# Patient Record
Sex: MALE | Race: BLACK OR AFRICAN AMERICAN | NOT HISPANIC OR LATINO | URBAN - METROPOLITAN AREA
[De-identification: names, ages, dates, MRNs, and addresses within clinical notes are randomized per-mention and may not be internally consistent; named-entity substitution may affect disease eponyms.]

---

## 2017-01-20 ENCOUNTER — EMERGENCY (EMERGENCY)
Facility: HOSPITAL | Age: 47
LOS: 0 days | Discharge: HOME | End: 2017-01-21
Attending: EMERGENCY MEDICINE
Payer: COMMERCIAL

## 2017-01-20 VITALS
SYSTOLIC BLOOD PRESSURE: 138 MMHG | RESPIRATION RATE: 19 BRPM | OXYGEN SATURATION: 99 % | DIASTOLIC BLOOD PRESSURE: 90 MMHG | TEMPERATURE: 98 F | HEIGHT: 73 IN | HEART RATE: 86 BPM | WEIGHT: 216.93 LBS

## 2017-01-20 DIAGNOSIS — E11.9 TYPE 2 DIABETES MELLITUS WITHOUT COMPLICATIONS: ICD-10-CM

## 2017-01-20 DIAGNOSIS — E78.00 PURE HYPERCHOLESTEROLEMIA, UNSPECIFIED: ICD-10-CM

## 2017-01-20 DIAGNOSIS — I10 ESSENTIAL (PRIMARY) HYPERTENSION: ICD-10-CM

## 2017-01-20 DIAGNOSIS — R31.9 HEMATURIA, UNSPECIFIED: ICD-10-CM

## 2017-01-20 DIAGNOSIS — Z79.4 LONG TERM (CURRENT) USE OF INSULIN: ICD-10-CM

## 2017-01-20 LAB
ALBUMIN SERPL ELPH-MCNC: 2.9 G/DL — LOW (ref 3.3–5)
ALP SERPL-CCNC: 539 U/L — HIGH (ref 40–120)
ALT FLD-CCNC: 199 U/L — HIGH (ref 12–78)
ANION GAP SERPL CALC-SCNC: 10 MMOL/L — SIGNIFICANT CHANGE UP (ref 5–17)
APPEARANCE UR: CLEAR — SIGNIFICANT CHANGE UP
AST SERPL-CCNC: 76 U/L — HIGH (ref 15–37)
BACTERIA # UR AUTO: ABNORMAL
BASOPHILS # BLD AUTO: 0.1 K/UL — SIGNIFICANT CHANGE UP (ref 0–0.2)
BASOPHILS NFR BLD AUTO: 0.8 % — SIGNIFICANT CHANGE UP (ref 0–2)
BILIRUB SERPL-MCNC: 0.3 MG/DL — SIGNIFICANT CHANGE UP (ref 0.2–1.2)
BILIRUB UR-MCNC: NEGATIVE — SIGNIFICANT CHANGE UP
BUN SERPL-MCNC: 16 MG/DL — SIGNIFICANT CHANGE UP (ref 7–23)
CALCIUM SERPL-MCNC: 9.2 MG/DL — SIGNIFICANT CHANGE UP (ref 8.5–10.1)
CHLORIDE SERPL-SCNC: 104 MMOL/L — SIGNIFICANT CHANGE UP (ref 96–108)
CO2 SERPL-SCNC: 26 MMOL/L — SIGNIFICANT CHANGE UP (ref 22–31)
COLOR SPEC: YELLOW — SIGNIFICANT CHANGE UP
CREAT SERPL-MCNC: 0.98 MG/DL — SIGNIFICANT CHANGE UP (ref 0.5–1.3)
DIFF PNL FLD: ABNORMAL
EOSINOPHIL # BLD AUTO: 0.3 K/UL — SIGNIFICANT CHANGE UP (ref 0–0.5)
EOSINOPHIL NFR BLD AUTO: 4.2 % — SIGNIFICANT CHANGE UP (ref 0–6)
EPI CELLS # UR: SIGNIFICANT CHANGE UP
GLUCOSE SERPL-MCNC: 215 MG/DL — HIGH (ref 70–99)
GLUCOSE UR QL: 1000 MG/DL
HCT VFR BLD CALC: 32.7 % — LOW (ref 39–50)
HGB BLD-MCNC: 11.5 G/DL — LOW (ref 13–17)
KETONES UR-MCNC: NEGATIVE — SIGNIFICANT CHANGE UP
LEUKOCYTE ESTERASE UR-ACNC: ABNORMAL
LYMPHOCYTES # BLD AUTO: 1.7 K/UL — SIGNIFICANT CHANGE UP (ref 1–3.3)
LYMPHOCYTES # BLD AUTO: 21.2 % — SIGNIFICANT CHANGE UP (ref 13–44)
MAGNESIUM SERPL-MCNC: 2.2 MG/DL — SIGNIFICANT CHANGE UP (ref 1.8–2.4)
MCHC RBC-ENTMCNC: 30.4 PG — SIGNIFICANT CHANGE UP (ref 27–34)
MCHC RBC-ENTMCNC: 35.2 GM/DL — SIGNIFICANT CHANGE UP (ref 32–36)
MCV RBC AUTO: 86.5 FL — SIGNIFICANT CHANGE UP (ref 80–100)
MONOCYTES # BLD AUTO: 0.9 K/UL — SIGNIFICANT CHANGE UP (ref 0–0.9)
MONOCYTES NFR BLD AUTO: 10.8 % — SIGNIFICANT CHANGE UP (ref 2–14)
NEUTROPHILS # BLD AUTO: 5.1 K/UL — SIGNIFICANT CHANGE UP (ref 1.8–7.4)
NEUTROPHILS NFR BLD AUTO: 62.9 % — SIGNIFICANT CHANGE UP (ref 43–77)
NITRITE UR-MCNC: NEGATIVE — SIGNIFICANT CHANGE UP
PH UR: 6 — SIGNIFICANT CHANGE UP (ref 4.8–8)
PLATELET # BLD AUTO: 324 K/UL — SIGNIFICANT CHANGE UP (ref 150–400)
POTASSIUM SERPL-MCNC: 4.3 MMOL/L — SIGNIFICANT CHANGE UP (ref 3.5–5.3)
POTASSIUM SERPL-SCNC: 4.3 MMOL/L — SIGNIFICANT CHANGE UP (ref 3.5–5.3)
PROT SERPL-MCNC: 7.7 GM/DL — SIGNIFICANT CHANGE UP (ref 6–8.3)
PROT UR-MCNC: 30 MG/DL
RBC # BLD: 3.78 M/UL — LOW (ref 4.2–5.8)
RBC # FLD: 10.7 % — LOW (ref 11–15)
RBC CASTS # UR COMP ASSIST: ABNORMAL /HPF (ref 0–4)
SODIUM SERPL-SCNC: 140 MMOL/L — SIGNIFICANT CHANGE UP (ref 135–145)
SP GR SPEC: 1.01 — SIGNIFICANT CHANGE UP (ref 1.01–1.02)
UROBILINOGEN FLD QL: 4 MG/DL
WBC # BLD: 8.1 K/UL — SIGNIFICANT CHANGE UP (ref 3.8–10.5)
WBC # FLD AUTO: 8.1 K/UL — SIGNIFICANT CHANGE UP (ref 3.8–10.5)
WBC UR QL: ABNORMAL

## 2017-01-20 PROCEDURE — 74177 CT ABD & PELVIS W/CONTRAST: CPT | Mod: 26

## 2017-01-20 PROCEDURE — 99285 EMERGENCY DEPT VISIT HI MDM: CPT

## 2017-01-20 NOTE — ED PROVIDER NOTE - PHYSICAL EXAMINATION
Gen: Alert, NAD  Head: NC, AT   Eyes: PERRL, EOMI, normal lids/conjunctiva  ENT: normal hearing, patent oropharynx without erythema/exudate, uvula midline  Neck: supple, no tenderness, Trachea midline  Pulm: Bilateral BS, normal resp effort, no wheeze/stridor/retractions  CV: RRR, no M/R/G, 2+ radial and dp pulses bl, no edema  Abd: soft, NT/ND, +BS, no hepatosplenomegaly  Mskel: extremities x4 with normal ROM and no joint effusions. no ctl spine ttp.   Skin: violaceous and erythematous papules on soles of feet, legs, and arms. none on palms. no lesions in the mucous membranes. none on torso.   Neuro: AAOx3, no sensory/motor deficits, CN 2-12 intact

## 2017-01-20 NOTE — ED PROVIDER NOTE - OBJECTIVE STATEMENT
Pertinent PMH/PSH/FHx/SHx and Review of Systems contained within:  46m hx htn, iddm, hld pw 1. hematuria - microscopy found by pmd today. no dysuria, penile trauma, discharge, perineal pain. 2. rash on bl feet, lets and arms - non itchy, non pruritic. no fever, chills, nausea, vomiting, abd pain. no new exposure to chemicals   Fh and Sh not otherwise contributory  ROS otherwise negative

## 2017-01-20 NOTE — ED PROVIDER NOTE - PROGRESS NOTE DETAILS
+RPR, was not treated, unable to reach patient, left a message to callback and mailgram sent today, will need treatment and further STI (HIV, etc.) testing unable to reach again, mailgram sent, reported to ADRIANO

## 2017-01-20 NOTE — ED ADULT TRIAGE NOTE - CHIEF COMPLAINT QUOTE
pt states he was seen by pmd and told that he has blood in his urine. pt also wants to be evaluated for generalized rash since Monday. pt has history of diabetes.

## 2017-01-20 NOTE — ED PROVIDER NOTE - MEDICAL DECISION MAKING DETAILS
hematuria may represent infection but unlikely given absence of symptoms. may represent bladder ca, also unlikely given age and patient is non smoker. perhaps nephritis? will check chem. rash disseminated gonorrhea? syphilis? will test for both. hematuria may represent infection but unlikely given absence of symptoms. may represent bladder ca, also unlikely given age and patient is non smoker. perhaps nephritis? will check chem. rash disseminated gonorrhea? syphilis? will test for both. patient has no pulmonary symptoms despite ct read. will have him follow with pmd for dedicated ct chest. will need cystoscopy for hematuria. will need follow up on lfts. would be worried about ca.

## 2017-01-20 NOTE — ED ADULT NURSE NOTE - OBJECTIVE STATEMENT
presented to ed from pmd to evaluate for blood in urine and upper abdominal pains with constipation. Red non itchy blotches on extreamities.

## 2017-01-21 VITALS
HEART RATE: 83 BPM | DIASTOLIC BLOOD PRESSURE: 94 MMHG | TEMPERATURE: 99 F | OXYGEN SATURATION: 95 % | SYSTOLIC BLOOD PRESSURE: 138 MMHG | RESPIRATION RATE: 18 BRPM

## 2017-01-22 LAB
CULTURE RESULTS: SIGNIFICANT CHANGE UP
RPR SER-TITR: (no result)
RPR SERPL-ACNC: REACTIVE
SPECIMEN SOURCE: SIGNIFICANT CHANGE UP
T PALLIDUM AB TITR SER: POSITIVE

## 2017-01-25 ENCOUNTER — EMERGENCY (EMERGENCY)
Facility: HOSPITAL | Age: 47
LOS: 0 days | Discharge: HOME | End: 2017-01-25
Attending: EMERGENCY MEDICINE
Payer: COMMERCIAL

## 2017-01-25 VITALS
HEIGHT: 73 IN | RESPIRATION RATE: 17 BRPM | DIASTOLIC BLOOD PRESSURE: 96 MMHG | HEART RATE: 100 BPM | OXYGEN SATURATION: 96 % | WEIGHT: 216.93 LBS | SYSTOLIC BLOOD PRESSURE: 153 MMHG | TEMPERATURE: 98 F

## 2017-01-25 VITALS
TEMPERATURE: 99 F | SYSTOLIC BLOOD PRESSURE: 142 MMHG | HEART RATE: 91 BPM | OXYGEN SATURATION: 97 % | RESPIRATION RATE: 17 BRPM | DIASTOLIC BLOOD PRESSURE: 85 MMHG

## 2017-01-25 DIAGNOSIS — Z79.4 LONG TERM (CURRENT) USE OF INSULIN: ICD-10-CM

## 2017-01-25 DIAGNOSIS — R31.9 HEMATURIA, UNSPECIFIED: ICD-10-CM

## 2017-01-25 DIAGNOSIS — I10 ESSENTIAL (PRIMARY) HYPERTENSION: ICD-10-CM

## 2017-01-25 DIAGNOSIS — E78.00 PURE HYPERCHOLESTEROLEMIA, UNSPECIFIED: ICD-10-CM

## 2017-01-25 DIAGNOSIS — A53.9 SYPHILIS, UNSPECIFIED: ICD-10-CM

## 2017-01-25 DIAGNOSIS — E11.9 TYPE 2 DIABETES MELLITUS WITHOUT COMPLICATIONS: ICD-10-CM

## 2017-01-25 LAB
C TRACH RRNA SPEC QL NAA+PROBE: SIGNIFICANT CHANGE UP
C TRACH RRNA SPEC QL NAA+PROBE: SIGNIFICANT CHANGE UP
GC AMPLIFICATION INTERPRETATION: SIGNIFICANT CHANGE UP
HIV 1 & 2 AB SERPL IA.RAPID: SIGNIFICANT CHANGE UP
N GONORRHOEA RRNA SPEC QL NAA+PROBE: SIGNIFICANT CHANGE UP
SPECIMEN SOURCE: SIGNIFICANT CHANGE UP

## 2017-01-25 PROCEDURE — 99284 EMERGENCY DEPT VISIT MOD MDM: CPT

## 2017-01-25 RX ORDER — PENICILLIN G BENZATHINE 1200000 [IU]/2ML
1.2 INJECTION, SUSPENSION INTRAMUSCULAR ONCE
Qty: 0 | Refills: 0 | Status: DISCONTINUED | OUTPATIENT
Start: 2017-01-25 | End: 2017-01-25

## 2017-01-25 RX ORDER — PENICILLIN G BENZATHINE 1200000 [IU]/2ML
2.4 INJECTION, SUSPENSION INTRAMUSCULAR ONCE
Qty: 0 | Refills: 0 | Status: COMPLETED | OUTPATIENT
Start: 2017-01-25 | End: 2017-01-25

## 2017-01-25 RX ADMIN — PENICILLIN G BENZATHINE 2.4 MILLION UNIT(S): 1200000 INJECTION, SUSPENSION INTRAMUSCULAR at 10:12

## 2017-01-25 RX ADMIN — Medication 100 MILLIGRAM(S): at 11:29

## 2017-01-25 NOTE — ED PROVIDER NOTE - MEDICAL DECISION MAKING DETAILS
Ddx: Confirmed syphillis, r/o HIV, already tested for other STI  Plan: Penicillin, HIV test, reiterated to pt need to have partner treated.

## 2017-08-02 ENCOUNTER — EMERGENCY (EMERGENCY)
Facility: HOSPITAL | Age: 47
LOS: 1 days | Discharge: HOME | End: 2017-08-02
Attending: EMERGENCY MEDICINE | Admitting: EMERGENCY MEDICINE
Payer: COMMERCIAL

## 2017-08-02 VITALS
DIASTOLIC BLOOD PRESSURE: 88 MMHG | OXYGEN SATURATION: 98 % | RESPIRATION RATE: 20 BRPM | SYSTOLIC BLOOD PRESSURE: 156 MMHG | HEART RATE: 96 BPM | TEMPERATURE: 99 F

## 2017-08-02 VITALS
SYSTOLIC BLOOD PRESSURE: 160 MMHG | TEMPERATURE: 98 F | DIASTOLIC BLOOD PRESSURE: 94 MMHG | HEART RATE: 103 BPM | OXYGEN SATURATION: 96 % | RESPIRATION RATE: 20 BRPM

## 2017-08-02 PROCEDURE — 99282 EMERGENCY DEPT VISIT SF MDM: CPT

## 2017-08-02 RX ORDER — CEPHALEXIN 500 MG
1 CAPSULE ORAL
Qty: 28 | Refills: 0
Start: 2017-08-02 | End: 2017-08-09

## 2017-08-02 NOTE — ED ADULT NURSE REASSESSMENT NOTE - NS ED NURSE REASSESS COMMENT FT1
Applied bacitracin under MD order to patients toe. Wrapped toe with gauze and cling. Patient states wrap feels comfortable- no redness to toes/decreased circulation/pain after wrap.

## 2017-08-02 NOTE — ED ADULT NURSE NOTE - CHPI ED SYMPTOMS NEG
no bleeding at site/no chills/no bleeding/no red streaks/no vomiting/no purulent drainage/no fever/no redness

## 2017-08-02 NOTE — ED PROVIDER NOTE - MEDICAL DECISION MAKING DETAILS
OPAL Izaguirre MD: Pt with PMH DM2 with blister between 2nd and 3rd L toes. Pt is concerned for infection. Plan is to give abx ppx, apply bacitracin and dressings and and to give wound care instructions. f/u with podiatrist and PMD recommended. Return precautions given to pt. Knows to f/u with PMD in 1-2 days and to return to the ED sooner for any worsening/concerning sx.

## 2017-08-02 NOTE — ED ADULT NURSE NOTE - OBJECTIVE STATEMENT
46M comes to ED c/o 46M comes to ED c/o left toe blister between 2nd and 3rd toes. He states yesterday he went to work and had no blisters, but when he got home from work, he found himself to have a bursted blister. He states he has slight pain to the toe. He cleaned the toe with betadine and wrapped it. He denies SOB/chest pain/N/V/D/Dizziness/abdominal pain/fever/chills/urinary symptoms. He has chronic numbness to toes. PMH hyperlipidemia, HTN and DM. He is a&ox4. On exam, patient has nontender soft abdomen, +pulses/motor/sensory all 4 extremities, open blister to left foot between second and third toe, neurologically intact. Will continue to monitor,

## 2017-08-02 NOTE — ED PROVIDER NOTE - PHYSICAL EXAMINATION
1x2cm open blister in web space between L 2nd and 3rd toes 1x2cm open blister in web space between L 2nd and 3rd toes. No surrounding erythema. No discharge. No closed blisters, bullae, petechiae, purpura.

## 2017-08-02 NOTE — ED PROVIDER NOTE - OBJECTIVE STATEMENT
Pt with PMH DM2 with blister between 2nd and 3rd L toes. Concerned for infection. Plan is to give ppx and give wound care instructions. f/y with podiatrist and PMD recommended.

## 2017-08-28 ENCOUNTER — EMERGENCY (EMERGENCY)
Facility: HOSPITAL | Age: 47
LOS: 1 days | End: 2017-08-28
Attending: EMERGENCY MEDICINE | Admitting: EMERGENCY MEDICINE
Payer: COMMERCIAL

## 2017-08-28 VITALS
OXYGEN SATURATION: 96 % | SYSTOLIC BLOOD PRESSURE: 151 MMHG | RESPIRATION RATE: 18 BRPM | DIASTOLIC BLOOD PRESSURE: 103 MMHG | TEMPERATURE: 98 F | HEART RATE: 83 BPM

## 2017-08-28 VITALS
HEART RATE: 92 BPM | TEMPERATURE: 99 F | DIASTOLIC BLOOD PRESSURE: 97 MMHG | SYSTOLIC BLOOD PRESSURE: 150 MMHG | RESPIRATION RATE: 20 BRPM | OXYGEN SATURATION: 97 %

## 2017-08-28 PROCEDURE — 99282 EMERGENCY DEPT VISIT SF MDM: CPT

## 2017-08-28 NOTE — ED PROVIDER NOTE - OBJECTIVE STATEMENT
46 M h/o DM, htn, p/w r great toe infection. Symptoms started 4 days ago with itching, applying otc anti-fungals with no relief. No also with toe nail dislocation and elevation. No fever/chills. no surrounding erythema. No drainage. Was unable to see his podiatrist so came to ED. denies pain, mild itching currently.

## 2017-08-28 NOTE — ED PROVIDER NOTE - SKIN COLOR
R toe with small crack at base of toe, no drainage, no surrounging erythema, also with superficial cracks in skin, toenail with disclocation and mild elevation, no streaking up leg or swelling noted, normal pulses

## 2017-08-28 NOTE — ED PROVIDER NOTE - MEDICAL DECISION MAKING DETAILS
46 M with fungal infection of toe with no evidence of cellulitis. Will tx for onychomycosis and podiatry follow up.

## 2017-08-28 NOTE — ED ADULT NURSE NOTE - PMH
Diabetes    DM (diabetes mellitus)    High cholesterol    HTN (hypertension)    HTN (hypertension)    Hyperlipidemia

## 2017-08-28 NOTE — ED PROVIDER NOTE - ATTENDING CONTRIBUTION TO CARE
emily quinones 46 yo c hx of dm, htn, rt great toe swelling, no redness, no spreading/streaking, 4 days. Pt unable to establish appt c pods until this week. No fever. No chills. Patient with onchymosis to toes. Requesting cream for nails.  mild fluctuance underneath left 1st nail with serous fluid expressed in medial cuticle, no purulence, non tender, no deformity, No pe findings of warmth/ redness, ncat, trachea midline, ctab, cooperative, mild  will get xray, will encourage  follow up with podiatry and dermatology, no acute life/limb-threatening infection, no gangrene   Will follow up on xray  reassess and disposition home as clinically indicated. emily quinones 48 yo c hx of dm, htn, rt great toe swelling, no redness, no spreading/streaking, 4 days. Pt unable to establish appt c pods until this week. No fever. No chills. Patient with oncomychosis to toes. Requesting cream for nails.  mild fluctuance underneath right 1st nail with serous fluid expressed in medial cuticle, intact pulses to dp and PT region, no purulence, non tender, no deformity, No pe findings of increased warmth/ redness, ncat, trachea midline, ctab, cooperative, mild  will get xray, will encourage  follow up with podiatry and dermatology, no acute life/limb-threatening infection, no gangrene   Will follow up on xray  reassess and disposition home as clinically indicated.    Patient eloped called for patient and no response

## 2017-08-28 NOTE — ED PROVIDER NOTE - PROGRESS NOTE DETAILS
Morad: XR had been ordered to r/o fx, patient called multiple times, patient no longer in ED, eloped prior to imaging

## 2017-08-28 NOTE — ED ADULT NURSE NOTE - OBJECTIVE STATEMENT
47 y/o male hx of DM, HTN HLD came in c/o pain to right big toe. As per it is swelling with discomfort, been using anti fungal med. Pt denies any fever/chill, no cp or SOB. Pulse present,

## 2018-03-31 ENCOUNTER — EMERGENCY (EMERGENCY)
Facility: HOSPITAL | Age: 48
LOS: 1 days | Discharge: HOME | End: 2018-03-31
Attending: EMERGENCY MEDICINE | Admitting: EMERGENCY MEDICINE
Payer: COMMERCIAL

## 2018-03-31 VITALS
RESPIRATION RATE: 16 BRPM | OXYGEN SATURATION: 100 % | TEMPERATURE: 98 F | SYSTOLIC BLOOD PRESSURE: 152 MMHG | DIASTOLIC BLOOD PRESSURE: 86 MMHG | HEART RATE: 95 BPM

## 2018-03-31 PROCEDURE — 73610 X-RAY EXAM OF ANKLE: CPT | Mod: 26,RT

## 2018-03-31 PROCEDURE — 99283 EMERGENCY DEPT VISIT LOW MDM: CPT

## 2018-03-31 PROCEDURE — 73630 X-RAY EXAM OF FOOT: CPT | Mod: 26,RT

## 2018-03-31 NOTE — ED PROVIDER NOTE - MEDICAL DECISION MAKING DETAILS
46 y/o male, s/p ankle injury 6 days prior. XR at that time demonstrated 2nd toe Fx with progressive pain and swelling. Evaluate for occult Fx vs sprain. Obtain XR ankle and foot r/o Fx. Anticipate D/C with crutches, Aircast, and scheduled podiatry f/u.

## 2018-03-31 NOTE — ED PROVIDER NOTE - PROGRESS NOTE DETAILS
MARIBEL Cheek: Discussed pt care with attending : 47yM w/ pmhx HTN, HLD, DM p/w right foot and ankle pain after twisting foot while walking on sand in [place] one week ago, was told he had fracture 2nd toe. Plan is to follow up xray, consult podiatry if needed. MARIBEL Cheek: Discussed with pt xray showing no acute fracture, placed pt in right ankle sprain, pt refused crutches. He has an appointment scheduled with his podiatrist for early next week. Agrees to d/c home with podiatry follow up

## 2018-03-31 NOTE — ED PROVIDER NOTE - PLAN OF CARE
Follow up with your primary care provider within 48-72 hours.    Recommend ortho follow up within the week. Referral list provided.   Rest, ice and elevate.  Take Motrin 600mg every 8hrs for pain with food.    Ambulate as tolerated using crutch assistance.   Worsening pain, swelling, numbness, weakness return to ER

## 2018-03-31 NOTE — ED ADULT TRIAGE NOTE - CHIEF COMPLAINT QUOTE
Pt injured his right ankle six days ago states he was told he has a fx in the toe pt c/o of worsening  pain. Pt inured his ankle on vacation.

## 2018-03-31 NOTE — ED PROVIDER NOTE - OBJECTIVE STATEMENT
46 y/o male, with PMHx of DM, HTN, and HLD, presents to the ED for right ankle pain x 6 days ago. Pt reports being, on vacation in [place], at the beach and twisting his ankle as he stepped in the sand. Visited nearby clinic and was Dx with Fx of 2nd toe. Has f/u with podiatry. Denies numbness, tingling, weakness, and any other complaints. No alcohol. No illicit drugs. Social alcohol use.

## 2018-03-31 NOTE — ED PROVIDER NOTE - LOWER EXTREMITY EXAM, RIGHT
Right ankle swelling with TTP over the medial malleolus at the posterior aspect at the distal 3 cm. Mild TTP at the 2nd toe. Antalgic gait but able to weight bear.

## 2018-03-31 NOTE — ED PROVIDER NOTE - CARE PLAN
Principal Discharge DX:	Ankle pain, right Principal Discharge DX:	Ankle pain, right  Assessment and plan of treatment:	Follow up with your primary care provider within 48-72 hours.    Recommend ortho follow up within the week. Referral list provided.   Rest, ice and elevate.  Take Motrin 600mg every 8hrs for pain with food.    Ambulate as tolerated using crutch assistance.   Worsening pain, swelling, numbness, weakness return to ER

## 2018-04-03 ENCOUNTER — APPOINTMENT (OUTPATIENT)
Dept: MRI IMAGING | Facility: CLINIC | Age: 48
End: 2018-04-03
Payer: COMMERCIAL

## 2018-04-03 ENCOUNTER — OUTPATIENT (OUTPATIENT)
Dept: OUTPATIENT SERVICES | Facility: HOSPITAL | Age: 48
LOS: 1 days | End: 2018-04-03
Payer: COMMERCIAL

## 2018-04-03 DIAGNOSIS — Z00.8 ENCOUNTER FOR OTHER GENERAL EXAMINATION: ICD-10-CM

## 2018-04-03 PROBLEM — Z00.00 ENCOUNTER FOR PREVENTIVE HEALTH EXAMINATION: Status: ACTIVE | Noted: 2018-04-03

## 2018-04-03 PROCEDURE — 73721 MRI JNT OF LWR EXTRE W/O DYE: CPT

## 2018-04-03 PROCEDURE — 73721 MRI JNT OF LWR EXTRE W/O DYE: CPT | Mod: 26,RT

## 2019-10-21 PROBLEM — E78.5 HYPERLIPIDEMIA, UNSPECIFIED: Chronic | Status: ACTIVE | Noted: 2017-08-02

## 2019-10-28 ENCOUNTER — APPOINTMENT (OUTPATIENT)
Dept: INTERNAL MEDICINE | Facility: CLINIC | Age: 49
End: 2019-10-28
Payer: COMMERCIAL

## 2019-10-28 VITALS
WEIGHT: 235 LBS | DIASTOLIC BLOOD PRESSURE: 86 MMHG | BODY MASS INDEX: 31.14 KG/M2 | HEIGHT: 73 IN | SYSTOLIC BLOOD PRESSURE: 122 MMHG | HEART RATE: 84 BPM | TEMPERATURE: 97.4 F

## 2019-10-28 DIAGNOSIS — Z78.9 OTHER SPECIFIED HEALTH STATUS: ICD-10-CM

## 2019-10-28 DIAGNOSIS — Z80.0 FAMILY HISTORY OF MALIGNANT NEOPLASM OF DIGESTIVE ORGANS: ICD-10-CM

## 2019-10-28 PROCEDURE — 99204 OFFICE O/P NEW MOD 45 MIN: CPT

## 2019-10-28 RX ORDER — ATORVASTATIN CALCIUM 10 MG/1
10 TABLET, FILM COATED ORAL
Qty: 90 | Refills: 0 | Status: ACTIVE | COMMUNITY
Start: 2019-10-28

## 2019-10-28 RX ORDER — ASPIRIN 81 MG/1
81 TABLET ORAL DAILY
Refills: 0 | Status: ACTIVE | COMMUNITY
Start: 2019-10-28

## 2019-10-28 RX ORDER — INSULIN ASPART 100 [IU]/ML
100 INJECTION, SOLUTION INTRAVENOUS; SUBCUTANEOUS
Refills: 0 | Status: ACTIVE | COMMUNITY
Start: 2019-10-28

## 2019-10-28 RX ORDER — NAPROXEN 500 MG/1
500 TABLET ORAL
Qty: 60 | Refills: 0 | Status: ACTIVE | COMMUNITY
Start: 2019-10-28

## 2019-10-28 RX ORDER — LOSARTAN POTASSIUM 100 MG/1
100 TABLET, FILM COATED ORAL
Qty: 90 | Refills: 0 | Status: ACTIVE | COMMUNITY
Start: 2019-10-28

## 2019-10-28 NOTE — REVIEW OF SYSTEMS
[Joint Pain] : joint pain [Joint Stiffness] : joint stiffness [Muscle Pain] : no muscle pain [Joint Swelling] : no joint swelling [Negative] : Heme/Lymph [FreeTextEntry9] : L knee pain see HPI

## 2019-10-28 NOTE — ASSESSMENT
[Patient NOT optimized for Surgery at this time] : Patient not optimized for surgery at this time [Other: _____] : [unfilled] [As per surgery] : as per surgery [FreeTextEntry4] : Mr. PRADO  is a 48 year-old male  with no significant history of coronary artery disease.  He  denies chest pain, palpitations or shortness of breath and  his EKG today is normal. His diabetes is uncontrolled and he will see the endocrinologist to lower the A1c prior to elective knee replacement.\par \par Mr. PRADO  is not medically optimized at this time.\par

## 2019-10-28 NOTE — PHYSICAL EXAM
[Normal] : normal gait, coordination grossly intact, no focal deficits [de-identified] : no rashes

## 2019-10-28 NOTE — HISTORY OF PRESENT ILLNESS
[No Pertinent Cardiac History] : no history of aortic stenosis, atrial fibrillation, coronary artery disease, recent myocardial infarction, or implantable device/pacemaker [No Pertinent Pulmonary History] : no history of asthma, COPD, sleep apnea, or smoking [No Adverse Anesthesia Reaction] : no adverse anesthesia reaction in self or family member [Chronic Anticoagulation] : no chronic anticoagulation [Chronic Kidney Disease] : no chronic kidney disease [Diabetes] : diabetes [(Patient denies any chest pain, claudication, dyspnea on exertion, orthopnea, palpitations or syncope)] : Patient denies any chest pain, claudication, dyspnea on exertion, orthopnea, palpitations or syncope [FreeTextEntry1] : L partial knee replacement [FreeTextEntry2] : 11/21 [FreeTextEntry3] : DR Samson [FreeTextEntry4] : Dear Dr. Samson : Thank you for referring Mr. PRADO for preoperative evaluation prior to L partial knee replacement on 11/21. He  is complaining of L knee  pain since August. Anti-inflammatories and physical therapy/injections are not helping any longer.\par PCP  dr Burk\par \par

## 2019-12-23 ENCOUNTER — TRANSCRIPTION ENCOUNTER (OUTPATIENT)
Age: 49
End: 2019-12-23

## 2019-12-24 ENCOUNTER — EMERGENCY (EMERGENCY)
Facility: HOSPITAL | Age: 49
LOS: 1 days | Discharge: HOME | End: 2019-12-24
Attending: EMERGENCY MEDICINE | Admitting: EMERGENCY MEDICINE
Payer: COMMERCIAL

## 2019-12-24 VITALS
SYSTOLIC BLOOD PRESSURE: 181 MMHG | RESPIRATION RATE: 16 BRPM | OXYGEN SATURATION: 98 % | HEART RATE: 85 BPM | DIASTOLIC BLOOD PRESSURE: 106 MMHG | TEMPERATURE: 98 F

## 2019-12-24 VITALS
OXYGEN SATURATION: 100 % | DIASTOLIC BLOOD PRESSURE: 92 MMHG | RESPIRATION RATE: 17 BRPM | HEART RATE: 81 BPM | SYSTOLIC BLOOD PRESSURE: 146 MMHG | TEMPERATURE: 99 F

## 2019-12-24 PROCEDURE — 99283 EMERGENCY DEPT VISIT LOW MDM: CPT

## 2019-12-24 NOTE — ED PROVIDER NOTE - OBJECTIVE STATEMENT
48 y/o male presents to ED w/ /90. Pt states his BP is usually 128/85. BP medications include Losartan (100mg) and Amlodipine. No changes to his BP medications recently. No CP or blurred vision but admits to palpitations, dull HA, and bloodshot eyes.

## 2019-12-24 NOTE — ED PROVIDER NOTE - CLINICAL SUMMARY MEDICAL DECISION MAKING FREE TEXT BOX
Pt w/ asymptomatic HTN. No red flags for HTN urgency or emergency. Extensive counseling about BP management and signs of severe BP. Will check BP and dc home. Pt w/ asymptomatic HTN. No red flags for HTN urgency or emergency. Extensive counseling about BP management and measuring as well as signs of severe BP. Will check BP and dc home.

## 2019-12-24 NOTE — ED ADULT TRIAGE NOTE - CHIEF COMPLAINT QUOTE
Pt presents with c/o hypertension. Pt also states that he has also had mild headaches but none at present. Pt states that he has been compliant with his rx'ed antihypertensive.

## 2019-12-24 NOTE — ED PROVIDER NOTE - PATIENT PORTAL LINK FT
You can access the FollowMyHealth Patient Portal offered by [facility] by registering at the following website: [email or web address]. By joining [facility]’s FollowMyHealth portal, you will also be able to view your health information using other applications (apps) compatible with our system.

## 2020-02-03 ENCOUNTER — APPOINTMENT (OUTPATIENT)
Dept: INTERNAL MEDICINE | Facility: CLINIC | Age: 50
End: 2020-02-03
Payer: COMMERCIAL

## 2020-02-03 VITALS
WEIGHT: 236 LBS | OXYGEN SATURATION: 98 % | TEMPERATURE: 97.7 F | BODY MASS INDEX: 31.28 KG/M2 | SYSTOLIC BLOOD PRESSURE: 120 MMHG | RESPIRATION RATE: 16 BRPM | HEIGHT: 73 IN | DIASTOLIC BLOOD PRESSURE: 70 MMHG | HEART RATE: 82 BPM

## 2020-02-03 DIAGNOSIS — Z01.818 ENCOUNTER FOR OTHER PREPROCEDURAL EXAMINATION: ICD-10-CM

## 2020-02-03 DIAGNOSIS — Z79.4 TYPE 2 DIABETES MELLITUS WITH OTHER DIABETIC NEUROLOGICAL COMPLICATION: ICD-10-CM

## 2020-02-03 DIAGNOSIS — I10 ESSENTIAL (PRIMARY) HYPERTENSION: ICD-10-CM

## 2020-02-03 DIAGNOSIS — M25.562 PAIN IN LEFT KNEE: ICD-10-CM

## 2020-02-03 DIAGNOSIS — E11.49 TYPE 2 DIABETES MELLITUS WITH OTHER DIABETIC NEUROLOGICAL COMPLICATION: ICD-10-CM

## 2020-02-03 DIAGNOSIS — R31.29 OTHER MICROSCOPIC HEMATURIA: ICD-10-CM

## 2020-02-03 DIAGNOSIS — E78.2 MIXED HYPERLIPIDEMIA: ICD-10-CM

## 2020-02-03 PROCEDURE — 99214 OFFICE O/P EST MOD 30 MIN: CPT

## 2020-02-03 RX ORDER — EMPAGLIFLOZIN 25 MG/1
25 TABLET, FILM COATED ORAL DAILY
Refills: 0 | Status: ACTIVE | COMMUNITY
Start: 2020-02-03

## 2020-02-03 RX ORDER — INSULIN GLARGINE 100 [IU]/ML
100 INJECTION, SOLUTION SUBCUTANEOUS AT BEDTIME
Refills: 0 | Status: DISCONTINUED | COMMUNITY
Start: 2019-10-28 | End: 2020-02-03

## 2020-02-03 RX ORDER — INSULIN DEGLUDEC INJECTION 100 U/ML
100 INJECTION, SOLUTION SUBCUTANEOUS AT BEDTIME
Refills: 0 | Status: ACTIVE | COMMUNITY
Start: 2020-02-03

## 2020-02-03 NOTE — ASSESSMENT
[Patient Optimized for Surgery] : Patient optimized for surgery [No Further Testing Recommended] : no further testing recommended [Other: _____] : [unfilled] [As per surgery] : as per surgery [FreeTextEntry4] : Mr. PRADO is a 49 year yo male with no h/o CAD and good exercise tolerance. He denies CP, palpitation or SOB and his EKG 11/2019 was  normal. He does not take blood thinners and denies sleep apnea or urinary obstruction symptoms. He does not have a h/o DVT. He will continue the prescription medications perioperatively as instructed. The morning of the procedure he will only take the BP   pill. He will take only 15 U Tresiba the night before and no DM rx the morning of procedure.\par \par Mr. PRADO has a moderate risk for perioperative cardiovascular complications and will undergo the procedure as planned. I will follow him postop.\par \par  ***More than 50% of the face to face time was devoted to counseling and/or coordination of care. The discussion and/or coordination of care included: perioperative medication management.\par \par

## 2020-02-03 NOTE — REVIEW OF SYSTEMS
[Joint Pain] : joint pain [Joint Stiffness] : joint stiffness [Negative] : Heme/Lymph [Muscle Pain] : no muscle pain [Joint Swelling] : no joint swelling [FreeTextEntry9] : L knee pain see HPI

## 2020-02-03 NOTE — PHYSICAL EXAM
[Normal] : normal gait, coordination grossly intact, no focal deficits [de-identified] : L knee c crepitations and tender ROM [de-identified] : no rashes

## 2020-02-03 NOTE — HISTORY OF PRESENT ILLNESS
[No Pertinent Cardiac History] : no history of aortic stenosis, atrial fibrillation, coronary artery disease, recent myocardial infarction, or implantable device/pacemaker [No Pertinent Pulmonary History] : no history of asthma, COPD, sleep apnea, or smoking [No Adverse Anesthesia Reaction] : no adverse anesthesia reaction in self or family member [Diabetes] : diabetes [(Patient denies any chest pain, claudication, dyspnea on exertion, orthopnea, palpitations or syncope)] : Patient denies any chest pain, claudication, dyspnea on exertion, orthopnea, palpitations or syncope [Chronic Anticoagulation] : no chronic anticoagulation [Chronic Kidney Disease] : no chronic kidney disease [FreeTextEntry1] : L partial knee replacement [FreeTextEntry2] : 2/11 [FreeTextEntry3] : DR Samson [FreeTextEntry4] : Dear Dr. Samson : Thank you for referring Mr. PRADO for preoperative evaluation prior to L partial knee replacement on 2/11. He  is complaining of L knee  pain since August. Anti-inflammatories and physical therapy/injections are not helping any longer. His initial surgey date was postponed for uncontrolled DM.\par PCP  dr Burk\par \par

## 2020-08-06 NOTE — ED ADULT NURSE NOTE - THOUGHTS OF HOMICIDE/VIOLENCE TOWARDS OTHERS YN, MLM
[email or web address] No

## 2021-04-02 ENCOUNTER — EMERGENCY (EMERGENCY)
Facility: HOSPITAL | Age: 51
LOS: 1 days | Discharge: HOME | End: 2021-04-02
Attending: STUDENT IN AN ORGANIZED HEALTH CARE EDUCATION/TRAINING PROGRAM | Admitting: STUDENT IN AN ORGANIZED HEALTH CARE EDUCATION/TRAINING PROGRAM
Payer: COMMERCIAL

## 2021-04-02 VITALS
TEMPERATURE: 97 F | RESPIRATION RATE: 16 BRPM | SYSTOLIC BLOOD PRESSURE: 174 MMHG | OXYGEN SATURATION: 100 % | HEIGHT: 73 IN | DIASTOLIC BLOOD PRESSURE: 95 MMHG | HEART RATE: 72 BPM

## 2021-04-02 DIAGNOSIS — S92.909A UNSPECIFIED FRACTURE OF UNSPECIFIED FOOT, INITIAL ENCOUNTER FOR CLOSED FRACTURE: Chronic | ICD-10-CM

## 2021-04-02 DIAGNOSIS — Z96.659 PRESENCE OF UNSPECIFIED ARTIFICIAL KNEE JOINT: Chronic | ICD-10-CM

## 2021-04-02 LAB
ALBUMIN SERPL ELPH-MCNC: 3.8 G/DL — SIGNIFICANT CHANGE UP (ref 3.3–5)
ALP SERPL-CCNC: 109 U/L — SIGNIFICANT CHANGE UP (ref 40–120)
ALT FLD-CCNC: 28 U/L — SIGNIFICANT CHANGE UP (ref 4–41)
ANION GAP SERPL CALC-SCNC: 13 MMOL/L — SIGNIFICANT CHANGE UP (ref 7–14)
AST SERPL-CCNC: 27 U/L — SIGNIFICANT CHANGE UP (ref 4–40)
BASOPHILS # BLD AUTO: 0.04 K/UL — SIGNIFICANT CHANGE UP (ref 0–0.2)
BASOPHILS NFR BLD AUTO: 0.5 % — SIGNIFICANT CHANGE UP (ref 0–2)
BILIRUB SERPL-MCNC: 0.3 MG/DL — SIGNIFICANT CHANGE UP (ref 0.2–1.2)
BUN SERPL-MCNC: 13 MG/DL — SIGNIFICANT CHANGE UP (ref 7–23)
CALCIUM SERPL-MCNC: 10.3 MG/DL — SIGNIFICANT CHANGE UP (ref 8.4–10.5)
CHLORIDE SERPL-SCNC: 102 MMOL/L — SIGNIFICANT CHANGE UP (ref 98–107)
CO2 SERPL-SCNC: 26 MMOL/L — SIGNIFICANT CHANGE UP (ref 22–31)
CREAT SERPL-MCNC: 1.06 MG/DL — SIGNIFICANT CHANGE UP (ref 0.5–1.3)
EOSINOPHIL # BLD AUTO: 0.26 K/UL — SIGNIFICANT CHANGE UP (ref 0–0.5)
EOSINOPHIL NFR BLD AUTO: 3.4 % — SIGNIFICANT CHANGE UP (ref 0–6)
GLUCOSE SERPL-MCNC: 103 MG/DL — HIGH (ref 70–99)
HCT VFR BLD CALC: 35.5 % — LOW (ref 39–50)
HGB BLD-MCNC: 11.8 G/DL — LOW (ref 13–17)
IANC: 4.47 K/UL — SIGNIFICANT CHANGE UP (ref 1.5–8.5)
IMM GRANULOCYTES NFR BLD AUTO: 0.4 % — SIGNIFICANT CHANGE UP (ref 0–1.5)
LYMPHOCYTES # BLD AUTO: 1.97 K/UL — SIGNIFICANT CHANGE UP (ref 1–3.3)
LYMPHOCYTES # BLD AUTO: 26 % — SIGNIFICANT CHANGE UP (ref 13–44)
MCHC RBC-ENTMCNC: 29.4 PG — SIGNIFICANT CHANGE UP (ref 27–34)
MCHC RBC-ENTMCNC: 33.2 GM/DL — SIGNIFICANT CHANGE UP (ref 32–36)
MCV RBC AUTO: 88.5 FL — SIGNIFICANT CHANGE UP (ref 80–100)
MONOCYTES # BLD AUTO: 0.81 K/UL — SIGNIFICANT CHANGE UP (ref 0–0.9)
MONOCYTES NFR BLD AUTO: 10.7 % — SIGNIFICANT CHANGE UP (ref 2–14)
NEUTROPHILS # BLD AUTO: 4.47 K/UL — SIGNIFICANT CHANGE UP (ref 1.8–7.4)
NEUTROPHILS NFR BLD AUTO: 59 % — SIGNIFICANT CHANGE UP (ref 43–77)
NRBC # BLD: 0 /100 WBCS — SIGNIFICANT CHANGE UP
NRBC # FLD: 0 K/UL — SIGNIFICANT CHANGE UP
PLATELET # BLD AUTO: 371 K/UL — SIGNIFICANT CHANGE UP (ref 150–400)
POTASSIUM SERPL-MCNC: 4.5 MMOL/L — SIGNIFICANT CHANGE UP (ref 3.5–5.3)
POTASSIUM SERPL-SCNC: 4.5 MMOL/L — SIGNIFICANT CHANGE UP (ref 3.5–5.3)
PROT SERPL-MCNC: 7.8 G/DL — SIGNIFICANT CHANGE UP (ref 6–8.3)
RBC # BLD: 4.01 M/UL — LOW (ref 4.2–5.8)
RBC # FLD: 11.3 % — SIGNIFICANT CHANGE UP (ref 10.3–14.5)
SODIUM SERPL-SCNC: 141 MMOL/L — SIGNIFICANT CHANGE UP (ref 135–145)
WBC # BLD: 7.58 K/UL — SIGNIFICANT CHANGE UP (ref 3.8–10.5)
WBC # FLD AUTO: 7.58 K/UL — SIGNIFICANT CHANGE UP (ref 3.8–10.5)

## 2021-04-02 PROCEDURE — 99285 EMERGENCY DEPT VISIT HI MDM: CPT

## 2021-04-02 PROCEDURE — 70450 CT HEAD/BRAIN W/O DYE: CPT | Mod: 26

## 2021-04-02 NOTE — ED PROVIDER NOTE - PSH
No significant past surgical history    No significant past surgical history     Foot fracture    H/O total knee replacement
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
## 2021-04-02 NOTE — ED PROVIDER NOTE - NSFOLLOWUPINSTRUCTIONS_ED_ALL_ED_FT
Rest, drink plenty of fluids.  Advance activity as tolerated.  Continue all previously prescribed medications as directed.  Follow up with your primary care physician in 48-72 hours- bring copies of your results.  Return to the ER for worsening or persistent symptoms, and/or ANY NEW OR CONCERNING SYMPTOMS. If you have issues obtaining follow up, please call: [phone] to obtain a doctor or specialist who takes your insurance in your area.

## 2021-04-02 NOTE — ED ADULT NURSE NOTE - OBJECTIVE STATEMENT
pt received to room intake #10a with c/o HTN. pt states he took his BP at home on a wrist meter and got 145/113, states he was worried so came to the ed. denies cp, sob, n/v/d, h/a and visual changes. IV placed, labs drawn and sent, pt to go to [facility]W.
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
## 2021-04-02 NOTE — ED PROVIDER NOTE - PROGRESS NOTE DETAILS
MARIBEL Napoles: pt signed out to PA Kendra follow up CT head and reassess. MARIBEL PATTERSON: Patient signed out to me by MARIBEL Napoles to f/u CT head. Patient reassessed, sitting comfortably in chair in NAD, denies any complaints. States feeling better, symptoms improved. CT head neg acute finding. Discussed with attending, Dr. Ron, patient can be discharged home to f/u with PCP. The patient was given verbal and written discharge instructions. Specifically, instructions when to return to the ED and when to seek follow-up from their pcp was discussed. Any specialty follow-up was discussed, including how to make an appointment.  Instructions were discussed in simple, plain language and was understood by the patient. The patient understands that should their symptoms worsen or any new symptoms arise, they should return to the ED immediately for further evaluation. All pt's questions were answered. Patient verbalizes understanding.

## 2021-04-02 NOTE — ED PROVIDER NOTE - CARE PLAN
Principal Discharge DX:	Elevated blood pressure reading  Secondary Diagnosis:	Peripheral neuropathy

## 2021-04-02 NOTE — ED ADULT TRIAGE NOTE - CHIEF COMPLAINT QUOTE
Pt presents to ED for high blood pressure of 178/114 at home. Denies chest pain, dizziness, palpitations, headaches. pmhx HTN, DM, HLD. Pt has no symptoms at this time.
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
## 2021-04-02 NOTE — ED PROVIDER NOTE - NS ED ROS FT
denies fever, chills, chest pain, SOB, abdominal pain, diarrhea, dysuria, syncope, bleeding, new rash, weakness, + chronic LE numbness, blurred vision    ROS  otherwise negative as per HPI

## 2021-04-02 NOTE — ED PROVIDER NOTE - PATIENT PORTAL LINK FT
You can access the FollowMyHealth Patient Portal offered by [facility] by registering at the following website: [email or web address]. By joining [facility]’s FollowMyHealth portal, you will also be able to view your health information using other applications (apps) compatible with our system.

## 2021-04-02 NOTE — ED ADULT NURSE NOTE - NSIMPLEMENTINTERV_GEN_ALL_ED
Implemented All Universal Safety Interventions:  [place] to call system. Call bell, personal items and telephone within reach. Instruct patient to call for assistance. Room bathroom lighting operational. Non-slip footwear when patient is off stretcher. Physically safe environment: no spills, clutter or unnecessary equipment. Stretcher in lowest position, wheels locked, appropriate side rails in place.

## 2021-04-03 VITALS — HEART RATE: 71 BPM | DIASTOLIC BLOOD PRESSURE: 74 MMHG | SYSTOLIC BLOOD PRESSURE: 158 MMHG

## 2021-04-10 ENCOUNTER — INPATIENT (INPATIENT)
Facility: HOSPITAL | Age: 51
LOS: 24 days | Discharge: NURSING, REHAB OR LONG-TERM CARE | End: 2021-05-05
Attending: GENERAL ACUTE CARE HOSPITAL | Admitting: GENERAL ACUTE CARE HOSPITAL
Payer: COMMERCIAL

## 2021-04-10 VITALS
RESPIRATION RATE: 16 BRPM | DIASTOLIC BLOOD PRESSURE: 97 MMHG | HEART RATE: 97 BPM | HEIGHT: 73 IN | TEMPERATURE: 98 F | OXYGEN SATURATION: 97 % | SYSTOLIC BLOOD PRESSURE: 155 MMHG

## 2021-04-10 DIAGNOSIS — Z96.659 PRESENCE OF UNSPECIFIED ARTIFICIAL KNEE JOINT: Chronic | ICD-10-CM

## 2021-04-10 DIAGNOSIS — S92.909A UNSPECIFIED FRACTURE OF UNSPECIFIED FOOT, INITIAL ENCOUNTER FOR CLOSED FRACTURE: Chronic | ICD-10-CM

## 2021-04-10 LAB
ALBUMIN SERPL ELPH-MCNC: 4.2 G/DL — SIGNIFICANT CHANGE UP (ref 3.3–5)
ALP SERPL-CCNC: 82 U/L — SIGNIFICANT CHANGE UP (ref 40–120)
ALT FLD-CCNC: 27 U/L — SIGNIFICANT CHANGE UP (ref 4–41)
ANION GAP SERPL CALC-SCNC: 10 MMOL/L — SIGNIFICANT CHANGE UP (ref 7–14)
ANION GAP SERPL CALC-SCNC: 13 MMOL/L — SIGNIFICANT CHANGE UP (ref 7–14)
AST SERPL-CCNC: 18 U/L — SIGNIFICANT CHANGE UP (ref 4–40)
BASE EXCESS BLDV CALC-SCNC: 2.7 MMOL/L — HIGH (ref -3–2)
BASOPHILS # BLD AUTO: 0.02 K/UL — SIGNIFICANT CHANGE UP (ref 0–0.2)
BASOPHILS NFR BLD AUTO: 0.2 % — SIGNIFICANT CHANGE UP (ref 0–2)
BILIRUB SERPL-MCNC: 0.5 MG/DL — SIGNIFICANT CHANGE UP (ref 0.2–1.2)
BLOOD GAS VENOUS - CREATININE: 1.2 MG/DL — SIGNIFICANT CHANGE UP (ref 0.5–1.3)
BLOOD GAS VENOUS COMPREHENSIVE RESULT: SIGNIFICANT CHANGE UP
BLOOD GAS VENOUS COMPREHENSIVE RESULT: SIGNIFICANT CHANGE UP
BUN SERPL-MCNC: 20 MG/DL — SIGNIFICANT CHANGE UP (ref 7–23)
BUN SERPL-MCNC: 22 MG/DL — SIGNIFICANT CHANGE UP (ref 7–23)
CALCIUM SERPL-MCNC: 8.6 MG/DL — SIGNIFICANT CHANGE UP (ref 8.4–10.5)
CALCIUM SERPL-MCNC: 9.4 MG/DL — SIGNIFICANT CHANGE UP (ref 8.4–10.5)
CHLORIDE BLDV-SCNC: 98 MMOL/L — SIGNIFICANT CHANGE UP (ref 96–108)
CHLORIDE SERPL-SCNC: 89 MMOL/L — LOW (ref 98–107)
CHLORIDE SERPL-SCNC: 92 MMOL/L — LOW (ref 98–107)
CO2 SERPL-SCNC: 24 MMOL/L — SIGNIFICANT CHANGE UP (ref 22–31)
CO2 SERPL-SCNC: 25 MMOL/L — SIGNIFICANT CHANGE UP (ref 22–31)
CREAT SERPL-MCNC: 1.07 MG/DL — SIGNIFICANT CHANGE UP (ref 0.5–1.3)
CREAT SERPL-MCNC: 1.13 MG/DL — SIGNIFICANT CHANGE UP (ref 0.5–1.3)
EOSINOPHIL # BLD AUTO: 0.06 K/UL — SIGNIFICANT CHANGE UP (ref 0–0.5)
EOSINOPHIL NFR BLD AUTO: 0.6 % — SIGNIFICANT CHANGE UP (ref 0–6)
GAS PNL BLDV: 127 MMOL/L — LOW (ref 136–146)
GLUCOSE BLDV-MCNC: 224 MG/DL — HIGH (ref 70–99)
GLUCOSE SERPL-MCNC: 216 MG/DL — HIGH (ref 70–99)
GLUCOSE SERPL-MCNC: 281 MG/DL — HIGH (ref 70–99)
HCO3 BLDV-SCNC: 26 MMOL/L — SIGNIFICANT CHANGE UP (ref 20–27)
HCT VFR BLD CALC: 39.6 % — SIGNIFICANT CHANGE UP (ref 39–50)
HCT VFR BLDA CALC: 40.6 % — SIGNIFICANT CHANGE UP (ref 39–51)
HGB BLD CALC-MCNC: 13.2 G/DL — SIGNIFICANT CHANGE UP (ref 13–17)
HGB BLD-MCNC: 13.6 G/DL — SIGNIFICANT CHANGE UP (ref 13–17)
IANC: 8.12 K/UL — SIGNIFICANT CHANGE UP (ref 1.5–8.5)
IMM GRANULOCYTES NFR BLD AUTO: 0.3 % — SIGNIFICANT CHANGE UP (ref 0–1.5)
LACTATE BLDV-MCNC: 1.6 MMOL/L — SIGNIFICANT CHANGE UP (ref 0.5–2)
LYMPHOCYTES # BLD AUTO: 1.59 K/UL — SIGNIFICANT CHANGE UP (ref 1–3.3)
LYMPHOCYTES # BLD AUTO: 14.7 % — SIGNIFICANT CHANGE UP (ref 13–44)
MAGNESIUM SERPL-MCNC: 2.5 MG/DL — SIGNIFICANT CHANGE UP (ref 1.6–2.6)
MCHC RBC-ENTMCNC: 28.9 PG — SIGNIFICANT CHANGE UP (ref 27–34)
MCHC RBC-ENTMCNC: 34.3 GM/DL — SIGNIFICANT CHANGE UP (ref 32–36)
MCV RBC AUTO: 84.3 FL — SIGNIFICANT CHANGE UP (ref 80–100)
MONOCYTES # BLD AUTO: 1 K/UL — HIGH (ref 0–0.9)
MONOCYTES NFR BLD AUTO: 9.2 % — SIGNIFICANT CHANGE UP (ref 2–14)
NEUTROPHILS # BLD AUTO: 8.12 K/UL — HIGH (ref 1.8–7.4)
NEUTROPHILS NFR BLD AUTO: 75 % — SIGNIFICANT CHANGE UP (ref 43–77)
NRBC # BLD: 0 /100 WBCS — SIGNIFICANT CHANGE UP
NRBC # FLD: 0 K/UL — SIGNIFICANT CHANGE UP
PCO2 BLDV: 43 MMHG — SIGNIFICANT CHANGE UP (ref 42–55)
PH BLDV: 7.41 — SIGNIFICANT CHANGE UP (ref 7.32–7.43)
PHOSPHATE SERPL-MCNC: 2.8 MG/DL — SIGNIFICANT CHANGE UP (ref 2.5–4.5)
PLATELET # BLD AUTO: 388 K/UL — SIGNIFICANT CHANGE UP (ref 150–400)
PO2 BLDV: 35 MMHG — SIGNIFICANT CHANGE UP (ref 35–40)
POTASSIUM BLDV-SCNC: 4.2 MMOL/L — SIGNIFICANT CHANGE UP (ref 3.4–4.5)
POTASSIUM SERPL-MCNC: 4.2 MMOL/L — SIGNIFICANT CHANGE UP (ref 3.5–5.3)
POTASSIUM SERPL-MCNC: 4.7 MMOL/L — SIGNIFICANT CHANGE UP (ref 3.5–5.3)
POTASSIUM SERPL-SCNC: 4.2 MMOL/L — SIGNIFICANT CHANGE UP (ref 3.5–5.3)
POTASSIUM SERPL-SCNC: 4.7 MMOL/L — SIGNIFICANT CHANGE UP (ref 3.5–5.3)
PROT SERPL-MCNC: 7.8 G/DL — SIGNIFICANT CHANGE UP (ref 6–8.3)
RBC # BLD: 4.7 M/UL — SIGNIFICANT CHANGE UP (ref 4.2–5.8)
RBC # FLD: 11.3 % — SIGNIFICANT CHANGE UP (ref 10.3–14.5)
SAO2 % BLDV: 61.4 % — SIGNIFICANT CHANGE UP (ref 60–85)
SODIUM SERPL-SCNC: 126 MMOL/L — LOW (ref 135–145)
SODIUM SERPL-SCNC: 127 MMOL/L — LOW (ref 135–145)
WBC # BLD: 10.82 K/UL — HIGH (ref 3.8–10.5)
WBC # FLD AUTO: 10.82 K/UL — HIGH (ref 3.8–10.5)

## 2021-04-10 RX ORDER — LOSARTAN POTASSIUM 100 MG/1
1 TABLET, FILM COATED ORAL
Qty: 0 | Refills: 0 | DISCHARGE

## 2021-04-10 RX ORDER — INSULIN LISPRO 100/ML
VIAL (ML) SUBCUTANEOUS AT BEDTIME
Refills: 0 | Status: DISCONTINUED | OUTPATIENT
Start: 2021-04-10 | End: 2021-04-11

## 2021-04-10 RX ORDER — IBUPROFEN 200 MG
600 TABLET ORAL ONCE
Refills: 0 | Status: COMPLETED | OUTPATIENT
Start: 2021-04-10 | End: 2021-04-10

## 2021-04-10 RX ORDER — DEXTROSE 50 % IN WATER 50 %
15 SYRINGE (ML) INTRAVENOUS ONCE
Refills: 0 | Status: DISCONTINUED | OUTPATIENT
Start: 2021-04-10 | End: 2021-05-05

## 2021-04-10 RX ORDER — SODIUM CHLORIDE 9 MG/ML
2000 INJECTION INTRAMUSCULAR; INTRAVENOUS; SUBCUTANEOUS ONCE
Refills: 0 | Status: COMPLETED | OUTPATIENT
Start: 2021-04-10 | End: 2021-04-10

## 2021-04-10 RX ORDER — GLUCAGON INJECTION, SOLUTION 0.5 MG/.1ML
1 INJECTION, SOLUTION SUBCUTANEOUS ONCE
Refills: 0 | Status: DISCONTINUED | OUTPATIENT
Start: 2021-04-10 | End: 2021-05-05

## 2021-04-10 RX ORDER — DEXTROSE 50 % IN WATER 50 %
12.5 SYRINGE (ML) INTRAVENOUS ONCE
Refills: 0 | Status: DISCONTINUED | OUTPATIENT
Start: 2021-04-10 | End: 2021-05-05

## 2021-04-10 RX ORDER — DEXTROSE 50 % IN WATER 50 %
25 SYRINGE (ML) INTRAVENOUS ONCE
Refills: 0 | Status: DISCONTINUED | OUTPATIENT
Start: 2021-04-10 | End: 2021-05-05

## 2021-04-10 RX ORDER — SIMVASTATIN 20 MG/1
1 TABLET, FILM COATED ORAL
Qty: 0 | Refills: 0 | DISCHARGE

## 2021-04-10 RX ORDER — ACETAMINOPHEN 500 MG
650 TABLET ORAL ONCE
Refills: 0 | Status: COMPLETED | OUTPATIENT
Start: 2021-04-10 | End: 2021-04-10

## 2021-04-10 RX ORDER — INSULIN LISPRO 100/ML
VIAL (ML) SUBCUTANEOUS
Refills: 0 | Status: DISCONTINUED | OUTPATIENT
Start: 2021-04-10 | End: 2021-04-11

## 2021-04-10 RX ORDER — SODIUM CHLORIDE 9 MG/ML
1000 INJECTION, SOLUTION INTRAVENOUS
Refills: 0 | Status: DISCONTINUED | OUTPATIENT
Start: 2021-04-10 | End: 2021-05-05

## 2021-04-10 RX ORDER — SODIUM CHLORIDE 9 MG/ML
1000 INJECTION INTRAMUSCULAR; INTRAVENOUS; SUBCUTANEOUS
Refills: 0 | Status: DISCONTINUED | OUTPATIENT
Start: 2021-04-10 | End: 2021-04-12

## 2021-04-10 RX ORDER — SODIUM CHLORIDE 9 MG/ML
1000 INJECTION INTRAMUSCULAR; INTRAVENOUS; SUBCUTANEOUS ONCE
Refills: 0 | Status: COMPLETED | OUTPATIENT
Start: 2021-04-10 | End: 2021-04-10

## 2021-04-10 RX ORDER — METFORMIN HYDROCHLORIDE 850 MG/1
1 TABLET ORAL
Qty: 0 | Refills: 0 | DISCHARGE

## 2021-04-10 RX ORDER — KETOROLAC TROMETHAMINE 30 MG/ML
30 SYRINGE (ML) INJECTION ONCE
Refills: 0 | Status: DISCONTINUED | OUTPATIENT
Start: 2021-04-10 | End: 2021-04-10

## 2021-04-10 RX ADMIN — Medication 650 MILLIGRAM(S): at 22:09

## 2021-04-10 RX ADMIN — Medication 30 MILLIGRAM(S): at 22:20

## 2021-04-10 RX ADMIN — SODIUM CHLORIDE 2000 MILLILITER(S): 9 INJECTION INTRAMUSCULAR; INTRAVENOUS; SUBCUTANEOUS at 14:01

## 2021-04-10 RX ADMIN — Medication 650 MILLIGRAM(S): at 14:12

## 2021-04-10 RX ADMIN — Medication 600 MILLIGRAM(S): at 14:12

## 2021-04-10 RX ADMIN — Medication 600 MILLIGRAM(S): at 22:09

## 2021-04-10 RX ADMIN — SODIUM CHLORIDE 1000 MILLILITER(S): 9 INJECTION INTRAMUSCULAR; INTRAVENOUS; SUBCUTANEOUS at 18:59

## 2021-04-10 NOTE — ED PROVIDER NOTE - NS ED ROS FT
GENERAL: No fever, chills  EYES: no vision changes, no discharge.   ENT: no difficulty swallowing or speaking   CARDIAC: no chest pain/pressure, SOB, lower extremity swelling  PULMONARY: no cough, SOB  GI: +abdominal pain, +diarrhea  : no dysuria  SKIN: no rashes  NEURO: no headache, lightheadedness, paresthesia  MSK: No joint pain, myalgia, weakness.

## 2021-04-10 NOTE — ED CDU PROVIDER INITIAL DAY NOTE - DETAILS
Hyponatremia in setting of fluid losses due to diarrhea secondary to laxative use; plan:  IV hydration, supportive care, AM labs, general observation care / monitoring.

## 2021-04-10 NOTE — ED ADULT TRIAGE NOTE - CHIEF COMPLAINT QUOTE
Pt presents to ED from home with c/o abdominal pain and dehydration. Pt had been feeling constipated x 1 week and took magnesium citrate last night which made him go but this AM pt has been feeling weak with muscle cramping. Pt was seen by MD who advised pt to come to ED for further eval. Elda Mcpherson (wife) [phone]

## 2021-04-10 NOTE — ED CDU PROVIDER INITIAL DAY NOTE - PMH
Diabetes    DM (diabetes mellitus)    High cholesterol    HTN (hypertension)    HTN (hypertension)    Hyperlipidemia     DM (diabetes mellitus)    HTN (hypertension)    Hyperlipidemia

## 2021-04-10 NOTE — ED PROVIDER NOTE - NSFOLLOWUPINSTRUCTIONS_ED_ALL_ED_FT
You were seen in the ED for diarrhea. This was likely caused by taking too many laxatives. The diarrhea caused dehydration and hyponatremia - low sodium. We recommended admission to replete your sodium but you opted to leave against medical advice.    Avoid drinking too much free fluid -- try salty fluids like soup to replete your sodium.    Your blood pressure was high. Make sure you take your BP medication and follow up with your primary doctor.    Please follow up with your PCP in 2-3 days. Return to the ED if you experience any worsening or new symptoms or any symptoms that concern you, including fevers, chills, shortness of breath, chest pain, seizures, numbness, weakness, syncope. You were seen in the ED for diarrhea. This was likely caused by taking too many laxatives. The diarrhea caused dehydration and hyponatremia - low sodium.    Your blood pressure was high. Make sure you take your BP medication and follow up with your primary doctor.    Please follow up with your PCP in 2-3 days. Return to the ED if you experience any worsening or new symptoms or any symptoms that concern you, including fevers, chills, shortness of breath, chest pain, seizures, numbness, weakness, syncope.

## 2021-04-10 NOTE — ED PROVIDER NOTE - PHYSICAL EXAMINATION
Omaira Cuba MD  GENERAL: Patient awake alert NAD.  HEENT: NC/AT, Moist mucous membranes, PERRL, EOMI.  LUNGS: CTAB, no wheezes or crackles.   CARDIAC: RRR, no m/r/g.    ABDOMEN: Soft, NT, ND, No rebound, guarding. No CVA tenderness.   EXT: No edema. No calf tenderness. CV 2+DP/PT bilaterally.   MSK: no pain with movement, no deformities.  NEURO: A&Ox3. Moving all extremities.  SKIN: Warm and dry. No rash.  PSYCH: Normal affect.

## 2021-04-10 NOTE — ED CDU PROVIDER INITIAL DAY NOTE - OBJECTIVE STATEMENT
Pt is a 51 yo M with HTN and DM who presents with abd cramping and diarrhea. Pt has had constipation for 1 week. Yesterday he took 3 miralax packets, 2 fleet enemas, and 1 mag citrate, resulting in perfuse (about 10 episodes) diarrhea. He endorses mild to moderate lower abd cramping and continuing lighter episodes of diarrhea. His muscles feel weak and he feels dehydrated. He denies abd surgery, blood in stool, N/V, fevers. Pt had normal colonoscopy 1 yr ago and has GI follow up in 1 week. His pcp sent him in to r/o acute abd pathology    CDU MARIBEL Parisi Note------  ED Provider HPI as above, reviewed.  Pt. is a 51 yo male, PMH DM, HTN, who presented to the ED c/o abdominal cramping, generalized, and diarrhea following use of laxatives as above for constipation.  In the ED, WBC 10.82, Hb 13.6, CMP: sodium 126, chloride 89, glucose 281, CMP otherwise unremarkable.  VBG: Lactate 2.6 (later downtrended to 1.6).  Pt. was treated with IV hydration and dispo'd to CDU for continued care plan:  IV hydration, supportive care, AM labs, general observation care / monitoring.  On CDU evaluation, pt denies any abdominal pain, discomfort, or nausea.  CDU care plan discussed with pt who verbalizes agreement with plan. Pt is a 49 yo M with HTN and DM who presents with abd cramping and diarrhea. Pt has had constipation for 1 week. Yesterday he took 3 miralax packets, 2 fleet enemas, and 1 mag citrate, resulting in perfuse (about 10 episodes) diarrhea. He endorses mild to moderate lower abd cramping and continuing lighter episodes of diarrhea. His muscles feel weak and he feels dehydrated. He denies abd surgery, blood in stool, N/V, fevers. Pt had normal colonoscopy 1 yr ago and has GI follow up in 1 week. His pcp sent him in to r/o acute abd pathology    CDU MARIBEL Parisi Note------  ED Provider HPI as above, reviewed.  Pt. is a 49 yo male, PMH DM, HTN, hyperlipidemia; pt presented to the ED c/o abdominal cramping, generalized, and diarrhea following use of laxatives as above for constipation.  In the ED, WBC 10.82, Hb 13.6, CMP: sodium 126, chloride 89, glucose 281, CMP otherwise unremarkable.  VBG: Lactate 2.6 (later downtrended to 1.6).  Pt. was treated with IV hydration and dispo'd to CDU for continued care plan:  IV hydration, supportive care, AM labs, general observation care / monitoring.  On CDU evaluation, pt denies any abdominal pain, discomfort, or nausea.  CDU care plan discussed with pt who verbalizes agreement with plan.  Of note, pt states he took his home dose of Tresiba 20 units prior to ED arrival.  No dizziness, lightheadedness reported.

## 2021-04-10 NOTE — ED PROVIDER NOTE - ATTENDING CONTRIBUTION TO CARE
Dr. Gaitan:  I have personally performed a face to face bedside history and physical examination of this patient. I have discussed the history, examination, review of systems, assessment and plan of management with the resident. I have reviewed the electronic medical record and amended it to reflect my history, review of systems, physical exam, assessment and plan.    50M h/o HTN, DM, presents with abdominal cramping and generalized weakness.  Had constipation x 1 week, took miralax x 3, fleet enemas x 2, and magnesium citrate x 1, and subsequently had diarrhea.  Reportedly had normal colonoscopy 1 year ago, has GI follow up scheduled next week.      Exam:  - nad  - rrr  - ctab   -abd soft ntnd    A/P  - abd pain and diarrhea, likely secondary to laxatives; r/o electrolyte abnl, rehydrate  - cbc, cmp, mg, phos, IVF

## 2021-04-10 NOTE — ED PROVIDER NOTE - PATIENT PORTAL LINK FT
You can access the FollowMyHealth Patient Portal offered by [facility] by registering at the following website: [email or web address]. By joining [employer]’s FollowMyHealth portal, you will also be able to view your health information using other applications (apps) compatible with our system.

## 2021-04-10 NOTE — ED PROVIDER NOTE - PROGRESS NOTE DETAILS
spoke with pt and his wife about risks and benefits of admission vs leaving and they insist they do not want to stay bc they are afraid of covid. they understand the return precautions had a lengthy discussion with pt and his wife about CDU vs AMA and pt decided to stay to correct his hyponatremia.

## 2021-04-10 NOTE — ED PROVIDER NOTE - OBJECTIVE STATEMENT
Pt is a 51 yo M with HTN and DM who presents with abd cramping and diarrhea. Pt has had constipation for 1 week. Yesterday he took 3 miralax packets, 2 fleet enemas, and 1 mag citrate, resulting in perfuse (about 10 episodes) diarrhea. He endorses mild to moderate lower abd cramping and continuing lighter episodes of diarrhea. His muscles feel weak and he feels dehydrated. He denies abd surgery, blood in stool, N/V, fevers. Pt had normal colonoscopy 1 yr ago and has GI follow up in 1 week. His pcp sent him in to r/o acute abd pathology

## 2021-04-10 NOTE — ED PROVIDER NOTE - CLINICAL SUMMARY MEDICAL DECISION MAKING FREE TEXT BOX
Rosa Elena: Pt is a 49 yo M with HTN and DM who presents with abd cramping and diarrhea. Most likely diarrhea 2/2 laxative use. gallbladder pathology, appendicitis, and SBO unlikely. will give fluids and check cbc, cmp for electrolyte imbalance

## 2021-04-10 NOTE — ED ADULT NURSE REASSESSMENT NOTE - NS ED NURSE REASSESS COMMENT FT1
Pt /106, Dr. Bloch aware and at bedside.
Pt aa&ox4 presenting to ED for abdominal cramping and loose stools x 3 days. Pt states prior to current symptoms pt was constipated. Pt was advised by PCP to take a few different medications to help with constipation. Pt denies black or bloody stools, ua symptoms, fevers. 20 IV placed in RT AC, labs sent. Will monitor.
Pt c/o back pain, requesting pain medication. Attending Jimena aware, awaiting orders.
Pt hypertensive. Dr. Bloch aware. Awaiting orders.
Patient complaining of back pain from sitting in the recliner. Patient breathing even and nonlabored. Patient medicated as ordered. Safety maintained. Patient stable upon exiting the room.

## 2021-04-11 DIAGNOSIS — E11.65 TYPE 2 DIABETES MELLITUS WITH HYPERGLYCEMIA: ICD-10-CM

## 2021-04-11 DIAGNOSIS — E87.1 HYPO-OSMOLALITY AND HYPONATREMIA: ICD-10-CM

## 2021-04-11 DIAGNOSIS — I10 ESSENTIAL (PRIMARY) HYPERTENSION: ICD-10-CM

## 2021-04-11 LAB
A1C WITH ESTIMATED AVERAGE GLUCOSE RESULT: 11.5 % — HIGH (ref 4–5.6)
ALBUMIN SERPL ELPH-MCNC: 3.8 G/DL — SIGNIFICANT CHANGE UP (ref 3.3–5)
ALP SERPL-CCNC: 83 U/L — SIGNIFICANT CHANGE UP (ref 40–120)
ALT FLD-CCNC: 24 U/L — SIGNIFICANT CHANGE UP (ref 4–41)
ANION GAP SERPL CALC-SCNC: 10 MMOL/L — SIGNIFICANT CHANGE UP (ref 7–14)
ANION GAP SERPL CALC-SCNC: 12 MMOL/L — SIGNIFICANT CHANGE UP (ref 7–14)
APPEARANCE UR: CLEAR — SIGNIFICANT CHANGE UP
AST SERPL-CCNC: 20 U/L — SIGNIFICANT CHANGE UP (ref 4–40)
BASOPHILS # BLD AUTO: 0.02 K/UL — SIGNIFICANT CHANGE UP (ref 0–0.2)
BASOPHILS NFR BLD AUTO: 0.2 % — SIGNIFICANT CHANGE UP (ref 0–2)
BILIRUB SERPL-MCNC: 0.5 MG/DL — SIGNIFICANT CHANGE UP (ref 0.2–1.2)
BILIRUB UR-MCNC: NEGATIVE — SIGNIFICANT CHANGE UP
BLOOD GAS VENOUS COMPREHENSIVE RESULT: SIGNIFICANT CHANGE UP
BUN SERPL-MCNC: 16 MG/DL — SIGNIFICANT CHANGE UP (ref 7–23)
BUN SERPL-MCNC: 17 MG/DL — SIGNIFICANT CHANGE UP (ref 7–23)
CALCIUM SERPL-MCNC: 8.4 MG/DL — SIGNIFICANT CHANGE UP (ref 8.4–10.5)
CALCIUM SERPL-MCNC: 8.6 MG/DL — SIGNIFICANT CHANGE UP (ref 8.4–10.5)
CHLORIDE SERPL-SCNC: 92 MMOL/L — LOW (ref 98–107)
CHLORIDE SERPL-SCNC: 93 MMOL/L — LOW (ref 98–107)
CHLORIDE UR-SCNC: 90 MMOL/L — SIGNIFICANT CHANGE UP
CO2 SERPL-SCNC: 20 MMOL/L — LOW (ref 22–31)
CO2 SERPL-SCNC: 24 MMOL/L — SIGNIFICANT CHANGE UP (ref 22–31)
COLOR SPEC: SIGNIFICANT CHANGE UP
CREAT SERPL-MCNC: 0.86 MG/DL — SIGNIFICANT CHANGE UP (ref 0.5–1.3)
CREAT SERPL-MCNC: 0.94 MG/DL — SIGNIFICANT CHANGE UP (ref 0.5–1.3)
DIFF PNL FLD: ABNORMAL
EOSINOPHIL # BLD AUTO: 0.08 K/UL — SIGNIFICANT CHANGE UP (ref 0–0.5)
EOSINOPHIL NFR BLD AUTO: 0.8 % — SIGNIFICANT CHANGE UP (ref 0–6)
ESTIMATED AVERAGE GLUCOSE: 283 MG/DL — HIGH (ref 68–114)
GLUCOSE SERPL-MCNC: 216 MG/DL — HIGH (ref 70–99)
GLUCOSE SERPL-MCNC: 244 MG/DL — HIGH (ref 70–99)
GLUCOSE UR QL: ABNORMAL
HCT VFR BLD CALC: 43.5 % — SIGNIFICANT CHANGE UP (ref 39–50)
HGB BLD-MCNC: 15 G/DL — SIGNIFICANT CHANGE UP (ref 13–17)
IANC: 7.43 K/UL — SIGNIFICANT CHANGE UP (ref 1.5–8.5)
IMM GRANULOCYTES NFR BLD AUTO: 0.4 % — SIGNIFICANT CHANGE UP (ref 0–1.5)
KETONES UR-MCNC: ABNORMAL
LEUKOCYTE ESTERASE UR-ACNC: NEGATIVE — SIGNIFICANT CHANGE UP
LYMPHOCYTES # BLD AUTO: 1.73 K/UL — SIGNIFICANT CHANGE UP (ref 1–3.3)
LYMPHOCYTES # BLD AUTO: 17.2 % — SIGNIFICANT CHANGE UP (ref 13–44)
MAGNESIUM SERPL-MCNC: 2.3 MG/DL — SIGNIFICANT CHANGE UP (ref 1.6–2.6)
MAGNESIUM SERPL-MCNC: 2.4 MG/DL — SIGNIFICANT CHANGE UP (ref 1.6–2.6)
MCHC RBC-ENTMCNC: 29.1 PG — SIGNIFICANT CHANGE UP (ref 27–34)
MCHC RBC-ENTMCNC: 34.5 GM/DL — SIGNIFICANT CHANGE UP (ref 32–36)
MCV RBC AUTO: 84.3 FL — SIGNIFICANT CHANGE UP (ref 80–100)
MONOCYTES # BLD AUTO: 0.78 K/UL — SIGNIFICANT CHANGE UP (ref 0–0.9)
MONOCYTES NFR BLD AUTO: 7.7 % — SIGNIFICANT CHANGE UP (ref 2–14)
NEUTROPHILS # BLD AUTO: 7.43 K/UL — HIGH (ref 1.8–7.4)
NEUTROPHILS NFR BLD AUTO: 73.7 % — SIGNIFICANT CHANGE UP (ref 43–77)
NITRITE UR-MCNC: NEGATIVE — SIGNIFICANT CHANGE UP
NRBC # BLD: 0 /100 WBCS — SIGNIFICANT CHANGE UP
NRBC # FLD: 0 K/UL — SIGNIFICANT CHANGE UP
OSMOLALITY SERPL: 281 MOSM/KG — SIGNIFICANT CHANGE UP (ref 275–295)
OSMOLALITY UR: 465 MOSM/KG — SIGNIFICANT CHANGE UP (ref 50–1200)
PH UR: 6.5 — SIGNIFICANT CHANGE UP (ref 5–8)
PHOSPHATE SERPL-MCNC: 2.2 MG/DL — LOW (ref 2.5–4.5)
PHOSPHATE SERPL-MCNC: 2.4 MG/DL — LOW (ref 2.5–4.5)
PLATELET # BLD AUTO: 338 K/UL — SIGNIFICANT CHANGE UP (ref 150–400)
POTASSIUM SERPL-MCNC: 4.5 MMOL/L — SIGNIFICANT CHANGE UP (ref 3.5–5.3)
POTASSIUM SERPL-MCNC: 4.5 MMOL/L — SIGNIFICANT CHANGE UP (ref 3.5–5.3)
POTASSIUM SERPL-SCNC: 4.5 MMOL/L — SIGNIFICANT CHANGE UP (ref 3.5–5.3)
POTASSIUM SERPL-SCNC: 4.5 MMOL/L — SIGNIFICANT CHANGE UP (ref 3.5–5.3)
POTASSIUM UR-SCNC: 25.1 MMOL/L — SIGNIFICANT CHANGE UP
PROT SERPL-MCNC: 7.9 G/DL — SIGNIFICANT CHANGE UP (ref 6–8.3)
PROT UR-MCNC: ABNORMAL
RBC # BLD: 5.16 M/UL — SIGNIFICANT CHANGE UP (ref 4.2–5.8)
RBC # FLD: 11.5 % — SIGNIFICANT CHANGE UP (ref 10.3–14.5)
SARS-COV-2 RNA SPEC QL NAA+PROBE: SIGNIFICANT CHANGE UP
SODIUM SERPL-SCNC: 125 MMOL/L — LOW (ref 135–145)
SODIUM SERPL-SCNC: 126 MMOL/L — LOW (ref 135–145)
SODIUM UR-SCNC: 104 MMOL/L — SIGNIFICANT CHANGE UP
SP GR SPEC: 1.01 — SIGNIFICANT CHANGE UP (ref 1.01–1.02)
TSH SERPL-MCNC: 2.29 UIU/ML — SIGNIFICANT CHANGE UP (ref 0.27–4.2)
UROBILINOGEN FLD QL: SIGNIFICANT CHANGE UP
WBC # BLD: 10.08 K/UL — SIGNIFICANT CHANGE UP (ref 3.8–10.5)
WBC # FLD AUTO: 10.08 K/UL — SIGNIFICANT CHANGE UP (ref 3.8–10.5)

## 2021-04-11 PROCEDURE — 99255 IP/OBS CONSLTJ NEW/EST HI 80: CPT

## 2021-04-11 RX ORDER — AMLODIPINE BESYLATE 2.5 MG/1
5 TABLET ORAL ONCE
Refills: 0 | Status: COMPLETED | OUTPATIENT
Start: 2021-04-11 | End: 2021-04-11

## 2021-04-11 RX ORDER — INSULIN LISPRO 100/ML
VIAL (ML) SUBCUTANEOUS
Refills: 0 | Status: DISCONTINUED | OUTPATIENT
Start: 2021-04-11 | End: 2021-05-05

## 2021-04-11 RX ORDER — POTASSIUM PHOSPHATE, MONOBASIC POTASSIUM PHOSPHATE, DIBASIC 236; 224 MG/ML; MG/ML
30 INJECTION, SOLUTION INTRAVENOUS ONCE
Refills: 0 | Status: COMPLETED | OUTPATIENT
Start: 2021-04-11 | End: 2021-04-11

## 2021-04-11 RX ORDER — LIDOCAINE 4 G/100G
1 CREAM TOPICAL ONCE
Refills: 0 | Status: COMPLETED | OUTPATIENT
Start: 2021-04-11 | End: 2021-04-11

## 2021-04-11 RX ORDER — INSULIN LISPRO 100/ML
VIAL (ML) SUBCUTANEOUS AT BEDTIME
Refills: 0 | Status: DISCONTINUED | OUTPATIENT
Start: 2021-04-11 | End: 2021-05-05

## 2021-04-11 RX ORDER — ACETAMINOPHEN 500 MG
650 TABLET ORAL ONCE
Refills: 0 | Status: COMPLETED | OUTPATIENT
Start: 2021-04-11 | End: 2021-04-11

## 2021-04-11 RX ORDER — LOSARTAN POTASSIUM 100 MG/1
100 TABLET, FILM COATED ORAL DAILY
Refills: 0 | Status: DISCONTINUED | OUTPATIENT
Start: 2021-04-11 | End: 2021-04-19

## 2021-04-11 RX ORDER — KETOROLAC TROMETHAMINE 30 MG/ML
30 SYRINGE (ML) INJECTION ONCE
Refills: 0 | Status: DISCONTINUED | OUTPATIENT
Start: 2021-04-11 | End: 2021-04-11

## 2021-04-11 RX ORDER — ACETAMINOPHEN 500 MG
650 TABLET ORAL EVERY 6 HOURS
Refills: 0 | Status: DISCONTINUED | OUTPATIENT
Start: 2021-04-11 | End: 2021-05-05

## 2021-04-11 RX ORDER — DIAZEPAM 5 MG
5 TABLET ORAL ONCE
Refills: 0 | Status: DISCONTINUED | OUTPATIENT
Start: 2021-04-11 | End: 2021-04-11

## 2021-04-11 RX ORDER — INSULIN LISPRO 100/ML
10 VIAL (ML) SUBCUTANEOUS
Refills: 0 | Status: DISCONTINUED | OUTPATIENT
Start: 2021-04-11 | End: 2021-04-16

## 2021-04-11 RX ORDER — INSULIN GLARGINE 100 [IU]/ML
20 INJECTION, SOLUTION SUBCUTANEOUS AT BEDTIME
Refills: 0 | Status: DISCONTINUED | OUTPATIENT
Start: 2021-04-11 | End: 2021-04-14

## 2021-04-11 RX ADMIN — Medication 650 MILLIGRAM(S): at 10:05

## 2021-04-11 RX ADMIN — Medication 650 MILLIGRAM(S): at 21:23

## 2021-04-11 RX ADMIN — Medication 10 UNIT(S): at 13:32

## 2021-04-11 RX ADMIN — SODIUM CHLORIDE 150 MILLILITER(S): 9 INJECTION INTRAMUSCULAR; INTRAVENOUS; SUBCUTANEOUS at 09:15

## 2021-04-11 RX ADMIN — LIDOCAINE 1 PATCH: 4 CREAM TOPICAL at 21:38

## 2021-04-11 RX ADMIN — Medication 1: at 09:08

## 2021-04-11 RX ADMIN — Medication 5 MILLIGRAM(S): at 09:35

## 2021-04-11 RX ADMIN — LOSARTAN POTASSIUM 100 MILLIGRAM(S): 100 TABLET, FILM COATED ORAL at 09:35

## 2021-04-11 RX ADMIN — Medication 10 UNIT(S): at 17:45

## 2021-04-11 RX ADMIN — INSULIN GLARGINE 20 UNIT(S): 100 INJECTION, SOLUTION SUBCUTANEOUS at 22:37

## 2021-04-11 RX ADMIN — Medication: at 13:32

## 2021-04-11 RX ADMIN — Medication 30 MILLIGRAM(S): at 07:02

## 2021-04-11 RX ADMIN — SODIUM CHLORIDE 150 MILLILITER(S): 9 INJECTION INTRAMUSCULAR; INTRAVENOUS; SUBCUTANEOUS at 01:45

## 2021-04-11 RX ADMIN — POTASSIUM PHOSPHATE, MONOBASIC POTASSIUM PHOSPHATE, DIBASIC 83.33 MILLIMOLE(S): 236; 224 INJECTION, SOLUTION INTRAVENOUS at 17:45

## 2021-04-11 RX ADMIN — Medication 650 MILLIGRAM(S): at 09:35

## 2021-04-11 RX ADMIN — AMLODIPINE BESYLATE 5 MILLIGRAM(S): 2.5 TABLET ORAL at 01:54

## 2021-04-11 NOTE — ED CDU PROVIDER DISPOSITION NOTE - CLINICAL COURSE
50 y/ M with a PMHx DM, HTN, hyperlipidemia; pt presented to the ED c/o abdominal cramping, generalized, and diarrhea following use of laxatives as above for constipation.  In the ED, WBC 10.82, Hb 13.6, CMP: sodium 126, chloride 89, glucose 281, CMP otherwise unremarkable.  VBG: Lactate 2.6 (later downtrended to 1.6).  Pt. was treated with IV hydration and dispo'd to CDU for continued care plan:  IV hydration, supportive care, AM labs, general observation care / monitoring.  This morning patient reporting worsening weakness and tingling to b/l upper arms to fingers, fatigue, and weakness/ tingling to left thigh. No further abdominal pain or diarrhea.   this am sodium trending down 125, despite hydration.   A1C, endo evaluated recs were given Lantus.. and Admelog...  labs and urine studies added on will admit for further management hydration. 50 y/ M with a PMHx DM, HTN, hyperlipidemia; pt presented to the ED c/o abdominal cramping, generalized, and diarrhea following use of laxatives as above for constipation.  In the ED, WBC 10.82, Hb 13.6, CMP: sodium 126, chloride 89, glucose 281, CMP otherwise unremarkable.  VBG: Lactate 2.6 (later downtrended to 1.6).  Pt. was treated with IV hydration and dispo'd to CDU for continued care plan:  IV hydration, supportive care, AM labs, general observation care / monitoring.  This morning patient reporting worsening weakness and tingling to b/l upper arms to fingers, fatigue, and weakness/ tingling to left thigh. No further abdominal pain or diarrhea.   this am sodium trending down 125, despite hydration.   A1C, endo evaluated recs were given [id number]. and [id number].  labs and urine studies added on will admit for further management hydration. Dr. Burk request Dr Garza admission. Can Admit to Dr. Garza

## 2021-04-11 NOTE — ED CDU PROVIDER SUBSEQUENT DAY NOTE - MEDICAL DECISION MAKING DETAILS
50 y/ M with a PMHx DM, HTN, hyperlipidemia; pt presented to the ED c/o abdominal cramping, generalized, and diarrhea following use of laxatives as above for constipation.  In the ED, WBC 10.82, Hb 13.6, CMP: sodium 126, chloride 89, glucose 281, CMP otherwise unremarkable.  VBG: Lactate 2.6 (later downtrended to 1.6).  Pt. was treated with IV hydration and dispo'd to CDU for continued care plan:  IV hydration, supportive care, AM labs, general observation care / monitoring.  This morning patient reporting worsening weakness and tingling to b/l upper arms to fingers, fatigue, and weakness/ tingling to left thigh. No further abdominal pain or diarrhea.   this am sodium trending down 125, despite hydration.   A1C, endo evaluated recs were given Lantus.. and Admelog...  labs and urine studies added on will admit for further management hydration.

## 2021-04-11 NOTE — CONSULT NOTE ADULT - PROBLEM SELECTOR PROBLEM 3
PT AOX3, admitted for left side cramps. Pt is on dialysis, Lt upper arm AV Fistula strong bruit and thrill. Pt was given multiple medication for spasms, heat pack applied. Pt had no relief from either interventions. Rt FA 20" saline lock intact with no sign of infiltration. Will continue to monitor. Hyponatremia

## 2021-04-11 NOTE — ED ADULT NURSE NOTE - CHIEF COMPLAINT QUOTE
Pt presents to ED from home with c/o abdominal pain and dehydration. Pt had been feeling constipated x 1 week and took magnesium citrate last night which made him go but this AM pt has been feeling weak with muscle cramping. Pt was seen by MD who advised pt to come to ED for further eval. Elda Mcpherson (wife) [phone]

## 2021-04-11 NOTE — CONSULT NOTE ADULT - ASSESSMENT
51 yo male, PMH DM, HTN, hyperlipidemia; pt presented to the ED c/o abdominal cramping, generalized, and diarrhea following use of laxatives as above for constipation.  In the ED, WBC 10.82, Hb 13.6, CMP: sodium 126, chloride 89, glucose 281, CMP otherwise unremarkable.  VBG: Lactate 2.6 (later downtrended to 1.6).  Pt. was treated with IV hydration and dispo'd to CDU for continued care plan:  IV hydration, supportive care, AM labs, general observation care / monitoring. pt continued to have generalized weakness , numbness and on labs persistent hyponatermia      A/P:  Hyponatremia:  Etiology?  Likely sec to hypovolemia+hyperglycemia, but work up suggest SIADH  Na has not improved significantly with IVF but didn't decrease. Has giovanny getting NS since last night  Continue NS at present  Repeat Na level-stat  Call me with result so I can adjust the fluid at [phone]  Repeat Urine Na, osmolality  Get TSH, serum cortisol in AM  Monitor Na level Q12  Na level should not increase more than 6meq in 24 hrs    Proteinuia/Hematuria:  Etiology?  Had Cystoscopy last year and was normal per patient  Possible sec to Diabetic Nephropathy but will need full vasculitis work up  Renal US, PIERO, C3, C4, Hep B, C, ANCA, HIV, RPR, Serum immunofixation, SPEP, serum free light chain  Based on work up result he might need kidney biopsy, which can be planned as outpatient as his renal function is stable  D/W patient in detail above plan    Hypophosphatemia:  Supplement K-phos 15mmol one dose

## 2021-04-11 NOTE — CONSULT NOTE ADULT - ASSESSMENT
50M uncontrolled DM2 HbA1c 11.5%, hyperglycemia related to difficulties obtaining adequate insulin during pandemic due to insurance/cost.  Endocrine team consulted for uncontrolled diabetes. Patient is high risk with high level decision making due to uncontrolled diabetes which places patient at high risk for cardiovascular and cerebrovascular events. Patient with lability of glucose requiring close monitoring and insulin adjustments.    1) DM2  Will be admitted to hospital for hyponatremia eval  Inpatient plan:  BG target 100-180 mg/dl  carb consistent diet  FS premeal and bedtime  Recommend Lantus 20 units qhs  Admelog 10/10/10  Admelog moderate scale premeal and moderate bedtime scale  RD consult  Discussed with patient regarding discharge planning, he now will have enought Tresiba and Novolog at home to proceed with these insulins.  DC on Tresiba and Novolog pens doses TBD. Discussed option of mixed insulin in case of future issues obtaining insulin.  Outpatient endocrine follow up Dr. Ban Velez    2) Hyponatremia  TSH normal  for completeness check Free T4 and 8AM cortisol tomorrow    3) Hypertension  currently uncontrolled  on amlodipine and losartan as outpatient  management per primary team    David Rojas MD  Division of Endocrinology  Pager: [phone]    If after 6PM or before 9AM, or on weekends/holidays, please call endocrine answering service for assistance ([phone]).  For nonurgent matters email [email or web address] for assistance.

## 2021-04-11 NOTE — H&P ADULT - ASSESSMENT
49 yo male, PMH DM, HTN, hyperlipidemia; pt presented to the ED c/o abdominal cramping, generalized, and diarrhea following use of laxatives as above for constipation.  In the ED, WBC 10.82, Hb 13.6, CMP: sodium 126, chloride 89, glucose 281, CMP otherwise unremarkable.  VBG: Lactate 2.6 (later downtrended to 1.6).  Pt. was treated with IV hydration and dispo'd to CDU for continued care plan:  IV hydration, supportive care, AM labs, general observation care / monitoring. pt continued to have generalized weakness , numbness and on labs persistent hyponatermia and now being admitted for furhter evlauation .  dnenies N/V/Abd pain   had bm   no urinary sx   no fever or chills   no cough   no cp /sob     - hyponatremia : check osmolalities   likely multifactorial including hyperglycemia ( pseudohyponatremia ) and pre renal sec to diarreah   ? element of ADH induced by pain   cont ivf   consult Renal   monitor     - DM: uncontrolled   appreciate endo input    -HTN : monitor     - numbness : likely nueropathic   consider neuro however : LLE proximal weakness   otherwise no concerns for spinal involvement     - hypophsphatemia : replete and monitor     - constipation : had had colonoscopy 2 yrs ago with Dr. Rae   will consult him  hold further bowel regimen     dvt proph   d/w pt at length   then with wife on phone   with CDU PA

## 2021-04-11 NOTE — ED CDU PROVIDER SUBSEQUENT DAY NOTE - HISTORY
50 y/ M with a PMHx DM, HTN, hyperlipidemia; pt presented to the ED c/o abdominal cramping, generalized, and diarrhea following use of laxatives as above for constipation.  In the ED, WBC 10.82, Hb 13.6, CMP: sodium 126, chloride 89, glucose 281, CMP otherwise unremarkable.  VBG: Lactate 2.6 (later downtrended to 1.6).  Pt. was treated with IV hydration and dispo'd to CDU for continued care plan:  IV hydration, supportive care, AM labs, general observation care / monitoring.  This morning patient reporting worsening weakness and tingling to b/l upper arms to fingers, fatigue, and weakness/ tingling to left thigh. No further abdominal pain or diarrhea.   this am sodium trending down 125, despite hydration.   A1C, endo evaluated recs were given Lantus.. and Admelog...

## 2021-04-11 NOTE — PROGRESS NOTE ADULT - SUBJECTIVE AND OBJECTIVE BOX
Patient is a 50y Male     Patient is a 50y old  Male who presents with a chief complaint of     HPI:  51 yo male, PMH DM, HTN, hyperlipidemia; pt presented to the ED c/o abdominal cramping, generalized, and diarrhea following use of laxatives as above for constipation.  In the ED, WBC 10.82, Hb 13.6, CMP: sodium 126, chloride 89, glucose 281, CMP otherwise unremarkable.  VBG: Lactate 2.6 (later downtrended to 1.6).  Pt. was treated with IV hydration and dispo'd to CDU for continued care plan:  IV hydration, supportive care, AM labs, general observation care / monitoring. pt continued to have generalized weakness , numbness and on labs persistent hyponatermia and now being admitted for furhter evlauation .  dnenies N/V/Abd pain   had bm   no urinary sx   no fever or chills   no cough   no cp /sob    (11 Apr 2021 15:50)      PAST MEDICAL & SURGICAL HISTORY:  DM (diabetes mellitus)    HTN (hypertension)    Hyperlipidemia    H/O total knee replacement    Foot fracture        MEDICATIONS  (STANDING):  dextrose 40% Gel 15 Gram(s) Oral once  dextrose 5%. 1000 milliLiter(s) (50 mL/Hr) IV Continuous <Continuous>  dextrose 5%. 1000 milliLiter(s) (100 mL/Hr) IV Continuous <Continuous>  dextrose 50% Injectable 25 Gram(s) IV Push once  dextrose 50% Injectable 12.5 Gram(s) IV Push once  dextrose 50% Injectable 25 Gram(s) IV Push once  glucagon  Injectable 1 milliGRAM(s) IntraMuscular once  insulin glargine Injectable (LANTUS) 20 Unit(s) SubCutaneous at bedtime  insulin lispro (ADMELOG) corrective regimen sliding scale   SubCutaneous three times a day before meals  insulin lispro (ADMELOG) corrective regimen sliding scale   SubCutaneous at bedtime  insulin lispro Injectable (ADMELOG) 10 Unit(s) SubCutaneous three times a day before meals  losartan 100 milliGRAM(s) Oral daily  sodium chloride 0.9%. 1000 milliLiter(s) (150 mL/Hr) IV Continuous <Continuous>      Allergies    No Known Drug Allergies  shellfish (Urticaria)    Intolerances        SOCIAL HISTORY:  Denies ETOh,Smoking,     FAMILY HISTORY:  No pertinent family history in first degree relatives        REVIEW OF SYSTEMS:    CONSTITUTIONAL: No weakness, fevers or chills  EYES/ENT: No visual changes;  No vertigo or throat pain   NECK: No pain or stiffness  RESPIRATORY: No cough, wheezing, hemoptysis; No shortness of breath  CARDIOVASCULAR: No chest pain or palpitations  GASTROINTESTINAL: No abdominal or epigastric pain. No nausea, vomiting, or hematemesis; No diarrhea or constipation. No melena or hematochezia.  GENITOURINARY: No dysuria, frequency or hematuria  NEUROLOGICAL: No numbness or weakness  SKIN: No itching, burning, rashes, or lesions   All other review of systems is negative unless indicated above.    VITAL:  T(C): , Max: 37.1 (04-11-21 @ 16:31)  T(F): , Max: 98.7 (04-11-21 @ 16:31)  HR: 80 (04-11-21 @ 16:31)  BP: 150/101 (04-11-21 @ 16:31)  BP(mean): --  RR: 17 (04-11-21 @ 16:31)  SpO2: 100% (04-11-21 @ 16:31)  Wt(kg): --    I and O's:        PHYSICAL EXAM:    Constitutional: NAD  HEENT: PERRLA,   Neck: No JVD  Respiratory: CTA B/L  Cardiovascular: S1 and S2  Gastrointestinal: BS+, soft, NT/ND  Extremities: No peripheral edema  Neurological: A/O x 3, no focal deficits  Psychiatric: Normal mood, normal affect  : No Jose  Skin: No rashes  Access: Not applicable  Back: No CVA tenderness    LABS:                        15.0   10.08 )-----------( 338      ( 11 Apr 2021 07:40 )             43.5     04-11    126<L>  |  92<L>  |  16  ----------------------------<  244<H>  4.5   |  24  |  0.94    Ca    8.4      11 Apr 2021 11:43  Phos  2.4     04-11  Mg     2.3     04-11    TPro  7.9  /  Alb  3.8  /  TBili  0.5  /  DBili  x   /  AST  20  /  ALT  24  /  AlkPhos  83  04-11          RADIOLOGY & ADDITIONAL STUDIES:

## 2021-04-11 NOTE — CONSULT NOTE ADULT - SUBJECTIVE AND OBJECTIVE BOX
HPI: Pt. is a 51 yo male, PMH DM, HTN, hyperlipidemia; pt presented to the ED c/o abdominal cramping, generalized, and diarrhea following use of laxatives as above for constipation.  In the ED, WBC 10.82, Hb 13.6, CMP: sodium 126, chloride 89, glucose 281, CMP otherwise unremarkable.  VBG: Lactate 2.6 (later downtrended to 1.6).  Pt. was treated with IV hydration and dispo'd to CDU for continued care plan:  IV hydration, supportive care, AM labs, general observation care / monitoring.  On CDU evaluation, pt denies any abdominal pain, discomfort, or nausea.  CDU care plan discussed with pt who verbalizes agreement with plan.  Of note, pt states he took his home dose of Tresiba 20 units prior to ED arrival.  No dizziness, lightheadedness reported.    Endocrine history:  Long standing DM2, follows with endocrinologist Dr. Ban Velez as well as PCP. Due to pandemic was furloughed and still has medical insurance but lost prescription coverage. Has had issues obtaining insulin and was rationing insulin to make it last.  More recently found a program by [email or web address] to obtain insulin and now should have access to Tresiba and Novolog.  Usual insulin regimen is Tresiba 20 units qhs (took last dose before coming to hospital around 2 PM yesterday) and Novolog 15 units TID (he was using this 1-2 x per day instead of 3 x per day due to not having enough insulin).  Denies retinopathy. + Numbness in hands and feet. No known nephropathy.      PAST MEDICAL & SURGICAL HISTORY:  DM (diabetes mellitus)    HTN (hypertension)    Hyperlipidemia    H/O total knee replacement    Foot fracture        FAMILY HISTORY:  No pertinent family history in first degree relatives        Social History:   · Tobacco Usage	Never smoker    Outpatient Medications:   	amLODIPine: Last Dose Taken:  , orally once a day  · 	Tresiba 100 units/mL subcutaneous solution: Last Dose Taken:  , 20 units in the evening  · 	NovoLOG: Last Dose Taken:  ,  15 units subcutaneous injection TID with meals  · 	losartan 100 mg oral tablet: Last Dose Taken:  , 1 tab(s) orally once a day    MEDICATIONS  (STANDING):  dextrose 40% Gel 15 Gram(s) Oral once  dextrose 5%. 1000 milliLiter(s) (50 mL/Hr) IV Continuous <Continuous>  dextrose 5%. 1000 milliLiter(s) (100 mL/Hr) IV Continuous <Continuous>  dextrose 50% Injectable 25 Gram(s) IV Push once  dextrose 50% Injectable 12.5 Gram(s) IV Push once  dextrose 50% Injectable 25 Gram(s) IV Push once  glucagon  Injectable 1 milliGRAM(s) IntraMuscular once  insulin lispro (ADMELOG) corrective regimen sliding scale   SubCutaneous three times a day before meals  insulin lispro (ADMELOG) corrective regimen sliding scale   SubCutaneous at bedtime  losartan 100 milliGRAM(s) Oral daily  sodium chloride 0.9%. 1000 milliLiter(s) (150 mL/Hr) IV Continuous <Continuous>    MEDICATIONS  (PRN):      Allergies    No Known Drug Allergies  shellfish (Urticaria)    Intolerances      Review of Systems:  Constitutional: No fever  Eyes: No blurry vision  Neuro: No tremors  HEENT: No pain  Cardiovascular: No chest pain, palpitations  Respiratory: No SOB, no cough  GI: abdominal discomfort, constipation  : No dysuria  Skin: no rash  Psych: no depression  Endocrine: no polyuria, polydipsia  Hem/lymph: no swelling  Osteoporosis: no fractures    ALL OTHER SYSTEMS REVIEWED AND NEGATIVE      PHYSICAL EXAM:  VITALS: T(C): 36.9 (04-11-21 @ 12:05)  T(F): 98.5 (04-11-21 @ 12:05), Max: 98.6 (04-10-21 @ 17:47)  HR: 89 (04-11-21 @ 12:05) (82 - 97)  BP: 170/97 (04-11-21 @ 12:05) (155/97 - 198/98)  RR:  (16 - 18)  SpO2:  (97% - 100%)  Wt(kg): --  GENERAL: NAD, well-groomed, well-developed  EYES: No proptosis, no lid lag, anicteric  HEENT:  Atraumatic, Normocephalic, moist mucous membranes  THYROID: Normal size, no palpable nodules  RESPIRATORY: Clear to auscultation bilaterally; No rales, rhonchi, wheezing  CARDIOVASCULAR: Regular rate and rhythm; No murmurs; no peripheral edema  GI: Soft, mild distention, mildly tender lower abdomen  SKIN: Dry, intact, No rashes or lesions  MUSCULOSKELETAL: difficulty lifting legs fully  NEURO: sensation intact, extraocular movements intact, no tremor  PSYCH: Alert and oriented x 3, normal affect, normal mood  CUSHING'S SIGNS: no striae      CAPILLARY BLOOD GLUCOSE      POCT Blood Glucose.: 195 mg/dL (11 Apr 2021 08:57)  POCT Blood Glucose.: 214 mg/dL (10 Apr 2021 22:25)  POCT Blood Glucose.: 224 mg/dL (10 Apr 2021 19:14)                            15.0   10.08 )-----------( 338      ( 11 Apr 2021 07:40 )             43.5       04-11    126<L>  |  92<L>  |  16  ----------------------------<  244<H>  4.5   |  24  |  0.94    EGFR if : 108  EGFR if non : 93    Ca    8.4      04-11  Mg     2.3     04-11  Phos  2.4     04-11    TPro  7.9  /  Alb  3.8  /  TBili  0.5  /  DBili  x   /  AST  20  /  ALT  24  /  AlkPhos  83  04-11      Thyroid Function Tests:  04-11 @ 11:43 TSH 2.29 FreeT4 -- T3 -- Anti TPO -- Anti Thyroglobulin Ab -- TSI --      A1C with Estimated Average Glucose Result: 11.5 % (04-11-21 @ 07:40)          Radiology:

## 2021-04-12 LAB
ANION GAP SERPL CALC-SCNC: 10 MMOL/L — SIGNIFICANT CHANGE UP (ref 7–14)
ANION GAP SERPL CALC-SCNC: 11 MMOL/L — SIGNIFICANT CHANGE UP (ref 7–14)
BUN SERPL-MCNC: 15 MG/DL — SIGNIFICANT CHANGE UP (ref 7–23)
BUN SERPL-MCNC: 15 MG/DL — SIGNIFICANT CHANGE UP (ref 7–23)
CALCIUM SERPL-MCNC: 8.8 MG/DL — SIGNIFICANT CHANGE UP (ref 8.4–10.5)
CALCIUM SERPL-MCNC: 8.9 MG/DL — SIGNIFICANT CHANGE UP (ref 8.4–10.5)
CHLORIDE SERPL-SCNC: 101 MMOL/L — SIGNIFICANT CHANGE UP (ref 98–107)
CHLORIDE SERPL-SCNC: 101 MMOL/L — SIGNIFICANT CHANGE UP (ref 98–107)
CO2 SERPL-SCNC: 21 MMOL/L — LOW (ref 22–31)
CO2 SERPL-SCNC: 22 MMOL/L — SIGNIFICANT CHANGE UP (ref 22–31)
CORTIS AM PEAK SERPL-MCNC: 9 UG/DL — SIGNIFICANT CHANGE UP (ref 6–18.4)
COVID-19 SPIKE DOMAIN AB INTERP: POSITIVE
COVID-19 SPIKE DOMAIN ANTIBODY RESULT: 238 U/ML — HIGH
CREAT SERPL-MCNC: 0.97 MG/DL — SIGNIFICANT CHANGE UP (ref 0.5–1.3)
CREAT SERPL-MCNC: 0.99 MG/DL — SIGNIFICANT CHANGE UP (ref 0.5–1.3)
GLUCOSE SERPL-MCNC: 130 MG/DL — HIGH (ref 70–99)
GLUCOSE SERPL-MCNC: 154 MG/DL — HIGH (ref 70–99)
HCT VFR BLD CALC: 39.3 % — SIGNIFICANT CHANGE UP (ref 39–50)
HGB BLD-MCNC: 13.4 G/DL — SIGNIFICANT CHANGE UP (ref 13–17)
MAGNESIUM SERPL-MCNC: 2.5 MG/DL — SIGNIFICANT CHANGE UP (ref 1.6–2.6)
MAGNESIUM SERPL-MCNC: 2.5 MG/DL — SIGNIFICANT CHANGE UP (ref 1.6–2.6)
MCHC RBC-ENTMCNC: 29.6 PG — SIGNIFICANT CHANGE UP (ref 27–34)
MCHC RBC-ENTMCNC: 34.1 GM/DL — SIGNIFICANT CHANGE UP (ref 32–36)
MCV RBC AUTO: 86.8 FL — SIGNIFICANT CHANGE UP (ref 80–100)
NRBC # BLD: 0 /100 WBCS — SIGNIFICANT CHANGE UP
NRBC # FLD: 0 K/UL — SIGNIFICANT CHANGE UP
PHOSPHATE SERPL-MCNC: 2.3 MG/DL — LOW (ref 2.5–4.5)
PHOSPHATE SERPL-MCNC: 3.8 MG/DL — SIGNIFICANT CHANGE UP (ref 2.5–4.5)
PLATELET # BLD AUTO: 358 K/UL — SIGNIFICANT CHANGE UP (ref 150–400)
POTASSIUM SERPL-MCNC: 4.6 MMOL/L — SIGNIFICANT CHANGE UP (ref 3.5–5.3)
POTASSIUM SERPL-MCNC: 4.6 MMOL/L — SIGNIFICANT CHANGE UP (ref 3.5–5.3)
POTASSIUM SERPL-SCNC: 4.6 MMOL/L — SIGNIFICANT CHANGE UP (ref 3.5–5.3)
POTASSIUM SERPL-SCNC: 4.6 MMOL/L — SIGNIFICANT CHANGE UP (ref 3.5–5.3)
RBC # BLD: 4.53 M/UL — SIGNIFICANT CHANGE UP (ref 4.2–5.8)
RBC # FLD: 11.7 % — SIGNIFICANT CHANGE UP (ref 10.3–14.5)
SARS-COV-2 IGG+IGM SERPL QL IA: 238 U/ML — HIGH
SARS-COV-2 IGG+IGM SERPL QL IA: POSITIVE
SODIUM SERPL-SCNC: 132 MMOL/L — LOW (ref 135–145)
SODIUM SERPL-SCNC: 134 MMOL/L — LOW (ref 135–145)
T4 FREE SERPL-MCNC: 1.7 NG/DL — SIGNIFICANT CHANGE UP (ref 0.9–1.8)
TSH SERPL-MCNC: 3 UIU/ML — SIGNIFICANT CHANGE UP (ref 0.27–4.2)
WBC # BLD: 9.65 K/UL — SIGNIFICANT CHANGE UP (ref 3.8–10.5)
WBC # FLD AUTO: 9.65 K/UL — SIGNIFICANT CHANGE UP (ref 3.8–10.5)

## 2021-04-12 PROCEDURE — 74176 CT ABD & PELVIS W/O CONTRAST: CPT | Mod: 26

## 2021-04-12 RX ORDER — AMLODIPINE BESYLATE 2.5 MG/1
5 TABLET ORAL DAILY
Refills: 0 | Status: DISCONTINUED | OUTPATIENT
Start: 2021-04-12 | End: 2021-04-13

## 2021-04-12 RX ORDER — OXYCODONE HYDROCHLORIDE 5 MG/1
5 TABLET ORAL ONCE
Refills: 0 | Status: DISCONTINUED | OUTPATIENT
Start: 2021-04-12 | End: 2021-04-13

## 2021-04-12 RX ORDER — HYDRALAZINE HCL 50 MG
5 TABLET ORAL ONCE
Refills: 0 | Status: COMPLETED | OUTPATIENT
Start: 2021-04-12 | End: 2021-04-12

## 2021-04-12 RX ORDER — ACETAMINOPHEN 500 MG
1000 TABLET ORAL ONCE
Refills: 0 | Status: COMPLETED | OUTPATIENT
Start: 2021-04-12 | End: 2021-04-12

## 2021-04-12 RX ORDER — GABAPENTIN 400 MG/1
100 CAPSULE ORAL THREE TIMES A DAY
Refills: 0 | Status: DISCONTINUED | OUTPATIENT
Start: 2021-04-12 | End: 2021-04-21

## 2021-04-12 RX ADMIN — Medication 650 MILLIGRAM(S): at 20:38

## 2021-04-12 RX ADMIN — Medication 10 UNIT(S): at 12:26

## 2021-04-12 RX ADMIN — INSULIN GLARGINE 20 UNIT(S): 100 INJECTION, SOLUTION SUBCUTANEOUS at 21:35

## 2021-04-12 RX ADMIN — Medication 2: at 09:34

## 2021-04-12 RX ADMIN — GABAPENTIN 100 MILLIGRAM(S): 400 CAPSULE ORAL at 13:55

## 2021-04-12 RX ADMIN — Medication 650 MILLIGRAM(S): at 05:06

## 2021-04-12 RX ADMIN — LOSARTAN POTASSIUM 100 MILLIGRAM(S): 100 TABLET, FILM COATED ORAL at 05:06

## 2021-04-12 RX ADMIN — GABAPENTIN 100 MILLIGRAM(S): 400 CAPSULE ORAL at 20:37

## 2021-04-12 RX ADMIN — Medication 650 MILLIGRAM(S): at 06:06

## 2021-04-12 RX ADMIN — Medication 400 MILLIGRAM(S): at 10:44

## 2021-04-12 RX ADMIN — Medication 2: at 12:26

## 2021-04-12 RX ADMIN — Medication 10 UNIT(S): at 09:34

## 2021-04-12 RX ADMIN — Medication 650 MILLIGRAM(S): at 21:08

## 2021-04-12 RX ADMIN — LIDOCAINE 1 PATCH: 4 CREAM TOPICAL at 10:00

## 2021-04-12 RX ADMIN — AMLODIPINE BESYLATE 5 MILLIGRAM(S): 2.5 TABLET ORAL at 13:56

## 2021-04-12 RX ADMIN — Medication 1000 MILLIGRAM(S): at 11:14

## 2021-04-12 RX ADMIN — Medication 5 MILLIGRAM(S): at 21:17

## 2021-04-12 RX ADMIN — LIDOCAINE 1 PATCH: 4 CREAM TOPICAL at 06:46

## 2021-04-12 NOTE — PROVIDER CONTACT NOTE (OTHER) - ASSESSMENT
Pt is alert and oriented. Other vital signs stable. Pt denies headache, dizziness, chest pain and shortness of breath. Pt denies any other s/s. Pt appears to be resting comfortably in bed.

## 2021-04-12 NOTE — CONSULT NOTE ADULT - SUBJECTIVE AND OBJECTIVE BOX
Neurology Consult    Reason for Consult: Patient is a 50y old  Male who presents with a chief complaint of abdominal pain (2021 09:47)      HPI:  51 yo RH AA male with h/o DM, HTN, hyperlipidemia; pt presented to the ED c/o abdominal cramping, generalized, and diarrhea following use of laxatives as above for constipation. Labs notable for Na 126. Pt. was treated with IV hydration and dispo'd to CDU for continued care plan:  IV hydration, supportive care, AM labs, general observation care / monitoring. pt continued to have generalized weakness , numbness and on labs persistent hyponatermia and now being admitted for further evaluation .  dnenies N/V/Abd pain   had bm   no urinary sx   no fever or chills   no cough   no cp /sob   Neurology called for evaluation of LE weakness, unsteady gait. patient states he is unstady ambulating. strength intact. labs notable for A1x 11.5, , Na was down to 126 improving to 134.       PAST MEDICAL & SURGICAL HISTORY:  DM (diabetes mellitus)    HTN (hypertension)    Hyperlipidemia    H/O total knee replacement    Foot fracture        Allergies: Allergies    No Known Drug Allergies  shellfish (Urticaria)    Intolerances        Social History: Denies toxic habits including tobacco, ETOH or illicit drugs.    Family History: FAMILY HISTORY:  No pertinent family history in first degree relatives    . No family history of strokes    Medications: MEDICATIONS  (STANDING):  amLODIPine   Tablet 5 milliGRAM(s) Oral daily  dextrose 40% Gel 15 Gram(s) Oral once  dextrose 5%. 1000 milliLiter(s) (50 mL/Hr) IV Continuous <Continuous>  dextrose 5%. 1000 milliLiter(s) (100 mL/Hr) IV Continuous <Continuous>  dextrose 50% Injectable 25 Gram(s) IV Push once  dextrose 50% Injectable 12.5 Gram(s) IV Push once  dextrose 50% Injectable 25 Gram(s) IV Push once  gabapentin   Solution 100 milliGRAM(s) Oral three times a day  glucagon  Injectable 1 milliGRAM(s) IntraMuscular once  insulin glargine Injectable (LANTUS) 20 Unit(s) SubCutaneous at bedtime  insulin lispro (ADMELOG) corrective regimen sliding scale   SubCutaneous three times a day before meals  insulin lispro (ADMELOG) corrective regimen sliding scale   SubCutaneous at bedtime  insulin lispro Injectable (ADMELOG) 10 Unit(s) SubCutaneous three times a day before meals  losartan 100 milliGRAM(s) Oral daily    MEDICATIONS  (PRN):  acetaminophen   Tablet .. 650 milliGRAM(s) Oral every 6 hours PRN Temp greater or equal to 38C (100.4F), Mild Pain (1 - 3), Moderate Pain (4 - 6)      Review of Systems:  CONSTITUTIONAL:  No weight loss, fever, chills, weakness or fatigue.  HEENT:  Eyes:  No visual loss, blurred vision, double vision or yellow sclera. Ears, Nose, Throat:  No hearing loss, sneezing, congestion, runny nose or sore throat.  SKIN:  No rash or itching.  CARDIOVASCULAR:  No chest pain, chest pressure or chest discomfort. No palpitations or edema.  RESPIRATORY:  No shortness of breath, cough or sputum.  GASTROINTESTINAL:  abdominal pain bloating constipation   GENITOURINARY:  No burning on urination or incontinence   NEUROLOGICAL:  No headache, dizziness, syncope, paralysis, ataxia, numbness or tingling in the extremities. No change in bowel or bladder control. no limb weakness. no vision changes.   MUSCULOSKELETAL:  No muscle, back pain, joint pain or stiffness.  HEMATOLOGIC:  No anemia, bleeding or bruising.  LYMPHATICS:  No enlarged nodes. No history of splenectomy.  PSYCHIATRIC:  No history of depression or anxiety.  ENDOCRINOLOGIC:  No reports of sweating, cold or heat intolerance. No polyuria or polydipsia.      Vitals:  Vital Signs Last 24 Hrs  T(C): 36.9 (2021 13:50), Max: 36.9 (2021 13:50)  T(F): 98.5 (2021 13:50), Max: 98.5 (2021 13:50)  HR: 88 (2021 13:50) (67 - 93)  BP: 157/97 (2021 13:50) (140/87 - 171/95)  BP(mean): --  RR: 18 (2021 13:50) (15 - 18)  SpO2: 98% (2021 13:50) (98% - 100%)    General Exam:   General Appearance: Appropriately dressed and in no acute distress       Head: Normocephalic, atraumatic and no dysmorphic features  Ear, Nose, and Throat: Moist mucous membranes  CVS: S1S2+  Resp: No SOB, no wheeze or rhonchi  GI: soft +_ distended   Extremities: No edema or cyanosis  Skin: No bruises or rashes     Neurological Exam:  Mental Status: Awake, alert and oriented x 3.  Able to follow simple and complex verbal commands. Able to name and repeat. fluent speech. No obvious aphasia or dysarthria noted.   Cranial Nerves: PERRL, EOMI, VFFC, sensation V1-V3 intact,  no obvious facial asymmetry, equal elevation of palate, scm/trap 5/5, tongue is midline on protrusion. no obvious papilledema on fundoscopic exam. hearing is grossly intact.   Motor: Normal bulk, tone and strength throughout. B/L Uppers 5/5 LE's distally 5/5 at DF, PF eversion and inversion.  Prox at hip also 5/5.    Sensation: Intact to light touch and pinprick throughout. no right/left confusion. no extinction to tactile on DSS. decrease prop and vibration   Reflexes: 1+ throughout at biceps, brachioradialis, triceps, patellars and ankles bilaterally and equal. No clonus. R toe and L toe were both downgoing.  Coordination: No dysmetria on FNF trouble with HKS   Gait: able to stand but unstady. unable to hold for long time     Data/Labs/Imaging which I personally reviewed.     Labs:     CBC Full  -  ( 2021 06:36 )  WBC Count : 9.65 K/uL  RBC Count : 4.53 M/uL  Hemoglobin : 13.4 g/dL  Hematocrit : 39.3 %  Platelet Count - Automated : 358 K/uL  Mean Cell Volume : 86.8 fL  Mean Cell Hemoglobin : 29.6 pg  Mean Cell Hemoglobin Concentration : 34.1 gm/dL  Auto Neutrophil # : x  Auto Lymphocyte # : x  Auto Monocyte # : x  Auto Eosinophil # : x  Auto Basophil # : x  Auto Neutrophil % : x  Auto Lymphocyte % : x  Auto Monocyte % : x  Auto Eosinophil % : x  Auto Basophil % : x    04-12    134<L>  |  101  |  15  ----------------------------<  154<H>  4.6   |  22  |  0.99    Ca    8.9      2021 06:36  Phos  2.3     04-12  Mg     2.5     04-12    TPro  7.9  /  Alb  3.8  /  TBili  0.5  /  DBili  x   /  AST  20  /  ALT  24  /  AlkPhos  83  04-11    LIVER FUNCTIONS - ( 2021 07:40 )  Alb: 3.8 g/dL / Pro: 7.9 g/dL / ALK PHOS: 83 U/L / ALT: 24 U/L / AST: 20 U/L / GGT: x             Urinalysis Basic - ( 2021 12:32 )    Color: Light Yellow / Appearance: Clear / S.013 / pH: x  Gluc: x / Ketone: Small  / Bili: Negative / Urobili: <2 mg/dL   Blood: x / Protein: 100 mg/dL / Nitrite: Negative   Leuk Esterase: Negative / RBC: 10 /HPF / WBC 1 /HPF   Sq Epi: x / Non Sq Epi: 1 /HPF / Bacteria: Negative

## 2021-04-12 NOTE — CHART NOTE - NSCHARTNOTEFT_GEN_A_CORE
Patient seen and examined this am . Complains of generalized weakness following diarrhea prior to admission . Patient states constipation x 1 week , s/p laxative use had multiple episodes of diarrhea and generalized weakness . + flatus . Last BM 2 days ago . Denies nausea and vomiting . No dizziness or lightheadedness.    currently able to move all extremities but has been in bed for a few days . Has not attempted to sit in chair      Lungs : CTA b/l   CVS : S1, S2   Abdomen : Soft + BS all 4 quadrants, no rebound or guarding , Generalized pain on deep palpation   EXT: No calf pain , Strength equal and intact b/l Upper and lower extremities, no weakness noted< + Active and passive ROM all extremities , no back pain on palpation   Complete Blood Count (04.12.21 @ 06:36)    Nucleated RBC: 0 /100 WBCs    WBC Count: 9.65 K/uL    RBC Count: 4.53 M/uL    Hemoglobin: 13.4 g/dL    Hematocrit: 39.3 %    Mean Cell Volume: 86.8 fL    Mean Cell Hemoglobin: 29.6 pg    Mean Cell Hemoglobin Conc: 34.1 gm/dL    Red Cell Distrib Width: 11.7 %    Platelet Count - Automated: 358 K/uL    Nucleated RBC #: 0.00 K/uL    Basic Metabolic Panel w/Mg &amp; Inorg Phos (04.12.21 @ 06:36)    Sodium, Serum: 134 mmol/L    Potassium, Serum: 4.6 mmol/L    Chloride, Serum: 101 mmol/L    Carbon Dioxide, Serum: 22 mmol/L    Anion Gap, Serum: 11 mmol/L    Blood Urea Nitrogen, Serum: 15 mg/dL    Creatinine, Serum: 0.99 mg/dL    Glucose, Serum: 154 mg/dL    Calcium, Total Serum: 8.9 mg/dL     Magnesium, Serum: 2.5 mg/dL    Phosphorus Level, Serum: 2.3 mg/dL      49 yo M with hx of uncontrolled DM , HTN, hypercholesterolemia , a/w diarrhea and weakness following laxative use   Abdominal Pain -   GI consult appreciated . CT abdomen ordered earlier . diyclomine can be used for pain     generalized weakness  - PT consult ordered   - orthostatics pending   encourage PO fluid intake , renal following   - possible neuropathy - will start neurontin 100 mg TID     HTN   On losartan 100 , will restart home amlodipine . Unable to reach home pharmacy to confirm dosage. will start 5 mg now and titrate as needed   - Monitor BP Q shift     Hyponatremia   - renal following . NA improving labs ordered , considering us.

## 2021-04-12 NOTE — PROGRESS NOTE ADULT - SUBJECTIVE AND OBJECTIVE BOX
Date of service: 04-12-21 @ 17:01      Patient is a 50y old  Male who presents with a chief complaint of abdominal pain (12 Apr 2021 09:47)                                                               INTERVAL HPI/OVERNIGHT EVENTS:    REVIEW OF SYSTEMS:     CONSTITUTIONAL: No weakness, fevers or chills  RESPIRATORY: No cough, wheezing,  No shortness of breath  CARDIOVASCULAR: No chest pain or palpitations  GASTROINTESTINAL: No abdominal pain  . No nausea, vomiting, or hematemesis; No diarrhea or constipation. No melena or hematochezia.  GENITOURINARY: No dysuria, frequency or hematuria  NEUROLOGICAL: numbness  in extermities   pain mid back                                                                                                                                                                                                                                                                                Medications:  MEDICATIONS  (STANDING):  amLODIPine   Tablet 5 milliGRAM(s) Oral daily  dextrose 40% Gel 15 Gram(s) Oral once  dextrose 5%. 1000 milliLiter(s) (50 mL/Hr) IV Continuous <Continuous>  dextrose 5%. 1000 milliLiter(s) (100 mL/Hr) IV Continuous <Continuous>  dextrose 50% Injectable 25 Gram(s) IV Push once  dextrose 50% Injectable 12.5 Gram(s) IV Push once  dextrose 50% Injectable 25 Gram(s) IV Push once  gabapentin   Solution 100 milliGRAM(s) Oral three times a day  glucagon  Injectable 1 milliGRAM(s) IntraMuscular once  insulin glargine Injectable (LANTUS) 20 Unit(s) SubCutaneous at bedtime  insulin lispro (ADMELOG) corrective regimen sliding scale   SubCutaneous three times a day before meals  insulin lispro (ADMELOG) corrective regimen sliding scale   SubCutaneous at bedtime  insulin lispro Injectable (ADMELOG) 10 Unit(s) SubCutaneous three times a day before meals  losartan 100 milliGRAM(s) Oral daily    MEDICATIONS  (PRN):  acetaminophen   Tablet .. 650 milliGRAM(s) Oral every 6 hours PRN Temp greater or equal to 38C (100.4F), Mild Pain (1 - 3), Moderate Pain (4 - 6)       Allergies    No Known Drug Allergies  shellfish (Urticaria)    Intolerances      Vital Signs Last 24 Hrs  T(C): 36.9 (12 Apr 2021 13:50), Max: 36.9 (12 Apr 2021 13:50)  T(F): 98.5 (12 Apr 2021 13:50), Max: 98.5 (12 Apr 2021 13:50)  HR: 88 (12 Apr 2021 13:50) (67 - 93)  BP: 157/97 (12 Apr 2021 13:50) (140/87 - 171/95)  BP(mean): --  RR: 18 (12 Apr 2021 13:50) (15 - 18)  SpO2: 98% (12 Apr 2021 13:50) (98% - 100%)  CAPILLARY BLOOD GLUCOSE      POCT Blood Glucose.: 74 mg/dL (12 Apr 2021 16:27)  POCT Blood Glucose.: 153 mg/dL (12 Apr 2021 12:04)  POCT Blood Glucose.: 165 mg/dL (12 Apr 2021 09:13)  POCT Blood Glucose.: 110 mg/dL (11 Apr 2021 22:10)  POCT Blood Glucose.: 127 mg/dL (11 Apr 2021 17:30)      04-12 @ 07:01  -  04-12 @ 17:01  --------------------------------------------------------  IN: 0 mL / OUT: 400 mL / NET: -400 mL      Physical Exam:    Daily Height in cm: 185.42 (12 Apr 2021 02:30)    Daily   General:  NAD   HEENT:  Nonicteric, PERRLA  CV:  RRR, S1S2   Lungs:  CTA B/L, no wheezes, rales, rhonchi  Abdomen:  Soft, non-tender, no distended, positive BS  Extremities:  no edema   Neuro:  AAOx3, LLE proximal weakness sec to pain                                                                                                                                                                                                                                                                         LABS:                               13.4   9.65  )-----------( 358      ( 12 Apr 2021 06:36 )             39.3                      04-12    134<L>  |  101  |  15  ----------------------------<  154<H>  4.6   |  22  |  0.99    Ca    8.9      12 Apr 2021 06:36  Phos  2.3     04-12  Mg     2.5     04-12    TPro  7.9  /  Alb  3.8  /  TBili  0.5  /  DBili  x   /  AST  20  /  ALT  24  /  AlkPhos  83  04-11                       RADIOLOGY & ADDITIONAL TESTS         I personally reviewed: [  ]EKG   [  ]CXR    [  ] CT      A/P:         Discussed with :     Francisco consultants' Notes   Time spent :

## 2021-04-12 NOTE — PROGRESS NOTE ADULT - SUBJECTIVE AND OBJECTIVE BOX
[facility]   MD WYATT CHRIS DO ANAM SIDDIQUI ANGELA WONG, PA    TEL:  OFFICE: [phone]  DR DAWSON CELL: [phone]  DR. TIRADO CELL: [phone]  DR. MORAN CELL: [phone]  CATINA PATEL CELL: [phone]    From 5pm-7am Answering Service [phone]    -- RENAL FOLLOW UP NOTE ---Date of Service 04-12-21 @ 14:47    Patient is a 50y old  Male who presents with a chief complaint of abdominal pain (12 Apr 2021 09:47)      Patient seen and examined at bedside. No chest pain/sob    VITALS:  T(F): 98.5 (04-12-21 @ 13:50), Max: 98.7 (04-11-21 @ 16:31)  HR: 88 (04-12-21 @ 13:50)  BP: 157/97 (04-12-21 @ 13:50)  RR: 18 (04-12-21 @ 13:50)  SpO2: 98% (04-12-21 @ 13:50)  Wt(kg): --    04-12 @ 07:01  -  04-12 @ 14:47  --------------------------------------------------------  IN: 0 mL / OUT: 400 mL / NET: -400 mL      Height (cm): 185.4 (04-12 @ 02:30)  Weight (kg): 91.9 (04-12 @ 02:30)  BMI (kg/m2): 26.7 (04-12 @ 02:30)  BSA (m2): 2.16 (04-12 @ 02:30)    PHYSICAL EXAM:  Constitutional: NAD  Neck: No JVD  Respiratory: CTAB, no wheezes, rales or rhonchi  Cardiovascular: S1, S2, RRR  Gastrointestinal: BS+, soft, NT/ND  Extremities: No peripheral edema    Hospital Medications:   MEDICATIONS  (STANDING):  amLODIPine   Tablet 5 milliGRAM(s) Oral daily  dextrose 40% Gel 15 Gram(s) Oral once  dextrose 5%. 1000 milliLiter(s) (50 mL/Hr) IV Continuous <Continuous>  dextrose 5%. 1000 milliLiter(s) (100 mL/Hr) IV Continuous <Continuous>  dextrose 50% Injectable 25 Gram(s) IV Push once  dextrose 50% Injectable 12.5 Gram(s) IV Push once  dextrose 50% Injectable 25 Gram(s) IV Push once  gabapentin   Solution 100 milliGRAM(s) Oral three times a day  glucagon  Injectable 1 milliGRAM(s) IntraMuscular once  insulin glargine Injectable (LANTUS) 20 Unit(s) SubCutaneous at bedtime  insulin lispro (ADMELOG) corrective regimen sliding scale   SubCutaneous three times a day before meals  insulin lispro (ADMELOG) corrective regimen sliding scale   SubCutaneous at bedtime  insulin lispro Injectable (ADMELOG) 10 Unit(s) SubCutaneous three times a day before meals  losartan 100 milliGRAM(s) Oral daily      LABS:  04-12    134<L>  |  101  |  15  ----------------------------<  154<H>  4.6   |  22  |  0.99    Ca    8.9      12 Apr 2021 06:36  Phos  2.3     04-12  Mg     2.5     04-12    TPro  7.9  /  Alb  3.8  /  TBili  0.5  /  DBili      /  AST  20  /  ALT  24  /  AlkPhos  83  04-11    Creatinine Trend: 0.99 <--, 0.97 <--, 0.94 <--, 0.86 <--, 1.07 <--, 1.13 <--    Phosphorus Level, Serum: 2.3 mg/dL (04-12 @ 06:36)  Phosphorus Level, Serum: 3.8 mg/dL (04-12 @ 00:07)                              13.4   9.65  )-----------( 358      ( 12 Apr 2021 06:36 )             39.3     Urine Studies:  Urinalysis - [04-11-21 @ 12:32]      Color Light Yellow / Appearance Clear / SG 1.013 / pH 6.5      Gluc >= 1000 mg/dL / Ketone Small  / Bili Negative / Urobili <2 mg/dL       Blood Small / Protein 100 mg/dL / Leuk Est Negative / Nitrite Negative      RBC 10 / WBC 1 / Hyaline 1 / Gran  / Sq Epi  / Non Sq Epi 1 / Bacteria Negative    Urine Creatinine 51      [04-11-21 @ 16:02]  Urine Sodium 104      [04-11-21 @ 12:32]  Urine Potassium 25.1      [04-11-21 @ 12:32]  Urine Chloride 90      [04-11-21 @ 12:32]  Urine Osmolality 465      [04-11-21 @ 16:00]    TSH 3.00      [04-12-21 @ 06:36]        RADIOLOGY & ADDITIONAL STUDIES:

## 2021-04-12 NOTE — CONSULT NOTE ADULT - ASSESSMENT
51 yo RH AA male with h/o DM, HTN, hyperlipidemia; pt presented to the ED c/o abdominal cramping, generalized, and diarrhea following use of laxatives as above for constipation. Neurology called for evaluation of LE weakness, unsteady gait. A1c 11.5, , Na was down to 126 improving to 134.    Etiology of unsteady gait unclear at this time, mild ataxia lowers but not uppers.  possible deconditioning. possible diabetic neuropathy given A1c 11.5.  hyponatremia--> psuedohyponatremia  - check b12, copper, magnesium heavy metals for dorsal column dysfunction.   - will need outpt EMG/NCS  - PT therapy, if no improvement MRI L spine  - slow correction of Na   - needs better DM control  - cw neurontin 100mg TID for now   - check FS, glucose control <180  - GI/DVT ppx  - Counseling on diet, exercise, and medication adherence was done  - Counseling on smoking cessation and alcohol consumption offered when appropriate.  - Pain assessed and judicious use of narcotics when appropriate was discussed.    - Stroke education given when appropriate.  - Importance of fall prevention discussed.   - Differential diagnosis and plan of care discussed with patient and/or family and primary team  - Thank you for allowing me to participate in the care of this patient. Call with questions.   Geoffrey Gardner MD  Vascular Neurology  Office: [phone]  51 yo RH AA male with h/o DM, HTN, hyperlipidemia; pt presented to the ED c/o abdominal cramping, generalized, and diarrhea following use of laxatives as above for constipation. Neurology called for evaluation of LE weakness, unsteady gait. A1c 11.5, , Na was down to 126 improving to 134.    Etiology of unsteady gait unclear at this time, mild ataxia lowers but not uppers.  possible deconditioning. possible diabetic neuropathy given A1c 11.5.  hyponatremia--> psuedohyponatremia  - check b12, copper, magnesium heavy metals for dorsal column dysfunction.   - will need outpt EMG/NCS  - PT therapy, if no improvement MRI L spine. f/u xrays for now pending   - slow correction of Na   - needs better DM control  - cw neurontin 100mg TID for now   - check FS, glucose control <180  - GI/DVT ppx  - Counseling on diet, exercise, and medication adherence was done  - Counseling on smoking cessation and alcohol consumption offered when appropriate.  - Pain assessed and judicious use of narcotics when appropriate was discussed.    - Stroke education given when appropriate.  - Importance of fall prevention discussed.   - Differential diagnosis and plan of care discussed with patient and/or family and primary team  - Thank you for allowing me to participate in the care of this patient. Call with questions.   Geoffrey Gardner MD  Vascular Neurology  Office: [phone]  51 yo RH AA male with h/o DM, HTN, hyperlipidemia; pt presented to the ED c/o abdominal cramping, generalized, and diarrhea following use of laxatives as above for constipation. Neurology called for evaluation of LE weakness, unsteady gait. A1c 11.5, , Na was down to 126 improving to 134.    Etiology of unsteady gait unclear at this time, mild ataxia lowers but not uppers.  possible deconditioning. possible diabetic neuropathy given A1c 11.5.  hyponatremia--> psuedohyponatremia  - check b12, copper, Vit E, thiamine, magnesium heavy metals for dorsal column dysfunction.   - will need outpt EMG/NCS  - PT therapy, if no improvement MRI L spine. f/u xrays for now pending   - slow correction of Na   - needs better DM control  - cw neurontin 100mg TID for now   - check FS, glucose control <180  - GI/DVT ppx  - Counseling on diet, exercise, and medication adherence was done  - Counseling on smoking cessation and alcohol consumption offered when appropriate.  - Pain assessed and judicious use of narcotics when appropriate was discussed.    - Stroke education given when appropriate.  - Importance of fall prevention discussed.   - Differential diagnosis and plan of care discussed with patient and/or family and primary team  - Thank you for allowing me to participate in the care of this patient. Call with questions.   Geoffrey Gardner MD  Vascular Neurology  Office: [phone]

## 2021-04-12 NOTE — PROGRESS NOTE ADULT - ASSESSMENT
49 yo male, PMH DM, HTN, hyperlipidemia; pt presented to the ED c/o abdominal cramping, generalized, and diarrhea following use of laxatives as above for constipation.  In the ED, WBC 10.82, Hb 13.6, CMP: sodium 126, chloride 89, glucose 281, CMP otherwise unremarkable.  VBG: Lactate 2.6 (later downtrended to 1.6).  Pt. was treated with IV hydration and dispo'd to CDU for continued care plan:  IV hydration, supportive care, AM labs, general observation care / monitoring. pt continued to have generalized weakness , numbness and on labs persistent hyponatermia and now being admitted for furhter evlauation .  dnenies N/V/Abd pain   had bm   no urinary sx   no fever or chills   no cough   no cp /sob     - hyponatremia : check osmolalities   likely multifactorial including hyperglycemia ( pseudohyponatremia ) and pre renal sec to diarreah   ? ADH induced by pain   consulted Renal and discussed with    improving   monitor     - DM: uncontrolled   appreciate endo input    -HTN : not well constrolled   norvasc restarted     - numbness : likely nueropathic   LLE proximal weakness ??  limited exam sec to pain   otherwise no concerns for spinal involvement   consutl neuro   check Xray     - hypophsphatemia : replete and monitor     - constipation : had had colonoscopy 2 yrs ago with Dr. Rae   hold further bowel regimen   cu ct a/p         dvt proph   d/w pt at length   with PA

## 2021-04-12 NOTE — CHART NOTE - NSCHARTNOTEFT_GEN_A_CORE
Notified by RN that patient with continued abdominal pain s/p Tylenol. Patient seen and examined awaiting CT scan. Patient states that his abdomen still is painful and the Tylenol helps a little but pain is still there. Patient denies n/v, CP, SOB. Patient endorses passing flatus. On exam, positive bowel sounds noted, nontender to palpation, soft and nondistended. Will order dicyclomine 10mg per GI recommendation. Prelim CT results no emergent findings. Will f/u final read. Will continue to monitor patient closely.

## 2021-04-12 NOTE — CHART NOTE - NSCHARTNOTEFT_GEN_A_CORE
Notified by RN that patient /108, patient due to go for CT scan now. Patient without headache or dizziness. Patient with back pain. Told RN to cancel transport for CT now and will treat BP. Hydralazine 5mg IVP ordered. Called by RN about repeat BP post hydralazine 166/74. Patient within 1 hour window after drinking contrast. Patient asymptomatic. Will send down for CT scan and continue to monitor patient.

## 2021-04-12 NOTE — PROGRESS NOTE ADULT - ASSESSMENT
49 yo male, PMH DM, HTN, hyperlipidemia; pt presented to the ED c/o abdominal cramping, generalized, and diarrhea following use of laxatives as above for constipation.  In the ED, WBC 10.82, Hb 13.6, CMP: sodium 126, chloride 89, glucose 281, CMP otherwise unremarkable.  VBG: Lactate 2.6 (later downtrended to 1.6).  Pt. was treated with IV hydration and dispo'd to CDU for continued care plan:  IV hydration, supportive care, AM labs, general observation care / monitoring. pt continued to have generalized weakness , numbness and on labs persistent hyponatermia      A/P:  Hyponatremia:  Etiology?  Likely sec to hypovolemia+hyperglycemia, but work up suggest SIADH  Na better  Repeat Urine Na, osmolality  ok TSH, serum cortisol level  Monitor Na level Q12  Na level should not increase more than 6meq in 24 hrs    Proteinuia/Hematuria:  Etiology?  Had Cystoscopy last year and was normal per patient  Possible sec to Diabetic Nephropathy but will need full vasculitis work up  check urine p/c ratio  Renal US, PIERO, C3, C4, Hep B, C, ANCA, HIV, RPR, Serum immunofixation, SPEP, serum free light chain  Based on work up result he might need kidney biopsy, which can be planned as outpatient as his renal function is stable  D/W patient in detail above plan    Hypophosphatemia:  Supplement K-phos 15mmol one dose  montior

## 2021-04-12 NOTE — PROGRESS NOTE ADULT - SUBJECTIVE AND OBJECTIVE BOX
Patient is a 50y Male     Patient is a 50y old  Male who presents with a chief complaint of dyspepsia (11 Apr 2021 20:29)      HPI:  51 yo male, PMH DM, HTN, hyperlipidemia; pt presented to the ED c/o abdominal cramping, generalized, and diarrhea following use of laxatives as above for constipation.  In the ED, WBC 10.82, Hb 13.6, CMP: sodium 126, chloride 89, glucose 281, CMP otherwise unremarkable.  VBG: Lactate 2.6 (later downtrended to 1.6).  Pt. was treated with IV hydration and dispo'd to CDU for continued care plan:  IV hydration, supportive care, AM labs, general observation care / monitoring. pt continued to have generalized weakness , numbness and on labs persistent hyponatermia and now being admitted for furhter evlauation .  dnenies N/V/Abd pain   had bm   no urinary sx   no fever or chills   no cough   no cp /sob    (11 Apr 2021 15:50)      PAST MEDICAL & SURGICAL HISTORY:  DM (diabetes mellitus)    HTN (hypertension)    Hyperlipidemia    H/O total knee replacement    Foot fracture        MEDICATIONS  (STANDING):  acetaminophen  IVPB .. 1000 milliGRAM(s) IV Intermittent once  amLODIPine   Tablet 5 milliGRAM(s) Oral daily  dextrose 40% Gel 15 Gram(s) Oral once  dextrose 5%. 1000 milliLiter(s) (50 mL/Hr) IV Continuous <Continuous>  dextrose 5%. 1000 milliLiter(s) (100 mL/Hr) IV Continuous <Continuous>  dextrose 50% Injectable 25 Gram(s) IV Push once  dextrose 50% Injectable 12.5 Gram(s) IV Push once  dextrose 50% Injectable 25 Gram(s) IV Push once  gabapentin   Solution 100 milliGRAM(s) Oral three times a day  glucagon  Injectable 1 milliGRAM(s) IntraMuscular once  insulin glargine Injectable (LANTUS) 20 Unit(s) SubCutaneous at bedtime  insulin lispro (ADMELOG) corrective regimen sliding scale   SubCutaneous three times a day before meals  insulin lispro (ADMELOG) corrective regimen sliding scale   SubCutaneous at bedtime  insulin lispro Injectable (ADMELOG) 10 Unit(s) SubCutaneous three times a day before meals  losartan 100 milliGRAM(s) Oral daily      Allergies    No Known Drug Allergies  shellfish (Urticaria)    Intolerances        SOCIAL HISTORY:  Denies ETOh,Smoking,     FAMILY HISTORY:  No pertinent family history in first degree relatives        REVIEW OF SYSTEMS:    CONSTITUTIONAL: No weakness, fevers or chills  EYES/ENT: No visual changes;  No vertigo or throat pain   NECK: No pain or stiffness  RESPIRATORY: No cough, wheezing, hemoptysis; No shortness of breath  CARDIOVASCULAR: No chest pain or palpitations  GASTROINTESTINAL: No abdominal or epigastric pain. No nausea, vomiting, or hematemesis; No diarrhea or constipation. No melena or hematochezia.  GENITOURINARY: No dysuria, frequency or hematuria  NEUROLOGICAL: No numbness or weakness  SKIN: No itching, burning, rashes, or lesions   All other review of systems is negative unless indicated above.    VITAL:  T(C): , Max: 37.1 (04-11-21 @ 16:31)  T(F): , Max: 98.7 (04-11-21 @ 16:31)  HR: 90 (04-12-21 @ 05:04)  BP: 170/90 (04-12-21 @ 05:04)  BP(mean): --  RR: 18 (04-12-21 @ 05:04)  SpO2: 99% (04-12-21 @ 05:04)  Wt(kg): --    I and O's:    Height (cm): 185.4 (04-12 @ 02:30)  Weight (kg): 91.9 (04-12 @ 02:30)  BMI (kg/m2): 26.7 (04-12 @ 02:30)  BSA (m2): 2.16 (04-12 @ 02:30)    PHYSICAL EXAM:    Constitutional: NAD  HEENT: PERRLA,   Neck: No JVD  Respiratory: CTA B/L  Cardiovascular: S1 and S2  Gastrointestinal: BS+, soft, NT/ND  Extremities: No peripheral edema  Neurological: A/O x 3, no focal deficits  Psychiatric: Normal mood, normal affect  : No Jose  Skin: No rashes  Access: Not applicable  Back: No CVA tenderness    LABS:                        13.4   9.65  )-----------( 358      ( 12 Apr 2021 06:36 )             39.3     04-12    134<L>  |  101  |  15  ----------------------------<  154<H>  4.6   |  22  |  0.99    Ca    8.9      12 Apr 2021 06:36  Phos  2.3     04-12  Mg     2.5     04-12    TPro  7.9  /  Alb  3.8  /  TBili  0.5  /  DBili  x   /  AST  20  /  ALT  24  /  AlkPhos  83  04-11          RADIOLOGY & ADDITIONAL STUDIES:

## 2021-04-12 NOTE — PROGRESS NOTE ADULT - ASSESSMENT
pt reports consipation and pain afetre bm.  abdomen is benign. will order ct abdomen  further recommenations after exam.  diyclomine can be used for pain

## 2021-04-13 LAB
ANION GAP SERPL CALC-SCNC: 11 MMOL/L — SIGNIFICANT CHANGE UP (ref 7–14)
BUN SERPL-MCNC: 16 MG/DL — SIGNIFICANT CHANGE UP (ref 7–23)
C3 SERPL-MCNC: 152 MG/DL — SIGNIFICANT CHANGE UP (ref 90–180)
C4 SERPL-MCNC: 33 MG/DL — SIGNIFICANT CHANGE UP (ref 10–40)
CALCIUM SERPL-MCNC: 8.9 MG/DL — SIGNIFICANT CHANGE UP (ref 8.4–10.5)
CHLORIDE SERPL-SCNC: 96 MMOL/L — LOW (ref 98–107)
CO2 SERPL-SCNC: 24 MMOL/L — SIGNIFICANT CHANGE UP (ref 22–31)
CREAT SERPL-MCNC: 1.26 MG/DL — SIGNIFICANT CHANGE UP (ref 0.5–1.3)
GLUCOSE SERPL-MCNC: 175 MG/DL — HIGH (ref 70–99)
HBV SURFACE AG SER-ACNC: SIGNIFICANT CHANGE UP
HCT VFR BLD CALC: 39.2 % — SIGNIFICANT CHANGE UP (ref 39–50)
HCV AB S/CO SERPL IA: 0.13 S/CO — SIGNIFICANT CHANGE UP (ref 0–0.99)
HCV AB SERPL-IMP: SIGNIFICANT CHANGE UP
HGB BLD-MCNC: 13.4 G/DL — SIGNIFICANT CHANGE UP (ref 13–17)
HIV 1+2 AB+HIV1 P24 AG SERPL QL IA: SIGNIFICANT CHANGE UP
KAPPA LC SER QL IFE: 1.51 MG/DL — SIGNIFICANT CHANGE UP (ref 0.33–1.94)
KAPPA/LAMBDA FREE LIGHT CHAIN RATIO, SERUM: 0.8 RATIO — SIGNIFICANT CHANGE UP (ref 0.26–1.65)
LAMBDA LC SER QL IFE: 1.89 MG/DL — SIGNIFICANT CHANGE UP (ref 0.57–2.63)
MAGNESIUM SERPL-MCNC: 2.3 MG/DL — SIGNIFICANT CHANGE UP (ref 1.6–2.6)
MAGNESIUM SERPL-MCNC: 2.3 MG/DL — SIGNIFICANT CHANGE UP (ref 1.6–2.6)
MCHC RBC-ENTMCNC: 30 PG — SIGNIFICANT CHANGE UP (ref 27–34)
MCHC RBC-ENTMCNC: 34.2 GM/DL — SIGNIFICANT CHANGE UP (ref 32–36)
MCV RBC AUTO: 87.7 FL — SIGNIFICANT CHANGE UP (ref 80–100)
NRBC # BLD: 0 /100 WBCS — SIGNIFICANT CHANGE UP
NRBC # FLD: 0 K/UL — SIGNIFICANT CHANGE UP
PLATELET # BLD AUTO: 351 K/UL — SIGNIFICANT CHANGE UP (ref 150–400)
POTASSIUM SERPL-MCNC: 4.4 MMOL/L — SIGNIFICANT CHANGE UP (ref 3.5–5.3)
POTASSIUM SERPL-SCNC: 4.4 MMOL/L — SIGNIFICANT CHANGE UP (ref 3.5–5.3)
PROT SERPL-MCNC: 6.9 G/DL — SIGNIFICANT CHANGE UP (ref 6–8.3)
RBC # BLD: 4.47 M/UL — SIGNIFICANT CHANGE UP (ref 4.2–5.8)
RBC # FLD: 11.7 % — SIGNIFICANT CHANGE UP (ref 10.3–14.5)
SODIUM SERPL-SCNC: 131 MMOL/L — LOW (ref 135–145)
VIT B12 SERPL-MCNC: 2000 PG/ML — HIGH (ref 200–900)
WBC # BLD: 8.41 K/UL — SIGNIFICANT CHANGE UP (ref 3.8–10.5)
WBC # FLD AUTO: 8.41 K/UL — SIGNIFICANT CHANGE UP (ref 3.8–10.5)

## 2021-04-13 PROCEDURE — 99232 SBSQ HOSP IP/OBS MODERATE 35: CPT

## 2021-04-13 PROCEDURE — 84165 PROTEIN E-PHORESIS SERUM: CPT | Mod: 26

## 2021-04-13 PROCEDURE — 71250 CT THORAX DX C-: CPT | Mod: 26

## 2021-04-13 PROCEDURE — 86334 IMMUNOFIX E-PHORESIS SERUM: CPT | Mod: 26

## 2021-04-13 RX ORDER — AMLODIPINE BESYLATE 2.5 MG/1
5 TABLET ORAL ONCE
Refills: 0 | Status: COMPLETED | OUTPATIENT
Start: 2021-04-13 | End: 2021-04-13

## 2021-04-13 RX ORDER — AMLODIPINE BESYLATE 2.5 MG/1
10 TABLET ORAL DAILY
Refills: 0 | Status: DISCONTINUED | OUTPATIENT
Start: 2021-04-13 | End: 2021-04-13

## 2021-04-13 RX ORDER — HEPARIN SODIUM 5000 [USP'U]/ML
5000 INJECTION INTRAVENOUS; SUBCUTANEOUS EVERY 8 HOURS
Refills: 0 | Status: DISCONTINUED | OUTPATIENT
Start: 2021-04-13 | End: 2021-04-19

## 2021-04-13 RX ORDER — AMLODIPINE BESYLATE 2.5 MG/1
10 TABLET ORAL DAILY
Refills: 0 | Status: DISCONTINUED | OUTPATIENT
Start: 2021-04-14 | End: 2021-05-05

## 2021-04-13 RX ADMIN — LOSARTAN POTASSIUM 100 MILLIGRAM(S): 100 TABLET, FILM COATED ORAL at 04:54

## 2021-04-13 RX ADMIN — Medication 10 MILLIGRAM(S): at 04:48

## 2021-04-13 RX ADMIN — Medication 10 UNIT(S): at 08:30

## 2021-04-13 RX ADMIN — Medication 10 UNIT(S): at 12:11

## 2021-04-13 RX ADMIN — Medication 650 MILLIGRAM(S): at 13:00

## 2021-04-13 RX ADMIN — Medication 650 MILLIGRAM(S): at 13:55

## 2021-04-13 RX ADMIN — AMLODIPINE BESYLATE 5 MILLIGRAM(S): 2.5 TABLET ORAL at 07:29

## 2021-04-13 RX ADMIN — AMLODIPINE BESYLATE 5 MILLIGRAM(S): 2.5 TABLET ORAL at 04:54

## 2021-04-13 RX ADMIN — GABAPENTIN 100 MILLIGRAM(S): 400 CAPSULE ORAL at 04:54

## 2021-04-13 RX ADMIN — INSULIN GLARGINE 20 UNIT(S): 100 INJECTION, SOLUTION SUBCUTANEOUS at 22:24

## 2021-04-13 RX ADMIN — HEPARIN SODIUM 5000 UNIT(S): 5000 INJECTION INTRAVENOUS; SUBCUTANEOUS at 22:24

## 2021-04-13 RX ADMIN — GABAPENTIN 100 MILLIGRAM(S): 400 CAPSULE ORAL at 13:00

## 2021-04-13 RX ADMIN — GABAPENTIN 100 MILLIGRAM(S): 400 CAPSULE ORAL at 22:24

## 2021-04-13 RX ADMIN — Medication 10 UNIT(S): at 17:40

## 2021-04-13 RX ADMIN — Medication 4: at 08:30

## 2021-04-13 NOTE — PROGRESS NOTE ADULT - SUBJECTIVE AND OBJECTIVE BOX
Chief Complaint: DM2    History:  tolerating carb consistent clear liquids, asking to advance die to solids  no hypoglycemia symptoms    MEDICATIONS  (STANDING):  dextrose 40% Gel 15 Gram(s) Oral once  dextrose 5%. 1000 milliLiter(s) (50 mL/Hr) IV Continuous <Continuous>  dextrose 5%. 1000 milliLiter(s) (100 mL/Hr) IV Continuous <Continuous>  dextrose 50% Injectable 25 Gram(s) IV Push once  dextrose 50% Injectable 12.5 Gram(s) IV Push once  dextrose 50% Injectable 25 Gram(s) IV Push once  gabapentin   Solution 100 milliGRAM(s) Oral three times a day  glucagon  Injectable 1 milliGRAM(s) IntraMuscular once  insulin glargine Injectable (LANTUS) 20 Unit(s) SubCutaneous at bedtime  insulin lispro (ADMELOG) corrective regimen sliding scale   SubCutaneous three times a day before meals  insulin lispro (ADMELOG) corrective regimen sliding scale   SubCutaneous at bedtime  insulin lispro Injectable (ADMELOG) 10 Unit(s) SubCutaneous three times a day before meals  losartan 100 milliGRAM(s) Oral daily    MEDICATIONS  (PRN):  acetaminophen   Tablet .. 650 milliGRAM(s) Oral every 6 hours PRN Temp greater or equal to 38C (100.4F), Mild Pain (1 - 3), Moderate Pain (4 - 6)      Allergies    No Known Drug Allergies  shellfish (Urticaria)    Intolerances      Review of Systems:  ALL OTHER SYSTEMS REVIEWED AND NEGATIVE      PHYSICAL EXAM:  VITALS: T(C): 36.7 (04-13-21 @ 13:00)  T(F): 98 (04-13-21 @ 13:00), Max: 98.9 (04-12-21 @ 21:33)  HR: 90 (04-13-21 @ 13:00) (88 - 94)  BP: 124/73 (04-13-21 @ 13:00) (124/73 - 199/118)  RR:  (17 - 18)  SpO2:  (98% - 99%)  Wt(kg): --  GENERAL: NAD, well-groomed, well-developed  EYES: No proptosis, no lid lag, anicteric  HEENT:  Atraumatic, Normocephalic, moist mucous membranes  RESPIRATORY: nonlabored respirations  PSYCH: Alert and oriented x 3, normal affect, normal mood    CAPILLARY BLOOD GLUCOSE      POCT Blood Glucose.: 116 mg/dL (13 Apr 2021 11:51)  POCT Blood Glucose.: 225 mg/dL (13 Apr 2021 08:02)  POCT Blood Glucose.: 167 mg/dL (12 Apr 2021 21:19)  POCT Blood Glucose.: 74 mg/dL (12 Apr 2021 16:27)      04-12    134<L>  |  101  |  15  ----------------------------<  154<H>  4.6   |  22  |  0.99    EGFR if : 101  EGFR if non : 87    Ca    8.9      04-12  Mg     2.3     04-13  Phos  2.3     04-12    TPro  6.9  /  Alb  x   /  TBili  x   /  DBili  x   /  AST  x   /  ALT  x   /  AlkPhos  x   04-13      A1C with Estimated Average Glucose Result: 11.5 % (04-11-21 @ 07:40)      Thyroid Function Tests:  04-12 @ 06:36 TSH 3.00 FreeT4 1.7 T3 -- Anti TPO -- Anti Thyroglobulin Ab -- TSI --  04-11 @ 11:43 TSH 2.29 FreeT4 -- T3 -- Anti TPO -- Anti Thyroglobulin Ab -- TSI --

## 2021-04-13 NOTE — PROGRESS NOTE ADULT - ASSESSMENT
51 yo male, PMH DM, HTN, hyperlipidemia; pt presented to the ED c/o abdominal cramping, generalized, and diarrhea following use of laxatives as above for constipation.  In the ED, WBC 10.82, Hb 13.6, CMP: sodium 126, chloride 89, glucose 281, CMP otherwise unremarkable.  VBG: Lactate 2.6 (later downtrended to 1.6).  Pt. was treated with IV hydration and dispo'd to CDU for continued care plan:  IV hydration, supportive care, AM labs, general observation care / monitoring. pt continued to have generalized weakness , numbness and on labs persistent hyponatermia and now being admitted for furhter evlauation .  dnenies N/V/Abd pain   had bm   no urinary sx   no fever or chills   no cough   no cp /sob     - hyponatremia :   likely multifactorial including hyperglycemia ( pseudohyponatremia ) and pre renal sec to diarreah   ? ADH induced by pain   consulted Renal and discussed with    improving   monitor     - DM: uncontrolled   appreciate endo input    -HTN : not well constrolled   norvasc restarted     - numbness/weakness  : likely nueropathic   LLE proximal weakness ??  limited exam sec to pain   otherwise no concerns for spinal involvement   consult apprecaited   also possibly sec to recent COVID infection .. will check CT chest      - hypophsphatemia : replete and monitor     - constipation : had had colonoscopy 2 yrs ago with Dr. Rae   hold further bowel regimen   CT abd no acute path       dvt proph   d/w pt at length   with PA

## 2021-04-13 NOTE — PROGRESS NOTE ADULT - SUBJECTIVE AND OBJECTIVE BOX
[facility]   MD SEVEN CHRIS MD RUORU WONG, PA    TEL:  OFFICE: [phone]  DR DAWSON CELL: [phone]  SIMI PATEL CELL: [phone]  DR. MORAN CELL: [phone]  DR. TIRADO CELL: [phone]    FROM 5 PM - 7 AM PLEASE CALL ANSWERING SERVICE: [phone]    RENAL FOLLOW UP NOTE--Date of Service 04-13-21 @ 08:01  --------------------------------------------------------------------------------  HPI:      Pt seen and examined at bedside.       PAST HISTORY  --------------------------------------------------------------------------------  No significant changes to PMH, PSH, FHx, SHx, unless otherwise noted    ALLERGIES & MEDICATIONS  --------------------------------------------------------------------------------  Allergies    No Known Drug Allergies  shellfish (Urticaria)    Intolerances      Standing Inpatient Medications  dextrose 40% Gel 15 Gram(s) Oral once  dextrose 5%. 1000 milliLiter(s) IV Continuous <Continuous>  dextrose 5%. 1000 milliLiter(s) IV Continuous <Continuous>  dextrose 50% Injectable 25 Gram(s) IV Push once  dextrose 50% Injectable 12.5 Gram(s) IV Push once  dextrose 50% Injectable 25 Gram(s) IV Push once  gabapentin   Solution 100 milliGRAM(s) Oral three times a day  glucagon  Injectable 1 milliGRAM(s) IntraMuscular once  insulin glargine Injectable (LANTUS) 20 Unit(s) SubCutaneous at bedtime  insulin lispro (ADMELOG) corrective regimen sliding scale   SubCutaneous three times a day before meals  insulin lispro (ADMELOG) corrective regimen sliding scale   SubCutaneous at bedtime  insulin lispro Injectable (ADMELOG) 10 Unit(s) SubCutaneous three times a day before meals  losartan 100 milliGRAM(s) Oral daily    PRN Inpatient Medications  acetaminophen   Tablet .. 650 milliGRAM(s) Oral every 6 hours PRN      REVIEW OF SYSTEMS  --------------------------------------------------------------------------------  General: no fever    MSK: no edema     VITALS/PHYSICAL EXAM  --------------------------------------------------------------------------------  T(C): 37.2 (04-13-21 @ 04:46), Max: 37.2 (04-12-21 @ 21:33)  HR: 89 (04-13-21 @ 04:46) (88 - 94)  BP: 176/100 (04-13-21 @ 04:46) (157/97 - 199/118)  RR: 18 (04-13-21 @ 04:46) (18 - 18)  SpO2: 98% (04-13-21 @ 04:46) (98% - 98%)  Wt(kg): --  Height (cm): 185.4 (04-12-21 @ 02:30)  Weight (kg): 91.9 (04-12-21 @ 02:30)  BMI (kg/m2): 26.7 (04-12-21 @ 02:30)  BSA (m2): 2.16 (04-12-21 @ 02:30)      04-12-21 @ 07:01  -  04-13-21 @ 07:00  --------------------------------------------------------  IN: 0 mL / OUT: 400 mL / NET: -400 mL      Physical Exam:  	Gen: NAD  	HEENT: MMM  	Pulm: CTA B/L  	CV: S1S2  	Abd: Soft, +BS  	Ext: No LE edema B/L                      Neuro: Awake   	Skin: Warm and Dry   	Vascular access: no HD catheter           no  lance  LABS/STUDIES  --------------------------------------------------------------------------------              13.4   9.65  >-----------<  358      [04-12-21 @ 06:36]              39.3     134  |  101  |  15  ----------------------------<  154      [04-12-21 @ 06:36]  4.6   |  22  |  0.99        Ca     8.9     [04-12-21 @ 06:36]      Mg     2.5     [04-12-21 @ 06:36]      Phos  2.3     [04-12-21 @ 06:36]          Serum Osmolality 281      [04-11-21 @ 11:43]    Creatinine Trend:  SCr 0.99 [04-12 @ 06:36]  SCr 0.97 [04-12 @ 00:07]  SCr 0.94 [04-11 @ 11:43]  SCr 0.86 [04-11 @ 07:40]  SCr 1.07 [04-10 @ 16:32]    Urinalysis - [04-11-21 @ 12:32]      Color Light Yellow / Appearance Clear / SG 1.013 / pH 6.5      Gluc >= 1000 mg/dL / Ketone Small  / Bili Negative / Urobili <2 mg/dL       Blood Small / Protein 100 mg/dL / Leuk Est Negative / Nitrite Negative      RBC 10 / WBC 1 / Hyaline 1 / Gran  / Sq Epi  / Non Sq Epi 1 / Bacteria Negative    Urine Creatinine 51      [04-11-21 @ 16:02]  Urine Sodium 104      [04-11-21 @ 12:32]  Urine Potassium 25.1      [04-11-21 @ 12:32]  Urine Chloride 90      [04-11-21 @ 12:32]  Urine Osmolality 465      [04-11-21 @ 16:00]    TSH 3.00      [04-12-21 @ 06:36]

## 2021-04-13 NOTE — PROGRESS NOTE ADULT - ASSESSMENT
50M uncontrolled DM2 HbA1c 11.5%, hyperglycemia related to difficulties obtaining adequate insulin during pandemic due to insurance/cost.    1) DM2 with hyperglycemia  Inpatient plan:  BG target 100-180 mg/dl, BG overall improving, one borderline low value yesterday likely related to being on clear liquid diet, diet advanced for today.  carb consistent diet  FS premeal and bedtime  Continue Lantus 20 units qhs  Continue Admelog 10/10/10  Continue Admelog moderate scale premeal and moderate bedtime scale  RD consult  Discussed with patient regarding discharge planning, he now will have enough Tresiba and Novolog at home to proceed with these insulins due to [facility] program he is now a part of.   DC on Tresiba and Novolog pens doses TBD. Discussed option of mixed insulin in case of future issues obtaining insulin.  Outpatient endocrine follow up Dr. Ban Velez, although he will consider changing to [facility] endocrinology if he prefers [phone].    2) Hyponatremia  TSH, Free T4 and 8AM cortisol in acceptable range not indicative of endocrine cause of hyponatremia. Na improved to 134 yesterday.    3) Hypertension  currently uncontrolled  on amlodipine and losartan as outpatient  management per primary team    David Rojas MD  Division of Endocrinology  Pager: [phone]    If after 6PM or before 9AM, or on weekends/holidays, please call endocrine answering service for assistance ([phone]).  For nonurgent matters email [email or web address] for assistance.

## 2021-04-13 NOTE — PROGRESS NOTE ADULT - SUBJECTIVE AND OBJECTIVE BOX
Date of service: 04-13-21 @ 22:25      Patient is a 50y old  Male who presents with a chief complaint of abdominal pain (12 Apr 2021 09:47)                                                               INTERVAL HPI/OVERNIGHT EVENTS:    REVIEW OF SYSTEMS:     CONSTITUTIONAL: generalied weakness   RESPIRATORY: No cough, wheezing,  No shortness of breath  CARDIOVASCULAR: No chest pain or palpitations  GASTROINTESTINAL: No abdominal pain  . No nausea, vomiting, or hematemesis; No diarrhea or constipation. No melena or hematochezia.  GENITOURINARY: No dysuria, frequency or hematuria  NEUROLOGICAL: No numbness or weakness                                                                                                                                                                                                                                                                           Medications:  MEDICATIONS  (STANDING):  dextrose 40% Gel 15 Gram(s) Oral once  dextrose 5%. 1000 milliLiter(s) (50 mL/Hr) IV Continuous <Continuous>  dextrose 5%. 1000 milliLiter(s) (100 mL/Hr) IV Continuous <Continuous>  dextrose 50% Injectable 25 Gram(s) IV Push once  dextrose 50% Injectable 12.5 Gram(s) IV Push once  dextrose 50% Injectable 25 Gram(s) IV Push once  gabapentin   Solution 100 milliGRAM(s) Oral three times a day  glucagon  Injectable 1 milliGRAM(s) IntraMuscular once  heparin   Injectable 5000 Unit(s) SubCutaneous every 8 hours  insulin glargine Injectable (LANTUS) 20 Unit(s) SubCutaneous at bedtime  insulin lispro (ADMELOG) corrective regimen sliding scale   SubCutaneous three times a day before meals  insulin lispro (ADMELOG) corrective regimen sliding scale   SubCutaneous at bedtime  insulin lispro Injectable (ADMELOG) 10 Unit(s) SubCutaneous three times a day before meals  losartan 100 milliGRAM(s) Oral daily    MEDICATIONS  (PRN):  acetaminophen   Tablet .. 650 milliGRAM(s) Oral every 6 hours PRN Temp greater or equal to 38C (100.4F), Mild Pain (1 - 3), Moderate Pain (4 - 6)       Allergies    No Known Drug Allergies  shellfish (Urticaria)    Intolerances      Vital Signs Last 24 Hrs  T(C): 36.7 (13 Apr 2021 13:00), Max: 37.2 (13 Apr 2021 04:46)  T(F): 98 (13 Apr 2021 13:00), Max: 98.9 (13 Apr 2021 04:46)  HR: 90 (13 Apr 2021 13:00) (89 - 90)  BP: 124/73 (13 Apr 2021 13:00) (124/73 - 176/100)  BP(mean): --  RR: 17 (13 Apr 2021 13:00) (17 - 18)  SpO2: 99% (13 Apr 2021 13:00) (98% - 99%)  CAPILLARY BLOOD GLUCOSE      POCT Blood Glucose.: 162 mg/dL (13 Apr 2021 21:57)  POCT Blood Glucose.: 131 mg/dL (13 Apr 2021 16:49)  POCT Blood Glucose.: 116 mg/dL (13 Apr 2021 11:51)  POCT Blood Glucose.: 225 mg/dL (13 Apr 2021 08:02)      04-12 @ 07:01  -  04-13 @ 07:00  --------------------------------------------------------  IN: 0 mL / OUT: 400 mL / NET: -400 mL    04-13 @ 07:01 - 04-13 @ 22:25  --------------------------------------------------------  IN: 0 mL / OUT: 475 mL / NET: -475 mL      Physical Exam:    Daily     Daily   General:  NAD  HEENT:  Nonicteric, PERRLA  CV:  RRR, S1S2   Lungs:  CTA B/L, no wheezes, rales, rhonchi  Abdomen:  Soft, non-tender, no distended, positive BS  Extremities:   edema   2+ pulses, no c/c, no edema  Skin:  Warm and dry, no rashes  :  No lucas  Neuro:  AAOx3, non-focal, grossly intact                                                                                                                                                                                                                                                                                                LABS:                               13.4   8.41  )-----------( 351      ( 13 Apr 2021 18:47 )             39.2                      04-13    131<L>  |  96<L>  |  16  ----------------------------<  175<H>  4.4   |  24  |  1.26    Ca    8.9      13 Apr 2021 18:47  Phos  2.3     04-12  Mg     2.3     04-13    TPro  6.9  /  Alb  x   /  TBili  x   /  DBili  x   /  AST  x   /  ALT  x   /  AlkPhos  x   04-13                       RADIOLOGY & ADDITIONAL TESTS         I personally reviewed: [  ]EKG   [  ]CXR    [  ] CT      A/P:         Discussed with :     Francisco consultants' Notes   Time spent :

## 2021-04-13 NOTE — PROGRESS NOTE ADULT - ASSESSMENT
49 yo male, PMH DM, HTN, hyperlipidemia; pt presented to the ED c/o abdominal cramping, generalized, and diarrhea following use of laxatives as above for constipation.  In the ED, WBC 10.82, Hb 13.6, CMP: sodium 126, chloride 89, glucose 281, CMP otherwise unremarkable.  VBG: Lactate 2.6 (later downtrended to 1.6).  Pt. was treated with IV hydration and dispo'd to CDU for continued care plan:  IV hydration, supportive care, AM labs, general observation care / monitoring. pt continued to have generalized weakness , numbness and on labs persistent hyponatermia      A/P:  Hyponatremia:  Etiology?  Likely sec to hypovolemia+hyperglycemia, but work up suggest SIADH  Na improving on 4/12 --pending BMP today   Repeat Urine Na, osmolality  ok TSH, serum cortisol level  Monitor Na level Q12  Na level should not increase more than 6meq in 24 hrs    Proteinuia/Hematuria:  Etiology?  Had Cystoscopy last year and was normal per patient  Possible sec to Diabetic Nephropathy but will need full vasculitis work up  check urine p/c ratio  Renal US, PIERO, C3, C4, Hep B, C, ANCA, HIV, RPR, Serum immunofixation, SPEP, serum free light chain  Based on work up result he might need kidney biopsy, which can be planned as outpatient as his renal function is stable  D/W patient in detail above plan    Hypophosphatemia:  SUpplement as needed   ivon

## 2021-04-14 LAB
ANION GAP SERPL CALC-SCNC: 10 MMOL/L — SIGNIFICANT CHANGE UP (ref 7–14)
BUN SERPL-MCNC: 23 MG/DL — SIGNIFICANT CHANGE UP (ref 7–23)
CALCIUM SERPL-MCNC: 8.9 MG/DL — SIGNIFICANT CHANGE UP (ref 8.4–10.5)
CHLORIDE SERPL-SCNC: 97 MMOL/L — LOW (ref 98–107)
CO2 SERPL-SCNC: 23 MMOL/L — SIGNIFICANT CHANGE UP (ref 22–31)
CREAT SERPL-MCNC: 1.21 MG/DL — SIGNIFICANT CHANGE UP (ref 0.5–1.3)
CRP SERPL-MCNC: <4 MG/L — SIGNIFICANT CHANGE UP
ERYTHROCYTE [SEDIMENTATION RATE] IN BLOOD: 60 MM/HR — HIGH (ref 1–15)
GLUCOSE SERPL-MCNC: 187 MG/DL — HIGH (ref 70–99)
HCT VFR BLD CALC: 39 % — SIGNIFICANT CHANGE UP (ref 39–50)
HGB BLD-MCNC: 13.1 G/DL — SIGNIFICANT CHANGE UP (ref 13–17)
MAGNESIUM SERPL-MCNC: 2.3 MG/DL — SIGNIFICANT CHANGE UP (ref 1.6–2.6)
MCHC RBC-ENTMCNC: 29.2 PG — SIGNIFICANT CHANGE UP (ref 27–34)
MCHC RBC-ENTMCNC: 33.6 GM/DL — SIGNIFICANT CHANGE UP (ref 32–36)
MCV RBC AUTO: 86.9 FL — SIGNIFICANT CHANGE UP (ref 80–100)
NRBC # BLD: 0 /100 WBCS — SIGNIFICANT CHANGE UP
NRBC # FLD: 0 K/UL — SIGNIFICANT CHANGE UP
OSMOLALITY UR: 242 MOSM/KG — SIGNIFICANT CHANGE UP (ref 50–1200)
PLATELET # BLD AUTO: 339 K/UL — SIGNIFICANT CHANGE UP (ref 150–400)
POTASSIUM SERPL-MCNC: 4.6 MMOL/L — SIGNIFICANT CHANGE UP (ref 3.5–5.3)
POTASSIUM SERPL-SCNC: 4.6 MMOL/L — SIGNIFICANT CHANGE UP (ref 3.5–5.3)
RBC # BLD: 4.49 M/UL — SIGNIFICANT CHANGE UP (ref 4.2–5.8)
RBC # FLD: 11.5 % — SIGNIFICANT CHANGE UP (ref 10.3–14.5)
SODIUM SERPL-SCNC: 130 MMOL/L — LOW (ref 135–145)
SODIUM UR-SCNC: 24 MMOL/L — SIGNIFICANT CHANGE UP
T PALLIDUM AB TITR SER: POSITIVE
WBC # BLD: 8.52 K/UL — SIGNIFICANT CHANGE UP (ref 3.8–10.5)
WBC # FLD AUTO: 8.52 K/UL — SIGNIFICANT CHANGE UP (ref 3.8–10.5)

## 2021-04-14 PROCEDURE — 71046 X-RAY EXAM CHEST 2 VIEWS: CPT | Mod: 26

## 2021-04-14 PROCEDURE — 72082 X-RAY EXAM ENTIRE SPI 2/3 VW: CPT | Mod: 26

## 2021-04-14 RX ORDER — LIDOCAINE 4 G/100G
1 CREAM TOPICAL ONCE
Refills: 0 | Status: COMPLETED | OUTPATIENT
Start: 2021-04-14 | End: 2021-04-14

## 2021-04-14 RX ORDER — INSULIN GLARGINE 100 [IU]/ML
24 INJECTION, SOLUTION SUBCUTANEOUS AT BEDTIME
Refills: 0 | Status: DISCONTINUED | OUTPATIENT
Start: 2021-04-14 | End: 2021-04-16

## 2021-04-14 RX ORDER — KETOROLAC TROMETHAMINE 30 MG/ML
15 SYRINGE (ML) INJECTION EVERY 12 HOURS
Refills: 0 | Status: DISCONTINUED | OUTPATIENT
Start: 2021-04-14 | End: 2021-04-16

## 2021-04-14 RX ADMIN — Medication 15 MILLIGRAM(S): at 17:25

## 2021-04-14 RX ADMIN — Medication 10 MILLIGRAM(S): at 04:07

## 2021-04-14 RX ADMIN — Medication 5 MILLIGRAM(S): at 21:29

## 2021-04-14 RX ADMIN — LIDOCAINE 1 PATCH: 4 CREAM TOPICAL at 16:36

## 2021-04-14 RX ADMIN — INSULIN GLARGINE 24 UNIT(S): 100 INJECTION, SOLUTION SUBCUTANEOUS at 21:27

## 2021-04-14 RX ADMIN — HEPARIN SODIUM 5000 UNIT(S): 5000 INJECTION INTRAVENOUS; SUBCUTANEOUS at 05:25

## 2021-04-14 RX ADMIN — Medication 10 UNIT(S): at 09:00

## 2021-04-14 RX ADMIN — HEPARIN SODIUM 5000 UNIT(S): 5000 INJECTION INTRAVENOUS; SUBCUTANEOUS at 13:08

## 2021-04-14 RX ADMIN — GABAPENTIN 100 MILLIGRAM(S): 400 CAPSULE ORAL at 13:08

## 2021-04-14 RX ADMIN — LOSARTAN POTASSIUM 100 MILLIGRAM(S): 100 TABLET, FILM COATED ORAL at 05:25

## 2021-04-14 RX ADMIN — Medication 10 UNIT(S): at 17:25

## 2021-04-14 RX ADMIN — Medication 4: at 09:00

## 2021-04-14 RX ADMIN — GABAPENTIN 100 MILLIGRAM(S): 400 CAPSULE ORAL at 05:25

## 2021-04-14 RX ADMIN — LIDOCAINE 1 PATCH: 4 CREAM TOPICAL at 04:07

## 2021-04-14 RX ADMIN — HEPARIN SODIUM 5000 UNIT(S): 5000 INJECTION INTRAVENOUS; SUBCUTANEOUS at 21:27

## 2021-04-14 RX ADMIN — GABAPENTIN 100 MILLIGRAM(S): 400 CAPSULE ORAL at 21:28

## 2021-04-14 RX ADMIN — Medication 10 UNIT(S): at 12:42

## 2021-04-14 RX ADMIN — AMLODIPINE BESYLATE 10 MILLIGRAM(S): 2.5 TABLET ORAL at 05:25

## 2021-04-14 RX ADMIN — LIDOCAINE 1 PATCH: 4 CREAM TOPICAL at 09:02

## 2021-04-14 NOTE — PROGRESS NOTE ADULT - SUBJECTIVE AND OBJECTIVE BOX
Patient is a 50y Male     Patient is a 50y old  Male who presents with a chief complaint of abdominal pain (12 Apr 2021 09:47)      HPI:  49 yo male, PMH DM, HTN, hyperlipidemia; pt presented to the ED c/o abdominal cramping, generalized, and diarrhea following use of laxatives as above for constipation.  In the ED, WBC 10.82, Hb 13.6, CMP: sodium 126, chloride 89, glucose 281, CMP otherwise unremarkable.  VBG: Lactate 2.6 (later downtrended to 1.6).  Pt. was treated with IV hydration and dispo'd to CDU for continued care plan:  IV hydration, supportive care, AM labs, general observation care / monitoring. pt continued to have generalized weakness , numbness and on labs persistent hyponatermia and now being admitted for furhter evlauation .  dnenies N/V/Abd pain   had bm   no urinary sx   no fever or chills   no cough   no cp /sob    (11 Apr 2021 15:50)      PAST MEDICAL & SURGICAL HISTORY:  DM (diabetes mellitus)    HTN (hypertension)    Hyperlipidemia    H/O total knee replacement    Foot fracture        MEDICATIONS  (STANDING):  amLODIPine   Tablet 10 milliGRAM(s) Oral daily  dextrose 40% Gel 15 Gram(s) Oral once  dextrose 5%. 1000 milliLiter(s) (50 mL/Hr) IV Continuous <Continuous>  dextrose 5%. 1000 milliLiter(s) (100 mL/Hr) IV Continuous <Continuous>  dextrose 50% Injectable 25 Gram(s) IV Push once  dextrose 50% Injectable 12.5 Gram(s) IV Push once  dextrose 50% Injectable 25 Gram(s) IV Push once  gabapentin   Solution 100 milliGRAM(s) Oral three times a day  glucagon  Injectable 1 milliGRAM(s) IntraMuscular once  heparin   Injectable 5000 Unit(s) SubCutaneous every 8 hours  insulin glargine Injectable (LANTUS) 20 Unit(s) SubCutaneous at bedtime  insulin lispro (ADMELOG) corrective regimen sliding scale   SubCutaneous three times a day before meals  insulin lispro (ADMELOG) corrective regimen sliding scale   SubCutaneous at bedtime  insulin lispro Injectable (ADMELOG) 10 Unit(s) SubCutaneous three times a day before meals  losartan 100 milliGRAM(s) Oral daily      Allergies    No Known Drug Allergies  shellfish (Urticaria)    Intolerances        SOCIAL HISTORY:  Denies ETOh,Smoking,     FAMILY HISTORY:  No pertinent family history in first degree relatives        REVIEW OF SYSTEMS:    CONSTITUTIONAL: No weakness, fevers or chills  EYES/ENT: No visual changes;  No vertigo or throat pain   NECK: No pain or stiffness  RESPIRATORY: No cough, wheezing, hemoptysis; No shortness of breath  CARDIOVASCULAR: No chest pain or palpitations  GASTROINTESTINAL: No abdominal or epigastric pain. No nausea, vomiting, or hematemesis; No diarrhea or constipation. No melena or hematochezia.  GENITOURINARY: No dysuria, frequency or hematuria  NEUROLOGICAL: No numbness or weakness  SKIN: No itching, burning, rashes, or lesions   All other review of systems is negative unless indicated above.    VITAL:  T(C): , Max: 37.1 (04-14-21 @ 05:20)  T(F): , Max: 98.7 (04-14-21 @ 05:20)  HR: 90 (04-14-21 @ 05:20)  BP: 158/80 (04-14-21 @ 05:20)  BP(mean): --  RR: 18 (04-14-21 @ 05:20)  SpO2: 97% (04-14-21 @ 05:20)  Wt(kg): --    I and O's:    04-12 @ 07:01  -  04-13 @ 07:00  --------------------------------------------------------  IN: 0 mL / OUT: 400 mL / NET: -400 mL    04-13 @ 07:01  -  04-14 @ 07:00  --------------------------------------------------------  IN: 0 mL / OUT: 475 mL / NET: -475 mL          PHYSICAL EXAM:    Constitutional: NAD  HEENT: PERRLA,   Neck: No JVD  Respiratory: CTA B/L  Cardiovascular: S1 and S2  Gastrointestinal: BS+, soft, NT/ND  Extremities: No peripheral edema  Neurological: A/O x 3, no focal deficits  Psychiatric: Normal mood, normal affect  : No Jose  Skin: No rashes  Access: Not applicable  Back: No CVA tenderness    LABS:                        13.1   8.52  )-----------( 339      ( 14 Apr 2021 05:24 )             39.0     04-14    130<L>  |  97<L>  |  23  ----------------------------<  187<H>  4.6   |  23  |  1.21    Ca    8.9      14 Apr 2021 05:24  Mg     2.3     04-14    TPro  6.9  /  Alb  x   /  TBili  x   /  DBili  x   /  AST  x   /  ALT  x   /  AlkPhos  x   04-13          RADIOLOGY & ADDITIONAL STUDIES:

## 2021-04-14 NOTE — PROGRESS NOTE ADULT - ASSESSMENT
51 yo male, PMH DM, HTN, hyperlipidemia; pt presented to the ED c/o abdominal cramping, generalized, and diarrhea following use of laxatives as above for constipation.  In the ED, WBC 10.82, Hb 13.6, CMP: sodium 126, chloride 89, glucose 281, CMP otherwise unremarkable.  VBG: Lactate 2.6 (later downtrended to 1.6).  Pt. was treated with IV hydration and dispo'd to CDU for continued care plan:  IV hydration, supportive care, AM labs, general observation care / monitoring. pt continued to have generalized weakness , numbness and on labs persistent hyponatermia      A/P:  Hyponatremia:  Etiology?  Likely sec to hypovolemia+hyperglycemia. work up suggested SIADH  Na slightly worsen today  Repeat Urine Na, osmolality  free water restriction <1.2L/day. if Na worsen would likely to start na tab 1g tid x 2days  ok TSH, serum cortisol level  Monitor Na level Q12  Na level should not increase more than 6meq in 24 hrs    Proteinuia/Hematuria:  Etiology?  Had Cystoscopy last year and was normal per patient  Possible sec to Diabetic Nephropathy but will need full vasculitis work up  check urine p/c ratio  c3 c4 hep b hep c hiv, k/l wnl  pending PIERO, ANCA, RPR, Serum immunofixation, SPEP    Based on work up result he might need kidney biopsy, which can be planned as outpatient as his renal function is stable  D/W patient in detail above plan    Hypophosphatemia:  SUpplement as needed   montior    HTN  suboptimal   on norvasc and losartan  start metoprolol if bp persistently >150

## 2021-04-14 NOTE — PROGRESS NOTE ADULT - SUBJECTIVE AND OBJECTIVE BOX
[facility]   MD WYATT CHRIS DO ANAM SIDDIQUI ANGELA WONG, PA    TEL:  OFFICE: [phone]  DR DAWSON CELL: [phone]  DR. TIRADO CELL: [phone]  DR. MORAN CELL: [phone]  CATINA PATEL CELL: [phone]    From 5pm-7am Answering Service [phone]    -- RENAL FOLLOW UP NOTE ---Date of Service 04-14-21 @ 13:13    Patient is a 50y old  Male who presents with a chief complaint of constipaiton, abdominal pain (14 Apr 2021 08:46)      Patient seen and examined at bedside. No chest pain/sob    VITALS:  T(F): 98.7 (04-14-21 @ 05:20), Max: 98.7 (04-14-21 @ 05:20)  HR: 90 (04-14-21 @ 05:20)  BP: 158/80 (04-14-21 @ 05:20)  RR: 18 (04-14-21 @ 05:20)  SpO2: 97% (04-14-21 @ 05:20)  Wt(kg): --    04-13 @ 07:01  -  04-14 @ 07:00  --------------------------------------------------------  IN: 0 mL / OUT: 475 mL / NET: -475 mL          PHYSICAL EXAM:  Constitutional: NAD  Neck: No JVD  Respiratory: CTAB, no wheezes, rales or rhonchi  Cardiovascular: S1, S2, RRR  Gastrointestinal: BS+, soft, NT/ND  Extremities: No peripheral edema    Hospital Medications:   MEDICATIONS  (STANDING):  amLODIPine   Tablet 10 milliGRAM(s) Oral daily  dextrose 40% Gel 15 Gram(s) Oral once  dextrose 5%. 1000 milliLiter(s) (50 mL/Hr) IV Continuous <Continuous>  dextrose 5%. 1000 milliLiter(s) (100 mL/Hr) IV Continuous <Continuous>  dextrose 50% Injectable 25 Gram(s) IV Push once  dextrose 50% Injectable 12.5 Gram(s) IV Push once  dextrose 50% Injectable 25 Gram(s) IV Push once  gabapentin   Solution 100 milliGRAM(s) Oral three times a day  glucagon  Injectable 1 milliGRAM(s) IntraMuscular once  heparin   Injectable 5000 Unit(s) SubCutaneous every 8 hours  insulin glargine Injectable (LANTUS) 24 Unit(s) SubCutaneous at bedtime  insulin lispro (ADMELOG) corrective regimen sliding scale   SubCutaneous three times a day before meals  insulin lispro (ADMELOG) corrective regimen sliding scale   SubCutaneous at bedtime  insulin lispro Injectable (ADMELOG) 10 Unit(s) SubCutaneous three times a day before meals  ketorolac   Injectable 15 milliGRAM(s) IV Push every 12 hours  losartan 100 milliGRAM(s) Oral daily      LABS:  04-14    130<L>  |  97<L>  |  23  ----------------------------<  187<H>  4.6   |  23  |  1.21    Ca    8.9      14 Apr 2021 05:24  Mg     2.3     04-14    TPro  6.9  /  Alb      /  TBili      /  DBili      /  AST      /  ALT      /  AlkPhos      04-13    Creatinine Trend: 1.21 <--, 1.26 <--, 0.99 <--, 0.97 <--, 0.94 <--, 0.86 <--, 1.07 <--, 1.13 <--                                13.1   8.52  )-----------( 339      ( 14 Apr 2021 05:24 )             39.0     Urine Studies:  Urinalysis - [04-11-21 @ 12:32]      Color Light Yellow / Appearance Clear / SG 1.013 / pH 6.5      Gluc >= 1000 mg/dL / Ketone Small  / Bili Negative / Urobili <2 mg/dL       Blood Small / Protein 100 mg/dL / Leuk Est Negative / Nitrite Negative      RBC 10 / WBC 1 / Hyaline 1 / Gran  / Sq Epi  / Non Sq Epi 1 / Bacteria Negative    Urine Creatinine 51      [04-11-21 @ 16:02]  Urine Sodium 104      [04-11-21 @ 12:32]  Urine Potassium 25.1      [04-11-21 @ 12:32]  Urine Chloride 90      [04-11-21 @ 12:32]  Urine Osmolality 465      [04-11-21 @ 16:00]    TSH 3.00      [04-12-21 @ 06:36]    HBsAg Nonreact      [04-13-21 @ 10:09]  HCV 0.13, Nonreact      [04-13-21 @ 10:09]  HIV Nonreact      [04-13-21 @ 07:48]    C3 Complement 152      [04-13-21 @ 07:48]  C4 Complement 33      [04-13-21 @ 07:48]  Free Light Chains: kappa 1.51, lambda 1.89, ratio = 0.80      [04-13 @ 10:08]    RADIOLOGY & ADDITIONAL STUDIES:

## 2021-04-14 NOTE — PROGRESS NOTE ADULT - SUBJECTIVE AND OBJECTIVE BOX
Date of service: 04-14-21 @ 17:36      Patient is a 50y old  Male who presents with a chief complaint of constipation, abdominal pain (14 Apr 2021 13:25)                                                               INTERVAL HPI/OVERNIGHT EVENTS:    REVIEW OF SYSTEMS:     CONSTITUTIONAL: No weakness, fevers or chills  EYES/ENT: No visual changes , no ear ache   NECK: No pain or stiffness  RESPIRATORY: No cough, wheezing,  No shortness of breath  CARDIOVASCULAR: No chest pain or palpitations  GASTROINTESTINAL: No abdominal pain  . No nausea, vomiting, or hematemesis; No diarrhea or constipation. No melena or hematochezia.  GENITOURINARY: No dysuria, frequency or hematuria  NEUROLOGICAL: still c/o numbenss and weakness   no urinary or stool incontiencne                                                                                                                                                                                                                                                                             Medications:  MEDICATIONS  (STANDING):  amLODIPine   Tablet 10 milliGRAM(s) Oral daily  dextrose 40% Gel 15 Gram(s) Oral once  dextrose 5%. 1000 milliLiter(s) (50 mL/Hr) IV Continuous <Continuous>  dextrose 5%. 1000 milliLiter(s) (100 mL/Hr) IV Continuous <Continuous>  dextrose 50% Injectable 25 Gram(s) IV Push once  dextrose 50% Injectable 12.5 Gram(s) IV Push once  dextrose 50% Injectable 25 Gram(s) IV Push once  gabapentin   Solution 100 milliGRAM(s) Oral three times a day  glucagon  Injectable 1 milliGRAM(s) IntraMuscular once  heparin   Injectable 5000 Unit(s) SubCutaneous every 8 hours  insulin glargine Injectable (LANTUS) 24 Unit(s) SubCutaneous at bedtime  insulin lispro (ADMELOG) corrective regimen sliding scale   SubCutaneous three times a day before meals  insulin lispro (ADMELOG) corrective regimen sliding scale   SubCutaneous at bedtime  insulin lispro Injectable (ADMELOG) 10 Unit(s) SubCutaneous three times a day before meals  ketorolac   Injectable 15 milliGRAM(s) IV Push every 12 hours  losartan 100 milliGRAM(s) Oral daily    MEDICATIONS  (PRN):  acetaminophen   Tablet .. 650 milliGRAM(s) Oral every 6 hours PRN Temp greater or equal to 38C (100.4F), Mild Pain (1 - 3), Moderate Pain (4 - 6)       Allergies    No Known Drug Allergies  shellfish (Urticaria)    Intolerances      Vital Signs Last 24 Hrs  T(C): 36.8 (14 Apr 2021 15:00), Max: 37.1 (14 Apr 2021 05:20)  T(F): 98.3 (14 Apr 2021 15:00), Max: 98.7 (14 Apr 2021 05:20)  HR: 88 (14 Apr 2021 15:00) (88 - 90)  BP: 122/77 (14 Apr 2021 15:00) (122/77 - 158/80)  BP(mean): --  RR: 16 (14 Apr 2021 15:00) (16 - 18)  SpO2: 100% (14 Apr 2021 15:00) (97% - 100%)  CAPILLARY BLOOD GLUCOSE      POCT Blood Glucose.: 147 mg/dL (14 Apr 2021 17:05)  POCT Blood Glucose.: 133 mg/dL (14 Apr 2021 12:36)  POCT Blood Glucose.: 209 mg/dL (14 Apr 2021 08:38)  POCT Blood Glucose.: 162 mg/dL (13 Apr 2021 21:57)      04-13 @ 07:01  -  04-14 @ 07:00  --------------------------------------------------------  IN: 0 mL / OUT: 475 mL / NET: -475 mL      Physical Exam:    Daily     Daily   General:  NAD   HEENT:  Nonicteric, PERRLA  CV:  RRR, S1S2   Lungs:  CTA B/L, no wheezes, rales, rhonchi  Abdomen:  Soft, non-tender, no distended, positive BS  Extremities:  no edema   Neuro:  AAOx3, LLE weakness sec to pain ?        LABS:                               13.1   8.52  )-----------( 339      ( 14 Apr 2021 05:24 )             39.0                      04-14    130<L>  |  97<L>  |  23  ----------------------------<  187<H>  4.6   |  23  |  1.21    Ca    8.9      14 Apr 2021 05:24  Mg     2.3     04-14    TPro  6.9  /  Alb  x   /  TBili  x   /  DBili  x   /  AST  x   /  ALT  x   /  AlkPhos  x   04-13                       RADIOLOGY & ADDITIONAL TESTS         I personally reviewed: [  ]EKG   [  ]CXR    [  ] CT      A/P:         Discussed with :     Francisco consultants' Notes   Time spent :

## 2021-04-14 NOTE — DIETITIAN INITIAL EVALUATION ADULT. - OTHER INFO
Pt has a history of DM2, HTN and HLD.  Pt presented with abdominal cramping  and diarrhea after use of laxatives.   Pt states his appetite has been good and he is eating well. He has an allergy to shellfish. Pt states he has had diabetes for many years and is usually well controlled. Pt was taking less insulin at home due to insurance and cost of insulin. Reviewed diet with Pt who showed a good understanding of the diet with teach back.  Pt states that he often gets hungry during the day and requested Glucerna Shakes for snack.

## 2021-04-14 NOTE — PROGRESS NOTE ADULT - ASSESSMENT
49 yo male, PMH DM, HTN, hyperlipidemia; pt presented to the ED c/o abdominal cramping, generalized, and diarrhea following use of laxatives as above for constipation.  In the ED, WBC 10.82, Hb 13.6, CMP: sodium 126, chloride 89, glucose 281, CMP otherwise unremarkable.  VBG: Lactate 2.6 (later downtrended to 1.6).  Pt. was treated with IV hydration and dispo'd to CDU for continued care plan:  IV hydration, supportive care, AM labs, general observation care / monitoring. pt continued to have generalized weakness , numbness and on labs persistent hyponatermia and now being admitted for furhter evlauation .  dnenies N/V/Abd pain   had bm   no urinary sx   no fever or chills   no cough   no cp /sob     - hyponatremia :   likely multifactorial including hyperglycemia ( pseudohyponatremia ) and pre renal sec to diarreah   ? ADH induced by pain   apprecaite renal input   improving   monitor     - DM: uncontrolled   appreciate endo input    -HTN : imrpoved     - numbness/weakness  : likely nueropathic   LLE proximal weakness ??  limited exam sec to pain   otherwise no concerns for spinal involvement   neuro consult apprecaited   will check MR C/T/L given persistance numbeness and weakness   also possibly sec to recent COVID infection .. CT chest  : non specific GGO     - hypophosphatemia : repleted and monitor     - constipation : had had colonoscopy 2 yrs ago with Dr. Rae   hold further bowel regimen   CT abd no acute path   no furtehr inpt red : discussed with Dr Rae       dvt proph   d/w pt at length   with PA

## 2021-04-14 NOTE — PROGRESS NOTE ADULT - SUBJECTIVE AND OBJECTIVE BOX
Chief Complaint: DM2    History:  patient reports fair appetite, eating about 50% of meals  No n/v  no hypoglycemia symptoms    MEDICATIONS  (STANDING):  dextrose 40% Gel 15 Gram(s) Oral once  dextrose 5%. 1000 milliLiter(s) (50 mL/Hr) IV Continuous <Continuous>  dextrose 5%. 1000 milliLiter(s) (100 mL/Hr) IV Continuous <Continuous>  dextrose 50% Injectable 25 Gram(s) IV Push once  dextrose 50% Injectable 12.5 Gram(s) IV Push once  dextrose 50% Injectable 25 Gram(s) IV Push once  gabapentin   Solution 100 milliGRAM(s) Oral three times a day  glucagon  Injectable 1 milliGRAM(s) IntraMuscular once  insulin glargine Injectable (LANTUS) 20 Unit(s) SubCutaneous at bedtime  insulin lispro (ADMELOG) corrective regimen sliding scale   SubCutaneous three times a day before meals  insulin lispro (ADMELOG) corrective regimen sliding scale   SubCutaneous at bedtime  insulin lispro Injectable (ADMELOG) 10 Unit(s) SubCutaneous three times a day before meals  losartan 100 milliGRAM(s) Oral daily    MEDICATIONS  (PRN):  acetaminophen   Tablet .. 650 milliGRAM(s) Oral every 6 hours PRN Temp greater or equal to 38C (100.4F), Mild Pain (1 - 3), Moderate Pain (4 - 6)      Allergies    No Known Drug Allergies  shellfish (Urticaria)    Intolerances      Review of Systems:  ALL OTHER SYSTEMS REVIEWED AND NEGATIVE      Vital Signs Last 24 Hrs  T(C): 36.8 (14 Apr 2021 15:00), Max: 37.1 (14 Apr 2021 05:20)  T(F): 98.3 (14 Apr 2021 15:00), Max: 98.7 (14 Apr 2021 05:20)  HR: 88 (14 Apr 2021 15:00) (88 - 90)  BP: 122/77 (14 Apr 2021 15:00) (122/77 - 158/80)  BP(mean): --  RR: 16 (14 Apr 2021 15:00) (16 - 18)  SpO2: 100% (14 Apr 2021 15:00) (97% - 100%)  GENERAL: NAD, well-groomed, well-developed  EYES: No proptosis, no lid lag, anicteric  HEENT:  Atraumatic, Normocephalic, moist mucous membranes  RESPIRATORY: nonlabored respirations  PSYCH: Alert and oriented x 3, normal affect, normal mood      CAPILLARY BLOOD GLUCOSE      POCT Blood Glucose.: 147 mg/dL (14 Apr 2021 17:05)  POCT Blood Glucose.: 133 mg/dL (14 Apr 2021 12:36)  POCT Blood Glucose.: 209 mg/dL (14 Apr 2021 08:38)  POCT Blood Glucose.: 162 mg/dL (13 Apr 2021 21:57)    POCT Blood Glucose.: 116 mg/dL (13 Apr 2021 11:51)  POCT Blood Glucose.: 225 mg/dL (13 Apr 2021 08:02)  POCT Blood Glucose.: 167 mg/dL (12 Apr 2021 21:19)  POCT Blood Glucose.: 74 mg/dL (12 Apr 2021 16:27)      04-14    130<L>  |  97<L>  |  23  ----------------------------<  187<H>  4.6   |  23  |  1.21    Ca    8.9      14 Apr 2021 05:24  Mg     2.3     04-14    TPro  6.9  /  Alb  x   /  TBili  x   /  DBili  x   /  AST  x   /  ALT  x   /  AlkPhos  x   04-13        A1C with Estimated Average Glucose Result: 11.5 % (04-11-21 @ 07:40)      Thyroid Function Tests:  04-12 @ 06:36 TSH 3.00 FreeT4 1.7 T3 -- Anti TPO -- Anti Thyroglobulin Ab -- TSI --  04-11 @ 11:43 TSH 2.29 FreeT4 -- T3 -- Anti TPO -- Anti Thyroglobulin Ab -- TSI --

## 2021-04-14 NOTE — PROGRESS NOTE ADULT - ASSESSMENT
50M uncontrolled DM2 HbA1c 11.5%, hyperglycemia related to difficulties obtaining adequate insulin during pandemic due to insurance/cost.    1) DM2 with hyperglycemia  Inpatient plan:  BG target 100-180 mg/dl, BG overall improving, one borderline low value yesterday likely related to being on clear liquid diet, diet advanced for today.  carb consistent diet  FS premeal and bedtime  increase Lantus to 24 units qhs  Continue Admelog 10/10/10  Continue Admelog moderate scale premeal and moderate bedtime scale  RD consult- appreciated  Discussed with patient regarding discharge planning, he now will have enough Tresiba and Novolog at home to proceed with these insulins due to [facility] program he is now a part of.   DC on Tresiba and Novolog pens doses TBD. Discussed option of mixed insulin in case of future issues obtaining insulin.  Outpatient endocrine follow up Dr. Ban Velez, although he will consider changing to [facility] endocrinology if he prefers [phone].    2) Hyponatremia  TSH, Free T4 and 8AM cortisol in acceptable range not indicative of endocrine cause of hyponatremia. Na improved to 134 yesterday.    3) Hypertension  currently uncontrolled  on amlodipine and losartan as outpatient  management per primary team    Frazaneh Bennett  Nurse Practitioner  Division of Endocrinology & Diabetes  Pager # [phone]      If after 6PM or before 9AM, or on weekends/holidays, please call endocrine answering service for assistance ([phone]).  For nonurgent matters email [email or web address] for assistance.

## 2021-04-15 LAB
ALBUMIN SERPL ELPH-MCNC: 3.5 G/DL — SIGNIFICANT CHANGE UP (ref 3.3–5)
ALP SERPL-CCNC: 106 U/L — SIGNIFICANT CHANGE UP (ref 40–120)
ALT FLD-CCNC: 33 U/L — SIGNIFICANT CHANGE UP (ref 4–41)
ANA TITR SER: NEGATIVE — SIGNIFICANT CHANGE UP
ANION GAP SERPL CALC-SCNC: 12 MMOL/L — SIGNIFICANT CHANGE UP (ref 7–14)
AST SERPL-CCNC: 24 U/L — SIGNIFICANT CHANGE UP (ref 4–40)
AUTO DIFF PNL BLD: NEGATIVE — SIGNIFICANT CHANGE UP
BILIRUB DIRECT SERPL-MCNC: <0.2 MG/DL — SIGNIFICANT CHANGE UP (ref 0–0.2)
BILIRUB INDIRECT FLD-MCNC: >0.1 MG/DL — SIGNIFICANT CHANGE UP (ref 0–1)
BILIRUB SERPL-MCNC: 0.3 MG/DL — SIGNIFICANT CHANGE UP (ref 0.2–1.2)
BUN SERPL-MCNC: 25 MG/DL — HIGH (ref 7–23)
C-ANCA SER-ACNC: NEGATIVE — SIGNIFICANT CHANGE UP
CALCIUM SERPL-MCNC: 9 MG/DL — SIGNIFICANT CHANGE UP (ref 8.4–10.5)
CHLORIDE SERPL-SCNC: 98 MMOL/L — SIGNIFICANT CHANGE UP (ref 98–107)
CO2 SERPL-SCNC: 22 MMOL/L — SIGNIFICANT CHANGE UP (ref 22–31)
CREAT SERPL-MCNC: 1.29 MG/DL — SIGNIFICANT CHANGE UP (ref 0.5–1.3)
GLUCOSE SERPL-MCNC: 257 MG/DL — HIGH (ref 70–99)
HCT VFR BLD CALC: 39.4 % — SIGNIFICANT CHANGE UP (ref 39–50)
HGB BLD-MCNC: 13.2 G/DL — SIGNIFICANT CHANGE UP (ref 13–17)
MAGNESIUM SERPL-MCNC: 2.4 MG/DL — SIGNIFICANT CHANGE UP (ref 1.6–2.6)
MCHC RBC-ENTMCNC: 29.5 PG — SIGNIFICANT CHANGE UP (ref 27–34)
MCHC RBC-ENTMCNC: 33.5 GM/DL — SIGNIFICANT CHANGE UP (ref 32–36)
MCV RBC AUTO: 87.9 FL — SIGNIFICANT CHANGE UP (ref 80–100)
NRBC # BLD: 0 /100 WBCS — SIGNIFICANT CHANGE UP
NRBC # FLD: 0 K/UL — SIGNIFICANT CHANGE UP
OSMOLALITY UR: 360 MOSM/KG — SIGNIFICANT CHANGE UP (ref 50–1200)
P-ANCA SER-ACNC: NEGATIVE — SIGNIFICANT CHANGE UP
PHOSPHATE SERPL-MCNC: 4.1 MG/DL — SIGNIFICANT CHANGE UP (ref 2.5–4.5)
PLATELET # BLD AUTO: 328 K/UL — SIGNIFICANT CHANGE UP (ref 150–400)
POTASSIUM SERPL-MCNC: 4.8 MMOL/L — SIGNIFICANT CHANGE UP (ref 3.5–5.3)
POTASSIUM SERPL-SCNC: 4.8 MMOL/L — SIGNIFICANT CHANGE UP (ref 3.5–5.3)
PROT ?TM UR-MCNC: 66 MG/DL — SIGNIFICANT CHANGE UP
PROT SERPL-MCNC: 7.1 G/DL — SIGNIFICANT CHANGE UP (ref 6–8.3)
RBC # BLD: 4.48 M/UL — SIGNIFICANT CHANGE UP (ref 4.2–5.8)
RBC # FLD: 11.5 % — SIGNIFICANT CHANGE UP (ref 10.3–14.5)
SODIUM SERPL-SCNC: 132 MMOL/L — LOW (ref 135–145)
SODIUM UR-SCNC: 21 MMOL/L — SIGNIFICANT CHANGE UP
WBC # BLD: 7.3 K/UL — SIGNIFICANT CHANGE UP (ref 3.8–10.5)
WBC # FLD AUTO: 7.3 K/UL — SIGNIFICANT CHANGE UP (ref 3.8–10.5)

## 2021-04-15 PROCEDURE — 99231 SBSQ HOSP IP/OBS SF/LOW 25: CPT

## 2021-04-15 PROCEDURE — 99253 IP/OBS CNSLTJ NEW/EST LOW 45: CPT | Mod: GC

## 2021-04-15 RX ORDER — SENNA PLUS 8.6 MG/1
1 TABLET ORAL
Refills: 0 | Status: DISCONTINUED | OUTPATIENT
Start: 2021-04-15 | End: 2021-04-15

## 2021-04-15 RX ORDER — KETOROLAC TROMETHAMINE 30 MG/ML
15 SYRINGE (ML) INJECTION ONCE
Refills: 0 | Status: DISCONTINUED | OUTPATIENT
Start: 2021-04-15 | End: 2021-04-15

## 2021-04-15 RX ORDER — KETOROLAC TROMETHAMINE 30 MG/ML
15 SYRINGE (ML) INJECTION ONCE
Refills: 0 | Status: DISCONTINUED | OUTPATIENT
Start: 2021-04-15 | End: 2021-04-16

## 2021-04-15 RX ORDER — LANOLIN ALCOHOL/MO/W.PET/CERES
6 CREAM (GRAM) TOPICAL ONCE
Refills: 0 | Status: COMPLETED | OUTPATIENT
Start: 2021-04-15 | End: 2021-04-15

## 2021-04-15 RX ORDER — SENNA PLUS 8.6 MG/1
2 TABLET ORAL
Refills: 0 | Status: DISCONTINUED | OUTPATIENT
Start: 2021-04-15 | End: 2021-05-05

## 2021-04-15 RX ADMIN — Medication 5 MILLIGRAM(S): at 23:06

## 2021-04-15 RX ADMIN — Medication 650 MILLIGRAM(S): at 02:26

## 2021-04-15 RX ADMIN — Medication 10 UNIT(S): at 12:27

## 2021-04-15 RX ADMIN — Medication 15 MILLIGRAM(S): at 06:11

## 2021-04-15 RX ADMIN — Medication 30 MILLILITER(S): at 13:12

## 2021-04-15 RX ADMIN — LOSARTAN POTASSIUM 100 MILLIGRAM(S): 100 TABLET, FILM COATED ORAL at 06:11

## 2021-04-15 RX ADMIN — GABAPENTIN 100 MILLIGRAM(S): 400 CAPSULE ORAL at 06:11

## 2021-04-15 RX ADMIN — HEPARIN SODIUM 5000 UNIT(S): 5000 INJECTION INTRAVENOUS; SUBCUTANEOUS at 13:13

## 2021-04-15 RX ADMIN — INSULIN GLARGINE 24 UNIT(S): 100 INJECTION, SOLUTION SUBCUTANEOUS at 23:05

## 2021-04-15 RX ADMIN — Medication 650 MILLIGRAM(S): at 00:31

## 2021-04-15 RX ADMIN — Medication 10 UNIT(S): at 17:37

## 2021-04-15 RX ADMIN — Medication 15 MILLIGRAM(S): at 17:37

## 2021-04-15 RX ADMIN — Medication 4: at 08:47

## 2021-04-15 RX ADMIN — GABAPENTIN 100 MILLIGRAM(S): 400 CAPSULE ORAL at 13:13

## 2021-04-15 RX ADMIN — Medication 10 UNIT(S): at 08:48

## 2021-04-15 RX ADMIN — Medication 6 MILLIGRAM(S): at 00:43

## 2021-04-15 RX ADMIN — SENNA PLUS 2 TABLET(S): 8.6 TABLET ORAL at 17:37

## 2021-04-15 RX ADMIN — HEPARIN SODIUM 5000 UNIT(S): 5000 INJECTION INTRAVENOUS; SUBCUTANEOUS at 06:11

## 2021-04-15 RX ADMIN — Medication 15 MILLIGRAM(S): at 04:14

## 2021-04-15 RX ADMIN — Medication 15 MILLIGRAM(S): at 02:07

## 2021-04-15 RX ADMIN — HEPARIN SODIUM 5000 UNIT(S): 5000 INJECTION INTRAVENOUS; SUBCUTANEOUS at 23:06

## 2021-04-15 RX ADMIN — AMLODIPINE BESYLATE 10 MILLIGRAM(S): 2.5 TABLET ORAL at 06:11

## 2021-04-15 NOTE — CONSULT NOTE ADULT - SUBJECTIVE AND OBJECTIVE BOX
Patient is a 50y old  Male who presents with a chief complaint of constipation (15 Apr 2021 07:00)    HPI:  51 yo M with HTN and DM who presents with abd cramping and diarrhea on 4/10/2021. Pt has had constipation for 1 week. He took 3 miralax packets, 2 fleet enemas, and 1 mag citrate on 4/9/2021, resulting in perfuse (about 10 episodes) diarrhea. He endorses mild to moderate lower abd cramping and continuing lighter episodes of diarrhea. His muscles feel weak and he feels dehydrated. He denies abd surgery, blood in stool, N/V, fevers. Pt had normal colonoscopy 1 yr ago and has GI follow up in 1 week. His pcp sent him in to r/o acute abd pathology.    Per chart review, pt went to ED on 1/20/2017 for hematuria and found to have rash on palms. Called back on 1/27/2017 about syphilis positive result, RPR=1:128, pt received PCN G benazathine 2.4M IM shot*1.    Now pt with RPR 1:1, ID consulted for positive test result.     REVIEW OF SYSTEMS  [  ] ROS unobtainable because:    [  ] All other systems negative except as noted below    Constitutional:  [ ] fever [ ] chills  [ ] weight loss  [ ]night sweat  [ ]poor appetite/PO intake [ ]fatigue   Skin:  [ ] rash [ ] phlebitis	  Eyes: [ ] icterus [ ] pain  [ ] discharge	  ENMT: [ ] sore throat  [ ] thrush [ ] ulcers [ ] exudates [ ]anosmia  Respiratory: [ ] dyspnea [ ] hemoptysis [ ] cough [ ] sputum	  Cardiovascular:  [ ] chest pain [ ] palpitations [ ] edema	  Gastrointestinal:  [ ] nausea [ ] vomiting [ ] diarrhea [ ] constipation [ ] pain	  Genitourinary:  [ ] dysuria [ ] frequency [ ] hematuria [ ] discharge [ ] flank pain  [ ] incontinence  Musculoskeletal:  [ ] myalgias [ ] arthralgias [ ] arthritis  [ ] back pain  Neurological:  [ ] headache [ ] weakness [ ] seizures  [ ] confusion/altered mental status    prior hospital charts reviewed [V]  primary team notes reviewed [V]  other consultant notes reviewed [V]    PAST MEDICAL & SURGICAL HISTORY:  DM (diabetes mellitus)    HTN (hypertension)    Hyperlipidemia    H/O total knee replacement    Foot fracture      SOCIAL HISTORY:  - Denied smoking/vaping/alcohol/recreational drug use    FAMILY HISTORY:  No pertinent family history in first degree relatives    Allergies  No Known Drug Allergies  shellfish (Urticaria)    ANTIMICROBIALS: Off    ANTIMICROBIALS (past 90 days):  MEDICATIONS  (STANDING):      OTHER MEDS:   MEDICATIONS  (STANDING):  acetaminophen   Tablet .. 650 every 6 hours PRN  aluminum hydroxide/magnesium hydroxide/simethicone Suspension 30 every 6 hours PRN  amLODIPine   Tablet 10 daily  bisacodyl 5 at bedtime  dextrose 40% Gel 15 once  dextrose 50% Injectable 25 once  dextrose 50% Injectable 12.5 once  dextrose 50% Injectable 25 once  gabapentin   Solution 100 three times a day  glucagon  Injectable 1 once  heparin   Injectable 5000 every 8 hours  insulin glargine Injectable (LANTUS) 24 at bedtime  insulin lispro (ADMELOG) corrective regimen sliding scale  three times a day before meals  insulin lispro (ADMELOG) corrective regimen sliding scale  at bedtime  insulin lispro Injectable (ADMELOG) 10 three times a day before meals  ketorolac   Injectable 15 every 12 hours  losartan 100 daily  senna 2 two times a day      VITALS:  Vital Signs Last 24 Hrs  T(F): 97 (04-15-21 @ 13:23), Max: 98.9 (04-12-21 @ 21:33)    Vital Signs Last 24 Hrs  HR: 90 (04-15-21 @ 13:23) (76 - 90)  BP: 149/90 (04-15-21 @ 13:23) (121/80 - 149/90)  RR: 18 (04-15-21 @ 13:23)  SpO2: 98% (04-15-21 @ 13:23) (98% - 100%)  Wt(kg): --    EXAM:  GA: NAD  HEENT: oral cavity no lesion  CV: nl S1/S2, no RMG  Lungs: CTAB  Abd: BS+, soft, nontender, no rebounding pain  Ext: no edema  Skin:  IV: no phlebitis    Labs:                        13.2   7.30  )-----------( 328      ( 15 Apr 2021 07:36 )             39.4     04-15    132<L>  |  98  |  25<H>  ----------------------------<  257<H>  4.8   |  22  |  1.29    Ca    9.0      15 Apr 2021 07:36  Phos  4.1     04-15  Mg     2.4     04-15    TPro  7.1  /  Alb  3.5  /  TBili  0.3  /  DBili  <0.2  /  AST  24  /  ALT  33  /  AlkPhos  106  04-15      WBC Trend:  WBC Count: 7.30 (04-15-21 @ 07:36)  WBC Count: 8.52 (04-14-21 @ 05:24)  WBC Count: 8.41 (04-13-21 @ 18:47)  WBC Count: 9.65 (04-12-21 @ 06:36)      Auto Neutrophil #: 7.43 K/uL (04-11-21 @ 07:40)  Auto Neutrophil #: 8.12 K/uL (04-10-21 @ 14:21)  Auto Neutrophil #: 4.47 K/uL (04-02-21 @ 18:49)      Creatine Trend:  Creatinine, Serum: 1.29 (04-15)  Creatinine, Serum: 1.21 (04-14)  Creatinine, Serum: 1.26 (04-13)  Creatinine, Serum: 0.99 (04-12)      Liver Biochemical Testing Trend:  Alanine Aminotransferase (ALT/SGPT): 33 (04-15)  Alanine Aminotransferase (ALT/SGPT): 24 (04-11)  Alanine Aminotransferase (ALT/SGPT): 27 (04-10)  Alanine Aminotransferase (ALT/SGPT): 28 (04-02)  Aspartate Aminotransferase (AST/SGOT): 24 (04-15-21 @ 07:36)  Aspartate Aminotransferase (AST/SGOT): 20 (04-11-21 @ 07:40)  Aspartate Aminotransferase (AST/SGOT): 18 (04-10-21 @ 14:21)  Aspartate Aminotransferase (AST/SGOT): 27 (04-02-21 @ 18:52)  Bilirubin Total, Serum: 0.3 (04-15)  Bilirubin Direct, Serum: <0.2 (04-15)  Bilirubin Total, Serum: 0.5 (04-11)  Bilirubin Total, Serum: 0.5 (04-10)  Bilirubin Total, Serum: 0.3 (04-02)      MICROBIOLOGY:    HIV-1/2 Combo Result: Nonreact (04-13-21 @ 07:48)    Treponema Pallidum Antibody Interpretation: Positive (04-13-21 @ 10:10)  RPR Titer: 1:1 (04.13.21 @ 10:10)  RPR Titer: 1:128 (01.21.17 @ 10:34)  Treponema Pallidum Antibody Interpretation: Positive (01.21.17 @ 10:34)    COVID-19 PCR: NotDetec (04-10-21 @ 18:57)    COVID-19 Padilla Domain AB Interp: Positive (04-12-21 @ 09:33)    C-Reactive Protein, Serum: <4.0 (04-14)    A1C with Estimated Average Glucose Result: 11.5 % (04-11-21 @ 07:40)      RADIOLOGY:  imaging below personally reviewed    < from: CT Chest No Cont (04.13.21 @ 21:42) >  IMPRESSION:  1.  There are faint bilateral groundglass and reticular opacities with a slight peripheral predominance are of uncertain etiology.  2.  Atherosclerotic calcifications of the left anterior descending coronary artery.    < end of copied text >       Patient is a 50y old  Male who presents with a chief complaint of constipation (15 Apr 2021 07:00)    HPI:  49 yo M with HTN and DM who presents with abd cramping and diarrhea on 4/10/2021. Pt has had constipation for 1 week. He took 3 miralax packets, 2 fleet enemas, and 1 mag citrate on 4/9/2021, resulting in perfuse (about 10 episodes) diarrhea. He endorses mild to moderate lower abd cramping and continuing lighter episodes of diarrhea. His muscles feel weak and he feels dehydrated. He denies abd surgery, blood in stool, N/V, fevers. Pt had normal colonoscopy 1 yr ago and has GI follow up in 1 week. His pcp sent him in to r/o acute abd pathology.    Per chart review, pt went to ED on 1/20/2017 for hematuria and found to have rash on palms. Called back on 1/27/2017 about syphilis positive result, RPR=1:128, pt received PCN G benazathine 2.4M IM shot*1.    Now pt with RPR 1:1, ID consulted for positive test result.    He denies hx of multiple sexual partners. Only sex with women. He  his wife since 2007, monogomous with her. He does not know how he got syphilis in the past. Stated that he has ED so he is not sexually active either.     REVIEW OF SYSTEMS  [  ] ROS unobtainable because:    [ V ] All other systems negative except as noted below    Constitutional:  [ ] fever [ ] chills  [ ] weight loss  [ ]night sweat  [ ]poor appetite/PO intake [ ]fatigue   Skin:  [ ] rash [ ] phlebitis	  Eyes: [ ] icterus [ ] pain  [ ] discharge	  ENMT: [ ] sore throat  [ ] thrush [ ] ulcers [ ] exudates [ ]anosmia  Respiratory: [ ] dyspnea [ ] hemoptysis [ ] cough [ ] sputum	  Cardiovascular:  [ ] chest pain [ ] palpitations [ ] edema	  Gastrointestinal:  [ ] nausea [ ] vomiting [ ] diarrhea [ ] constipation [V ] pain	  Genitourinary:  [ ] dysuria [ ] frequency [ ] hematuria [ ] discharge [ ] flank pain  [ ] incontinence  Musculoskeletal:  [ ] myalgias [ ] arthralgias [ ] arthritis  [ ] back pain  Neurological:  [ ] headache [V ] weakness [ ] seizures  [ ] confusion/altered mental status    prior hospital charts reviewed [V]  primary team notes reviewed [V]  other consultant notes reviewed [V]    PAST MEDICAL & SURGICAL HISTORY:  DM (diabetes mellitus)    HTN (hypertension)    Hyperlipidemia    H/O total knee replacement    Foot fracture      SOCIAL HISTORY:  - Denied smoking/vaping/alcohol/recreational drug use  - Working as [employer]    FAMILY HISTORY:  Mother- colon cancer    Allergies  No Known Drug Allergies  shellfish (Urticaria)    ANTIMICROBIALS: Off    ANTIMICROBIALS (past 90 days):  MEDICATIONS  (STANDING):      OTHER MEDS:   MEDICATIONS  (STANDING):  acetaminophen   Tablet .. 650 every 6 hours PRN  aluminum hydroxide/magnesium hydroxide/simethicone Suspension 30 every 6 hours PRN  amLODIPine   Tablet 10 daily  bisacodyl 5 at bedtime  dextrose 40% Gel 15 once  dextrose 50% Injectable 25 once  dextrose 50% Injectable 12.5 once  dextrose 50% Injectable 25 once  gabapentin   Solution 100 three times a day  glucagon  Injectable 1 once  heparin   Injectable 5000 every 8 hours  insulin glargine Injectable (LANTUS) 24 at bedtime  insulin lispro (ADMELOG) corrective regimen sliding scale  three times a day before meals  insulin lispro (ADMELOG) corrective regimen sliding scale  at bedtime  insulin lispro Injectable (ADMELOG) 10 three times a day before meals  ketorolac   Injectable 15 every 12 hours  losartan 100 daily  senna 2 two times a day      VITALS:  Vital Signs Last 24 Hrs  T(F): 97 (04-15-21 @ 13:23), Max: 98.9 (04-12-21 @ 21:33)    Vital Signs Last 24 Hrs  HR: 90 (04-15-21 @ 13:23) (76 - 90)  BP: 149/90 (04-15-21 @ 13:23) (121/80 - 149/90)  RR: 18 (04-15-21 @ 13:23)  SpO2: 98% (04-15-21 @ 13:23) (98% - 100%)  Wt(kg): --    EXAM:  GA: NAD  HEENT: oral cavity no lesion  CV: nl S1/S2, no RMG  Lungs: CTAB  Abd: BS+, soft, mild tender, no rebounding pain  Ext: no edema  Skin: no palmar lesion  IV: no phlebitis  More weakness on the legs, can move around, upper muscle power 4.5/5    Labs:                        13.2   7.30  )-----------( 328      ( 15 Apr 2021 07:36 )             39.4     04-15    132<L>  |  98  |  25<H>  ----------------------------<  257<H>  4.8   |  22  |  1.29    Ca    9.0      15 Apr 2021 07:36  Phos  4.1     04-15  Mg     2.4     04-15    TPro  7.1  /  Alb  3.5  /  TBili  0.3  /  DBili  <0.2  /  AST  24  /  ALT  33  /  AlkPhos  106  04-15      WBC Trend:  WBC Count: 7.30 (04-15-21 @ 07:36)  WBC Count: 8.52 (04-14-21 @ 05:24)  WBC Count: 8.41 (04-13-21 @ 18:47)  WBC Count: 9.65 (04-12-21 @ 06:36)      Auto Neutrophil #: 7.43 K/uL (04-11-21 @ 07:40)  Auto Neutrophil #: 8.12 K/uL (04-10-21 @ 14:21)  Auto Neutrophil #: 4.47 K/uL (04-02-21 @ 18:49)      Creatine Trend:  Creatinine, Serum: 1.29 (04-15)  Creatinine, Serum: 1.21 (04-14)  Creatinine, Serum: 1.26 (04-13)  Creatinine, Serum: 0.99 (04-12)      Liver Biochemical Testing Trend:  Alanine Aminotransferase (ALT/SGPT): 33 (04-15)  Alanine Aminotransferase (ALT/SGPT): 24 (04-11)  Alanine Aminotransferase (ALT/SGPT): 27 (04-10)  Alanine Aminotransferase (ALT/SGPT): 28 (04-02)  Aspartate Aminotransferase (AST/SGOT): 24 (04-15-21 @ 07:36)  Aspartate Aminotransferase (AST/SGOT): 20 (04-11-21 @ 07:40)  Aspartate Aminotransferase (AST/SGOT): 18 (04-10-21 @ 14:21)  Aspartate Aminotransferase (AST/SGOT): 27 (04-02-21 @ 18:52)  Bilirubin Total, Serum: 0.3 (04-15)  Bilirubin Direct, Serum: <0.2 (04-15)  Bilirubin Total, Serum: 0.5 (04-11)  Bilirubin Total, Serum: 0.5 (04-10)  Bilirubin Total, Serum: 0.3 (04-02)      MICROBIOLOGY:    HIV-1/2 Combo Result: Nonreact (04-13-21 @ 07:48)    Treponema Pallidum Antibody Interpretation: Positive (04-13-21 @ 10:10)  RPR Titer: 1:1 (04.13.21 @ 10:10)  RPR Titer: 1:128 (01.21.17 @ 10:34)  Treponema Pallidum Antibody Interpretation: Positive (01.21.17 @ 10:34)    COVID-19 PCR: NotDetec (04-10-21 @ 18:57)    COVID-19 Padilla Domain AB Interp: Positive (04-12-21 @ 09:33)    C-Reactive Protein, Serum: <4.0 (04-14)    A1C with Estimated Average Glucose Result: 11.5 % (04-11-21 @ 07:40)      RADIOLOGY:  imaging below personally reviewed    < from: CT Chest No Cont (04.13.21 @ 21:42) >  IMPRESSION:  1.  There are faint bilateral groundglass and reticular opacities with a slight peripheral predominance are of uncertain etiology.  2.  Atherosclerotic calcifications of the left anterior descending coronary artery.    < end of copied text >

## 2021-04-15 NOTE — PROGRESS NOTE ADULT - ASSESSMENT
abdomen is benign. recommend mirlax and or senna for bm.   no contraidicaiton to d/c form gi percspecitve

## 2021-04-15 NOTE — PROGRESS NOTE ADULT - ASSESSMENT
50M uncontrolled DM2 HbA1c 11.5%, hyperglycemia related to difficulties obtaining adequate insulin during pandemic due to insurance/cost.    1) DM2 with hyperglycemia  Inpatient plan:  BG target 100-180 mg/dl  carb consistent diet  FS premeal and bedtime  continue Lantus 24 units qhs  Continue Admelog 10/10/10  Continue Admelog moderate scale premeal and moderate bedtime scale  RD consult- appreciated    Discussed with patient regarding discharge planning, he now will have enough Tresiba and Novolog at home to proceed with these insulins due to [facility] program he is now a part of.   DC on Tresiba and Novolog pens doses TBD. Discussed option of mixed insulin in case of future issues obtaining insulin.  Outpatient endocrine follow up Dr. Ban Velez, although he will consider changing to [facility] endocrinology if he prefers [phone].    2) Hyponatremia  TSH, Free T4 and 8AM cortisol in acceptable range not indicative of endocrine cause of hyponatremia. Na- 132 today    3) Hypertension  currently uncontrolled  on amlodipine and losartan as outpatient  management per primary team    Farzaneh Bennett  Nurse Practitioner  Division of Endocrinology & Diabetes  Pager # [phone]      If after 6PM or before 9AM, or on weekends/holidays, please call endocrine answering service for assistance ([phone]).  For nonurgent matters email [email or web address] for assistance.

## 2021-04-15 NOTE — PROGRESS NOTE ADULT - SUBJECTIVE AND OBJECTIVE BOX
Chief Complaint: DM2    History:  patient reports fair appetite, eating about 40-50% of meals  No n/v  no hypoglycemia symptoms  has tresiba and novolog at home    MEDICATIONS  (STANDING):  dextrose 40% Gel 15 Gram(s) Oral once  dextrose 5%. 1000 milliLiter(s) (50 mL/Hr) IV Continuous <Continuous>  dextrose 5%. 1000 milliLiter(s) (100 mL/Hr) IV Continuous <Continuous>  dextrose 50% Injectable 25 Gram(s) IV Push once  dextrose 50% Injectable 12.5 Gram(s) IV Push once  dextrose 50% Injectable 25 Gram(s) IV Push once  gabapentin   Solution 100 milliGRAM(s) Oral three times a day  glucagon  Injectable 1 milliGRAM(s) IntraMuscular once  insulin glargine Injectable (LANTUS) 20 Unit(s) SubCutaneous at bedtime  insulin lispro (ADMELOG) corrective regimen sliding scale   SubCutaneous three times a day before meals  insulin lispro (ADMELOG) corrective regimen sliding scale   SubCutaneous at bedtime  insulin lispro Injectable (ADMELOG) 10 Unit(s) SubCutaneous three times a day before meals  losartan 100 milliGRAM(s) Oral daily    MEDICATIONS  (PRN):  acetaminophen   Tablet .. 650 milliGRAM(s) Oral every 6 hours PRN Temp greater or equal to 38C (100.4F), Mild Pain (1 - 3), Moderate Pain (4 - 6)      Allergies    No Known Drug Allergies  shellfish (Urticaria)    Intolerances      Review of Systems:  ALL OTHER SYSTEMS REVIEWED AND NEGATIVE      Vital Signs Last 24 Hrs  T(C): 36.1 (15 Apr 2021 13:23), Max: 36.8 (15 Apr 2021 02:05)  T(F): 97 (15 Apr 2021 13:23), Max: 98.3 (15 Apr 2021 06:07)  HR: 90 (15 Apr 2021 13:23) (76 - 90)  BP: 149/90 (15 Apr 2021 13:23) (121/80 - 149/90)  BP(mean): --  RR: 18 (15 Apr 2021 13:23) (18 - 18)  SpO2: 98% (15 Apr 2021 13:23) (98% - 100%)  GENERAL: NAD, well-groomed, well-developed  EYES: No proptosis, no lid lag, anicteric  HEENT:  Atraumatic, Normocephalic, moist mucous membranes  RESPIRATORY: nonlabored respirations  PSYCH: Alert and oriented x 3, normal affect, normal mood      CAPILLARY BLOOD GLUCOSE      POCT Blood Glucose.: 149 mg/dL (15 Apr 2021 17:30)  POCT Blood Glucose.: 147 mg/dL (15 Apr 2021 12:17)  POCT Blood Glucose.: 227 mg/dL (15 Apr 2021 08:38)  POCT Blood Glucose.: 173 mg/dL (14 Apr 2021 21:17)      POCT Blood Glucose.: 147 mg/dL (14 Apr 2021 17:05)  POCT Blood Glucose.: 133 mg/dL (14 Apr 2021 12:36)  POCT Blood Glucose.: 209 mg/dL (14 Apr 2021 08:38)  POCT Blood Glucose.: 162 mg/dL (13 Apr 2021 21:57)    POCT Blood Glucose.: 116 mg/dL (13 Apr 2021 11:51)  POCT Blood Glucose.: 225 mg/dL (13 Apr 2021 08:02)  POCT Blood Glucose.: 167 mg/dL (12 Apr 2021 21:19)  POCT Blood Glucose.: 74 mg/dL (12 Apr 2021 16:27)    04-15    132<L>  |  98  |  25<H>  ----------------------------<  257<H>  4.8   |  22  |  1.29    Ca    9.0      15 Apr 2021 07:36  Phos  4.1     04-15  Mg     2.4     04-15    TPro  7.1  /  Alb  3.5  /  TBili  0.3  /  DBili  <0.2  /  AST  24  /  ALT  33  /  AlkPhos  106  04-15          A1C with Estimated Average Glucose Result: 11.5 % (04-11-21 @ 07:40)      Thyroid Function Tests:  04-12 @ 06:36 TSH 3.00 FreeT4 1.7 T3 -- Anti TPO -- Anti Thyroglobulin Ab -- TSI --  04-11 @ 11:43 TSH 2.29 FreeT4 -- T3 -- Anti TPO -- Anti Thyroglobulin Ab -- TSI --

## 2021-04-15 NOTE — PROGRESS NOTE ADULT - ASSESSMENT
51 yo male, PMH DM, HTN, hyperlipidemia; pt presented to the ED c/o abdominal cramping, generalized, and diarrhea following use of laxatives as above for constipation.  In the ED, WBC 10.82, Hb 13.6, CMP: sodium 126, chloride 89, glucose 281, CMP otherwise unremarkable.  VBG: Lactate 2.6 (later downtrended to 1.6).  Pt. was treated with IV hydration and dispo'd to CDU for continued care plan:  IV hydration, supportive care, AM labs, general observation care / monitoring. pt continued to have generalized weakness , numbness and on labs persistent hyponatermia      A/P:  Hyponatremia:  Etiology?  Likely sec to hypovolemia+hyperglycemia. work up suggested SIADH  Na better today  Repeat Urine Na, osmolality showed polydipsia.   optimize dm control  free water restriction <1.2L/day.  ok TSH, serum cortisol level  Monitor Na level Q12  Na level should not increase more than 6meq in 24 hrs    Proteinuia/Hematuria:  Etiology?  Had Cystoscopy last year and was normal per patient  Possible sec to Diabetic Nephropathy but will need full vasculitis work up  check urine p/c ratio  c3 c4 hep b hep c hiv, k/l wnl, lexii neg  pendingANCA, Serum immunofixation, SPEP    RPR +, pt denied known hx of syphilis, ID eval. d/w team    Based on work up result he might need kidney biopsy, which can be planned as outpatient as his renal function is stable  D/W patient in detail above plan    Hypophosphatemia:  SUpplement as needed   montior    HTN  suboptimal   on norvasc and losartan  start metoprolol if bp persistently >150

## 2021-04-15 NOTE — PROGRESS NOTE ADULT - SUBJECTIVE AND OBJECTIVE BOX
[facility] [place] [facility]   MD WYATT CHRIS DO ANAM SIDDIQUI ANGELA WONG, PA    TEL:  OFFICE: [phone]  DR DAWSON CELL: [phone]  DR. TIRADO CELL: [phone]  DR. MORAN CELL: [phone]  CATINA PATEL CELL: [phone]    From 5pm-7am Answering Service [phone]    -- RENAL FOLLOW UP NOTE ---Date of Service 04-15-21 @ 14:24    Patient is a 50y old  Male who presents with a chief complaint of constipation (15 Apr 2021 07:00)      Patient seen and examined at bedside. No chest pain/sob. c/o abd pain    VITALS:  T(F): 97 (04-15-21 @ 13:23), Max: 98.3 (04-14-21 @ 15:00)  HR: 90 (04-15-21 @ 13:23)  BP: 149/90 (04-15-21 @ 13:23)  RR: 18 (04-15-21 @ 13:23)  SpO2: 98% (04-15-21 @ 13:23)  Wt(kg): --        PHYSICAL EXAM:  Constitutional: NAD  Neck: No JVD  Respiratory: CTAB, no wheezes, rales or rhonchi  Cardiovascular: S1, S2, RRR  Gastrointestinal: BS+, soft, tender  Extremities: No peripheral edema    Hospital Medications:   MEDICATIONS  (STANDING):  amLODIPine   Tablet 10 milliGRAM(s) Oral daily  bisacodyl 5 milliGRAM(s) Oral at bedtime  dextrose 40% Gel 15 Gram(s) Oral once  dextrose 5%. 1000 milliLiter(s) (50 mL/Hr) IV Continuous <Continuous>  dextrose 5%. 1000 milliLiter(s) (100 mL/Hr) IV Continuous <Continuous>  dextrose 50% Injectable 25 Gram(s) IV Push once  dextrose 50% Injectable 12.5 Gram(s) IV Push once  dextrose 50% Injectable 25 Gram(s) IV Push once  gabapentin   Solution 100 milliGRAM(s) Oral three times a day  glucagon  Injectable 1 milliGRAM(s) IntraMuscular once  heparin   Injectable 5000 Unit(s) SubCutaneous every 8 hours  insulin glargine Injectable (LANTUS) 24 Unit(s) SubCutaneous at bedtime  insulin lispro (ADMELOG) corrective regimen sliding scale   SubCutaneous three times a day before meals  insulin lispro (ADMELOG) corrective regimen sliding scale   SubCutaneous at bedtime  insulin lispro Injectable (ADMELOG) 10 Unit(s) SubCutaneous three times a day before meals  ketorolac   Injectable 15 milliGRAM(s) IV Push every 12 hours  losartan 100 milliGRAM(s) Oral daily  senna 2 Tablet(s) Oral two times a day      LABS:  04-15    132<L>  |  98  |  25<H>  ----------------------------<  257<H>  4.8   |  22  |  1.29    Ca    9.0      15 Apr 2021 07:36  Phos  4.1     04-15  Mg     2.4     04-15    TPro  7.1  /  Alb  3.5  /  TBili  0.3  /  DBili  <0.2  /  AST  24  /  ALT  33  /  AlkPhos  106  04-15    Creatinine Trend: 1.29 <--, 1.21 <--, 1.26 <--, 0.99 <--, 0.97 <--, 0.94 <--, 0.86 <--, 1.07 <--, 1.13 <--    Phosphorus Level, Serum: 4.1 mg/dL (04-15 @ 07:36)  Albumin, Serum: 3.5 g/dL (04-15 @ 07:36)                              13.2   7.30  )-----------( 328      ( 15 Apr 2021 07:36 )             39.4     Urine Studies:  Urinalysis - [04-11-21 @ 12:32]      Color Light Yellow / Appearance Clear / SG 1.013 / pH 6.5      Gluc >= 1000 mg/dL / Ketone Small  / Bili Negative / Urobili <2 mg/dL       Blood Small / Protein 100 mg/dL / Leuk Est Negative / Nitrite Negative      RBC 10 / WBC 1 / Hyaline 1 / Gran  / Sq Epi  / Non Sq Epi 1 / Bacteria Negative    Urine Creatinine 51      [04-11-21 @ 16:02]  Urine Protein 66      [04-15-21 @ 09:10]  Urine Sodium 21      [04-15-21 @ 09:10]  Urine Potassium 25.1      [04-11-21 @ 12:32]  Urine Chloride 90      [04-11-21 @ 12:32]  Urine Osmolality 360      [04-15-21 @ 09:10]    TSH 3.00      [04-12-21 @ 06:36]    HBsAg Nonreact      [04-13-21 @ 10:09]  HCV 0.13, Nonreact      [04-13-21 @ 10:09]  HIV Nonreact      [04-13-21 @ 07:48]    PIERO: titer Negative, pattern --      [04-13-21 @ 10:10]  C3 Complement 152      [04-13-21 @ 07:48]  C4 Complement 33      [04-13-21 @ 07:48]  Syphilis Screen (Treponema Pallidum Ab) Positive      [04-13-21 @ 10:10]  Syphilis Screen (RPR Titer) 1:1      [04-13-21 @ 10:10]  Free Light Chains: kappa 1.51, lambda 1.89, ratio = 0.80      [04-13 @ 10:08]    RADIOLOGY & ADDITIONAL STUDIES:

## 2021-04-15 NOTE — PROGRESS NOTE ADULT - ASSESSMENT
49 yo male, PMH DM, HTN, hyperlipidemia; pt presented to the ED c/o abdominal cramping, generalized, and diarrhea following use of laxatives as above for constipation.  In the ED, WBC 10.82, Hb 13.6, CMP: sodium 126, chloride 89, glucose 281, CMP otherwise unremarkable.  VBG: Lactate 2.6 (later downtrended to 1.6).  Pt. was treated with IV hydration and dispo'd to CDU for continued care plan:  IV hydration, supportive care, AM labs, general observation care / monitoring. pt continued to have generalized weakness , numbness and on labs persistent hyponatermia and now being admitted for furhter evlauation .  dnenies N/V/Abd pain   had bm   no urinary sx   no fever or chills   no cough   no cp /sob     - hyponatremia :   likely multifactorial including hyperglycemia ( pseudohyponatremia ) and pre renal sec to diarreah   ? ADH induced by pain   apprecaite renal input   improving   monitor     - DM: uncontrolled   appreciate endo input    -HTN : imrpoved     - numbness/weakness  : likely nueropathic   LLE proximal weakness ??  limited exam sec to pain   otherwise no concerns for spinal involvement   neuro consult apprecaited   fu MR C/T/L given persistance numbeness and weakness   also possibly sec to recent COVID infection .. CT chest  : non specific GGO     - hypophosphatemia : repleted and monitor     - constipation : had had colonoscopy 2 yrs ago with Dr. Rae   hold further bowel regimen   CT abd no acute path   no furtehr inpt red : discussed with Dr Rae       dvt proph   d/w pt at length   with PA

## 2021-04-15 NOTE — PROGRESS NOTE ADULT - SUBJECTIVE AND OBJECTIVE BOX
Patient is a 50y Male     Patient is a 50y old  Male who presents with a chief complaint of constipation, abdominal pain (14 Apr 2021 13:25)      HPI:  49 yo male, PMH DM, HTN, hyperlipidemia; pt presented to the ED c/o abdominal cramping, generalized, and diarrhea following use of laxatives as above for constipation.  In the ED, WBC 10.82, Hb 13.6, CMP: sodium 126, chloride 89, glucose 281, CMP otherwise unremarkable.  VBG: Lactate 2.6 (later downtrended to 1.6).  Pt. was treated with IV hydration and dispo'd to CDU for continued care plan:  IV hydration, supportive care, AM labs, general observation care / monitoring. pt continued to have generalized weakness , numbness and on labs persistent hyponatermia and now being admitted for furhter evlauation .  dnenies N/V/Abd pain   had bm   no urinary sx   no fever or chills   no cough   no cp /sob    (11 Apr 2021 15:50)      PAST MEDICAL & SURGICAL HISTORY:  DM (diabetes mellitus)    HTN (hypertension)    Hyperlipidemia    H/O total knee replacement    Foot fracture        MEDICATIONS  (STANDING):  amLODIPine   Tablet 10 milliGRAM(s) Oral daily  bisacodyl 5 milliGRAM(s) Oral at bedtime  dextrose 40% Gel 15 Gram(s) Oral once  dextrose 5%. 1000 milliLiter(s) (50 mL/Hr) IV Continuous <Continuous>  dextrose 5%. 1000 milliLiter(s) (100 mL/Hr) IV Continuous <Continuous>  dextrose 50% Injectable 25 Gram(s) IV Push once  dextrose 50% Injectable 12.5 Gram(s) IV Push once  dextrose 50% Injectable 25 Gram(s) IV Push once  gabapentin   Solution 100 milliGRAM(s) Oral three times a day  glucagon  Injectable 1 milliGRAM(s) IntraMuscular once  heparin   Injectable 5000 Unit(s) SubCutaneous every 8 hours  insulin glargine Injectable (LANTUS) 24 Unit(s) SubCutaneous at bedtime  insulin lispro (ADMELOG) corrective regimen sliding scale   SubCutaneous three times a day before meals  insulin lispro (ADMELOG) corrective regimen sliding scale   SubCutaneous at bedtime  insulin lispro Injectable (ADMELOG) 10 Unit(s) SubCutaneous three times a day before meals  ketorolac   Injectable 15 milliGRAM(s) IV Push every 12 hours  losartan 100 milliGRAM(s) Oral daily      Allergies    No Known Drug Allergies  shellfish (Urticaria)    Intolerances        SOCIAL HISTORY:  Denies ETOh,Smoking,     FAMILY HISTORY:  No pertinent family history in first degree relatives        REVIEW OF SYSTEMS:    CONSTITUTIONAL: No weakness, fevers or chills  EYES/ENT: No visual changes;  No vertigo or throat pain   NECK: No pain or stiffness  RESPIRATORY: No cough, wheezing, hemoptysis; No shortness of breath  CARDIOVASCULAR: No chest pain or palpitations  GASTROINTESTINAL: No abdominal or epigastric pain. No nausea, vomiting, or hematemesis; No diarrhea or constipation. No melena or hematochezia.  GENITOURINARY: No dysuria, frequency or hematuria  NEUROLOGICAL: No numbness or weakness  SKIN: No itching, burning, rashes, or lesions   All other review of systems is negative unless indicated above.    VITAL:  T(C): , Max: 36.8 (04-14-21 @ 15:00)  T(F): , Max: 98.3 (04-14-21 @ 15:00)  HR: 76 (04-15-21 @ 06:07)  BP: 128/82 (04-15-21 @ 06:07)  BP(mean): --  RR: 18 (04-15-21 @ 06:07)  SpO2: 100% (04-15-21 @ 06:07)  Wt(kg): --    I and O's:    04-13 @ 07:01  -  04-14 @ 07:00  --------------------------------------------------------  IN: 0 mL / OUT: 475 mL / NET: -475 mL          PHYSICAL EXAM:    Constitutional: NAD  HEENT: PERRLA,   Neck: No JVD  Respiratory: CTA B/L  Cardiovascular: S1 and S2  Gastrointestinal: BS+, soft, NT/ND  Extremities: No peripheral edema  Neurological: A/O x 3, no focal deficits  Psychiatric: Normal mood, normal affect  : No Jose  Skin: No rashes  Access: Not applicable  Back: No CVA tenderness    LABS:                        13.1   8.52  )-----------( 339      ( 14 Apr 2021 05:24 )             39.0     04-14    130<L>  |  97<L>  |  23  ----------------------------<  187<H>  4.6   |  23  |  1.21    Ca    8.9      14 Apr 2021 05:24  Mg     2.3     04-14    TPro  6.9  /  Alb  x   /  TBili  x   /  DBili  x   /  AST  x   /  ALT  x   /  AlkPhos  x   04-13          RADIOLOGY & ADDITIONAL STUDIES:

## 2021-04-15 NOTE — CONSULT NOTE ADULT - ATTENDING COMMENTS
50m with h/o syphilis - treated  -his RPR is 1:1 - no s/s of active syphilis  -treated with PCN in past  -no need for abx currently    John Tolbert  Attending Physician   Division of Infectious Disease  Pager #[phone]  Available on Microsoft Teams also  After 5pm/weekend or no response, call #[phone]    Will sign off, recall ID if needed #[phone].    D/w pt

## 2021-04-15 NOTE — CONSULT NOTE ADULT - ASSESSMENT
Pt with hx of syphilis, treated with 1 IM PCN shot in 2017, had responded well improving from RPR 1:128 to RPR 1:1.    #Treated syphilis:  - Does not require more intervention  - Routine STD screening as per primary team    Jie Gama MD, PGY4   ID fellow  Pager: [phone]  After 5pm/weekends call [phone]   Pt with hx of syphilis, treated with 1 IM PCN shot in 2017, had responded well improving from RPR 1:128 to RPR 1:1.    #Treated syphilis:  - Does not require more intervention  - Routine STD screening as per primary team  - Rest of care per primary team    Jie Gama MD, PGY4   ID fellow  Pager: [phone]  After 5pm/weekends call [phone]    d/w Dr. Tolbert  Recs conveyed to primary team

## 2021-04-15 NOTE — PROGRESS NOTE ADULT - SUBJECTIVE AND OBJECTIVE BOX
Date of service: 04-15-21 @ 23:00      Patient is a 50y old  Male who presents with a chief complaint of abdominal pain (15 Apr 2021 14:58)                                                               INTERVAL HPI/OVERNIGHT EVENTS:    REVIEW OF SYSTEMS:     CONSTITUTIONAL: No weakness, fevers or chills  EYES/ENT: No visual changes , no ear ache   NECK: No pain or stiffness  RESPIRATORY: No cough, wheezing,  No shortness of breath  CARDIOVASCULAR: No chest pain or palpitations  GASTROINTESTINAL: abdominal pain  . No nausea, vomiting, or hematemesis; No diarrhea or constipation. No melena or hematochezia.  GENITOURINARY: No dysuria, frequency or hematuria  NEUROLOGICAL: back pain                                                                                                                                                                                                                                                                           Medications:  MEDICATIONS  (STANDING):  amLODIPine   Tablet 10 milliGRAM(s) Oral daily  bisacodyl 5 milliGRAM(s) Oral at bedtime  dextrose 40% Gel 15 Gram(s) Oral once  dextrose 5%. 1000 milliLiter(s) (50 mL/Hr) IV Continuous <Continuous>  dextrose 5%. 1000 milliLiter(s) (100 mL/Hr) IV Continuous <Continuous>  dextrose 50% Injectable 25 Gram(s) IV Push once  dextrose 50% Injectable 12.5 Gram(s) IV Push once  dextrose 50% Injectable 25 Gram(s) IV Push once  gabapentin   Solution 100 milliGRAM(s) Oral three times a day  glucagon  Injectable 1 milliGRAM(s) IntraMuscular once  heparin   Injectable 5000 Unit(s) SubCutaneous every 8 hours  insulin glargine Injectable (LANTUS) 24 Unit(s) SubCutaneous at bedtime  insulin lispro (ADMELOG) corrective regimen sliding scale   SubCutaneous three times a day before meals  insulin lispro (ADMELOG) corrective regimen sliding scale   SubCutaneous at bedtime  insulin lispro Injectable (ADMELOG) 10 Unit(s) SubCutaneous three times a day before meals  ketorolac   Injectable 15 milliGRAM(s) IV Push every 12 hours  losartan 100 milliGRAM(s) Oral daily  senna 2 Tablet(s) Oral two times a day    MEDICATIONS  (PRN):  acetaminophen   Tablet .. 650 milliGRAM(s) Oral every 6 hours PRN Temp greater or equal to 38C (100.4F), Mild Pain (1 - 3), Moderate Pain (4 - 6)  aluminum hydroxide/magnesium hydroxide/simethicone Suspension 30 milliLiter(s) Oral every 6 hours PRN Dyspepsia       Allergies    No Known Drug Allergies  shellfish (Urticaria)    Intolerances      Vital Signs Last 24 Hrs  T(C): 36.9 (15 Apr 2021 21:17), Max: 36.9 (15 Apr 2021 21:17)  T(F): 98.5 (15 Apr 2021 21:17), Max: 98.5 (15 Apr 2021 21:17)  HR: 95 (15 Apr 2021 21:17) (76 - 95)  BP: 168/99 (15 Apr 2021 22:55) (122/74 - 168/99)  BP(mean): --  RR: 17 (15 Apr 2021 21:17) (17 - 18)  SpO2: 98% (15 Apr 2021 21:17) (98% - 100%)  CAPILLARY BLOOD GLUCOSE      POCT Blood Glucose.: 162 mg/dL (15 Apr 2021 21:07)  POCT Blood Glucose.: 149 mg/dL (15 Apr 2021 17:30)  POCT Blood Glucose.: 147 mg/dL (15 Apr 2021 12:17)  POCT Blood Glucose.: 227 mg/dL (15 Apr 2021 08:38)      Physical Exam:    Daily     Daily   General:  Well appearing, NAD, not cachetic  HEENT:  Nonicteric, PERRLA  CV:  RRR, S1S2   Lungs:  CTA B/L, no wheezes, rales, rhonchi  Abdomen:  Soft, non-tender, no distended, positive BS  Extremities:  2+ pulses, no c/c, no edema  Skin:  Warm and dry, no rashes  :  No lucas  Neuro:  AAOx3, non-focal, grossly intact                                                                                                                                                                                                                                                                                                LABS:                               13.2   7.30  )-----------( 328      ( 15 Apr 2021 07:36 )             39.4                      04-15    132<L>  |  98  |  25<H>  ----------------------------<  257<H>  4.8   |  22  |  1.29    Ca    9.0      15 Apr 2021 07:36  Phos  4.1     04-15  Mg     2.4     04-15    TPro  7.1  /  Alb  3.5  /  TBili  0.3  /  DBili  <0.2  /  AST  24  /  ALT  33  /  AlkPhos  106  04-15                       RADIOLOGY & ADDITIONAL TESTS         I personally reviewed: [  ]EKG   [  ]CXR    [  ] CT      A/P:         Discussed with :     Francisco consultants' Notes   Time spent :

## 2021-04-16 LAB
% ALBUMIN: 42.9 % — SIGNIFICANT CHANGE UP
% ALPHA 1: 2.9 % — SIGNIFICANT CHANGE UP
% ALPHA 2: 19.6 % — SIGNIFICANT CHANGE UP
% BETA: 17.2 % — SIGNIFICANT CHANGE UP
% GAMMA: 17.4 % — SIGNIFICANT CHANGE UP
ALBUMIN SERPL ELPH-MCNC: 2.96 G/DL — LOW (ref 3.3–4.4)
ALBUMIN SERPL ELPH-MCNC: 3.5 G/DL — SIGNIFICANT CHANGE UP (ref 3.3–5)
ALBUMIN/GLOB SERPL ELPH: 0.8 RATIO — SIGNIFICANT CHANGE UP
ALP SERPL-CCNC: 98 U/L — SIGNIFICANT CHANGE UP (ref 40–120)
ALPHA1 GLOB SERPL ELPH-MCNC: 0.2 G/DL — SIGNIFICANT CHANGE UP (ref 0.1–0.3)
ALPHA2 GLOB SERPL ELPH-MCNC: 1.4 G/DL — HIGH (ref 0.6–1)
ALT FLD-CCNC: 38 U/L — SIGNIFICANT CHANGE UP (ref 4–41)
ANION GAP SERPL CALC-SCNC: 11 MMOL/L — SIGNIFICANT CHANGE UP (ref 7–14)
ANION GAP SERPL CALC-SCNC: 11 MMOL/L — SIGNIFICANT CHANGE UP (ref 7–14)
AST SERPL-CCNC: 44 U/L — HIGH (ref 4–40)
B-GLOBULIN SERPL ELPH-MCNC: 1.19 G/DL — HIGH (ref 0.6–1.1)
BILIRUB DIRECT SERPL-MCNC: <0.2 MG/DL — SIGNIFICANT CHANGE UP (ref 0–0.2)
BILIRUB INDIRECT FLD-MCNC: >0.3 MG/DL — SIGNIFICANT CHANGE UP (ref 0–1)
BILIRUB SERPL-MCNC: 0.5 MG/DL — SIGNIFICANT CHANGE UP (ref 0.2–1.2)
BUN SERPL-MCNC: 24 MG/DL — HIGH (ref 7–23)
BUN SERPL-MCNC: 25 MG/DL — HIGH (ref 7–23)
CALCIUM SERPL-MCNC: 9.3 MG/DL — SIGNIFICANT CHANGE UP (ref 8.4–10.5)
CALCIUM SERPL-MCNC: 9.5 MG/DL — SIGNIFICANT CHANGE UP (ref 8.4–10.5)
CHLORIDE SERPL-SCNC: 95 MMOL/L — LOW (ref 98–107)
CHLORIDE SERPL-SCNC: 97 MMOL/L — LOW (ref 98–107)
CO2 SERPL-SCNC: 21 MMOL/L — LOW (ref 22–31)
CO2 SERPL-SCNC: 24 MMOL/L — SIGNIFICANT CHANGE UP (ref 22–31)
CREAT SERPL-MCNC: 1.05 MG/DL — SIGNIFICANT CHANGE UP (ref 0.5–1.3)
CREAT SERPL-MCNC: 1.13 MG/DL — SIGNIFICANT CHANGE UP (ref 0.5–1.3)
GAMMA GLOBULIN: 1.2 G/DL — SIGNIFICANT CHANGE UP (ref 0.7–1.7)
GLUCOSE SERPL-MCNC: 227 MG/DL — HIGH (ref 70–99)
GLUCOSE SERPL-MCNC: 235 MG/DL — HIGH (ref 70–99)
HCT VFR BLD CALC: 40.4 % — SIGNIFICANT CHANGE UP (ref 39–50)
HGB BLD-MCNC: 13.8 G/DL — SIGNIFICANT CHANGE UP (ref 13–17)
INTERPRETATION SERPL IFE-IMP: SIGNIFICANT CHANGE UP
MAGNESIUM SERPL-MCNC: 2.3 MG/DL — SIGNIFICANT CHANGE UP (ref 1.6–2.6)
MAGNESIUM SERPL-MCNC: 2.3 MG/DL — SIGNIFICANT CHANGE UP (ref 1.6–2.6)
MCHC RBC-ENTMCNC: 29.9 PG — SIGNIFICANT CHANGE UP (ref 27–34)
MCHC RBC-ENTMCNC: 34.2 GM/DL — SIGNIFICANT CHANGE UP (ref 32–36)
MCV RBC AUTO: 87.4 FL — SIGNIFICANT CHANGE UP (ref 80–100)
NRBC # BLD: 0 /100 WBCS — SIGNIFICANT CHANGE UP
NRBC # FLD: 0 K/UL — SIGNIFICANT CHANGE UP
PHOSPHATE SERPL-MCNC: 3.7 MG/DL — SIGNIFICANT CHANGE UP (ref 2.5–4.5)
PHOSPHATE SERPL-MCNC: 3.8 MG/DL — SIGNIFICANT CHANGE UP (ref 2.5–4.5)
PLATELET # BLD AUTO: 331 K/UL — SIGNIFICANT CHANGE UP (ref 150–400)
POTASSIUM SERPL-MCNC: 4.9 MMOL/L — SIGNIFICANT CHANGE UP (ref 3.5–5.3)
POTASSIUM SERPL-MCNC: SIGNIFICANT CHANGE UP MMOL/L (ref 3.5–5.3)
POTASSIUM SERPL-SCNC: 4.9 MMOL/L — SIGNIFICANT CHANGE UP (ref 3.5–5.3)
POTASSIUM SERPL-SCNC: SIGNIFICANT CHANGE UP MMOL/L (ref 3.5–5.3)
PROT PATTERN SERPL ELPH-IMP: SIGNIFICANT CHANGE UP
PROT PATTERN SERPL ELPH-IMP: SIGNIFICANT CHANGE UP
PROT SERPL-MCNC: 6.9 G/DL — SIGNIFICANT CHANGE UP
PROT SERPL-MCNC: 7.6 G/DL — SIGNIFICANT CHANGE UP (ref 6–8.3)
PROT SERPL-MCNC: 7.6 G/DL — SIGNIFICANT CHANGE UP (ref 6–8.3)
RBC # BLD: 4.62 M/UL — SIGNIFICANT CHANGE UP (ref 4.2–5.8)
RBC # FLD: 11.7 % — SIGNIFICANT CHANGE UP (ref 10.3–14.5)
SODIUM SERPL-SCNC: 127 MMOL/L — LOW (ref 135–145)
SODIUM SERPL-SCNC: 132 MMOL/L — LOW (ref 135–145)
WBC # BLD: 7.72 K/UL — SIGNIFICANT CHANGE UP (ref 3.8–10.5)
WBC # FLD AUTO: 7.72 K/UL — SIGNIFICANT CHANGE UP (ref 3.8–10.5)

## 2021-04-16 PROCEDURE — 84165 PROTEIN E-PHORESIS SERUM: CPT | Mod: 26

## 2021-04-16 PROCEDURE — 99232 SBSQ HOSP IP/OBS MODERATE 35: CPT

## 2021-04-16 PROCEDURE — 72157 MRI CHEST SPINE W/O & W/DYE: CPT | Mod: 26

## 2021-04-16 PROCEDURE — 86334 IMMUNOFIX E-PHORESIS SERUM: CPT | Mod: 26

## 2021-04-16 PROCEDURE — 72156 MRI NECK SPINE W/O & W/DYE: CPT | Mod: 26

## 2021-04-16 PROCEDURE — 72158 MRI LUMBAR SPINE W/O & W/DYE: CPT | Mod: 26

## 2021-04-16 RX ORDER — TRAMADOL HYDROCHLORIDE 50 MG/1
25 TABLET ORAL ONCE
Refills: 0 | Status: DISCONTINUED | OUTPATIENT
Start: 2021-04-16 | End: 2021-04-16

## 2021-04-16 RX ORDER — METOPROLOL TARTRATE 50 MG
12.5 TABLET ORAL
Refills: 0 | Status: DISCONTINUED | OUTPATIENT
Start: 2021-04-16 | End: 2021-05-05

## 2021-04-16 RX ORDER — INSULIN GLARGINE 100 [IU]/ML
26 INJECTION, SOLUTION SUBCUTANEOUS AT BEDTIME
Refills: 0 | Status: DISCONTINUED | OUTPATIENT
Start: 2021-04-16 | End: 2021-04-19

## 2021-04-16 RX ORDER — INSULIN LISPRO 100/ML
12 VIAL (ML) SUBCUTANEOUS
Refills: 0 | Status: DISCONTINUED | OUTPATIENT
Start: 2021-04-16 | End: 2021-04-19

## 2021-04-16 RX ADMIN — Medication 500 MILLIGRAM(S): at 18:00

## 2021-04-16 RX ADMIN — Medication 650 MILLIGRAM(S): at 16:59

## 2021-04-16 RX ADMIN — Medication 4: at 13:13

## 2021-04-16 RX ADMIN — AMLODIPINE BESYLATE 10 MILLIGRAM(S): 2.5 TABLET ORAL at 06:30

## 2021-04-16 RX ADMIN — Medication 15 MILLIGRAM(S): at 06:31

## 2021-04-16 RX ADMIN — Medication 650 MILLIGRAM(S): at 17:24

## 2021-04-16 RX ADMIN — HEPARIN SODIUM 5000 UNIT(S): 5000 INJECTION INTRAVENOUS; SUBCUTANEOUS at 22:45

## 2021-04-16 RX ADMIN — Medication 15 MILLIGRAM(S): at 00:27

## 2021-04-16 RX ADMIN — Medication 650 MILLIGRAM(S): at 23:27

## 2021-04-16 RX ADMIN — GABAPENTIN 100 MILLIGRAM(S): 400 CAPSULE ORAL at 00:28

## 2021-04-16 RX ADMIN — Medication 10 UNIT(S): at 08:11

## 2021-04-16 RX ADMIN — Medication 2: at 16:58

## 2021-04-16 RX ADMIN — SENNA PLUS 2 TABLET(S): 8.6 TABLET ORAL at 16:59

## 2021-04-16 RX ADMIN — GABAPENTIN 100 MILLIGRAM(S): 400 CAPSULE ORAL at 13:30

## 2021-04-16 RX ADMIN — LOSARTAN POTASSIUM 100 MILLIGRAM(S): 100 TABLET, FILM COATED ORAL at 06:31

## 2021-04-16 RX ADMIN — Medication 10 UNIT(S): at 13:13

## 2021-04-16 RX ADMIN — Medication 5 MILLIGRAM(S): at 22:44

## 2021-04-16 RX ADMIN — Medication 500 MILLIGRAM(S): at 18:30

## 2021-04-16 RX ADMIN — Medication 15 MILLIGRAM(S): at 00:57

## 2021-04-16 RX ADMIN — GABAPENTIN 100 MILLIGRAM(S): 400 CAPSULE ORAL at 06:31

## 2021-04-16 RX ADMIN — Medication 12 UNIT(S): at 16:58

## 2021-04-16 RX ADMIN — SENNA PLUS 2 TABLET(S): 8.6 TABLET ORAL at 06:31

## 2021-04-16 RX ADMIN — HEPARIN SODIUM 5000 UNIT(S): 5000 INJECTION INTRAVENOUS; SUBCUTANEOUS at 13:31

## 2021-04-16 RX ADMIN — Medication 6: at 08:10

## 2021-04-16 RX ADMIN — HEPARIN SODIUM 5000 UNIT(S): 5000 INJECTION INTRAVENOUS; SUBCUTANEOUS at 06:30

## 2021-04-16 RX ADMIN — GABAPENTIN 100 MILLIGRAM(S): 400 CAPSULE ORAL at 22:44

## 2021-04-16 RX ADMIN — Medication 12.5 MILLIGRAM(S): at 22:45

## 2021-04-16 RX ADMIN — INSULIN GLARGINE 26 UNIT(S): 100 INJECTION, SOLUTION SUBCUTANEOUS at 22:53

## 2021-04-16 NOTE — PROGRESS NOTE ADULT - SUBJECTIVE AND OBJECTIVE BOX
Patient is a 50y Male     Patient is a 50y old  Male who presents with a chief complaint of abdominal pain (15 Apr 2021 14:58)      HPI:  49 yo male, PMH DM, HTN, hyperlipidemia; pt presented to the ED c/o abdominal cramping, generalized, and diarrhea following use of laxatives as above for constipation.  In the ED, WBC 10.82, Hb 13.6, CMP: sodium 126, chloride 89, glucose 281, CMP otherwise unremarkable.  VBG: Lactate 2.6 (later downtrended to 1.6).  Pt. was treated with IV hydration and dispo'd to CDU for continued care plan:  IV hydration, supportive care, AM labs, general observation care / monitoring. pt continued to have generalized weakness , numbness and on labs persistent hyponatermia and now being admitted for furhter evlauation .  dnenies N/V/Abd pain   had bm   no urinary sx   no fever or chills   no cough   no cp /sob    (11 Apr 2021 15:50)      PAST MEDICAL & SURGICAL HISTORY:  DM (diabetes mellitus)    HTN (hypertension)    Hyperlipidemia    H/O total knee replacement    Foot fracture        MEDICATIONS  (STANDING):  amLODIPine   Tablet 10 milliGRAM(s) Oral daily  bisacodyl 5 milliGRAM(s) Oral at bedtime  dextrose 40% Gel 15 Gram(s) Oral once  dextrose 5%. 1000 milliLiter(s) (50 mL/Hr) IV Continuous <Continuous>  dextrose 5%. 1000 milliLiter(s) (100 mL/Hr) IV Continuous <Continuous>  dextrose 50% Injectable 25 Gram(s) IV Push once  dextrose 50% Injectable 12.5 Gram(s) IV Push once  dextrose 50% Injectable 25 Gram(s) IV Push once  gabapentin   Solution 100 milliGRAM(s) Oral three times a day  glucagon  Injectable 1 milliGRAM(s) IntraMuscular once  heparin   Injectable 5000 Unit(s) SubCutaneous every 8 hours  insulin glargine Injectable (LANTUS) 24 Unit(s) SubCutaneous at bedtime  insulin lispro (ADMELOG) corrective regimen sliding scale   SubCutaneous three times a day before meals  insulin lispro (ADMELOG) corrective regimen sliding scale   SubCutaneous at bedtime  insulin lispro Injectable (ADMELOG) 10 Unit(s) SubCutaneous three times a day before meals  losartan 100 milliGRAM(s) Oral daily  senna 2 Tablet(s) Oral two times a day      Allergies    No Known Drug Allergies  shellfish (Urticaria)    Intolerances        SOCIAL HISTORY:  Denies ETOh,Smoking,     FAMILY HISTORY:  No pertinent family history in first degree relatives        REVIEW OF SYSTEMS:    CONSTITUTIONAL: No weakness, fevers or chills  EYES/ENT: No visual changes;  No vertigo or throat pain   NECK: No pain or stiffness  RESPIRATORY: No cough, wheezing, hemoptysis; No shortness of breath  CARDIOVASCULAR: No chest pain or palpitations  GASTROINTESTINAL: No abdominal or epigastric pain. No nausea, vomiting, or hematemesis; No diarrhea or constipation. No melena or hematochezia.  GENITOURINARY: No dysuria, frequency or hematuria  NEUROLOGICAL: No numbness or weakness  SKIN: No itching, burning, rashes, or lesions   All other review of systems is negative unless indicated above.    VITAL:  T(C): , Max: 37.1 (04-16-21 @ 06:25)  T(F): , Max: 98.7 (04-16-21 @ 06:25)  HR: 94 (04-16-21 @ 06:25)  BP: 157/85 (04-16-21 @ 06:25)  BP(mean): --  RR: 18 (04-16-21 @ 06:25)  SpO2: 100% (04-16-21 @ 06:25)  Wt(kg): --    I and O's:    04-15 @ 07:01  -  04-16 @ 07:00  --------------------------------------------------------  IN: 0 mL / OUT: 550 mL / NET: -550 mL          PHYSICAL EXAM:    Constitutional: NAD  HEENT: PERRLA,   Neck: No JVD  Respiratory: CTA B/L  Cardiovascular: S1 and S2  Gastrointestinal: BS+, soft, NT/ND  Extremities: No peripheral edema  Neurological: A/O x 3, no focal deficits  Psychiatric: Normal mood, normal affect  : No Jose  Skin: No rashes  Access: Not applicable  Back: No CVA tenderness    LABS:                        13.2   7.30  )-----------( 328      ( 15 Apr 2021 07:36 )             39.4     04-15    132<L>  |  98  |  25<H>  ----------------------------<  257<H>  4.8   |  22  |  1.29    Ca    9.0      15 Apr 2021 07:36  Phos  4.1     04-15  Mg     2.4     04-15    TPro  7.1  /  Alb  3.5  /  TBili  0.3  /  DBili  <0.2  /  AST  24  /  ALT  33  /  AlkPhos  106  04-15          RADIOLOGY & ADDITIONAL STUDIES:

## 2021-04-16 NOTE — PROGRESS NOTE ADULT - ASSESSMENT
50M uncontrolled DM2 HbA1c 11.5%, hyperglycemia related to difficulties obtaining adequate insulin during pandemic due to insurance/cost.    1) DM2 with hyperglycemia  Inpatient plan:  BG target 100-180 mg/dl  above goal   carb consistent diet  FS premeal and bedtime  increasing Lantus to 26 units qhs  increasing Admelog to 12 units TID before meals - HOLD if not eating   Continue Admelog moderate scale premeal and moderate bedtime scale  RD consult- appreciated    Discussed with patient regarding discharge planning, he now will have enough Tresiba and Novolog at home to proceed with these insulins due to [facility] program he is now a part of.   DC on Tresiba and Novolog pens doses TBD. Discussed option of mixed insulin in case of future issues obtaining insulin.  Outpatient endocrine follow up Dr. Ban Velez, although he will consider changing to [facility] endocrinology if he prefers [phone]. Emailed office to help facilitate follow up     2) Hyponatremia  TSH, Free T4 and 8AM cortisol in acceptable range not indicative of endocrine cause of hyponatremia. Na- 132 today    3) Hypertension  currently uncontrolled  on amlodipine and losartan as outpatient  management per primary team      RIKY Dash-BC  Nurse Practitioner   Division of Endocrinology  Pager: [facility] pager [phone]    If out of hospital/unavailable when paged, please note: patient will be cared for by another provider on the endocrine service.  Please call the endocrine answering service for assistance to reach covering provider ([phone]). For non-urgent matters, please email [email or web address] for assistance.

## 2021-04-16 NOTE — PROVIDER CONTACT NOTE (OTHER) - ASSESSMENT
Patient's BP is 168/99 and he complains of pain in his abdomen and back. Denies chest pain, headache or dizziness.

## 2021-04-16 NOTE — PROGRESS NOTE ADULT - SUBJECTIVE AND OBJECTIVE BOX
Chief Complaint: DM2    History:  patient reports fair appetite, eating about 40-50% of meals  No n/v  no hypoglycemia symptoms  Has tresiba and novolog at home    MEDICATIONS  (STANDING):  amLODIPine   Tablet 10 milliGRAM(s) Oral daily  bisacodyl 5 milliGRAM(s) Oral at bedtime  dextrose 40% Gel 15 Gram(s) Oral once  dextrose 5%. 1000 milliLiter(s) (50 mL/Hr) IV Continuous <Continuous>  dextrose 5%. 1000 milliLiter(s) (100 mL/Hr) IV Continuous <Continuous>  dextrose 50% Injectable 25 Gram(s) IV Push once  dextrose 50% Injectable 12.5 Gram(s) IV Push once  dextrose 50% Injectable 25 Gram(s) IV Push once  gabapentin   Solution 100 milliGRAM(s) Oral three times a day  glucagon  Injectable 1 milliGRAM(s) IntraMuscular once  heparin   Injectable 5000 Unit(s) SubCutaneous every 8 hours  insulin glargine Injectable (LANTUS) 24 Unit(s) SubCutaneous at bedtime  insulin lispro (ADMELOG) corrective regimen sliding scale   SubCutaneous three times a day before meals  insulin lispro (ADMELOG) corrective regimen sliding scale   SubCutaneous at bedtime  insulin lispro Injectable (ADMELOG) 10 Unit(s) SubCutaneous three times a day before meals  losartan 100 milliGRAM(s) Oral daily  senna 2 Tablet(s) Oral two times a day      Allergies    No Known Drug Allergies  shellfish (Urticaria)        Review of Systems:  Gen: denies fever   ALL OTHER SYSTEMS REVIEWED AND NEGATIVE      Vital Signs Last 24 Hrs  Vital Signs Last 24 Hrs  T(C): 36.8 (16 Apr 2021 13:46), Max: 37.1 (16 Apr 2021 06:25)  T(F): 98.3 (16 Apr 2021 13:46), Max: 98.7 (16 Apr 2021 06:25)  HR: 96 (16 Apr 2021 13:46) (94 - 96)  BP: 153/101 (16 Apr 2021 13:46) (153/101 - 168/99)  BP(mean): --  RR: 18 (16 Apr 2021 13:46) (17 - 18)  SpO2: 98% (16 Apr 2021 13:46) (98% - 100%)  GENERAL: NAD, laying in bed   EYES: No proptosis, no lid lag, anicteric  HEENT:  Atraumatic, Normocephalic, moist mucous membranes  RESPIRATORY: nonlabored respirations  PSYCH: Alert and oriented x 3, normal affect, normal mood      CAPILLARY BLOOD GLUCOSE      POCT Blood Glucose.: 202 mg/dL (16 Apr 2021 11:57)  POCT Blood Glucose.: 275 mg/dL (16 Apr 2021 07:21)  POCT Blood Glucose.: 277 mg/dL (15 Apr 2021 23:02)  POCT Blood Glucose.: 162 mg/dL (15 Apr 2021 21:07)  POCT Blood Glucose.: 149 mg/dL (15 Apr 2021 17:30)      04-16    132<L>  |  97<L>  |  24<H>  ----------------------------<  227<H>  4.9   |  24  |  1.13    Ca    9.5      16 Apr 2021 10:57  Phos  3.8     04-16  Mg     2.3     04-16    TPro  7.6  /  Alb  3.5  /  TBili  0.5  /  DBili  <0.2  /  AST  44<H>  /  ALT  38  /  AlkPhos  98  04-16      A1C with Estimated Average Glucose Result: 11.5 % (04-11-21 @ 07:40)      Thyroid Function Tests:  04-12 @ 06:36 TSH 3.00 FreeT4 1.7 T3 -- Anti TPO -- Anti Thyroglobulin Ab -- TSI --  04-11 @ 11:43 TSH 2.29 FreeT4 -- T3 -- Anti TPO -- Anti Thyroglobulin Ab -- TSI --      Diet, Consistent Carbohydrate Renal w/Evening Snack:   1200mL Fluid Restriction ([id number])  Supplement Feeding Modality:  Oral  Glucerna Shake Cans or Servings Per Day:  2       Frequency:  Daily (04-16-21 @ 12:01) [Active]

## 2021-04-16 NOTE — PROGRESS NOTE ADULT - ASSESSMENT
49 yo male, PMH DM, HTN, hyperlipidemia; pt presented to the ED c/o abdominal cramping, generalized, and diarrhea following use of laxatives as above for constipation.  In the ED, WBC 10.82, Hb 13.6, CMP: sodium 126, chloride 89, glucose 281, CMP otherwise unremarkable.  VBG: Lactate 2.6 (later downtrended to 1.6).  Pt. was treated with IV hydration and dispo'd to CDU for continued care plan:  IV hydration, supportive care, AM labs, general observation care / monitoring. pt continued to have generalized weakness , numbness and on labs persistent hyponatermia      A/P:  Hyponatremia:  Etiology?  Likely sec to hypovolemia+hyperglycemia. work up suggested SIADH  Na better today  Repeat Urine Na, osmolality showed polydipsia.   optimize dm control  free water restriction <1.2L/day.  ok TSH, serum cortisol level  Monitor Na level Q12  Na level should not increase more than 6meq in 24 hrs    Proteinuia/Hematuria:  Etiology?  Had Cystoscopy last year and was normal per patient  Possible sec to Diabetic Nephropathy but will need full vasculitis work up  check urine p/c ratio  c3 c4 hep b hep c hiv, k/l wnl, lexii neg  pendingANCA, Serum immunofixation, SPEP    RPR +, patient s/p treatment 2017 ID eval appreciated     Based on work up result he might need kidney biopsy, which can be planned as outpatient as his renal function is stable  D/W patient in detail above plan    Hypophosphatemia:  SUpplement as needed   montior    HTN  suboptimal   on norvasc and losartan  start metoprolol if bp persistently >150 51 yo male, PMH DM, HTN, hyperlipidemia; pt presented to the ED c/o abdominal cramping, generalized, and diarrhea following use of laxatives as above for constipation.  In the ED, WBC 10.82, Hb 13.6, CMP: sodium 126, chloride 89, glucose 281, CMP otherwise unremarkable.  VBG: Lactate 2.6 (later downtrended to 1.6).  Pt. was treated with IV hydration and dispo'd to CDU for continued care plan:  IV hydration, supportive care, AM labs, general observation care / monitoring. pt continued to have generalized weakness , numbness and on labs persistent hyponatermia      A/P:  Hyponatremia:  Etiology?  Likely sec to hypovolemia+hyperglycemia. work up suggested SIADH  Na improving  today  Repeat Urine Na, osmolality showed polydipsia.   optimize dm control  free water restriction <1.2L/day.  ok TSH, serum cortisol level  Monitor Na level Q12  Na level should not increase more than 6meq in 24 hrs    Proteinuia/Hematuria:  Etiology?  Had Cystoscopy last year and was normal per patient  Possible sec to Diabetic Nephropathy but will need full vasculitis work up  check urine p/c ratio  c3 c4 hep b hep c hiv, k/l wnl, lexii neg  pendingANCA, Serum immunofixation, SPEP    RPR +, patient s/p treatment 2017 ID eval appreciated     Based on work up result he might need kidney biopsy, which can be planned as outpatient as his renal function is stable  D/W patient in detail above plan    Hypophosphatemia:  SUpplement as needed   montior    HTN  suboptimal   on norvasc and losartan  start metoprolol if bp persistently >150

## 2021-04-16 NOTE — PROGRESS NOTE ADULT - ASSESSMENT
49 yo RH AA male with h/o DM, HTN, hyperlipidemia; pt presented to the ED c/o abdominal cramping, generalized, and diarrhea following use of laxatives as above for constipation. Neurology called for evaluation of LE weakness, unsteady gait. A1c 11.5, , Na was down to 126 improving to 134.  +RPR 1:1  Etiology of unsteady gait unclear at this time, mild ataxia lowers but not uppers.  possible deconditioning. possible diabetic neuropathy given A1c 11.5.  hyponatremia--> psuedohyponatremia  - ID for +RPR, not active?  - MRI C/T/L spine pending  - check b12 WNL, copper, Vit E, thiamine, magnesium heavy metals for dorsal column dysfunction.   - will need outpt EMG/NCS  - PT therapy,  - slow correction of Na   - needs better DM control  - cw neurontin 100mg TID for now   - will consider LP if no improvement ? neuro syphilis, ? AIDP but poor   - check FS, glucose control <180  - GI/DVT ppx  - Counseling on diet, exercise, and medication adherence was done  - Counseling on smoking cessation and alcohol consumption offered when appropriate.  - Pain assessed and judicious use of narcotics when appropriate was discussed.    - Stroke education given when appropriate.  - Importance of fall prevention discussed.   - Differential diagnosis and plan of care discussed with patient and/or family and primary team  - Thank you for allowing me to participate in the care of this patient. Call with questions.   Geoffrey Gardner MD  Vascular Neurology  Office: [phone]

## 2021-04-16 NOTE — PROVIDER CONTACT NOTE (OTHER) - ASSESSMENT
Patients BP is 166/97mmHg. Patient A&OX4. Denies headache. Complaining on back pain 7/10. States Tylenol doesn't work. Patients BP is 166/97mmHg. Patient A&OX4. Denies headache. Complaining on back pain 6/10. States Tylenol doesn't work.

## 2021-04-16 NOTE — CHART NOTE - NSCHARTNOTEFT_GEN_A_CORE
Pt. noted with SBP > 150. As per discussion with medical attending will start lopressor 12.5mg BID. Will continue to monitor VS closely

## 2021-04-16 NOTE — PROGRESS NOTE ADULT - SUBJECTIVE AND OBJECTIVE BOX
Neurology Progress Note    S: Patient seen and examined. No new events overnight. patient denied CP, SOB, HA or pain. still with LE weakness. turned away patient transport to MRI. wife at bedside.     Medication:  acetaminophen   Tablet .. 650 milliGRAM(s) Oral every 6 hours PRN  aluminum hydroxide/magnesium hydroxide/simethicone Suspension 30 milliLiter(s) Oral every 6 hours PRN  amLODIPine   Tablet 10 milliGRAM(s) Oral daily  bisacodyl 5 milliGRAM(s) Oral at bedtime  dextrose 40% Gel 15 Gram(s) Oral once  dextrose 5%. 1000 milliLiter(s) IV Continuous <Continuous>  dextrose 5%. 1000 milliLiter(s) IV Continuous <Continuous>  dextrose 50% Injectable 25 Gram(s) IV Push once  dextrose 50% Injectable 12.5 Gram(s) IV Push once  dextrose 50% Injectable 25 Gram(s) IV Push once  dicyclomine 10 milliGRAM(s) Oral once PRN  gabapentin   Solution 100 milliGRAM(s) Oral three times a day  glucagon  Injectable 1 milliGRAM(s) IntraMuscular once  heparin   Injectable 5000 Unit(s) SubCutaneous every 8 hours  insulin glargine Injectable (LANTUS) 26 Unit(s) SubCutaneous at bedtime  insulin lispro (ADMELOG) corrective regimen sliding scale   SubCutaneous three times a day before meals  insulin lispro (ADMELOG) corrective regimen sliding scale   SubCutaneous at bedtime  insulin lispro Injectable (ADMELOG) 12 Unit(s) SubCutaneous three times a day before meals  losartan 100 milliGRAM(s) Oral daily  naproxen 500 milliGRAM(s) Oral two times a day PRN  senna 2 Tablet(s) Oral two times a day      Vitals:  Vital Signs Last 24 Hrs  T(C): 36.6 (16 Apr 2021 17:11), Max: 37.1 (16 Apr 2021 06:25)  T(F): 97.8 (16 Apr 2021 17:11), Max: 98.7 (16 Apr 2021 06:25)  HR: 97 (16 Apr 2021 17:11) (94 - 97)  BP: 151/93 (16 Apr 2021 17:11) (151/93 - 168/99)  BP(mean): --  RR: 18 (16 Apr 2021 17:11) (17 - 18)  SpO2: 98% (16 Apr 2021 17:11) (98% - 100%)    General Exam:   General Appearance: Appropriately dressed and in no acute distress       Head: Normocephalic, atraumatic and no dysmorphic features  Ear, Nose, and Throat: Moist mucous membranes  CVS: S1S2+  Resp: No SOB, no wheeze or rhonchi  GI: soft +_ distended   Extremities: No edema or cyanosis  Skin: No bruises or rashes     Neurological Exam:  Mental Status: Awake, alert and oriented x 3.  Able to follow simple and complex verbal commands. Able to name and repeat. fluent speech. No obvious aphasia or dysarthria noted.   Cranial Nerves: PERRL, EOMI, VFFC, sensation V1-V3 intact,  no obvious facial asymmetry, equal elevation of palate, scm/trap 5/5, tongue is midline on protrusion. no obvious papilledema on fundoscopic exam. hearing is grossly intact.   Motor: Normal bulk, tone and strength throughout. B/L Uppers 5/5 LE's distally 5/5 at DF, PF eversion and inversion. but unwilling to lift legs B/L. + hoovers poor effort  Sensation: Intact to light touch and pinprick throughout. no right/left confusion. no extinction to tactile on DSS. decrease prop and vibration   Reflexes: 1+ throughout at biceps, brachioradialis, triceps, patellars and ankles bilaterally and equal. No clonus. R toe and L toe were both downgoing.  Coordination: No dysmetria on FNF   Gait: unable to stand    I personally reviewed the below data/images/labs:    CBC Full  -  ( 16 Apr 2021 08:31 )  WBC Count : 7.72 K/uL  RBC Count : 4.62 M/uL  Hemoglobin : 13.8 g/dL  Hematocrit : 40.4 %  Platelet Count - Automated : 331 K/uL  Mean Cell Volume : 87.4 fL  Mean Cell Hemoglobin : 29.9 pg  Mean Cell Hemoglobin Concentration : 34.2 gm/dL  Auto Neutrophil # : x  Auto Lymphocyte # : x  Auto Monocyte # : x  Auto Eosinophil # : x  Auto Basophil # : x  Auto Neutrophil % : x  Auto Lymphocyte % : x  Auto Monocyte % : x  Auto Eosinophil % : x  Auto Basophil % : x    04-16    132<L>  |  97<L>  |  24<H>  ----------------------------<  227<H>  4.9   |  24  |  1.13    Ca    9.5      16 Apr 2021 10:57  Phos  3.8     04-16  Mg     2.3     04-16    TPro  7.6  /  Alb  3.5  /  TBili  0.5  /  DBili  <0.2  /  AST  44<H>  /  ALT  38  /  AlkPhos  98  04-16      < from: CT Head No Cont (04.02.21 @ 21:48) >    EXAM:  CT BRAIN        PROCEDURE DATE:  Apr 2 2021         INTERPRETATION:  HISTORY: Hypertension    Technique: CT of the head was performed without contrast.    Multiple contiguous axial images were acquired from the skullbase to the vertex without the administration of intravenous contrast.  Coronal and sagittal reformations were made.    COMPARISON: None.    FINDINGS:  The ventricles and sulci are within normal limits given the patient's age. No acute hemorrhage, mass effect, midline shift,hydrocephalus, or extra-axial fluid collections. Mild patchy hypoattenuation within the white matter, likely secondary chronic microscopy changes.    The calvarium is intact.    Small cyst or polyp in the right maxillary sinus. There are opacification of a few ethmoid air cells bilaterally as well as mild mucosal thickening of the left frontal sinus. The mastoid air cells are clear.    IMPRESSION:  No acute hemorrhage, mass effect, or midline shift.      DARRELL MIX MD; Resident Radiology  This document has been electronically signed.  PORTILLO HARPER MD; Attending Radiologist  This document has been electronically signed. Apr 2 2021 11:34PM    < end of copied text >

## 2021-04-16 NOTE — PROGRESS NOTE ADULT - ASSESSMENT
49 yo male, PMH DM, HTN, hyperlipidemia; pt presented to the ED c/o abdominal cramping, generalized, and diarrhea following use of laxatives as above for constipation.  In the ED, WBC 10.82, Hb 13.6, CMP: sodium 126, chloride 89, glucose 281, CMP otherwise unremarkable.  VBG: Lactate 2.6 (later downtrended to 1.6).  Pt. was treated with IV hydration and dispo'd to CDU for continued care plan:  IV hydration, supportive care, AM labs, general observation care / monitoring. pt continued to have generalized weakness , numbness and on labs persistent hyponatermia and now being admitted for furhter evlauation .  dnenies N/V/Abd pain   had bm   no urinary sx   no fever or chills   no cough   no cp /sob     - hyponatremia :   likely multifactorial including hyperglycemia ( pseudohyponatremia ) and pre renal sec to diarreah   ? ADH induced by pain   apprecaite renal input   improving   monitor     - DM: uncontrolled   appreciate endo input    -HTN : imrpoved     - numbness/weakness  : likely nueropathic   now with BLE weakness : doubt cord compression however will expedite MR: discussed with radio : being expedited   fu MR C/T/L  also possibly sec to recent COVID infection .. CT chest  : non specific GGO     - hypophosphatemia : repleted and monitor     - constipation : had had colonoscopy 2 yrs ago with Dr. Rae   hold further bowel regimen   CT abd no acute path   no furtehr inpt red : discussed with Dr Rae         dvt proph   d/w pt at length   with PA

## 2021-04-16 NOTE — PROGRESS NOTE ADULT - SUBJECTIVE AND OBJECTIVE BOX
[facility] [place] [facility]   MD WYATT CHRIS DO ANAM SIDDIQUI ANGELA WONG, PA    TEL:  OFFICE: [phone]  DR DAWSON CELL: [phone]  DR. TIRADO CELL: [phone]  DR. MORAN CELL: [phone]  CATINA PATEL CELL: [phone]    From 5pm-7am Answering Service [phone]    -- RENAL FOLLOW UP NOTE ---Date of Service 04-16-21 @ 13:00    Patient is a 50y old  Male who presents with a chief complaint of abdominal pain (15 Apr 2021 14:58)      Patient seen and examined at bedside. No chest pain/sob    VITALS:  T(F): 98.7 (04-16-21 @ 06:25), Max: 98.7 (04-16-21 @ 06:25)  HR: 94 (04-16-21 @ 06:25)  BP: 157/85 (04-16-21 @ 06:25)  RR: 18 (04-16-21 @ 06:25)  SpO2: 100% (04-16-21 @ 06:25)  Wt(kg): --    04-15 @ 07:01  -  04-16 @ 07:00  --------------------------------------------------------  IN: 0 mL / OUT: 550 mL / NET: -550 mL          PHYSICAL EXAM:  Constitutional: NAD  Neck: No JVD  Respiratory: CTAB, no wheezes, rales or rhonchi  Cardiovascular: S1, S2, RRR  Gastrointestinal: BS+, soft, NT/ND  Extremities: No peripheral edema    Hospital Medications:   MEDICATIONS  (STANDING):  amLODIPine   Tablet 10 milliGRAM(s) Oral daily  bisacodyl 5 milliGRAM(s) Oral at bedtime  dextrose 40% Gel 15 Gram(s) Oral once  dextrose 5%. 1000 milliLiter(s) (50 mL/Hr) IV Continuous <Continuous>  dextrose 5%. 1000 milliLiter(s) (100 mL/Hr) IV Continuous <Continuous>  dextrose 50% Injectable 25 Gram(s) IV Push once  dextrose 50% Injectable 12.5 Gram(s) IV Push once  dextrose 50% Injectable 25 Gram(s) IV Push once  gabapentin   Solution 100 milliGRAM(s) Oral three times a day  glucagon  Injectable 1 milliGRAM(s) IntraMuscular once  heparin   Injectable 5000 Unit(s) SubCutaneous every 8 hours  insulin glargine Injectable (LANTUS) 24 Unit(s) SubCutaneous at bedtime  insulin lispro (ADMELOG) corrective regimen sliding scale   SubCutaneous three times a day before meals  insulin lispro (ADMELOG) corrective regimen sliding scale   SubCutaneous at bedtime  insulin lispro Injectable (ADMELOG) 10 Unit(s) SubCutaneous three times a day before meals  losartan 100 milliGRAM(s) Oral daily  senna 2 Tablet(s) Oral two times a day      LABS:  04-16    132<L>  |  97<L>  |  24<H>  ----------------------------<  227<H>  4.9   |  24  |  1.13    Ca    9.5      16 Apr 2021 10:57  Phos  3.8     04-16  Mg     2.3     04-16    TPro  7.6  /  Alb  3.5  /  TBili  0.5  /  DBili  <0.2  /  AST  44<H>  /  ALT  38  /  AlkPhos  98  04-16    Creatinine Trend: 1.13 <--, 1.05 <--, 1.29 <--, 1.21 <--, 1.26 <--, 0.99 <--, 0.97 <--, 0.94 <--, 0.86 <--, 1.07 <--, 1.13 <--    Phosphorus Level, Serum: 3.8 mg/dL (04-16 @ 10:57)  Phosphorus Level, Serum: 3.7 mg/dL (04-16 @ 08:31)  Albumin, Serum: 3.5 g/dL (04-16 @ 08:31)                              13.8   7.72  )-----------( 331      ( 16 Apr 2021 08:31 )             40.4     Urine Studies:  Urinalysis - [04-11-21 @ 12:32]      Color Light Yellow / Appearance Clear / SG 1.013 / pH 6.5      Gluc >= 1000 mg/dL / Ketone Small  / Bili Negative / Urobili <2 mg/dL       Blood Small / Protein 100 mg/dL / Leuk Est Negative / Nitrite Negative      RBC 10 / WBC 1 / Hyaline 1 / Gran  / Sq Epi  / Non Sq Epi 1 / Bacteria Negative    Urine Creatinine 51      [04-11-21 @ 16:02]  Urine Protein 66      [04-15-21 @ 09:10]  Urine Sodium 21      [04-15-21 @ 09:10]  Urine Potassium 25.1      [04-11-21 @ 12:32]  Urine Chloride 90      [04-11-21 @ 12:32]  Urine Osmolality 360      [04-15-21 @ 09:10]    TSH 3.00      [04-12-21 @ 06:36]    HBsAg Nonreact      [04-13-21 @ 10:09]  HCV 0.13, Nonreact      [04-13-21 @ 10:09]  HIV Nonreact      [04-13-21 @ 07:48]    PIERO: titer Negative, pattern --      [04-13-21 @ 10:10]  C3 Complement 152      [04-13-21 @ 07:48]  C4 Complement 33      [04-13-21 @ 07:48]  ANCA: cANCA Negative, pANCA Negative, atypical ANCA Negative      [04-13-21 @ 10:10]  Syphilis Screen (Treponema Pallidum Ab) Positive      [04-13-21 @ 10:10]  Syphilis Screen (RPR Titer) 1:1      [04-13-21 @ 10:10]  Free Light Chains: kappa 1.51, lambda 1.89, ratio = 0.80      [04-13 @ 10:08]    RADIOLOGY & ADDITIONAL STUDIES:

## 2021-04-16 NOTE — PROGRESS NOTE ADULT - SUBJECTIVE AND OBJECTIVE BOX
Date of service: 04-16-21 @ 15:42      Patient is a 50y old  Male who presents with a chief complaint of abdominal pain (15 Apr 2021 14:58)                                                               INTERVAL HPI/OVERNIGHT EVENTS:    REVIEW OF SYSTEMS:     CONSTITUTIONAL: No weakness, fevers or chills  RESPIRATORY: No cough, wheezing,  No shortness of breath  CARDIOVASCULAR: No chest pain or palpitations  GASTROINTESTINAL: No abdominal pain  . No nausea, vomiting, or hematemesis; No diarrhea or constipation. No melena or hematochezia.  GENITOURINARY: No dysuria, frequency or hematuria  NEUROLOGICAL: reporting numbenss and weakness   unable to get out of bed   no urinary or stool incontinence                                                                                                                                                                                                                                                                                    Medications:  MEDICATIONS  (STANDING):  amLODIPine   Tablet 10 milliGRAM(s) Oral daily  bisacodyl 5 milliGRAM(s) Oral at bedtime  dextrose 40% Gel 15 Gram(s) Oral once  dextrose 5%. 1000 milliLiter(s) (50 mL/Hr) IV Continuous <Continuous>  dextrose 5%. 1000 milliLiter(s) (100 mL/Hr) IV Continuous <Continuous>  dextrose 50% Injectable 25 Gram(s) IV Push once  dextrose 50% Injectable 12.5 Gram(s) IV Push once  dextrose 50% Injectable 25 Gram(s) IV Push once  gabapentin   Solution 100 milliGRAM(s) Oral three times a day  glucagon  Injectable 1 milliGRAM(s) IntraMuscular once  heparin   Injectable 5000 Unit(s) SubCutaneous every 8 hours  insulin glargine Injectable (LANTUS) 26 Unit(s) SubCutaneous at bedtime  insulin lispro (ADMELOG) corrective regimen sliding scale   SubCutaneous three times a day before meals  insulin lispro (ADMELOG) corrective regimen sliding scale   SubCutaneous at bedtime  insulin lispro Injectable (ADMELOG) 12 Unit(s) SubCutaneous three times a day before meals  losartan 100 milliGRAM(s) Oral daily  senna 2 Tablet(s) Oral two times a day    MEDICATIONS  (PRN):  acetaminophen   Tablet .. 650 milliGRAM(s) Oral every 6 hours PRN Temp greater or equal to 38C (100.4F), Mild Pain (1 - 3), Moderate Pain (4 - 6)  aluminum hydroxide/magnesium hydroxide/simethicone Suspension 30 milliLiter(s) Oral every 6 hours PRN Dyspepsia  dicyclomine 10 milliGRAM(s) Oral once PRN abdominal pain  naproxen 500 milliGRAM(s) Oral every 8 hours PRN Moderate Pain (4 - 6)       Allergies    No Known Drug Allergies  shellfish (Urticaria)    Intolerances      Vital Signs Last 24 Hrs  T(C): 36.8 (16 Apr 2021 13:46), Max: 37.1 (16 Apr 2021 06:25)  T(F): 98.3 (16 Apr 2021 13:46), Max: 98.7 (16 Apr 2021 06:25)  HR: 96 (16 Apr 2021 13:46) (94 - 96)  BP: 153/101 (16 Apr 2021 13:46) (153/101 - 168/99)  BP(mean): --  RR: 18 (16 Apr 2021 13:46) (17 - 18)  SpO2: 98% (16 Apr 2021 13:46) (98% - 100%)  CAPILLARY BLOOD GLUCOSE      POCT Blood Glucose.: 202 mg/dL (16 Apr 2021 11:57)  POCT Blood Glucose.: 275 mg/dL (16 Apr 2021 07:21)  POCT Blood Glucose.: 277 mg/dL (15 Apr 2021 23:02)  POCT Blood Glucose.: 162 mg/dL (15 Apr 2021 21:07)  POCT Blood Glucose.: 149 mg/dL (15 Apr 2021 17:30)      04-15 @ 07:01  -  04-16 @ 07:00  --------------------------------------------------------  IN: 0 mL / OUT: 550 mL / NET: -550 mL      Physical Exam:    Daily     Daily   General:  NAD   HEENT:  Nonicteric, PERRLA  CV:  RRR, S1S2   Lungs:  CTA B/L, no wheezes, rales, rhonchi  Abdomen:  Soft, non-tender, no distended, positive BS  Extremities: no edema   Neuro:  AAOx3  hyporefelxia   no sensory level   LE : says he is too weak to cooperate  2/5 at best   UE 4/5             LABS:                               13.8   7.72  )-----------( 331      ( 16 Apr 2021 08:31 )             40.4                      04-16    132<L>  |  97<L>  |  24<H>  ----------------------------<  227<H>  4.9   |  24  |  1.13    Ca    9.5      16 Apr 2021 10:57  Phos  3.8     04-16  Mg     2.3     04-16    TPro  7.6  /  Alb  3.5  /  TBili  0.5  /  DBili  <0.2  /  AST  44<H>  /  ALT  38  /  AlkPhos  98  04-16

## 2021-04-17 LAB
ALBUMIN SERPL ELPH-MCNC: 3.8 G/DL — SIGNIFICANT CHANGE UP (ref 3.3–5)
ALP SERPL-CCNC: 88 U/L — SIGNIFICANT CHANGE UP (ref 40–120)
ALT FLD-CCNC: 32 U/L — SIGNIFICANT CHANGE UP (ref 4–41)
ANION GAP SERPL CALC-SCNC: 13 MMOL/L — SIGNIFICANT CHANGE UP (ref 7–14)
AST SERPL-CCNC: 19 U/L — SIGNIFICANT CHANGE UP (ref 4–40)
BASOPHILS # BLD AUTO: 0.03 K/UL — SIGNIFICANT CHANGE UP (ref 0–0.2)
BASOPHILS NFR BLD AUTO: 0.3 % — SIGNIFICANT CHANGE UP (ref 0–2)
BILIRUB DIRECT SERPL-MCNC: <0.2 MG/DL — SIGNIFICANT CHANGE UP (ref 0–0.2)
BILIRUB INDIRECT FLD-MCNC: >0.4 MG/DL — SIGNIFICANT CHANGE UP (ref 0–1)
BILIRUB SERPL-MCNC: 0.6 MG/DL — SIGNIFICANT CHANGE UP (ref 0.2–1.2)
BUN SERPL-MCNC: 21 MG/DL — SIGNIFICANT CHANGE UP (ref 7–23)
CALCIUM SERPL-MCNC: 9.5 MG/DL — SIGNIFICANT CHANGE UP (ref 8.4–10.5)
CHLORIDE SERPL-SCNC: 93 MMOL/L — LOW (ref 98–107)
CO2 SERPL-SCNC: 22 MMOL/L — SIGNIFICANT CHANGE UP (ref 22–31)
CREAT SERPL-MCNC: 0.99 MG/DL — SIGNIFICANT CHANGE UP (ref 0.5–1.3)
EOSINOPHIL # BLD AUTO: 0.22 K/UL — SIGNIFICANT CHANGE UP (ref 0–0.5)
EOSINOPHIL NFR BLD AUTO: 2.2 % — SIGNIFICANT CHANGE UP (ref 0–6)
GLUCOSE SERPL-MCNC: 225 MG/DL — HIGH (ref 70–99)
HCT VFR BLD CALC: 40.2 % — SIGNIFICANT CHANGE UP (ref 39–50)
HGB BLD-MCNC: 13.7 G/DL — SIGNIFICANT CHANGE UP (ref 13–17)
HIV 1+2 AB+HIV1 P24 AG SERPL QL IA: SIGNIFICANT CHANGE UP
IANC: 6.89 K/UL — SIGNIFICANT CHANGE UP (ref 1.5–8.5)
IMM GRANULOCYTES NFR BLD AUTO: 0.2 % — SIGNIFICANT CHANGE UP (ref 0–1.5)
LYMPHOCYTES # BLD AUTO: 2.02 K/UL — SIGNIFICANT CHANGE UP (ref 1–3.3)
LYMPHOCYTES # BLD AUTO: 20.1 % — SIGNIFICANT CHANGE UP (ref 13–44)
MAGNESIUM SERPL-MCNC: 1.9 MG/DL — SIGNIFICANT CHANGE UP (ref 1.6–2.6)
MCHC RBC-ENTMCNC: 29.4 PG — SIGNIFICANT CHANGE UP (ref 27–34)
MCHC RBC-ENTMCNC: 34.1 GM/DL — SIGNIFICANT CHANGE UP (ref 32–36)
MCV RBC AUTO: 86.3 FL — SIGNIFICANT CHANGE UP (ref 80–100)
MONOCYTES # BLD AUTO: 0.89 K/UL — SIGNIFICANT CHANGE UP (ref 0–0.9)
MONOCYTES NFR BLD AUTO: 8.8 % — SIGNIFICANT CHANGE UP (ref 2–14)
NEUTROPHILS # BLD AUTO: 6.89 K/UL — SIGNIFICANT CHANGE UP (ref 1.8–7.4)
NEUTROPHILS NFR BLD AUTO: 68.4 % — SIGNIFICANT CHANGE UP (ref 43–77)
NRBC # BLD: 0 /100 WBCS — SIGNIFICANT CHANGE UP
NRBC # FLD: 0 K/UL — SIGNIFICANT CHANGE UP
PHOSPHATE SERPL-MCNC: 4.3 MG/DL — SIGNIFICANT CHANGE UP (ref 2.5–4.5)
PLATELET # BLD AUTO: 339 K/UL — SIGNIFICANT CHANGE UP (ref 150–400)
POTASSIUM SERPL-MCNC: 5.1 MMOL/L — SIGNIFICANT CHANGE UP (ref 3.5–5.3)
POTASSIUM SERPL-SCNC: 5.1 MMOL/L — SIGNIFICANT CHANGE UP (ref 3.5–5.3)
PROT SERPL-MCNC: 7.2 G/DL — SIGNIFICANT CHANGE UP (ref 6–8.3)
RBC # BLD: 4.66 M/UL — SIGNIFICANT CHANGE UP (ref 4.2–5.8)
RBC # FLD: 11.7 % — SIGNIFICANT CHANGE UP (ref 10.3–14.5)
SODIUM SERPL-SCNC: 128 MMOL/L — LOW (ref 135–145)
WBC # BLD: 10.07 K/UL — SIGNIFICANT CHANGE UP (ref 3.8–10.5)
WBC # FLD AUTO: 10.07 K/UL — SIGNIFICANT CHANGE UP (ref 3.8–10.5)

## 2021-04-17 RX ORDER — SODIUM CHLORIDE 9 MG/ML
1 INJECTION INTRAMUSCULAR; INTRAVENOUS; SUBCUTANEOUS THREE TIMES A DAY
Refills: 0 | Status: COMPLETED | OUTPATIENT
Start: 2021-04-17 | End: 2021-04-19

## 2021-04-17 RX ORDER — THIAMINE MONONITRATE (VIT B1) 100 MG
100 TABLET ORAL DAILY
Refills: 0 | Status: DISCONTINUED | OUTPATIENT
Start: 2021-04-17 | End: 2021-04-17

## 2021-04-17 RX ORDER — DIPHENHYDRAMINE HCL 50 MG
25 CAPSULE ORAL ONCE
Refills: 0 | Status: DISCONTINUED | OUTPATIENT
Start: 2021-04-17 | End: 2021-04-17

## 2021-04-17 RX ORDER — ACETAMINOPHEN 500 MG
325 TABLET ORAL ONCE
Refills: 0 | Status: DISCONTINUED | OUTPATIENT
Start: 2021-04-17 | End: 2021-04-17

## 2021-04-17 RX ORDER — IMMUNE GLOBULIN (HUMAN) 10 G/100ML
35 INJECTION INTRAVENOUS; SUBCUTANEOUS
Refills: 0 | Status: DISCONTINUED | OUTPATIENT
Start: 2021-04-17 | End: 2021-04-17

## 2021-04-17 RX ORDER — HYDRALAZINE HCL 50 MG
25 TABLET ORAL THREE TIMES A DAY
Refills: 0 | Status: DISCONTINUED | OUTPATIENT
Start: 2021-04-17 | End: 2021-05-05

## 2021-04-17 RX ORDER — DIPHENHYDRAMINE HCL 50 MG
25 CAPSULE ORAL DAILY
Refills: 0 | Status: COMPLETED | OUTPATIENT
Start: 2021-04-17 | End: 2021-04-22

## 2021-04-17 RX ORDER — ACETAMINOPHEN 500 MG
325 TABLET ORAL DAILY
Refills: 0 | Status: DISCONTINUED | OUTPATIENT
Start: 2021-04-17 | End: 2021-04-20

## 2021-04-17 RX ORDER — IMMUNE GLOBULIN (HUMAN) 10 G/100ML
160 INJECTION INTRAVENOUS; SUBCUTANEOUS ONCE
Refills: 0 | Status: DISCONTINUED | OUTPATIENT
Start: 2021-04-17 | End: 2021-04-17

## 2021-04-17 RX ADMIN — Medication 500 MILLIGRAM(S): at 22:19

## 2021-04-17 RX ADMIN — SODIUM CHLORIDE 1 GRAM(S): 9 INJECTION INTRAMUSCULAR; INTRAVENOUS; SUBCUTANEOUS at 15:07

## 2021-04-17 RX ADMIN — Medication 500 MILLIGRAM(S): at 23:15

## 2021-04-17 RX ADMIN — GABAPENTIN 100 MILLIGRAM(S): 400 CAPSULE ORAL at 05:27

## 2021-04-17 RX ADMIN — Medication 25 MILLIGRAM(S): at 15:10

## 2021-04-17 RX ADMIN — Medication 12 UNIT(S): at 12:28

## 2021-04-17 RX ADMIN — LOSARTAN POTASSIUM 100 MILLIGRAM(S): 100 TABLET, FILM COATED ORAL at 05:28

## 2021-04-17 RX ADMIN — Medication 25 MILLIGRAM(S): at 22:19

## 2021-04-17 RX ADMIN — Medication 500 MILLIGRAM(S): at 06:28

## 2021-04-17 RX ADMIN — SENNA PLUS 2 TABLET(S): 8.6 TABLET ORAL at 05:27

## 2021-04-17 RX ADMIN — Medication 500 MILLIGRAM(S): at 07:13

## 2021-04-17 RX ADMIN — GABAPENTIN 100 MILLIGRAM(S): 400 CAPSULE ORAL at 15:08

## 2021-04-17 RX ADMIN — HEPARIN SODIUM 5000 UNIT(S): 5000 INJECTION INTRAVENOUS; SUBCUTANEOUS at 15:08

## 2021-04-17 RX ADMIN — HEPARIN SODIUM 5000 UNIT(S): 5000 INJECTION INTRAVENOUS; SUBCUTANEOUS at 05:27

## 2021-04-17 RX ADMIN — Medication 2: at 12:28

## 2021-04-17 RX ADMIN — AMLODIPINE BESYLATE 10 MILLIGRAM(S): 2.5 TABLET ORAL at 05:27

## 2021-04-17 RX ADMIN — SODIUM CHLORIDE 1 GRAM(S): 9 INJECTION INTRAMUSCULAR; INTRAVENOUS; SUBCUTANEOUS at 22:19

## 2021-04-17 RX ADMIN — Medication 12 UNIT(S): at 18:26

## 2021-04-17 RX ADMIN — INSULIN GLARGINE 26 UNIT(S): 100 INJECTION, SOLUTION SUBCUTANEOUS at 22:19

## 2021-04-17 RX ADMIN — Medication 12 UNIT(S): at 08:00

## 2021-04-17 RX ADMIN — SENNA PLUS 2 TABLET(S): 8.6 TABLET ORAL at 18:33

## 2021-04-17 RX ADMIN — GABAPENTIN 100 MILLIGRAM(S): 400 CAPSULE ORAL at 22:18

## 2021-04-17 RX ADMIN — Medication 650 MILLIGRAM(S): at 00:27

## 2021-04-17 RX ADMIN — HEPARIN SODIUM 5000 UNIT(S): 5000 INJECTION INTRAVENOUS; SUBCUTANEOUS at 22:18

## 2021-04-17 RX ADMIN — Medication 4: at 08:00

## 2021-04-17 RX ADMIN — Medication 2: at 18:26

## 2021-04-17 NOTE — PROGRESS NOTE ADULT - ASSESSMENT
51 yo RH AA male with h/o DM, HTN, hyperlipidemia; pt presented to the ED c/o abdominal cramping, generalized, and diarrhea following use of laxatives as above for constipation. Neurology called for evaluation of LE weakness, unsteady gait.    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    Recs:  [] LP with opening pressure and send for glucose, protein, cell count, PCR, culture, gram stain, HSV, oligoclonal bands, NMO, MOG Ab, myelin basic protein, CSF syphilis screen  [] send for gandlioside antibodies, Gq1b  [] after LP obtain please start IVIG  [] slow correction of sodium  [] aggressive PT/OT  [] B12 >2000, copper, Vit E, thiamine, magnesium heavy metals, zinc    IVIG Recommendations:  [] please start IVIG 2g/kg divided into 5 days, patient weighs 91.9 kg, daily dose will be 36.76gm/day, can round to  35gm/day for 5 days  [] starting rate 30cc/hr and increase by 10cc/hr every 30 min to goal 70cc/hr. Infuse over 6-7 hours  [] premedicate with Tylenol 325mg PO 30 minutes prior to dose  [] premedicate with Benadryl 25mg PO 30 minutes prior to dose  [] obtain daily CBC, BMP while on IVIG  [] common adverse side effects include headache, nausea, fever, tremor, flushing, myalgias, high blood pressure, tachycardia  [] observe for symptoms of pulmonary edema, hemolysis, thrombosis, and anaphylaxis    Case discussed with neurology attending, Dr. Gardner.    49 yo RH AA male with h/o DM, HTN, hyperlipidemia; pt presented to the ED c/o abdominal cramping, generalized, and diarrhea following use of laxatives as above for constipation. Neurology called for evaluation of LE weakness, unsteady gait.    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    Recs:  [] LP with opening pressure and send for glucose, protein, cell count, PCR, culture, gram stain, HSV, oligoclonal bands, NMO, MOG Ab, myelin basic protein, CSF syphilis screen  [] send for ganglioside antibodies, Gq1b  [] after LP obtain please start IVIG  [] slow correction of sodium  [] aggressive PT/OT  [] B12 >2000, copper, Vit E, thiamine, magnesium heavy metals, zinc    IVIG Recommendations:  [] please start IVIG 2g/kg divided into 5 days, patient weighs 91.9 kg, daily dose will be 36.76gm/day, can round to  35gm/day for 5 days  [] starting rate 30cc/hr and increase by 10cc/hr every 30 min to goal 70cc/hr. Infuse over 6-7 hours  [] premedicate with Tylenol 325mg PO 30 minutes prior to dose  [] premedicate with Benadryl 25mg PO 30 minutes prior to dose  [] obtain daily CBC, BMP while on IVIG  [] common adverse side effects include headache, nausea, fever, tremor, flushing, myalgias, high blood pressure, tachycardia  [] observe for symptoms of pulmonary edema, hemolysis, thrombosis, and anaphylaxis    Case discussed with neurology attending, Dr. Gardner.    none

## 2021-04-17 NOTE — PROVIDER CONTACT NOTE (OTHER) - ASSESSMENT
Patients BP is 160/98mmHg. Patient A&OX4. Denies chest pain and headache. No signs of distress noted. Patient states "I'm not taking anymore BP meds. There has to be something wrong with the machine".

## 2021-04-17 NOTE — CHART NOTE - NSCHARTNOTEFT_GEN_A_CORE
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  Dr. Garza has made Dr. Gardner aware. Will f/u neuro recs.

## 2021-04-17 NOTE — CHART NOTE - NSCHARTNOTEFT_GEN_A_CORE
Notified by neuro resident that LP is recommended to be done for GBS work up. Neuro resident is unable to do LP procedure as he must respond to code webb at [facility] Called IR whom said Radiology resident ([id number]) takes care of this. Radiology resident stated on a weekend anesthesia would take care of LPs. Anesthesia pagers [phone] and [phone] paged. Awaiting call back. Neuro resident and Dr. Garza aware of the calls made to try to get LP done today. Neuro resident recommends anesthesia to do the LP but if unable to proceed with tx with IVIG as outlined in his note from today. Notified by neuro resident that LP is recommended to be done for GBS work up. Neuro resident is unable to do LP procedure as he must respond to code webb at [facility]. Called IR whom said Radiology resident ([id number]) takes care of this. Radiology resident stated on a weekend anesthesia would take care of LPs. Anesthesia pagers [phone] and [phone] paged. Awaiting call back. Neuro resident and Dr. Garza aware of the calls made to try to get LP done today. Neuro resident recommends anesthesia to do the LP but if unable to proceed with tx with IVIG as outlined in his note from today.    Spoke to Anesthesia MD Cazares at 15:15 on 4/17- Anesthesia is not responsible for LPs. Notified by neuro resident that LP is recommended to be done for GBS work up. Neuro resident is unable to do LP procedure as he must respond to code webb at [facility]. Called IR whom said Radiology resident ([id number]) takes care of this. Radiology resident Jeanna Castillo MD stated on a weekend anesthesia would take care of LPs. Anesthesia pagers [phone] and [phone] paged. Awaiting call back. Neuro resident and Dr. Garza aware of the calls made to try to get LP done today. Neuro resident recommends anesthesia to do the LP but if unable to proceed with tx with IVIG as outlined in his note from today.    Spoke to Anesthesia MD Cazares at 15:15 on 4/17- Anesthesia is not responsible for LPs. Notified by neuro resident that LP is recommended to be done for GBS work up. Neuro resident is unable to do LP procedure as he must respond to code jon at [facility]. Called IR whom said Radiology resident ([id number]) takes care of this. Radiology resident Jeanna Castillo MD stated on a weekend anesthesia would take care of LPs. Anesthesia pagers [phone] and [phone] paged. Awaiting call back. Neuro resident and Dr. Garza aware of the calls made to try to get LP done today. Neuro resident recommends anesthesia to do the LP but if unable to then go ahead and proceed with tx with IVIG as outlined in his note from today.    Spoke to Anesthesia MD Cazares at 15:15 on 4/17- Anesthesia is not responsible for LPs. Re-directed to neuroradiology. [place]ager listed is [phone]. She has made her attn MD aware of case and they will f/u w/ Dr. Garza.    As per Dr. Garza - order IVIG tx as he is unable to confirm LP getting done today. Spoke to MD Chava whom agreed to start IVIG.

## 2021-04-17 NOTE — PROGRESS NOTE ADULT - ASSESSMENT
51 yo male, PMH DM, HTN, hyperlipidemia; pt presented to the ED c/o abdominal cramping, generalized, and diarrhea following use of laxatives as above for constipation.  In the ED, WBC 10.82, Hb 13.6, CMP: sodium 126, chloride 89, glucose 281, CMP otherwise unremarkable.  VBG: Lactate 2.6 (later downtrended to 1.6).  Pt. was treated with IV hydration and dispo'd to CDU for continued care plan:  IV hydration, supportive care, AM labs, general observation care / monitoring. pt continued to have generalized weakness , numbness and on labs persistent hyponatermia and now being admitted for furhter evlauation .  dnenies N/V/Abd pain   had bm   no urinary sx   no fever or chills   no cough   no cp /sob     - hyponatremia :   likely multifactorial including hyperglycemia ( pseudohyponatremia ) and pre renal sec to diarreah   ? ADH induced by pain   apprecaite renal input   improving   monitor     - DM: uncontrolled   appreciate endo input    -HTN : imrpoved     - numbness/weakness  : likely nueropathic   now with BLE weakness : doubt cord compression however will expedite MR: discussed with radio : being expedited   fu MR C/T/L  also possibly sec to recent COVID infection .. CT chest  : non specific GGO     - hypophosphatemia : repleted and monitor     - constipation : had had colonoscopy 2 yrs ago with Dr. Rae   hold further bowel regimen   CT abd no acute path   no furtehr inpt red : discussed with Dr Rae         dvt proph   d/w pt at length   with PA  51 yo male, PMH DM, HTN, hyperlipidemia; pt presented to the ED c/o abdominal cramping, generalized, and diarrhea following use of laxatives as above for constipation.  In the ED, WBC 10.82, Hb 13.6, CMP: sodium 126, chloride 89, glucose 281, CMP otherwise unremarkable.  VBG: Lactate 2.6 (later downtrended to 1.6).  Pt. was treated with IV hydration and dispo'd to CDU for continued care plan:  IV hydration, supportive care, AM labs, general observation care / monitoring. pt continued to have generalized weakness , numbness and on labs persistent hyponatermia and now being admitted for furhter evlauation .  dnenies N/V/Abd pain   had bm   no urinary sx   no fever or chills   no cough   no cp /sob     - hyponatremia :   likely multifactorial including hyperglycemia ( pseudohyponatremia ) and pre renal sec to diarreah   ? ADH induced by pain   apprecaite renal input   improving   monitor     - DM: uncontrolled   appreciate endo input    -HTN : imrpoved     - numbness/weakness  : likely nueropathic   RPR positive : treated and no further need for any treatment per ID   now with BLE weakness : doubt cord compression however will expedite MR: discussed with radio : being expedited   fu MR C/T/L: noted   discussed w neuro , IR , neuroradioogy.. unable to arrange for LP over weekend.. discussed wit pt and wife at length : they refused IVIG for [employer] reasons .. will attempt plasmapaheresis : pt and wife agreeable   also possibly sec to recent COVID infection .. CT chest  : non specific GGO     - hypophosphatemia : repleted and monitor     - constipation : had had colonoscopy 2 yrs ago with Dr. Rae   hold further bowel regimen   CT abd no acute path   no furtehr inpt red : discussed with Dr Rae         dvt proph   d/w pt at length   with PA

## 2021-04-17 NOTE — CHART NOTE - NSCHARTNOTEFT_GEN_A_CORE
MRI Reviewed and results as below:    < from: MR Cervical Spine w/wo IV Cont (04.16.21 @ 22:16) >    IMPRESSION:  No evidence for spinal cord compression, signal abnormality, or abnormal enhancement.    Multilevel degenerative changes, as described above, most pronounced at C3-C4 where there is mild spinal canal stenosis and C5-C6 where there is severe right and moderate left neural foraminal narrowing.    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 Alternative etiologies include chronic inflammatory demyelinating polyneuropathy, Lyme disease, less likely arachnoiditis or carcinomatosis.    < end of copied text >      Please obtain LP with opening pressure with the following studies:  [] glucose, protein, cell count, PCR, culture, gram stain, HSV, oligocolonal bands, NMO, MOG Ab, myselin basic protein    Further recommendations to follow with likely IVIG treatment.

## 2021-04-17 NOTE — CHART NOTE - NSCHARTNOTEFT_GEN_A_CORE
Neurology unable to perform LP at this time as only resident is available and must cover code strokes.    Spoken with primary team and radiology resident.  Coordination for a lumbar puncture is ongoing for the patient to have the procedure performed today.    Ideally, studies as outlined in previous note should be drawn prior to initiation of therapy.  If LP cannot be performed, then would initiate treatement with IVIG as outlined in previous neurology progress note.

## 2021-04-17 NOTE — PROGRESS NOTE ADULT - SUBJECTIVE AND OBJECTIVE BOX
*************************************  NEUROLOGY PROGRESS NOTE  **************************************    JAGUAR PRADO  Male  MRN-[id number]    Subjective:  Patient was seen and examined at the bedside. Stated that he continues to be weak throughout. Unable to stand up. Does note that he has some pain in his lower extremities but is improved with naproxen.     ROS:  10 point review of systems reviewed and negative except for as in subjective.    MEDICATIONS  (STANDING):  amLODIPine   Tablet 10 milliGRAM(s) Oral daily  bisacodyl 5 milliGRAM(s) Oral at bedtime  dextrose 40% Gel 15 Gram(s) Oral once  dextrose 5%. 1000 milliLiter(s) (50 mL/Hr) IV Continuous <Continuous>  dextrose 5%. 1000 milliLiter(s) (100 mL/Hr) IV Continuous <Continuous>  dextrose 50% Injectable 25 Gram(s) IV Push once  dextrose 50% Injectable 12.5 Gram(s) IV Push once  dextrose 50% Injectable 25 Gram(s) IV Push once  gabapentin   Solution 100 milliGRAM(s) Oral three times a day  glucagon  Injectable 1 milliGRAM(s) IntraMuscular once  heparin   Injectable 5000 Unit(s) SubCutaneous every 8 hours  hydrALAZINE 25 milliGRAM(s) Oral three times a day  insulin glargine Injectable (LANTUS) 26 Unit(s) SubCutaneous at bedtime  insulin lispro (ADMELOG) corrective regimen sliding scale   SubCutaneous three times a day before meals  insulin lispro (ADMELOG) corrective regimen sliding scale   SubCutaneous at bedtime  insulin lispro Injectable (ADMELOG) 12 Unit(s) SubCutaneous three times a day before meals  losartan 100 milliGRAM(s) Oral daily  metoprolol tartrate 12.5 milliGRAM(s) Oral two times a day  senna 2 Tablet(s) Oral two times a day  sodium chloride 1 Gram(s) Oral three times a day    MEDICATIONS  (PRN):  acetaminophen   Tablet .. 650 milliGRAM(s) Oral every 6 hours PRN Temp greater or equal to 38C (100.4F), Mild Pain (1 - 3), Moderate Pain (4 - 6)  aluminum hydroxide/magnesium hydroxide/simethicone Suspension 30 milliLiter(s) Oral every 6 hours PRN Dyspepsia  dicyclomine 10 milliGRAM(s) Oral once PRN abdominal pain  naproxen 500 milliGRAM(s) Oral two times a day PRN Moderate Pain (4 - 6)    VITAL SIGNS:  Vital Signs Last 24 Hrs  T(C): 36.5 (17 Apr 2021 10:11), Max: 37 (17 Apr 2021 05:24)  T(F): 97.7 (17 Apr 2021 10:11), Max: 98.6 (17 Apr 2021 05:24)  HR: 80 (17 Apr 2021 10:11) (80 - 97)  BP: 120/86 (17 Apr 2021 10:11) (120/86 - 166/97)  BP(mean): --  RR: 17 (17 Apr 2021 10:11) (17 - 20)  SpO2: 100% (17 Apr 2021 10:11) (97% - 100%)    PHYSICAL EXAMINATION:  General: Well-developed, well nourished, in no acute distress.  Neurologic:  - Mental Status:    - Cranial Nerves II-XII:  VF intact, EOMI, PERRLA, V1-V3 intact, no facial asymmetry, t/p midline, SCM/trap intact.  - Motor:  RUE 4/5, LUE 4/5 throughout.   - Reflexes:  2+ and symmetric at the biceps, triceps, brachioradialis, knees, and ankles.  Plantar responses flexor.  - Sensory:  Intact to light touch, pin prick, vibration, and joint-position sense throughout.  - Coordination:  Finger-nose-finger and heel-knee-shin intact without dysmetria.  Rapid alternating hand movements intact.  - Gait:   Normal steps, base, arm swing, and turning.  Heel and toe walking are normal.  Tandem gait is normal.  Romberg testing is negative.    General Exam:   General appearance: No acute distress      Neurological Exam:  Mental Status: Alert, awake, oriented to person, place, and time; Speech is fluent with intact comprehension  Cranial Nerves:  VF intact, EOMI, PERRLA, V1-V3 intact, no facial asymmetry, t/p midline, SCM/trap intact.    Motor:   Tone: normal.                  Strength:     [] Upper extremity                      Delt       Bicep    Tricep                                                  R         4/5        4/5        4/5       4/5                                               L          4/5        4/5        4/5       4/5  [] Lower extremity                       HF          KE          KF        DF         PF                                               R        2/5        4-/5        4/5       4/5       4/5                                               L         2/5        4-/5       4/5       4/5        4/5               Dysmetria: None to finger-nose-finger.    Sensation: intact to light touch.    Deep Tendon Reflexes:     Biceps          Triceps      BR        Patellar        Ankle         Babinski                                         R       1                  1          1             0             0           downgoing                                         L        1                  1          1            0              0           downgoing    Gait: Deferred    LABS:                          13.7   10.07 )-----------( 339      ( 17 Apr 2021 06:22 )             40.2     04-17    128<L>  |  93<L>  |  21  ----------------------------<  225<H>  5.1   |  22  |  0.99    Ca    9.5      17 Apr 2021 06:22  Phos  4.3     04-17  Mg     1.9     04-17    TPro  7.2  /  Alb  3.8  /  TBili  0.6  /  DBili  <0.2  /  AST  19  /  ALT  32  /  AlkPhos  88  04-17    RADIOLOGY & ADDITIONAL STUDIES:      < from: MR Cervical Spine w/wo IV Cont (04.16.21 @ 22:16) >    IMPRESSION:  No evidence for spinal cord compression, signal abnormality, or abnormal enhancement.    Multilevel degenerative changes, as described above, most pronounced at C3-C4 where there is mild spinal canal stenosis and C5-C6 where there is severe right and moderate left neural foraminal narrowing.    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 Alternative etiologies include chronic inflammatory demyelinating polyneuropathy, Lyme disease, less likely arachnoiditis or carcinomatosis.    < end of copied text >

## 2021-04-17 NOTE — PROGRESS NOTE ADULT - SUBJECTIVE AND OBJECTIVE BOX
[facility] [place] [facility]   MD SEVEN CHRIS MD RUORU WONG, PA    TEL:  OFFICE: [phone]  DR DAWSON CELL: [phone]  SIMI PATEL CELL: [phone]  DR. MORAN CELL: [phone]  DR. TIRADO CELL: [phone]    FROM 5 PM - 7 AM PLEASE CALL ANSWERING SERVICE: [phone]    RENAL FOLLOW UP NOTE--Date of Service 04-17-21 @ 10:07  --------------------------------------------------------------------------------  HPI:      Pt seen and examined at bedside.   Denies SOB, chest pain     PAST HISTORY  --------------------------------------------------------------------------------  No significant changes to PMH, PSH, FHx, SHx, unless otherwise noted    ALLERGIES & MEDICATIONS  --------------------------------------------------------------------------------  Allergies    No Known Drug Allergies  shellfish (Urticaria)    Intolerances      Standing Inpatient Medications  amLODIPine   Tablet 10 milliGRAM(s) Oral daily  bisacodyl 5 milliGRAM(s) Oral at bedtime  dextrose 40% Gel 15 Gram(s) Oral once  dextrose 5%. 1000 milliLiter(s) IV Continuous <Continuous>  dextrose 5%. 1000 milliLiter(s) IV Continuous <Continuous>  dextrose 50% Injectable 25 Gram(s) IV Push once  dextrose 50% Injectable 12.5 Gram(s) IV Push once  dextrose 50% Injectable 25 Gram(s) IV Push once  gabapentin   Solution 100 milliGRAM(s) Oral three times a day  glucagon  Injectable 1 milliGRAM(s) IntraMuscular once  heparin   Injectable 5000 Unit(s) SubCutaneous every 8 hours  hydrALAZINE 25 milliGRAM(s) Oral three times a day  insulin glargine Injectable (LANTUS) 26 Unit(s) SubCutaneous at bedtime  insulin lispro (ADMELOG) corrective regimen sliding scale   SubCutaneous three times a day before meals  insulin lispro (ADMELOG) corrective regimen sliding scale   SubCutaneous at bedtime  insulin lispro Injectable (ADMELOG) 12 Unit(s) SubCutaneous three times a day before meals  losartan 100 milliGRAM(s) Oral daily  metoprolol tartrate 12.5 milliGRAM(s) Oral two times a day  senna 2 Tablet(s) Oral two times a day  sodium chloride 1 Gram(s) Oral three times a day    PRN Inpatient Medications  acetaminophen   Tablet .. 650 milliGRAM(s) Oral every 6 hours PRN  aluminum hydroxide/magnesium hydroxide/simethicone Suspension 30 milliLiter(s) Oral every 6 hours PRN  dicyclomine 10 milliGRAM(s) Oral once PRN  naproxen 500 milliGRAM(s) Oral two times a day PRN      REVIEW OF SYSTEMS  --------------------------------------------------------------------------------  General: no fever  CVS: no chest pain  RESP: no sob, no cough  ABD: no abdominal pain  : no dysuria,  MSK: no edema     VITALS/PHYSICAL EXAM  --------------------------------------------------------------------------------  T(C): 37 (04-17-21 @ 05:24), Max: 37 (04-17-21 @ 05:24)  HR: 86 (04-17-21 @ 05:24) (86 - 97)  BP: 161/97 (04-17-21 @ 05:24) (151/93 - 166/97)  RR: 18 (04-17-21 @ 05:24) (18 - 20)  SpO2: 99% (04-17-21 @ 05:24) (97% - 99%)  Wt(kg): --        Physical Exam:  	Gen: NAD  	HEENT: MMM  	Pulm: CTA B/L  	CV: S1S2  	Abd: Soft, +BS  	Ext: No LE edema B/L                      Neuro: Awake alert  	Skin: Warm and Dry   	Vascular access: no HD catheter            no lance  LABS/STUDIES  --------------------------------------------------------------------------------              13.7   10.07 >-----------<  339      [04-17-21 @ 06:22]              40.2     128  |  93  |  21  ----------------------------<  225      [04-17-21 @ 06:22]  5.1   |  22  |  0.99        Ca     9.5     [04-17-21 @ 06:22]      Mg     1.9     [04-17-21 @ 06:22]      Phos  4.3     [04-17-21 @ 06:22]    TPro  7.2  /  Alb  3.8  /  TBili  0.6  /  DBili  <0.2  /  AST  19  /  ALT  32  /  AlkPhos  88  [04-17-21 @ 06:22]          Creatinine Trend:  SCr 0.99 [04-17 @ 06:22]  SCr 1.13 [04-16 @ 10:57]  SCr 1.05 [04-16 @ 08:31]  SCr 1.29 [04-15 @ 07:36]  SCr 1.21 [04-14 @ 05:24]    Urinalysis - [04-11-21 @ 12:32]      Color Light Yellow / Appearance Clear / SG 1.013 / pH 6.5      Gluc >= 1000 mg/dL / Ketone Small  / Bili Negative / Urobili <2 mg/dL       Blood Small / Protein 100 mg/dL / Leuk Est Negative / Nitrite Negative      RBC 10 / WBC 1 / Hyaline 1 / Gran  / Sq Epi  / Non Sq Epi 1 / Bacteria Negative    Urine Creatinine 51      [04-11-21 @ 16:02]  Urine Protein 66      [04-15-21 @ 09:10]  Urine Sodium 21      [04-15-21 @ 09:10]  Urine Potassium 25.1      [04-11-21 @ 12:32]  Urine Chloride 90      [04-11-21 @ 12:32]  Urine Osmolality 360      [04-15-21 @ 09:10]    TSH 3.00      [04-12-21 @ 06:36]    HBsAg Nonreact      [04-13-21 @ 10:09]  HCV 0.13, Nonreact      [04-13-21 @ 10:09]  HIV Nonreact      [04-13-21 @ 07:48]    PIERO: titer Negative, pattern --      [04-13-21 @ 10:10]  C3 Complement 152      [04-13-21 @ 07:48]  C4 Complement 33      [04-13-21 @ 07:48]  ANCA: cANCA Negative, pANCA Negative, atypical ANCA Negative      [04-13-21 @ 10:10]  Syphilis Screen (Treponema Pallidum Ab) Positive      [04-13-21 @ 10:10]  Syphilis Screen (RPR Titer) 1:1      [04-13-21 @ 10:10]  Free Light Chains: kappa 1.51, lambda 1.89, ratio = 0.80      [04-13 @ 10:08]  Immunofixation Serum:   No Monoclonal Band Identified. KRIS Ferris M.D.    Reference Range: None Detected      [04-13-21 @ 07:48]  SPEP Interpretation: with acute phase reaction. KRIS Ferris M.D.      [04-13-21 @ 07:48]

## 2021-04-17 NOTE — PROGRESS NOTE ADULT - SUBJECTIVE AND OBJECTIVE BOX
Date of service: 04-17-21 @ 23:55      Patient is a 50y old  Male who presents with a chief complaint of abdominal pain (15 Apr 2021 14:58)                                                               INTERVAL HPI/OVERNIGHT EVENTS:    REVIEW OF SYSTEMS:     CONSTITUTIONAL: No weakness, fevers or chills  EYES/ENT: No visual changes , no ear ache   NECK: No pain or stiffness  RESPIRATORY: No cough, wheezing,  No shortness of breath  CARDIOVASCULAR: No chest pain or palpitations  GASTROINTESTINAL: No abdominal pain  . No nausea, vomiting, or hematemesis; No diarrhea or constipation. No melena or hematochezia.  GENITOURINARY: No dysuria, frequency or hematuria  NEUROLOGICAL: No numbness or weakness  SKIN: No itching, burning, rashes, or lesions                                                                                                                                                                                                                                                                                 Medications:  MEDICATIONS  (STANDING):  acetaminophen   Tablet .. 325 milliGRAM(s) Oral daily  amLODIPine   Tablet 10 milliGRAM(s) Oral daily  bisacodyl 5 milliGRAM(s) Oral at bedtime  bisacodyl Suppository 10 milliGRAM(s) Rectal once  dextrose 40% Gel 15 Gram(s) Oral once  dextrose 5%. 1000 milliLiter(s) (50 mL/Hr) IV Continuous <Continuous>  dextrose 5%. 1000 milliLiter(s) (100 mL/Hr) IV Continuous <Continuous>  dextrose 50% Injectable 25 Gram(s) IV Push once  dextrose 50% Injectable 12.5 Gram(s) IV Push once  dextrose 50% Injectable 25 Gram(s) IV Push once  diphenhydrAMINE 25 milliGRAM(s) Oral daily  gabapentin   Solution 100 milliGRAM(s) Oral three times a day  glucagon  Injectable 1 milliGRAM(s) IntraMuscular once  heparin   Injectable 5000 Unit(s) SubCutaneous every 8 hours  hydrALAZINE 25 milliGRAM(s) Oral three times a day  insulin glargine Injectable (LANTUS) 26 Unit(s) SubCutaneous at bedtime  insulin lispro (ADMELOG) corrective regimen sliding scale   SubCutaneous three times a day before meals  insulin lispro (ADMELOG) corrective regimen sliding scale   SubCutaneous at bedtime  insulin lispro Injectable (ADMELOG) 12 Unit(s) SubCutaneous three times a day before meals  losartan 100 milliGRAM(s) Oral daily  metoprolol tartrate 12.5 milliGRAM(s) Oral two times a day  senna 2 Tablet(s) Oral two times a day  sodium chloride 1 Gram(s) Oral three times a day    MEDICATIONS  (PRN):  acetaminophen   Tablet .. 650 milliGRAM(s) Oral every 6 hours PRN Temp greater or equal to 38C (100.4F), Mild Pain (1 - 3), Moderate Pain (4 - 6)  aluminum hydroxide/magnesium hydroxide/simethicone Suspension 30 milliLiter(s) Oral every 6 hours PRN Dyspepsia  dicyclomine 10 milliGRAM(s) Oral once PRN abdominal pain  naproxen 500 milliGRAM(s) Oral two times a day PRN Moderate Pain (4 - 6)       Allergies    No Known Drug Allergies  shellfish (Urticaria)    Intolerances      Vital Signs Last 24 Hrs  T(C): 36.6 (17 Apr 2021 21:23), Max: 37 (17 Apr 2021 05:24)  T(F): 97.8 (17 Apr 2021 21:23), Max: 98.6 (17 Apr 2021 05:24)  HR: 81 (17 Apr 2021 21:23) (69 - 92)  BP: 127/78 (17 Apr 2021 21:23) (105/68 - 161/97)  BP(mean): --  RR: 17 (17 Apr 2021 21:23) (17 - 18)  SpO2: 98% (17 Apr 2021 21:23) (98% - 100%)  CAPILLARY BLOOD GLUCOSE      POCT Blood Glucose.: 136 mg/dL (17 Apr 2021 21:16)  POCT Blood Glucose.: 178 mg/dL (17 Apr 2021 17:38)  POCT Blood Glucose.: 179 mg/dL (17 Apr 2021 12:14)  POCT Blood Glucose.: 205 mg/dL (17 Apr 2021 07:03)      04-17 @ 07:01  -  04-17 @ 23:55  --------------------------------------------------------  IN: 720 mL / OUT: 400 mL / NET: 320 mL      Physical Exam:    Daily     Daily   General:  Well appearing, NAD, not cachetic  HEENT:  Nonicteric, PERRLA  CV:  RRR, S1S2   Lungs:  CTA B/L, no wheezes, rales, rhonchi  Abdomen:  Soft, non-tender, no distended, positive BS  Extremities:  2+ pulses, no c/c, no edema  Skin:  Warm and dry, no rashes  :  No lucas  Neuro:  AAOx3, non-focal, grossly intact                                                                                                                                                                                                                                                                                                LABS:                               13.7   10.07 )-----------( 339      ( 17 Apr 2021 06:22 )             40.2                      04-17    128<L>  |  93<L>  |  21  ----------------------------<  225<H>  5.1   |  22  |  0.99    Ca    9.5      17 Apr 2021 06:22  Phos  4.3     04-17  Mg     1.9     04-17    TPro  7.2  /  Alb  3.8  /  TBili  0.6  /  DBili  <0.2  /  AST  19  /  ALT  32  /  AlkPhos  88  04-17                       RADIOLOGY & ADDITIONAL TESTS         I personally reviewed: [  ]EKG   [  ]CXR    [  ] CT      A/P:         Discussed with :     Francisco consultants' Notes   Time spent :   Date of service: 04-17-21 @ 23:55      Patient is a 50y old  Male who presents with a chief complaint of abdominal pain (15 Apr 2021 14:58)                                                               INTERVAL HPI/OVERNIGHT EVENTS:    REVIEW OF SYSTEMS:     CONSTITUTIONAL: No weakness, fevers or chills  EYES/ENT: No visual changes , no ear ache   NECK: No pain or stiffness  RESPIRATORY: No cough, wheezing,  No shortness of breath  CARDIOVASCULAR: No chest pain or palpitations  GASTROINTESTINAL: No abdominal pain  . No nausea, vomiting, or hematemesis; No diarrhea or constipation. No melena or hematochezia.  GENITOURINARY: No dysuria, frequency or hematuria  NEUROLOGICAL: Le weakness   no urinary or stool incontinence                                                                                                                                                                                                                                                                               Medications:  MEDICATIONS  (STANDING):  acetaminophen   Tablet .. 325 milliGRAM(s) Oral daily  amLODIPine   Tablet 10 milliGRAM(s) Oral daily  bisacodyl 5 milliGRAM(s) Oral at bedtime  bisacodyl Suppository 10 milliGRAM(s) Rectal once  dextrose 40% Gel 15 Gram(s) Oral once  dextrose 5%. 1000 milliLiter(s) (50 mL/Hr) IV Continuous <Continuous>  dextrose 5%. 1000 milliLiter(s) (100 mL/Hr) IV Continuous <Continuous>  dextrose 50% Injectable 25 Gram(s) IV Push once  dextrose 50% Injectable 12.5 Gram(s) IV Push once  dextrose 50% Injectable 25 Gram(s) IV Push once  diphenhydrAMINE 25 milliGRAM(s) Oral daily  gabapentin   Solution 100 milliGRAM(s) Oral three times a day  glucagon  Injectable 1 milliGRAM(s) IntraMuscular once  heparin   Injectable 5000 Unit(s) SubCutaneous every 8 hours  hydrALAZINE 25 milliGRAM(s) Oral three times a day  insulin glargine Injectable (LANTUS) 26 Unit(s) SubCutaneous at bedtime  insulin lispro (ADMELOG) corrective regimen sliding scale   SubCutaneous three times a day before meals  insulin lispro (ADMELOG) corrective regimen sliding scale   SubCutaneous at bedtime  insulin lispro Injectable (ADMELOG) 12 Unit(s) SubCutaneous three times a day before meals  losartan 100 milliGRAM(s) Oral daily  metoprolol tartrate 12.5 milliGRAM(s) Oral two times a day  senna 2 Tablet(s) Oral two times a day  sodium chloride 1 Gram(s) Oral three times a day    MEDICATIONS  (PRN):  acetaminophen   Tablet .. 650 milliGRAM(s) Oral every 6 hours PRN Temp greater or equal to 38C (100.4F), Mild Pain (1 - 3), Moderate Pain (4 - 6)  aluminum hydroxide/magnesium hydroxide/simethicone Suspension 30 milliLiter(s) Oral every 6 hours PRN Dyspepsia  dicyclomine 10 milliGRAM(s) Oral once PRN abdominal pain  naproxen 500 milliGRAM(s) Oral two times a day PRN Moderate Pain (4 - 6)       Allergies    No Known Drug Allergies  shellfish (Urticaria)    Intolerances      Vital Signs Last 24 Hrs  T(C): 36.6 (17 Apr 2021 21:23), Max: 37 (17 Apr 2021 05:24)  T(F): 97.8 (17 Apr 2021 21:23), Max: 98.6 (17 Apr 2021 05:24)  HR: 81 (17 Apr 2021 21:23) (69 - 92)  BP: 127/78 (17 Apr 2021 21:23) (105/68 - 161/97)  BP(mean): --  RR: 17 (17 Apr 2021 21:23) (17 - 18)  SpO2: 98% (17 Apr 2021 21:23) (98% - 100%)  CAPILLARY BLOOD GLUCOSE      POCT Blood Glucose.: 136 mg/dL (17 Apr 2021 21:16)  POCT Blood Glucose.: 178 mg/dL (17 Apr 2021 17:38)  POCT Blood Glucose.: 179 mg/dL (17 Apr 2021 12:14)  POCT Blood Glucose.: 205 mg/dL (17 Apr 2021 07:03)      04-17 @ 07:01  -  04-17 @ 23:55  --------------------------------------------------------  IN: 720 mL / OUT: 400 mL / NET: 320 mL      Physical Exam:    Daily     Daily   General:  Well appearing, NAD, not cachetic  HEENT:  Nonicteric, PERRLA  CV:  RRR, S1S2   Lungs:  CTA B/L, no wheezes, rales, rhonchi  Abdomen:  Soft, non-tender, no distended, positive BS  Extremities:  no edema   Neuro:  AAOx3,  LE 1/5 BLE   no sensory level  hyporeflexia all   othewrwise NF         LABS:                               13.7   10.07 )-----------( 339      ( 17 Apr 2021 06:22 )             40.2                      04-17    128<L>  |  93<L>  |  21  ----------------------------<  225<H>  5.1   |  22  |  0.99    Ca    9.5      17 Apr 2021 06:22  Phos  4.3     04-17  Mg     1.9     04-17    TPro  7.2  /  Alb  3.8  /  TBili  0.6  /  DBili  <0.2  /  AST  19  /  ALT  32  /  AlkPhos  88  04-17                       RADIOLOGY & ADDITIONAL TESTS         I personally reviewed: [  ]EKG   [  ]CXR    [  ] CT      A/P:         Discussed with :     Francisco consultants' Notes   Time spent :

## 2021-04-17 NOTE — PROGRESS NOTE ADULT - ASSESSMENT
49 yo male, PMH DM, HTN, hyperlipidemia; pt presented to the ED c/o abdominal cramping, generalized, and diarrhea following use of laxatives as above for constipation.  In the ED, WBC 10.82, Hb 13.6, CMP: sodium 126, chloride 89, glucose 281, CMP otherwise unremarkable.  VBG: Lactate 2.6 (later downtrended to 1.6).  Pt. was treated with IV hydration and dispo'd to CDU for continued care plan:  IV hydration, supportive care, AM labs, general observation care / monitoring. pt continued to have generalized weakness , numbness and on labs persistent hyponatermia      A/P:  Hyponatremia:  Etiology?  Likely sec to hypovolemia+hyperglycemia. work up suggested SIADH  Na worsening today ,   PT reports he is drinking a lot of water because he is constipated   Repeat Urine Na, osmolality showed polydipsia.   optimize dm control  free water restriction <1.2L/day.  start salt tab 1 gm TI Dx 2 days  ok TSH, serum cortisol level  Monitor Na level Q12  Na level should not increase more than 6meq in 24 hrs    Proteinuia/Hematuria:  Etiology?  Had Cystoscopy last year and was normal per patient  Possible sec to Diabetic Nephropathy but will need full vasculitis work up  check urine p/c ratio  c3 c4 hep b hep c hiv, k/l wnl, lexii neg  pendingANCA, Serum immunofixation, SPEP    RPR +, patient s/p treatment 2017 ID eval appreciated     Based on work up result he might need kidney biopsy, which can be planned as outpatient as his renal function is stable  D/W patient in detail above plan    Hypophosphatemia:  SUpplement as needed   montior    HTN  BP elevated   on norvasc and losartan  Start Hydralazine 25mg TID  Monitor closely

## 2021-04-17 NOTE — CHART NOTE - NSCHARTNOTEFT_GEN_A_CORE
Discussed with Neuro Resident-> ideally patient should have LP performed before starting IVIG. Due to patient's symptoms worsening it was recommended that patient should start IVIG and LP will be Discussed with Neuro Resident who explained that if LP cannot be performed, then would initiate treatement with IVIG due to worsening symptoms. Discussed with Neuro Resident who explained that if LP cannot be performed, then would initiate treatment with IVIG due to worsening symptoms. Discussed plan extensively with patient's HCP Elda Mcpherson (wife) [phone] who disagrees with treatment plan due to Rastafari reasons. Patient is Pre-[employer] and does not believe in accepting any products derived from humans. Risks vs Benefits of receiving IVIG were discussed. Patient will like to hold off on IVIG treatment for now.     Elissa Chambers PA-C  pager [phone] Discussed with Neuro Resident who explained that if LP cannot be performed, then would initiate treatment with IVIG due to worsening symptoms. Discussed plan extensively with patient's HCP Elda Mcpherson (wife) [phone] who disagrees with treatment plan due to Rastafarian reasons. Patient is Pre-[employer] and does not believe in accepting any products derived from humans. Risks vs Benefits of receiving IVIG were discussed. Patient will like to hold off on IVIG treatment for now. Discussed case with Dr. Garza.    Elissa Chambers PA-C  pager [phone]

## 2021-04-18 LAB
ALBUMIN SERPL ELPH-MCNC: 3.7 G/DL — SIGNIFICANT CHANGE UP (ref 3.3–5)
ALP SERPL-CCNC: 96 U/L — SIGNIFICANT CHANGE UP (ref 40–120)
ALT FLD-CCNC: 35 U/L — SIGNIFICANT CHANGE UP (ref 4–41)
ANION GAP SERPL CALC-SCNC: 12 MMOL/L — SIGNIFICANT CHANGE UP (ref 7–14)
AST SERPL-CCNC: 21 U/L — SIGNIFICANT CHANGE UP (ref 4–40)
BASOPHILS # BLD AUTO: 0.04 K/UL — SIGNIFICANT CHANGE UP (ref 0–0.2)
BASOPHILS NFR BLD AUTO: 0.4 % — SIGNIFICANT CHANGE UP (ref 0–2)
BILIRUB DIRECT SERPL-MCNC: <0.2 MG/DL — SIGNIFICANT CHANGE UP (ref 0–0.2)
BILIRUB INDIRECT FLD-MCNC: >0.2 MG/DL — SIGNIFICANT CHANGE UP (ref 0–1)
BILIRUB SERPL-MCNC: 0.4 MG/DL — SIGNIFICANT CHANGE UP (ref 0.2–1.2)
BUN SERPL-MCNC: 28 MG/DL — HIGH (ref 7–23)
CALCIUM SERPL-MCNC: 9.1 MG/DL — SIGNIFICANT CHANGE UP (ref 8.4–10.5)
CHLORIDE SERPL-SCNC: 97 MMOL/L — LOW (ref 98–107)
CO2 SERPL-SCNC: 22 MMOL/L — SIGNIFICANT CHANGE UP (ref 22–31)
CREAT SERPL-MCNC: 1.23 MG/DL — SIGNIFICANT CHANGE UP (ref 0.5–1.3)
EOSINOPHIL # BLD AUTO: 0.28 K/UL — SIGNIFICANT CHANGE UP (ref 0–0.5)
EOSINOPHIL NFR BLD AUTO: 3.1 % — SIGNIFICANT CHANGE UP (ref 0–6)
GLUCOSE SERPL-MCNC: 207 MG/DL — HIGH (ref 70–99)
HAV IGM SER-ACNC: SIGNIFICANT CHANGE UP
HBV CORE IGM SER-ACNC: SIGNIFICANT CHANGE UP
HBV SURFACE AG SER-ACNC: SIGNIFICANT CHANGE UP
HCT VFR BLD CALC: 38.6 % — LOW (ref 39–50)
HCV AB S/CO SERPL IA: 0.4 S/CO — SIGNIFICANT CHANGE UP (ref 0–0.99)
HCV AB SERPL-IMP: SIGNIFICANT CHANGE UP
HGB BLD-MCNC: 13.4 G/DL — SIGNIFICANT CHANGE UP (ref 13–17)
IANC: 4.93 K/UL — SIGNIFICANT CHANGE UP (ref 1.5–8.5)
IMM GRANULOCYTES NFR BLD AUTO: 0.3 % — SIGNIFICANT CHANGE UP (ref 0–1.5)
LYMPHOCYTES # BLD AUTO: 2.89 K/UL — SIGNIFICANT CHANGE UP (ref 1–3.3)
LYMPHOCYTES # BLD AUTO: 31.9 % — SIGNIFICANT CHANGE UP (ref 13–44)
MAGNESIUM SERPL-MCNC: 2.1 MG/DL — SIGNIFICANT CHANGE UP (ref 1.6–2.6)
MCHC RBC-ENTMCNC: 30.1 PG — SIGNIFICANT CHANGE UP (ref 27–34)
MCHC RBC-ENTMCNC: 34.7 GM/DL — SIGNIFICANT CHANGE UP (ref 32–36)
MCV RBC AUTO: 86.7 FL — SIGNIFICANT CHANGE UP (ref 80–100)
MONOCYTES # BLD AUTO: 0.88 K/UL — SIGNIFICANT CHANGE UP (ref 0–0.9)
MONOCYTES NFR BLD AUTO: 9.7 % — SIGNIFICANT CHANGE UP (ref 2–14)
NEUTROPHILS # BLD AUTO: 4.93 K/UL — SIGNIFICANT CHANGE UP (ref 1.8–7.4)
NEUTROPHILS NFR BLD AUTO: 54.6 % — SIGNIFICANT CHANGE UP (ref 43–77)
NRBC # BLD: 0 /100 WBCS — SIGNIFICANT CHANGE UP
NRBC # FLD: 0 K/UL — SIGNIFICANT CHANGE UP
PHOSPHATE SERPL-MCNC: 4.1 MG/DL — SIGNIFICANT CHANGE UP (ref 2.5–4.5)
PLATELET # BLD AUTO: 286 K/UL — SIGNIFICANT CHANGE UP (ref 150–400)
POTASSIUM SERPL-MCNC: 5 MMOL/L — SIGNIFICANT CHANGE UP (ref 3.5–5.3)
POTASSIUM SERPL-SCNC: 5 MMOL/L — SIGNIFICANT CHANGE UP (ref 3.5–5.3)
PROT SERPL-MCNC: 6.8 G/DL — SIGNIFICANT CHANGE UP (ref 6–8.3)
RBC # BLD: 4.45 M/UL — SIGNIFICANT CHANGE UP (ref 4.2–5.8)
RBC # FLD: 11.8 % — SIGNIFICANT CHANGE UP (ref 10.3–14.5)
SODIUM SERPL-SCNC: 131 MMOL/L — LOW (ref 135–145)
WBC # BLD: 9.05 K/UL — SIGNIFICANT CHANGE UP (ref 3.8–10.5)
WBC # FLD AUTO: 9.05 K/UL — SIGNIFICANT CHANGE UP (ref 3.8–10.5)

## 2021-04-18 RX ORDER — KETOROLAC TROMETHAMINE 30 MG/ML
15 SYRINGE (ML) INJECTION ONCE
Refills: 0 | Status: DISCONTINUED | OUTPATIENT
Start: 2021-04-18 | End: 2021-04-18

## 2021-04-18 RX ADMIN — Medication 12.5 MILLIGRAM(S): at 06:21

## 2021-04-18 RX ADMIN — Medication 325 MILLIGRAM(S): at 12:57

## 2021-04-18 RX ADMIN — LOSARTAN POTASSIUM 100 MILLIGRAM(S): 100 TABLET, FILM COATED ORAL at 06:21

## 2021-04-18 RX ADMIN — Medication 12 UNIT(S): at 12:59

## 2021-04-18 RX ADMIN — AMLODIPINE BESYLATE 10 MILLIGRAM(S): 2.5 TABLET ORAL at 06:20

## 2021-04-18 RX ADMIN — SENNA PLUS 2 TABLET(S): 8.6 TABLET ORAL at 17:40

## 2021-04-18 RX ADMIN — Medication 650 MILLIGRAM(S): at 23:30

## 2021-04-18 RX ADMIN — Medication 15 MILLIGRAM(S): at 04:12

## 2021-04-18 RX ADMIN — Medication 325 MILLIGRAM(S): at 13:38

## 2021-04-18 RX ADMIN — HEPARIN SODIUM 5000 UNIT(S): 5000 INJECTION INTRAVENOUS; SUBCUTANEOUS at 12:57

## 2021-04-18 RX ADMIN — Medication 12 UNIT(S): at 08:05

## 2021-04-18 RX ADMIN — Medication 12 UNIT(S): at 17:39

## 2021-04-18 RX ADMIN — Medication 15 MILLIGRAM(S): at 03:47

## 2021-04-18 RX ADMIN — SODIUM CHLORIDE 1 GRAM(S): 9 INJECTION INTRAMUSCULAR; INTRAVENOUS; SUBCUTANEOUS at 06:21

## 2021-04-18 RX ADMIN — HEPARIN SODIUM 5000 UNIT(S): 5000 INJECTION INTRAVENOUS; SUBCUTANEOUS at 06:20

## 2021-04-18 RX ADMIN — Medication 650 MILLIGRAM(S): at 22:30

## 2021-04-18 RX ADMIN — HEPARIN SODIUM 5000 UNIT(S): 5000 INJECTION INTRAVENOUS; SUBCUTANEOUS at 23:00

## 2021-04-18 RX ADMIN — Medication 25 MILLIGRAM(S): at 06:20

## 2021-04-18 RX ADMIN — SODIUM CHLORIDE 1 GRAM(S): 9 INJECTION INTRAMUSCULAR; INTRAVENOUS; SUBCUTANEOUS at 12:58

## 2021-04-18 RX ADMIN — Medication 500 MILLIGRAM(S): at 13:38

## 2021-04-18 RX ADMIN — GABAPENTIN 100 MILLIGRAM(S): 400 CAPSULE ORAL at 12:57

## 2021-04-18 RX ADMIN — Medication 500 MILLIGRAM(S): at 13:03

## 2021-04-18 RX ADMIN — Medication 2: at 17:39

## 2021-04-18 RX ADMIN — INSULIN GLARGINE 26 UNIT(S): 100 INJECTION, SOLUTION SUBCUTANEOUS at 22:00

## 2021-04-18 RX ADMIN — Medication 2: at 08:03

## 2021-04-18 RX ADMIN — Medication 2: at 12:58

## 2021-04-18 RX ADMIN — GABAPENTIN 100 MILLIGRAM(S): 400 CAPSULE ORAL at 06:20

## 2021-04-18 NOTE — PROGRESS NOTE ADULT - SUBJECTIVE AND OBJECTIVE BOX
Date of service: 04-18-21 @ 23:43      Patient is a 50y old  Male who presents with a chief complaint of constipation (18 Apr 2021 08:30)                                                               INTERVAL HPI/OVERNIGHT EVENTS:    REVIEW OF SYSTEMS:     CONSTITUTIONAL: No weakness, fevers or chills  EYES/ENT: No visual changes , no ear ache   NECK: No pain or stiffness  RESPIRATORY: No cough, wheezing,  No shortness of breath  CARDIOVASCULAR: No chest pain or palpitations  GASTROINTESTINAL: No abdominal pain  . No nausea, vomiting, or hematemesis; No diarrhea or constipation. No melena or hematochezia.  GENITOURINARY: No dysuria, frequency or hematuria  NEUROLOGICAL: No numbness or weakness  SKIN: No itching, burning, rashes, or lesions                                                                                                                                                                                                                                                                                 Medications:  MEDICATIONS  (STANDING):  acetaminophen   Tablet .. 325 milliGRAM(s) Oral daily  amLODIPine   Tablet 10 milliGRAM(s) Oral daily  bisacodyl 5 milliGRAM(s) Oral at bedtime  bisacodyl Suppository 10 milliGRAM(s) Rectal once  dextrose 40% Gel 15 Gram(s) Oral once  dextrose 5%. 1000 milliLiter(s) (50 mL/Hr) IV Continuous <Continuous>  dextrose 5%. 1000 milliLiter(s) (100 mL/Hr) IV Continuous <Continuous>  dextrose 50% Injectable 25 Gram(s) IV Push once  dextrose 50% Injectable 12.5 Gram(s) IV Push once  dextrose 50% Injectable 25 Gram(s) IV Push once  diphenhydrAMINE 25 milliGRAM(s) Oral daily  gabapentin   Solution 100 milliGRAM(s) Oral three times a day  glucagon  Injectable 1 milliGRAM(s) IntraMuscular once  heparin   Injectable 5000 Unit(s) SubCutaneous every 8 hours  hydrALAZINE 25 milliGRAM(s) Oral three times a day  insulin glargine Injectable (LANTUS) 26 Unit(s) SubCutaneous at bedtime  insulin lispro (ADMELOG) corrective regimen sliding scale   SubCutaneous three times a day before meals  insulin lispro (ADMELOG) corrective regimen sliding scale   SubCutaneous at bedtime  insulin lispro Injectable (ADMELOG) 12 Unit(s) SubCutaneous three times a day before meals  losartan 100 milliGRAM(s) Oral daily  metoprolol tartrate 12.5 milliGRAM(s) Oral two times a day  senna 2 Tablet(s) Oral two times a day  sodium chloride 1 Gram(s) Oral three times a day    MEDICATIONS  (PRN):  acetaminophen   Tablet .. 650 milliGRAM(s) Oral every 6 hours PRN Temp greater or equal to 38C (100.4F), Mild Pain (1 - 3), Moderate Pain (4 - 6)  aluminum hydroxide/magnesium hydroxide/simethicone Suspension 30 milliLiter(s) Oral every 6 hours PRN Dyspepsia  dicyclomine 10 milliGRAM(s) Oral once PRN abdominal pain  naproxen 500 milliGRAM(s) Oral two times a day PRN Moderate Pain (4 - 6)       Allergies    No Known Drug Allergies  shellfish (Urticaria)    Intolerances      Vital Signs Last 24 Hrs  T(C): 36.7 (18 Apr 2021 21:22), Max: 37 (18 Apr 2021 05:58)  T(F): 98.1 (18 Apr 2021 21:22), Max: 98.6 (18 Apr 2021 05:58)  HR: 93 (18 Apr 2021 21:22) (75 - 93)  BP: 107/68 (18 Apr 2021 21:22) (104/60 - 144/96)  BP(mean): --  RR: 16 (18 Apr 2021 21:22) (16 - 18)  SpO2: 98% (18 Apr 2021 21:22) (98% - 99%)  CAPILLARY BLOOD GLUCOSE      POCT Blood Glucose.: 174 mg/dL (18 Apr 2021 21:07)  POCT Blood Glucose.: 152 mg/dL (18 Apr 2021 17:35)  POCT Blood Glucose.: 154 mg/dL (18 Apr 2021 12:17)  POCT Blood Glucose.: 200 mg/dL (18 Apr 2021 07:15)      04-17 @ 07:01  -  04-18 @ 07:00  --------------------------------------------------------  IN: 720 mL / OUT: 400 mL / NET: 320 mL    04-18 @ 07:01 - 04-18 @ 23:43  --------------------------------------------------------  IN: 0 mL / OUT: 525 mL / NET: -525 mL      Physical Exam:    Daily     Daily   General:  Well appearing, NAD, not cachetic  HEENT:  Nonicteric, PERRLA  CV:  RRR, S1S2   Lungs:  CTA B/L, no wheezes, rales, rhonchi  Abdomen:  Soft, non-tender, no distended, positive BS  Extremities:  2+ pulses, no c/c, no edema  Skin:  Warm and dry, no rashes  :  No lucas  Neuro:  AAOx3, non-focal, grossly intact                                                                                                                                                                                                                                                                                                LABS:                               13.4   9.05  )-----------( 286      ( 18 Apr 2021 07:49 )             38.6                      04-18    131<L>  |  97<L>  |  28<H>  ----------------------------<  207<H>  5.0   |  22  |  1.23    Ca    9.1      18 Apr 2021 07:49  Phos  4.1     04-18  Mg     2.1     04-18    TPro  6.8  /  Alb  3.7  /  TBili  0.4  /  DBili  <0.2  /  AST  21  /  ALT  35  /  AlkPhos  96  04-18                       RADIOLOGY & ADDITIONAL TESTS         I personally reviewed: [  ]EKG   [  ]CXR    [  ] CT      A/P:         Discussed with :     Francisco consultants' Notes   Time spent :   Date of service: 04-18-21 @ 23:43      Patient is a 50y old  Male who presents with a chief complaint of constipation (18 Apr 2021 08:30)                                                               INTERVAL HPI/OVERNIGHT EVENTS:    REVIEW OF SYSTEMS:     CONSTITUTIONAL: No weakness, fevers or chills  RESPIRATORY: No cough, wheezing,  No shortness of breath  CARDIOVASCULAR: No chest pain or palpitations  GASTROINTESTINAL: No abdominal pain  . No nausea, vomiting, or hematemesis; No diarrhea or constipation. No melena or hematochezia.  GENITOURINARY: No dysuria, frequency or hematuria  NEUROLOGICAL: wekaness in LE                                                                                                                                                                                                                                                                      Medications:  MEDICATIONS  (STANDING):  acetaminophen   Tablet .. 325 milliGRAM(s) Oral daily  amLODIPine   Tablet 10 milliGRAM(s) Oral daily  bisacodyl 5 milliGRAM(s) Oral at bedtime  bisacodyl Suppository 10 milliGRAM(s) Rectal once  dextrose 40% Gel 15 Gram(s) Oral once  dextrose 5%. 1000 milliLiter(s) (50 mL/Hr) IV Continuous <Continuous>  dextrose 5%. 1000 milliLiter(s) (100 mL/Hr) IV Continuous <Continuous>  dextrose 50% Injectable 25 Gram(s) IV Push once  dextrose 50% Injectable 12.5 Gram(s) IV Push once  dextrose 50% Injectable 25 Gram(s) IV Push once  diphenhydrAMINE 25 milliGRAM(s) Oral daily  gabapentin   Solution 100 milliGRAM(s) Oral three times a day  glucagon  Injectable 1 milliGRAM(s) IntraMuscular once  heparin   Injectable 5000 Unit(s) SubCutaneous every 8 hours  hydrALAZINE 25 milliGRAM(s) Oral three times a day  insulin glargine Injectable (LANTUS) 26 Unit(s) SubCutaneous at bedtime  insulin lispro (ADMELOG) corrective regimen sliding scale   SubCutaneous three times a day before meals  insulin lispro (ADMELOG) corrective regimen sliding scale   SubCutaneous at bedtime  insulin lispro Injectable (ADMELOG) 12 Unit(s) SubCutaneous three times a day before meals  losartan 100 milliGRAM(s) Oral daily  metoprolol tartrate 12.5 milliGRAM(s) Oral two times a day  senna 2 Tablet(s) Oral two times a day  sodium chloride 1 Gram(s) Oral three times a day    MEDICATIONS  (PRN):  acetaminophen   Tablet .. 650 milliGRAM(s) Oral every 6 hours PRN Temp greater or equal to 38C (100.4F), Mild Pain (1 - 3), Moderate Pain (4 - 6)  aluminum hydroxide/magnesium hydroxide/simethicone Suspension 30 milliLiter(s) Oral every 6 hours PRN Dyspepsia  dicyclomine 10 milliGRAM(s) Oral once PRN abdominal pain  naproxen 500 milliGRAM(s) Oral two times a day PRN Moderate Pain (4 - 6)       Allergies    No Known Drug Allergies  shellfish (Urticaria)    Intolerances      Vital Signs Last 24 Hrs  T(C): 36.7 (18 Apr 2021 21:22), Max: 37 (18 Apr 2021 05:58)  T(F): 98.1 (18 Apr 2021 21:22), Max: 98.6 (18 Apr 2021 05:58)  HR: 93 (18 Apr 2021 21:22) (75 - 93)  BP: 107/68 (18 Apr 2021 21:22) (104/60 - 144/96)  BP(mean): --  RR: 16 (18 Apr 2021 21:22) (16 - 18)  SpO2: 98% (18 Apr 2021 21:22) (98% - 99%)  CAPILLARY BLOOD GLUCOSE      POCT Blood Glucose.: 174 mg/dL (18 Apr 2021 21:07)  POCT Blood Glucose.: 152 mg/dL (18 Apr 2021 17:35)  POCT Blood Glucose.: 154 mg/dL (18 Apr 2021 12:17)  POCT Blood Glucose.: 200 mg/dL (18 Apr 2021 07:15)      04-17 @ 07:01  -  04-18 @ 07:00  --------------------------------------------------------  IN: 720 mL / OUT: 400 mL / NET: 320 mL    04-18 @ 07:01  -  04-18 @ 23:43  --------------------------------------------------------  IN: 0 mL / OUT: 525 mL / NET: -525 mL      Physical Exam:    Daily     Daily   General:  Well appearing, NAD, not cachetic  HEENT:  Nonicteric, PERRLA  CV:  RRR, S1S2   Lungs:  CTA B/L, no wheezes, rales, rhonchi  Abdomen:  Soft, non-tender, no distended, positive BS  Extremities:  2+ pulses, no c/c, no edema  Skin:  Warm and dry, no rashes  :  No lucas  Neuro:  AAOx3, non-focal, grossly intact                                                                                                                                                                                                                                                                                                LABS:                               13.4   9.05  )-----------( 286      ( 18 Apr 2021 07:49 )             38.6                      04-18    131<L>  |  97<L>  |  28<H>  ----------------------------<  207<H>  5.0   |  22  |  1.23    Ca    9.1      18 Apr 2021 07:49  Phos  4.1     04-18  Mg     2.1     04-18    TPro  6.8  /  Alb  3.7  /  TBili  0.4  /  DBili  <0.2  /  AST  21  /  ALT  35  /  AlkPhos  96  04-18                       RADIOLOGY & ADDITIONAL TESTS         I personally reviewed: [  ]EKG   [  ]CXR    [  ] CT      A/P:         Discussed with :     Francisco consultants' Notes   Time spent :

## 2021-04-18 NOTE — PROGRESS NOTE ADULT - SUBJECTIVE AND OBJECTIVE BOX
Patient is a 50y Male     Patient is a 50y old  Male who presents with a chief complaint of dyspepsia (16 Apr 2021 07:22)      HPI:  51 yo male, PMH DM, HTN, hyperlipidemia; pt presented to the ED c/o abdominal cramping, generalized, and diarrhea following use of laxatives as above for constipation.  In the ED, WBC 10.82, Hb 13.6, CMP: sodium 126, chloride 89, glucose 281, CMP otherwise unremarkable.  VBG: Lactate 2.6 (later downtrended to 1.6).  Pt. was treated with IV hydration and dispo'd to CDU for continued care plan:  IV hydration, supportive care, AM labs, general observation care / monitoring. pt continued to have generalized weakness , numbness and on labs persistent hyponatermia and now being admitted for furhter evlauation .  dnenies N/V/Abd pain   had bm   no urinary sx   no fever or chills   no cough   no cp /sob    (11 Apr 2021 15:50)      PAST MEDICAL & SURGICAL HISTORY:  DM (diabetes mellitus)    HTN (hypertension)    Hyperlipidemia    H/O total knee replacement    Foot fracture        MEDICATIONS  (STANDING):  acetaminophen   Tablet .. 325 milliGRAM(s) Oral daily  amLODIPine   Tablet 10 milliGRAM(s) Oral daily  bisacodyl 5 milliGRAM(s) Oral at bedtime  bisacodyl Suppository 10 milliGRAM(s) Rectal once  dextrose 40% Gel 15 Gram(s) Oral once  dextrose 5%. 1000 milliLiter(s) (50 mL/Hr) IV Continuous <Continuous>  dextrose 5%. 1000 milliLiter(s) (100 mL/Hr) IV Continuous <Continuous>  dextrose 50% Injectable 25 Gram(s) IV Push once  dextrose 50% Injectable 12.5 Gram(s) IV Push once  dextrose 50% Injectable 25 Gram(s) IV Push once  diphenhydrAMINE 25 milliGRAM(s) Oral daily  gabapentin   Solution 100 milliGRAM(s) Oral three times a day  glucagon  Injectable 1 milliGRAM(s) IntraMuscular once  heparin   Injectable 5000 Unit(s) SubCutaneous every 8 hours  hydrALAZINE 25 milliGRAM(s) Oral three times a day  insulin glargine Injectable (LANTUS) 26 Unit(s) SubCutaneous at bedtime  insulin lispro (ADMELOG) corrective regimen sliding scale   SubCutaneous three times a day before meals  insulin lispro (ADMELOG) corrective regimen sliding scale   SubCutaneous at bedtime  insulin lispro Injectable (ADMELOG) 12 Unit(s) SubCutaneous three times a day before meals  losartan 100 milliGRAM(s) Oral daily  metoprolol tartrate 12.5 milliGRAM(s) Oral two times a day  senna 2 Tablet(s) Oral two times a day  sodium chloride 1 Gram(s) Oral three times a day      Allergies    No Known Drug Allergies  shellfish (Urticaria)    Intolerances        SOCIAL HISTORY:  Denies ETOh,Smoking,     FAMILY HISTORY:  No pertinent family history in first degree relatives        REVIEW OF SYSTEMS:    CONSTITUTIONAL: No weakness, fevers or chills  EYES/ENT: No visual changes;  No vertigo or throat pain   NECK: No pain or stiffness  RESPIRATORY: No cough, wheezing, hemoptysis; No shortness of breath  CARDIOVASCULAR: No chest pain or palpitations  GASTROINTESTINAL: No abdominal or epigastric pain. No nausea, vomiting, or hematemesis; No diarrhea or constipation. No melena or hematochezia.  GENITOURINARY: No dysuria, frequency or hematuria  NEUROLOGICAL: No numbness or weakness  SKIN: No itching, burning, rashes, or lesions   All other review of systems is negative unless indicated above.    VITAL:  T(C): , Max: 37 (04-18-21 @ 05:58)  T(F): , Max: 98.6 (04-18-21 @ 05:58)  HR: 90 (04-18-21 @ 05:58)  BP: 144/96 (04-18-21 @ 05:58)  BP(mean): --  RR: 17 (04-18-21 @ 05:58)  SpO2: 98% (04-18-21 @ 05:58)  Wt(kg): --    I and O's:    04-17 @ 07:01  -  04-18 @ 07:00  --------------------------------------------------------  IN: 720 mL / OUT: 400 mL / NET: 320 mL          PHYSICAL EXAM:    Constitutional: NAD  HEENT: PERRLA,   Neck: No JVD  Respiratory: CTA B/L  Cardiovascular: S1 and S2  Gastrointestinal: BS+, soft, NT/ND  Extremities: No peripheral edema  Neurological: A/O x 3, no focal deficits  Psychiatric: Normal mood, normal affect  : No Jose  Skin: No rashes  Access: Not applicable  Back: No CVA tenderness    LABS:                        13.4   9.05  )-----------( 286      ( 18 Apr 2021 07:49 )             38.6     04-17    128<L>  |  93<L>  |  21  ----------------------------<  225<H>  5.1   |  22  |  0.99    Ca    9.5      17 Apr 2021 06:22  Phos  4.3     04-17  Mg     1.9     04-17    TPro  7.2  /  Alb  3.8  /  TBili  0.6  /  DBili  <0.2  /  AST  19  /  ALT  32  /  AlkPhos  88  04-17          RADIOLOGY & ADDITIONAL STUDIES:

## 2021-04-18 NOTE — PROGRESS NOTE ADULT - ASSESSMENT
51 yo male, PMH DM, HTN, hyperlipidemia; pt presented to the ED c/o abdominal cramping, generalized, and diarrhea following use of laxatives as above for constipation.  In the ED, WBC 10.82, Hb 13.6, CMP: sodium 126, chloride 89, glucose 281, CMP otherwise unremarkable.  VBG: Lactate 2.6 (later downtrended to 1.6).  Pt. was treated with IV hydration and dispo'd to CDU for continued care plan:  IV hydration, supportive care, AM labs, general observation care / monitoring. pt continued to have generalized weakness , numbness and on labs persistent hyponatermia      A/P:  Hyponatremia:  Likely sec to hypovolemia+hyperglycemia. work up suggested SIADH  PT reports he is drinking a lot of water because he is constipated   Repeat Urine Na, osmolality showed polydipsia.   optimize dm control  free water restriction <1.2L/day.  started  salt tab 1 gm TI Dx 2 days  improving sodium today   ok TSH, serum cortisol level  Monitor Na level Q12  Na level should not increase more than 6meq in 24 hrs    Proteinuia/Hematuria:  Etiology?  Had Cystoscopy last year and was normal per patient  Possible sec to Diabetic Nephropathy but will need full vasculitis work up  check urine p/c ratio  c3 c4 hep b hep c hiv, k/l wnl, lexii neg  pendingANCA, Serum immunofixation, SPEP    RPR +, patient s/p treatment 2017 ID eval appreciated     Based on work up result he might need kidney biopsy, which can be planned as outpatient as his renal function is stable  D/W patient in detail above plan    Hypophosphatemia:  SUpplement as needed   montior    HTN  BP improving  on norvasc and losartan  Continue Hydralazine 25mg TID  Monitor closely

## 2021-04-18 NOTE — CHART NOTE - CONSULT REQUEST TIME
Chief Complaints and History of Present Illnesses   Patient presents with     Follow Up For     POAG     HPI    Affected eye(s):  Both   Symptoms:     No decreased vision   Difficulty with reading (Comment: uses magnifying glasses since glasses are broken)   Floaters (Comment: occasionally notes floaters)   No flashes      Duration:  3 weeks   Frequency:  Constant       Do you have eye pain now?:  No      Comments:  3 week f/u POAG, both eyes  Pt's glasses are broken but is wanting to wait on updated glasses until after surgery  Pt states she wasn't able to acquire a shield for her eye until last night    Ocular meds (per med sheets with pt from home):  Brimonidine BID, right eye  Cosopt BID, both eyes  FML QD, right eye  Lumigan bimatoprost QHS, right eye    Ana Mendoza COA 10:32 AM January 4, 2018               18-Apr-2021 09:00

## 2021-04-18 NOTE — PROGRESS NOTE ADULT - SUBJECTIVE AND OBJECTIVE BOX
[facility]   MD SEVEN CHRIS MD RUORU WONG, PA    TEL:  OFFICE: [phone]  DR DAWSON CELL: [phone]  SIMI PATEL CELL: [phone]  DR. MORAN CELL: [phone]  DR. TIRADO CELL: [phone]    FROM 5 PM - 7 AM PLEASE CALL ANSWERING SERVICE: [phone]    RENAL FOLLOW UP NOTE--Date of Service 04-18-21 @ 09:27  --------------------------------------------------------------------------------  HPI:      Pt seen and examined at bedside.   Denies SOB, chest pain     PAST HISTORY  --------------------------------------------------------------------------------  No significant changes to PMH, PSH, FHx, SHx, unless otherwise noted    ALLERGIES & MEDICATIONS  --------------------------------------------------------------------------------  Allergies    No Known Drug Allergies  shellfish (Urticaria)    Intolerances      Standing Inpatient Medications  acetaminophen   Tablet .. 325 milliGRAM(s) Oral daily  amLODIPine   Tablet 10 milliGRAM(s) Oral daily  bisacodyl 5 milliGRAM(s) Oral at bedtime  bisacodyl Suppository 10 milliGRAM(s) Rectal once  dextrose 40% Gel 15 Gram(s) Oral once  dextrose 5%. 1000 milliLiter(s) IV Continuous <Continuous>  dextrose 5%. 1000 milliLiter(s) IV Continuous <Continuous>  dextrose 50% Injectable 25 Gram(s) IV Push once  dextrose 50% Injectable 12.5 Gram(s) IV Push once  dextrose 50% Injectable 25 Gram(s) IV Push once  diphenhydrAMINE 25 milliGRAM(s) Oral daily  gabapentin   Solution 100 milliGRAM(s) Oral three times a day  glucagon  Injectable 1 milliGRAM(s) IntraMuscular once  heparin   Injectable 5000 Unit(s) SubCutaneous every 8 hours  hydrALAZINE 25 milliGRAM(s) Oral three times a day  insulin glargine Injectable (LANTUS) 26 Unit(s) SubCutaneous at bedtime  insulin lispro (ADMELOG) corrective regimen sliding scale   SubCutaneous three times a day before meals  insulin lispro (ADMELOG) corrective regimen sliding scale   SubCutaneous at bedtime  insulin lispro Injectable (ADMELOG) 12 Unit(s) SubCutaneous three times a day before meals  losartan 100 milliGRAM(s) Oral daily  metoprolol tartrate 12.5 milliGRAM(s) Oral two times a day  senna 2 Tablet(s) Oral two times a day  sodium chloride 1 Gram(s) Oral three times a day    PRN Inpatient Medications  acetaminophen   Tablet .. 650 milliGRAM(s) Oral every 6 hours PRN  aluminum hydroxide/magnesium hydroxide/simethicone Suspension 30 milliLiter(s) Oral every 6 hours PRN  dicyclomine 10 milliGRAM(s) Oral once PRN  naproxen 500 milliGRAM(s) Oral two times a day PRN      REVIEW OF SYSTEMS  --------------------------------------------------------------------------------  General: no fever  CVS: no chest pain  RESP: no sob, no cough  ABD: no abdominal pain  : no dysuria,  MSK: no edema     VITALS/PHYSICAL EXAM  --------------------------------------------------------------------------------  T(C): 37 (04-18-21 @ 05:58), Max: 37 (04-18-21 @ 05:58)  HR: 90 (04-18-21 @ 05:58) (69 - 92)  BP: 144/96 (04-18-21 @ 05:58) (105/68 - 144/96)  RR: 17 (04-18-21 @ 05:58) (17 - 18)  SpO2: 98% (04-18-21 @ 05:58) (98% - 100%)  Wt(kg): --        04-17-21 @ 07:01  -  04-18-21 @ 07:00  --------------------------------------------------------  IN: 720 mL / OUT: 400 mL / NET: 320 mL      Physical Exam:  	Gen: NAD  	HEENT: MMM  	Pulm: CTA B/L  	CV: S1S2  	Abd: Soft, +BS  	Ext: No LE edema B/L                      Neuro: Awake alert  	Skin: Warm and Dry   	Vascular access: no HD catheter          BAKARI no  lance  LABS/STUDIES  --------------------------------------------------------------------------------              13.4   9.05  >-----------<  286      [04-18-21 @ 07:49]              38.6     131  |  97  |  28  ----------------------------<  207      [04-18-21 @ 07:49]  5.0   |  22  |  1.23        Ca     9.1     [04-18-21 @ 07:49]      Mg     2.1     [04-18-21 @ 07:49]      Phos  4.1     [04-18-21 @ 07:49]    TPro  6.8  /  Alb  3.7  /  TBili  0.4  /  DBili  <0.2  /  AST  21  /  ALT  35  /  AlkPhos  96  [04-18-21 @ 07:49]          Creatinine Trend:  SCr 1.23 [04-18 @ 07:49]  SCr 0.99 [04-17 @ 06:22]  SCr 1.13 [04-16 @ 10:57]  SCr 1.05 [04-16 @ 08:31]  SCr 1.29 [04-15 @ 07:36]    Urinalysis - [04-11-21 @ 12:32]      Color Light Yellow / Appearance Clear / SG 1.013 / pH 6.5      Gluc >= 1000 mg/dL / Ketone Small  / Bili Negative / Urobili <2 mg/dL       Blood Small / Protein 100 mg/dL / Leuk Est Negative / Nitrite Negative      RBC 10 / WBC 1 / Hyaline 1 / Gran  / Sq Epi  / Non Sq Epi 1 / Bacteria Negative    Urine Creatinine 51      [04-11-21 @ 16:02]  Urine Protein 66      [04-15-21 @ 09:10]  Urine Sodium 21      [04-15-21 @ 09:10]  Urine Potassium 25.1      [04-11-21 @ 12:32]  Urine Chloride 90      [04-11-21 @ 12:32]  Urine Osmolality 360      [04-15-21 @ 09:10]    TSH 3.00      [04-12-21 @ 06:36]    HBsAg Nonreact      [04-18-21 @ 01:28]  HCV 0.40, Nonreact      [04-18-21 @ 01:28]  HIV Nonreact      [04-17-21 @ 16:16]    PIERO: titer Negative, pattern --      [04-13-21 @ 10:10]  C3 Complement 152      [04-13-21 @ 07:48]  C4 Complement 33      [04-13-21 @ 07:48]  ANCA: cANCA Negative, pANCA Negative, atypical ANCA Negative      [04-13-21 @ 10:10]  Syphilis Screen (Treponema Pallidum Ab) Positive      [04-13-21 @ 10:10]  Syphilis Screen (RPR Titer) 1:1      [04-13-21 @ 10:10]  Free Light Chains: kappa 1.51, lambda 1.89, ratio = 0.80      [04-13 @ 10:08]  Immunofixation Serum:   No Monoclonal Band Identified. KRIS Ferris M.D.    Reference Range: None Detected      [04-13-21 @ 07:48]  SPEP Interpretation: with acute phase reaction. KRIS Ferris M.D.      [04-13-21 @ 07:48]

## 2021-04-18 NOTE — PROGRESS NOTE ADULT - ASSESSMENT
51 yo male, PMH DM, HTN, hyperlipidemia; pt presented to the ED c/o abdominal cramping, generalized, and diarrhea following use of laxatives as above for constipation.  In the ED, WBC 10.82, Hb 13.6, CMP: sodium 126, chloride 89, glucose 281, CMP otherwise unremarkable.  VBG: Lactate 2.6 (later downtrended to 1.6).  Pt. was treated with IV hydration and dispo'd to CDU for continued care plan:  IV hydration, supportive care, AM labs, general observation care / monitoring. pt continued to have generalized weakness , numbness and on labs persistent hyponatermia and now being admitted for furhter evlauation .  dnenies N/V/Abd pain   had bm   no urinary sx   no fever or chills   no cough   no cp /sob     - hyponatremia :   likely multifactorial including hyperglycemia ( pseudohyponatremia ) and pre renal sec to diarreah   ? ADH induced by pain   apprecaite renal input   improving   monitor     - DM: uncontrolled   appreciate endo input    -HTN : imrpoved     - numbness/weakness  : likely nueropathic   now with BLE weakness : doubt cord compression however will expedite MR: discussed with radio : being expedited   fu MR C/T/L  also possibly sec to recent COVID infection .. CT chest  : non specific GGO     - hypophosphatemia : repleted and monitor     - constipation : had had colonoscopy 2 yrs ago with Dr. Rae   hold further bowel regimen   CT abd no acute path   no furtehr inpt red : discussed with Dr Rae         dvt proph   d/w pt at length   with PA  49 yo male, PMH DM, HTN, hyperlipidemia; pt presented to the ED c/o abdominal cramping, generalized, and diarrhea following use of laxatives as above for constipation.  In the ED, WBC 10.82, Hb 13.6, CMP: sodium 126, chloride 89, glucose 281, CMP otherwise unremarkable.  VBG: Lactate 2.6 (later downtrended to 1.6).  Pt. was treated with IV hydration and dispo'd to CDU for continued care plan:  IV hydration, supportive care, AM labs, general observation care / monitoring. pt continued to have generalized weakness , numbness and on labs persistent hyponatermia and now being admitted for furhter evlauation .  dnenies N/V/Abd pain   had bm   no urinary sx   no fever or chills   no cough   no cp /sob     - hyponatremia :   likely multifactorial including hyperglycemia ( pseudohyponatremia ) and pre renal sec to diarreah   ? 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 unable to arrange for LP over weekend.. discussed wit pt and wife at length : they refused IVIG for Hinduism reasons .. refused plasmapaheresis and asking to transfer to [facility] due to convenience of location however if this does not occur prior to [place] : they are willing to move forward with LP .. still unclear if they will accept palsmapheresis   also possibly sec to recent COVID infection .. CT chest  : non specific GGO     - hypophosphatemia : repleted and monitor     - constipation : had had colonoscopy 2 yrs ago with Dr. Rae   hold further bowel regimen   CT abd no acute path   no further inpt red : discussed with Dr Rae         dvt proph   d/w pt at length   with PA

## 2021-04-19 LAB
ALBUMIN SERPL ELPH-MCNC: 3.7 G/DL — SIGNIFICANT CHANGE UP (ref 3.3–5)
ALP SERPL-CCNC: 162 U/L — HIGH (ref 40–120)
ALT FLD-CCNC: 67 U/L — HIGH (ref 4–41)
ANION GAP SERPL CALC-SCNC: 15 MMOL/L — HIGH (ref 7–14)
APPEARANCE CSF: CLEAR — SIGNIFICANT CHANGE UP
APPEARANCE SPUN FLD: COLORLESS — SIGNIFICANT CHANGE UP
AST SERPL-CCNC: 33 U/L — SIGNIFICANT CHANGE UP (ref 4–40)
BASOPHILS # BLD AUTO: 0.03 K/UL — SIGNIFICANT CHANGE UP (ref 0–0.2)
BASOPHILS NFR BLD AUTO: 0.4 % — SIGNIFICANT CHANGE UP (ref 0–2)
BILIRUB DIRECT SERPL-MCNC: <0.2 MG/DL — SIGNIFICANT CHANGE UP (ref 0–0.2)
BILIRUB INDIRECT FLD-MCNC: SIGNIFICANT CHANGE UP MG/DL (ref 0–1)
BILIRUB SERPL-MCNC: <0.2 MG/DL — SIGNIFICANT CHANGE UP (ref 0.2–1.2)
BUN SERPL-MCNC: 38 MG/DL — HIGH (ref 7–23)
CALCIUM SERPL-MCNC: 8.9 MG/DL — SIGNIFICANT CHANGE UP (ref 8.4–10.5)
CHLORIDE SERPL-SCNC: 99 MMOL/L — SIGNIFICANT CHANGE UP (ref 98–107)
CO2 SERPL-SCNC: 20 MMOL/L — LOW (ref 22–31)
COLOR CSF: COLORLESS — SIGNIFICANT CHANGE UP
CREAT SERPL-MCNC: 1.41 MG/DL — HIGH (ref 0.5–1.3)
EOSINOPHIL # BLD AUTO: 0.31 K/UL — SIGNIFICANT CHANGE UP (ref 0–0.5)
EOSINOPHIL NFR BLD AUTO: 4 % — SIGNIFICANT CHANGE UP (ref 0–6)
GLUCOSE CSF-MCNC: 133 MG/DL — HIGH (ref 40–70)
GLUCOSE SERPL-MCNC: 272 MG/DL — HIGH (ref 70–99)
HCT VFR BLD CALC: 40.1 % — SIGNIFICANT CHANGE UP (ref 39–50)
HGB BLD-MCNC: 13.2 G/DL — SIGNIFICANT CHANGE UP (ref 13–17)
IANC: 3.99 K/UL — SIGNIFICANT CHANGE UP (ref 1.5–8.5)
IMM GRANULOCYTES NFR BLD AUTO: 0.3 % — SIGNIFICANT CHANGE UP (ref 0–1.5)
LYMPHOCYTES # BLD AUTO: 2.65 K/UL — SIGNIFICANT CHANGE UP (ref 1–3.3)
LYMPHOCYTES # BLD AUTO: 34.4 % — SIGNIFICANT CHANGE UP (ref 13–44)
LYMPHOCYTES # CSF: 16 % — SIGNIFICANT CHANGE UP
MAGNESIUM SERPL-MCNC: 2.3 MG/DL — SIGNIFICANT CHANGE UP (ref 1.6–2.6)
MCHC RBC-ENTMCNC: 29.1 PG — SIGNIFICANT CHANGE UP (ref 27–34)
MCHC RBC-ENTMCNC: 32.9 GM/DL — SIGNIFICANT CHANGE UP (ref 32–36)
MCV RBC AUTO: 88.5 FL — SIGNIFICANT CHANGE UP (ref 80–100)
MONOCYTES # BLD AUTO: 0.7 K/UL — SIGNIFICANT CHANGE UP (ref 0–0.9)
MONOCYTES NFR BLD AUTO: 9.1 % — SIGNIFICANT CHANGE UP (ref 2–14)
MONOS+MACROS NFR CSF: 4 % — SIGNIFICANT CHANGE UP
NEUTROPHILS # BLD AUTO: 3.99 K/UL — SIGNIFICANT CHANGE UP (ref 1.8–7.4)
NEUTROPHILS # CSF: 0 % — SIGNIFICANT CHANGE UP
NEUTROPHILS NFR BLD AUTO: 51.8 % — SIGNIFICANT CHANGE UP (ref 43–77)
NRBC # BLD: 0 /100 WBCS — SIGNIFICANT CHANGE UP
NRBC # FLD: 0 K/UL — SIGNIFICANT CHANGE UP
NRBC NFR CSF: 1 CELLS/UL — SIGNIFICANT CHANGE UP (ref 0–5)
PHOSPHATE SERPL-MCNC: 3.9 MG/DL — SIGNIFICANT CHANGE UP (ref 2.5–4.5)
PLATELET # BLD AUTO: 281 K/UL — SIGNIFICANT CHANGE UP (ref 150–400)
POTASSIUM SERPL-MCNC: 4.7 MMOL/L — SIGNIFICANT CHANGE UP (ref 3.5–5.3)
POTASSIUM SERPL-SCNC: 4.7 MMOL/L — SIGNIFICANT CHANGE UP (ref 3.5–5.3)
PROT CSF-MCNC: 207 MG/DL — HIGH (ref 15–45)
PROT SERPL-MCNC: 6.8 G/DL — SIGNIFICANT CHANGE UP (ref 6–8.3)
RBC # BLD: 4.53 M/UL — SIGNIFICANT CHANGE UP (ref 4.2–5.8)
RBC # CSF: 0 CELLS/UL — SIGNIFICANT CHANGE UP (ref 0–0)
RBC # FLD: 11.9 % — SIGNIFICANT CHANGE UP (ref 10.3–14.5)
SODIUM SERPL-SCNC: 134 MMOL/L — LOW (ref 135–145)
TOTAL CELLS COUNTED, SPINAL FLUID: 20 CELLS — SIGNIFICANT CHANGE UP
TUBE TYPE: SIGNIFICANT CHANGE UP
WBC # BLD: 7.7 K/UL — SIGNIFICANT CHANGE UP (ref 3.8–10.5)
WBC # FLD AUTO: 7.7 K/UL — SIGNIFICANT CHANGE UP (ref 3.8–10.5)

## 2021-04-19 PROCEDURE — 99232 SBSQ HOSP IP/OBS MODERATE 35: CPT

## 2021-04-19 RX ORDER — INSULIN GLARGINE 100 [IU]/ML
28 INJECTION, SOLUTION SUBCUTANEOUS AT BEDTIME
Refills: 0 | Status: DISCONTINUED | OUTPATIENT
Start: 2021-04-19 | End: 2021-04-20

## 2021-04-19 RX ORDER — ACETAMINOPHEN 500 MG
975 TABLET ORAL ONCE
Refills: 0 | Status: COMPLETED | OUTPATIENT
Start: 2021-04-19 | End: 2021-04-19

## 2021-04-19 RX ORDER — LIDOCAINE HCL 20 MG/ML
1 VIAL (ML) INJECTION ONCE
Refills: 0 | Status: DISCONTINUED | OUTPATIENT
Start: 2021-04-19 | End: 2021-04-19

## 2021-04-19 RX ORDER — KETOROLAC TROMETHAMINE 30 MG/ML
15 SYRINGE (ML) INJECTION ONCE
Refills: 0 | Status: DISCONTINUED | OUTPATIENT
Start: 2021-04-19 | End: 2021-04-19

## 2021-04-19 RX ORDER — SODIUM CHLORIDE 9 MG/ML
1000 INJECTION INTRAMUSCULAR; INTRAVENOUS; SUBCUTANEOUS
Refills: 0 | Status: DISCONTINUED | OUTPATIENT
Start: 2021-04-19 | End: 2021-05-05

## 2021-04-19 RX ORDER — INSULIN LISPRO 100/ML
14 VIAL (ML) SUBCUTANEOUS
Refills: 0 | Status: DISCONTINUED | OUTPATIENT
Start: 2021-04-19 | End: 2021-04-20

## 2021-04-19 RX ORDER — LIDOCAINE HCL 20 MG/ML
50 VIAL (ML) INJECTION ONCE
Refills: 0 | Status: COMPLETED | OUTPATIENT
Start: 2021-04-19 | End: 2021-04-19

## 2021-04-19 RX ADMIN — SENNA PLUS 2 TABLET(S): 8.6 TABLET ORAL at 18:06

## 2021-04-19 RX ADMIN — Medication 5 MILLIGRAM(S): at 00:50

## 2021-04-19 RX ADMIN — SENNA PLUS 2 TABLET(S): 8.6 TABLET ORAL at 05:21

## 2021-04-19 RX ADMIN — LOSARTAN POTASSIUM 100 MILLIGRAM(S): 100 TABLET, FILM COATED ORAL at 05:21

## 2021-04-19 RX ADMIN — GABAPENTIN 100 MILLIGRAM(S): 400 CAPSULE ORAL at 15:46

## 2021-04-19 RX ADMIN — INSULIN GLARGINE 28 UNIT(S): 100 INJECTION, SOLUTION SUBCUTANEOUS at 22:20

## 2021-04-19 RX ADMIN — AMLODIPINE BESYLATE 10 MILLIGRAM(S): 2.5 TABLET ORAL at 05:20

## 2021-04-19 RX ADMIN — SODIUM CHLORIDE 1 GRAM(S): 9 INJECTION INTRAMUSCULAR; INTRAVENOUS; SUBCUTANEOUS at 00:52

## 2021-04-19 RX ADMIN — SODIUM CHLORIDE 75 MILLILITER(S): 9 INJECTION INTRAMUSCULAR; INTRAVENOUS; SUBCUTANEOUS at 16:04

## 2021-04-19 RX ADMIN — Medication 8: at 08:43

## 2021-04-19 RX ADMIN — Medication 12.5 MILLIGRAM(S): at 05:21

## 2021-04-19 RX ADMIN — Medication 2: at 18:05

## 2021-04-19 RX ADMIN — Medication 14 UNIT(S): at 18:06

## 2021-04-19 RX ADMIN — Medication 25 MILLIGRAM(S): at 00:51

## 2021-04-19 RX ADMIN — GABAPENTIN 100 MILLIGRAM(S): 400 CAPSULE ORAL at 22:19

## 2021-04-19 RX ADMIN — GABAPENTIN 100 MILLIGRAM(S): 400 CAPSULE ORAL at 00:51

## 2021-04-19 RX ADMIN — Medication 500 MILLIGRAM(S): at 06:43

## 2021-04-19 RX ADMIN — HEPARIN SODIUM 5000 UNIT(S): 5000 INJECTION INTRAVENOUS; SUBCUTANEOUS at 05:20

## 2021-04-19 RX ADMIN — Medication 4: at 12:23

## 2021-04-19 RX ADMIN — Medication 5 MILLIGRAM(S): at 22:19

## 2021-04-19 RX ADMIN — Medication 12 UNIT(S): at 08:44

## 2021-04-19 RX ADMIN — Medication 15 MILLIGRAM(S): at 12:00

## 2021-04-19 RX ADMIN — Medication 50 MILLILITER(S): at 18:13

## 2021-04-19 RX ADMIN — Medication 650 MILLIGRAM(S): at 05:22

## 2021-04-19 RX ADMIN — Medication 12 UNIT(S): at 12:24

## 2021-04-19 RX ADMIN — Medication 15 MILLIGRAM(S): at 11:37

## 2021-04-19 RX ADMIN — GABAPENTIN 100 MILLIGRAM(S): 400 CAPSULE ORAL at 05:20

## 2021-04-19 RX ADMIN — Medication 25 MILLIGRAM(S): at 05:21

## 2021-04-19 RX ADMIN — Medication 650 MILLIGRAM(S): at 23:27

## 2021-04-19 RX ADMIN — Medication 12.5 MILLIGRAM(S): at 18:06

## 2021-04-19 RX ADMIN — Medication 25 MILLIGRAM(S): at 15:46

## 2021-04-19 RX ADMIN — Medication 325 MILLIGRAM(S): at 11:45

## 2021-04-19 RX ADMIN — Medication 25 MILLIGRAM(S): at 22:19

## 2021-04-19 RX ADMIN — Medication 650 MILLIGRAM(S): at 06:15

## 2021-04-19 RX ADMIN — SODIUM CHLORIDE 1 GRAM(S): 9 INJECTION INTRAMUSCULAR; INTRAVENOUS; SUBCUTANEOUS at 05:21

## 2021-04-19 RX ADMIN — Medication 500 MILLIGRAM(S): at 07:06

## 2021-04-19 RX ADMIN — Medication 325 MILLIGRAM(S): at 12:10

## 2021-04-19 NOTE — PROGRESS NOTE ADULT - ASSESSMENT
50M uncontrolled DM2 HbA1c 11.5%, hyperglycemia related to difficulties obtaining adequate insulin during pandemic due to insurance/cost.    1) DM2 with hyperglycemia  Inpatient plan:  BG target 100-180 mg/dl  above goal   carb consistent diet  FS premeal and bedtime  increasing Lantus to 28 units qhs  increasing Admelog to 14 units TID before meals - HOLD if not eating   Continue Admelog moderate scale premeal and moderate bedtime scale  RD consult- appreciated    Discussed with patient regarding discharge planning, he now will have enough Tresiba and Novolog at home to proceed with these insulins due to [employer] [employer] program he is now a part of.   DC on Tresiba and Novolog pens doses TBD. Discussed option of mixed insulin in case of future issues obtaining insulin.  Outpatient endocrine follow up Dr. Ban Velez, although he will consider changing to [facility] endocrinology if he prefers [phone]. Emailed office to help facilitate follow up     2) Hyponatremia  TSH, Free T4 and 8AM cortisol in acceptable range not indicative of endocrine cause of hyponatremia. Na- 134 today    3) Hypertension  controlled at this time   management per primary team      RIKY Dash-BC  Nurse Practitioner   Division of Endocrinology  Pager: [facility] pager [phone]    If out of hospital/unavailable when paged, please note: patient will be cared for by another provider on the endocrine service.  Please call the endocrine answering service for assistance to reach covering provider ([phone]). For non-urgent matters, please email [email or web address] for assistance.      50M uncontrolled DM2 HbA1c 11.5%, hyperglycemia related to difficulties obtaining adequate insulin during pandemic due to insurance/cost.    1) DM2 with hyperglycemia  Inpatient plan:  BG target 100-180 mg/dl  above goal   carb consistent diet  FS premeal and bedtime  increasing Lantus to 28 units qhs  increasing Admelog to 14 units TID before meals - HOLD if not eating   Continue Admelog moderate scale premeal and moderate bedtime scale  RD consult- appreciated    Discussed with patient regarding discharge planning, he now will have enough Tresiba and Novolog at home to proceed with these insulins due to [employer] program he is now a part of.   DC on Tresiba and Novolog pens doses TBD. Discussed option of mixed insulin in case of future issues obtaining insulin.  Can follow with outpatient endocrine Dr. Ban Velez, although he requested changing to [facility] endocrinology if he prefers [phone]. Appointments below:  [address] [place] [address], [address], [phone]  6/8/21 @ 11:00 AM with diabetes educator   7/12/21 @ 11:00 AM with Dr Montesinos     2) Hyponatremia  TSH, Free T4 and 8AM cortisol in acceptable range not indicative of endocrine cause of hyponatremia. Na- 134 today    3) Hypertension  controlled at this time   management per primary team      RIKY Dash-BC  Nurse Practitioner   Division of Endocrinology  Pager: [facility] pager [phone]    If out of hospital/unavailable when paged, please note: patient will be cared for by another provider on the endocrine service.  Please call the endocrine answering service for assistance to reach covering provider ([phone]). For non-urgent matters, please email [email or web address] for assistance.

## 2021-04-19 NOTE — PROGRESS NOTE ADULT - SUBJECTIVE AND OBJECTIVE BOX
[facility] [place] [facility]PHROLOGY PRACTICE   MD WYATT CHRIS DO ANAM SIDDIQUI ANGELA WONG, PA    TEL:  OFFICE: [phone]  DR DAWSON CELL: [phone]  DR. TIRADO CELL: [phone]  DR. MORAN CELL: [phone]  CATINA PATEL CELL: [phone]    From 5pm-7am Answering Service [phone]    -- RENAL FOLLOW UP NOTE ---Date of Service 04-19-21 @ 13:06    Patient is a 50y old  Male who presents with a chief complaint of abdominal pain (19 Apr 2021 08:34)      Patient seen and examined at bedside. No chest pain/sob    VITALS:  T(F): 98.7 (04-19-21 @ 10:08), Max: 98.7 (04-19-21 @ 10:08)  HR: 90 (04-19-21 @ 10:08)  BP: 100/65 (04-19-21 @ 10:08)  RR: 17 (04-19-21 @ 10:08)  SpO2: 100% (04-19-21 @ 10:08)  Wt(kg): --    04-18 @ 07:01  -  04-19 @ 07:00  --------------------------------------------------------  IN: 0 mL / OUT: 1025 mL / NET: -1025 mL          PHYSICAL EXAM:  Constitutional: NAD  Neck: No JVD  Respiratory: CTAB, no wheezes, rales or rhonchi  Cardiovascular: S1, S2, RRR  Gastrointestinal: BS+, soft, NT/ND  Extremities: No peripheral edema    Hospital Medications:   MEDICATIONS  (STANDING):  acetaminophen   Tablet .. 325 milliGRAM(s) Oral daily  amLODIPine   Tablet 10 milliGRAM(s) Oral daily  bisacodyl 5 milliGRAM(s) Oral at bedtime  bisacodyl Suppository 10 milliGRAM(s) Rectal once  dextrose 40% Gel 15 Gram(s) Oral once  dextrose 5%. 1000 milliLiter(s) (50 mL/Hr) IV Continuous <Continuous>  dextrose 5%. 1000 milliLiter(s) (100 mL/Hr) IV Continuous <Continuous>  dextrose 50% Injectable 25 Gram(s) IV Push once  dextrose 50% Injectable 12.5 Gram(s) IV Push once  dextrose 50% Injectable 25 Gram(s) IV Push once  diphenhydrAMINE 25 milliGRAM(s) Oral daily  gabapentin   Solution 100 milliGRAM(s) Oral three times a day  glucagon  Injectable 1 milliGRAM(s) IntraMuscular once  hydrALAZINE 25 milliGRAM(s) Oral three times a day  insulin glargine Injectable (LANTUS) 28 Unit(s) SubCutaneous at bedtime  insulin lispro (ADMELOG) corrective regimen sliding scale   SubCutaneous three times a day before meals  insulin lispro (ADMELOG) corrective regimen sliding scale   SubCutaneous at bedtime  insulin lispro Injectable (ADMELOG) 14 Unit(s) SubCutaneous three times a day before meals  losartan 100 milliGRAM(s) Oral daily  metoprolol tartrate 12.5 milliGRAM(s) Oral two times a day  senna 2 Tablet(s) Oral two times a day      LABS:  04-19    134<L>  |  99  |  38<H>  ----------------------------<  272<H>  4.7   |  20<L>  |  1.41<H>    Ca    8.9      19 Apr 2021 06:10  Phos  3.9     04-19  Mg     2.3     04-19    TPro  6.8  /  Alb  3.7  /  TBili  <0.2  /  DBili  <0.2  /  AST  33  /  ALT  67<H>  /  AlkPhos  162<H>  04-19    Creatinine Trend: 1.41 <--, 1.23 <--, 0.99 <--, 1.13 <--, 1.05 <--, 1.29 <--, 1.21 <--, 1.26 <--    Phosphorus Level, Serum: 3.9 mg/dL (04-19 @ 06:10)  Albumin, Serum: 3.7 g/dL (04-19 @ 06:10)                              13.2   7.70  )-----------( 281      ( 19 Apr 2021 06:10 )             40.1     Urine Studies:  Urinalysis - [04-11-21 @ 12:32]      Color Light Yellow / Appearance Clear / SG 1.013 / pH 6.5      Gluc >= 1000 mg/dL / Ketone Small  / Bili Negative / Urobili <2 mg/dL       Blood Small / Protein 100 mg/dL / Leuk Est Negative / Nitrite Negative      RBC 10 / WBC 1 / Hyaline 1 / Gran  / Sq Epi  / Non Sq Epi 1 / Bacteria Negative    Urine Protein 66      [04-15-21 @ 09:10]  Urine Sodium 21      [04-15-21 @ 09:10]  Urine Osmolality 360      [04-15-21 @ 09:10]    TSH 3.00      [04-12-21 @ 06:36]    HBsAg Nonreact      [04-18-21 @ 01:28]  HCV 0.40, Nonreact      [04-18-21 @ 01:28]  HIV Nonreact      [04-17-21 @ 16:16]    PIERO: titer Negative, pattern --      [04-13-21 @ 10:10]  C3 Complement 152      [04-13-21 @ 07:48]  C4 Complement 33      [04-13-21 @ 07:48]  ANCA: cANCA Negative, pANCA Negative, atypical ANCA Negative      [04-13-21 @ 10:10]  Syphilis Screen (Treponema Pallidum Ab) Positive      [04-13-21 @ 10:10]  Syphilis Screen (RPR Titer) 1:1      [04-13-21 @ 10:10]  Free Light Chains: kappa 1.51, lambda 1.89, ratio = 0.80      [04-13 @ 10:08]  Immunofixation Serum:   No Monoclonal Band Identified. KRIS Ferirs M.D.    Reference Range: None Detected      [04-13-21 @ 07:48]  SPEP Interpretation: with acute phase reaction. KRIS Ferris M.D.      [04-13-21 @ 07:48]    RADIOLOGY & ADDITIONAL STUDIES:

## 2021-04-19 NOTE — PROGRESS NOTE ADULT - ASSESSMENT
49 yo male, PMH DM, HTN, hyperlipidemia; pt presented to the ED c/o abdominal cramping, generalized, and diarrhea following use of laxatives as above for constipation.  In the ED, WBC 10.82, Hb 13.6, CMP: sodium 126, chloride 89, glucose 281, CMP otherwise unremarkable.  VBG: Lactate 2.6 (later downtrended to 1.6).  Pt. was treated with IV hydration and dispo'd to CDU for continued care plan:  IV hydration, supportive care, AM labs, general observation care / monitoring. pt continued to have generalized weakness , numbness and on labs persistent hyponatermia and now being admitted for furhter evlauation .  dnenies N/V/Abd pain   had bm   no urinary sx   no fever or chills   no cough   no cp /sob     - hyponatremia :   likely multifactorial including hyperglycemia ( pseudohyponatremia ) and pre renal sec to diarreah   ? ADH induced by pain   apprecaite renal input   improving   monitor     - DM: uncontrolled   appreciate endo input    -HTN : imrpoved     - numbness/weakness  : likely nueropathic   RPR positive : treated and no further need for any treatment per ID   now with BLE weakness : doubt cord compression however will expedite MR: discussed with radio : being expedited   fu MR C/T/L: noted : lkely GBS   discussed at length w pt : requesting transfer .. received a call from [facility] ..discussed case .. in the meantime will proceed with LP if transfer doesnot take place today .     - hypophosphatemia : repleted and monitor     - constipation : had had colonoscopy 2 yrs ago with Dr. Rae   hold further bowel regimen   CT abd no acute path   no further inpt red : discussed with Dr Rae     - ALLIE : fu with nephrology         dvt proph   d/w pt at length   with PA

## 2021-04-19 NOTE — PROGRESS NOTE ADULT - ASSESSMENT
continue conservative gi management. no acute gi complaints.    bowel ergimen. ct negative for gi pathology, would use mirlax bid for bm

## 2021-04-19 NOTE — PROGRESS NOTE ADULT - SUBJECTIVE AND OBJECTIVE BOX
Patient is a 50y old  Male who presents with a chief complaint of abdominal pain (19 Apr 2021 08:34)                                                               INTERVAL HPI/OVERNIGHT EVENTS:    REVIEW OF SYSTEMS:     CONSTITUTIONAL: No weakness, fevers or chills  EYES/ENT: No visual changes , no ear ache   NECK: No pain or stiffness  RESPIRATORY: No cough, wheezing,  No shortness of breath  CARDIOVASCULAR: No chest pain or palpitations  GASTROINTESTINAL: No abdominal pain  . No nausea, vomiting, or hematemesis; No diarrhea or constipation. No melena or hematochezia.  GENITOURINARY: No dysuria, frequency or hematuria  NEUROLOGICAL: LE weaknes s  no urinary or stool incontinence                                                                                                                                                                                                                                                                              Medications:  MEDICATIONS  (STANDING):  acetaminophen   Tablet .. 325 milliGRAM(s) Oral daily  amLODIPine   Tablet 10 milliGRAM(s) Oral daily  bisacodyl 5 milliGRAM(s) Oral at bedtime  bisacodyl Suppository 10 milliGRAM(s) Rectal once  dextrose 40% Gel 15 Gram(s) Oral once  dextrose 5%. 1000 milliLiter(s) (50 mL/Hr) IV Continuous <Continuous>  dextrose 5%. 1000 milliLiter(s) (100 mL/Hr) IV Continuous <Continuous>  dextrose 50% Injectable 25 Gram(s) IV Push once  dextrose 50% Injectable 12.5 Gram(s) IV Push once  dextrose 50% Injectable 25 Gram(s) IV Push once  diphenhydrAMINE 25 milliGRAM(s) Oral daily  gabapentin   Solution 100 milliGRAM(s) Oral three times a day  glucagon  Injectable 1 milliGRAM(s) IntraMuscular once  hydrALAZINE 25 milliGRAM(s) Oral three times a day  insulin glargine Injectable (LANTUS) 28 Unit(s) SubCutaneous at bedtime  insulin lispro (ADMELOG) corrective regimen sliding scale   SubCutaneous three times a day before meals  insulin lispro (ADMELOG) corrective regimen sliding scale   SubCutaneous at bedtime  insulin lispro Injectable (ADMELOG) 14 Unit(s) SubCutaneous three times a day before meals  metoprolol tartrate 12.5 milliGRAM(s) Oral two times a day  senna 2 Tablet(s) Oral two times a day  sodium chloride 0.9%. 1000 milliLiter(s) (75 mL/Hr) IV Continuous <Continuous>    MEDICATIONS  (PRN):  acetaminophen   Tablet .. 650 milliGRAM(s) Oral every 6 hours PRN Temp greater or equal to 38C (100.4F), Mild Pain (1 - 3), Moderate Pain (4 - 6)  aluminum hydroxide/magnesium hydroxide/simethicone Suspension 30 milliLiter(s) Oral every 6 hours PRN Dyspepsia  dicyclomine 10 milliGRAM(s) Oral once PRN abdominal pain  naproxen 500 milliGRAM(s) Oral two times a day PRN Moderate Pain (4 - 6)       Allergies    No Known Drug Allergies  shellfish (Urticaria)    Intolerances      Vital Signs Last 24 Hrs  T(C): 36.7 (20 Apr 2021 06:25), Max: 37.1 (19 Apr 2021 10:08)  T(F): 98.1 (20 Apr 2021 06:25), Max: 98.7 (19 Apr 2021 10:08)  HR: 84 (20 Apr 2021 06:25) (78 - 90)  BP: 156/80 (20 Apr 2021 06:25) (100/65 - 156/80)  BP(mean): --  RR: 18 (20 Apr 2021 06:25) (17 - 18)  SpO2: 99% (20 Apr 2021 06:25) (98% - 100%)  CAPILLARY BLOOD GLUCOSE      POCT Blood Glucose.: 157 mg/dL (19 Apr 2021 21:34)  POCT Blood Glucose.: 195 mg/dL (19 Apr 2021 18:01)  POCT Blood Glucose.: 245 mg/dL (19 Apr 2021 12:10)  POCT Blood Glucose.: 337 mg/dL (19 Apr 2021 08:40)      Physical Exam:    Daily     Daily   General: NAD   HEENT:  Nonicteric, PERRLA  CV:  RRR, S1S2   Lungs:  CTA B/L, no wheezes, rales, rhonchi  Abdomen:  Soft, non-tender, no distended, positive BS  Extremities:  no edema   Neuro: LE 1/5  BLE   otherwise no sensory level                                                                                                                                                                                                                                                                                   LABS:                               13.2   7.70  )-----------( 281      ( 19 Apr 2021 06:10 )             40.1                      04-19    134<L>  |  99  |  38<H>  ----------------------------<  272<H>  4.7   |  20<L>  |  1.41<H>    Ca    8.9      19 Apr 2021 06:10  Phos  3.9     04-19  Mg     2.3     04-19    TPro  6.8  /  Alb  3.7  /  TBili  <0.2  /  DBili  <0.2  /  AST  33  /  ALT  67<H>  /  AlkPhos  162<H>  04-19                       RADIOLOGY & ADDITIONAL TESTS         I personally reviewed: [  ]EKG   [  ]CXR    [  ] CT      A/P:         Discussed with :     Francisco consultants' Notes   Time spent :

## 2021-04-19 NOTE — PROGRESS NOTE ADULT - SUBJECTIVE AND OBJECTIVE BOX
Neurology Progress Note    S: Patient seen and examined. No new events overnight. patient denied CP, SOB, HA or pain. still LE weakness.  awaiting LP today    Medication:  acetaminophen   Tablet .. 325 milliGRAM(s) Oral daily  acetaminophen   Tablet .. 650 milliGRAM(s) Oral every 6 hours PRN  aluminum hydroxide/magnesium hydroxide/simethicone Suspension 30 milliLiter(s) Oral every 6 hours PRN  amLODIPine   Tablet 10 milliGRAM(s) Oral daily  bisacodyl 5 milliGRAM(s) Oral at bedtime  bisacodyl Suppository 10 milliGRAM(s) Rectal once  dextrose 40% Gel 15 Gram(s) Oral once  dextrose 5%. 1000 milliLiter(s) IV Continuous <Continuous>  dextrose 5%. 1000 milliLiter(s) IV Continuous <Continuous>  dextrose 50% Injectable 25 Gram(s) IV Push once  dextrose 50% Injectable 12.5 Gram(s) IV Push once  dextrose 50% Injectable 25 Gram(s) IV Push once  dicyclomine 10 milliGRAM(s) Oral once PRN  diphenhydrAMINE 25 milliGRAM(s) Oral daily  gabapentin   Solution 100 milliGRAM(s) Oral three times a day  glucagon  Injectable 1 milliGRAM(s) IntraMuscular once  hydrALAZINE 25 milliGRAM(s) Oral three times a day  insulin glargine Injectable (LANTUS) 26 Unit(s) SubCutaneous at bedtime  insulin lispro (ADMELOG) corrective regimen sliding scale   SubCutaneous three times a day before meals  insulin lispro (ADMELOG) corrective regimen sliding scale   SubCutaneous at bedtime  insulin lispro Injectable (ADMELOG) 12 Unit(s) SubCutaneous three times a day before meals  ketorolac   Injectable 15 milliGRAM(s) IV Push once  losartan 100 milliGRAM(s) Oral daily  metoprolol tartrate 12.5 milliGRAM(s) Oral two times a day  naproxen 500 milliGRAM(s) Oral two times a day PRN  senna 2 Tablet(s) Oral two times a day      Vitals:  Vital Signs Last 24 Hrs  T(C): 25.9 (19 Apr 2021 10:08), Max: 36.9 (19 Apr 2021 05:19)  T(F): 78.7 (19 Apr 2021 10:08), Max: 98.4 (19 Apr 2021 05:19)  HR: 90 (19 Apr 2021 10:08) (75 - 93)  BP: 100/65 (19 Apr 2021 10:08) (100/65 - 136/77)  BP(mean): --  RR: 17 (19 Apr 2021 10:08) (16 - 18)  SpO2: 100% (19 Apr 2021 10:08) (98% - 100%)    General Exam:   General Appearance: Appropriately dressed and in no acute distress       Head: Normocephalic, atraumatic and no dysmorphic features  Ear, Nose, and Throat: Moist mucous membranes  CVS: S1S2+  Resp: No SOB, no wheeze or rhonchi  Abd: soft NTND  Extremities: No edema, no cyanosis  Skin: No bruises, no rashes     Neurological Exam:  Mental Status: Awake, alert and oriented x 3.  Able to follow simple and complex verbal commands. Able to name and repeat. fluent speech. No obvious aphasia or dysarthria noted.   Cranial Nerves: PERRL, EOMI, VFFC, sensation V1-V3 intact,  no obvious facial asymmetry , equal elevation of palate, scm/trap 5/5, tongue is midline on protrusion. no obvious papilledema on fundoscopic exam. Hearing is grossly intact.   Motor: Normal bulk, tone and strength throughout B/L UE Fine finger movements were intact and symmetric. no tremors B/L LE 2/5  Sensation: Intact to light touch and pinprick throughout. no right/left confusion. no extinction to tactile on DSS. Romberg was negative.   Reflexes: 0 throughout at biceps, brachioradialis, triceps, patellars and ankles bilaterally and equal. No clonus. R toe and L toe were both downgoing.  Coordination: No dysmetria on FNF   Gait: unable    I personally reviewed the below data/images/labs:      CBC Full  -  ( 19 Apr 2021 06:10 )  WBC Count : 7.70 K/uL  RBC Count : 4.53 M/uL  Hemoglobin : 13.2 g/dL  Hematocrit : 40.1 %  Platelet Count - Automated : 281 K/uL  Mean Cell Volume : 88.5 fL  Mean Cell Hemoglobin : 29.1 pg  Mean Cell Hemoglobin Concentration : 32.9 gm/dL  Auto Neutrophil # : 3.99 K/uL  Auto Lymphocyte # : 2.65 K/uL  Auto Monocyte # : 0.70 K/uL  Auto Eosinophil # : 0.31 K/uL  Auto Basophil # : 0.03 K/uL  Auto Neutrophil % : 51.8 %  Auto Lymphocyte % : 34.4 %  Auto Monocyte % : 9.1 %  Auto Eosinophil % : 4.0 %  Auto Basophil % : 0.4 %    04-19    134<L>  |  99  |  38<H>  ----------------------------<  272<H>  4.7   |  20<L>  |  1.41<H>    Ca    8.9      19 Apr 2021 06:10  Phos  3.9     04-19  Mg     2.3     04-19    TPro  6.8  /  Alb  3.7  /  TBili  <0.2  /  DBili  <0.2  /  AST  33  /  ALT  67<H>  /  AlkPhos  162<H>  04-19    LIVER FUNCTIONS - ( 19 Apr 2021 06:10 )  Alb: 3.7 g/dL / Pro: 6.8 g/dL / ALK PHOS: 162 U/L / ALT: 67 U/L / AST: 33 U/L / GGT: x           < from: CT Head No Cont (04.02.21 @ 21:48) >    EXAM:  CT BRAIN        PROCEDURE DATE:  Apr 2 2021         INTERPRETATION:  HISTORY: Hypertension    Technique: CT of the head was performed without contrast.    Multiple contiguous axial images were acquired from the skullbase to the vertex without the administration of intravenous contrast.  Coronal and sagittal reformations were made.    COMPARISON: None.    FINDINGS:  The ventricles and sulci are within normal limits given the patient's age. No acute hemorrhage, mass effect, midline shift,hydrocephalus, or extra-axial fluid collections. Mild patchy hypoattenuation within the white matter, likely secondary chronic microscopy changes.    The calvarium is intact.    Small cyst or polyp in the right maxillary sinus. There are opacification of a few ethmoid air cells bilaterally as well as mild mucosal thickening of the left frontal sinus. The mastoid air cells are clear.    IMPRESSION:  No acute hemorrhage, mass effect, or midline shift.            DARRELL MIX MD; Resident Radiology  This document has been electronically signed.  PORTILLO HARPER MD; Attending Radiologist  This document has been electronically signed. Apr 2 2021 11:34PM    < end of copied text >  < from: MR Lumbar Spine w/wo IV Cont (04.16.21 @ 22:16) >    EXAM:  MR SPINE LUMBAR WAW IC      EXAM:  MR SPINE THORACIC WAW IC        PROCEDURE DATE:  Apr 16 2021         INTERPRETATION:  CLINICAL INDICATION: Bilateral leg weakness of unknown etiology.    Technique: MRI of the thoracic and lumbar spine was performed with and without contrast. A total 10 cc of Gadavist were administered intravenously.    COMPARISON: None.    FINDINGS:    THORACIC:    There is normal thoracic kyphosis. There are multiple small vertebral body endplate Schmorl's nodes. Otherwise, the vertebrae are normal in signal, height, and alignment. There is anterior endplate osteophytosis most pronounced from the most pronounced from T6-T10.    There are tiny disc bulges at T7-T8 and T8-T9, without significant central spinal canalstenosis or neuroforaminal narrowing.    The spinal cord is normal in course, caliber, and signal.    There is no abnormal enhancement within thoracic spine.    LUMBAR:    There is normal lumbar lordosis. There are mild-to-moderate type I degenerative endplate changes on the left at L4-L5. Otherwise, the vertebral bodies are normal in height and signal without fracture or dislocation.    There is disc space narrowing and desiccation at L4-L5 and L5-S1.    Evaluation of individual levels demonstrates:    L5-S1: Small posterior disc bulge, as well as mild right greater than left facet hypertrophy, resulting in moderate right and mild left neural foraminal stenosis. No significant spinal canal stenosis.    L4-L5: Small posterior disc bulge and superimposed small right central disc protrusion, including tiny annular fissure, resulting in mild spinal canal stenosis, as well as bilateral facet hypertrophy with moderate right and severe left neural foraminal stenosis.    L3-L4: Small posterior disc bulge, along with bilateral facet hypertrophy, resulting in mild spinal canal stenosis as well as mild bilateral neural foraminal stenosis    L2-L3: No significant disc herniation, central spinal canal stenosis, or neural foraminal narrowing. Mildbilateral facet hypertrophy.    L1-L2: Tiny posterior disc bulge without significant central spinal canal stenosis or neuroforaminal narrowing. Mild bilateral facet hypertrophy.    The conus is normal in position and morphology at the L1 level. Thereis smooth thickening and abnormal enhancement of the cauda equina and ventral nerve roots, a nonspecific finding.    There is no definite fluid collection or mass lesion within the visualized retroperitoneal or posterior paraspinal soft tissues.    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 Alternative etiologies include chronic inflammatory demyelinating polyneuropathy, Lyme disease, less likely arachnoiditis or carcinomatosis.    Results were discussed with ADELSO Elkins by Dr. Haji at 9:40 AM on 4/17/2021 with read back followed.              ROSIE HAJI MD; Attending Radiologist  This document has been electronically signed. Apr 17 2021  9:48AM    < end of copied text >  < from: MR Cervical Spine w/wo IV Cont (04.16.21 @ 22:16) >    EXAM:  MR SPINE CERVICAL WAW IC        PROCEDURE DATE:  Apr 16 2021         INTERPRETATION:  CLINICAL INDICATION: Patient with new weakness in lower extremities.    TECHNIQUE: MRI of the cervical spine was performed before and after intravenous contrast. A total 10 cc of Gadavist were administered.    COMPARISON: None.    FINDINGS:  Structures at the craniocervical and cervicomedullary junction are intact.    There is nonspecific straightening of the normal cervical lordosis. There is grade 1 degenerative retrolisthesis of C5 on C6. There is disc space narrowing at C3-C4 and C5-C6. The remaining intervertebral disc space heights are grossly preserved. There is anterior endplate osteophytosis from C3 to C6.    There are mild Modic type II degenerative endplate changes involving C3 CT 4 and C5-C6, along with mild endplate irregularity. Otherwise, the vertebrae demonstrate normal height and signal without fracture, dislocation or marrow edema.    The spinal cord demonstrates normal course and caliber without intrinsic cord signal abnormality.    The prevertebral and paravertebral soft tissues are within normal limits.    There is no abnormal enhancement.    There is diffuse disc desiccation. Evaluation of individual levels demonstrates:    C2-C3: No significant disc herniation, central spinal canal stenosis, although neural foraminal narrowing.    C3-C4: Small posterior disc osteophyte complexes, partially effacing the ventral thecal sac and resulting in mild spinal canal stenosis,as well as moderate right and mild left neural foraminal narrowing.    C4-C5: Tiny posterior disc osteophyte complex minimally effacing the ventral thecal sac, without significant spinal canal stenosis, as well as moderate left and mild right neural foraminal narrowing.    C5-C6: Tiny posterior disc osteophyte complex minimally effacing the ventral thecal sac, without significant spinal canal stenosis, as well as severe right and moderate left neural foraminal narrowing.    C6-C7: No significant disc herniation, central spinal canal stenosis, or neural foraminal narrowing.    C7-T1: No significant disc herniation, central spinal canal stenosis, or neural foraminal narrowing.    There is no soft tissue neck mass or fluid collection.    IMPRESSION:  No evidence for spinal cord compression, signal abnormality, or abnormal enhancement.    Multilevel degenerative changes, as described above, most pronounced at C3-C4 where there is mild spinal canal stenosis and C5-C6 where there is severe right and moderate left neural foraminal narrowing.              ROSIE HAJI MD; Attending Radiologist  This document has been electronically signed. Apr 17 2021  8:54AM    < end of copied text >

## 2021-04-19 NOTE — PROGRESS NOTE ADULT - SUBJECTIVE AND OBJECTIVE BOX
Patient is a 50y Male     Patient is a 50y old  Male who presents with a chief complaint of constipation (18 Apr 2021 08:30)      HPI:  49 yo male, PMH DM, HTN, hyperlipidemia; pt presented to the ED c/o abdominal cramping, generalized, and diarrhea following use of laxatives as above for constipation.  In the ED, WBC 10.82, Hb 13.6, CMP: sodium 126, chloride 89, glucose 281, CMP otherwise unremarkable.  VBG: Lactate 2.6 (later downtrended to 1.6).  Pt. was treated with IV hydration and dispo'd to CDU for continued care plan:  IV hydration, supportive care, AM labs, general observation care / monitoring. pt continued to have generalized weakness , numbness and on labs persistent hyponatermia and now being admitted for furhter evlauation .  dnenies N/V/Abd pain   had bm   no urinary sx   no fever or chills   no cough   no cp /sob    (11 Apr 2021 15:50)      PAST MEDICAL & SURGICAL HISTORY:  DM (diabetes mellitus)    HTN (hypertension)    Hyperlipidemia    H/O total knee replacement    Foot fracture        MEDICATIONS  (STANDING):  acetaminophen   Tablet .. 325 milliGRAM(s) Oral daily  amLODIPine   Tablet 10 milliGRAM(s) Oral daily  bisacodyl 5 milliGRAM(s) Oral at bedtime  bisacodyl Suppository 10 milliGRAM(s) Rectal once  dextrose 40% Gel 15 Gram(s) Oral once  dextrose 5%. 1000 milliLiter(s) (50 mL/Hr) IV Continuous <Continuous>  dextrose 5%. 1000 milliLiter(s) (100 mL/Hr) IV Continuous <Continuous>  dextrose 50% Injectable 25 Gram(s) IV Push once  dextrose 50% Injectable 12.5 Gram(s) IV Push once  dextrose 50% Injectable 25 Gram(s) IV Push once  diphenhydrAMINE 25 milliGRAM(s) Oral daily  gabapentin   Solution 100 milliGRAM(s) Oral three times a day  glucagon  Injectable 1 milliGRAM(s) IntraMuscular once  hydrALAZINE 25 milliGRAM(s) Oral three times a day  insulin glargine Injectable (LANTUS) 26 Unit(s) SubCutaneous at bedtime  insulin lispro (ADMELOG) corrective regimen sliding scale   SubCutaneous three times a day before meals  insulin lispro (ADMELOG) corrective regimen sliding scale   SubCutaneous at bedtime  insulin lispro Injectable (ADMELOG) 12 Unit(s) SubCutaneous three times a day before meals  losartan 100 milliGRAM(s) Oral daily  metoprolol tartrate 12.5 milliGRAM(s) Oral two times a day  senna 2 Tablet(s) Oral two times a day      Allergies    No Known Drug Allergies  shellfish (Urticaria)    Intolerances        SOCIAL HISTORY:  Denies ETOh,Smoking,     FAMILY HISTORY:  No pertinent family history in first degree relatives        REVIEW OF SYSTEMS:    CONSTITUTIONAL: No weakness, fevers or chills  EYES/ENT: No visual changes;  No vertigo or throat pain   NECK: No pain or stiffness  RESPIRATORY: No cough, wheezing, hemoptysis; No shortness of breath  CARDIOVASCULAR: No chest pain or palpitations  GASTROINTESTINAL: No abdominal or epigastric pain. No nausea, vomiting, or hematemesis; No diarrhea or constipation. No melena or hematochezia.  GENITOURINARY: No dysuria, frequency or hematuria  NEUROLOGICAL: No numbness or weakness  SKIN: No itching, burning, rashes, or lesions   All other review of systems is negative unless indicated above.    VITAL:  T(C): , Max: 36.9 (04-19-21 @ 05:19)  T(F): , Max: 98.4 (04-19-21 @ 05:19)  HR: 90 (04-19-21 @ 05:19)  BP: 136/77 (04-19-21 @ 05:19)  BP(mean): --  RR: 17 (04-19-21 @ 05:19)  SpO2: 100% (04-19-21 @ 05:19)  Wt(kg): --    I and O's:    04-17 @ 07:01  -  04-18 @ 07:00  --------------------------------------------------------  IN: 720 mL / OUT: 400 mL / NET: 320 mL    04-18 @ 07:01  -  04-19 @ 07:00  --------------------------------------------------------  IN: 0 mL / OUT: 1025 mL / NET: -1025 mL          PHYSICAL EXAM:    Constitutional: NAD  HEENT: PERRLA,   Neck: No JVD  Respiratory: CTA B/L  Cardiovascular: S1 and S2  Gastrointestinal: BS+, soft, NT/ND  Extremities: No peripheral edema  Neurological: A/O x 3, no focal deficits  Psychiatric: Normal mood, normal affect  : No Jose  Skin: No rashes  Access: Not applicable  Back: No CVA tenderness    LABS:                        13.2   7.70  )-----------( 281      ( 19 Apr 2021 06:10 )             40.1     04-19    134<L>  |  99  |  38<H>  ----------------------------<  272<H>  4.7   |  20<L>  |  1.41<H>    Ca    8.9      19 Apr 2021 06:10  Phos  3.9     04-19  Mg     2.3     04-19    TPro  6.8  /  Alb  3.7  /  TBili  <0.2  /  DBili  <0.2  /  AST  33  /  ALT  67<H>  /  AlkPhos  162<H>  04-19          RADIOLOGY & ADDITIONAL STUDIES:

## 2021-04-19 NOTE — PROGRESS NOTE ADULT - ASSESSMENT
51 yo male, PMH DM, HTN, hyperlipidemia; pt presented to the ED c/o abdominal cramping, generalized, and diarrhea following use of laxatives as above for constipation.  In the ED, WBC 10.82, Hb 13.6, CMP: sodium 126, chloride 89, glucose 281, CMP otherwise unremarkable.  VBG: Lactate 2.6 (later downtrended to 1.6).  Pt. was treated with IV hydration and dispo'd to CDU for continued care plan:  IV hydration, supportive care, AM labs, general observation care / monitoring. pt continued to have generalized weakness , numbness and on labs persistent hyponatermia      A/P:    ALLIE  scr worsen today. baseline likely ~ 1  ALLIE possible sec to uncontrolled hyperkalemia on losartan  will give NS @ 75cc/hr x 12 hrs  check urine cr, na, bladder scan to r/o other etiology  will hold losartan today. can resume tmr if scr improves  monitor bmp  avoid nephrotoxic agents    Hyponatremia:  Likely sec to hypovolemia+hyperglycemia. work up suggested SIADH  PT reports he is drinking a lot of water because he is constipated   Repeat Urine Na, osmolality showed polydipsia.   optimize dm control  free water restriction <1.2L/day.  started  salt tab 1 gm TI Dx 2 days  improving sodium today   ok TSH, serum cortisol level  Monitor Na level Q12  Na level should not increase more than 6meq in 24 hrs    Proteinuia/Hematuria:  Etiology?  Had Cystoscopy last year and was normal per patient  Possible sec to Diabetic Nephropathy but will need full vasculitis work up  check urine p/c ratio  c3 c4 hep b hep c hiv, k/l wnl, lexii neg  pendingANCA, Serum immunofixation, SPEP    RPR +, patient s/p treatment 2017 ID eval appreciated     Based on work up result he might need kidney biopsy, which can be planned as outpatient as his renal function is stable  D/W patient in detail above plan    Hypophosphatemia:  SUpplement as needed   montior    HTN  BP improving  on norvasc and losartan  Continue Hydralazine 25mg TID  Monitor closely

## 2021-04-19 NOTE — PROGRESS NOTE ADULT - ASSESSMENT
51 yo RH AA male with h/o DM, HTN, hyperlipidemia; pt presented to the ED c/o abdominal cramping, generalized, and diarrhea following use of laxatives as above for constipation. Neurology called for evaluation of LE weakness, unsteady gait.    Impression: LE>UE weakness with absent reflexes and noted cauda equina/nerve root enhancement concerning for [place] syndrome.  - LP with opening pressure and send for glucose, protein, cell count, PCR, culture, gram stain, HSV, oligoclonal bands, NMO, MOG Ab, myelin basic protein, CSF syphilis screen. per procedure team won't be done until tomorrow...trying to arrange with IR. if nobody can do LP will have neurology residents attempt.   - send for ganglioside antibodies, Gq1b  - after LP obtain please start IVIG  - slow correction of sodium  - aggressive PT/OT  - B12 >2000, copper, Vit E, thiamine, magnesium heavy metals, zinc  - patient refusing treatment until LP obtained.  refused IVIG but agreed for PLEX  - if LP c/w GBS will need 3-5 session of PLEX after central line placed  - still trying to transfer to marafia Gardner MD  Vascular Neurology  Office: [phone]

## 2021-04-19 NOTE — PROGRESS NOTE ADULT - SUBJECTIVE AND OBJECTIVE BOX
Chief Complaint: DM2 uncontrolled with hyperglycemia     History:  Hyperglycemia, no recent steroid administration of per EMAR review     MEDICATIONS  (STANDING):  acetaminophen   Tablet .. 325 milliGRAM(s) Oral daily  amLODIPine   Tablet 10 milliGRAM(s) Oral daily  bisacodyl 5 milliGRAM(s) Oral at bedtime  bisacodyl Suppository 10 milliGRAM(s) Rectal once  dextrose 40% Gel 15 Gram(s) Oral once  dextrose 5%. 1000 milliLiter(s) (50 mL/Hr) IV Continuous <Continuous>  dextrose 5%. 1000 milliLiter(s) (100 mL/Hr) IV Continuous <Continuous>  dextrose 50% Injectable 25 Gram(s) IV Push once  dextrose 50% Injectable 12.5 Gram(s) IV Push once  dextrose 50% Injectable 25 Gram(s) IV Push once  diphenhydrAMINE 25 milliGRAM(s) Oral daily  gabapentin   Solution 100 milliGRAM(s) Oral three times a day  glucagon  Injectable 1 milliGRAM(s) IntraMuscular once  hydrALAZINE 25 milliGRAM(s) Oral three times a day  insulin glargine Injectable (LANTUS) 26 Unit(s) SubCutaneous at bedtime  insulin lispro (ADMELOG) corrective regimen sliding scale   SubCutaneous three times a day before meals  insulin lispro (ADMELOG) corrective regimen sliding scale   SubCutaneous at bedtime  insulin lispro Injectable (ADMELOG) 12 Unit(s) SubCutaneous three times a day before meals  losartan 100 milliGRAM(s) Oral daily  metoprolol tartrate 12.5 milliGRAM(s) Oral two times a day  senna 2 Tablet(s) Oral two times a day        Allergies    No Known Drug Allergies  shellfish (Urticaria)        Review of Systems:  Gen: denies fever   ALL OTHER SYSTEMS REVIEWED AND NEGATIVE      Vital Signs Last 24 Hrs  T(C): 37.1 (19 Apr 2021 10:08), Max: 37.1 (19 Apr 2021 10:08)  T(F): 98.7 (19 Apr 2021 10:08), Max: 98.7 (19 Apr 2021 10:08)  HR: 90 (19 Apr 2021 10:08) (75 - 93)  BP: 100/65 (19 Apr 2021 10:08) (100/65 - 136/77)  BP(mean): --  RR: 17 (19 Apr 2021 10:08) (16 - 18)  SpO2: 100% (19 Apr 2021 10:08) (98% - 100%)  GENERAL: NAD  EYES: No proptosis, no lid lag, anicteric  HEENT:  Atraumatic, Normocephalic, moist mucous membranes  RESPIRATORY: nonlabored respirations  PSYCH: Alert and oriented x 3, normal affect, normal mood    CAPILLARY BLOOD GLUCOSE      POCT Blood Glucose.: 245 mg/dL (19 Apr 2021 12:10)  POCT Blood Glucose.: 337 mg/dL (19 Apr 2021 08:40)  POCT Blood Glucose.: 310 mg/dL (19 Apr 2021 07:07)  POCT Blood Glucose.: 174 mg/dL (18 Apr 2021 21:07)  POCT Blood Glucose.: 152 mg/dL (18 Apr 2021 17:35)    04-19    134<L>  |  99  |  38<H>  ----------------------------<  272<H>  4.7   |  20<L>  |  1.41<H>    Ca    8.9      19 Apr 2021 06:10  Phos  3.9     04-19  Mg     2.3     04-19    TPro  6.8  /  Alb  3.7  /  TBili  <0.2  /  DBili  <0.2  /  AST  33  /  ALT  67<H>  /  AlkPhos  162<H>  04-19        A1C with Estimated Average Glucose Result: 11.5 % (04-11-21 @ 07:40)      Thyroid Function Tests:  04-12 @ 06:36 TSH 3.00 FreeT4 1.7 T3 -- Anti TPO -- Anti Thyroglobulin Ab -- TSI --  04-11 @ 11:43 TSH 2.29 FreeT4 -- T3 -- Anti TPO -- Anti Thyroglobulin Ab -- TSI --      Diet, Consistent Carbohydrate Renal w/Evening Snack:   1200mL Fluid Restriction ([id number])  Supplement Feeding Modality:  Oral  Glucerna Shake Cans or Servings Per Day:  2       Frequency:  Daily (04-16-21 @ 12:01) [Active]

## 2021-04-19 NOTE — CHART NOTE - NSCHARTNOTEFT_GEN_A_CORE
Briefly, 50M w/ acute onset flaccid quadriparesis.     MR brain demonstrating cauda equina/nerve root enhancement.     CSF w/ evidence of cytoalbuminologic dissociation.     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     Plan:   [] Spoke to family earlier today; They are leaning towards plasmapharesis as opposed to IVIG due to Sikhism concerns.   [] Consult pharesis team for shiley placement and to begin PLEX for 5 sessions  [] Continue NIF/VC q6h

## 2021-04-20 LAB
ALBUMIN SERPL ELPH-MCNC: 3.7 G/DL — SIGNIFICANT CHANGE UP (ref 3.3–5)
ALP SERPL-CCNC: 135 U/L — HIGH (ref 40–120)
ALT FLD-CCNC: 63 U/L — HIGH (ref 4–41)
ANION GAP SERPL CALC-SCNC: 11 MMOL/L — SIGNIFICANT CHANGE UP (ref 7–14)
APTT BLD: 29.7 SEC — SIGNIFICANT CHANGE UP (ref 27–36.3)
AST SERPL-CCNC: 25 U/L — SIGNIFICANT CHANGE UP (ref 4–40)
AUTO DIFF PNL BLD: NEGATIVE — SIGNIFICANT CHANGE UP
BILIRUB DIRECT SERPL-MCNC: <0.2 MG/DL — SIGNIFICANT CHANGE UP (ref 0–0.2)
BILIRUB INDIRECT FLD-MCNC: >0 MG/DL — SIGNIFICANT CHANGE UP (ref 0–1)
BILIRUB SERPL-MCNC: 0.2 MG/DL — SIGNIFICANT CHANGE UP (ref 0.2–1.2)
BLD GP AB SCN SERPL QL: NEGATIVE — SIGNIFICANT CHANGE UP
BUN SERPL-MCNC: 32 MG/DL — HIGH (ref 7–23)
C-ANCA SER-ACNC: NEGATIVE — SIGNIFICANT CHANGE UP
CALCIUM SERPL-MCNC: 9.2 MG/DL — SIGNIFICANT CHANGE UP (ref 8.4–10.5)
CHLORIDE SERPL-SCNC: 101 MMOL/L — SIGNIFICANT CHANGE UP (ref 98–107)
CO2 SERPL-SCNC: 22 MMOL/L — SIGNIFICANT CHANGE UP (ref 22–31)
CREAT SERPL-MCNC: 1.08 MG/DL — SIGNIFICANT CHANGE UP (ref 0.5–1.3)
CSF PCR RESULT: SIGNIFICANT CHANGE UP
GLUCOSE SERPL-MCNC: 255 MG/DL — HIGH (ref 70–99)
GRAM STN FLD: SIGNIFICANT CHANGE UP
HCT VFR BLD CALC: 37.3 % — LOW (ref 39–50)
HGB BLD-MCNC: 12.5 G/DL — LOW (ref 13–17)
INR BLD: 0.94 RATIO — SIGNIFICANT CHANGE UP (ref 0.88–1.16)
INR BLD: 0.96 RATIO — SIGNIFICANT CHANGE UP (ref 0.88–1.16)
LABORATORY COMMENT REPORT: SIGNIFICANT CHANGE UP
MAGNESIUM SERPL-MCNC: 2.2 MG/DL — SIGNIFICANT CHANGE UP (ref 1.6–2.6)
MCHC RBC-ENTMCNC: 29.3 PG — SIGNIFICANT CHANGE UP (ref 27–34)
MCHC RBC-ENTMCNC: 33.5 GM/DL — SIGNIFICANT CHANGE UP (ref 32–36)
MCV RBC AUTO: 87.4 FL — SIGNIFICANT CHANGE UP (ref 80–100)
NRBC # BLD: 0 /100 WBCS — SIGNIFICANT CHANGE UP
NRBC # FLD: 0 K/UL — SIGNIFICANT CHANGE UP
P-ANCA SER-ACNC: NEGATIVE — SIGNIFICANT CHANGE UP
PHOSPHATE SERPL-MCNC: 2.6 MG/DL — SIGNIFICANT CHANGE UP (ref 2.5–4.5)
PLATELET # BLD AUTO: 278 K/UL — SIGNIFICANT CHANGE UP (ref 150–400)
POTASSIUM SERPL-MCNC: 4.8 MMOL/L — SIGNIFICANT CHANGE UP (ref 3.5–5.3)
POTASSIUM SERPL-SCNC: 4.8 MMOL/L — SIGNIFICANT CHANGE UP (ref 3.5–5.3)
PROT SERPL-MCNC: 7.2 G/DL — SIGNIFICANT CHANGE UP (ref 6–8.3)
PROTHROM AB SERPL-ACNC: 10.7 SEC — SIGNIFICANT CHANGE UP (ref 10.6–13.6)
PROTHROM AB SERPL-ACNC: 11.1 SEC — SIGNIFICANT CHANGE UP (ref 10.6–13.6)
RBC # BLD: 4.27 M/UL — SIGNIFICANT CHANGE UP (ref 4.2–5.8)
RBC # FLD: 11.8 % — SIGNIFICANT CHANGE UP (ref 10.3–14.5)
RH IG SCN BLD-IMP: POSITIVE — SIGNIFICANT CHANGE UP
SODIUM SERPL-SCNC: 134 MMOL/L — LOW (ref 135–145)
SOURCE HSV 1/2: SIGNIFICANT CHANGE UP
SPECIMEN SOURCE: SIGNIFICANT CHANGE UP
WBC # BLD: 8.79 K/UL — SIGNIFICANT CHANGE UP (ref 3.8–10.5)
WBC # FLD AUTO: 8.79 K/UL — SIGNIFICANT CHANGE UP (ref 3.8–10.5)

## 2021-04-20 PROCEDURE — 99232 SBSQ HOSP IP/OBS MODERATE 35: CPT

## 2021-04-20 PROCEDURE — 99446 NTRPROF PH1/NTRNET/EHR 5-10: CPT | Mod: GC

## 2021-04-20 PROCEDURE — 76937 US GUIDE VASCULAR ACCESS: CPT | Mod: 26

## 2021-04-20 PROCEDURE — 36556 INSERT NON-TUNNEL CV CATH: CPT

## 2021-04-20 PROCEDURE — 77001 FLUOROGUIDE FOR VEIN DEVICE: CPT | Mod: 26,GC

## 2021-04-20 RX ORDER — CALCIUM GLUCONATE 100 MG/ML
2 VIAL (ML) INTRAVENOUS
Refills: 0 | Status: DISCONTINUED | OUTPATIENT
Start: 2021-04-20 | End: 2021-05-05

## 2021-04-20 RX ORDER — SODIUM CHLORIDE 9 MG/ML
10 INJECTION INTRAMUSCULAR; INTRAVENOUS; SUBCUTANEOUS
Refills: 0 | Status: DISCONTINUED | OUTPATIENT
Start: 2021-04-20 | End: 2021-05-05

## 2021-04-20 RX ORDER — CHLORHEXIDINE GLUCONATE 213 G/1000ML
1 SOLUTION TOPICAL
Refills: 0 | Status: DISCONTINUED | OUTPATIENT
Start: 2021-04-20 | End: 2021-04-20

## 2021-04-20 RX ORDER — LOSARTAN POTASSIUM 100 MG/1
100 TABLET, FILM COATED ORAL DAILY
Refills: 0 | Status: DISCONTINUED | OUTPATIENT
Start: 2021-04-20 | End: 2021-04-21

## 2021-04-20 RX ORDER — OXYCODONE AND ACETAMINOPHEN 5; 325 MG/1; MG/1
1 TABLET ORAL EVERY 6 HOURS
Refills: 0 | Status: DISCONTINUED | OUTPATIENT
Start: 2021-04-20 | End: 2021-04-22

## 2021-04-20 RX ORDER — INSULIN LISPRO 100/ML
16 VIAL (ML) SUBCUTANEOUS
Refills: 0 | Status: DISCONTINUED | OUTPATIENT
Start: 2021-04-21 | End: 2021-05-05

## 2021-04-20 RX ORDER — ACETAMINOPHEN 500 MG
650 TABLET ORAL EVERY 6 HOURS
Refills: 0 | Status: DISCONTINUED | OUTPATIENT
Start: 2021-04-20 | End: 2021-05-05

## 2021-04-20 RX ORDER — INSULIN LISPRO 100/ML
14 VIAL (ML) SUBCUTANEOUS
Refills: 0 | Status: DISCONTINUED | OUTPATIENT
Start: 2021-04-21 | End: 2021-05-05

## 2021-04-20 RX ORDER — ALBUMIN HUMAN 25 %
3750 VIAL (ML) INTRAVENOUS
Refills: 0 | Status: DISCONTINUED | OUTPATIENT
Start: 2021-04-20 | End: 2021-05-05

## 2021-04-20 RX ORDER — ACETAMINOPHEN 500 MG
1000 TABLET ORAL ONCE
Refills: 0 | Status: COMPLETED | OUTPATIENT
Start: 2021-04-20 | End: 2021-04-20

## 2021-04-20 RX ORDER — INSULIN GLARGINE 100 [IU]/ML
32 INJECTION, SOLUTION SUBCUTANEOUS AT BEDTIME
Refills: 0 | Status: DISCONTINUED | OUTPATIENT
Start: 2021-04-20 | End: 2021-04-21

## 2021-04-20 RX ORDER — HYDROMORPHONE HYDROCHLORIDE 2 MG/ML
1 INJECTION INTRAMUSCULAR; INTRAVENOUS; SUBCUTANEOUS EVERY 8 HOURS
Refills: 0 | Status: DISCONTINUED | OUTPATIENT
Start: 2021-04-20 | End: 2021-04-22

## 2021-04-20 RX ORDER — TRAMADOL HYDROCHLORIDE 50 MG/1
25 TABLET ORAL ONCE
Refills: 0 | Status: DISCONTINUED | OUTPATIENT
Start: 2021-04-20 | End: 2021-04-20

## 2021-04-20 RX ORDER — INSULIN LISPRO 100/ML
14 VIAL (ML) SUBCUTANEOUS
Refills: 0 | Status: DISCONTINUED | OUTPATIENT
Start: 2021-04-20 | End: 2021-05-05

## 2021-04-20 RX ORDER — CHLORHEXIDINE GLUCONATE 213 G/1000ML
1 SOLUTION TOPICAL DAILY
Refills: 0 | Status: DISCONTINUED | OUTPATIENT
Start: 2021-04-20 | End: 2021-05-05

## 2021-04-20 RX ADMIN — Medication 14 UNIT(S): at 17:30

## 2021-04-20 RX ADMIN — Medication 2: at 17:29

## 2021-04-20 RX ADMIN — Medication 25 MILLIGRAM(S): at 06:52

## 2021-04-20 RX ADMIN — TRAMADOL HYDROCHLORIDE 25 MILLIGRAM(S): 50 TABLET ORAL at 03:50

## 2021-04-20 RX ADMIN — Medication 25 MILLIGRAM(S): at 14:11

## 2021-04-20 RX ADMIN — Medication 14 UNIT(S): at 12:06

## 2021-04-20 RX ADMIN — AMLODIPINE BESYLATE 10 MILLIGRAM(S): 2.5 TABLET ORAL at 06:52

## 2021-04-20 RX ADMIN — Medication 400 MILLIGRAM(S): at 06:51

## 2021-04-20 RX ADMIN — SENNA PLUS 2 TABLET(S): 8.6 TABLET ORAL at 17:29

## 2021-04-20 RX ADMIN — GABAPENTIN 100 MILLIGRAM(S): 400 CAPSULE ORAL at 14:11

## 2021-04-20 RX ADMIN — HYDROMORPHONE HYDROCHLORIDE 1 MILLIGRAM(S): 2 INJECTION INTRAMUSCULAR; INTRAVENOUS; SUBCUTANEOUS at 09:12

## 2021-04-20 RX ADMIN — TRAMADOL HYDROCHLORIDE 25 MILLIGRAM(S): 50 TABLET ORAL at 02:57

## 2021-04-20 RX ADMIN — HYDROMORPHONE HYDROCHLORIDE 1 MILLIGRAM(S): 2 INJECTION INTRAMUSCULAR; INTRAVENOUS; SUBCUTANEOUS at 19:20

## 2021-04-20 RX ADMIN — INSULIN GLARGINE 32 UNIT(S): 100 INJECTION, SOLUTION SUBCUTANEOUS at 23:03

## 2021-04-20 RX ADMIN — HYDROMORPHONE HYDROCHLORIDE 1 MILLIGRAM(S): 2 INJECTION INTRAMUSCULAR; INTRAVENOUS; SUBCUTANEOUS at 19:14

## 2021-04-20 RX ADMIN — OXYCODONE AND ACETAMINOPHEN 1 TABLET(S): 5; 325 TABLET ORAL at 15:19

## 2021-04-20 RX ADMIN — SENNA PLUS 2 TABLET(S): 8.6 TABLET ORAL at 06:51

## 2021-04-20 RX ADMIN — Medication 650 MILLIGRAM(S): at 00:20

## 2021-04-20 RX ADMIN — Medication 12.5 MILLIGRAM(S): at 17:29

## 2021-04-20 RX ADMIN — Medication 1000 MILLIGRAM(S): at 07:10

## 2021-04-20 RX ADMIN — LOSARTAN POTASSIUM 100 MILLIGRAM(S): 100 TABLET, FILM COATED ORAL at 14:11

## 2021-04-20 RX ADMIN — Medication 25 MILLIGRAM(S): at 21:13

## 2021-04-20 RX ADMIN — Medication 5 MILLIGRAM(S): at 21:13

## 2021-04-20 RX ADMIN — GABAPENTIN 100 MILLIGRAM(S): 400 CAPSULE ORAL at 21:14

## 2021-04-20 RX ADMIN — OXYCODONE AND ACETAMINOPHEN 1 TABLET(S): 5; 325 TABLET ORAL at 15:59

## 2021-04-20 RX ADMIN — Medication 4: at 12:06

## 2021-04-20 RX ADMIN — Medication 4: at 08:13

## 2021-04-20 RX ADMIN — GABAPENTIN 100 MILLIGRAM(S): 400 CAPSULE ORAL at 06:51

## 2021-04-20 RX ADMIN — Medication 14 UNIT(S): at 08:14

## 2021-04-20 RX ADMIN — Medication 12.5 MILLIGRAM(S): at 06:52

## 2021-04-20 RX ADMIN — HYDROMORPHONE HYDROCHLORIDE 1 MILLIGRAM(S): 2 INJECTION INTRAMUSCULAR; INTRAVENOUS; SUBCUTANEOUS at 09:20

## 2021-04-20 NOTE — PROGRESS NOTE ADULT - SUBJECTIVE AND OBJECTIVE BOX
[facility]   MD WYATT CHRIS DO ANAM SIDDIQUI ANGELA WONG, PA    TEL:  OFFICE: [phone]  DR DAWSON CELL: [phone]  DR. TIRADO CELL: [phone]  DR. MORAN CELL: [phone]  CATINA PATEL CELL: [phone]    From 5pm-7am Answering Service [phone]    -- RENAL FOLLOW UP NOTE ---Date of Service 04-20-21 @ 12:44    Patient is a 50y old  Male who presents with a chief complaint of abdominal pain (20 Apr 2021 10:04)      Patient seen and examined at bedside. No chest pain/sob    VITALS:  T(F): 98.1 (04-20-21 @ 10:32), Max: 98.3 (04-19-21 @ 22:14)  HR: 72 (04-20-21 @ 10:32)  BP: 128/69 (04-20-21 @ 10:32)  RR: 18 (04-20-21 @ 10:32)  SpO2: 100% (04-20-21 @ 10:32)  Wt(kg): --        PHYSICAL EXAM:  Constitutional: NAD  Neck: No JVD  Respiratory: CTAB, no wheezes, rales or rhonchi  Cardiovascular: S1, S2, RRR  Gastrointestinal: BS+, soft, NT/ND  Extremities: No peripheral edema    Hospital Medications:   MEDICATIONS  (STANDING):  albumin human  5% IVPB 3750 milliLiter(s) IV Intermittent every 48 hours  amLODIPine   Tablet 10 milliGRAM(s) Oral daily  bisacodyl 5 milliGRAM(s) Oral at bedtime  bisacodyl Suppository 10 milliGRAM(s) Rectal once  calcium gluconate IVPB 2 Gram(s) IV Intermittent <User Schedule>  dextrose 40% Gel 15 Gram(s) Oral once  dextrose 5%. 1000 milliLiter(s) (50 mL/Hr) IV Continuous <Continuous>  dextrose 5%. 1000 milliLiter(s) (100 mL/Hr) IV Continuous <Continuous>  dextrose 50% Injectable 25 Gram(s) IV Push once  dextrose 50% Injectable 12.5 Gram(s) IV Push once  dextrose 50% Injectable 25 Gram(s) IV Push once  diphenhydrAMINE 25 milliGRAM(s) Oral daily  gabapentin   Solution 100 milliGRAM(s) Oral three times a day  glucagon  Injectable 1 milliGRAM(s) IntraMuscular once  hydrALAZINE 25 milliGRAM(s) Oral three times a day  insulin glargine Injectable (LANTUS) 28 Unit(s) SubCutaneous at bedtime  insulin lispro (ADMELOG) corrective regimen sliding scale   SubCutaneous three times a day before meals  insulin lispro (ADMELOG) corrective regimen sliding scale   SubCutaneous at bedtime  insulin lispro Injectable (ADMELOG) 14 Unit(s) SubCutaneous three times a day before meals  metoprolol tartrate 12.5 milliGRAM(s) Oral two times a day  senna 2 Tablet(s) Oral two times a day  sodium chloride 0.9%. 1000 milliLiter(s) (75 mL/Hr) IV Continuous <Continuous>      LABS:  04-20    134<L>  |  101  |  32<H>  ----------------------------<  255<H>  4.8   |  22  |  1.08    Ca    9.2      20 Apr 2021 07:35  Phos  2.6     04-20  Mg     2.2     04-20    TPro  7.2  /  Alb  3.7  /  TBili  0.2  /  DBili  <0.2  /  AST  25  /  ALT  63<H>  /  AlkPhos  135<H>  04-20    Creatinine Trend: 1.08 <--, 1.41 <--, 1.23 <--, 0.99 <--, 1.13 <--, 1.05 <--, 1.29 <--, 1.21 <--, 1.26 <--    Phosphorus Level, Serum: 2.6 mg/dL (04-20 @ 07:35)  Albumin, Serum: 3.7 g/dL (04-20 @ 07:35)                              12.5   8.79  )-----------( 278      ( 20 Apr 2021 07:35 )             37.3     Urine Studies:  Urinalysis - [04-11-21 @ 12:32]      Color Light Yellow / Appearance Clear / SG 1.013 / pH 6.5      Gluc >= 1000 mg/dL / Ketone Small  / Bili Negative / Urobili <2 mg/dL       Blood Small / Protein 100 mg/dL / Leuk Est Negative / Nitrite Negative      RBC 10 / WBC 1 / Hyaline 1 / Gran  / Sq Epi  / Non Sq Epi 1 / Bacteria Negative    Urine Protein 66      [04-15-21 @ 09:10]  Urine Sodium 21      [04-15-21 @ 09:10]  Urine Osmolality 360      [04-15-21 @ 09:10]    TSH 3.00      [04-12-21 @ 06:36]    HBsAg Nonreact      [04-18-21 @ 01:28]  HCV 0.40, Nonreact      [04-18-21 @ 01:28]  HIV Nonreact      [04-17-21 @ 16:16]    PIERO: titer Negative, pattern --      [04-13-21 @ 10:10]  C3 Complement 152      [04-13-21 @ 07:48]  C4 Complement 33      [04-13-21 @ 07:48]  ANCA: cANCA Negative, pANCA Negative, atypical ANCA Negative      [04-13-21 @ 10:10]  Syphilis Screen (Treponema Pallidum Ab) Positive      [04-13-21 @ 10:10]  Syphilis Screen (RPR Titer) 1:1      [04-13-21 @ 10:10]  Free Light Chains: kappa 1.51, lambda 1.89, ratio = 0.80      [04-13 @ 10:08]  Immunofixation Serum:   No Monoclonal Band Identified. KRIS Ferris M.D.    Reference Range: None Detected      [04-13-21 @ 07:48]  SPEP Interpretation: with acute phase reaction. KRIS Ferris M.D.      [04-13-21 @ 07:48]    RADIOLOGY & ADDITIONAL STUDIES:

## 2021-04-20 NOTE — PROGRESS NOTE ADULT - ASSESSMENT
51 yo male, PMH DM, HTN, hyperlipidemia; pt presented to the ED c/o abdominal cramping, generalized, and diarrhea following use of laxatives as above for constipation.  In the ED, WBC 10.82, Hb 13.6, CMP: sodium 126, chloride 89, glucose 281, CMP otherwise unremarkable.  VBG: Lactate 2.6 (later downtrended to 1.6).  Pt. was treated with IV hydration and dispo'd to CDU for continued care plan:  IV hydration, supportive care, AM labs, general observation care / monitoring. pt continued to have generalized weakness , numbness and on labs persistent hyponatermia      A/P:    ALLIE  scr worsen today. baseline likely ~ 1  ALLIE possible sec to uncontrolled hyperkalemia on losartan  s/p NS @ 75cc/hr x 12 hrs  scr improved  resume losartan  monitor bmp  avoid nephrotoxic agents    Hyponatremia:  Likely sec to hypovolemia+hyperglycemia. work up suggested SIADH  PT reports he is drinking a lot of water because he is constipated   Repeat Urine Na, osmolality showed polydipsia.   optimize dm control  free water restriction <1.2L/day.  improving sodium today   ok TSH, serum cortisol level  Monitor Na level Q12  Na level should not increase more than 6meq in 24 hrs    Proteinuia/Hematuria:  Etiology?  Had Cystoscopy last year and was normal per patient  Possible sec to Diabetic Nephropathy but will need full vasculitis work up  check urine p/c ratio  c3 c4 hep b hep c hiv, k/l wnl, lexii neg  pendingANCA, Serum immunofixation, SPEP    RPR +, patient s/p treatment 2017 ID eval appreciated     Based on work up result he might need kidney biopsy, which can be planned as outpatient as his renal function is stable  D/W patient in detail above plan    Hypophosphatemia:  SUpplement as needed   montior    HTN  BP improving  on norvasc and losartan  Continue Hydralazine 25mg TID  Monitor closely

## 2021-04-20 NOTE — CONSULT NOTE ADULT - ASSESSMENT
50 year old male presented with diarrhea and abdominal cramping and eventually with progressive LE weakness and reduced reflexes. S/p LP concern for GBS. Primary team requested PLEX.     Risks and benefits explained in details. All questions answered. Consent obtained from wife and son for PLEX with ONLY albumin. [facility] notified.  Plan to do 5 procedures over next 10 days. Jazzy pending.    PLEX today with 3750ml 5% albumin.  50 year old male presented with diarrhea and abdominal cramping and eventually with progressive LE weakness and reduced reflexes. S/p LP concern for GBS. Neuro requested PLEX.     Risks and benefits explained in details. All questions answered. Consent obtained from wife and son for PLEX with ONLY albumin. [facility] notified.  Plan to do 5 procedures over next 10 days. Jazzy pending.    PLEX will be started on 4/21/21 with 3750ml of 5% albumin as replacement fluid.

## 2021-04-20 NOTE — CHART NOTE - NSCHARTNOTEFT_GEN_A_CORE
IR Pre-Procedure Note    Patient Age: 50y    Patient Gender: Male    Procedure (including site / side if known): Shiley Catheter    Diagnosis / Indication: Patient is a 50y old  Male who presents with a chief complaint of abdominal pain (20 Apr 2021 10:04)      Interventional Radiology Attending Physician: Dr. Hardwick    Ordering Attending Physician: Dr. Garza    PAST MEDICAL & SURGICAL HISTORY:  DM (diabetes mellitus)  HTN (hypertension)  Hyperlipidemia  H/O total knee replacement  Foot fracture    Pertinent Labs:   CBC Full  -  ( 20 Apr 2021 07:35 )  WBC Count : 8.79 K/uL  RBC Count : 4.27 M/uL  Hemoglobin : 12.5 g/dL  Hematocrit : 37.3 %  Platelet Count - Automated : 278 K/uL  Mean Cell Volume : 87.4 fL  Mean Cell Hemoglobin : 29.3 pg  Mean Cell Hemoglobin Concentration : 33.5 gm/dL  Auto Neutrophil # : x  Auto Lymphocyte # : x  Auto Monocyte # : x  Auto Eosinophil # : x  Auto Basophil # : x  Auto Neutrophil % : x  Auto Lymphocyte % : x  Auto Monocyte % : x  Auto Eosinophil % : x  Auto Basophil % : x    04-20    134<L>  |  101  |  32<H>  ----------------------------<  255<H>  4.8   |  22  |  1.08    Ca    9.2      20 Apr 2021 07:35  Phos  2.6     04-20  Mg     2.2     04-20    TPro  7.2  /  Alb  3.7  /  TBili  0.2  /  DBili  <0.2  /  AST  25  /  ALT  63<H>  /  AlkPhos  135<H>  04-20    PT/INR - ( 20 Apr 2021 11:52 )   PT: 11.1 sec;   INR: 0.96 ratio      PTT - ( 20 Apr 2021 07:35 )  PTT:29.7 sec    Patient / Family aware of procedure:   [ x ] Y   [  ] N        Tanisha Joe NP-BC  Department of Medicine  In House Pager #[phone]

## 2021-04-20 NOTE — PROCEDURE NOTE - PROCEDURE FINDINGS AND DETAILS
Patent right IJ vein. Successful insertion of a non-tunneled plasmapheresis catheter with its tip in the SVC.

## 2021-04-20 NOTE — PROGRESS NOTE ADULT - SUBJECTIVE AND OBJECTIVE BOX
Patient is a 50y Male     Patient is a 50y old  Male who presents with a chief complaint of abdominal pain (19 Apr 2021 08:34)      HPI:  51 yo male, PMH DM, HTN, hyperlipidemia; pt presented to the ED c/o abdominal cramping, generalized, and diarrhea following use of laxatives as above for constipation.  In the ED, WBC 10.82, Hb 13.6, CMP: sodium 126, chloride 89, glucose 281, CMP otherwise unremarkable.  VBG: Lactate 2.6 (later downtrended to 1.6).  Pt. was treated with IV hydration and dispo'd to CDU for continued care plan:  IV hydration, supportive care, AM labs, general observation care / monitoring. pt continued to have generalized weakness , numbness and on labs persistent hyponatermia and now being admitted for furhter evlauation .  dnenies N/V/Abd pain   had bm   no urinary sx   no fever or chills   no cough   no cp /sob    (11 Apr 2021 15:50)      PAST MEDICAL & SURGICAL HISTORY:  DM (diabetes mellitus)    HTN (hypertension)    Hyperlipidemia    H/O total knee replacement    Foot fracture        MEDICATIONS  (STANDING):  amLODIPine   Tablet 10 milliGRAM(s) Oral daily  bisacodyl 5 milliGRAM(s) Oral at bedtime  bisacodyl Suppository 10 milliGRAM(s) Rectal once  dextrose 40% Gel 15 Gram(s) Oral once  dextrose 5%. 1000 milliLiter(s) (50 mL/Hr) IV Continuous <Continuous>  dextrose 5%. 1000 milliLiter(s) (100 mL/Hr) IV Continuous <Continuous>  dextrose 50% Injectable 25 Gram(s) IV Push once  dextrose 50% Injectable 12.5 Gram(s) IV Push once  dextrose 50% Injectable 25 Gram(s) IV Push once  diphenhydrAMINE 25 milliGRAM(s) Oral daily  gabapentin   Solution 100 milliGRAM(s) Oral three times a day  glucagon  Injectable 1 milliGRAM(s) IntraMuscular once  hydrALAZINE 25 milliGRAM(s) Oral three times a day  insulin glargine Injectable (LANTUS) 28 Unit(s) SubCutaneous at bedtime  insulin lispro (ADMELOG) corrective regimen sliding scale   SubCutaneous three times a day before meals  insulin lispro (ADMELOG) corrective regimen sliding scale   SubCutaneous at bedtime  insulin lispro Injectable (ADMELOG) 14 Unit(s) SubCutaneous three times a day before meals  metoprolol tartrate 12.5 milliGRAM(s) Oral two times a day  senna 2 Tablet(s) Oral two times a day  sodium chloride 0.9%. 1000 milliLiter(s) (75 mL/Hr) IV Continuous <Continuous>      Allergies    No Known Drug Allergies  shellfish (Urticaria)    Intolerances        SOCIAL HISTORY:  Denies ETOh,Smoking,     FAMILY HISTORY:  No pertinent family history in first degree relatives        REVIEW OF SYSTEMS:    CONSTITUTIONAL: No weakness, fevers or chills  EYES/ENT: No visual changes;  No vertigo or throat pain   NECK: No pain or stiffness  RESPIRATORY: No cough, wheezing, hemoptysis; No shortness of breath  CARDIOVASCULAR: No chest pain or palpitations  GASTROINTESTINAL: No abdominal or epigastric pain. No nausea, vomiting, or hematemesis; No diarrhea or constipation. No melena or hematochezia.  GENITOURINARY: No dysuria, frequency or hematuria  NEUROLOGICAL: No numbness or weakness  SKIN: No itching, burning, rashes, or lesions   All other review of systems is negative unless indicated above.    VITAL:  T(C): , Max: 37.1 (04-19-21 @ 10:08)  T(F): , Max: 98.7 (04-19-21 @ 10:08)  HR: 84 (04-20-21 @ 06:25)  BP: 156/80 (04-20-21 @ 06:25)  BP(mean): --  RR: 18 (04-20-21 @ 06:25)  SpO2: 99% (04-20-21 @ 06:25)  Wt(kg): --    I and O's:    04-18 @ 07:01  -  04-19 @ 07:00  --------------------------------------------------------  IN: 0 mL / OUT: 1025 mL / NET: -1025 mL          PHYSICAL EXAM:    Constitutional: NAD  HEENT: PERRLA,   Neck: No JVD  Respiratory: CTA B/L  Cardiovascular: S1 and S2  Gastrointestinal: BS+, soft, NT/ND  Extremities: No peripheral edema  Neurological: A/O x 3, no focal deficits  Psychiatric: Normal mood, normal affect  : No Jose  Skin: No rashes  Access: Not applicable  Back: No CVA tenderness    LABS:                        12.5   8.79  )-----------( 278      ( 20 Apr 2021 07:35 )             37.3     04-20    134<L>  |  101  |  32<H>  ----------------------------<  255<H>  4.8   |  22  |  1.08    Ca    9.2      20 Apr 2021 07:35  Phos  2.6     04-20  Mg     2.2     04-20    TPro  7.2  /  Alb  3.7  /  TBili  0.2  /  DBili  <0.2  /  AST  25  /  ALT  63<H>  /  AlkPhos  135<H>  04-20          RADIOLOGY & ADDITIONAL STUDIES:

## 2021-04-20 NOTE — PROGRESS NOTE ADULT - SUBJECTIVE AND OBJECTIVE BOX
Neurology Progress Note    S: Patient seen and examined. No new events overnight. patient denied CP, SOB, HA or pain. still LE weakness.  LP with + albumino cytologic dissociation c/w GBS    Medication:  MEDICATIONS  (STANDING):  amLODIPine   Tablet 10 milliGRAM(s) Oral daily  bisacodyl 5 milliGRAM(s) Oral at bedtime  bisacodyl Suppository 10 milliGRAM(s) Rectal once  dextrose 40% Gel 15 Gram(s) Oral once  dextrose 5%. 1000 milliLiter(s) (50 mL/Hr) IV Continuous <Continuous>  dextrose 5%. 1000 milliLiter(s) (100 mL/Hr) IV Continuous <Continuous>  dextrose 50% Injectable 25 Gram(s) IV Push once  dextrose 50% Injectable 12.5 Gram(s) IV Push once  dextrose 50% Injectable 25 Gram(s) IV Push once  diphenhydrAMINE 25 milliGRAM(s) Oral daily  gabapentin   Solution 100 milliGRAM(s) Oral three times a day  glucagon  Injectable 1 milliGRAM(s) IntraMuscular once  hydrALAZINE 25 milliGRAM(s) Oral three times a day  insulin glargine Injectable (LANTUS) 28 Unit(s) SubCutaneous at bedtime  insulin lispro (ADMELOG) corrective regimen sliding scale   SubCutaneous three times a day before meals  insulin lispro (ADMELOG) corrective regimen sliding scale   SubCutaneous at bedtime  insulin lispro Injectable (ADMELOG) 14 Unit(s) SubCutaneous three times a day before meals  metoprolol tartrate 12.5 milliGRAM(s) Oral two times a day  senna 2 Tablet(s) Oral two times a day  sodium chloride 0.9%. 1000 milliLiter(s) (75 mL/Hr) IV Continuous <Continuous>    MEDICATIONS  (PRN):  acetaminophen   Tablet .. 650 milliGRAM(s) Oral every 6 hours PRN Temp greater or equal to 38C (100.4F), Mild Pain (1 - 3), Moderate Pain (4 - 6)  acetaminophen   Tablet .. 650 milliGRAM(s) Oral every 6 hours PRN Mild Pain (1 - 3)  aluminum hydroxide/magnesium hydroxide/simethicone Suspension 30 milliLiter(s) Oral every 6 hours PRN Dyspepsia  dicyclomine 10 milliGRAM(s) Oral once PRN abdominal pain  HYDROmorphone  Injectable 1 milliGRAM(s) IV Push every 8 hours PRN Severe Pain (7 - 10)  oxycodone    5 mG/acetaminophen 325 mG 1 Tablet(s) Oral every 6 hours PRN Moderate Pain (4 - 6)      Vitals:  Vital Signs Last 24 Hrs  T(C): 25.9 (19 Apr 2021 10:08), Max: 36.9 (19 Apr 2021 05:19)  T(F): 78.7 (19 Apr 2021 10:08), Max: 98.4 (19 Apr 2021 05:19)  HR: 90 (19 Apr 2021 10:08) (75 - 93)  BP: 100/65 (19 Apr 2021 10:08) (100/65 - 136/77)  BP(mean): --  RR: 17 (19 Apr 2021 10:08) (16 - 18)  SpO2: 100% (19 Apr 2021 10:08) (98% - 100%)    General Exam:   General Appearance: Appropriately dressed and in no acute distress       Head: Normocephalic, atraumatic and no dysmorphic features  Ear, Nose, and Throat: Moist mucous membranes  CVS: S1S2+  Resp: No SOB, no wheeze or rhonchi  Abd: soft NTND  Extremities: No edema, no cyanosis  Skin: No bruises, no rashes     Neurological Exam:  Mental Status: Awake, alert and oriented x 3.  Able to follow simple and complex verbal commands. Able to name and repeat. fluent speech. No obvious aphasia or dysarthria noted.   Cranial Nerves: PERRL, EOMI, VFFC, sensation V1-V3 intact,  no obvious facial asymmetry , equal elevation of palate, scm/trap 5/5, tongue is midline on protrusion. no obvious papilledema on fundoscopic exam. Hearing is grossly intact.   Motor: Normal bulk, tone and strength throughout B/L UE Fine finger movements were intact and symmetric. no tremors B/L LE 2/5  Sensation: Intact to light touch and pinprick throughout. no right/left confusion. no extinction to tactile on DSS. Romberg was negative.   Reflexes: 0 throughout at biceps, brachioradialis, triceps, patellars and ankles bilaterally and equal. only hyas LUE biceps 1+ No clonus. R toe and L toe were both downgoing.  Coordination: No dysmetria on FNF   Gait: unable    I personally reviewed the below data/images/labs:    CBC Full  -  ( 20 Apr 2021 07:35 )  WBC Count : 8.79 K/uL  RBC Count : 4.27 M/uL  Hemoglobin : 12.5 g/dL  Hematocrit : 37.3 %  Platelet Count - Automated : 278 K/uL  Mean Cell Volume : 87.4 fL  Mean Cell Hemoglobin : 29.3 pg  Mean Cell Hemoglobin Concentration : 33.5 gm/dL  Auto Neutrophil # : x  Auto Lymphocyte # : x  Auto Monocyte # : x  Auto Eosinophil # : x  Auto Basophil # : x  Auto Neutrophil % : x  Auto Lymphocyte % : x  Auto Monocyte % : x  Auto Eosinophil % : x  Auto Basophil % : x    04-20    134<L>  |  101  |  32<H>  ----------------------------<  255<H>  4.8   |  22  |  1.08    Ca    9.2      20 Apr 2021 07:35  Phos  2.6     04-20  Mg     2.2     04-20    TPro  7.2  /  Alb  3.7  /  TBili  0.2  /  DBili  <0.2  /  AST  25  /  ALT  63<H>  /  AlkPhos  135<H>  04-20      < from: CT Head No Cont (04.02.21 @ 21:48) >    EXAM:  CT BRAIN        PROCEDURE DATE:  Apr 2 2021         INTERPRETATION:  HISTORY: Hypertension    Technique: CT of the head was performed without contrast.    Multiple contiguous axial images were acquired from the skullbase to the vertex without the administration of intravenous contrast.  Coronal and sagittal reformations were made.    COMPARISON: None.    FINDINGS:  The ventricles and sulci are within normal limits given the patient's age. No acute hemorrhage, mass effect, midline shift,hydrocephalus, or extra-axial fluid collections. Mild patchy hypoattenuation within the white matter, likely secondary chronic microscopy changes.    The calvarium is intact.    Small cyst or polyp in the right maxillary sinus. There are opacification of a few ethmoid air cells bilaterally as well as mild mucosal thickening of the left frontal sinus. The mastoid air cells are clear.    IMPRESSION:  No acute hemorrhage, mass effect, or midline shift.            DARRELL MIX MD; Resident Radiology  This document has been electronically signed.  PORTILLO HARPER MD; Attending Radiologist  This document has been electronically signed. Apr 2 2021 11:34PM    < end of copied text >  < from: MR Lumbar Spine w/wo IV Cont (04.16.21 @ 22:16) >    EXAM:  MR SPINE LUMBAR WAW IC      EXAM:  MR SPINE THORACIC WAW IC        PROCEDURE DATE:  Apr 16 2021         INTERPRETATION:  CLINICAL INDICATION: Bilateral leg weakness of unknown etiology.    Technique: MRI of the thoracic and lumbar spine was performed with and without contrast. A total 10 cc of Gadavist were administered intravenously.    COMPARISON: None.    FINDINGS:    THORACIC:    There is normal thoracic kyphosis. There are multiple small vertebral body endplate Schmorl's nodes. Otherwise, the vertebrae are normal in signal, height, and alignment. There is anterior endplate osteophytosis most pronounced from the most pronounced from T6-T10.    There are tiny disc bulges at T7-T8 and T8-T9, without significant central spinal canalstenosis or neuroforaminal narrowing.    The spinal cord is normal in course, caliber, and signal.    There is no abnormal enhancement within thoracic spine.    LUMBAR:    There is normal lumbar lordosis. There are mild-to-moderate type I degenerative endplate changes on the left at L4-L5. Otherwise, the vertebral bodies are normal in height and signal without fracture or dislocation.    There is disc space narrowing and desiccation at L4-L5 and L5-S1.    Evaluation of individual levels demonstrates:    L5-S1: Small posterior disc bulge, as well as mild right greater than left facet hypertrophy, resulting in moderate right and mild left neural foraminal stenosis. No significant spinal canal stenosis.    L4-L5: Small posterior disc bulge and superimposed small right central disc protrusion, including tiny annular fissure, resulting in mild spinal canal stenosis, as well as bilateral facet hypertrophy with moderate right and severe left neural foraminal stenosis.    L3-L4: Small posterior disc bulge, along with bilateral facet hypertrophy, resulting in mild spinal canal stenosis as well as mild bilateral neural foraminal stenosis    L2-L3: No significant disc herniation, central spinal canal stenosis, or neural foraminal narrowing. Mildbilateral facet hypertrophy.    L1-L2: Tiny posterior disc bulge without significant central spinal canal stenosis or neuroforaminal narrowing. Mild bilateral facet hypertrophy.    The conus is normal in position and morphology at the L1 level. Thereis smooth thickening and abnormal enhancement of the cauda equina and ventral nerve roots, a nonspecific finding.    There is no definite fluid collection or mass lesion within the visualized retroperitoneal or posterior paraspinal soft tissues.    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 Alternative etiologies include chronic inflammatory demyelinating polyneuropathy, Lyme disease, less likely arachnoiditis or carcinomatosis.    Results were discussed with ADELSO Elkins by Dr. Haji at 9:40 AM on 4/17/2021 with read back followed.              ROSIE HAJI MD; Attending Radiologist  This document has been electronically signed. Apr 17 2021  9:48AM    < end of copied text >  < from: MR Cervical Spine w/wo IV Cont (04.16.21 @ 22:16) >    EXAM:  MR SPINE CERVICAL WAW IC        PROCEDURE DATE:  Apr 16 2021         INTERPRETATION:  CLINICAL INDICATION: Patient with new weakness in lower extremities.    TECHNIQUE: MRI of the cervical spine was performed before and after intravenous contrast. A total 10 cc of Gadavist were administered.    COMPARISON: None.    FINDINGS:  Structures at the craniocervical and cervicomedullary junction are intact.    There is nonspecific straightening of the normal cervical lordosis. There is grade 1 degenerative retrolisthesis of C5 on C6. There is disc space narrowing at C3-C4 and C5-C6. The remaining intervertebral disc space heights are grossly preserved. There is anterior endplate osteophytosis from C3 to C6.    There are mild Modic type II degenerative endplate changes involving C3 CT 4 and C5-C6, along with mild endplate irregularity. Otherwise, the vertebrae demonstrate normal height and signal without fracture, dislocation or marrow edema.    The spinal cord demonstrates normal course and caliber without intrinsic cord signal abnormality.    The prevertebral and paravertebral soft tissues are within normal limits.    There is no abnormal enhancement.    There is diffuse disc desiccation. Evaluation of individual levels demonstrates:    C2-C3: No significant disc herniation, central spinal canal stenosis, although neural foraminal narrowing.    C3-C4: Small posterior disc osteophyte complexes, partially effacing the ventral thecal sac and resulting in mild spinal canal stenosis,as well as moderate right and mild left neural foraminal narrowing.    C4-C5: Tiny posterior disc osteophyte complex minimally effacing the ventral thecal sac, without significant spinal canal stenosis, as well as moderate left and mild right neural foraminal narrowing.    C5-C6: Tiny posterior disc osteophyte complex minimally effacing the ventral thecal sac, without significant spinal canal stenosis, as well as severe right and moderate left neural foraminal narrowing.    C6-C7: No significant disc herniation, central spinal canal stenosis, or neural foraminal narrowing.    C7-T1: No significant disc herniation, central spinal canal stenosis, or neural foraminal narrowing.    There is no soft tissue neck mass or fluid collection.    IMPRESSION:  No evidence for spinal cord compression, signal abnormality, or abnormal enhancement.    Multilevel degenerative changes, as described above, most pronounced at C3-C4 where there is mild spinal canal stenosis and C5-C6 where there is severe right and moderate left neural foraminal narrowing.              ROSIE HAJI MD; Attending Radiologist  This document has been electronically signed. Apr 17 2021  8:54AM    < end of copied text >

## 2021-04-20 NOTE — PROGRESS NOTE ADULT - ASSESSMENT
50M uncontrolled DM2 HbA1c 11.5%, hyperglycemia related to difficulties obtaining adequate insulin during pandemic due to insurance/cost.    1) DM2 with hyperglycemia  Inpatient plan:  BG target 100-180 mg/dl  glucose is above goal, likely exacerbated by pain/stress  carb consistent diet  FS premeal and bedtime  increase Lantus to 32 units qhs  increase Admelog to 16/14/14 before meals - HOLD if not eating   Continue Admelog moderate scale premeal and moderate bedtime scale  RD consult- appreciated    Discussed with patient regarding discharge planning, he now will have enough Tresiba and Novolog at home to proceed with these insulins due to [facility] program he is now a part of.   DC on Tresiba and Novolog pens doses TBD. Discussed option of mixed insulin in case of future issues obtaining insulin.  Can follow with outpatient endocrine Dr. Ban Velez, although he requested changing to [facility] endocrinology if he prefers [phone]. Appointments below:  [address] [place] [address], [address], [phone]  6/8/21 @ 11:00 AM with diabetes educator   7/12/21 @ 11:00 AM with Dr Montesinos     2) Hyponatremia  TSH, Free T4 and 8AM cortisol in acceptable range not indicative of endocrine cause of hyponatremia. Na- 134 today    3) Hypertension  controlled at this time   management per primary team    David Rojas MD  Division of Endocrinology  Pager: [phone]    If after 6PM or before 9AM, or on weekends/holidays, please call endocrine answering service for assistance ([phone]).  For nonurgent matters email [email or web address] for assistance.

## 2021-04-20 NOTE — PROGRESS NOTE ADULT - SUBJECTIVE AND OBJECTIVE BOX
Chief Complaint: DM2    History: patient complains of significant mid back pain, unable to sleep due to pain  tolerating some po  drank Glucerna for breakfast    MEDICATIONS  (STANDING):  albumin human  5% IVPB 3750 milliLiter(s) IV Intermittent every 48 hours  amLODIPine   Tablet 10 milliGRAM(s) Oral daily  bisacodyl 5 milliGRAM(s) Oral at bedtime  bisacodyl Suppository 10 milliGRAM(s) Rectal once  calcium gluconate IVPB 2 Gram(s) IV Intermittent <User Schedule>  dextrose 40% Gel 15 Gram(s) Oral once  dextrose 5%. 1000 milliLiter(s) (50 mL/Hr) IV Continuous <Continuous>  dextrose 5%. 1000 milliLiter(s) (100 mL/Hr) IV Continuous <Continuous>  dextrose 50% Injectable 25 Gram(s) IV Push once  dextrose 50% Injectable 12.5 Gram(s) IV Push once  dextrose 50% Injectable 25 Gram(s) IV Push once  diphenhydrAMINE 25 milliGRAM(s) Oral daily  gabapentin   Solution 100 milliGRAM(s) Oral three times a day  glucagon  Injectable 1 milliGRAM(s) IntraMuscular once  hydrALAZINE 25 milliGRAM(s) Oral three times a day  insulin glargine Injectable (LANTUS) 28 Unit(s) SubCutaneous at bedtime  insulin lispro (ADMELOG) corrective regimen sliding scale   SubCutaneous three times a day before meals  insulin lispro (ADMELOG) corrective regimen sliding scale   SubCutaneous at bedtime  insulin lispro Injectable (ADMELOG) 14 Unit(s) SubCutaneous three times a day before meals  losartan 100 milliGRAM(s) Oral daily  metoprolol tartrate 12.5 milliGRAM(s) Oral two times a day  senna 2 Tablet(s) Oral two times a day  sodium chloride 0.9%. 1000 milliLiter(s) (75 mL/Hr) IV Continuous <Continuous>    MEDICATIONS  (PRN):  acetaminophen   Tablet .. 650 milliGRAM(s) Oral every 6 hours PRN Temp greater or equal to 38C (100.4F), Mild Pain (1 - 3), Moderate Pain (4 - 6)  acetaminophen   Tablet .. 650 milliGRAM(s) Oral every 6 hours PRN Mild Pain (1 - 3)  aluminum hydroxide/magnesium hydroxide/simethicone Suspension 30 milliLiter(s) Oral every 6 hours PRN Dyspepsia  dicyclomine 10 milliGRAM(s) Oral once PRN abdominal pain  HYDROmorphone  Injectable 1 milliGRAM(s) IV Push every 8 hours PRN Severe Pain (7 - 10)  oxycodone    5 mG/acetaminophen 325 mG 1 Tablet(s) Oral every 6 hours PRN Moderate Pain (4 - 6)      Allergies    No Known Drug Allergies  shellfish (Urticaria)    Intolerances      Review of Systems:  ALL OTHER SYSTEMS REVIEWED AND NEGATIVE      PHYSICAL EXAM:  VITALS: T(C): 36.7 (04-20-21 @ 13:50)  T(F): 98 (04-20-21 @ 13:50), Max: 98.3 (04-19-21 @ 22:14)  HR: 77 (04-20-21 @ 13:50) (72 - 88)  BP: 148/91 (04-20-21 @ 13:50) (107/68 - 156/80)  RR:  (17 - 18)  SpO2:  (98% - 100%)  Wt(kg): --  GENERAL: NAD, well-groomed, well-developed  EYES: No proptosis, no lid lag, anicteric  HEENT:  Atraumatic, Normocephalic, moist mucous membranes  RESPIRATORY: nonlabored respirations  PSYCH: Alert and oriented x 3, normal affect, normal mood    CAPILLARY BLOOD GLUCOSE      POCT Blood Glucose.: 232 mg/dL (20 Apr 2021 12:03)  POCT Blood Glucose.: 246 mg/dL (20 Apr 2021 07:33)  POCT Blood Glucose.: 157 mg/dL (19 Apr 2021 21:34)  POCT Blood Glucose.: 195 mg/dL (19 Apr 2021 18:01)      04-20    134<L>  |  101  |  32<H>  ----------------------------<  255<H>  4.8   |  22  |  1.08    EGFR if : 91  EGFR if non : 78    Ca    9.2      04-20  Mg     2.2     04-20  Phos  2.6     04-20    TPro  7.2  /  Alb  3.7  /  TBili  0.2  /  DBili  <0.2  /  AST  25  /  ALT  63<H>  /  AlkPhos  135<H>  04-20      A1C with Estimated Average Glucose Result: 11.5 % (04-11-21 @ 07:40)      Thyroid Function Tests:  04-12 @ 06:36 TSH 3.00 FreeT4 1.7 T3 -- Anti TPO -- Anti Thyroglobulin Ab -- TSI --  04-11 @ 11:43 TSH 2.29 FreeT4 -- T3 -- Anti TPO -- Anti Thyroglobulin Ab -- TSI --

## 2021-04-20 NOTE — PROGRESS NOTE ADULT - SUBJECTIVE AND OBJECTIVE BOX
Date of service: 04-20-21 @ 22:02      Patient is a 50y old  Male who presents with a chief complaint of GBS (20 Apr 2021 15:11)                                                               INTERVAL HPI/OVERNIGHT EVENTS:    REVIEW OF SYSTEMS:     CONSTITUTIONAL: No weakness, fevers or chills  RESPIRATORY: No cough, wheezing,  No shortness of breath  CARDIOVASCULAR: No chest pain or palpitations  GASTROINTESTINAL: No abdominal pain  . No nausea, vomiting, or hematemesis; No diarrhea or constipation. No melena or hematochezia.  GENITOURINARY: No dysuria, frequency or hematuria  NEUROLOGICAL:LE edema                                                                                                                                                                                                                                                                                Medications:  MEDICATIONS  (STANDING):  albumin human  5% IVPB 3750 milliLiter(s) IV Intermittent every 48 hours  amLODIPine   Tablet 10 milliGRAM(s) Oral daily  bisacodyl 5 milliGRAM(s) Oral at bedtime  bisacodyl Suppository 10 milliGRAM(s) Rectal once  calcium gluconate IVPB 2 Gram(s) IV Intermittent <User Schedule>  chlorhexidine 2% Cloths 1 Application(s) Topical daily  dextrose 40% Gel 15 Gram(s) Oral once  dextrose 5%. 1000 milliLiter(s) (50 mL/Hr) IV Continuous <Continuous>  dextrose 5%. 1000 milliLiter(s) (100 mL/Hr) IV Continuous <Continuous>  dextrose 50% Injectable 25 Gram(s) IV Push once  dextrose 50% Injectable 12.5 Gram(s) IV Push once  dextrose 50% Injectable 25 Gram(s) IV Push once  diphenhydrAMINE 25 milliGRAM(s) Oral daily  gabapentin   Solution 100 milliGRAM(s) Oral three times a day  glucagon  Injectable 1 milliGRAM(s) IntraMuscular once  hydrALAZINE 25 milliGRAM(s) Oral three times a day  insulin glargine Injectable (LANTUS) 32 Unit(s) SubCutaneous at bedtime  insulin lispro (ADMELOG) corrective regimen sliding scale   SubCutaneous three times a day before meals  insulin lispro (ADMELOG) corrective regimen sliding scale   SubCutaneous at bedtime  insulin lispro Injectable (ADMELOG) 14 Unit(s) SubCutaneous before dinner  losartan 100 milliGRAM(s) Oral daily  metoprolol tartrate 12.5 milliGRAM(s) Oral two times a day  senna 2 Tablet(s) Oral two times a day  sodium chloride 0.9%. 1000 milliLiter(s) (75 mL/Hr) IV Continuous <Continuous>    MEDICATIONS  (PRN):  acetaminophen   Tablet .. 650 milliGRAM(s) Oral every 6 hours PRN Temp greater or equal to 38C (100.4F), Mild Pain (1 - 3), Moderate Pain (4 - 6)  acetaminophen   Tablet .. 650 milliGRAM(s) Oral every 6 hours PRN Mild Pain (1 - 3)  aluminum hydroxide/magnesium hydroxide/simethicone Suspension 30 milliLiter(s) Oral every 6 hours PRN Dyspepsia  dicyclomine 10 milliGRAM(s) Oral once PRN abdominal pain  HYDROmorphone  Injectable 1 milliGRAM(s) IV Push every 8 hours PRN Severe Pain (7 - 10)  oxycodone    5 mG/acetaminophen 325 mG 1 Tablet(s) Oral every 6 hours PRN Moderate Pain (4 - 6)  sodium chloride 0.9% lock flush 10 milliLiter(s) IV Push every 1 hour PRN Pre/post blood products, medications, blood draw, and to maintain line patency       Allergies    No Known Drug Allergies  shellfish (Urticaria)    Intolerances      Vital Signs Last 24 Hrs  T(C): 37 (20 Apr 2021 21:15), Max: 37 (20 Apr 2021 21:15)  T(F): 98.6 (20 Apr 2021 21:15), Max: 98.6 (20 Apr 2021 21:15)  HR: 70 (20 Apr 2021 21:15) (70 - 84)  BP: 113/65 (20 Apr 2021 21:15) (113/65 - 156/80)  BP(mean): --  RR: 18 (20 Apr 2021 21:15) (17 - 18)  SpO2: 100% (20 Apr 2021 21:15) (99% - 100%)  CAPILLARY BLOOD GLUCOSE      POCT Blood Glucose.: 164 mg/dL (20 Apr 2021 17:16)  POCT Blood Glucose.: 232 mg/dL (20 Apr 2021 12:03)  POCT Blood Glucose.: 246 mg/dL (20 Apr 2021 07:33)      Physical Exam:    Daily     Daily   General: NAD  HEENT:  Nonicteric, PERRLA  CV:  RRR, S1S2   Lungs:  CTA B/L, no wheezes, rales, rhonchi  Abdomen:  Soft, non-tender, no distended, positive BS  Extremities:  no edema  Neuro:  LE weakness                                                                                                                                                                                                                                                                                  LABS:                               12.5   8.79  )-----------( 278      ( 20 Apr 2021 07:35 )             37.3                      04-20    134<L>  |  101  |  32<H>  ----------------------------<  255<H>  4.8   |  22  |  1.08    Ca    9.2      20 Apr 2021 07:35  Phos  2.6     04-20  Mg     2.2     04-20    TPro  7.2  /  Alb  3.7  /  TBili  0.2  /  DBili  <0.2  /  AST  25  /  ALT  63<H>  /  AlkPhos  135<H>  04-20                       RADIOLOGY & ADDITIONAL TESTS         I personally reviewed: [  ]EKG   [  ]CXR    [  ] CT      A/P:         Discussed with :     Francisco consultants' Notes   Time spent :

## 2021-04-20 NOTE — CONSULT NOTE ADULT - SUBJECTIVE AND OBJECTIVE BOX
HPI:  49 yo male presented with diarrhea and abdominal cramping following use of laxatives as above for constipation.  pt continued to have generalized weakness , numbness and on labs persistent hyponatermia and now being admitted for further evlauation .  denies N/V/Abd pain. 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 Family refused IVIG. Patient is s/p LP.     MEDICATIONS  (STANDING): Reviewed no redosing needed with PLEX    PMH: HTN, HLD, DM2     Vital Signs Last 24 Hrs  T(C): 36.7 (20 Apr 2021 06:25), Max: 36.8 (19 Apr 2021 22:14)  T(F): 98.1 (20 Apr 2021 06:25), Max: 98.3 (19 Apr 2021 22:14)  HR: 84 (20 Apr 2021 06:25) (78 - 88)  BP: 156/80 (20 Apr 2021 06:25) (107/68 - 156/80)  RR: 18 (20 Apr 2021 06:25) (17 - 18)  SpO2: 99% (20 Apr 2021 06:25) (98% - 100%)                            12.5   8.79  )-----------( 278      ( 20 Apr 2021 07:35 )             37.3       Hematocrit: 37.3 % (04-20 @ 07:35)      04-20    134<L>  |  101  |  32<H>  ----------------------------<  255<H>  4.8   |  22  |  1.08    Ca    9.2      20 Apr 2021 07:35  Phos  2.6     04-20  Mg     2.2     04-20    TPro  7.2  /  Alb  3.7  /  TBili  0.2  /  DBili  <0.2  /  AST  25  /  ALT  63<H>  /  AlkPhos  135<H>  04-20     ( 20 Apr 2021 07:35 )   PT: 10.7 sec;   INR: 0.94 ratio   PTT:29.7 sec   HPI:  51 yo male presented with diarrhea and abdominal cramping following use of laxatives as above for constipation.  pt continued to have generalized weakness , numbness and on labs persistent hyponatermia and now being admitted for further evlauation .  denies N/V/Abd pain. 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 Family refused IVIG. Patient is s/p LP    MEDICATIONS  (STANDING): Reviewed no redosing needed with PLEX    PMH: HTN, HLD, DM2     Vital Signs Last 24 Hrs  T(C): 36.7 (20 Apr 2021 06:25), Max: 36.8 (19 Apr 2021 22:14)  T(F): 98.1 (20 Apr 2021 06:25), Max: 98.3 (19 Apr 2021 22:14)  HR: 84 (20 Apr 2021 06:25) (78 - 88)  BP: 156/80 (20 Apr 2021 06:25) (107/68 - 156/80)  RR: 18 (20 Apr 2021 06:25) (17 - 18)  SpO2: 99% (20 Apr 2021 06:25) (98% - 100%)                            12.5   8.79  )-----------( 278      ( 20 Apr 2021 07:35 )             37.3       Hematocrit: 37.3 % (04-20 @ 07:35)      04-20    134<L>  |  101  |  32<H>  ----------------------------<  255<H>  4.8   |  22  |  1.08    Ca    9.2      20 Apr 2021 07:35  Phos  2.6     04-20  Mg     2.2     04-20    TPro  7.2  /  Alb  3.7  /  TBili  0.2  /  DBili  <0.2  /  AST  25  /  ALT  63<H>  /  AlkPhos  135<H>  04-20     ( 20 Apr 2021 07:35 )   PT: 10.7 sec;   INR: 0.94 ratio   PTT:29.7 sec

## 2021-04-20 NOTE — PROGRESS NOTE ADULT - ASSESSMENT
51 yo male, PMH DM, HTN, hyperlipidemia; pt presented to the ED c/o abdominal cramping, generalized, and diarrhea following use of laxatives as above for constipation.  In the ED, WBC 10.82, Hb 13.6, CMP: sodium 126, chloride 89, glucose 281, CMP otherwise unremarkable.  VBG: Lactate 2.6 (later downtrended to 1.6).  Pt. was treated with IV hydration and dispo'd to CDU for continued care plan:  IV hydration, supportive care, AM labs, general observation care / monitoring. pt continued to have generalized weakness , numbness and on labs persistent hyponatermia and now being admitted for furhter evlauation .  dnenies N/V/Abd pain   had bm   no urinary sx   no fever or chills   no cough   no cp /sob     - hyponatremia :   likely multifactorial including hyperglycemia ( pseudohyponatremia ) and pre renal sec to diarreah   ? 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 shiely placement     - hypophosphatemia : repleted and monitor     - constipation : had had colonoscopy 2 yrs ago with Dr. Rae   hold further bowel regimen   CT abd no acute path   no further inpt red : discussed with Dr Rae     - ALLIE : fu with nephrology         dvt proph   d/w pt at length   with PA

## 2021-04-20 NOTE — PROGRESS NOTE ADULT - ASSESSMENT
51 yo RH AA male with h/o DM, HTN, hyperlipidemia; pt presented to the ED c/o abdominal cramping, generalized, and diarrhea following use of laxatives as above for constipation. Neurology called for evaluation of LE weakness, unsteady gait.    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   - f/u remaining LP results.   - send for ganglioside antibodies, Gq1b  - slow correction of sodium  - aggressive PT/OT  - B12 >2000, copper, Vit E, thiamine, magnesium heavy metals, zinc  -refused IVIG but agreed for PLEX  - needs shiley and will need 3-5 session of PLEX after central line placed  Geoffrey Gardner MD  Vascular Neurology  Office: [phone]

## 2021-04-20 NOTE — CONSULT NOTE ADULT - SUBJECTIVE AND OBJECTIVE BOX
Interventional Radiology    Evaluate for Procedure: Shiley placement    HPI: 50y Male with DM, HTN, hyperlipidemia p/w abdominal cramping, generalized, and diarrhea. Neurology following for LE weakness and unsteady gait, and LP confirmed GBS.  IR consulted for placement of Shiley so patient can undergo PLEX treatments.    Allergies:   Medications (Abx/Cardiac/Anticoagulation/Blood Products)  amLODIPine   Tablet: 10 milliGRAM(s) Oral (04-20 @ 06:52)  heparin   Injectable: 5000 Unit(s) SubCutaneous (04-19 @ 05:20)  hydrALAZINE: 25 milliGRAM(s) Oral (04-20 @ 14:11)  losartan: 100 milliGRAM(s) Oral (04-20 @ 14:11)  losartan: 100 milliGRAM(s) Oral (04-19 @ 05:21)  metoprolol tartrate: 12.5 milliGRAM(s) Oral (04-20 @ 06:52)    Data:    T(C): 36.7  HR: 77  BP: 148/91  RR: 17  SpO2: 100%    -WBC 8.79 / HgB 12.5 / Hct 37.3 / Plt 278  -Na 134 / Cl 101 / BUN 32 / Glucose 255  -K 4.8 / CO2 22 / Cr 1.08  -ALT 63 / Alk Phos 135 / T.Bili 0.2  -INR 0.96 / PTT --        Assessment/Plan:   50y Male with DM, HTN, hyperlipidemia p/w abdominal cramping, generalized, and diarrhea. Neurology following for LE weakness and unsteady gait, and LP confirmed GBS.  IR consulted for placement of Shiley so patient can undergo PLEX treatments.    -- IR will plan to perform Shiley placement today, 4/20/2021  -- please continue to hold [facility] prior to procedure  -- please complete IR pre-procedure note  -- please place IR procedure request order under Dr. Hardwick

## 2021-04-21 LAB
% ALBUMIN: 40 % — SIGNIFICANT CHANGE UP
% ALPHA 1: 3.6 % — SIGNIFICANT CHANGE UP
% ALPHA 2: 20.6 % — SIGNIFICANT CHANGE UP
% BETA: 17.9 % — SIGNIFICANT CHANGE UP
% GAMMA: 17.9 % — SIGNIFICANT CHANGE UP
A-TOCOPHEROL VIT E SERPL-MCNC: 12.5 MG/L — SIGNIFICANT CHANGE UP (ref 7–25.1)
ALBUMIN SERPL ELPH-MCNC: 2.84 G/DL — LOW (ref 3.3–4.4)
ALBUMIN/GLOB SERPL ELPH: 0.7 RATIO — SIGNIFICANT CHANGE UP
ALPHA1 GLOB SERPL ELPH-MCNC: 0.26 G/DL — SIGNIFICANT CHANGE UP (ref 0.1–0.3)
ALPHA2 GLOB SERPL ELPH-MCNC: 1.5 G/DL — HIGH (ref 0.6–1)
ANION GAP SERPL CALC-SCNC: 9 MMOL/L — SIGNIFICANT CHANGE UP (ref 7–14)
B-GLOBULIN SERPL ELPH-MCNC: 1.27 G/DL — HIGH (ref 0.6–1.1)
BETA+GAMMA TOCOPHEROL SERPL-MCNC: 0.7 MG/L — SIGNIFICANT CHANGE UP (ref 0.5–5.5)
BUN SERPL-MCNC: 22 MG/DL — SIGNIFICANT CHANGE UP (ref 7–23)
CALCIUM SERPL-MCNC: 9.5 MG/DL — SIGNIFICANT CHANGE UP (ref 8.4–10.5)
CHLORIDE SERPL-SCNC: 98 MMOL/L — SIGNIFICANT CHANGE UP (ref 98–107)
CO2 SERPL-SCNC: 23 MMOL/L — SIGNIFICANT CHANGE UP (ref 22–31)
CREAT SERPL-MCNC: 0.97 MG/DL — SIGNIFICANT CHANGE UP (ref 0.5–1.3)
GAMMA GLOBULIN: 1.27 G/DL — SIGNIFICANT CHANGE UP (ref 0.7–1.7)
GLUCOSE SERPL-MCNC: 237 MG/DL — HIGH (ref 70–99)
HCT VFR BLD CALC: 38.1 % — LOW (ref 39–50)
HGB BLD-MCNC: 12.7 G/DL — LOW (ref 13–17)
MAGNESIUM SERPL-MCNC: 2 MG/DL — SIGNIFICANT CHANGE UP (ref 1.6–2.6)
MCHC RBC-ENTMCNC: 29.7 PG — SIGNIFICANT CHANGE UP (ref 27–34)
MCHC RBC-ENTMCNC: 33.3 GM/DL — SIGNIFICANT CHANGE UP (ref 32–36)
MCV RBC AUTO: 89.2 FL — SIGNIFICANT CHANGE UP (ref 80–100)
NRBC # BLD: 0 /100 WBCS — SIGNIFICANT CHANGE UP
NRBC # FLD: 0 K/UL — SIGNIFICANT CHANGE UP
PHOSPHATE SERPL-MCNC: 3.4 MG/DL — SIGNIFICANT CHANGE UP (ref 2.5–4.5)
PLATELET # BLD AUTO: 273 K/UL — SIGNIFICANT CHANGE UP (ref 150–400)
POTASSIUM SERPL-MCNC: 4.9 MMOL/L — SIGNIFICANT CHANGE UP (ref 3.5–5.3)
POTASSIUM SERPL-SCNC: 4.9 MMOL/L — SIGNIFICANT CHANGE UP (ref 3.5–5.3)
PROT PATTERN SERPL ELPH-IMP: SIGNIFICANT CHANGE UP
PROT PATTERN SERPL ELPH-IMP: SIGNIFICANT CHANGE UP
PROT SERPL-MCNC: 7.1 G/DL — SIGNIFICANT CHANGE UP
RBC # BLD: 4.27 M/UL — SIGNIFICANT CHANGE UP (ref 4.2–5.8)
RBC # FLD: 11.9 % — SIGNIFICANT CHANGE UP (ref 10.3–14.5)
SODIUM SERPL-SCNC: 130 MMOL/L — LOW (ref 135–145)
WBC # BLD: 8.32 K/UL — SIGNIFICANT CHANGE UP (ref 3.8–10.5)
WBC # FLD AUTO: 8.32 K/UL — SIGNIFICANT CHANGE UP (ref 3.8–10.5)

## 2021-04-21 PROCEDURE — 99231 SBSQ HOSP IP/OBS SF/LOW 25: CPT

## 2021-04-21 PROCEDURE — 99222 1ST HOSP IP/OBS MODERATE 55: CPT

## 2021-04-21 PROCEDURE — 36514 APHERESIS PLASMA: CPT

## 2021-04-21 RX ORDER — GABAPENTIN 400 MG/1
100 CAPSULE ORAL THREE TIMES A DAY
Refills: 0 | Status: DISCONTINUED | OUTPATIENT
Start: 2021-04-21 | End: 2021-05-04

## 2021-04-21 RX ORDER — HYDROMORPHONE HYDROCHLORIDE 2 MG/ML
1 INJECTION INTRAMUSCULAR; INTRAVENOUS; SUBCUTANEOUS ONCE
Refills: 0 | Status: DISCONTINUED | OUTPATIENT
Start: 2021-04-21 | End: 2021-04-21

## 2021-04-21 RX ORDER — INSULIN GLARGINE 100 [IU]/ML
36 INJECTION, SOLUTION SUBCUTANEOUS AT BEDTIME
Refills: 0 | Status: DISCONTINUED | OUTPATIENT
Start: 2021-04-21 | End: 2021-04-22

## 2021-04-21 RX ADMIN — Medication 2: at 22:19

## 2021-04-21 RX ADMIN — HYDROMORPHONE HYDROCHLORIDE 1 MILLIGRAM(S): 2 INJECTION INTRAMUSCULAR; INTRAVENOUS; SUBCUTANEOUS at 08:49

## 2021-04-21 RX ADMIN — GABAPENTIN 100 MILLIGRAM(S): 400 CAPSULE ORAL at 05:13

## 2021-04-21 RX ADMIN — INSULIN GLARGINE 36 UNIT(S): 100 INJECTION, SOLUTION SUBCUTANEOUS at 22:19

## 2021-04-21 RX ADMIN — HYDROMORPHONE HYDROCHLORIDE 1 MILLIGRAM(S): 2 INJECTION INTRAMUSCULAR; INTRAVENOUS; SUBCUTANEOUS at 01:56

## 2021-04-21 RX ADMIN — Medication 2: at 12:16

## 2021-04-21 RX ADMIN — HYDROMORPHONE HYDROCHLORIDE 1 MILLIGRAM(S): 2 INJECTION INTRAMUSCULAR; INTRAVENOUS; SUBCUTANEOUS at 09:04

## 2021-04-21 RX ADMIN — GABAPENTIN 100 MILLIGRAM(S): 400 CAPSULE ORAL at 15:04

## 2021-04-21 RX ADMIN — OXYCODONE AND ACETAMINOPHEN 1 TABLET(S): 5; 325 TABLET ORAL at 07:50

## 2021-04-21 RX ADMIN — Medication 25 MILLIGRAM(S): at 05:13

## 2021-04-21 RX ADMIN — OXYCODONE AND ACETAMINOPHEN 1 TABLET(S): 5; 325 TABLET ORAL at 15:04

## 2021-04-21 RX ADMIN — Medication 14 UNIT(S): at 12:16

## 2021-04-21 RX ADMIN — LOSARTAN POTASSIUM 100 MILLIGRAM(S): 100 TABLET, FILM COATED ORAL at 05:14

## 2021-04-21 RX ADMIN — SENNA PLUS 2 TABLET(S): 8.6 TABLET ORAL at 17:14

## 2021-04-21 RX ADMIN — Medication 14 UNIT(S): at 17:14

## 2021-04-21 RX ADMIN — Medication 16 UNIT(S): at 08:32

## 2021-04-21 RX ADMIN — CHLORHEXIDINE GLUCONATE 1 APPLICATION(S): 213 SOLUTION TOPICAL at 12:16

## 2021-04-21 RX ADMIN — Medication 5 MILLIGRAM(S): at 22:18

## 2021-04-21 RX ADMIN — OXYCODONE AND ACETAMINOPHEN 1 TABLET(S): 5; 325 TABLET ORAL at 06:56

## 2021-04-21 RX ADMIN — HYDROMORPHONE HYDROCHLORIDE 1 MILLIGRAM(S): 2 INJECTION INTRAMUSCULAR; INTRAVENOUS; SUBCUTANEOUS at 17:30

## 2021-04-21 RX ADMIN — AMLODIPINE BESYLATE 10 MILLIGRAM(S): 2.5 TABLET ORAL at 05:13

## 2021-04-21 RX ADMIN — Medication 12.5 MILLIGRAM(S): at 05:13

## 2021-04-21 RX ADMIN — HYDROMORPHONE HYDROCHLORIDE 1 MILLIGRAM(S): 2 INJECTION INTRAMUSCULAR; INTRAVENOUS; SUBCUTANEOUS at 17:15

## 2021-04-21 RX ADMIN — GABAPENTIN 100 MILLIGRAM(S): 400 CAPSULE ORAL at 22:21

## 2021-04-21 RX ADMIN — Medication 50 GRAM(S): at 13:31

## 2021-04-21 RX ADMIN — OXYCODONE AND ACETAMINOPHEN 1 TABLET(S): 5; 325 TABLET ORAL at 16:00

## 2021-04-21 RX ADMIN — SENNA PLUS 2 TABLET(S): 8.6 TABLET ORAL at 05:14

## 2021-04-21 RX ADMIN — Medication 4: at 08:20

## 2021-04-21 RX ADMIN — HYDROMORPHONE HYDROCHLORIDE 1 MILLIGRAM(S): 2 INJECTION INTRAMUSCULAR; INTRAVENOUS; SUBCUTANEOUS at 02:40

## 2021-04-21 NOTE — CONSULT NOTE ADULT - SUBJECTIVE AND OBJECTIVE BOX
Patient is a 50y old  Male who presents with a chief complaint of GBS (20 Apr 2021 15:11)      HPI:  49 yo male, PMH DM, HTN, hyperlipidemia presented to the ED c/o abdominal cramping, generalized, and diarrhea following use of laxatives as above for constipation.  Patient treated for dehydration but during admission noted worsening weakness and inability to ambulate.  Patient had LP consistent with GBS.  Starting plasmapharesesis.       REVIEW OF SYSTEMS  Constitutional - No fever, No weight loss, No fatigue  HEENT - No eye pain, No visual disturbances, No difficulty hearing, No tinnitus, No vertigo, No neck pain  Respiratory - No cough, No wheezing, No shortness of breath  Cardiovascular - No chest pain, No palpitations  Gastrointestinal - No abdominal pain, No nausea, No vomiting, No diarrhea, No constipation  Genitourinary - No dysuria, No frequency, No hematuria, No incontinence  Neurological - No headaches, No memory loss, No loss of strength, No numbness, No tremors  Skin - No itching, No rashes, No lesions   Endocrine - No temperature intolerance  Musculoskeletal - No joint pain, No joint swelling, No muscle pain  Psychiatric - No depression, No anxiety    PAST MEDICAL & SURGICAL HISTORY  DM (diabetes mellitus)    HTN (hypertension)    High cholesterol    Diabetes    HTN (hypertension)    Hyperlipidemia    No significant past surgical history    No significant past surgical history    H/O total knee replacement    Foot fracture        SOCIAL HISTORY  Smoking - Denied  EtOH - Denied   Drugs - Denied    FUNCTIONAL HISTORY  Lives in basement of house, 6 stairs to enter. lives with his wife  Independent at baseline without device    CURRENT FUNCTIONAL STATUS  4/20  Bed Mobility  Bed Mobility Training Treatment not Performed: patient refused treatment,  patient state " i need 2 people to hold me."    Therapeutic Exercise  Therapeutic Exercise Rehab Effort: good  Therapeutic Exercise Symptoms Noted During/After Treatment: none  Therapeutic Exercise Detail: Patient performed active ROM of both UE, active assistive to passive ROM of both LE in functional plains with 10 repetition each.         FAMILY HISTORY   No pertinent family history in first degree relatives        RECENT LABS/IMAGING  < from: MR Lumbar Spine w/wo IV Cont (04.16.21 @ 22:16) >  EXAM:  MR SPINE LUMBAR WAW IC      EXAM:  MR SPINE THORACIC WAW IC        PROCEDURE DATE:  Apr 16 2021         INTERPRETATION:  CLINICAL INDICATION: Bilateral leg weakness of unknown etiology.    Technique: MRI of the thoracic and lumbar spine was performed with and without contrast. A total 10 cc of Gadavist were administered intravenously.    COMPARISON: None.    FINDINGS:    THORACIC:    There is normal thoracic kyphosis. There are multiple small vertebral body endplate Schmorl's nodes. Otherwise, the vertebrae are normal in signal, height, and alignment. There is anterior endplate osteophytosis most pronounced from the most pronounced from T6-T10.    There are tiny disc bulges at T7-T8 and T8-T9, without significant central spinal canalstenosis or neuroforaminal narrowing.    The spinal cord is normal in course, caliber, and signal.    There is no abnormal enhancement within thoracic spine.    LUMBAR:    There is normal lumbar lordosis. There are mild-to-moderate type I degenerative endplate changes on the left at L4-L5. Otherwise, the vertebral bodies are normal in height and signal without fracture or dislocation.    There is disc space narrowing and desiccation at L4-L5 and L5-S1.    Evaluation of individual levels demonstrates:    L5-S1: Small posterior disc bulge, as well as mild right greater than left facet hypertrophy, resulting in moderate right and mild left neural foraminal stenosis. No significant spinal canal stenosis.    L4-L5: Small posterior disc bulge and superimposed small right central disc protrusion, including tiny annular fissure, resulting in mild spinal canal stenosis, as well as bilateral facet hypertrophy with moderate right and severe left neural foraminal stenosis.    L3-L4: Small posterior disc bulge, along with bilateral facet hypertrophy, resulting in mild spinal canal stenosis as well as mild bilateral neural foraminal stenosis    L2-L3: No significant disc herniation, central spinal canal stenosis, or neural foraminal narrowing. Mildbilateral facet hypertrophy.    L1-L2: Tiny posterior disc bulge without significant central spinal canal stenosis or neuroforaminal narrowing. Mild bilateral facet hypertrophy.    The conus is normal in position and morphology at the L1 level. Thereis smooth thickening and abnormal enhancement of the cauda equina and ventral nerve roots, a nonspecific finding.    There is no definite fluid collection or mass lesion within the visualized retroperitoneal or posterior paraspinal soft tissues.    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 Alternative etiologies include chronic inflammatory demyelinating polyneuropathy, Lyme disease, less likely arachnoiditis or carcinomatosis.    Results were discussed with ADELSO Elkins by Dr. Haji at 9:40 AM on 4/17/2021 with read back followed.              ROSIE HAJI MD; Attending Radiologist  This document has been electronically signed. Apr 17 2021  9:48AM    < end of copied text >    CBC Full  -  ( 21 Apr 2021 07:21 )  WBC Count : 8.32 K/uL  RBC Count : 4.27 M/uL  Hemoglobin : 12.7 g/dL  Hematocrit : 38.1 %  Platelet Count - Automated : 273 K/uL  Mean Cell Volume : 89.2 fL  Mean Cell Hemoglobin : 29.7 pg  Mean Cell Hemoglobin Concentration : 33.3 gm/dL  Auto Neutrophil # : x  Auto Lymphocyte # : x  Auto Monocyte # : x  Auto Eosinophil # : x  Auto Basophil # : x  Auto Neutrophil % : x  Auto Lymphocyte % : x  Auto Monocyte % : x  Auto Eosinophil % : x  Auto Basophil % : x    04-21    130<L>  |  98  |  22  ----------------------------<  237<H>  4.9   |  23  |  0.97    Ca    9.5      21 Apr 2021 07:21  Phos  3.4     04-21  Mg     2.0     04-21    TPro  7.2  /  Alb  3.7  /  TBili  0.2  /  DBili  <0.2  /  AST  25  /  ALT  63<H>  /  AlkPhos  135<H>  04-20        VITALS  T(C): 37.2 (04-21-21 @ 05:14), Max: 37.2 (04-21-21 @ 05:14)  HR: 89 (04-21-21 @ 05:14) (70 - 89)  BP: 159/87 (04-21-21 @ 05:14) (113/65 - 159/87)  RR: 18 (04-21-21 @ 05:14) (17 - 18)  SpO2: 100% (04-21-21 @ 05:14) (99% - 100%)  Wt(kg): --    ALLERGIES  No Known Drug Allergies  shellfish (Urticaria)      MEDICATIONS   acetaminophen   Tablet .. 650 milliGRAM(s) Oral every 6 hours PRN  acetaminophen   Tablet .. 650 milliGRAM(s) Oral every 6 hours PRN  albumin human  5% IVPB 3750 milliLiter(s) IV Intermittent every 48 hours  aluminum hydroxide/magnesium hydroxide/simethicone Suspension 30 milliLiter(s) Oral every 6 hours PRN  amLODIPine   Tablet 10 milliGRAM(s) Oral daily  bisacodyl 5 milliGRAM(s) Oral at bedtime  bisacodyl Suppository 10 milliGRAM(s) Rectal once  calcium gluconate IVPB 2 Gram(s) IV Intermittent <User Schedule>  chlorhexidine 2% Cloths 1 Application(s) Topical daily  dextrose 40% Gel 15 Gram(s) Oral once  dextrose 5%. 1000 milliLiter(s) IV Continuous <Continuous>  dextrose 5%. 1000 milliLiter(s) IV Continuous <Continuous>  dextrose 50% Injectable 25 Gram(s) IV Push once  dextrose 50% Injectable 12.5 Gram(s) IV Push once  dextrose 50% Injectable 25 Gram(s) IV Push once  dicyclomine 10 milliGRAM(s) Oral once PRN  diphenhydrAMINE 25 milliGRAM(s) Oral daily  gabapentin   Solution 100 milliGRAM(s) Oral three times a day  glucagon  Injectable 1 milliGRAM(s) IntraMuscular once  hydrALAZINE 25 milliGRAM(s) Oral three times a day  HYDROmorphone  Injectable 1 milliGRAM(s) IV Push every 8 hours PRN  insulin glargine Injectable (LANTUS) 32 Unit(s) SubCutaneous at bedtime  insulin lispro (ADMELOG) corrective regimen sliding scale   SubCutaneous three times a day before meals  insulin lispro (ADMELOG) corrective regimen sliding scale   SubCutaneous at bedtime  insulin lispro Injectable (ADMELOG) 14 Unit(s) SubCutaneous before lunch  insulin lispro Injectable (ADMELOG) 14 Unit(s) SubCutaneous before dinner  insulin lispro Injectable (ADMELOG) 16 Unit(s) SubCutaneous before breakfast  losartan 100 milliGRAM(s) Oral daily  metoprolol tartrate 12.5 milliGRAM(s) Oral two times a day  oxycodone    5 mG/acetaminophen 325 mG 1 Tablet(s) Oral every 6 hours PRN  senna 2 Tablet(s) Oral two times a day  sodium chloride 0.9% lock flush 10 milliLiter(s) IV Push every 1 hour PRN  sodium chloride 0.9%. 1000 milliLiter(s) IV Continuous <Continuous>      ----------------------------------------------------------------------------------------  PHYSICAL EXAM  Constitutional - NAD, Comfortable  HEENT -  + R neck plasmapheresis catheter, EOMI     Chest - no respiratory distress  Cardiovascular - RRR, S1S2   Abdomen - Soft, NTND  Extremities - No C/C/E, No calf tenderness   Neurologic Exam -                    Cognitive - Awake, Alert, AAO to self, place, date, year, situation     Communication - Fluent, No dysarthria     Cranial Nerves - CN 2-12 intact     Motor -                     LEFT    UE - ShAB 4+/5, EF 4+/5, EE 4+/5, WE4+/5,  4+/5                    RIGHT UE - ShAB 4+/5, EF 4+/5, EE 4+/5, WE4+/5,  4+/5                    LEFT    LE - HF 3/5, KE 4-/5, DF 4+/5, PF 4+/5                    RIGHT LE - HF 3+/5, KE 4/5, DF 4+/5, PF 4+/5        Sensory -impaired distal fingertips     Reflexes - brachoradialis 1+ b/l      OculoVestibular - No saccades, No nystagmus, VOR         Balance - WNL Static  Psychiatric - Mood stable, Affect WNL  ----------------------------------------------------------------------------------------  ASSESSMENT/PLAN  49 yo m p/w GI symptoms including constipation, bloating, cramping, dehydration with weakness onset during admission  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  If requires assistance for ambulation would benefit from acute rehab    Recommend ACUTE inpatient rehabilitation for the functional deficits consisting of 3 hours of therapy/day & 24 hour RN/daily PMR physician for comorbid medical management. Will continue to follow for ongoing rehab needs and recommendations.

## 2021-04-21 NOTE — PROGRESS NOTE ADULT - ASSESSMENT
51 yo male, PMH DM, HTN, hyperlipidemia; pt presented to the ED c/o abdominal cramping, generalized, and diarrhea following use of laxatives as above for constipation.  In the ED, WBC 10.82, Hb 13.6, CMP: sodium 126, chloride 89, glucose 281, CMP otherwise unremarkable.  VBG: Lactate 2.6 (later downtrended to 1.6).  Pt. was treated with IV hydration and dispo'd to CDU for continued care plan:  IV hydration, supportive care, AM labs, general observation care / monitoring. pt continued to have generalized weakness , numbness and on labs persistent hyponatermia      A/P:    ALLIE   baseline likely ~ 1  ALLIE possible sec to uncontrolled hyperkalemia on losartan  s/p NS @ 75cc/hr x 12 hrs  scr improved  resume losartan  monitor bmp  avoid nephrotoxic agents    Hyponatremia:  Likely sec to hypovolemia+hyperglycemia. work up suggested SIADH  s/p NS 4/20 for ALLIE  Na slightly worsen today  if Na continue to worsen will start Na tab 1g for 2 days  optimize dm control  free water restriction <1.2L/day.  ok TSH, serum cortisol level  Monitor Na level Q12  Na level should not increase more than 6meq in 24 hrs    Proteinuia/Hematuria:  Etiology?  Had Cystoscopy last year and was normal per patient  Possible sec to Diabetic Nephropathy but will need full vasculitis work up  check urine p/c ratio  c3 c4 hep b hep c hiv, k/l wnl, lexii neg  pendingANCA, Serum immunofixation, SPEP    RPR +, patient s/p treatment 2017 ID eval appreciated     Based on work up result he might need kidney biopsy, which can be planned as outpatient as his renal function is stable  D/W patient in detail above plan    Hypophosphatemia:  SUpplement as needed   montior    HTN  BP was better  on norvasc and losartan  Continue Hydralazine 25mg TID, increase to 50 tid if bp persistently >150  Monitor closely

## 2021-04-21 NOTE — PROGRESS NOTE ADULT - ASSESSMENT
50M uncontrolled DM2 HbA1c 11.5%, hyperglycemia related to difficulties obtaining adequate insulin during pandemic due to insurance/cost.    1) DM2 with hyperglycemia  Inpatient plan:  BG target 100-180 mg/dl  glucose is above goal, likely exacerbated by pain/stress  carb consistent diet  FS premeal and bedtime  increase Lantus to 36 units qhs  Contniue Admelog to 16/14/14 before meals - HOLD if not eating   Continue Admelog moderate scale premeal and moderate bedtime scale  RD consult- appreciated    Discussed with patient regarding discharge planning, he now will have enough Tresiba and Novolog at home to proceed with these insulins due to [facility] program he is now a part of.   DC on Tresiba and Novolog pens doses TBD. Discussed option of mixed insulin in case of future issues obtaining insulin.  Can follow with outpatient endocrine Dr. Ban Velez, although he requested changing to [facility] endocrinology if he prefers [phone]. Appointments below:  [address] [place] [address], [address], [phone]  6/8/21 @ 11:00 AM with diabetes educator   7/12/21 @ 11:00 AM with Dr Montesinos     2) Hyponatremia  TSH, Free T4 and 8AM cortisol in acceptable range not indicative of endocrine cause of hyponatremia. Na- 134 today    3) Hypertension  controlled at this time   management per primary team    Farzaneh Bennett  Nurse Practitioner  Division of Endocrinology & Diabetes  Pager # [phone]      If after 6PM or before 9AM, or on weekends/holidays, please call endocrine answering service for assistance ([phone]).  For nonurgent matters email [email or web address] for assistance.

## 2021-04-21 NOTE — PROGRESS NOTE ADULT - SUBJECTIVE AND OBJECTIVE BOX
Date of service: 21 @ 15:54      Patient is a 50y old  Male who presents with a chief complaint of weakness (2021 15:25)                                                               INTERVAL HPI/OVERNIGHT EVENTS:    REVIEW OF SYSTEMS:     CONSTITUTIONAL: No weakness, fevers or chills  RESPIRATORY: No cough, wheezing,  No shortness of breath  CARDIOVASCULAR: No chest pain or palpitations  GASTROINTESTINAL: No abdominal pain  . No nausea, vomiting, or hematemesis; No diarrhea or constipation. No melena or hematochezia.  GENITOURINARY: No dysuria, frequency or hematuria  NEUROLOGICAL: no urinary  or stool incontinence   LE weakness improving                                                                                                                                                                                                                                                                          Medications:  MEDICATIONS  (STANDING):  albumin human  5% IVPB 3750 milliLiter(s) IV Intermittent every 48 hours  amLODIPine   Tablet 10 milliGRAM(s) Oral daily  bisacodyl 5 milliGRAM(s) Oral at bedtime  bisacodyl Suppository 10 milliGRAM(s) Rectal once  calcium gluconate IVPB 2 Gram(s) IV Intermittent <User Schedule>  chlorhexidine 2% Cloths 1 Application(s) Topical daily  dextrose 40% Gel 15 Gram(s) Oral once  dextrose 5%. 1000 milliLiter(s) (50 mL/Hr) IV Continuous <Continuous>  dextrose 5%. 1000 milliLiter(s) (100 mL/Hr) IV Continuous <Continuous>  dextrose 50% Injectable 25 Gram(s) IV Push once  dextrose 50% Injectable 12.5 Gram(s) IV Push once  dextrose 50% Injectable 25 Gram(s) IV Push once  diphenhydrAMINE 25 milliGRAM(s) Oral daily  gabapentin 100 milliGRAM(s) Oral three times a day  glucagon  Injectable 1 milliGRAM(s) IntraMuscular once  hydrALAZINE 25 milliGRAM(s) Oral three times a day  insulin glargine Injectable (LANTUS) 36 Unit(s) SubCutaneous at bedtime  insulin lispro (ADMELOG) corrective regimen sliding scale   SubCutaneous three times a day before meals  insulin lispro (ADMELOG) corrective regimen sliding scale   SubCutaneous at bedtime  insulin lispro Injectable (ADMELOG) 14 Unit(s) SubCutaneous before lunch  insulin lispro Injectable (ADMELOG) 14 Unit(s) SubCutaneous before dinner  insulin lispro Injectable (ADMELOG) 16 Unit(s) SubCutaneous before breakfast  losartan 100 milliGRAM(s) Oral daily  metoprolol tartrate 12.5 milliGRAM(s) Oral two times a day  senna 2 Tablet(s) Oral two times a day  sodium chloride 0.9%. 1000 milliLiter(s) (75 mL/Hr) IV Continuous <Continuous>    MEDICATIONS  (PRN):  acetaminophen   Tablet .. 650 milliGRAM(s) Oral every 6 hours PRN Temp greater or equal to 38C (100.4F), Mild Pain (1 - 3), Moderate Pain (4 - 6)  acetaminophen   Tablet .. 650 milliGRAM(s) Oral every 6 hours PRN Mild Pain (1 - 3)  aluminum hydroxide/magnesium hydroxide/simethicone Suspension 30 milliLiter(s) Oral every 6 hours PRN Dyspepsia  dicyclomine 10 milliGRAM(s) Oral once PRN abdominal pain  HYDROmorphone  Injectable 1 milliGRAM(s) IV Push every 8 hours PRN Severe Pain (7 - 10)  oxycodone    5 mG/acetaminophen 325 mG 1 Tablet(s) Oral every 6 hours PRN Moderate Pain (4 - 6)  sodium chloride 0.9% lock flush 10 milliLiter(s) IV Push every 1 hour PRN Pre/post blood products, medications, blood draw, and to maintain line patency       Allergies    No Known Drug Allergies  shellfish (Urticaria)    Intolerances      Vital Signs Last 24 Hrs  T(C): 36.8 (2021 14:17), Max: 37.2 (2021 05:14)  T(F): 98.2 (2021 14:17), Max: 98.9 (2021 05:14)  HR: 71 (2021 14:17) (70 - 89)  BP: 159/87 (2021 05:14) (113/65 - 159/87)  BP(mean): --  RR: 18 (2021 14:17) (18 - 18)  SpO2: 100% (2021 14:17) (99% - 100%)  CAPILLARY BLOOD GLUCOSE      POCT Blood Glucose.: 175 mg/dL (2021 11:56)  POCT Blood Glucose.: 245 mg/dL (2021 07:26)  POCT Blood Glucose.: 168 mg/dL (2021 23:02)  POCT Blood Glucose.: 164 mg/dL (2021 17:16)      04-20 @ 07:01  -   @ 07:00  --------------------------------------------------------  IN: 0 mL / OUT: 300 mL / NET: -300 mL      Physical Exam:    Daily     Daily Weight in k.2 (2021 01:54)  General:  NAD   HEENT:  Nonicteric, PERRLA  CV:  RRR, S1S2   Lungs:  CTA B/L, no wheezes, rales, rhonchi  Abdomen:  Soft, non-tender, no distended, positive BS  Extremities: no edema   Neuro:  AAOx3,   LE                                                                                                                                                                                                                                                                                       LABS:                               12.7   8.32  )-----------( 273      ( 2021 07:21 )             38.1                          130<L>  |  98  |  22  ----------------------------<  237<H>  4.9   |  23  |  0.97    Ca    9.5      2021 07:21  Phos  3.4       Mg     2.0         TPro  7.2  /  Alb  3.7  /  TBili  0.2  /  DBili  <0.2  /  AST  25  /  ALT  63<H>  /  AlkPhos  135<H>                         RADIOLOGY & ADDITIONAL TESTS         I personally reviewed: [  ]EKG   [  ]CXR    [  ] CT      A/P: improved         Discussed with :     Francisco consultants' Notes   Time spent :

## 2021-04-21 NOTE — PROGRESS NOTE ADULT - ASSESSMENT
51 yo male, PMH DM, HTN, hyperlipidemia; pt presented to the ED c/o abdominal cramping, generalized, and diarrhea following use of laxatives as above for constipation.  In the ED, WBC 10.82, Hb 13.6, CMP: sodium 126, chloride 89, glucose 281, CMP otherwise unremarkable.  VBG: Lactate 2.6 (later downtrended to 1.6).  Pt. was treated with IV hydration and dispo'd to CDU for continued care plan:  IV hydration, supportive care, AM labs, general observation care / monitoring. pt continued to have generalized weakness , numbness and on labs persistent hyponatermia and now being admitted for furhter evlauation .  dnenies N/V/Abd pain   had bm   no urinary sx   no fever or chills   no cough   no cp /sob     - hyponatremia :   likely multifactorial including hyperglycemia ( pseudohyponatremia ) and pre renal sec to diarreah   ? 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 shiely placement     - hypophosphatemia : repleted and monitor     - constipation : had had colonoscopy 2 yrs ago with Dr. Rae   hold further bowel regimen   CT abd no acute path   no further inpt red : discussed with Dr Rae     - ALLIE : improved       dvt proph   d/w pt at length

## 2021-04-21 NOTE — CONSULT NOTE ADULT - ASSESSMENT
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  Today procedure #1 had to stop the procedure after 1600 cc replacement due to hypotension, which responded to 500cc bolus. Upon restarting the procedure, although with slower rate, again developed hypotension. I asked to nurse to stop the procedure. I reviewed his meds, he is on Losartan. Although ACE inhibitor not ARB usually  induce hypotension I recommend to withhold Losartan 24 hours before exchange if it is possible. Case was discussed with Nafisa.

## 2021-04-21 NOTE — PROGRESS NOTE ADULT - ASSESSMENT
49 yo RH AA male with h/o DM, HTN, hyperlipidemia; pt presented to the ED c/o abdominal cramping, generalized, and diarrhea following use of laxatives as above for constipation. Neurology called for evaluation of LE weakness, unsteady gait.    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 s/p shielye. Na 130 today  - hyponatremia Na 130 slow correction  - s/p kimberly awaiting PLEX for 3-5 sessions pending clinical improvement   - f/u remaining LP results.   - send for ganglioside antibodies, Gq1b  - slow correction of sodium  - aggressive PT/OT  - B12 >2000, copper, Vit E, thiamine, magnesium heavy metals, zinc  -refused IVIG but agreed for PLEX  - telemetry  - check FS, glucose control <180  - GI/DVT ppx  - Counseling on diet, exercise, and medication adherence was done  - Counseling on smoking cessation and alcohol consumption offered when appropriate.  - Pain assessed and judicious use of narcotics when appropriate was discussed.    - Stroke education given when appropriate.  - Importance of fall prevention discussed.   - Differential diagnosis and plan of care discussed with patient and/or family and primary team  - Thank you for allowing me to participate in the care of this patient. Call with questions.     Geoffrey Gardner MD  Vascular Neurology  Office: [phone]

## 2021-04-21 NOTE — PROGRESS NOTE ADULT - SUBJECTIVE AND OBJECTIVE BOX
Neurology Progress Note    S: Patient seen and examined. No new events overnight. patient denied CP, SOB, HA or pain.  LP with + albumino cytologic dissociation c/w GBS. legs seem better. s/p shiley awaiting PLEX    Medication:  MEDICATIONS  (STANDING):  albumin human  5% IVPB 3750 milliLiter(s) IV Intermittent every 48 hours  amLODIPine   Tablet 10 milliGRAM(s) Oral daily  bisacodyl 5 milliGRAM(s) Oral at bedtime  bisacodyl Suppository 10 milliGRAM(s) Rectal once  calcium gluconate IVPB 2 Gram(s) IV Intermittent <User Schedule>  chlorhexidine 2% Cloths 1 Application(s) Topical daily  dextrose 40% Gel 15 Gram(s) Oral once  dextrose 5%. 1000 milliLiter(s) (50 mL/Hr) IV Continuous <Continuous>  dextrose 5%. 1000 milliLiter(s) (100 mL/Hr) IV Continuous <Continuous>  dextrose 50% Injectable 25 Gram(s) IV Push once  dextrose 50% Injectable 12.5 Gram(s) IV Push once  dextrose 50% Injectable 25 Gram(s) IV Push once  diphenhydrAMINE 25 milliGRAM(s) Oral daily  gabapentin   Solution 100 milliGRAM(s) Oral three times a day  glucagon  Injectable 1 milliGRAM(s) IntraMuscular once  hydrALAZINE 25 milliGRAM(s) Oral three times a day  insulin glargine Injectable (LANTUS) 32 Unit(s) SubCutaneous at bedtime  insulin lispro (ADMELOG) corrective regimen sliding scale   SubCutaneous three times a day before meals  insulin lispro (ADMELOG) corrective regimen sliding scale   SubCutaneous at bedtime  insulin lispro Injectable (ADMELOG) 14 Unit(s) SubCutaneous before lunch  insulin lispro Injectable (ADMELOG) 14 Unit(s) SubCutaneous before dinner  insulin lispro Injectable (ADMELOG) 16 Unit(s) SubCutaneous before breakfast  losartan 100 milliGRAM(s) Oral daily  metoprolol tartrate 12.5 milliGRAM(s) Oral two times a day  senna 2 Tablet(s) Oral two times a day  sodium chloride 0.9%. 1000 milliLiter(s) (75 mL/Hr) IV Continuous <Continuous>    MEDICATIONS  (PRN):  acetaminophen   Tablet .. 650 milliGRAM(s) Oral every 6 hours PRN Temp greater or equal to 38C (100.4F), Mild Pain (1 - 3), Moderate Pain (4 - 6)  acetaminophen   Tablet .. 650 milliGRAM(s) Oral every 6 hours PRN Mild Pain (1 - 3)  aluminum hydroxide/magnesium hydroxide/simethicone Suspension 30 milliLiter(s) Oral every 6 hours PRN Dyspepsia  dicyclomine 10 milliGRAM(s) Oral once PRN abdominal pain  HYDROmorphone  Injectable 1 milliGRAM(s) IV Push every 8 hours PRN Severe Pain (7 - 10)  oxycodone    5 mG/acetaminophen 325 mG 1 Tablet(s) Oral every 6 hours PRN Moderate Pain (4 - 6)  sodium chloride 0.9% lock flush 10 milliLiter(s) IV Push every 1 hour PRN Pre/post blood products, medications, blood draw, and to maintain line patency      Vitals:  Vital Signs Last 24 Hrs  T(C): 37.2 (21 Apr 2021 05:14), Max: 37.2 (21 Apr 2021 05:14)  T(F): 98.9 (21 Apr 2021 05:14), Max: 98.9 (21 Apr 2021 05:14)  HR: 89 (21 Apr 2021 05:14) (70 - 89)  BP: 159/87 (21 Apr 2021 05:14) (113/65 - 159/87)  BP(mean): --  RR: 18 (21 Apr 2021 05:14) (17 - 18)  SpO2: 100% (21 Apr 2021 05:14) (99% - 100%)    General Exam:   General Appearance: Appropriately dressed and in no acute distress       Head: Normocephalic, atraumatic and no dysmorphic features  Ear, Nose, and Throat: Moist mucous membranes  CVS: S1S2+  Resp: No SOB, no wheeze or rhonchi  Abd: soft NTND  Extremities: No edema, no cyanosis  Skin: No bruises, no rashes     Neurological Exam:  Mental Status: Awake, alert and oriented x 3.  Able to follow simple and complex verbal commands. Able to name and repeat. fluent speech. No obvious aphasia or dysarthria noted.   Cranial Nerves: PERRL, EOMI, VFFC, sensation V1-V3 intact,  no obvious facial asymmetry , equal elevation of palate, scm/trap 5/5, tongue is midline on protrusion. no obvious papilledema on fundoscopic exam. Hearing is grossly intact.   Motor: Normal bulk, tone and strength throughout B/L UE Fine finger movements were intact and symmetric. no tremors B/L LE 2-3/5  Sensation: Intact to light touch and pinprick throughout. no right/left confusion. no extinction to tactile on DSS. Romberg was negative.   Reflexes: 0 throughout at biceps, brachioradialis, triceps, patellars and ankles bilaterally and equal. only hyas LUE biceps 1+ No clonus. R toe and L toe were both downgoing.  Coordination: No dysmetria on FNF   Gait: unable    I personally reviewed the below data/images/labs:  CBC Full  -  ( 21 Apr 2021 07:21 )  WBC Count : 8.32 K/uL  RBC Count : 4.27 M/uL  Hemoglobin : 12.7 g/dL  Hematocrit : 38.1 %  Platelet Count - Automated : 273 K/uL  Mean Cell Volume : 89.2 fL  Mean Cell Hemoglobin : 29.7 pg  Mean Cell Hemoglobin Concentration : 33.3 gm/dL  Auto Neutrophil # : x  Auto Lymphocyte # : x  Auto Monocyte # : x  Auto Eosinophil # : x  Auto Basophil # : x  Auto Neutrophil % : x  Auto Lymphocyte % : x  Auto Monocyte % : x  Auto Eosinophil % : x  Auto Basophil % : x    04-21    130<L>  |  98  |  22  ----------------------------<  237<H>  4.9   |  23  |  0.97    Ca    9.5      21 Apr 2021 07:21  Phos  3.4     04-21  Mg     2.0     04-21    TPro  7.2  /  Alb  3.7  /  TBili  0.2  /  DBili  <0.2  /  AST  25  /  ALT  63<H>  /  AlkPhos  135<H>  04-20      < from: CT Head No Cont (04.02.21 @ 21:48) >    EXAM:  CT BRAIN        PROCEDURE DATE:  Apr 2 2021         INTERPRETATION:  HISTORY: Hypertension    Technique: CT of the head was performed without contrast.    Multiple contiguous axial images were acquired from the skullbase to the vertex without the administration of intravenous contrast.  Coronal and sagittal reformations were made.    COMPARISON: None.    FINDINGS:  The ventricles and sulci are within normal limits given the patient's age. No acute hemorrhage, mass effect, midline shift,hydrocephalus, or extra-axial fluid collections. Mild patchy hypoattenuation within the white matter, likely secondary chronic microscopy changes.    The calvarium is intact.    Small cyst or polyp in the right maxillary sinus. There are opacification of a few ethmoid air cells bilaterally as well as mild mucosal thickening of the left frontal sinus. The mastoid air cells are clear.    IMPRESSION:  No acute hemorrhage, mass effect, or midline shift.            DARRELL MIX MD; Resident Radiology  This document has been electronically signed.  PORTILLO HARPER MD; Attending Radiologist  This document has been electronically signed. Apr 2 2021 11:34PM    < end of copied text >  < from: MR Lumbar Spine w/wo IV Cont (04.16.21 @ 22:16) >    EXAM:  MR SPINE LUMBAR WAW IC      EXAM:  MR SPINE THORACIC WAW IC        PROCEDURE DATE:  Apr 16 2021         INTERPRETATION:  CLINICAL INDICATION: Bilateral leg weakness of unknown etiology.    Technique: MRI of the thoracic and lumbar spine was performed with and without contrast. A total 10 cc of Gadavist were administered intravenously.    COMPARISON: None.    FINDINGS:    THORACIC:    There is normal thoracic kyphosis. There are multiple small vertebral body endplate Schmorl's nodes. Otherwise, the vertebrae are normal in signal, height, and alignment. There is anterior endplate osteophytosis most pronounced from the most pronounced from T6-T10.    There are tiny disc bulges at T7-T8 and T8-T9, without significant central spinal canalstenosis or neuroforaminal narrowing.    The spinal cord is normal in course, caliber, and signal.    There is no abnormal enhancement within thoracic spine.    LUMBAR:    There is normal lumbar lordosis. There are mild-to-moderate type I degenerative endplate changes on the left at L4-L5. Otherwise, the vertebral bodies are normal in height and signal without fracture or dislocation.    There is disc space narrowing and desiccation at L4-L5 and L5-S1.    Evaluation of individual levels demonstrates:    L5-S1: Small posterior disc bulge, as well as mild right greater than left facet hypertrophy, resulting in moderate right and mild left neural foraminal stenosis. No significant spinal canal stenosis.    L4-L5: Small posterior disc bulge and superimposed small right central disc protrusion, including tiny annular fissure, resulting in mild spinal canal stenosis, as well as bilateral facet hypertrophy with moderate right and severe left neural foraminal stenosis.    L3-L4: Small posterior disc bulge, along with bilateral facet hypertrophy, resulting in mild spinal canal stenosis as well as mild bilateral neural foraminal stenosis    L2-L3: No significant disc herniation, central spinal canal stenosis, or neural foraminal narrowing. Mildbilateral facet hypertrophy.    L1-L2: Tiny posterior disc bulge without significant central spinal canal stenosis or neuroforaminal narrowing. Mild bilateral facet hypertrophy.    The conus is normal in position and morphology at the L1 level. Thereis smooth thickening and abnormal enhancement of the cauda equina and ventral nerve roots, a nonspecific finding.    There is no definite fluid collection or mass lesion within the visualized retroperitoneal or posterior paraspinal soft tissues.    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 Alternative etiologies include chronic inflammatory demyelinating polyneuropathy, Lyme disease, less likely arachnoiditis or carcinomatosis.    Results were discussed with ADELSO Elkins by Dr. Haji at 9:40 AM on 4/17/2021 with read back followed.              ROSIE HAJI MD; Attending Radiologist  This document has been electronically signed. Apr 17 2021  9:48AM    < end of copied text >  < from: MR Cervical Spine w/wo IV Cont (04.16.21 @ 22:16) >    EXAM:  MR SPINE CERVICAL WAW IC        PROCEDURE DATE:  Apr 16 2021         INTERPRETATION:  CLINICAL INDICATION: Patient with new weakness in lower extremities.    TECHNIQUE: MRI of the cervical spine was performed before and after intravenous contrast. A total 10 cc of Gadavist were administered.    COMPARISON: None.    FINDINGS:  Structures at the craniocervical and cervicomedullary junction are intact.    There is nonspecific straightening of the normal cervical lordosis. There is grade 1 degenerative retrolisthesis of C5 on C6. There is disc space narrowing at C3-C4 and C5-C6. The remaining intervertebral disc space heights are grossly preserved. There is anterior endplate osteophytosis from C3 to C6.    There are mild Modic type II degenerative endplate changes involving C3 CT 4 and C5-C6, along with mild endplate irregularity. Otherwise, the vertebrae demonstrate normal height and signal without fracture, dislocation or marrow edema.    The spinal cord demonstrates normal course and caliber without intrinsic cord signal abnormality.    The prevertebral and paravertebral soft tissues are within normal limits.    There is no abnormal enhancement.    There is diffuse disc desiccation. Evaluation of individual levels demonstrates:    C2-C3: No significant disc herniation, central spinal canal stenosis, although neural foraminal narrowing.    C3-C4: Small posterior disc osteophyte complexes, partially effacing the ventral thecal sac and resulting in mild spinal canal stenosis,as well as moderate right and mild left neural foraminal narrowing.    C4-C5: Tiny posterior disc osteophyte complex minimally effacing the ventral thecal sac, without significant spinal canal stenosis, as well as moderate left and mild right neural foraminal narrowing.    C5-C6: Tiny posterior disc osteophyte complex minimally effacing the ventral thecal sac, without significant spinal canal stenosis, as well as severe right and moderate left neural foraminal narrowing.    C6-C7: No significant disc herniation, central spinal canal stenosis, or neural foraminal narrowing.    C7-T1: No significant disc herniation, central spinal canal stenosis, or neural foraminal narrowing.    There is no soft tissue neck mass or fluid collection.    IMPRESSION:  No evidence for spinal cord compression, signal abnormality, or abnormal enhancement.    Multilevel degenerative changes, as described above, most pronounced at C3-C4 where there is mild spinal canal stenosis and C5-C6 where there is severe right and moderate left neural foraminal narrowing.              ROSIE HAJI MD; Attending Radiologist  This document has been electronically signed. Apr 17 2021  8:54AM    < end of copied text >

## 2021-04-21 NOTE — CHART NOTE - NSCHARTNOTEFT_GEN_A_CORE
51 yo male w/ PMH DM, HTN, HLD presented to the ED c/o abdominal cramping, generalized, and diarrhea following use of laxatives for constipation.  Pt continued to have generalized weakness , numbness and on labs persistent hyponatermia and admitted for further evaluation.  Neurology following, workup included MRI, LP on 4/19, findings most consistent with GBS, declining IVIG, planning to proceed with for PLEX for 3-5 sessions via [place] inserted on 4/20.    Patient scheduled for 1st PLEX session today, 4/21.    Notified by transfusion team after completion of PLEX session, session complicated by hypotension.  Per Transfusion team, pt had asymptomatic episode of SBP 70s during treatment, was given 500 cc bolus, SBP improved to 100s.  Per Transfusion team, session was resumed and patient had subsequent episode of hypotension.  At that point, session was stopped.  Per Transfusion team "recommend to withhold Losartan 24 hours before exchange if it is possible"    The above was discussed with Dr. Garza, plan as follows:  - D/C Losartan  - Plan to hold AM dose of Hydralazine on morning Plasmaphoresis is due if SBP<140  - If persistently hypotensive low threshold for ICU consult    Neurology, Dr Gardner, was made aware of the above, no additional recommendations at this time.    Will continue to monitor.      Tanisha Joe NP-BC  Department of Medicine  In House Pager #[phone]

## 2021-04-21 NOTE — PROGRESS NOTE ADULT - SUBJECTIVE AND OBJECTIVE BOX
[facility]   MD WYATT CHRIS DO ANAM SIDDIQUI ANGELA WONG, PA    TEL:  OFFICE: [phone]  DR DAWSON CELL: [phone]  DR. TIRADO CELL: [phone]  DR. MORAN CELL: [phone]  CATINA PATEL CELL: [phone]    From 5pm-7am Answering Service [phone]    -- RENAL FOLLOW UP NOTE ---Date of Service 04-21-21 @ 14:20    Patient is a 50y old  Male who presents with a chief complaint of GBS (20 Apr 2021 15:11)      Patient seen and examined at bedside. No chest pain/sob    VITALS:  T(F): 98.2 (04-21-21 @ 14:17), Max: 98.9 (04-21-21 @ 05:14)  HR: 71 (04-21-21 @ 14:17)  BP: 159/87 (04-21-21 @ 05:14)  RR: 18 (04-21-21 @ 14:17)  SpO2: 100% (04-21-21 @ 14:17)  Wt(kg): --    04-20 @ 07:01  -  04-21 @ 07:00  --------------------------------------------------------  IN: 0 mL / OUT: 300 mL / NET: -300 mL          PHYSICAL EXAM:  Constitutional: NAD  Neck: No JVD  Respiratory: CTAB, no wheezes, rales or rhonchi  Cardiovascular: S1, S2, RRR  Gastrointestinal: BS+, soft, NT/ND  Extremities: No peripheral edema    Hospital Medications:   MEDICATIONS  (STANDING):  albumin human  5% IVPB 3750 milliLiter(s) IV Intermittent every 48 hours  amLODIPine   Tablet 10 milliGRAM(s) Oral daily  bisacodyl 5 milliGRAM(s) Oral at bedtime  bisacodyl Suppository 10 milliGRAM(s) Rectal once  calcium gluconate IVPB 2 Gram(s) IV Intermittent <User Schedule>  chlorhexidine 2% Cloths 1 Application(s) Topical daily  dextrose 40% Gel 15 Gram(s) Oral once  dextrose 5%. 1000 milliLiter(s) (50 mL/Hr) IV Continuous <Continuous>  dextrose 5%. 1000 milliLiter(s) (100 mL/Hr) IV Continuous <Continuous>  dextrose 50% Injectable 25 Gram(s) IV Push once  dextrose 50% Injectable 12.5 Gram(s) IV Push once  dextrose 50% Injectable 25 Gram(s) IV Push once  diphenhydrAMINE 25 milliGRAM(s) Oral daily  gabapentin 100 milliGRAM(s) Oral three times a day  glucagon  Injectable 1 milliGRAM(s) IntraMuscular once  hydrALAZINE 25 milliGRAM(s) Oral three times a day  insulin glargine Injectable (LANTUS) 36 Unit(s) SubCutaneous at bedtime  insulin lispro (ADMELOG) corrective regimen sliding scale   SubCutaneous three times a day before meals  insulin lispro (ADMELOG) corrective regimen sliding scale   SubCutaneous at bedtime  insulin lispro Injectable (ADMELOG) 16 Unit(s) SubCutaneous before breakfast  insulin lispro Injectable (ADMELOG) 14 Unit(s) SubCutaneous before lunch  insulin lispro Injectable (ADMELOG) 14 Unit(s) SubCutaneous before dinner  losartan 100 milliGRAM(s) Oral daily  metoprolol tartrate 12.5 milliGRAM(s) Oral two times a day  senna 2 Tablet(s) Oral two times a day  sodium chloride 0.9%. 1000 milliLiter(s) (75 mL/Hr) IV Continuous <Continuous>      LABS:  04-21    130<L>  |  98  |  22  ----------------------------<  237<H>  4.9   |  23  |  0.97    Ca    9.5      21 Apr 2021 07:21  Phos  3.4     04-21  Mg     2.0     04-21    TPro  7.2  /  Alb  3.7  /  TBili  0.2  /  DBili  <0.2  /  AST  25  /  ALT  63<H>  /  AlkPhos  135<H>  04-20    Creatinine Trend: 0.97 <--, 1.08 <--, 1.41 <--, 1.23 <--, 0.99 <--, 1.13 <--, 1.05 <--, 1.29 <--    Phosphorus Level, Serum: 3.4 mg/dL (04-21 @ 07:21)                              12.7   8.32  )-----------( 273      ( 21 Apr 2021 07:21 )             38.1     Urine Studies:  Urinalysis - [04-11-21 @ 12:32]      Color Light Yellow / Appearance Clear / SG 1.013 / pH 6.5      Gluc >= 1000 mg/dL / Ketone Small  / Bili Negative / Urobili <2 mg/dL       Blood Small / Protein 100 mg/dL / Leuk Est Negative / Nitrite Negative      RBC 10 / WBC 1 / Hyaline 1 / Gran  / Sq Epi  / Non Sq Epi 1 / Bacteria Negative    Urine Protein 66      [04-15-21 @ 09:10]  Urine Sodium 21      [04-15-21 @ 09:10]  Urine Osmolality 360      [04-15-21 @ 09:10]    TSH 3.00      [04-12-21 @ 06:36]    HBsAg Nonreact      [04-18-21 @ 01:28]  HCV 0.40, Nonreact      [04-18-21 @ 01:28]  HIV Nonreact      [04-17-21 @ 16:16]    PIERO: titer Negative, pattern --      [04-13-21 @ 10:10]  C3 Complement 152      [04-13-21 @ 07:48]  C4 Complement 33      [04-13-21 @ 07:48]  ANCA: cANCA Negative, pANCA Negative, atypical ANCA Negative      [04-16-21 @ 12:16]  Syphilis Screen (Treponema Pallidum Ab) Positive      [04-13-21 @ 10:10]  Syphilis Screen (RPR Titer) 1:1      [04-13-21 @ 10:10]  Free Light Chains: kappa 1.51, lambda 1.89, ratio = 0.80      [04-13 @ 10:08]  Immunofixation Serum:   No Monoclonal Band Identified. KRIS Ferris M.D.    Reference Range: None Detected      [04-13-21 @ 07:48]  SPEP Interpretation: with acute phase reaction. KRIS Ferris M.D.      [04-13-21 @ 07:48]    RADIOLOGY & ADDITIONAL STUDIES:

## 2021-04-21 NOTE — PROGRESS NOTE ADULT - SUBJECTIVE AND OBJECTIVE BOX
Chief Complaint: DM2    History:   tolerating some po  was having plasma exchange at time of visit    MEDICATIONS  (STANDING):  albumin human  5% IVPB 3750 milliLiter(s) IV Intermittent every 48 hours  amLODIPine   Tablet 10 milliGRAM(s) Oral daily  bisacodyl 5 milliGRAM(s) Oral at bedtime  bisacodyl Suppository 10 milliGRAM(s) Rectal once  calcium gluconate IVPB 2 Gram(s) IV Intermittent <User Schedule>  dextrose 40% Gel 15 Gram(s) Oral once  dextrose 5%. 1000 milliLiter(s) (50 mL/Hr) IV Continuous <Continuous>  dextrose 5%. 1000 milliLiter(s) (100 mL/Hr) IV Continuous <Continuous>  dextrose 50% Injectable 25 Gram(s) IV Push once  dextrose 50% Injectable 12.5 Gram(s) IV Push once  dextrose 50% Injectable 25 Gram(s) IV Push once  diphenhydrAMINE 25 milliGRAM(s) Oral daily  gabapentin   Solution 100 milliGRAM(s) Oral three times a day  glucagon  Injectable 1 milliGRAM(s) IntraMuscular once  hydrALAZINE 25 milliGRAM(s) Oral three times a day  insulin glargine Injectable (LANTUS) 28 Unit(s) SubCutaneous at bedtime  insulin lispro (ADMELOG) corrective regimen sliding scale   SubCutaneous three times a day before meals  insulin lispro (ADMELOG) corrective regimen sliding scale   SubCutaneous at bedtime  insulin lispro Injectable (ADMELOG) 14 Unit(s) SubCutaneous three times a day before meals  losartan 100 milliGRAM(s) Oral daily  metoprolol tartrate 12.5 milliGRAM(s) Oral two times a day  senna 2 Tablet(s) Oral two times a day  sodium chloride 0.9%. 1000 milliLiter(s) (75 mL/Hr) IV Continuous <Continuous>    MEDICATIONS  (PRN):  acetaminophen   Tablet .. 650 milliGRAM(s) Oral every 6 hours PRN Temp greater or equal to 38C (100.4F), Mild Pain (1 - 3), Moderate Pain (4 - 6)  acetaminophen   Tablet .. 650 milliGRAM(s) Oral every 6 hours PRN Mild Pain (1 - 3)  aluminum hydroxide/magnesium hydroxide/simethicone Suspension 30 milliLiter(s) Oral every 6 hours PRN Dyspepsia  dicyclomine 10 milliGRAM(s) Oral once PRN abdominal pain  HYDROmorphone  Injectable 1 milliGRAM(s) IV Push every 8 hours PRN Severe Pain (7 - 10)  oxycodone    5 mG/acetaminophen 325 mG 1 Tablet(s) Oral every 6 hours PRN Moderate Pain (4 - 6)      Allergies    No Known Drug Allergies  shellfish (Urticaria)    Intolerances      Review of Systems:  ALL OTHER SYSTEMS REVIEWED AND NEGATIVE      Vital Signs Last 24 Hrs  T(C): 36.8 (21 Apr 2021 14:17), Max: 37.2 (21 Apr 2021 05:14)  T(F): 98.2 (21 Apr 2021 14:17), Max: 98.9 (21 Apr 2021 05:14)  HR: 71 (21 Apr 2021 14:17) (70 - 89)  BP: 159/87 (21 Apr 2021 05:14) (113/65 - 159/87)  BP(mean): --  RR: 18 (21 Apr 2021 14:17) (18 - 18)  SpO2: 100% (21 Apr 2021 14:17) (99% - 100%)  GENERAL: NAD, well-groomed, well-developed  EYES: No proptosis, no lid lag, anicteric  HEENT:  Atraumatic, Normocephalic, moist mucous membranes  RESPIRATORY: nonlabored respirations  PSYCH: Alert and oriented x 3, normal affect, normal mood    CAPILLARY BLOOD GLUCOSE      POCT Blood Glucose.: 175 mg/dL (21 Apr 2021 11:56)  POCT Blood Glucose.: 245 mg/dL (21 Apr 2021 07:26)  POCT Blood Glucose.: 168 mg/dL (20 Apr 2021 23:02)  POCT Blood Glucose.: 164 mg/dL (20 Apr 2021 17:16)    POCT Blood Glucose.: 232 mg/dL (20 Apr 2021 12:03)  POCT Blood Glucose.: 246 mg/dL (20 Apr 2021 07:33)  POCT Blood Glucose.: 157 mg/dL (19 Apr 2021 21:34)  POCT Blood Glucose.: 195 mg/dL (19 Apr 2021 18:01)      04-21    130<L>  |  98  |  22  ----------------------------<  237<H>  4.9   |  23  |  0.97    Ca    9.5      21 Apr 2021 07:21  Phos  3.4     04-21  Mg     2.0     04-21    TPro  7.2  /  Alb  3.7  /  TBili  0.2  /  DBili  <0.2  /  AST  25  /  ALT  63<H>  /  AlkPhos  135<H>  04-20        A1C with Estimated Average Glucose Result: 11.5 % (04-11-21 @ 07:40)      Thyroid Function Tests:  04-12 @ 06:36 TSH 3.00 FreeT4 1.7 T3 -- Anti TPO -- Anti Thyroglobulin Ab -- TSI --  04-11 @ 11:43 TSH 2.29 FreeT4 -- T3 -- Anti TPO -- Anti Thyroglobulin Ab -- TSI --

## 2021-04-22 LAB
ANION GAP SERPL CALC-SCNC: 12 MMOL/L — SIGNIFICANT CHANGE UP (ref 7–14)
ARSENIC BLD-MCNC: 6 UG/L — SIGNIFICANT CHANGE UP (ref 2–23)
BUN SERPL-MCNC: 26 MG/DL — HIGH (ref 7–23)
CADMIUM SERPL-MCNC: <0.5 UG/L — SIGNIFICANT CHANGE UP (ref 0–1.2)
CALCIUM SERPL-MCNC: 9.1 MG/DL — SIGNIFICANT CHANGE UP (ref 8.4–10.5)
CHLORIDE SERPL-SCNC: 100 MMOL/L — SIGNIFICANT CHANGE UP (ref 98–107)
CO2 SERPL-SCNC: 24 MMOL/L — SIGNIFICANT CHANGE UP (ref 22–31)
CREAT SERPL-MCNC: 1.13 MG/DL — SIGNIFICANT CHANGE UP (ref 0.5–1.3)
CULTURE RESULTS: NO GROWTH — SIGNIFICANT CHANGE UP
GD1A GANGL IGG+IGM SER IA-ACNC: 7 IV — SIGNIFICANT CHANGE UP (ref 0–50)
GD1B GANGL IGG+IGM SER IA-ACNC: 24 IV — SIGNIFICANT CHANGE UP (ref 0–50)
GLUCOSE SERPL-MCNC: 234 MG/DL — HIGH (ref 70–99)
GM1 ASIALO IGG+IGM SER IA-ACNC: 21 IV — SIGNIFICANT CHANGE UP (ref 0–50)
GM1 GANGL IGG+IGM SER-ACNC: 23 IV — SIGNIFICANT CHANGE UP (ref 0–50)
GM2 GANGL IGG+IGM SER IA-ACNC: 8 IV — SIGNIFICANT CHANGE UP (ref 0–50)
GQ1B GANGL IGG+IGM SER IA-ACNC: 8 IV — SIGNIFICANT CHANGE UP (ref 0–50)
HCT VFR BLD CALC: 38.3 % — LOW (ref 39–50)
HGB BLD-MCNC: 12.5 G/DL — LOW (ref 13–17)
LEAD BLD-MCNC: <1 UG/DL — SIGNIFICANT CHANGE UP (ref 0–4)
MAGNESIUM SERPL-MCNC: 2 MG/DL — SIGNIFICANT CHANGE UP (ref 1.6–2.6)
MBP CSF-MCNC: 2 NG/ML — SIGNIFICANT CHANGE UP (ref 0–4.7)
MCHC RBC-ENTMCNC: 29.2 PG — SIGNIFICANT CHANGE UP (ref 27–34)
MCHC RBC-ENTMCNC: 32.6 GM/DL — SIGNIFICANT CHANGE UP (ref 32–36)
MCV RBC AUTO: 89.5 FL — SIGNIFICANT CHANGE UP (ref 80–100)
MERCURY SERPL-MCNC: 2.4 UG/L — SIGNIFICANT CHANGE UP (ref 0–14.9)
NRBC # BLD: 0 /100 WBCS — SIGNIFICANT CHANGE UP
NRBC # FLD: 0 K/UL — SIGNIFICANT CHANGE UP
PHOSPHATE SERPL-MCNC: 3.3 MG/DL — SIGNIFICANT CHANGE UP (ref 2.5–4.5)
PLATELET # BLD AUTO: 247 K/UL — SIGNIFICANT CHANGE UP (ref 150–400)
POTASSIUM SERPL-MCNC: 4.9 MMOL/L — SIGNIFICANT CHANGE UP (ref 3.5–5.3)
POTASSIUM SERPL-SCNC: 4.9 MMOL/L — SIGNIFICANT CHANGE UP (ref 3.5–5.3)
RBC # BLD: 4.28 M/UL — SIGNIFICANT CHANGE UP (ref 4.2–5.8)
RBC # FLD: 12 % — SIGNIFICANT CHANGE UP (ref 10.3–14.5)
SODIUM SERPL-SCNC: 136 MMOL/L — SIGNIFICANT CHANGE UP (ref 135–145)
SPECIMEN SOURCE: SIGNIFICANT CHANGE UP
VDRL CSF-TITR: SIGNIFICANT CHANGE UP
WBC # BLD: 16.06 K/UL — HIGH (ref 3.8–10.5)
WBC # FLD AUTO: 16.06 K/UL — HIGH (ref 3.8–10.5)

## 2021-04-22 PROCEDURE — 99232 SBSQ HOSP IP/OBS MODERATE 35: CPT

## 2021-04-22 RX ORDER — INSULIN GLARGINE 100 [IU]/ML
40 INJECTION, SOLUTION SUBCUTANEOUS AT BEDTIME
Refills: 0 | Status: DISCONTINUED | OUTPATIENT
Start: 2021-04-22 | End: 2021-04-23

## 2021-04-22 RX ORDER — OXYCODONE AND ACETAMINOPHEN 5; 325 MG/1; MG/1
1 TABLET ORAL EVERY 6 HOURS
Refills: 0 | Status: DISCONTINUED | OUTPATIENT
Start: 2021-04-22 | End: 2021-04-29

## 2021-04-22 RX ORDER — HYDROMORPHONE HYDROCHLORIDE 2 MG/ML
1 INJECTION INTRAMUSCULAR; INTRAVENOUS; SUBCUTANEOUS EVERY 8 HOURS
Refills: 0 | Status: DISCONTINUED | OUTPATIENT
Start: 2021-04-22 | End: 2021-04-29

## 2021-04-22 RX ORDER — MINERAL OIL
30 OIL (ML) MISCELLANEOUS ONCE
Refills: 0 | Status: DISCONTINUED | OUTPATIENT
Start: 2021-04-22 | End: 2021-05-05

## 2021-04-22 RX ADMIN — Medication 14 UNIT(S): at 12:15

## 2021-04-22 RX ADMIN — Medication 6: at 07:58

## 2021-04-22 RX ADMIN — Medication 5 MILLIGRAM(S): at 21:40

## 2021-04-22 RX ADMIN — GABAPENTIN 100 MILLIGRAM(S): 400 CAPSULE ORAL at 21:40

## 2021-04-22 RX ADMIN — Medication 2: at 12:15

## 2021-04-22 RX ADMIN — Medication 16 UNIT(S): at 07:58

## 2021-04-22 RX ADMIN — SENNA PLUS 2 TABLET(S): 8.6 TABLET ORAL at 06:07

## 2021-04-22 RX ADMIN — HYDROMORPHONE HYDROCHLORIDE 1 MILLIGRAM(S): 2 INJECTION INTRAMUSCULAR; INTRAVENOUS; SUBCUTANEOUS at 20:26

## 2021-04-22 RX ADMIN — HYDROMORPHONE HYDROCHLORIDE 1 MILLIGRAM(S): 2 INJECTION INTRAMUSCULAR; INTRAVENOUS; SUBCUTANEOUS at 21:00

## 2021-04-22 RX ADMIN — Medication 10 MILLIGRAM(S): at 00:09

## 2021-04-22 RX ADMIN — CHLORHEXIDINE GLUCONATE 1 APPLICATION(S): 213 SOLUTION TOPICAL at 11:27

## 2021-04-22 RX ADMIN — HYDROMORPHONE HYDROCHLORIDE 1 MILLIGRAM(S): 2 INJECTION INTRAMUSCULAR; INTRAVENOUS; SUBCUTANEOUS at 10:35

## 2021-04-22 RX ADMIN — INSULIN GLARGINE 40 UNIT(S): 100 INJECTION, SOLUTION SUBCUTANEOUS at 21:49

## 2021-04-22 RX ADMIN — GABAPENTIN 100 MILLIGRAM(S): 400 CAPSULE ORAL at 06:07

## 2021-04-22 RX ADMIN — OXYCODONE AND ACETAMINOPHEN 1 TABLET(S): 5; 325 TABLET ORAL at 14:59

## 2021-04-22 RX ADMIN — HYDROMORPHONE HYDROCHLORIDE 1 MILLIGRAM(S): 2 INJECTION INTRAMUSCULAR; INTRAVENOUS; SUBCUTANEOUS at 02:34

## 2021-04-22 RX ADMIN — HYDROMORPHONE HYDROCHLORIDE 1 MILLIGRAM(S): 2 INJECTION INTRAMUSCULAR; INTRAVENOUS; SUBCUTANEOUS at 11:05

## 2021-04-22 RX ADMIN — GABAPENTIN 100 MILLIGRAM(S): 400 CAPSULE ORAL at 14:47

## 2021-04-22 RX ADMIN — SENNA PLUS 2 TABLET(S): 8.6 TABLET ORAL at 18:39

## 2021-04-22 RX ADMIN — HYDROMORPHONE HYDROCHLORIDE 1 MILLIGRAM(S): 2 INJECTION INTRAMUSCULAR; INTRAVENOUS; SUBCUTANEOUS at 03:00

## 2021-04-22 RX ADMIN — OXYCODONE AND ACETAMINOPHEN 1 TABLET(S): 5; 325 TABLET ORAL at 15:29

## 2021-04-22 NOTE — PROGRESS NOTE ADULT - SUBJECTIVE AND OBJECTIVE BOX
Patient is a 50y old  Male who presents with a chief complaint of constipation (22 Apr 2021 08:19)      HPI:  49 yo male, PMH DM, HTN, hyperlipidemia; pt presented to the ED c/o abdominal cramping, generalized, and diarrhea following use of laxatives as above for constipation.  In the ED, WBC 10.82, Hb 13.6, CMP: sodium 126, chloride 89, glucose 281, CMP otherwise unremarkable.  VBG: Lactate 2.6 (later downtrended to 1.6).  Pt. was treated with IV hydration and dispo'd to CDU for continued care plan:  IV hydration, supportive care, AM labs, general observation care / monitoring. pt continued to have generalized weakness , numbness and on labs persistent hyponatermia and now being admitted for furhter evlauation .  dnenies N/V/Abd pain   had bm   no urinary sx   no fever or chills   no cough   no cp /sob    (11 Apr 2021 15:50)      REVIEW OF SYSTEMS  Constitutional - No fever, No weight loss, No fatigue  HEENT - No eye pain, No visual disturbances, No difficulty hearing, No tinnitus, No vertigo, No neck pain  Respiratory - No cough, No wheezing, No shortness of breath  Cardiovascular - No chest pain, No palpitations  Gastrointestinal - No abdominal pain, No nausea, No vomiting, No diarrhea, + constipation  Genitourinary - No dysuria, No frequency, No hematuria, No incontinence  Neurological - No headaches, No memory loss, + loss of strength, + numbness, No tremors  Skin - No itching, No rashes, No lesions   Endocrine - No temperature intolerance  Musculoskeletal - No joint pain, No joint swelling, No muscle pain  Psychiatric - No depression, No anxiety    PAST MEDICAL & SURGICAL HISTORY  DM (diabetes mellitus)    HTN (hypertension)    High cholesterol    No significant past surgical history     H/O total knee replacement    Foot fracture         CURRENT FUNCTIONAL STATUS  4/21   Bed Mobility  Bed Mobility Training Rehab Potential: good, to achieve stated therapy goals  Bed Mobility Training Sit-to-Supine: minimum assist (75% patient effort);  verbal cues;  nonverbal cues (demo/gestures);  1 person assist  Bed Mobility Training Supine-to-Sit: minimum assist (75% patient effort);  verbal cues;  nonverbal cues (demo/gestures);  1 person assist  Bed Mobility Training Limitations: decreased ability to use arms for pushing/pulling;  decreased ability to use legs for bridging/pushing;  impaired ability to control trunk for mobility;  decreased strength;  impaired balance;  impaired postural control    Sit-Stand Transfer Training  Sit-to-Stand Transfer Training Rehab Potential: pt with poor sitting balance and "unable to feel my legs"    Therapeutic Exercise  Therapeutic Exercise Rehab Effort: good  Therapeutic Exercise Detail: Patient performed bilateral UE & LE active ROM exercises while seated at edge of bed: shoulder flexion to 90 degrees, elbow flexion/extension, hip flexion, knee extension, ankle pumps (2x10).         FAMILY HISTORY   No pertinent family history in first degree relatives        RECENT LABS/IMAGING  CBC Full  -  ( 22 Apr 2021 07:36 )  WBC Count : 16.06 K/uL  RBC Count : 4.28 M/uL  Hemoglobin : 12.5 g/dL  Hematocrit : 38.3 %  Platelet Count - Automated : 247 K/uL  Mean Cell Volume : 89.5 fL  Mean Cell Hemoglobin : 29.2 pg  Mean Cell Hemoglobin Concentration : 32.6 gm/dL  Auto Neutrophil # : x  Auto Lymphocyte # : x  Auto Monocyte # : x  Auto Eosinophil # : x  Auto Basophil # : x  Auto Neutrophil % : x  Auto Lymphocyte % : x  Auto Monocyte % : x  Auto Eosinophil % : x  Auto Basophil % : x    04-22    136  |  100  |  26<H>  ----------------------------<  234<H>  4.9   |  24  |  1.13    Ca    9.1      22 Apr 2021 07:36  Phos  3.3     04-22  Mg     2.0     04-22          VITALS  T(C): 36.9 (04-22-21 @ 10:35), Max: 37 (04-22-21 @ 06:11)  HR: 90 (04-22-21 @ 10:35) (71 - 105)  BP: 142/83 (04-22-21 @ 10:35) (70/40 - 145/92)  RR: 18 (04-22-21 @ 10:35) (17 - 18)  SpO2: 99% (04-22-21 @ 10:35) (98% - 100%)  Wt(kg): --    ALLERGIES  No Known Drug Allergies  shellfish (Urticaria)      MEDICATIONS   acetaminophen   Tablet .. 650 milliGRAM(s) Oral every 6 hours PRN  acetaminophen   Tablet .. 650 milliGRAM(s) Oral every 6 hours PRN  albumin human  5% IVPB 3750 milliLiter(s) IV Intermittent every 48 hours  aluminum hydroxide/magnesium hydroxide/simethicone Suspension 30 milliLiter(s) Oral every 6 hours PRN  amLODIPine   Tablet 10 milliGRAM(s) Oral daily  bisacodyl 5 milliGRAM(s) Oral at bedtime  calcium gluconate IVPB 2 Gram(s) IV Intermittent <User Schedule>  chlorhexidine 2% Cloths 1 Application(s) Topical daily  dextrose 40% Gel 15 Gram(s) Oral once  dextrose 5%. 1000 milliLiter(s) IV Continuous <Continuous>  dextrose 5%. 1000 milliLiter(s) IV Continuous <Continuous>  dextrose 50% Injectable 25 Gram(s) IV Push once  dextrose 50% Injectable 12.5 Gram(s) IV Push once  dextrose 50% Injectable 25 Gram(s) IV Push once  dicyclomine 10 milliGRAM(s) Oral once PRN  diphenhydrAMINE 25 milliGRAM(s) Oral daily  gabapentin 100 milliGRAM(s) Oral three times a day  glucagon  Injectable 1 milliGRAM(s) IntraMuscular once  hydrALAZINE 25 milliGRAM(s) Oral three times a day  HYDROmorphone  Injectable 1 milliGRAM(s) IV Push every 8 hours PRN  insulin glargine Injectable (LANTUS) 36 Unit(s) SubCutaneous at bedtime  insulin lispro (ADMELOG) corrective regimen sliding scale   SubCutaneous three times a day before meals  insulin lispro (ADMELOG) corrective regimen sliding scale   SubCutaneous at bedtime  insulin lispro Injectable (ADMELOG) 16 Unit(s) SubCutaneous before breakfast  insulin lispro Injectable (ADMELOG) 14 Unit(s) SubCutaneous before lunch  insulin lispro Injectable (ADMELOG) 14 Unit(s) SubCutaneous before dinner  metoprolol tartrate 12.5 milliGRAM(s) Oral two times a day  mineral oil 30 milliLiter(s) Oral once  oxycodone    5 mG/acetaminophen 325 mG 1 Tablet(s) Oral every 6 hours PRN  senna 2 Tablet(s) Oral two times a day  sodium chloride 0.9% lock flush 10 milliLiter(s) IV Push every 1 hour PRN  sodium chloride 0.9%. 1000 milliLiter(s) IV Continuous <Continuous>      ----------------------------------------------------------------------------------------  PHYSICAL EXAM  Constitutional - NAD, Comfortable  HEENT -  + R neck plasmapheresis catheter, EOMI     Chest - no respiratory distress  Cardiovascular - RRR, S1S2   Abdomen - Soft, NTND  Extremities - No C/C/E, No calf tenderness   Neurologic Exam -                    Cognitive - Awake, Alert, AAO to self, place, date, year, situation     Communication - Fluent, No dysarthria     Cranial Nerves - CN 2-12 intact     Motor -                     LEFT    UE - ShAB 4+/5, EF 4+/5, EE 4+/5, WE4+/5,  4+/5                    RIGHT UE - ShAB 4+/5, EF 4+/5, EE 4+/5, WE4+/5,  4+/5                    LEFT    LE - HF 3/5, KE 4-/5, DF 4+/5, PF 4+/5                    RIGHT LE - HF 3+/5, KE 4/5, DF 4+/5, PF 4+/5        Sensory -impaired distal fingertips     Reflexes - brachoradialis 1+ b/l      OculoVestibular - No saccades, No nystagmus, VOR         Balance - WNL Static  Psychiatric - Mood stable, Affect WNL  ----------------------------------------------------------------------------------------  ASSESSMENT/PLAN  49 yo m p/w GI symptoms including constipation, bloating, cramping, dehydration with weakness onset during admission  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 Will continue to follow for ongoing rehab needs and recommendations.      Patient is a 50y old  Male who presents with a chief complaint of constipation (22 Apr 2021 08:19)      HPI:  51 yo male, PMH DM, HTN, hyperlipidemia; pt presented to the ED c/o abdominal cramping, generalized, and diarrhea following use of laxatives as above for constipation.  In the ED, WBC 10.82, Hb 13.6, CMP: sodium 126, chloride 89, glucose 281, CMP otherwise unremarkable.  VBG: Lactate 2.6 (later downtrended to 1.6).  Pt. was treated with IV hydration and dispo'd to CDU for continued care plan:  IV hydration, supportive care, AM labs, general observation care / monitoring. pt continued to have generalized weakness , numbness and on labs persistent hyponatermia and now being admitted for further evaluation .  dnenies N/V/Abd pain   had bm   no urinary sx   no fever or chills   no cough   no cp /sob    (11 Apr 2021 15:50)    Pt states he began to have plasmapheresis yesterday, able to tolerate half treatment before discontinuing for low BP. Pt complains of constipation, received 2 enemas this morning, reports some improvement after having BM, but still feels constipated with lower abdominal pressure. Otherwise, pt reports some moderate back pain, but denies any difficulty swallowing or SOB. Reports he feels his strength is the same is yesterday.       REVIEW OF SYSTEMS  Constitutional - No fever, No weight loss, No fatigue  HEENT - No eye pain, No visual disturbances, No difficulty hearing, No tinnitus, No vertigo, No neck pain  Respiratory - No cough, No wheezing, No shortness of breath  Cardiovascular - No chest pain, No palpitations  Gastrointestinal - No abdominal pain, No nausea, No vomiting, No diarrhea, + constipation  Genitourinary - No dysuria, No frequency, No hematuria, No incontinence  Neurological - No headaches, No memory loss, + loss of strength, + numbness, No tremors  Skin - No itching, No rashes, No lesions   Endocrine - No temperature intolerance  Musculoskeletal - No joint pain, No joint swelling, No muscle pain  Psychiatric - No depression, No anxiety    PAST MEDICAL & SURGICAL HISTORY  DM (diabetes mellitus)    HTN (hypertension)    High cholesterol    No significant past surgical history     H/O total knee replacement    Foot fracture         CURRENT FUNCTIONAL STATUS  4/21   Bed Mobility  Bed Mobility Training Rehab Potential: good, to achieve stated therapy goals  Bed Mobility Training Sit-to-Supine: minimum assist (75% patient effort);  verbal cues;  nonverbal cues (demo/gestures);  1 person assist  Bed Mobility Training Supine-to-Sit: minimum assist (75% patient effort);  verbal cues;  nonverbal cues (demo/gestures);  1 person assist  Bed Mobility Training Limitations: decreased ability to use arms for pushing/pulling;  decreased ability to use legs for bridging/pushing;  impaired ability to control trunk for mobility;  decreased strength;  impaired balance;  impaired postural control    Sit-Stand Transfer Training  Sit-to-Stand Transfer Training Rehab Potential: pt with poor sitting balance and "unable to feel my legs"    Therapeutic Exercise  Therapeutic Exercise Rehab Effort: good  Therapeutic Exercise Detail: Patient performed bilateral UE & LE active ROM exercises while seated at edge of bed: shoulder flexion to 90 degrees, elbow flexion/extension, hip flexion, knee extension, ankle pumps (2x10).         FAMILY HISTORY   No pertinent family history in first degree relatives        RECENT LABS/IMAGING  CBC Full  -  ( 22 Apr 2021 07:36 )  WBC Count : 16.06 K/uL  RBC Count : 4.28 M/uL  Hemoglobin : 12.5 g/dL  Hematocrit : 38.3 %  Platelet Count - Automated : 247 K/uL  Mean Cell Volume : 89.5 fL  Mean Cell Hemoglobin : 29.2 pg  Mean Cell Hemoglobin Concentration : 32.6 gm/dL  Auto Neutrophil # : x  Auto Lymphocyte # : x  Auto Monocyte # : x  Auto Eosinophil # : x  Auto Basophil # : x  Auto Neutrophil % : x  Auto Lymphocyte % : x  Auto Monocyte % : x  Auto Eosinophil % : x  Auto Basophil % : x    04-22    136  |  100  |  26<H>  ----------------------------<  234<H>  4.9   |  24  |  1.13    Ca    9.1      22 Apr 2021 07:36  Phos  3.3     04-22  Mg     2.0     04-22          VITALS  T(C): 36.9 (04-22-21 @ 10:35), Max: 37 (04-22-21 @ 06:11)  HR: 90 (04-22-21 @ 10:35) (71 - 105)  BP: 142/83 (04-22-21 @ 10:35) (70/40 - 145/92)  RR: 18 (04-22-21 @ 10:35) (17 - 18)  SpO2: 99% (04-22-21 @ 10:35) (98% - 100%)  Wt(kg): --    ALLERGIES  No Known Drug Allergies  shellfish (Urticaria)      MEDICATIONS   acetaminophen   Tablet .. 650 milliGRAM(s) Oral every 6 hours PRN  acetaminophen   Tablet .. 650 milliGRAM(s) Oral every 6 hours PRN  albumin human  5% IVPB 3750 milliLiter(s) IV Intermittent every 48 hours  aluminum hydroxide/magnesium hydroxide/simethicone Suspension 30 milliLiter(s) Oral every 6 hours PRN  amLODIPine   Tablet 10 milliGRAM(s) Oral daily  bisacodyl 5 milliGRAM(s) Oral at bedtime  calcium gluconate IVPB 2 Gram(s) IV Intermittent <User Schedule>  chlorhexidine 2% Cloths 1 Application(s) Topical daily  dextrose 40% Gel 15 Gram(s) Oral once  dextrose 5%. 1000 milliLiter(s) IV Continuous <Continuous>  dextrose 5%. 1000 milliLiter(s) IV Continuous <Continuous>  dextrose 50% Injectable 25 Gram(s) IV Push once  dextrose 50% Injectable 12.5 Gram(s) IV Push once  dextrose 50% Injectable 25 Gram(s) IV Push once  dicyclomine 10 milliGRAM(s) Oral once PRN  diphenhydrAMINE 25 milliGRAM(s) Oral daily  gabapentin 100 milliGRAM(s) Oral three times a day  glucagon  Injectable 1 milliGRAM(s) IntraMuscular once  hydrALAZINE 25 milliGRAM(s) Oral three times a day  HYDROmorphone  Injectable 1 milliGRAM(s) IV Push every 8 hours PRN  insulin glargine Injectable (LANTUS) 36 Unit(s) SubCutaneous at bedtime  insulin lispro (ADMELOG) corrective regimen sliding scale   SubCutaneous three times a day before meals  insulin lispro (ADMELOG) corrective regimen sliding scale   SubCutaneous at bedtime  insulin lispro Injectable (ADMELOG) 16 Unit(s) SubCutaneous before breakfast  insulin lispro Injectable (ADMELOG) 14 Unit(s) SubCutaneous before lunch  insulin lispro Injectable (ADMELOG) 14 Unit(s) SubCutaneous before dinner  metoprolol tartrate 12.5 milliGRAM(s) Oral two times a day  mineral oil 30 milliLiter(s) Oral once  oxycodone    5 mG/acetaminophen 325 mG 1 Tablet(s) Oral every 6 hours PRN  senna 2 Tablet(s) Oral two times a day  sodium chloride 0.9% lock flush 10 milliLiter(s) IV Push every 1 hour PRN  sodium chloride 0.9%. 1000 milliLiter(s) IV Continuous <Continuous>      ----------------------------------------------------------------------------------------  PHYSICAL EXAM  Constitutional - NAD, Comfortable  HEENT -  + R neck plasmapheresis catheter, EOMI     Chest - no respiratory distress  Cardiovascular - RRR, S1S2   Abdomen - Soft, NTND  Extremities - No C/C/E, No calf tenderness   Neurologic Exam -                    Cognitive - Awake, Alert, AAO to self, place, date, year, situation     Communication - Fluent, No dysarthria     Cranial Nerves - CN 2-12 intact     Motor -                     LEFT    UE - ShAB 4+/5, EF 4+/5, EE 4+/5, WE4+/5,  4+/5                    RIGHT UE - ShAB 4+/5, EF 4+/5, EE 4+/5, WE4+/5,  4+/5                    Unable to assess lower extremities in patient's position on bed pan.      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 Will continue to follow for ongoing rehab needs and recommendations.      Patient is a 50y old  Male who presents with a chief complaint of constipation (22 Apr 2021 08:19)    Interval history:  Pt states he began to have plasmapheresis yesterday, able to tolerate half treatment before discontinuing for low BP. Pt complains of constipation, received 2 enemas this morning, reports some improvement after having BM, but still feels constipated with lower abdominal pressure. Otherwise, pt reports some moderate back pain, but denies any difficulty swallowing or SOB. Reports he feels his strength is the same is yesterday.       REVIEW OF SYSTEMS  Constitutional - No fever, No weight loss, No fatigue  HEENT - No eye pain, No visual disturbances, No difficulty hearing, No tinnitus, No vertigo, No neck pain  Respiratory - No cough, No wheezing, No shortness of breath  Cardiovascular - No chest pain, No palpitations  Gastrointestinal - No abdominal pain, No nausea, No vomiting, No diarrhea, + constipation  Genitourinary - No dysuria, No frequency, No hematuria, No incontinence  Neurological - No headaches, No memory loss, + loss of strength, + numbness, No tremors  Skin - No itching, No rashes, No lesions   Endocrine - No temperature intolerance  Musculoskeletal - No joint pain, No joint swelling, No muscle pain  Psychiatric - No depression, No anxiety    PAST MEDICAL & SURGICAL HISTORY  DM (diabetes mellitus)    HTN (hypertension)    High cholesterol    No significant past surgical history     H/O total knee replacement    Foot fracture         CURRENT FUNCTIONAL STATUS  4/21   Bed Mobility  Bed Mobility Training Rehab Potential: good, to achieve stated therapy goals  Bed Mobility Training Sit-to-Supine: minimum assist (75% patient effort);  verbal cues;  nonverbal cues (demo/gestures);  1 person assist  Bed Mobility Training Supine-to-Sit: minimum assist (75% patient effort);  verbal cues;  nonverbal cues (demo/gestures);  1 person assist  Bed Mobility Training Limitations: decreased ability to use arms for pushing/pulling;  decreased ability to use legs for bridging/pushing;  impaired ability to control trunk for mobility;  decreased strength;  impaired balance;  impaired postural control    Sit-Stand Transfer Training  Sit-to-Stand Transfer Training Rehab Potential: pt with poor sitting balance and "unable to feel my legs"    Therapeutic Exercise  Therapeutic Exercise Rehab Effort: good  Therapeutic Exercise Detail: Patient performed bilateral UE & LE active ROM exercises while seated at edge of bed: shoulder flexion to 90 degrees, elbow flexion/extension, hip flexion, knee extension, ankle pumps (2x10).         FAMILY HISTORY   No pertinent family history in first degree relatives        RECENT LABS/IMAGING  CBC Full  -  ( 22 Apr 2021 07:36 )  WBC Count : 16.06 K/uL  RBC Count : 4.28 M/uL  Hemoglobin : 12.5 g/dL  Hematocrit : 38.3 %  Platelet Count - Automated : 247 K/uL  Mean Cell Volume : 89.5 fL  Mean Cell Hemoglobin : 29.2 pg  Mean Cell Hemoglobin Concentration : 32.6 gm/dL  Auto Neutrophil # : x  Auto Lymphocyte # : x  Auto Monocyte # : x  Auto Eosinophil # : x  Auto Basophil # : x  Auto Neutrophil % : x  Auto Lymphocyte % : x  Auto Monocyte % : x  Auto Eosinophil % : x  Auto Basophil % : x    04-22    136  |  100  |  26<H>  ----------------------------<  234<H>  4.9   |  24  |  1.13    Ca    9.1      22 Apr 2021 07:36  Phos  3.3     04-22  Mg     2.0     04-22          VITALS  T(C): 36.9 (04-22-21 @ 10:35), Max: 37 (04-22-21 @ 06:11)  HR: 90 (04-22-21 @ 10:35) (71 - 105)  BP: 142/83 (04-22-21 @ 10:35) (70/40 - 145/92)  RR: 18 (04-22-21 @ 10:35) (17 - 18)  SpO2: 99% (04-22-21 @ 10:35) (98% - 100%)  Wt(kg): --    ALLERGIES  No Known Drug Allergies  shellfish (Urticaria)      MEDICATIONS   acetaminophen   Tablet .. 650 milliGRAM(s) Oral every 6 hours PRN  acetaminophen   Tablet .. 650 milliGRAM(s) Oral every 6 hours PRN  albumin human  5% IVPB 3750 milliLiter(s) IV Intermittent every 48 hours  aluminum hydroxide/magnesium hydroxide/simethicone Suspension 30 milliLiter(s) Oral every 6 hours PRN  amLODIPine   Tablet 10 milliGRAM(s) Oral daily  bisacodyl 5 milliGRAM(s) Oral at bedtime  calcium gluconate IVPB 2 Gram(s) IV Intermittent <User Schedule>  chlorhexidine 2% Cloths 1 Application(s) Topical daily  dextrose 40% Gel 15 Gram(s) Oral once  dextrose 5%. 1000 milliLiter(s) IV Continuous <Continuous>  dextrose 5%. 1000 milliLiter(s) IV Continuous <Continuous>  dextrose 50% Injectable 25 Gram(s) IV Push once  dextrose 50% Injectable 12.5 Gram(s) IV Push once  dextrose 50% Injectable 25 Gram(s) IV Push once  dicyclomine 10 milliGRAM(s) Oral once PRN  diphenhydrAMINE 25 milliGRAM(s) Oral daily  gabapentin 100 milliGRAM(s) Oral three times a day  glucagon  Injectable 1 milliGRAM(s) IntraMuscular once  hydrALAZINE 25 milliGRAM(s) Oral three times a day  HYDROmorphone  Injectable 1 milliGRAM(s) IV Push every 8 hours PRN  insulin glargine Injectable (LANTUS) 36 Unit(s) SubCutaneous at bedtime  insulin lispro (ADMELOG) corrective regimen sliding scale   SubCutaneous three times a day before meals  insulin lispro (ADMELOG) corrective regimen sliding scale   SubCutaneous at bedtime  insulin lispro Injectable (ADMELOG) 16 Unit(s) SubCutaneous before breakfast  insulin lispro Injectable (ADMELOG) 14 Unit(s) SubCutaneous before lunch  insulin lispro Injectable (ADMELOG) 14 Unit(s) SubCutaneous before dinner  metoprolol tartrate 12.5 milliGRAM(s) Oral two times a day  mineral oil 30 milliLiter(s) Oral once  oxycodone    5 mG/acetaminophen 325 mG 1 Tablet(s) Oral every 6 hours PRN  senna 2 Tablet(s) Oral two times a day  sodium chloride 0.9% lock flush 10 milliLiter(s) IV Push every 1 hour PRN  sodium chloride 0.9%. 1000 milliLiter(s) IV Continuous <Continuous>      ----------------------------------------------------------------------------------------  PHYSICAL EXAM  Constitutional - NAD, Comfortable  HEENT -  + R neck plasmapheresis catheter, EOMI     Chest - no respiratory distress  Cardiovascular - RRR, S1S2   Abdomen - Soft, NTND  Extremities - No C/C/E, No calf tenderness   Neurologic Exam -                    Cognitive - Awake, Alert, AAO to self, place, date, year, situation     Communication - Fluent, No dysarthria     Cranial Nerves - CN 2-12 intact     Motor -                     LEFT    UE - ShAB 4+/5, EF 4+/5, EE 4+/5, WE4+/5,  4+/5                    RIGHT UE - ShAB 4+/5, EF 4+/5, EE 4+/5, WE4+/5,  4+/5                    Unable to assess lower extremities in patient's position on bed pan.      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 Will continue to follow for ongoing rehab needs and recommendations.

## 2021-04-22 NOTE — PROGRESS NOTE ADULT - ASSESSMENT
51 yo RH AA male with h/o DM, HTN, hyperlipidemia; pt presented to the ED c/o abdominal cramping, generalized, and diarrhea following use of laxatives as above for constipation. Neurology called for evaluation of LE weakness, unsteady gait.    Impression: LE>UE weakness with absent reflexes and noted cauda equina/nerve root enhancement concerning for [place] [address] syndrome. 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 s/p shielye. Na improved.    had first session of PLEX yesterday and episode of hypotension. now improved + constipation   - GI following for constipation  - hyponatremia Na 130-->136 slow correction  - s/p shiley   - first PLEX  4/22, had episode of hypotension.  next session please go at slower rate and hold BP meds on morning of session. complete total of 3-5 sessions pending clinical improvement   - f/u remaining LP results.   - send for ganglioside antibodies, Gq1b  - slow correction of sodium  - aggressive PT/OT  - B12 >2000, copper, Vit E, thiamine, magnesium heavy metals, zinc  -refused IVIG but agreed for PLEX  - telemetry  - check FS, glucose control <180  - GI/DVT ppx  - Counseling on diet, exercise, and medication adherence was done  - Counseling on smoking cessation and alcohol consumption offered when appropriate.  - Pain assessed and judicious use of narcotics when appropriate was discussed.    - Stroke education given when appropriate.  - Importance of fall prevention discussed.   - Differential diagnosis and plan of care discussed with patient and/or family and primary team  - Thank you for allowing me to participate in the care of this patient. Call with questions.     Geoffrey Gardner MD  Vascular Neurology  Office: [phone]

## 2021-04-22 NOTE — PROGRESS NOTE ADULT - SUBJECTIVE AND OBJECTIVE BOX
Neurology Progress Note    S: Patient seen and examined. had first session of PLEX yesterday and episode of hypotension. now improved + constipation     Medication:  MEDICATIONS  (STANDING):  albumin human  5% IVPB 3750 milliLiter(s) IV Intermittent every 48 hours  amLODIPine   Tablet 10 milliGRAM(s) Oral daily  bisacodyl 5 milliGRAM(s) Oral at bedtime  calcium gluconate IVPB 2 Gram(s) IV Intermittent <User Schedule>  chlorhexidine 2% Cloths 1 Application(s) Topical daily  dextrose 40% Gel 15 Gram(s) Oral once  dextrose 5%. 1000 milliLiter(s) (50 mL/Hr) IV Continuous <Continuous>  dextrose 5%. 1000 milliLiter(s) (100 mL/Hr) IV Continuous <Continuous>  dextrose 50% Injectable 25 Gram(s) IV Push once  dextrose 50% Injectable 12.5 Gram(s) IV Push once  dextrose 50% Injectable 25 Gram(s) IV Push once  gabapentin 100 milliGRAM(s) Oral three times a day  glucagon  Injectable 1 milliGRAM(s) IntraMuscular once  hydrALAZINE 25 milliGRAM(s) Oral three times a day  insulin glargine Injectable (LANTUS) 36 Unit(s) SubCutaneous at bedtime  insulin lispro (ADMELOG) corrective regimen sliding scale   SubCutaneous three times a day before meals  insulin lispro (ADMELOG) corrective regimen sliding scale   SubCutaneous at bedtime  insulin lispro Injectable (ADMELOG) 16 Unit(s) SubCutaneous before breakfast  insulin lispro Injectable (ADMELOG) 14 Unit(s) SubCutaneous before lunch  insulin lispro Injectable (ADMELOG) 14 Unit(s) SubCutaneous before dinner  metoprolol tartrate 12.5 milliGRAM(s) Oral two times a day  mineral oil 30 milliLiter(s) Oral once  senna 2 Tablet(s) Oral two times a day  sodium chloride 0.9%. 1000 milliLiter(s) (75 mL/Hr) IV Continuous <Continuous>    MEDICATIONS  (PRN):  acetaminophen   Tablet .. 650 milliGRAM(s) Oral every 6 hours PRN Temp greater or equal to 38C (100.4F), Mild Pain (1 - 3), Moderate Pain (4 - 6)  acetaminophen   Tablet .. 650 milliGRAM(s) Oral every 6 hours PRN Mild Pain (1 - 3)  aluminum hydroxide/magnesium hydroxide/simethicone Suspension 30 milliLiter(s) Oral every 6 hours PRN Dyspepsia  dicyclomine 10 milliGRAM(s) Oral once PRN abdominal pain  HYDROmorphone  Injectable 1 milliGRAM(s) IV Push every 8 hours PRN Severe Pain (7 - 10)  oxycodone    5 mG/acetaminophen 325 mG 1 Tablet(s) Oral every 6 hours PRN Moderate Pain (4 - 6)  sodium chloride 0.9% lock flush 10 milliLiter(s) IV Push every 1 hour PRN Pre/post blood products, medications, blood draw, and to maintain line patency    Vitals:  Vital Signs Last 24 Hrs  T(C): 36.9 (22 Apr 2021 10:35), Max: 37 (22 Apr 2021 06:11)  T(F): 98.5 (22 Apr 2021 10:35), Max: 98.6 (22 Apr 2021 06:11)  HR: 90 (22 Apr 2021 10:35) (71 - 105)  BP: 142/83 (22 Apr 2021 10:35) (70/40 - 145/92)  BP(mean): --  RR: 18 (22 Apr 2021 10:35) (17 - 18)  SpO2: 99% (22 Apr 2021 10:35) (98% - 100%)    General Exam:   General Appearance: Appropriately dressed and in no acute distress       Head: Normocephalic, atraumatic and no dysmorphic features  Ear, Nose, and Throat: Moist mucous membranes  CVS: S1S2+  Resp: No SOB, no wheeze or rhonchi  Abd: soft NTND  Extremities: No edema, no cyanosis  Skin: No bruises, no rashes     Neurological Exam:  Mental Status: Awake, alert and oriented x 3.  Able to follow simple and complex verbal commands. Able to name and repeat. fluent speech. No obvious aphasia or dysarthria noted.   Cranial Nerves: PERRL, EOMI, VFFC, sensation V1-V3 intact,  no obvious facial asymmetry , equal elevation of palate, scm/trap 5/5, tongue is midline on protrusion. no obvious papilledema on fundoscopic exam. Hearing is grossly intact.   Motor: Normal bulk, tone and strength throughout B/L UE Fine finger movements were intact and symmetric. no tremors B/L LE 2-3/5  Sensation: Intact to light touch and pinprick throughout. no right/left confusion. no extinction to tactile on DSS. Romberg was negative.   Reflexes: 0 throughout at biceps, brachioradialis, triceps, patellars and ankles bilaterally and equal. only hyas LUE biceps 1+ No clonus. R toe and L toe were both downgoing.  Coordination: No dysmetria on FNF   Gait: unable    I personally reviewed the below data/images/labs:  CBC Full  -  ( 22 Apr 2021 07:36 )  WBC Count : 16.06 K/uL  RBC Count : 4.28 M/uL  Hemoglobin : 12.5 g/dL  Hematocrit : 38.3 %  Platelet Count - Automated : 247 K/uL  Mean Cell Volume : 89.5 fL  Mean Cell Hemoglobin : 29.2 pg  Mean Cell Hemoglobin Concentration : 32.6 gm/dL  Auto Neutrophil # : x  Auto Lymphocyte # : x  Auto Monocyte # : x  Auto Eosinophil # : x  Auto Basophil # : x  Auto Neutrophil % : x  Auto Lymphocyte % : x  Auto Monocyte % : x  Auto Eosinophil % : x  Auto Basophil % : x    04-22    136  |  100  |  26<H>  ----------------------------<  234<H>  4.9   |  24  |  1.13    Ca    9.1      22 Apr 2021 07:36  Phos  3.3     04-22  Mg     2.0     04-22        < from: CT Head No Cont (04.02.21 @ 21:48) >    EXAM:  CT BRAIN        PROCEDURE DATE:  Apr 2 2021         INTERPRETATION:  HISTORY: Hypertension    Technique: CT of the head was performed without contrast.    Multiple contiguous axial images were acquired from the skullbase to the vertex without the administration of intravenous contrast.  Coronal and sagittal reformations were made.    COMPARISON: None.    FINDINGS:  The ventricles and sulci are within normal limits given the patient's age. No acute hemorrhage, mass effect, midline shift,hydrocephalus, or extra-axial fluid collections. Mild patchy hypoattenuation within the white matter, likely secondary chronic microscopy changes.    The calvarium is intact.    Small cyst or polyp in the right maxillary sinus. There are opacification of a few ethmoid air cells bilaterally as well as mild mucosal thickening of the left frontal sinus. The mastoid air cells are clear.    IMPRESSION:  No acute hemorrhage, mass effect, or midline shift.            DARRELL MIX MD; Resident Radiology  This document has been electronically signed.  PORTILLO HARPER MD; Attending Radiologist  This document has been electronically signed. Apr 2 2021 11:34PM    < end of copied text >  < from: MR Lumbar Spine w/wo IV Cont (04.16.21 @ 22:16) >    EXAM:  MR SPINE LUMBAR WAW IC      EXAM:  MR SPINE THORACIC WAW IC        PROCEDURE DATE:  Apr 16 2021         INTERPRETATION:  CLINICAL INDICATION: Bilateral leg weakness of unknown etiology.    Technique: MRI of the thoracic and lumbar spine was performed with and without contrast. A total 10 cc of Gadavist were administered intravenously.    COMPARISON: None.    FINDINGS:    THORACIC:    There is normal thoracic kyphosis. There are multiple small vertebral body endplate Schmorl's nodes. Otherwise, the vertebrae are normal in signal, height, and alignment. There is anterior endplate osteophytosis most pronounced from the most pronounced from T6-T10.    There are tiny disc bulges at T7-T8 and T8-T9, without significant central spinal canalstenosis or neuroforaminal narrowing.    The spinal cord is normal in course, caliber, and signal.    There is no abnormal enhancement within thoracic spine.    LUMBAR:    There is normal lumbar lordosis. There are mild-to-moderate type I degenerative endplate changes on the left at L4-L5. Otherwise, the vertebral bodies are normal in height and signal without fracture or dislocation.    There is disc space narrowing and desiccation at L4-L5 and L5-S1.    Evaluation of individual levels demonstrates:    L5-S1: Small posterior disc bulge, as well as mild right greater than left facet hypertrophy, resulting in moderate right and mild left neural foraminal stenosis. No significant spinal canal stenosis.    L4-L5: Small posterior disc bulge and superimposed small right central disc protrusion, including tiny annular fissure, resulting in mild spinal canal stenosis, as well as bilateral facet hypertrophy with moderate right and severe left neural foraminal stenosis.    L3-L4: Small posterior disc bulge, along with bilateral facet hypertrophy, resulting in mild spinal canal stenosis as well as mild bilateral neural foraminal stenosis    L2-L3: No significant disc herniation, central spinal canal stenosis, or neural foraminal narrowing. Mildbilateral facet hypertrophy.    L1-L2: Tiny posterior disc bulge without significant central spinal canal stenosis or neuroforaminal narrowing. Mild bilateral facet hypertrophy.    The conus is normal in position and morphology at the L1 level. Thereis smooth thickening and abnormal enhancement of the cauda equina and ventral nerve roots, a nonspecific finding.    There is no definite fluid collection or mass lesion within the visualized retroperitoneal or posterior paraspinal soft tissues.    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 Alternative etiologies include chronic inflammatory demyelinating polyneuropathy, Lyme disease, less likely arachnoiditis or carcinomatosis.    Results were discussed with ADELSO Elkins by Dr. Haji at 9:40 AM on 4/17/2021 with read back followed.              ROSIE HAJI MD; Attending Radiologist  This document has been electronically signed. Apr 17 2021  9:48AM    < end of copied text >  < from: MR Cervical Spine w/wo IV Cont (04.16.21 @ 22:16) >    EXAM:  MR SPINE CERVICAL WAW IC        PROCEDURE DATE:  Apr 16 2021         INTERPRETATION:  CLINICAL INDICATION: Patient with new weakness in lower extremities.    TECHNIQUE: MRI of the cervical spine was performed before and after intravenous contrast. A total 10 cc of Gadavist were administered.    COMPARISON: None.    FINDINGS:  Structures at the craniocervical and cervicomedullary junction are intact.    There is nonspecific straightening of the normal cervical lordosis. There is grade 1 degenerative retrolisthesis of C5 on C6. There is disc space narrowing at C3-C4 and C5-C6. The remaining intervertebral disc space heights are grossly preserved. There is anterior endplate osteophytosis from C3 to C6.    There are mild Modic type II degenerative endplate changes involving C3 CT 4 and C5-C6, along with mild endplate irregularity. Otherwise, the vertebrae demonstrate normal height and signal without fracture, dislocation or marrow edema.    The spinal cord demonstrates normal course and caliber without intrinsic cord signal abnormality.    The prevertebral and paravertebral soft tissues are within normal limits.    There is no abnormal enhancement.    There is diffuse disc desiccation. Evaluation of individual levels demonstrates:    C2-C3: No significant disc herniation, central spinal canal stenosis, although neural foraminal narrowing.    C3-C4: Small posterior disc osteophyte complexes, partially effacing the ventral thecal sac and resulting in mild spinal canal stenosis,as well as moderate right and mild left neural foraminal narrowing.    C4-C5: Tiny posterior disc osteophyte complex minimally effacing the ventral thecal sac, without significant spinal canal stenosis, as well as moderate left and mild right neural foraminal narrowing.    C5-C6: Tiny posterior disc osteophyte complex minimally effacing the ventral thecal sac, without significant spinal canal stenosis, as well as severe right and moderate left neural foraminal narrowing.    C6-C7: No significant disc herniation, central spinal canal stenosis, or neural foraminal narrowing.    C7-T1: No significant disc herniation, central spinal canal stenosis, or neural foraminal narrowing.    There is no soft tissue neck mass or fluid collection.    IMPRESSION:  No evidence for spinal cord compression, signal abnormality, or abnormal enhancement.    Multilevel degenerative changes, as described above, most pronounced at C3-C4 where there is mild spinal canal stenosis and C5-C6 where there is severe right and moderate left neural foraminal narrowing.              ROSIE HAJI MD; Attending Radiologist  This document has been electronically signed. Apr 17 2021  8:54AM    < end of copied text >

## 2021-04-22 NOTE — PROGRESS NOTE ADULT - ASSESSMENT
continue rx conisitpatoin/ diarrhea as needed.  back pain is main issue  d/c planning per medicine when stable

## 2021-04-22 NOTE — PROGRESS NOTE ADULT - ASSESSMENT
51 yo male, PMH DM, HTN, hyperlipidemia; pt presented to the ED c/o abdominal cramping, generalized, and diarrhea following use of laxatives as above for constipation.  In the ED, WBC 10.82, Hb 13.6, CMP: sodium 126, chloride 89, glucose 281, CMP otherwise unremarkable.  VBG: Lactate 2.6 (later downtrended to 1.6).  Pt. was treated with IV hydration and dispo'd to CDU for continued care plan:  IV hydration, supportive care, AM labs, general observation care / monitoring. pt continued to have generalized weakness , numbness and on labs persistent hyponatermia      A/P:    ALLIE   baseline likely ~ 1  ALLIE possible sec to uncontrolled hyperkalemia on losartan  s/p NS @ 75cc/hr x 12 hrs  scr improved  resume losartan  monitor bmp  avoid nephrotoxic agents    Hyponatremia:  Likely sec to hypovolemia+hyperglycemia. work up suggested SIADH  s/p NS 4/20 for ALLIE  Na improved  optimize dm control  free water restriction <1.2L/day.  ok TSH, serum cortisol level  Monitor Na level Q12  Na level should not increase more than 6meq in 24 hrs    Proteinuia/Hematuria:  Etiology?  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  In the ED, WBC 10.82, Hb 13.6, CMP: sodium 126, chloride 89, glucose 281, CMP otherwise unremarkable.  VBG: Lactate 2.6 (later downtrended to 1.6).  Pt. was treated with IV hydration and dispo'd to CDU for continued care plan:  IV hydration, supportive care, AM labs, general observation care / monitoring. pt continued to have generalized weakness , numbness and on labs persistent hyponatermia      A/P:    ALLIE   baseline likely ~ 1  ALLIE possible sec to uncontrolled hyperkalemia on losartan  s/p NS @ 75cc/hr x 12 hrs  scr improved  resume losartan  monitor bmp  avoid nephrotoxic agents    Hyponatremia:  Likely sec to hypovolemia+hyperglycemia. work up suggested SIADH  s/p NS 4/20 for ALLIE  Na improved  optimize dm control  free water restriction <1.2L/day.  ok TSH, serum cortisol level  Monitor Na level Q12  Na level should not increase more than 6meq in 24 hrs    Proteinuia/Hematuria:  Etiology?  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

## 2021-04-22 NOTE — PROGRESS NOTE ADULT - ASSESSMENT
49 yo male, PMH DM, HTN, hyperlipidemia; pt presented to the ED c/o abdominal cramping, generalized, and diarrhea following use of laxatives as above for constipation.  In the ED, WBC 10.82, Hb 13.6, CMP: sodium 126, chloride 89, glucose 281, CMP otherwise unremarkable.  VBG: Lactate 2.6 (later downtrended to 1.6).  Pt. was treated with IV hydration and dispo'd to CDU for continued care plan:  IV hydration, supportive care, AM labs, general observation care / monitoring. pt continued to have generalized weakness , numbness and on labs persistent hyponatermia and now being admitted for furhter evlauation .  dnenies N/V/Abd pain   had bm   no urinary sx   no fever or chills   no cough   no cp /sob     - hyponatremia :   likely multifactorial including hyperglycemia ( pseudohyponatremia ) and pre renal sec to diarreah   ? 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 shiely placement     - hypophosphatemia : repleted and monitor     - constipation : had had colonoscopy 2 yrs ago with Dr. Rae   hold further bowel regimen   CT abd no acute path   no further inpt red : discussed with Dr Rae     - ALLIE : improved       dvt proph   d/w pt at length    51 yo male, PMH DM, HTN, hyperlipidemia; pt presented to the ED c/o abdominal cramping, generalized, and diarrhea following use of laxatives as above for constipation.  In the ED, WBC 10.82, Hb 13.6, CMP: sodium 126, chloride 89, glucose 281, CMP otherwise unremarkable.  VBG: Lactate 2.6 (later downtrended to 1.6).  Pt. was treated with IV hydration and dispo'd to CDU for continued care plan:  IV hydration, supportive care, AM labs, general observation care / monitoring. pt continued to have generalized weakness , numbness and on labs persistent hyponatermia and now being admitted for furhter evlauation .  dnenies N/V/Abd pain   had bm   no urinary sx   no fever or chills   no cough   no cp /sob     - hyponatremia :   likely multifactorial including hyperglycemia ( pseudohyponatremia ) and pre renal sec to diarreah   ? 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 Rae   CT abd no acute path   no further inpt red : discussed with Dr Rae     - ALLIE : improved   avoid NSAIDS for now     dvt proph   d/w pt at length

## 2021-04-22 NOTE — PROGRESS NOTE ADULT - SUBJECTIVE AND OBJECTIVE BOX
Date of service: 04-22-21 @ 23:55      Patient is a 50y old  Male who presents with a chief complaint of constipation (22 Apr 2021 08:19)                                                               INTERVAL HPI/OVERNIGHT EVENTS:    REVIEW OF SYSTEMS:     CONSTITUTIONAL: No weakness, fevers or chills  EYES/ENT: No visual changes , no ear ache   NECK: No pain or stiffness  RESPIRATORY: No cough, wheezing,  No shortness of breath  CARDIOVASCULAR: No chest pain or palpitations  GASTROINTESTINAL: No abdominal pain  . No nausea, vomiting, or hematemesis; No diarrhea or constipation. No melena or hematochezia.  GENITOURINARY: No dysuria, frequency or hematuria  NEUROLOGICAL: No numbness or weakness  SKIN: No itching, burning, rashes, or lesions                                                                                                                                                                                                                                                                                 Medications:  MEDICATIONS  (STANDING):  albumin human  5% IVPB 3750 milliLiter(s) IV Intermittent every 48 hours  amLODIPine   Tablet 10 milliGRAM(s) Oral daily  bisacodyl 5 milliGRAM(s) Oral at bedtime  calcium gluconate IVPB 2 Gram(s) IV Intermittent <User Schedule>  chlorhexidine 2% Cloths 1 Application(s) Topical daily  dextrose 40% Gel 15 Gram(s) Oral once  dextrose 5%. 1000 milliLiter(s) (50 mL/Hr) IV Continuous <Continuous>  dextrose 5%. 1000 milliLiter(s) (100 mL/Hr) IV Continuous <Continuous>  dextrose 50% Injectable 25 Gram(s) IV Push once  dextrose 50% Injectable 12.5 Gram(s) IV Push once  dextrose 50% Injectable 25 Gram(s) IV Push once  gabapentin 100 milliGRAM(s) Oral three times a day  glucagon  Injectable 1 milliGRAM(s) IntraMuscular once  hydrALAZINE 25 milliGRAM(s) Oral three times a day  insulin glargine Injectable (LANTUS) 40 Unit(s) SubCutaneous at bedtime  insulin lispro (ADMELOG) corrective regimen sliding scale   SubCutaneous three times a day before meals  insulin lispro (ADMELOG) corrective regimen sliding scale   SubCutaneous at bedtime  insulin lispro Injectable (ADMELOG) 14 Unit(s) SubCutaneous before lunch  insulin lispro Injectable (ADMELOG) 14 Unit(s) SubCutaneous before dinner  insulin lispro Injectable (ADMELOG) 16 Unit(s) SubCutaneous before breakfast  metoprolol tartrate 12.5 milliGRAM(s) Oral two times a day  mineral oil 30 milliLiter(s) Oral once  senna 2 Tablet(s) Oral two times a day  sodium chloride 0.9%. 1000 milliLiter(s) (75 mL/Hr) IV Continuous <Continuous>    MEDICATIONS  (PRN):  acetaminophen   Tablet .. 650 milliGRAM(s) Oral every 6 hours PRN Temp greater or equal to 38C (100.4F), Mild Pain (1 - 3), Moderate Pain (4 - 6)  acetaminophen   Tablet .. 650 milliGRAM(s) Oral every 6 hours PRN Mild Pain (1 - 3)  aluminum hydroxide/magnesium hydroxide/simethicone Suspension 30 milliLiter(s) Oral every 6 hours PRN Dyspepsia  dicyclomine 10 milliGRAM(s) Oral once PRN abdominal pain  HYDROmorphone  Injectable 1 milliGRAM(s) IV Push every 8 hours PRN Severe Pain (7 - 10)  oxycodone    5 mG/acetaminophen 325 mG 1 Tablet(s) Oral every 6 hours PRN Moderate Pain (4 - 6)  sodium chloride 0.9% lock flush 10 milliLiter(s) IV Push every 1 hour PRN Pre/post blood products, medications, blood draw, and to maintain line patency       Allergies    No Known Drug Allergies  shellfish (Urticaria)    Intolerances      Vital Signs Last 24 Hrs  T(C): 37.1 (22 Apr 2021 21:23), Max: 37.2 (22 Apr 2021 17:26)  T(F): 98.8 (22 Apr 2021 21:23), Max: 99 (22 Apr 2021 17:26)  HR: 90 (22 Apr 2021 21:23) (90 - 110)  BP: 125/81 (22 Apr 2021 21:23) (125/81 - 145/92)  BP(mean): --  RR: 18 (22 Apr 2021 21:23) (17 - 18)  SpO2: 98% (22 Apr 2021 21:23) (98% - 100%)  CAPILLARY BLOOD GLUCOSE      POCT Blood Glucose.: 172 mg/dL (22 Apr 2021 21:46)  POCT Blood Glucose.: 91 mg/dL (22 Apr 2021 17:13)  POCT Blood Glucose.: 156 mg/dL (22 Apr 2021 12:09)  POCT Blood Glucose.: 256 mg/dL (22 Apr 2021 07:13)      04-21 @ 07:01 - 04-22 @ 07:00  --------------------------------------------------------  IN: 354 mL / OUT: 600 mL / NET: -246 mL      Physical Exam:    Daily     Daily   General:  Well appearing, NAD, not cachetic  HEENT:  Nonicteric, PERRLA  CV:  RRR, S1S2   Lungs:  CTA B/L, no wheezes, rales, rhonchi  Abdomen:  Soft, non-tender, no distended, positive BS  Extremities:  2+ pulses, no c/c, no edema  Skin:  Warm and dry, no rashes  :  No lucas  Neuro:  AAOx3, non-focal, grossly intact                                                                                                                                                                                                                                                                                                LABS:                               12.5   16.06 )-----------( 247      ( 22 Apr 2021 07:36 )             38.3                      04-22    136  |  100  |  26<H>  ----------------------------<  234<H>  4.9   |  24  |  1.13    Ca    9.1      22 Apr 2021 07:36  Phos  3.3     04-22  Mg     2.0     04-22                         RADIOLOGY & ADDITIONAL TESTS         I personally reviewed: [  ]EKG   [  ]CXR    [  ] CT      A/P:         Discussed with :     Francisco consultants' Notes   Time spent :   Date of service: 04-22-21 @ 23:55      Patient is a 50y old  Male who presents with a chief complaint of constipation (22 Apr 2021 08:19)                                                               INTERVAL HPI/OVERNIGHT EVENTS:  events noted   hypotension during exchange     REVIEW OF SYSTEMS:     CONSTITUTIONAL: No weakness, fevers or chills  RESPIRATORY: No cough, wheezing,  No shortness of breath  CARDIOVASCULAR: No chest pain or palpitations  GASTROINTESTINAL: No abdominal pain  . No nausea, vomiting, or hematemesis; No diarrhea or constipation. No melena or hematochezia.  GENITOURINARY: No dysuria, frequency or hematuria  NEUROLOGICAL: No new complaints                                                                                                                                                                                                                                                                                Medications:  MEDICATIONS  (STANDING):  albumin human  5% IVPB 3750 milliLiter(s) IV Intermittent every 48 hours  amLODIPine   Tablet 10 milliGRAM(s) Oral daily  bisacodyl 5 milliGRAM(s) Oral at bedtime  calcium gluconate IVPB 2 Gram(s) IV Intermittent <User Schedule>  chlorhexidine 2% Cloths 1 Application(s) Topical daily  dextrose 40% Gel 15 Gram(s) Oral once  dextrose 5%. 1000 milliLiter(s) (50 mL/Hr) IV Continuous <Continuous>  dextrose 5%. 1000 milliLiter(s) (100 mL/Hr) IV Continuous <Continuous>  dextrose 50% Injectable 25 Gram(s) IV Push once  dextrose 50% Injectable 12.5 Gram(s) IV Push once  dextrose 50% Injectable 25 Gram(s) IV Push once  gabapentin 100 milliGRAM(s) Oral three times a day  glucagon  Injectable 1 milliGRAM(s) IntraMuscular once  hydrALAZINE 25 milliGRAM(s) Oral three times a day  insulin glargine Injectable (LANTUS) 40 Unit(s) SubCutaneous at bedtime  insulin lispro (ADMELOG) corrective regimen sliding scale   SubCutaneous three times a day before meals  insulin lispro (ADMELOG) corrective regimen sliding scale   SubCutaneous at bedtime  insulin lispro Injectable (ADMELOG) 14 Unit(s) SubCutaneous before lunch  insulin lispro Injectable (ADMELOG) 14 Unit(s) SubCutaneous before dinner  insulin lispro Injectable (ADMELOG) 16 Unit(s) SubCutaneous before breakfast  metoprolol tartrate 12.5 milliGRAM(s) Oral two times a day  mineral oil 30 milliLiter(s) Oral once  senna 2 Tablet(s) Oral two times a day  sodium chloride 0.9%. 1000 milliLiter(s) (75 mL/Hr) IV Continuous <Continuous>    MEDICATIONS  (PRN):  acetaminophen   Tablet .. 650 milliGRAM(s) Oral every 6 hours PRN Temp greater or equal to 38C (100.4F), Mild Pain (1 - 3), Moderate Pain (4 - 6)  acetaminophen   Tablet .. 650 milliGRAM(s) Oral every 6 hours PRN Mild Pain (1 - 3)  aluminum hydroxide/magnesium hydroxide/simethicone Suspension 30 milliLiter(s) Oral every 6 hours PRN Dyspepsia  dicyclomine 10 milliGRAM(s) Oral once PRN abdominal pain  HYDROmorphone  Injectable 1 milliGRAM(s) IV Push every 8 hours PRN Severe Pain (7 - 10)  oxycodone    5 mG/acetaminophen 325 mG 1 Tablet(s) Oral every 6 hours PRN Moderate Pain (4 - 6)  sodium chloride 0.9% lock flush 10 milliLiter(s) IV Push every 1 hour PRN Pre/post blood products, medications, blood draw, and to maintain line patency       Allergies    No Known Drug Allergies  shellfish (Urticaria)    Intolerances      Vital Signs Last 24 Hrs  T(C): 37.1 (22 Apr 2021 21:23), Max: 37.2 (22 Apr 2021 17:26)  T(F): 98.8 (22 Apr 2021 21:23), Max: 99 (22 Apr 2021 17:26)  HR: 90 (22 Apr 2021 21:23) (90 - 110)  BP: 125/81 (22 Apr 2021 21:23) (125/81 - 145/92)  BP(mean): --  RR: 18 (22 Apr 2021 21:23) (17 - 18)  SpO2: 98% (22 Apr 2021 21:23) (98% - 100%)  CAPILLARY BLOOD GLUCOSE      POCT Blood Glucose.: 172 mg/dL (22 Apr 2021 21:46)  POCT Blood Glucose.: 91 mg/dL (22 Apr 2021 17:13)  POCT Blood Glucose.: 156 mg/dL (22 Apr 2021 12:09)  POCT Blood Glucose.: 256 mg/dL (22 Apr 2021 07:13)      04-21 @ 07:01  -  04-22 @ 07:00  --------------------------------------------------------  IN: 354 mL / OUT: 600 mL / NET: -246 mL      Physical Exam:    Daily     Daily   General:  NAD   HEENT:  Nonicteric, PERRLA  CV:  RRR, S1S2   Lungs:  CTA B/L, no wheezes, rales, rhonchi  Abdomen:  Soft, non-tender, no distended, positive BS  Extremities:  no  edema   Neuro:  AAOx3, non-focal, grossly intact                                                                                                                                                                                                                                                                                                LABS:                               12.5   16.06 )-----------( 247      ( 22 Apr 2021 07:36 )             38.3                      04-22    136  |  100  |  26<H>  ----------------------------<  234<H>  4.9   |  24  |  1.13    Ca    9.1      22 Apr 2021 07:36  Phos  3.3     04-22  Mg     2.0     04-22                          Electronic Device (specify)/Cell Phone/PDA (specify)/Clothing

## 2021-04-22 NOTE — PROGRESS NOTE ADULT - SUBJECTIVE AND OBJECTIVE BOX
Patient is a 50y Male     Patient is a 50y old  Male who presents with a chief complaint of weakness (21 Apr 2021 15:25)      HPI:  49 yo male, PMH DM, HTN, hyperlipidemia; pt presented to the ED c/o abdominal cramping, generalized, and diarrhea following use of laxatives as above for constipation.  In the ED, WBC 10.82, Hb 13.6, CMP: sodium 126, chloride 89, glucose 281, CMP otherwise unremarkable.  VBG: Lactate 2.6 (later downtrended to 1.6).  Pt. was treated with IV hydration and dispo'd to CDU for continued care plan:  IV hydration, supportive care, AM labs, general observation care / monitoring. pt continued to have generalized weakness , numbness and on labs persistent hyponatermia and now being admitted for furhter evlauation .  dnenies N/V/Abd pain   had bm   no urinary sx   no fever or chills   no cough   no cp /sob    (11 Apr 2021 15:50)      PAST MEDICAL & SURGICAL HISTORY:  DM (diabetes mellitus)    HTN (hypertension)    Hyperlipidemia    H/O total knee replacement    Foot fracture        MEDICATIONS  (STANDING):  albumin human  5% IVPB 3750 milliLiter(s) IV Intermittent every 48 hours  amLODIPine   Tablet 10 milliGRAM(s) Oral daily  bisacodyl 5 milliGRAM(s) Oral at bedtime  calcium gluconate IVPB 2 Gram(s) IV Intermittent <User Schedule>  chlorhexidine 2% Cloths 1 Application(s) Topical daily  dextrose 40% Gel 15 Gram(s) Oral once  dextrose 5%. 1000 milliLiter(s) (50 mL/Hr) IV Continuous <Continuous>  dextrose 5%. 1000 milliLiter(s) (100 mL/Hr) IV Continuous <Continuous>  dextrose 50% Injectable 25 Gram(s) IV Push once  dextrose 50% Injectable 12.5 Gram(s) IV Push once  dextrose 50% Injectable 25 Gram(s) IV Push once  diphenhydrAMINE 25 milliGRAM(s) Oral daily  gabapentin 100 milliGRAM(s) Oral three times a day  glucagon  Injectable 1 milliGRAM(s) IntraMuscular once  hydrALAZINE 25 milliGRAM(s) Oral three times a day  insulin glargine Injectable (LANTUS) 36 Unit(s) SubCutaneous at bedtime  insulin lispro (ADMELOG) corrective regimen sliding scale   SubCutaneous three times a day before meals  insulin lispro (ADMELOG) corrective regimen sliding scale   SubCutaneous at bedtime  insulin lispro Injectable (ADMELOG) 14 Unit(s) SubCutaneous before lunch  insulin lispro Injectable (ADMELOG) 14 Unit(s) SubCutaneous before dinner  insulin lispro Injectable (ADMELOG) 16 Unit(s) SubCutaneous before breakfast  metoprolol tartrate 12.5 milliGRAM(s) Oral two times a day  mineral oil 30 milliLiter(s) Oral once  senna 2 Tablet(s) Oral two times a day  sodium chloride 0.9%. 1000 milliLiter(s) (75 mL/Hr) IV Continuous <Continuous>      Allergies    No Known Drug Allergies  shellfish (Urticaria)    Intolerances        SOCIAL HISTORY:  Denies ETOh,Smoking,     FAMILY HISTORY:  No pertinent family history in first degree relatives        REVIEW OF SYSTEMS:    CONSTITUTIONAL: No weakness, fevers or chills  EYES/ENT: No visual changes;  No vertigo or throat pain   NECK: No pain or stiffness  RESPIRATORY: No cough, wheezing, hemoptysis; No shortness of breath  CARDIOVASCULAR: No chest pain or palpitations  GASTROINTESTINAL: No abdominal or epigastric pain. No nausea, vomiting, or hematemesis; No diarrhea or constipation. No melena or hematochezia.  GENITOURINARY: No dysuria, frequency or hematuria  NEUROLOGICAL: No numbness or weakness  SKIN: No itching, burning, rashes, or lesions   All other review of systems is negative unless indicated above.    VITAL:  T(C): , Max: 37 (04-22-21 @ 06:11)  T(F): , Max: 98.6 (04-22-21 @ 06:11)  HR: 100 (04-22-21 @ 06:11)  BP: 128/66 (04-22-21 @ 06:11)  BP(mean): --  RR: 18 (04-22-21 @ 06:11)  SpO2: 98% (04-22-21 @ 06:11)  Wt(kg): --    I and O's:    04-20 @ 07:01  -  04-21 @ 07:00  --------------------------------------------------------  IN: 0 mL / OUT: 300 mL / NET: -300 mL    04-21 @ 07:01  -  04-22 @ 07:00  --------------------------------------------------------  IN: 354 mL / OUT: 600 mL / NET: -246 mL          PHYSICAL EXAM:    Constitutional: NAD  HEENT: PERRLA,   Neck: No JVD  Respiratory: CTA B/L  Cardiovascular: S1 and S2  Gastrointestinal: BS+, soft, NT/ND  Extremities: No peripheral edema  Neurological: A/O x 3, no focal deficits  Psychiatric: Normal mood, normal affect  : No Jose  Skin: No rashes  Access: Not applicable  Back: No CVA tenderness    LABS:                        12.5   x     )-----------( 247      ( 22 Apr 2021 07:36 )             38.3     04-22    136  |  100  |  26<H>  ----------------------------<  234<H>  4.9   |  24  |  1.13    Ca    9.1      22 Apr 2021 07:36  Phos  3.3     04-22  Mg     2.0     04-22            RADIOLOGY & ADDITIONAL STUDIES:

## 2021-04-22 NOTE — PROGRESS NOTE ADULT - SUBJECTIVE AND OBJECTIVE BOX
[facility]   MD WYATT CHRIS DO ANAM SIDDIQUI ANGELA WONG, PA    TEL:  OFFICE: [phone]  DR DAWSON CELL: [phone]  DR. TIRADO CELL: [phone]  DR. MORAN CELL: [phone]  CATINA PATEL CELL: [phone]    From 5pm-7am Answering Service [phone]    -- RENAL FOLLOW UP NOTE ---Date of Service 04-22-21 @ 12:19    Patient is a 50y old  Male who presents with a chief complaint of constipation (22 Apr 2021 08:19)      Patient seen and examined at bedside. No chest pain/sob. constipation     VITALS:  T(F): 98.5 (04-22-21 @ 10:35), Max: 98.6 (04-22-21 @ 06:11)  HR: 90 (04-22-21 @ 10:35)  BP: 142/83 (04-22-21 @ 10:35)  RR: 18 (04-22-21 @ 10:35)  SpO2: 99% (04-22-21 @ 10:35)  Wt(kg): --    04-21 @ 07:01  -  04-22 @ 07:00  --------------------------------------------------------  IN: 354 mL / OUT: 600 mL / NET: -246 mL          PHYSICAL EXAM:  Constitutional: NAD  Neck: No JVD  Respiratory: CTAB, no wheezes, rales or rhonchi  Cardiovascular: S1, S2, RRR  Gastrointestinal: BS+, soft, NT/ND  Extremities: No peripheral edema    Hospital Medications:   MEDICATIONS  (STANDING):  albumin human  5% IVPB 3750 milliLiter(s) IV Intermittent every 48 hours  amLODIPine   Tablet 10 milliGRAM(s) Oral daily  bisacodyl 5 milliGRAM(s) Oral at bedtime  calcium gluconate IVPB 2 Gram(s) IV Intermittent <User Schedule>  chlorhexidine 2% Cloths 1 Application(s) Topical daily  dextrose 40% Gel 15 Gram(s) Oral once  dextrose 5%. 1000 milliLiter(s) (50 mL/Hr) IV Continuous <Continuous>  dextrose 5%. 1000 milliLiter(s) (100 mL/Hr) IV Continuous <Continuous>  dextrose 50% Injectable 25 Gram(s) IV Push once  dextrose 50% Injectable 12.5 Gram(s) IV Push once  dextrose 50% Injectable 25 Gram(s) IV Push once  gabapentin 100 milliGRAM(s) Oral three times a day  glucagon  Injectable 1 milliGRAM(s) IntraMuscular once  hydrALAZINE 25 milliGRAM(s) Oral three times a day  insulin glargine Injectable (LANTUS) 36 Unit(s) SubCutaneous at bedtime  insulin lispro (ADMELOG) corrective regimen sliding scale   SubCutaneous three times a day before meals  insulin lispro (ADMELOG) corrective regimen sliding scale   SubCutaneous at bedtime  insulin lispro Injectable (ADMELOG) 14 Unit(s) SubCutaneous before lunch  insulin lispro Injectable (ADMELOG) 14 Unit(s) SubCutaneous before dinner  insulin lispro Injectable (ADMELOG) 16 Unit(s) SubCutaneous before breakfast  metoprolol tartrate 12.5 milliGRAM(s) Oral two times a day  mineral oil 30 milliLiter(s) Oral once  senna 2 Tablet(s) Oral two times a day  sodium chloride 0.9%. 1000 milliLiter(s) (75 mL/Hr) IV Continuous <Continuous>      LABS:  04-22    136  |  100  |  26<H>  ----------------------------<  234<H>  4.9   |  24  |  1.13    Ca    9.1      22 Apr 2021 07:36  Phos  3.3     04-22  Mg     2.0     04-22      Creatinine Trend: 1.13 <--, 0.97 <--, 1.08 <--, 1.41 <--, 1.23 <--, 0.99 <--, 1.13 <--, 1.05 <--    Phosphorus Level, Serum: 3.3 mg/dL (04-22 @ 07:36)                              12.5   16.06 )-----------( 247      ( 22 Apr 2021 07:36 )             38.3     Urine Studies:  Urinalysis - [04-11-21 @ 12:32]      Color Light Yellow / Appearance Clear / SG 1.013 / pH 6.5      Gluc >= 1000 mg/dL / Ketone Small  / Bili Negative / Urobili <2 mg/dL       Blood Small / Protein 100 mg/dL / Leuk Est Negative / Nitrite Negative      RBC 10 / WBC 1 / Hyaline 1 / Gran  / Sq Epi  / Non Sq Epi 1 / Bacteria Negative      TSH 3.00      [04-12-21 @ 06:36]    HBsAg Nonreact      [04-18-21 @ 01:28]  HCV 0.40, Nonreact      [04-18-21 @ 01:28]  HIV Nonreact      [04-17-21 @ 16:16]    PIERO: titer Negative, pattern --      [04-13-21 @ 10:10]  C3 Complement 152      [04-13-21 @ 07:48]  C4 Complement 33      [04-13-21 @ 07:48]  ANCA: cANCA Negative, pANCA Negative, atypical ANCA Negative      [04-16-21 @ 12:16]  Syphilis Screen (Treponema Pallidum Ab) Positive      [04-13-21 @ 10:10]  Syphilis Screen (RPR Titer) 1:1      [04-13-21 @ 10:10]  Free Light Chains: kappa 1.51, lambda 1.89, ratio = 0.80      [04-13 @ 10:08]  Immunofixation Serum:   No Monoclonal Band Identified. KRIS Ferris M.D.    Reference Range: None Detected      [04-13-21 @ 07:48]  SPEP Interpretation: phase reaction. KRIS Ferris M.D.      [04-16-21 @ 08:31]    RADIOLOGY & ADDITIONAL STUDIES:

## 2021-04-22 NOTE — CHART NOTE - NSCHARTNOTEFT_GEN_A_CORE
50M uncontrolled DM2 HbA1c 11.5%, hyperglycemia related to difficulties obtaining adequate insulin during pandemic due to insurance/cost.    CAPILLARY BLOOD GLUCOSE    POCT Blood Glucose.: 156 mg/dL (22 Apr 2021 12:09)  POCT Blood Glucose.: 256 mg/dL (22 Apr 2021 07:13)  POCT Blood Glucose.: 251 mg/dL (21 Apr 2021 22:17)  POCT Blood Glucose.: 143 mg/dL (21 Apr 2021 16:57)      1) DM2 with hyperglycemia  Inpatient plan:  BG target 100-180 mg/dl  glucose is above goal, likely exacerbated by pain/stress  carb consistent diet  FS premeal and bedtime  increase Lantus to 40 units qhs  Contniue Admelog to 16/14/14 before meals - HOLD if not eating   Continue Admelog moderate scale premeal and moderate bedtime scale  RD consult- appreciated    Discussed with patient regarding discharge planning, he now will have enough Tresiba and Novolog at home to proceed with these insulins due to [facility] program he is now a part of.   DC on Tresiba and Novolog pens doses TBD. Discussed option of mixed insulin in case of future issues obtaining insulin.  Can follow with outpatient endocrine Dr. Ban Velez, although he requested changing to [facility] endocrinology if he prefers [phone]. Appointments below:  [address] [place] [address], [address], [phone]  6/8/21 @ 11:00 AM with diabetes educator   7/12/21 @ 11:00 AM with Dr Montesinos     2) Hyponatremia  TSH, Free T4 and 8AM cortisol in acceptable range not indicative of endocrine cause of hyponatremia. Na- 134 today    3) Hypertension  controlled at this time   management per primary team    Farzaneh Bennett  Nurse Practitioner  Division of Endocrinology & Diabetes  Pager # [phone]      If after 6PM or before 9AM, or on weekends/holidays, please call endocrine answering service for assistance ([phone]).  For nonurgent matters email [email or web address] for [email or web address].

## 2021-04-23 LAB
A-TOCOPHEROL VIT E SERPL-MCNC: 13.6 MG/L — SIGNIFICANT CHANGE UP (ref 7–25.1)
ANION GAP SERPL CALC-SCNC: 13 MMOL/L — SIGNIFICANT CHANGE UP (ref 7–14)
BETA+GAMMA TOCOPHEROL SERPL-MCNC: 1 MG/L — SIGNIFICANT CHANGE UP (ref 0.5–5.5)
BUN SERPL-MCNC: 25 MG/DL — HIGH (ref 7–23)
CALCIUM SERPL-MCNC: 9.3 MG/DL — SIGNIFICANT CHANGE UP (ref 8.4–10.5)
CHLORIDE SERPL-SCNC: 99 MMOL/L — SIGNIFICANT CHANGE UP (ref 98–107)
CO2 SERPL-SCNC: 22 MMOL/L — SIGNIFICANT CHANGE UP (ref 22–31)
CREAT SERPL-MCNC: 1.02 MG/DL — SIGNIFICANT CHANGE UP (ref 0.5–1.3)
GLUCOSE SERPL-MCNC: 212 MG/DL — HIGH (ref 70–99)
HCT VFR BLD CALC: 35.3 % — LOW (ref 39–50)
HGB BLD-MCNC: 11.6 G/DL — LOW (ref 13–17)
MAGNESIUM SERPL-MCNC: 2.1 MG/DL — SIGNIFICANT CHANGE UP (ref 1.6–2.6)
MCHC RBC-ENTMCNC: 29.2 PG — SIGNIFICANT CHANGE UP (ref 27–34)
MCHC RBC-ENTMCNC: 32.9 GM/DL — SIGNIFICANT CHANGE UP (ref 32–36)
MCV RBC AUTO: 88.9 FL — SIGNIFICANT CHANGE UP (ref 80–100)
NRBC # BLD: 0 /100 WBCS — SIGNIFICANT CHANGE UP
NRBC # FLD: 0 K/UL — SIGNIFICANT CHANGE UP
PHOSPHATE SERPL-MCNC: 4.3 MG/DL — SIGNIFICANT CHANGE UP (ref 2.5–4.5)
PLATELET # BLD AUTO: 216 K/UL — SIGNIFICANT CHANGE UP (ref 150–400)
POTASSIUM SERPL-MCNC: 4.7 MMOL/L — SIGNIFICANT CHANGE UP (ref 3.5–5.3)
POTASSIUM SERPL-SCNC: 4.7 MMOL/L — SIGNIFICANT CHANGE UP (ref 3.5–5.3)
RBC # BLD: 3.97 M/UL — LOW (ref 4.2–5.8)
RBC # FLD: 12.1 % — SIGNIFICANT CHANGE UP (ref 10.3–14.5)
SODIUM SERPL-SCNC: 134 MMOL/L — LOW (ref 135–145)
WBC # BLD: 14.63 K/UL — HIGH (ref 3.8–10.5)
WBC # FLD AUTO: 14.63 K/UL — HIGH (ref 3.8–10.5)

## 2021-04-23 PROCEDURE — 99232 SBSQ HOSP IP/OBS MODERATE 35: CPT

## 2021-04-23 PROCEDURE — 36514 APHERESIS PLASMA: CPT

## 2021-04-23 RX ORDER — LOSARTAN POTASSIUM 100 MG/1
100 TABLET, FILM COATED ORAL DAILY
Refills: 0 | Status: DISCONTINUED | OUTPATIENT
Start: 2021-04-23 | End: 2021-05-05

## 2021-04-23 RX ORDER — INSULIN GLARGINE 100 [IU]/ML
44 INJECTION, SOLUTION SUBCUTANEOUS AT BEDTIME
Refills: 0 | Status: DISCONTINUED | OUTPATIENT
Start: 2021-04-23 | End: 2021-04-25

## 2021-04-23 RX ORDER — ENOXAPARIN SODIUM 100 MG/ML
40 INJECTION SUBCUTANEOUS DAILY
Refills: 0 | Status: DISCONTINUED | OUTPATIENT
Start: 2021-04-23 | End: 2021-05-05

## 2021-04-23 RX ADMIN — OXYCODONE AND ACETAMINOPHEN 1 TABLET(S): 5; 325 TABLET ORAL at 09:34

## 2021-04-23 RX ADMIN — OXYCODONE AND ACETAMINOPHEN 1 TABLET(S): 5; 325 TABLET ORAL at 19:37

## 2021-04-23 RX ADMIN — SENNA PLUS 2 TABLET(S): 8.6 TABLET ORAL at 18:31

## 2021-04-23 RX ADMIN — Medication 12.5 MILLIGRAM(S): at 18:31

## 2021-04-23 RX ADMIN — Medication 2: at 18:24

## 2021-04-23 RX ADMIN — OXYCODONE AND ACETAMINOPHEN 1 TABLET(S): 5; 325 TABLET ORAL at 09:04

## 2021-04-23 RX ADMIN — GABAPENTIN 100 MILLIGRAM(S): 400 CAPSULE ORAL at 22:04

## 2021-04-23 RX ADMIN — Medication 14 UNIT(S): at 12:46

## 2021-04-23 RX ADMIN — HYDROMORPHONE HYDROCHLORIDE 1 MILLIGRAM(S): 2 INJECTION INTRAMUSCULAR; INTRAVENOUS; SUBCUTANEOUS at 05:00

## 2021-04-23 RX ADMIN — GABAPENTIN 100 MILLIGRAM(S): 400 CAPSULE ORAL at 06:09

## 2021-04-23 RX ADMIN — Medication 25 MILLIGRAM(S): at 22:04

## 2021-04-23 RX ADMIN — Medication 5 MILLIGRAM(S): at 22:02

## 2021-04-23 RX ADMIN — Medication 4: at 09:01

## 2021-04-23 RX ADMIN — HYDROMORPHONE HYDROCHLORIDE 1 MILLIGRAM(S): 2 INJECTION INTRAMUSCULAR; INTRAVENOUS; SUBCUTANEOUS at 23:45

## 2021-04-23 RX ADMIN — INSULIN GLARGINE 44 UNIT(S): 100 INJECTION, SOLUTION SUBCUTANEOUS at 22:05

## 2021-04-23 RX ADMIN — Medication 16 UNIT(S): at 09:01

## 2021-04-23 RX ADMIN — ENOXAPARIN SODIUM 40 MILLIGRAM(S): 100 INJECTION SUBCUTANEOUS at 22:04

## 2021-04-23 RX ADMIN — Medication 14 UNIT(S): at 18:24

## 2021-04-23 RX ADMIN — OXYCODONE AND ACETAMINOPHEN 1 TABLET(S): 5; 325 TABLET ORAL at 02:37

## 2021-04-23 RX ADMIN — HYDROMORPHONE HYDROCHLORIDE 1 MILLIGRAM(S): 2 INJECTION INTRAMUSCULAR; INTRAVENOUS; SUBCUTANEOUS at 16:15

## 2021-04-23 RX ADMIN — SENNA PLUS 2 TABLET(S): 8.6 TABLET ORAL at 06:08

## 2021-04-23 RX ADMIN — GABAPENTIN 100 MILLIGRAM(S): 400 CAPSULE ORAL at 12:46

## 2021-04-23 RX ADMIN — OXYCODONE AND ACETAMINOPHEN 1 TABLET(S): 5; 325 TABLET ORAL at 03:30

## 2021-04-23 RX ADMIN — CHLORHEXIDINE GLUCONATE 1 APPLICATION(S): 213 SOLUTION TOPICAL at 12:42

## 2021-04-23 RX ADMIN — HYDROMORPHONE HYDROCHLORIDE 1 MILLIGRAM(S): 2 INJECTION INTRAMUSCULAR; INTRAVENOUS; SUBCUTANEOUS at 15:42

## 2021-04-23 RX ADMIN — HYDROMORPHONE HYDROCHLORIDE 1 MILLIGRAM(S): 2 INJECTION INTRAMUSCULAR; INTRAVENOUS; SUBCUTANEOUS at 04:27

## 2021-04-23 NOTE — PROGRESS NOTE ADULT - SUBJECTIVE AND OBJECTIVE BOX
Neurology Progress Note    S: Patient seen and examined. resting in bed.  awaiting 2nd plex session     Medication:  MEDICATIONS  (STANDING):  MEDICATIONS  (STANDING):  albumin human  5% IVPB 3750 milliLiter(s) IV Intermittent every 48 hours  amLODIPine   Tablet 10 milliGRAM(s) Oral daily  bisacodyl 5 milliGRAM(s) Oral at bedtime  calcium gluconate IVPB 2 Gram(s) IV Intermittent <User Schedule>  chlorhexidine 2% Cloths 1 Application(s) Topical daily  dextrose 40% Gel 15 Gram(s) Oral once  dextrose 5%. 1000 milliLiter(s) (50 mL/Hr) IV Continuous <Continuous>  dextrose 5%. 1000 milliLiter(s) (100 mL/Hr) IV Continuous <Continuous>  dextrose 50% Injectable 25 Gram(s) IV Push once  dextrose 50% Injectable 12.5 Gram(s) IV Push once  dextrose 50% Injectable 25 Gram(s) IV Push once  gabapentin 100 milliGRAM(s) Oral three times a day  glucagon  Injectable 1 milliGRAM(s) IntraMuscular once  hydrALAZINE 25 milliGRAM(s) Oral three times a day  insulin glargine Injectable (LANTUS) 40 Unit(s) SubCutaneous at bedtime  insulin lispro (ADMELOG) corrective regimen sliding scale   SubCutaneous three times a day before meals  insulin lispro (ADMELOG) corrective regimen sliding scale   SubCutaneous at bedtime  insulin lispro Injectable (ADMELOG) 14 Unit(s) SubCutaneous before lunch  insulin lispro Injectable (ADMELOG) 14 Unit(s) SubCutaneous before dinner  insulin lispro Injectable (ADMELOG) 16 Unit(s) SubCutaneous before breakfast  metoprolol tartrate 12.5 milliGRAM(s) Oral two times a day  mineral oil 30 milliLiter(s) Oral once  senna 2 Tablet(s) Oral two times a day  sodium chloride 0.9%. 1000 milliLiter(s) (75 mL/Hr) IV Continuous <Continuous>    MEDICATIONS  (PRN):  acetaminophen   Tablet .. 650 milliGRAM(s) Oral every 6 hours PRN Temp greater or equal to 38C (100.4F), Mild Pain (1 - 3), Moderate Pain (4 - 6)  acetaminophen   Tablet .. 650 milliGRAM(s) Oral every 6 hours PRN Mild Pain (1 - 3)  aluminum hydroxide/magnesium hydroxide/simethicone Suspension 30 milliLiter(s) Oral every 6 hours PRN Dyspepsia  dicyclomine 10 milliGRAM(s) Oral once PRN abdominal pain  HYDROmorphone  Injectable 1 milliGRAM(s) IV Push every 8 hours PRN Severe Pain (7 - 10)  oxycodone    5 mG/acetaminophen 325 mG 1 Tablet(s) Oral every 6 hours PRN Moderate Pain (4 - 6)  sodium chloride 0.9% lock flush 10 milliLiter(s) IV Push every 1 hour PRN Pre/post blood products, medications, blood draw, and to maintain line patency      Vitals:  Vital Signs Last 24 Hrs  T(C): 36.5 (23 Apr 2021 09:30), Max: 37.2 (22 Apr 2021 17:26)  T(F): 97.7 (23 Apr 2021 09:30), Max: 99 (22 Apr 2021 17:26)  HR: 93 (23 Apr 2021 09:30) (90 - 110)  BP: 143/90 (23 Apr 2021 09:30) (125/81 - 143/90)  BP(mean): --  RR: 17 (23 Apr 2021 09:30) (17 - 18)  SpO2: 98% (23 Apr 2021 09:30) (98% - 100%)    General Exam:   General Appearance: Appropriately dressed and in no acute distress       Head: Normocephalic, atraumatic and no dysmorphic features  Ear, Nose, and Throat: Moist mucous membranes  CVS: S1S2+  Resp: No SOB, no wheeze or rhonchi  Abd: soft NTND  Extremities: No edema, no cyanosis  Skin: No bruises, no rashes     Neurological Exam:  Mental Status: Awake, alert and oriented x 3.  Able to follow simple and complex verbal commands. Able to name and repeat. fluent speech. No obvious aphasia or dysarthria noted.   Cranial Nerves: PERRL, EOMI, VFFC, sensation V1-V3 intact,  no obvious facial asymmetry , equal elevation of palate, scm/trap 5/5, tongue is midline on protrusion. no obvious papilledema on fundoscopic exam. Hearing is grossly intact.   Motor: Normal bulk, tone and strength throughout B/L UE Fine finger movements were intact and symmetric. no tremors B/L LE 2-3/5  Sensation: Intact to light touch and pinprick throughout. no right/left confusion. no extinction to tactile on DSS. Romberg was negative.   Reflexes: 0 throughout at biceps, brachioradialis, triceps, patellars and ankles bilaterally and equal. only hyas LUE biceps 1+ No clonus. R toe and L toe were both downgoing.  Coordination: No dysmetria on FNF   Gait: unable    I personally reviewed the below data/images/labs:  CBC Full  -  ( 23 Apr 2021 07:24 )  WBC Count : 14.63 K/uL  RBC Count : 3.97 M/uL  Hemoglobin : 11.6 g/dL  Hematocrit : 35.3 %  Platelet Count - Automated : 216 K/uL  Mean Cell Volume : 88.9 fL  Mean Cell Hemoglobin : 29.2 pg  Mean Cell Hemoglobin Concentration : 32.9 gm/dL  Auto Neutrophil # : x  Auto Lymphocyte # : x  Auto Monocyte # : x  Auto Eosinophil # : x  Auto Basophil # : x  Auto Neutrophil % : x  Auto Lymphocyte % : x  Auto Monocyte % : x  Auto Eosinophil % : x  Auto Basophil % : x    04-23    134<L>  |  99  |  25<H>  ----------------------------<  212<H>  4.7   |  22  |  1.02    Ca    9.3      23 Apr 2021 07:24  Phos  4.3     04-23  Mg     2.1     04-23          < from: CT Head No Cont (04.02.21 @ 21:48) >    EXAM:  CT BRAIN        PROCEDURE DATE:  Apr 2 2021         INTERPRETATION:  HISTORY: Hypertension    Technique: CT of the head was performed without contrast.    Multiple contiguous axial images were acquired from the skullbase to the vertex without the administration of intravenous contrast.  Coronal and sagittal reformations were made.    COMPARISON: None.    FINDINGS:  The ventricles and sulci are within normal limits given the patient's age. No acute hemorrhage, mass effect, midline shift,hydrocephalus, or extra-axial fluid collections. Mild patchy hypoattenuation within the white matter, likely secondary chronic microscopy changes.    The calvarium is intact.    Small cyst or polyp in the right maxillary sinus. There are opacification of a few ethmoid air cells bilaterally as well as mild mucosal thickening of the left frontal sinus. The mastoid air cells are clear.    IMPRESSION:  No acute hemorrhage, mass effect, or midline shift.            DARRELL MIX MD; Resident Radiology  This document has been electronically signed.  PORTILLO HARPER MD; Attending Radiologist  This document has been electronically signed. Apr 2 2021 11:34PM    < end of copied text >  < from: MR Lumbar Spine w/wo IV Cont (04.16.21 @ 22:16) >    EXAM:  MR SPINE LUMBAR WAW IC      EXAM:  MR SPINE THORACIC WAW IC        PROCEDURE DATE:  Apr 16 2021         INTERPRETATION:  CLINICAL INDICATION: Bilateral leg weakness of unknown etiology.    Technique: MRI of the thoracic and lumbar spine was performed with and without contrast. A total 10 cc of Gadavist were administered intravenously.    COMPARISON: None.    FINDINGS:    THORACIC:    There is normal thoracic kyphosis. There are multiple small vertebral body endplate Schmorl's nodes. Otherwise, the vertebrae are normal in signal, height, and alignment. There is anterior endplate osteophytosis most pronounced from the most pronounced from T6-T10.    There are tiny disc bulges at T7-T8 and T8-T9, without significant central spinal canalstenosis or neuroforaminal narrowing.    The spinal cord is normal in course, caliber, and signal.    There is no abnormal enhancement within thoracic spine.    LUMBAR:    There is normal lumbar lordosis. There are mild-to-moderate type I degenerative endplate changes on the left at L4-L5. Otherwise, the vertebral bodies are normal in height and signal without fracture or dislocation.    There is disc space narrowing and desiccation at L4-L5 and L5-S1.    Evaluation of individual levels demonstrates:    L5-S1: Small posterior disc bulge, as well as mild right greater than left facet hypertrophy, resulting in moderate right and mild left neural foraminal stenosis. No significant spinal canal stenosis.    L4-L5: Small posterior disc bulge and superimposed small right central disc protrusion, including tiny annular fissure, resulting in mild spinal canal stenosis, as well as bilateral facet hypertrophy with moderate right and severe left neural foraminal stenosis.    L3-L4: Small posterior disc bulge, along with bilateral facet hypertrophy, resulting in mild spinal canal stenosis as well as mild bilateral neural foraminal stenosis    L2-L3: No significant disc herniation, central spinal canal stenosis, or neural foraminal narrowing. Mildbilateral facet hypertrophy.    L1-L2: Tiny posterior disc bulge without significant central spinal canal stenosis or neuroforaminal narrowing. Mild bilateral facet hypertrophy.    The conus is normal in position and morphology at the L1 level. Thereis smooth thickening and abnormal enhancement of the cauda equina and ventral nerve roots, a nonspecific finding.    There is no definite fluid collection or mass lesion within the visualized retroperitoneal or posterior paraspinal soft tissues.    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 Alternative etiologies include chronic inflammatory demyelinating polyneuropathy, Lyme disease, less likely arachnoiditis or carcinomatosis.    Results were discussed with ADELSO Elkins by Dr. Haji at 9:40 AM on 4/17/2021 with read back followed.              ROSIE HAJI MD; Attending Radiologist  This document has been electronically signed. Apr 17 2021  9:48AM    < end of copied text >  < from: MR Cervical Spine w/wo IV Cont (04.16.21 @ 22:16) >    EXAM:  MR SPINE CERVICAL WAW IC        PROCEDURE DATE:  Apr 16 2021         INTERPRETATION:  CLINICAL INDICATION: Patient with new weakness in lower extremities.    TECHNIQUE: MRI of the cervical spine was performed before and after intravenous contrast. A total 10 cc of Gadavist were administered.    COMPARISON: None.    FINDINGS:  Structures at the craniocervical and cervicomedullary junction are intact.    There is nonspecific straightening of the normal cervical lordosis. There is grade 1 degenerative retrolisthesis of C5 on C6. There is disc space narrowing at C3-C4 and C5-C6. The remaining intervertebral disc space heights are grossly preserved. There is anterior endplate osteophytosis from C3 to C6.    There are mild Modic type II degenerative endplate changes involving C3 CT 4 and C5-C6, along with mild endplate irregularity. Otherwise, the vertebrae demonstrate normal height and signal without fracture, dislocation or marrow edema.    The spinal cord demonstrates normal course and caliber without intrinsic cord signal abnormality.    The prevertebral and paravertebral soft tissues are within normal limits.    There is no abnormal enhancement.    There is diffuse disc desiccation. Evaluation of individual levels demonstrates:    C2-C3: No significant disc herniation, central spinal canal stenosis, although neural foraminal narrowing.    C3-C4: Small posterior disc osteophyte complexes, partially effacing the ventral thecal sac and resulting in mild spinal canal stenosis,as well as moderate right and mild left neural foraminal narrowing.    C4-C5: Tiny posterior disc osteophyte complex minimally effacing the ventral thecal sac, without significant spinal canal stenosis, as well as moderate left and mild right neural foraminal narrowing.    C5-C6: Tiny posterior disc osteophyte complex minimally effacing the ventral thecal sac, without significant spinal canal stenosis, as well as severe right and moderate left neural foraminal narrowing.    C6-C7: No significant disc herniation, central spinal canal stenosis, or neural foraminal narrowing.    C7-T1: No significant disc herniation, central spinal canal stenosis, or neural foraminal narrowing.    There is no soft tissue neck mass or fluid collection.    IMPRESSION:  No evidence for spinal cord compression, signal abnormality, or abnormal enhancement.    Multilevel degenerative changes, as described above, most pronounced at C3-C4 where there is mild spinal canal stenosis and C5-C6 where there is severe right and moderate left neural foraminal narrowing.              ROSIE HAJI MD; Attending Radiologist  This document has been electronically signed. Apr 17 2021  8:54AM    < end of copied text >

## 2021-04-23 NOTE — PROGRESS NOTE ADULT - SUBJECTIVE AND OBJECTIVE BOX
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 During pLEX#1 on 4/21 he had hypotension and procedure was terminated half way. Hence plan to hold off losartan. Wife refused IVIG. No interval events since last PLEX.      Vital Signs Last 24 Hrs  T(C): 36.5 (23 Apr 2021 09:30), Max: 37.2 (22 Apr 2021 17:26)  T(F): 97.7 (23 Apr 2021 09:30), Max: 99 (22 Apr 2021 17:26)  HR: 93 (23 Apr 2021 09:30) (90 - 110)  BP: 143/90 (23 Apr 2021 09:30) (125/81 - 143/90)  BP(mean): --  RR: 17 (23 Apr 2021 09:30) (17 - 18)  SpO2: 98% (23 Apr 2021 09:30) (98% - 100%)                        11.6   14.63 )-----------( 216      ( 23 Apr 2021 07:24 )             35.3       04-23    134<L>  |  99  |  25<H>  ----------------------------<  212<H>  4.7   |  22  |  1.02    Ca    9.3      23 Apr 2021 07:24  Phos  4.3     04-23  Mg     2.1     04-23

## 2021-04-23 NOTE — PROGRESS NOTE ADULT - SUBJECTIVE AND OBJECTIVE BOX
JAGUAR [id number]  50yMale  T(C): 36.8 (04-23-21 @ 01:38), Max: 37.2 (04-22-21 @ 17:26)  HR: 99 (04-23-21 @ 01:38) (90 - 110)  BP: 136/87 (04-23-21 @ 01:38) (125/81 - 142/83)  RR: 18 (04-23-21 @ 01:38) (17 - 18)  SpO2: 100% (04-23-21 @ 01:38) (98% - 100%)  Wt(kg): --  normal cephalic atraumatic  s1s2   clear to ascultation bilaterally  soft, non tender, non distended no guarding or rebound  no clubbing cyanosis or edema

## 2021-04-23 NOTE — PROGRESS NOTE ADULT - SUBJECTIVE AND OBJECTIVE BOX
[facility] [place] [facility]   MD WYATT CHRIS DO ANAM SIDDIQUI ANGELA WONG, PA    TEL:  OFFICE: [phone]  DR DAWSON CELL: [phone]  DR. TIRADO CELL: [phone]  DR. MORAN CELL: [phone]  CATINA PATEL CELL: [phone]    From 5pm-7am Answering Service [phone]    -- RENAL FOLLOW UP NOTE ---Date of Service 04-23-21 @ 15:28    Patient is a 50y old  Male who presents with a chief complaint of abd pain, diarrhea (23 Apr 2021 07:27)      Patient seen and examined at bedside. No chest pain/sob    VITALS:  T(F): 98.2 (04-23-21 @ 14:14), Max: 99 (04-22-21 @ 17:26)  HR: 95 (04-23-21 @ 14:14)  BP: 142/74 (04-23-21 @ 14:14)  RR: 19 (04-23-21 @ 14:14)  SpO2: 99% (04-23-21 @ 14:14)  Wt(kg): --        PHYSICAL EXAM:  Constitutional: NAD  Neck: No JVD  Respiratory: CTAB, no wheezes, rales or rhonchi  Cardiovascular: S1, S2, RRR  Gastrointestinal: BS+, soft, NT/ND  Extremities: No peripheral edema    Hospital Medications:   MEDICATIONS  (STANDING):  albumin human  5% IVPB 3750 milliLiter(s) IV Intermittent every 48 hours  amLODIPine   Tablet 10 milliGRAM(s) Oral daily  bisacodyl 5 milliGRAM(s) Oral at bedtime  calcium gluconate IVPB 2 Gram(s) IV Intermittent <User Schedule>  chlorhexidine 2% Cloths 1 Application(s) Topical daily  dextrose 40% Gel 15 Gram(s) Oral once  dextrose 5%. 1000 milliLiter(s) (50 mL/Hr) IV Continuous <Continuous>  dextrose 5%. 1000 milliLiter(s) (100 mL/Hr) IV Continuous <Continuous>  dextrose 50% Injectable 25 Gram(s) IV Push once  dextrose 50% Injectable 12.5 Gram(s) IV Push once  dextrose 50% Injectable 25 Gram(s) IV Push once  gabapentin 100 milliGRAM(s) Oral three times a day  glucagon  Injectable 1 milliGRAM(s) IntraMuscular once  hydrALAZINE 25 milliGRAM(s) Oral three times a day  insulin glargine Injectable (LANTUS) 44 Unit(s) SubCutaneous at bedtime  insulin lispro (ADMELOG) corrective regimen sliding scale   SubCutaneous three times a day before meals  insulin lispro (ADMELOG) corrective regimen sliding scale   SubCutaneous at bedtime  insulin lispro Injectable (ADMELOG) 14 Unit(s) SubCutaneous before lunch  insulin lispro Injectable (ADMELOG) 14 Unit(s) SubCutaneous before dinner  insulin lispro Injectable (ADMELOG) 16 Unit(s) SubCutaneous before breakfast  metoprolol tartrate 12.5 milliGRAM(s) Oral two times a day  mineral oil 30 milliLiter(s) Oral once  senna 2 Tablet(s) Oral two times a day  sodium chloride 0.9%. 1000 milliLiter(s) (75 mL/Hr) IV Continuous <Continuous>      LABS:  04-23    134<L>  |  99  |  25<H>  ----------------------------<  212<H>  4.7   |  22  |  1.02    Ca    9.3      23 Apr 2021 07:24  Phos  4.3     04-23  Mg     2.1     04-23      Creatinine Trend: 1.02 <--, 1.13 <--, 0.97 <--, 1.08 <--, 1.41 <--, 1.23 <--, 0.99 <--    Phosphorus Level, Serum: 4.3 mg/dL (04-23 @ 07:24)                              11.6   14.63 )-----------( 216      ( 23 Apr 2021 07:24 )             35.3     Urine Studies:  Urinalysis - [04-11-21 @ 12:32]      Color Light Yellow / Appearance Clear / SG 1.013 / pH 6.5      Gluc >= 1000 mg/dL / Ketone Small  / Bili Negative / Urobili <2 mg/dL       Blood Small / Protein 100 mg/dL / Leuk Est Negative / Nitrite Negative      RBC 10 / WBC 1 / Hyaline 1 / Gran  / Sq Epi  / Non Sq Epi 1 / Bacteria Negative      TSH 3.00      [04-12-21 @ 06:36]    HBsAg Nonreact      [04-18-21 @ 01:28]  HCV 0.40, Nonreact      [04-18-21 @ 01:28]  HIV Nonreact      [04-17-21 @ 16:16]    PIERO: titer Negative, pattern --      [04-13-21 @ 10:10]  C3 Complement 152      [04-13-21 @ 07:48]  C4 Complement 33      [04-13-21 @ 07:48]  ANCA: cANCA Negative, pANCA Negative, atypical ANCA Negative      [04-16-21 @ 12:16]  Syphilis Screen (Treponema Pallidum Ab) Positive      [04-13-21 @ 10:10]  Syphilis Screen (RPR Titer) 1:1      [04-13-21 @ 10:10]  Free Light Chains: kappa 1.51, lambda 1.89, ratio = 0.80      [04-13 @ 10:08]  Immunofixation Serum:   No Monoclonal Band Identified. KRIS Ferris M.D.    Reference Range: None Detected      [04-13-21 @ 07:48]  SPEP Interpretation: phase reaction. KRIS Ferris M.D.      [04-16-21 @ 08:31]    RADIOLOGY & ADDITIONAL STUDIES:

## 2021-04-23 NOTE — PROGRESS NOTE ADULT - ASSESSMENT
49 yo RH AA male with h/o DM, HTN, hyperlipidemia; pt presented to the ED c/o abdominal cramping, generalized, and diarrhea following use of laxatives as above for constipation. Neurology called for evaluation of LE weakness, unsteady gait.    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 s/p shielye. Na improved.    had first session of PLEX yesterday and episode of hypotension. now improved + constipation   - GI following for constipation  - hyponatremia Na 130-->136-->134  slow correction  - s/p shiley   - first PLEX  4/22, had episode of hypotension.  next session today 4/24 please go at slower rate and hold BP meds on morning of session. complete total of 3-5 sessions pending clinical improvement   - f/u remaining LP results.   - send for ganglioside antibodies, Gq1b  - slow correction of sodium  - aggressive PT/OT  - B12 >2000, copper, Vit E, thiamine, magnesium heavy metals, zinc  -refused IVIG but agreed for PLEX  - telemetry  - check FS, glucose control <180  - GI/DVT ppx  - Counseling on diet, exercise, and medication adherence was done  - Counseling on smoking cessation and alcohol consumption offered when appropriate.  - Pain assessed and judicious use of narcotics when appropriate was discussed.    - Stroke education given when appropriate.  - Importance of fall prevention discussed.   - Differential diagnosis and plan of care discussed with patient and/or family and primary team  - Thank you for allowing me to participate in the care of this patient. Call with questions.     Geoffrey Gardner MD  Vascular Neurology  Office: [phone]

## 2021-04-23 NOTE — PROGRESS NOTE ADULT - ASSESSMENT
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 Wife refused IVIG. During PLEX#1 on 4/21 he had hypotension, hence plan to hold off losartan.     Today using 3750ml 5% albumin replacement will perform PLEX#2. Case d/w Apheresis nurse. Procedure being well tolerated.     PLEX#3 on 4/25 with 5% albumin as replacement fluid.

## 2021-04-23 NOTE — PROGRESS NOTE ADULT - ASSESSMENT
51 yo male, PMH DM, HTN, hyperlipidemia; pt presented to the ED c/o abdominal cramping, generalized, and diarrhea following use of laxatives as above for constipation.  In the ED, WBC 10.82, Hb 13.6, CMP: sodium 126, chloride 89, glucose 281, CMP otherwise unremarkable.  VBG: Lactate 2.6 (later downtrended to 1.6).  Pt. was treated with IV hydration and dispo'd to CDU for continued care plan:  IV hydration, supportive care, AM labs, general observation care / monitoring. pt continued to have generalized weakness , numbness and on labs persistent hyponatermia and now being admitted for furhter evlauation .  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 ADH induced by pain   apprecaite renal input   improved   monitor     - DM: uncontrolled   appreciate endo input  cont insulin per endo     -HTN : holding ARB for now given hypotension during exchange       - hypophosphatemia : repleted and monitor     - constipation : had had colongoscopy 2 yrs ago with Dr. Rae   CT abd no acute path   no further inpt red : discussed with Dr Rae     - ALLIE : improved   avoid NSAIDS for now     dvt proph   d/w pt at length

## 2021-04-23 NOTE — PROGRESS NOTE ADULT - SUBJECTIVE AND OBJECTIVE BOX
Patient is a 50y old  Male who presents with a chief complaint of abd pain, diarrhea (23 Apr 2021 07:27)    Interval history: planned for plasmapheresis today  reports bm overnight. denies abdominal pain.   +back pain  would like to work with therapy today.      REVIEW OF SYSTEMS  Constitutional - No fever, No weight loss, No fatigue  HEENT - No eye pain, No visual disturbances, No difficulty hearing, No tinnitus, No vertigo, No neck pain  Respiratory - No cough, No wheezing, No shortness of breath  Cardiovascular - No chest pain, No palpitations  Gastrointestinal - No abdominal pain, No nausea, No vomiting, No diarrhea, No constipation  Genitourinary - No dysuria, No frequency, No hematuria, No incontinence  Neurological - No headaches, No memory loss, + loss of strength, + numbness, No tremors  Skin - No itching, No rashes, No lesions   Endocrine - No temperature intolerance  Musculoskeletal - No joint pain, No joint swelling, No muscle pain  Psychiatric - No depression, No anxiety    PAST MEDICAL & SURGICAL HISTORY  DM (diabetes mellitus)    HTN (hypertension)    High cholesterol     H/O total knee replacement    Foot fracture      CURRENT FUNCTIONAL STATUS  follow up pending today    FAMILY HISTORY   No pertinent family history in first degree relatives        RECENT LABS/IMAGING  < from: MR Lumbar Spine w/wo IV Cont (04.16.21 @ 22:16) >    EXAM:  MR SPINE LUMBAR WAW IC      EXAM:  MR SPINE THORACIC WAW IC        PROCEDURE DATE:  Apr 16 2021         INTERPRETATION:  CLINICAL INDICATION: Bilateral leg weakness of unknown etiology.    Technique: MRI of the thoracic and lumbar spine was performed with and without contrast. A total 10 cc of Gadavist were administered intravenously.    COMPARISON: None.    FINDINGS:    THORACIC:    There is normal thoracic kyphosis. There are multiple small vertebral body endplate Schmorl's nodes. Otherwise, the vertebrae are normal in signal, height, and alignment. There is anterior endplate osteophytosis most pronounced from the most pronounced from T6-T10.    There are tiny disc bulges at T7-T8 and T8-T9, without significant central spinal canalstenosis or neuroforaminal narrowing.    The spinal cord is normal in course, caliber, and signal.    There is no abnormal enhancement within thoracic spine.    LUMBAR:    There is normal lumbar lordosis. There are mild-to-moderate type I degenerative endplate changes on the left at L4-L5. Otherwise, the vertebral bodies are normal in height and signal without fracture or dislocation.    There is disc space narrowing and desiccation at L4-L5 and L5-S1.    Evaluation of individual levels demonstrates:    L5-S1: Small posterior disc bulge, as well as mild right greater than left facet hypertrophy, resulting in moderate right and mild left neural foraminal stenosis. No significant spinal canal stenosis.    L4-L5: Small posterior disc bulge and superimposed small right central disc protrusion, including tiny annular fissure, resulting in mild spinal canal stenosis, as well as bilateral facet hypertrophy with moderate right and severe left neural foraminal stenosis.    L3-L4: Small posterior disc bulge, along with bilateral facet hypertrophy, resulting in mild spinal canal stenosis as well as mild bilateral neural foraminal stenosis    L2-L3: No significant disc herniation, central spinal canal stenosis, or neural foraminal narrowing. Mildbilateral facet hypertrophy.    L1-L2: Tiny posterior disc bulge without significant central spinal canal stenosis or neuroforaminal narrowing. Mild bilateral facet hypertrophy.    The conus is normal in position and morphology at the L1 level. Thereis smooth thickening and abnormal enhancement of the cauda equina and ventral nerve roots, a nonspecific finding.    There is no definite fluid collection or mass lesion within the visualized retroperitoneal or posterior paraspinal soft tissues.    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 Alternative etiologies include chronic inflammatory demyelinating polyneuropathy, Lyme disease, less likely arachnoiditis or carcinomatosis.    Results were discussed with ADELSO Elkins by Dr. Haji at 9:40 AM on 4/17/2021 with read back followed.              ROSIE HAJI MD; Attending Radiologist  This document has been electronically signed. Apr 17 2021  9:48AM    < end of copied text >    CBC Full  -  ( 23 Apr 2021 07:24 )  WBC Count : 14.63 K/uL  RBC Count : 3.97 M/uL  Hemoglobin : 11.6 g/dL  Hematocrit : 35.3 %  Platelet Count - Automated : 216 K/uL  Mean Cell Volume : 88.9 fL  Mean Cell Hemoglobin : 29.2 pg  Mean Cell Hemoglobin Concentration : 32.9 gm/dL  Auto Neutrophil # : x  Auto Lymphocyte # : x  Auto Monocyte # : x  Auto Eosinophil # : x  Auto Basophil # : x  Auto Neutrophil % : x  Auto Lymphocyte % : x  Auto Monocyte % : x  Auto Eosinophil % : x  Auto Basophil % : x    04-23    134<L>  |  99  |  25<H>  ----------------------------<  212<H>  4.7   |  22  |  1.02    Ca    9.3      23 Apr 2021 07:24  Phos  4.3     04-23  Mg     2.1     04-23          VITALS  T(C): 36.5 (04-23-21 @ 09:30), Max: 37.2 (04-22-21 @ 17:26)  HR: 93 (04-23-21 @ 09:30) (90 - 110)  BP: 143/90 (04-23-21 @ 09:30) (125/81 - 143/90)  RR: 17 (04-23-21 @ 09:30) (17 - 18)  SpO2: 98% (04-23-21 @ 09:30) (98% - 100%)  Wt(kg): --    ALLERGIES  No Known Drug Allergies  shellfish (Urticaria)      MEDICATIONS   acetaminophen   Tablet .. 650 milliGRAM(s) Oral every 6 hours PRN  acetaminophen   Tablet .. 650 milliGRAM(s) Oral every 6 hours PRN  albumin human  5% IVPB 3750 milliLiter(s) IV Intermittent every 48 hours  aluminum hydroxide/magnesium hydroxide/simethicone Suspension 30 milliLiter(s) Oral every 6 hours PRN  amLODIPine   Tablet 10 milliGRAM(s) Oral daily  bisacodyl 5 milliGRAM(s) Oral at bedtime  calcium gluconate IVPB 2 Gram(s) IV Intermittent <User Schedule>  chlorhexidine 2% Cloths 1 Application(s) Topical daily  dextrose 40% Gel 15 Gram(s) Oral once  dextrose 5%. 1000 milliLiter(s) IV Continuous <Continuous>  dextrose 5%. 1000 milliLiter(s) IV Continuous <Continuous>  dextrose 50% Injectable 25 Gram(s) IV Push once  dextrose 50% Injectable 12.5 Gram(s) IV Push once  dextrose 50% Injectable 25 Gram(s) IV Push once  dicyclomine 10 milliGRAM(s) Oral once PRN  gabapentin 100 milliGRAM(s) Oral three times a day  glucagon  Injectable 1 milliGRAM(s) IntraMuscular once  hydrALAZINE 25 milliGRAM(s) Oral three times a day  HYDROmorphone  Injectable 1 milliGRAM(s) IV Push every 8 hours PRN  insulin glargine Injectable (LANTUS) 40 Unit(s) SubCutaneous at bedtime  insulin lispro (ADMELOG) corrective regimen sliding scale   SubCutaneous three times a day before meals  insulin lispro (ADMELOG) corrective regimen sliding scale   SubCutaneous at bedtime  insulin lispro Injectable (ADMELOG) 14 Unit(s) SubCutaneous before lunch  insulin lispro Injectable (ADMELOG) 14 Unit(s) SubCutaneous before dinner  insulin lispro Injectable (ADMELOG) 16 Unit(s) SubCutaneous before breakfast  metoprolol tartrate 12.5 milliGRAM(s) Oral two times a day  mineral oil 30 milliLiter(s) Oral once  oxycodone    5 mG/acetaminophen 325 mG 1 Tablet(s) Oral every 6 hours PRN  senna 2 Tablet(s) Oral two times a day  sodium chloride 0.9% lock flush 10 milliLiter(s) IV Push every 1 hour PRN  sodium chloride 0.9%. 1000 milliLiter(s) IV Continuous <Continuous>      ----------------------------------------------------------------------------------------  PHYSICAL EXAM  Constitutional - NAD, Comfortable  HEENT -  + R neck plasmapheresis catheter, EOMI     Chest - no respiratory distress  Cardiovascular - RRR, S1S2   Abdomen - Soft, NTND  Extremities - No C/C/E, No calf tenderness   Neurologic Exam -                    Cognitive - Awake, Alert, AAO to self, place, date, year, situation     Communication - Fluent, No dysarthria     Cranial Nerves - CN 2-12 intact     Motor -                     LEFT    UE - ShAB 4/5, EF 4/5, EE 4/5, WE4/5,  4/5                    RIGHT UE - ShAB 4/5, EF 4/5, EE 4/5, WE4/5,  4/5                    LEFT    LE - HF 2/5, KE 3/5, DF 4/5, PF 4/5                    RIGHT LE - HF 2/5, KE 3/5, DF 4/5, PF 4/5        Sensory -impaired distal fingertips     Reflexes - brachoradialis 1+ b/l      OculoVestibular - No saccades, No nystagmus, VOR         Balance - WNL Static  Psychiatric - Mood stable, Affect WNL  ----------------------------------------------------------------------------------------  ASSESSMENT/PLAN  51 yo m p/w GI symptoms, 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 Will continue to follow for ongoing rehab needs and recommendations.

## 2021-04-23 NOTE — PROGRESS NOTE ADULT - ASSESSMENT
49 yo male, PMH DM, HTN, hyperlipidemia; pt presented to the ED c/o abdominal cramping, generalized, and diarrhea following use of laxatives as above for constipation.  In the ED, WBC 10.82, Hb 13.6, CMP: sodium 126, chloride 89, glucose 281, CMP otherwise unremarkable.  VBG: Lactate 2.6 (later downtrended to 1.6).  Pt. was treated with IV hydration and dispo'd to CDU for continued care plan:  IV hydration, supportive care, AM labs, general observation care / monitoring. pt continued to have generalized weakness , numbness and on labs persistent hyponatermia      A/P:    ALLIE   baseline likely ~ 1  ALLIE possible sec to uncontrolled hyperkalemia on losartan  s/p NS @ 75cc/hr x 12 hrs  scr improved  resume losartan  monitor bmp  avoid nephrotoxic agents    Hyponatremia:  Likely sec to hypovolemia+hyperglycemia. work up suggested SIADH  s/p NS 4/20 for ALLIE  Na improved  optimize dm control  free water restriction <1.2L/day.  ok TSH, serum cortisol level  Monitor Na level Q12  Na level should not increase more than 6meq in 24 hrs    Proteinuia/Hematuria:  Etiology?  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

## 2021-04-23 NOTE — PROGRESS NOTE ADULT - SUBJECTIVE AND OBJECTIVE BOX
Date of service: 04-23-21 @ 15:13      Patient is a 50y old  Male who presents with a chief complaint of abd pain, diarrhea (23 Apr 2021 07:27)                                                               INTERVAL HPI/OVERNIGHT EVENTS:    REVIEW OF SYSTEMS:     CONSTITUTIONAL: No weakness, fevers or chills  RESPIRATORY: No cough, wheezing,  No shortness of breath  CARDIOVASCULAR: No chest pain or palpitations  GASTROINTESTINAL: No abdominal pain  . No nausea, vomiting, or hematemesis; No diarrhea or constipation. No melena or hematochezia.  GENITOURINARY: No dysuria, frequency or hematuria  NEUROLOGICAL: No new complaints                                                                                                                                                                                                                                                                                 Medications:  MEDICATIONS  (STANDING):  albumin human  5% IVPB 3750 milliLiter(s) IV Intermittent every 48 hours  amLODIPine   Tablet 10 milliGRAM(s) Oral daily  bisacodyl 5 milliGRAM(s) Oral at bedtime  calcium gluconate IVPB 2 Gram(s) IV Intermittent <User Schedule>  chlorhexidine 2% Cloths 1 Application(s) Topical daily  dextrose 40% Gel 15 Gram(s) Oral once  dextrose 5%. 1000 milliLiter(s) (50 mL/Hr) IV Continuous <Continuous>  dextrose 5%. 1000 milliLiter(s) (100 mL/Hr) IV Continuous <Continuous>  dextrose 50% Injectable 25 Gram(s) IV Push once  dextrose 50% Injectable 12.5 Gram(s) IV Push once  dextrose 50% Injectable 25 Gram(s) IV Push once  gabapentin 100 milliGRAM(s) Oral three times a day  glucagon  Injectable 1 milliGRAM(s) IntraMuscular once  hydrALAZINE 25 milliGRAM(s) Oral three times a day  insulin glargine Injectable (LANTUS) 44 Unit(s) SubCutaneous at bedtime  insulin lispro (ADMELOG) corrective regimen sliding scale   SubCutaneous three times a day before meals  insulin lispro (ADMELOG) corrective regimen sliding scale   SubCutaneous at bedtime  insulin lispro Injectable (ADMELOG) 14 Unit(s) SubCutaneous before lunch  insulin lispro Injectable (ADMELOG) 14 Unit(s) SubCutaneous before dinner  insulin lispro Injectable (ADMELOG) 16 Unit(s) SubCutaneous before breakfast  metoprolol tartrate 12.5 milliGRAM(s) Oral two times a day  mineral oil 30 milliLiter(s) Oral once  senna 2 Tablet(s) Oral two times a day  sodium chloride 0.9%. 1000 milliLiter(s) (75 mL/Hr) IV Continuous <Continuous>    MEDICATIONS  (PRN):  acetaminophen   Tablet .. 650 milliGRAM(s) Oral every 6 hours PRN Temp greater or equal to 38C (100.4F), Mild Pain (1 - 3), Moderate Pain (4 - 6)  acetaminophen   Tablet .. 650 milliGRAM(s) Oral every 6 hours PRN Mild Pain (1 - 3)  aluminum hydroxide/magnesium hydroxide/simethicone Suspension 30 milliLiter(s) Oral every 6 hours PRN Dyspepsia  dicyclomine 10 milliGRAM(s) Oral once PRN abdominal pain  HYDROmorphone  Injectable 1 milliGRAM(s) IV Push every 8 hours PRN Severe Pain (7 - 10)  oxycodone    5 mG/acetaminophen 325 mG 1 Tablet(s) Oral every 6 hours PRN Moderate Pain (4 - 6)  sodium chloride 0.9% lock flush 10 milliLiter(s) IV Push every 1 hour PRN Pre/post blood products, medications, blood draw, and to maintain line patency       Allergies    No Known Drug Allergies  shellfish (Urticaria)    Intolerances      Vital Signs Last 24 Hrs  T(C): 36.8 (23 Apr 2021 14:14), Max: 37.2 (22 Apr 2021 17:26)  T(F): 98.2 (23 Apr 2021 14:14), Max: 99 (22 Apr 2021 17:26)  HR: 95 (23 Apr 2021 14:14) (90 - 110)  BP: 142/74 (23 Apr 2021 14:14) (125/81 - 143/90)  BP(mean): --  RR: 19 (23 Apr 2021 14:14) (17 - 20)  SpO2: 99% (23 Apr 2021 14:14) (98% - 100%)  CAPILLARY BLOOD GLUCOSE      POCT Blood Glucose.: 123 mg/dL (23 Apr 2021 12:12)  POCT Blood Glucose.: 207 mg/dL (23 Apr 2021 08:55)  POCT Blood Glucose.: 195 mg/dL (23 Apr 2021 07:11)  POCT Blood Glucose.: 172 mg/dL (22 Apr 2021 21:46)  POCT Blood Glucose.: 91 mg/dL (22 Apr 2021 17:13)      Physical Exam:    Daily     Daily   General:  NAD   HEENT:  Nonicteric, PERRLA  CV:  RRR, S1S2   Lungs:  CTA B/L, no wheezes, rales, rhonchi  Abdomen:  Soft, non-tender, no distended, positive BS  Extremities:  no edema   Neuro:  AAOx3, Le weakness         LABS:                               11.6   14.63 )-----------( 216      ( 23 Apr 2021 07:24 )             35.3                      04-23    134<L>  |  99  |  25<H>  ----------------------------<  212<H>  4.7   |  22  |  1.02    Ca    9.3      23 Apr 2021 07:24  Phos  4.3     04-23  Mg     2.1     04-23                         RADIOLOGY & ADDITIONAL TESTS         I personally reviewed: [  ]EKG   [  ]CXR    [  ] CT      A/P:         Discussed with :     Francisco consultants' Notes   Time spent :

## 2021-04-24 LAB
ANION GAP SERPL CALC-SCNC: 12 MMOL/L — SIGNIFICANT CHANGE UP (ref 7–14)
BUN SERPL-MCNC: 16 MG/DL — SIGNIFICANT CHANGE UP (ref 7–23)
CALCIUM SERPL-MCNC: 8.6 MG/DL — SIGNIFICANT CHANGE UP (ref 8.4–10.5)
CHLORIDE SERPL-SCNC: 101 MMOL/L — SIGNIFICANT CHANGE UP (ref 98–107)
CO2 SERPL-SCNC: 22 MMOL/L — SIGNIFICANT CHANGE UP (ref 22–31)
CREAT SERPL-MCNC: 0.88 MG/DL — SIGNIFICANT CHANGE UP (ref 0.5–1.3)
GLUCOSE SERPL-MCNC: 219 MG/DL — HIGH (ref 70–99)
HCT VFR BLD CALC: 33.3 % — LOW (ref 39–50)
HGB BLD-MCNC: 11.1 G/DL — LOW (ref 13–17)
MAGNESIUM SERPL-MCNC: 1.9 MG/DL — SIGNIFICANT CHANGE UP (ref 1.6–2.6)
MCHC RBC-ENTMCNC: 29.5 PG — SIGNIFICANT CHANGE UP (ref 27–34)
MCHC RBC-ENTMCNC: 33.3 GM/DL — SIGNIFICANT CHANGE UP (ref 32–36)
MCV RBC AUTO: 88.6 FL — SIGNIFICANT CHANGE UP (ref 80–100)
NRBC # BLD: 0 /100 WBCS — SIGNIFICANT CHANGE UP
NRBC # FLD: 0 K/UL — SIGNIFICANT CHANGE UP
PHOSPHATE SERPL-MCNC: 3.1 MG/DL — SIGNIFICANT CHANGE UP (ref 2.5–4.5)
PLATELET # BLD AUTO: 217 K/UL — SIGNIFICANT CHANGE UP (ref 150–400)
POTASSIUM SERPL-MCNC: 4.4 MMOL/L — SIGNIFICANT CHANGE UP (ref 3.5–5.3)
POTASSIUM SERPL-SCNC: 4.4 MMOL/L — SIGNIFICANT CHANGE UP (ref 3.5–5.3)
RBC # BLD: 3.76 M/UL — LOW (ref 4.2–5.8)
RBC # FLD: 12 % — SIGNIFICANT CHANGE UP (ref 10.3–14.5)
SODIUM SERPL-SCNC: 135 MMOL/L — SIGNIFICANT CHANGE UP (ref 135–145)
VIT B1 SERPL-MCNC: 129.9 NMOL/L — SIGNIFICANT CHANGE UP (ref 66.5–200)
WBC # BLD: 11.89 K/UL — HIGH (ref 3.8–10.5)
WBC # FLD AUTO: 11.89 K/UL — HIGH (ref 3.8–10.5)

## 2021-04-24 RX ADMIN — Medication 2: at 12:18

## 2021-04-24 RX ADMIN — GABAPENTIN 100 MILLIGRAM(S): 400 CAPSULE ORAL at 23:01

## 2021-04-24 RX ADMIN — AMLODIPINE BESYLATE 10 MILLIGRAM(S): 2.5 TABLET ORAL at 05:55

## 2021-04-24 RX ADMIN — Medication 16 UNIT(S): at 08:05

## 2021-04-24 RX ADMIN — Medication 12.5 MILLIGRAM(S): at 17:21

## 2021-04-24 RX ADMIN — Medication 25 MILLIGRAM(S): at 05:57

## 2021-04-24 RX ADMIN — CHLORHEXIDINE GLUCONATE 1 APPLICATION(S): 213 SOLUTION TOPICAL at 11:53

## 2021-04-24 RX ADMIN — HYDROMORPHONE HYDROCHLORIDE 1 MILLIGRAM(S): 2 INJECTION INTRAMUSCULAR; INTRAVENOUS; SUBCUTANEOUS at 16:11

## 2021-04-24 RX ADMIN — ENOXAPARIN SODIUM 40 MILLIGRAM(S): 100 INJECTION SUBCUTANEOUS at 11:53

## 2021-04-24 RX ADMIN — Medication 14 UNIT(S): at 17:18

## 2021-04-24 RX ADMIN — Medication 25 MILLIGRAM(S): at 23:02

## 2021-04-24 RX ADMIN — INSULIN GLARGINE 44 UNIT(S): 100 INJECTION, SOLUTION SUBCUTANEOUS at 22:16

## 2021-04-24 RX ADMIN — GABAPENTIN 100 MILLIGRAM(S): 400 CAPSULE ORAL at 05:57

## 2021-04-24 RX ADMIN — Medication 14 UNIT(S): at 12:20

## 2021-04-24 RX ADMIN — Medication 4: at 08:05

## 2021-04-24 RX ADMIN — HYDROMORPHONE HYDROCHLORIDE 1 MILLIGRAM(S): 2 INJECTION INTRAMUSCULAR; INTRAVENOUS; SUBCUTANEOUS at 00:15

## 2021-04-24 RX ADMIN — HYDROMORPHONE HYDROCHLORIDE 1 MILLIGRAM(S): 2 INJECTION INTRAMUSCULAR; INTRAVENOUS; SUBCUTANEOUS at 16:30

## 2021-04-24 RX ADMIN — Medication 12.5 MILLIGRAM(S): at 05:56

## 2021-04-24 RX ADMIN — HYDROMORPHONE HYDROCHLORIDE 1 MILLIGRAM(S): 2 INJECTION INTRAMUSCULAR; INTRAVENOUS; SUBCUTANEOUS at 08:48

## 2021-04-24 RX ADMIN — LOSARTAN POTASSIUM 100 MILLIGRAM(S): 100 TABLET, FILM COATED ORAL at 05:56

## 2021-04-24 RX ADMIN — Medication 2: at 17:18

## 2021-04-24 RX ADMIN — HYDROMORPHONE HYDROCHLORIDE 1 MILLIGRAM(S): 2 INJECTION INTRAMUSCULAR; INTRAVENOUS; SUBCUTANEOUS at 08:03

## 2021-04-24 RX ADMIN — GABAPENTIN 100 MILLIGRAM(S): 400 CAPSULE ORAL at 13:34

## 2021-04-24 NOTE — PROGRESS NOTE ADULT - ASSESSMENT
51 yo RH AA male with h/o DM, HTN, hyperlipidemia; pt presented to the ED c/o abdominal cramping, generalized, and diarrhea following use of laxatives as above for constipation. Neurology called for evaluation of LE weakness, unsteady gait. s/p shieley. Na improved.  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 complete total of 3-5 sessions pending clinical improvement   - f/u remaining LP results.   - send for ganglioside antibodies, Gq1b  - slow correction of sodium  - aggressive PT/OT  - B12 >2000, copper, Vit E, thiamine, magnesium heavy metals, zinc  -refused IVIG but agreed for PLEX  - telemetry  - check FS, glucose control <180  - GI/DVT ppx  - Counseling on diet, exercise, and medication adherence was done  - Counseling on smoking cessation and alcohol consumption offered when appropriate.  - Pain assessed and judicious use of narcotics when appropriate was discussed.    - Stroke education given when appropriate.  - Importance of fall prevention discussed.   - Differential diagnosis and plan of care discussed with patient and/or family and primary team  - Thank you for allowing me to participate in the care of this patient. Call with questions.     Geoffrey Gardner MD  Vascular Neurology  Office: [phone]

## 2021-04-24 NOTE — PROGRESS NOTE ADULT - ASSESSMENT
49 yo male, PMH DM, HTN, hyperlipidemia; pt presented to the ED c/o abdominal cramping, generalized, and diarrhea following use of laxatives as above for constipation.  In the ED, WBC 10.82, Hb 13.6, CMP: sodium 126, chloride 89, glucose 281, CMP otherwise unremarkable.  VBG: Lactate 2.6 (later downtrended to 1.6).  Pt. was treated with IV hydration and dispo'd to CDU for continued care plan:  IV hydration, supportive care, AM labs, general observation care / monitoring. pt continued to have generalized weakness , numbness and on labs persistent hyponatermia and now being admitted for furhter evlauation.  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 ADH induced by pain   apprecaite renal input  improved, continue to monitor     - DM: uncontrolled   appreciate endo input  cont insulin per endo   c/w Lantus and premeal TID    -HTN : holding ARB for now given hypotension during exchange   (with hold parameters)    - hypophosphatemia : repleted and monitor     - constipation : had had colonoscopy 2 yrs ago with Dr. Rae   CT abd no acute path   no further inpt red : discussed with Dr Rae     - ALLIE : improved   avoid NSAIDS for now     dvt proph   d/w pt at length   All questions answered.    49 yo male, PMH DM, HTN, hyperlipidemia; pt presented to the ED c/o abdominal cramping, generalized, and diarrhea following use of laxatives as above for constipation.  In the ED, WBC 10.82, Hb 13.6, CMP: sodium 126, chloride 89, glucose 281, CMP otherwise unremarkable.  VBG: Lactate 2.6 (later downtrended to 1.6).  Pt. was treated with IV hydration and dispo'd to CDU for continued care plan:  IV hydration, supportive care, AM labs, general observation care / monitoring. pt continued to have generalized weakness , numbness and on labs persistent hyponatermia and now being admitted for furhter evlauation.  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 ADH induced by pain   apprecaite renal input  improved, continue to monitor     - DM: uncontrolled   appreciate endo input  cont insulin per endo   c/w Lantus and premeal TID    -HTN : holding ARB for now given hypotension during exchange   (with hold parameters)    - hypophosphatemia : repleted and monitor     - constipation : had had colonoscopy 2 yrs ago with Dr. Rae   CT abd no acute path   no further inpt red : discussed with Dr Rae     - ALLIE : improved   avoid NSAIDS for now     dvt proph   d/w pt at length   All questions answered.

## 2021-04-24 NOTE — PROGRESS NOTE ADULT - SUBJECTIVE AND OBJECTIVE BOX
Neurology Progress Note    S: Patient seen and examined. resting in bed.  had 2nd plex session improving     Medication:  MEDICATIONS  (STANDING):  MEDICATIONS  (STANDING):  albumin human  5% IVPB 3750 milliLiter(s) IV Intermittent every 48 hours  amLODIPine   Tablet 10 milliGRAM(s) Oral daily  bisacodyl 5 milliGRAM(s) Oral at bedtime  calcium gluconate IVPB 2 Gram(s) IV Intermittent <User Schedule>  chlorhexidine 2% Cloths 1 Application(s) Topical daily  dextrose 40% Gel 15 Gram(s) Oral once  dextrose 5%. 1000 milliLiter(s) (50 mL/Hr) IV Continuous <Continuous>  dextrose 5%. 1000 milliLiter(s) (100 mL/Hr) IV Continuous <Continuous>  dextrose 50% Injectable 25 Gram(s) IV Push once  dextrose 50% Injectable 12.5 Gram(s) IV Push once  dextrose 50% Injectable 25 Gram(s) IV Push once  gabapentin 100 milliGRAM(s) Oral three times a day  glucagon  Injectable 1 milliGRAM(s) IntraMuscular once  hydrALAZINE 25 milliGRAM(s) Oral three times a day  insulin glargine Injectable (LANTUS) 40 Unit(s) SubCutaneous at bedtime  insulin lispro (ADMELOG) corrective regimen sliding scale   SubCutaneous three times a day before meals  insulin lispro (ADMELOG) corrective regimen sliding scale   SubCutaneous at bedtime  insulin lispro Injectable (ADMELOG) 14 Unit(s) SubCutaneous before lunch  insulin lispro Injectable (ADMELOG) 14 Unit(s) SubCutaneous before dinner  insulin lispro Injectable (ADMELOG) 16 Unit(s) SubCutaneous before breakfast  metoprolol tartrate 12.5 milliGRAM(s) Oral two times a day  mineral oil 30 milliLiter(s) Oral once  senna 2 Tablet(s) Oral two times a day  sodium chloride 0.9%. 1000 milliLiter(s) (75 mL/Hr) IV Continuous <Continuous>    MEDICATIONS  (PRN):  acetaminophen   Tablet .. 650 milliGRAM(s) Oral every 6 hours PRN Temp greater or equal to 38C (100.4F), Mild Pain (1 - 3), Moderate Pain (4 - 6)  acetaminophen   Tablet .. 650 milliGRAM(s) Oral every 6 hours PRN Mild Pain (1 - 3)  aluminum hydroxide/magnesium hydroxide/simethicone Suspension 30 milliLiter(s) Oral every 6 hours PRN Dyspepsia  dicyclomine 10 milliGRAM(s) Oral once PRN abdominal pain  HYDROmorphone  Injectable 1 milliGRAM(s) IV Push every 8 hours PRN Severe Pain (7 - 10)  oxycodone    5 mG/acetaminophen 325 mG 1 Tablet(s) Oral every 6 hours PRN Moderate Pain (4 - 6)  sodium chloride 0.9% lock flush 10 milliLiter(s) IV Push every 1 hour PRN Pre/post blood products, medications, blood draw, and to maintain line patency      Vitals:  Vital Signs Last 24 Hrs  T(C): 36.9 (24 Apr 2021 10:21), Max: 37.1 (24 Apr 2021 05:46)  T(F): 98.5 (24 Apr 2021 10:21), Max: 98.7 (24 Apr 2021 05:46)  HR: 91 (24 Apr 2021 10:21) (87 - 100)  BP: 139/76 (24 Apr 2021 10:21) (125/76 - 144/88)  BP(mean): --  RR: 17 (24 Apr 2021 10:21) (17 - 19)  SpO2: 98% (24 Apr 2021 10:21) (95% - 99%)    General Exam:   General Appearance: Appropriately dressed and in no acute distress       Head: Normocephalic, atraumatic and no dysmorphic features  Ear, Nose, and Throat: Moist mucous membranes  CVS: S1S2+  Resp: No SOB, no wheeze or rhonchi  Abd: soft NTND  Extremities: No edema, no cyanosis  Skin: No bruises, no rashes     Neurological Exam:  Mental Status: Awake, alert and oriented x 3.  Able to follow simple and complex verbal commands. Able to name and repeat. fluent speech. No obvious aphasia or dysarthria noted.   Cranial Nerves: PERRL, EOMI, VFFC, sensation V1-V3 intact,  no obvious facial asymmetry , equal elevation of palate, scm/trap 5/5, tongue is midline on protrusion. no obvious papilledema on fundoscopic exam. Hearing is grossly intact.   Motor: Normal bulk, tone and strength throughout B/L UE Fine finger movements were intact and symmetric. no tremors B/L LE 2-3/5  Sensation: Intact to light touch and pinprick throughout. no right/left confusion. no extinction to tactile on DSS. Romberg was negative.   Reflexes: 0 throughout at biceps, brachioradialis, triceps, patellars and ankles bilaterally and equal. only hyas LUE biceps 1+ No clonus. R toe and L toe were both downgoing.  Coordination: No dysmetria on FNF   Gait: unable    I personally reviewed the below data/images/labs:  CBC Full  -  ( 24 Apr 2021 07:28 )  WBC Count : 11.89 K/uL  RBC Count : 3.76 M/uL  Hemoglobin : 11.1 g/dL  Hematocrit : 33.3 %  Platelet Count - Automated : 217 K/uL  Mean Cell Volume : 88.6 fL  Mean Cell Hemoglobin : 29.5 pg  Mean Cell Hemoglobin Concentration : 33.3 gm/dL  Auto Neutrophil # : x  Auto Lymphocyte # : x  Auto Monocyte # : x  Auto Eosinophil # : x  Auto Basophil # : x  Auto Neutrophil % : x  Auto Lymphocyte % : x  Auto Monocyte % : x  Auto Eosinophil % : x  Auto Basophil % : x    04-24    135  |  101  |  16  ----------------------------<  219<H>  4.4   |  22  |  0.88    Ca    8.6      24 Apr 2021 07:28  Phos  3.1     04-24  Mg     1.9     04-24        < from: CT Head No Cont (04.02.21 @ 21:48) >    EXAM:  CT BRAIN        PROCEDURE DATE:  Apr 2 2021         INTERPRETATION:  HISTORY: Hypertension    Technique: CT of the head was performed without contrast.    Multiple contiguous axial images were acquired from the skullbase to the vertex without the administration of intravenous contrast.  Coronal and sagittal reformations were made.    COMPARISON: None.    FINDINGS:  The ventricles and sulci are within normal limits given the patient's age. No acute hemorrhage, mass effect, midline shift,hydrocephalus, or extra-axial fluid collections. Mild patchy hypoattenuation within the white matter, likely secondary chronic microscopy changes.    The calvarium is intact.    Small cyst or polyp in the right maxillary sinus. There are opacification of a few ethmoid air cells bilaterally as well as mild mucosal thickening of the left frontal sinus. The mastoid air cells are clear.    IMPRESSION:  No acute hemorrhage, mass effect, or midline shift.            DARRELL MIX MD; Resident Radiology  This document has been electronically signed.  PORTILLO HARPER MD; Attending Radiologist  This document has been electronically signed. Apr 2 2021 11:34PM    < end of copied text >  < from: MR Lumbar Spine w/wo IV Cont (04.16.21 @ 22:16) >    EXAM:  MR SPINE LUMBAR WAW IC      EXAM:  MR SPINE THORACIC WAW IC        PROCEDURE DATE:  Apr 16 2021         INTERPRETATION:  CLINICAL INDICATION: Bilateral leg weakness of unknown etiology.    Technique: MRI of the thoracic and lumbar spine was performed with and without contrast. A total 10 cc of Gadavist were administered intravenously.    COMPARISON: None.    FINDINGS:    THORACIC:    There is normal thoracic kyphosis. There are multiple small vertebral body endplate Schmorl's nodes. Otherwise, the vertebrae are normal in signal, height, and alignment. There is anterior endplate osteophytosis most pronounced from the most pronounced from T6-T10.    There are tiny disc bulges at T7-T8 and T8-T9, without significant central spinal canalstenosis or neuroforaminal narrowing.    The spinal cord is normal in course, caliber, and signal.    There is no abnormal enhancement within thoracic spine.    LUMBAR:    There is normal lumbar lordosis. There are mild-to-moderate type I degenerative endplate changes on the left at L4-L5. Otherwise, the vertebral bodies are normal in height and signal without fracture or dislocation.    There is disc space narrowing and desiccation at L4-L5 and L5-S1.    Evaluation of individual levels demonstrates:    L5-S1: Small posterior disc bulge, as well as mild right greater than left facet hypertrophy, resulting in moderate right and mild left neural foraminal stenosis. No significant spinal canal stenosis.    L4-L5: Small posterior disc bulge and superimposed small right central disc protrusion, including tiny annular fissure, resulting in mild spinal canal stenosis, as well as bilateral facet hypertrophy with moderate right and severe left neural foraminal stenosis.    L3-L4: Small posterior disc bulge, along with bilateral facet hypertrophy, resulting in mild spinal canal stenosis as well as mild bilateral neural foraminal stenosis    L2-L3: No significant disc herniation, central spinal canal stenosis, or neural foraminal narrowing. Mildbilateral facet hypertrophy.    L1-L2: Tiny posterior disc bulge without significant central spinal canal stenosis or neuroforaminal narrowing. Mild bilateral facet hypertrophy.    The conus is normal in position and morphology at the L1 level. Thereis smooth thickening and abnormal enhancement of the cauda equina and ventral nerve roots, a nonspecific finding.    There is no definite fluid collection or mass lesion within the visualized retroperitoneal or posterior paraspinal soft tissues.    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 Alternative etiologies include chronic inflammatory demyelinating polyneuropathy, Lyme disease, less likely arachnoiditis or carcinomatosis.    Results were discussed with ADELSO Elkins by Dr. Haji at 9:40 AM on 4/17/2021 with read back followed.              ROSIE HAJI MD; Attending Radiologist  This document has been electronically signed. Apr 17 2021  9:48AM    < end of copied text >  < from: MR Cervical Spine w/wo IV Cont (04.16.21 @ 22:16) >    EXAM:  MR SPINE CERVICAL WAW IC        PROCEDURE DATE:  Apr 16 2021         INTERPRETATION:  CLINICAL INDICATION: Patient with new weakness in lower extremities.    TECHNIQUE: MRI of the cervical spine was performed before and after intravenous contrast. A total 10 cc of Gadavist were administered.    COMPARISON: None.    FINDINGS:  Structures at the craniocervical and cervicomedullary junction are intact.    There is nonspecific straightening of the normal cervical lordosis. There is grade 1 degenerative retrolisthesis of C5 on C6. There is disc space narrowing at C3-C4 and C5-C6. The remaining intervertebral disc space heights are grossly preserved. There is anterior endplate osteophytosis from C3 to C6.    There are mild Modic type II degenerative endplate changes involving C3 CT 4 and C5-C6, along with mild endplate irregularity. Otherwise, the vertebrae demonstrate normal height and signal without fracture, dislocation or marrow edema.    The spinal cord demonstrates normal course and caliber without intrinsic cord signal abnormality.    The prevertebral and paravertebral soft tissues are within normal limits.    There is no abnormal enhancement.    There is diffuse disc desiccation. Evaluation of individual levels demonstrates:    C2-C3: No significant disc herniation, central spinal canal stenosis, although neural foraminal narrowing.    C3-C4: Small posterior disc osteophyte complexes, partially effacing the ventral thecal sac and resulting in mild spinal canal stenosis,as well as moderate right and mild left neural foraminal narrowing.    C4-C5: Tiny posterior disc osteophyte complex minimally effacing the ventral thecal sac, without significant spinal canal stenosis, as well as moderate left and mild right neural foraminal narrowing.    C5-C6: Tiny posterior disc osteophyte complex minimally effacing the ventral thecal sac, without significant spinal canal stenosis, as well as severe right and moderate left neural foraminal narrowing.    C6-C7: No significant disc herniation, central spinal canal stenosis, or neural foraminal narrowing.    C7-T1: No significant disc herniation, central spinal canal stenosis, or neural foraminal narrowing.    There is no soft tissue neck mass or fluid collection.    IMPRESSION:  No evidence for spinal cord compression, signal abnormality, or abnormal enhancement.    Multilevel degenerative changes, as described above, most pronounced at C3-C4 where there is mild spinal canal stenosis and C5-C6 where there is severe right and moderate left neural foraminal narrowing.              ROSIE HAJI MD; Attending Radiologist  This document has been electronically signed. Apr 17 2021  8:54AM    < end of copied text >

## 2021-04-24 NOTE — PROGRESS NOTE ADULT - SUBJECTIVE AND OBJECTIVE BOX
[facility] [place] [facility]   MD Javier Fowler MD, D.O. Ruoru Wong, PA    From 7 AM - 5 PM:  OFFICE: [phone]  Dr. Gregory cell: [phone]  Dr. Juarez cell: [phone]  Dr. Newman cell: [phone]  MARIBEL Avitia cell: [phone]    From 5 PM - 7 AM: Answering Service: [phone]  Date of service: 04-24-21 @ 15:37    RENAL FOLLOW UP NOTE  --------------------------------------------------------------------------------  HPI:  Pt seen and examined at bedside.       PAST HISTORY  --------------------------------------------------------------------------------  No significant changes to PMH, PSH, FHx, SHx, unless otherwise noted    ALLERGIES & MEDICATIONS  --------------------------------------------------------------------------------  Allergies    No Known Drug Allergies  shellfish (Urticaria)    Intolerances      Standing Inpatient Medications  albumin human  5% IVPB 3750 milliLiter(s) IV Intermittent every 48 hours  amLODIPine   Tablet 10 milliGRAM(s) Oral daily  bisacodyl 5 milliGRAM(s) Oral at bedtime  calcium gluconate IVPB 2 Gram(s) IV Intermittent <User Schedule>  chlorhexidine 2% Cloths 1 Application(s) Topical daily  dextrose 40% Gel 15 Gram(s) Oral once  dextrose 5%. 1000 milliLiter(s) IV Continuous <Continuous>  dextrose 5%. 1000 milliLiter(s) IV Continuous <Continuous>  dextrose 50% Injectable 25 Gram(s) IV Push once  dextrose 50% Injectable 12.5 Gram(s) IV Push once  dextrose 50% Injectable 25 Gram(s) IV Push once  enoxaparin Injectable 40 milliGRAM(s) SubCutaneous daily  gabapentin 100 milliGRAM(s) Oral three times a day  glucagon  Injectable 1 milliGRAM(s) IntraMuscular once  hydrALAZINE 25 milliGRAM(s) Oral three times a day  insulin glargine Injectable (LANTUS) 44 Unit(s) SubCutaneous at bedtime  insulin lispro (ADMELOG) corrective regimen sliding scale   SubCutaneous three times a day before meals  insulin lispro (ADMELOG) corrective regimen sliding scale   SubCutaneous at bedtime  insulin lispro Injectable (ADMELOG) 14 Unit(s) SubCutaneous before lunch  insulin lispro Injectable (ADMELOG) 14 Unit(s) SubCutaneous before dinner  insulin lispro Injectable (ADMELOG) 16 Unit(s) SubCutaneous before breakfast  losartan 100 milliGRAM(s) Oral daily  metoprolol tartrate 12.5 milliGRAM(s) Oral two times a day  mineral oil 30 milliLiter(s) Oral once  senna 2 Tablet(s) Oral two times a day  sodium chloride 0.9%. 1000 milliLiter(s) IV Continuous <Continuous>    PRN Inpatient Medications  acetaminophen   Tablet .. 650 milliGRAM(s) Oral every 6 hours PRN  acetaminophen   Tablet .. 650 milliGRAM(s) Oral every 6 hours PRN  aluminum hydroxide/magnesium hydroxide/simethicone Suspension 30 milliLiter(s) Oral every 6 hours PRN  dicyclomine 10 milliGRAM(s) Oral once PRN  HYDROmorphone  Injectable 1 milliGRAM(s) IV Push every 8 hours PRN  oxycodone    5 mG/acetaminophen 325 mG 1 Tablet(s) Oral every 6 hours PRN  sodium chloride 0.9% lock flush 10 milliLiter(s) IV Push every 1 hour PRN      REVIEW OF SYSTEMS  --------------------------------------------------------------------------------  General: no fever  CVS: no chest pain  RESP: no sob, no cough  ABD: no abdominal pain  : no dysuria,  MSK: no edema     VITALS/PHYSICAL EXAM  --------------------------------------------------------------------------------  T(C): 36.9 (04-24-21 @ 14:00), Max: 37.1 (04-24-21 @ 05:46)  HR: 80 (04-24-21 @ 14:00) (80 - 100)  BP: 105/65 (04-24-21 @ 14:00) (105/65 - 144/88)  RR: 18 (04-24-21 @ 14:00) (17 - 18)  SpO2: 99% (04-24-21 @ 14:00) (95% - 99%)  Wt(kg): --        04-23-21 @ 07:01  -  04-24-21 @ 07:00  --------------------------------------------------------  IN: 0 mL / OUT: 850 mL / NET: -850 mL      Physical Exam:  	Gen: NAD  	HEENT: MMM  	Pulm: CTA B/L  	CV: S1S2  	Abd: Soft, +BS  	Ext: No LE edema B/L                      Neuro: Awake   	Skin: Warm and Dry   	    LABS/STUDIES  --------------------------------------------------------------------------------              11.1   11.89 >-----------<  217      [04-24-21 @ 07:28]              33.3     135  |  101  |  16  ----------------------------<  219      [04-24-21 @ 07:28]  4.4   |  22  |  0.88        Ca     8.6     [04-24-21 @ 07:28]      Mg     1.9     [04-24-21 @ 07:28]      Phos  3.1     [04-24-21 @ 07:28]    Creatinine Trend:  SCr 0.88 [04-24 @ 07:28]  SCr 1.02 [04-23 @ 07:24]  SCr 1.13 [04-22 @ 07:36]  SCr 0.97 [04-21 @ 07:21]  SCr 1.08 [04-20 @ 07:35]    Urinalysis - [04-11-21 @ 12:32]      Color Light Yellow / Appearance Clear / SG 1.013 / pH 6.5      Gluc >= 1000 mg/dL / Ketone Small  / Bili Negative / Urobili <2 mg/dL       Blood Small / Protein 100 mg/dL / Leuk Est Negative / Nitrite Negative      RBC 10 / WBC 1 / Hyaline 1 / Gran  / Sq Epi  / Non Sq Epi 1 / Bacteria Negative      TSH 3.00      [04-12-21 @ 06:36]    HBsAg Nonreact      [04-18-21 @ 01:28]  HCV 0.40, Nonreact      [04-18-21 @ 01:28]  HIV Nonreact      [04-17-21 @ 16:16]    PIERO: titer Negative, pattern --      [04-13-21 @ 10:10]  C3 Complement 152      [04-13-21 @ 07:48]  C4 Complement 33      [04-13-21 @ 07:48]  ANCA: cANCA Negative, pANCA Negative, atypical ANCA Negative      [04-16-21 @ 12:16]  Syphilis Screen (Treponema Pallidum Ab) Positive      [04-13-21 @ 10:10]  Syphilis Screen (RPR Titer) 1:1      [04-13-21 @ 10:10]  Free Light Chains: kappa 1.51, lambda 1.89, ratio = 0.80      [04-13 @ 10:08]  Immunofixation Serum:   No Monoclonal Band Identified. P. Ferris, M.D.    Reference Range: None Detected      [04-13-21 @ 07:48]  SPEP Interpretation: phase reaction. KRIS Ferris M.D.      [04-16-21 @ 08:31]

## 2021-04-24 NOTE — PROGRESS NOTE ADULT - ASSESSMENT
49 yo male, PMH DM, HTN, hyperlipidemia; pt presented to the ED c/o abdominal cramping, generalized, and diarrhea following use of laxatives as above for constipation.  In the ED, WBC 10.82, Hb 13.6, CMP: sodium 126, chloride 89, glucose 281, CMP otherwise unremarkable.  VBG: Lactate 2.6 (later downtrended to 1.6).  Pt. was treated with IV hydration and dispo'd to CDU for continued care plan:  IV hydration, supportive care, AM labs, general observation care / monitoring. pt continued to have generalized weakness , numbness and on labs persistent hyponatermia      A/P:  ALLIE  baseline likely ~ 1  ALLIE possible sec to uncontrolled hyperkalemia on losartan  s/p NS   scr improved  resumed losartan  monitor bmp  avoid nephrotoxic agents    Hyponatremia:  Likely sec to hypovolemia+hyperglycemia. work up suggested SIADH  s/p NS 4/20 for ALLIE  Na improved  optimize dm control  free water restriction <1.2L/day.  ok TSH, serum cortisol level  Monitor Na level Q12  Na level should not increase more than 6meq in 24 hrs    Proteinuia/Hematuria:  Etiology?  Had Cystoscopy last year and was normal per patient  Possible sec to Diabetic Nephropathy but will need full vasculitis work up  check urine p/c ratio  c3 c4 hep b hep c hiv, k/l wnl, lexii neg  pendingANCA, Serum immunofixation, SPEP    RPR +, patient s/p treatment 2017 ID eval appreciated     Based on work up result he might need kidney biopsy, which can be planned as outpatient as his renal function is stable  D/W patient in detail above plan    Hypophosphatemia:  SUpplement as needed   montior    HTN  BP controlled   on norvasc and losartan, hydralazine  hold per perimeter   Monitor closely

## 2021-04-25 LAB — FIBRINOGEN PPP-MCNC: 609 MG/DL — HIGH (ref 290–520)

## 2021-04-25 PROCEDURE — 36514 APHERESIS PLASMA: CPT

## 2021-04-25 RX ORDER — HYDROMORPHONE HYDROCHLORIDE 2 MG/ML
1 INJECTION INTRAMUSCULAR; INTRAVENOUS; SUBCUTANEOUS ONCE
Refills: 0 | Status: DISCONTINUED | OUTPATIENT
Start: 2021-04-25 | End: 2021-04-29

## 2021-04-25 RX ORDER — INSULIN GLARGINE 100 [IU]/ML
48 INJECTION, SOLUTION SUBCUTANEOUS AT BEDTIME
Refills: 0 | Status: DISCONTINUED | OUTPATIENT
Start: 2021-04-25 | End: 2021-04-28

## 2021-04-25 RX ADMIN — Medication 2: at 16:48

## 2021-04-25 RX ADMIN — HYDROMORPHONE HYDROCHLORIDE 1 MILLIGRAM(S): 2 INJECTION INTRAMUSCULAR; INTRAVENOUS; SUBCUTANEOUS at 12:02

## 2021-04-25 RX ADMIN — Medication 4: at 11:56

## 2021-04-25 RX ADMIN — INSULIN GLARGINE 48 UNIT(S): 100 INJECTION, SOLUTION SUBCUTANEOUS at 21:46

## 2021-04-25 RX ADMIN — Medication 6: at 08:02

## 2021-04-25 RX ADMIN — Medication 16 UNIT(S): at 08:02

## 2021-04-25 RX ADMIN — HYDROMORPHONE HYDROCHLORIDE 1 MILLIGRAM(S): 2 INJECTION INTRAMUSCULAR; INTRAVENOUS; SUBCUTANEOUS at 00:14

## 2021-04-25 RX ADMIN — ENOXAPARIN SODIUM 40 MILLIGRAM(S): 100 INJECTION SUBCUTANEOUS at 12:03

## 2021-04-25 RX ADMIN — Medication 14 UNIT(S): at 11:57

## 2021-04-25 RX ADMIN — Medication 25 MILLIGRAM(S): at 21:46

## 2021-04-25 RX ADMIN — HYDROMORPHONE HYDROCHLORIDE 1 MILLIGRAM(S): 2 INJECTION INTRAMUSCULAR; INTRAVENOUS; SUBCUTANEOUS at 21:05

## 2021-04-25 RX ADMIN — Medication 14 UNIT(S): at 16:49

## 2021-04-25 RX ADMIN — GABAPENTIN 100 MILLIGRAM(S): 400 CAPSULE ORAL at 14:45

## 2021-04-25 RX ADMIN — HYDROMORPHONE HYDROCHLORIDE 1 MILLIGRAM(S): 2 INJECTION INTRAMUSCULAR; INTRAVENOUS; SUBCUTANEOUS at 20:49

## 2021-04-25 RX ADMIN — GABAPENTIN 100 MILLIGRAM(S): 400 CAPSULE ORAL at 05:42

## 2021-04-25 RX ADMIN — GABAPENTIN 100 MILLIGRAM(S): 400 CAPSULE ORAL at 21:46

## 2021-04-25 RX ADMIN — HYDROMORPHONE HYDROCHLORIDE 1 MILLIGRAM(S): 2 INJECTION INTRAMUSCULAR; INTRAVENOUS; SUBCUTANEOUS at 00:44

## 2021-04-25 RX ADMIN — HYDROMORPHONE HYDROCHLORIDE 1 MILLIGRAM(S): 2 INJECTION INTRAMUSCULAR; INTRAVENOUS; SUBCUTANEOUS at 12:17

## 2021-04-25 RX ADMIN — OXYCODONE AND ACETAMINOPHEN 1 TABLET(S): 5; 325 TABLET ORAL at 06:30

## 2021-04-25 RX ADMIN — CHLORHEXIDINE GLUCONATE 1 APPLICATION(S): 213 SOLUTION TOPICAL at 12:03

## 2021-04-25 RX ADMIN — Medication 12.5 MILLIGRAM(S): at 18:44

## 2021-04-25 RX ADMIN — OXYCODONE AND ACETAMINOPHEN 1 TABLET(S): 5; 325 TABLET ORAL at 05:52

## 2021-04-25 NOTE — PROGRESS NOTE ADULT - SUBJECTIVE AND OBJECTIVE BOX
Patient is a 50y Male     Patient is a 50y old  Male who presents with a chief complaint of abd pain, diarrhea (23 Apr 2021 07:27)      HPI:  49 yo male, PMH DM, HTN, hyperlipidemia; pt presented to the ED c/o abdominal cramping, generalized, and diarrhea following use of laxatives as above for constipation.  In the ED, WBC 10.82, Hb 13.6, CMP: sodium 126, chloride 89, glucose 281, CMP otherwise unremarkable.  VBG: Lactate 2.6 (later downtrended to 1.6).  Pt. was treated with IV hydration and dispo'd to CDU for continued care plan:  IV hydration, supportive care, AM labs, general observation care / monitoring. pt continued to have generalized weakness , numbness and on labs persistent hyponatermia and now being admitted for furhter evlauation .  dnenies N/V/Abd pain   had bm   no urinary sx   no fever or chills   no cough   no cp /sob    (11 Apr 2021 15:50)      PAST MEDICAL & SURGICAL HISTORY:  DM (diabetes mellitus)    HTN (hypertension)    Hyperlipidemia    H/O total knee replacement    Foot fracture        MEDICATIONS  (STANDING):  albumin human  5% IVPB 3750 milliLiter(s) IV Intermittent every 48 hours  amLODIPine   Tablet 10 milliGRAM(s) Oral daily  bisacodyl 5 milliGRAM(s) Oral at bedtime  calcium gluconate IVPB 2 Gram(s) IV Intermittent <User Schedule>  chlorhexidine 2% Cloths 1 Application(s) Topical daily  dextrose 40% Gel 15 Gram(s) Oral once  dextrose 5%. 1000 milliLiter(s) (50 mL/Hr) IV Continuous <Continuous>  dextrose 5%. 1000 milliLiter(s) (100 mL/Hr) IV Continuous <Continuous>  dextrose 50% Injectable 25 Gram(s) IV Push once  dextrose 50% Injectable 12.5 Gram(s) IV Push once  dextrose 50% Injectable 25 Gram(s) IV Push once  enoxaparin Injectable 40 milliGRAM(s) SubCutaneous daily  gabapentin 100 milliGRAM(s) Oral three times a day  glucagon  Injectable 1 milliGRAM(s) IntraMuscular once  hydrALAZINE 25 milliGRAM(s) Oral three times a day  HYDROmorphone  Injectable 1 milliGRAM(s) IV Push once  insulin glargine Injectable (LANTUS) 44 Unit(s) SubCutaneous at bedtime  insulin lispro (ADMELOG) corrective regimen sliding scale   SubCutaneous three times a day before meals  insulin lispro (ADMELOG) corrective regimen sliding scale   SubCutaneous at bedtime  insulin lispro Injectable (ADMELOG) 14 Unit(s) SubCutaneous before lunch  insulin lispro Injectable (ADMELOG) 14 Unit(s) SubCutaneous before dinner  insulin lispro Injectable (ADMELOG) 16 Unit(s) SubCutaneous before breakfast  losartan 100 milliGRAM(s) Oral daily  metoprolol tartrate 12.5 milliGRAM(s) Oral two times a day  mineral oil 30 milliLiter(s) Oral once  senna 2 Tablet(s) Oral two times a day  sodium chloride 0.9%. 1000 milliLiter(s) (75 mL/Hr) IV Continuous <Continuous>      Allergies    No Known Drug Allergies  shellfish (Urticaria)    Intolerances        SOCIAL HISTORY:  Denies ETOh,Smoking,     FAMILY HISTORY:  No pertinent family history in first degree relatives        REVIEW OF SYSTEMS:    CONSTITUTIONAL: No weakness, fevers or chills  EYES/ENT: No visual changes;  No vertigo or throat pain   NECK: No pain or stiffness  RESPIRATORY: No cough, wheezing, hemoptysis; No shortness of breath  CARDIOVASCULAR: No chest pain or palpitations  GASTROINTESTINAL: No abdominal or epigastric pain. No nausea, vomiting, or hematemesis; No diarrhea or constipation. No melena or hematochezia.  GENITOURINARY: No dysuria, frequency or hematuria  NEUROLOGICAL: No numbness or weakness  SKIN: No itching, burning, rashes, or lesions   All other review of systems is negative unless indicated above.    VITAL:  T(C): , Max: 37.3 (04-24-21 @ 21:52)  T(F): , Max: 99.2 (04-24-21 @ 21:52)  HR: 82 (04-25-21 @ 12:02)  BP: 121/67 (04-25-21 @ 12:02)  BP(mean): --  RR: 16 (04-25-21 @ 12:02)  SpO2: 100% (04-25-21 @ 12:02)  Wt(kg): --    I and O's:    04-23 @ 07:01  -  04-24 @ 07:00  --------------------------------------------------------  IN: 0 mL / OUT: 850 mL / NET: -850 mL    04-24 @ 07:01  -  04-25 @ 07:00  --------------------------------------------------------  IN: 0 mL / OUT: 1000 mL / NET: -1000 mL          PHYSICAL EXAM:    Constitutional: NAD  HEENT: PERRLA,   Neck: No JVD  Respiratory: CTA B/L  Cardiovascular: S1 and S2  Gastrointestinal: BS+, soft, NT/ND  Extremities: No peripheral edema  Neurological: A/O x 3, no focal deficits  Psychiatric: Normal mood, normal affect  : No Jose  Skin: No rashes  Access: Not applicable  Back: No CVA tenderness    LABS:                        11.1   11.89 )-----------( 217      ( 24 Apr 2021 07:28 )             33.3     04-24    135  |  101  |  16  ----------------------------<  219<H>  4.4   |  22  |  0.88    Ca    8.6      24 Apr 2021 07:28  Phos  3.1     04-24  Mg     1.9     04-24            RADIOLOGY & ADDITIONAL STUDIES:

## 2021-04-25 NOTE — PROGRESS NOTE ADULT - ASSESSMENT
51 yo RH AA male with h/o DM, HTN, hyperlipidemia; pt presented to the ED c/o abdominal cramping, generalized, and diarrhea following use of laxatives as above for constipation. Neurology called for evaluation of LE weakness, unsteady gait. s/p shieley. Na improved. 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 protein  c/w GBS given albumino cytologic dissociation  - GI following for constipation  - hyponatremia improved   - s/p shiley   - complete 5 sessions of PLEX  - f/u remaining LP results.   - f/u ganglioside antibodies, Gq1b -->neg   - slow correction of sodium  - aggressive PT/OT  - B12 >2000, copper, Vit E, thiamine, magnesium heavy metals, zinc  -refused IVIG but agreed for PLEX  - telemetry  - check FS, glucose control <180  - GI/DVT ppx  - Counseling on diet, exercise, and medication adherence was done  - Counseling on smoking cessation and alcohol consumption offered when appropriate.  - Pain assessed and judicious use of narcotics when appropriate was discussed.    - Stroke education given when appropriate.  - Importance of fall prevention discussed.   - Differential diagnosis and plan of care discussed with patient and/or family and primary team  - Thank you for allowing me to participate in the care of this patient. Call with questions.     Geoffrey Gardner MD  Vascular Neurology  Office: [phone]

## 2021-04-25 NOTE — PROGRESS NOTE ADULT - SUBJECTIVE AND OBJECTIVE BOX
[facility] [place] [facility]   MD Javier Fowler MD, D.O. Ruoru Wong, PA    From 7 AM - 5 PM:  OFFICE: [phone]  Dr. Gregory cell: [phone]  Dr. Juarez cell: [phone]  Dr. Newman cell: [phone]  MARIBEL Avitia cell: [phone]    From 5 PM - 7 AM: Answering Service: [phone]  Date of service: 04-25-21 @ 15:48    RENAL FOLLOW UP NOTE  --------------------------------------------------------------------------------  HPI:  Pt seen and examined at bedside.   Denies SOB, chest pain     PAST HISTORY  --------------------------------------------------------------------------------  No significant changes to PMH, PSH, FHx, SHx, unless otherwise noted    ALLERGIES & MEDICATIONS  --------------------------------------------------------------------------------  Allergies    No Known Drug Allergies  shellfish (Urticaria)    Intolerances      Standing Inpatient Medications  albumin human  5% IVPB 3750 milliLiter(s) IV Intermittent every 48 hours  amLODIPine   Tablet 10 milliGRAM(s) Oral daily  bisacodyl 5 milliGRAM(s) Oral at bedtime  calcium gluconate IVPB 2 Gram(s) IV Intermittent <User Schedule>  chlorhexidine 2% Cloths 1 Application(s) Topical daily  dextrose 40% Gel 15 Gram(s) Oral once  dextrose 5%. 1000 milliLiter(s) IV Continuous <Continuous>  dextrose 5%. 1000 milliLiter(s) IV Continuous <Continuous>  dextrose 50% Injectable 25 Gram(s) IV Push once  dextrose 50% Injectable 12.5 Gram(s) IV Push once  dextrose 50% Injectable 25 Gram(s) IV Push once  enoxaparin Injectable 40 milliGRAM(s) SubCutaneous daily  gabapentin 100 milliGRAM(s) Oral three times a day  glucagon  Injectable 1 milliGRAM(s) IntraMuscular once  hydrALAZINE 25 milliGRAM(s) Oral three times a day  HYDROmorphone  Injectable 1 milliGRAM(s) IV Push once  insulin glargine Injectable (LANTUS) 48 Unit(s) SubCutaneous at bedtime  insulin lispro (ADMELOG) corrective regimen sliding scale   SubCutaneous three times a day before meals  insulin lispro (ADMELOG) corrective regimen sliding scale   SubCutaneous at bedtime  insulin lispro Injectable (ADMELOG) 14 Unit(s) SubCutaneous before lunch  insulin lispro Injectable (ADMELOG) 14 Unit(s) SubCutaneous before dinner  insulin lispro Injectable (ADMELOG) 16 Unit(s) SubCutaneous before breakfast  losartan 100 milliGRAM(s) Oral daily  metoprolol tartrate 12.5 milliGRAM(s) Oral two times a day  mineral oil 30 milliLiter(s) Oral once  senna 2 Tablet(s) Oral two times a day  sodium chloride 0.9%. 1000 milliLiter(s) IV Continuous <Continuous>    PRN Inpatient Medications  acetaminophen   Tablet .. 650 milliGRAM(s) Oral every 6 hours PRN  acetaminophen   Tablet .. 650 milliGRAM(s) Oral every 6 hours PRN  aluminum hydroxide/magnesium hydroxide/simethicone Suspension 30 milliLiter(s) Oral every 6 hours PRN  dicyclomine 10 milliGRAM(s) Oral once PRN  HYDROmorphone  Injectable 1 milliGRAM(s) IV Push every 8 hours PRN  oxycodone    5 mG/acetaminophen 325 mG 1 Tablet(s) Oral every 6 hours PRN  sodium chloride 0.9% lock flush 10 milliLiter(s) IV Push every 1 hour PRN      REVIEW OF SYSTEMS  --------------------------------------------------------------------------------  General: no fever  CVS: no chest pain  RESP: no sob, no cough  ABD: no abdominal pain  : no dysuria,  MSK: no edema     VITALS/PHYSICAL EXAM  --------------------------------------------------------------------------------  T(C): 36.8 (04-25-21 @ 14:15), Max: 37.3 (04-24-21 @ 21:52)  HR: 87 (04-25-21 @ 14:15) (78 - 90)  BP: 139/85 (04-25-21 @ 14:15) (121/67 - 139/85)  RR: 17 (04-25-21 @ 14:15) (16 - 18)  SpO2: 98% (04-25-21 @ 14:15) (98% - 100%)  Wt(kg): --        04-24-21 @ 07:01  -  04-25-21 @ 07:00  --------------------------------------------------------  IN: 0 mL / OUT: 1000 mL / NET: -1000 mL      Physical Exam:  	Gen: NAD  	HEENT: MMM  	Pulm: CTA B/L  	CV: S1S2  	Abd: Soft, +BS  	Ext: No LE edema B/L                      Neuro: Awake   	Skin: Warm and Dry     LABS/STUDIES  --------------------------------------------------------------------------------              11.1   11.89 >-----------<  217      [04-24-21 @ 07:28]              33.3     135  |  101  |  16  ----------------------------<  219      [04-24-21 @ 07:28]  4.4   |  22  |  0.88        Ca     8.6     [04-24-21 @ 07:28]      Mg     1.9     [04-24-21 @ 07:28]      Phos  3.1     [04-24-21 @ 07:28]    Creatinine Trend:  SCr 0.88 [04-24 @ 07:28]  SCr 1.02 [04-23 @ 07:24]  SCr 1.13 [04-22 @ 07:36]  SCr 0.97 [04-21 @ 07:21]  SCr 1.08 [04-20 @ 07:35]    Urinalysis - [04-11-21 @ 12:32]      Color Light Yellow / Appearance Clear / SG 1.013 / pH 6.5      Gluc >= 1000 mg/dL / Ketone Small  / Bili Negative / Urobili <2 mg/dL       Blood Small / Protein 100 mg/dL / Leuk Est Negative / Nitrite Negative      RBC 10 / WBC 1 / Hyaline 1 / Gran  / Sq Epi  / Non Sq Epi 1 / Bacteria Negative      TSH 3.00      [04-12-21 @ 06:36]    HBsAg Nonreact      [04-18-21 @ 01:28]  HCV 0.40, Nonreact      [04-18-21 @ 01:28]  HIV Nonreact      [04-17-21 @ 16:16]    PIERO: titer Negative, pattern --      [04-13-21 @ 10:10]  C3 Complement 152      [04-13-21 @ 07:48]  C4 Complement 33      [04-13-21 @ 07:48]  ANCA: cANCA Negative, pANCA Negative, atypical ANCA Negative      [04-16-21 @ 12:16]  Syphilis Screen (Treponema Pallidum Ab) Positive      [04-13-21 @ 10:10]  Syphilis Screen (RPR Titer) 1:1      [04-13-21 @ 10:10]  Free Light Chains: kappa 1.51, lambda 1.89, ratio = 0.80      [04-13 @ 10:08]  Immunofixation Serum:   No Monoclonal Band Identified. KRIS Ferris M.D.    Reference Range: None Detected      [04-13-21 @ 07:48]  SPEP Interpretation: phase reaction. KRIS Ferris M.D.      [04-16-21 @ 08:31]

## 2021-04-25 NOTE — CHART NOTE - NSCHARTNOTEFT_GEN_A_CORE
Diabetes follow up   Hyperglycemia   Please refer to last progress note for full plan of care   Chart reviewed   Glucose above target of 100-180 mg/dL   Will continue Admelog 16/14/14 units TID before meals - HOLD if not eating   Will increase Lantus to 44 units at bedtime   Will continue Admelog MODERATE correctional scale before meals and bedtime  Endocrine following    Endocrine to follow up Monday     CAPILLARY BLOOD GLUCOSE  POCT Blood Glucose.: 224 mg/dL (25 Apr 2021 11:46)  POCT Blood Glucose.: 266 mg/dL (25 Apr 2021 07:51)  POCT Blood Glucose.: 246 mg/dL (24 Apr 2021 21:45)  POCT Blood Glucose.: 159 mg/dL (24 Apr 2021 17:04)    04-24    135  |  101  |  16  ----------------------------<  219<H>  4.4   |  22  |  0.88    Ca    8.6      24 Apr 2021 07:28  Phos  3.1     04-24  Mg     1.9     04-24    MEDICATIONS  (STANDING):  albumin human  5% IVPB 3750 milliLiter(s) IV Intermittent every 48 hours  amLODIPine   Tablet 10 milliGRAM(s) Oral daily  bisacodyl 5 milliGRAM(s) Oral at bedtime  calcium gluconate IVPB 2 Gram(s) IV Intermittent <User Schedule>  chlorhexidine 2% Cloths 1 Application(s) Topical daily  dextrose 40% Gel 15 Gram(s) Oral once  dextrose 5%. 1000 milliLiter(s) (50 mL/Hr) IV Continuous <Continuous>  dextrose 5%. 1000 milliLiter(s) (100 mL/Hr) IV Continuous <Continuous>  dextrose 50% Injectable 25 Gram(s) IV Push once  dextrose 50% Injectable 12.5 Gram(s) IV Push once  dextrose 50% Injectable 25 Gram(s) IV Push once  enoxaparin Injectable 40 milliGRAM(s) SubCutaneous daily  gabapentin 100 milliGRAM(s) Oral three times a day  glucagon  Injectable 1 milliGRAM(s) IntraMuscular once  hydrALAZINE 25 milliGRAM(s) Oral three times a day  HYDROmorphone  Injectable 1 milliGRAM(s) IV Push once  insulin glargine Injectable (LANTUS) 44 Unit(s) SubCutaneous at bedtime  insulin lispro (ADMELOG) corrective regimen sliding scale   SubCutaneous three times a day before meals  insulin lispro (ADMELOG) corrective regimen sliding scale   SubCutaneous at bedtime  insulin lispro Injectable (ADMELOG) 14 Unit(s) SubCutaneous before lunch  insulin lispro Injectable (ADMELOG) 14 Unit(s) SubCutaneous before dinner  insulin lispro Injectable (ADMELOG) 16 Unit(s) SubCutaneous before breakfast  losartan 100 milliGRAM(s) Oral daily  metoprolol tartrate 12.5 milliGRAM(s) Oral two times a day  mineral oil 30 milliLiter(s) Oral once  senna 2 Tablet(s) Oral two times a day  sodium chloride 0.9%. 1000 milliLiter(s) (75 mL/Hr) IV Continuous <Continuous> Diabetes follow up   Hyperglycemia   Please refer to last progress note for full plan of care   Chart reviewed   Glucose above target of 100-180 mg/dL   Will continue Admelog 16/14/14 units TID before meals - HOLD if not eating   Will increase Lantus to 48 units at bedtime   Will continue Admelog MODERATE correctional scale before meals and bedtime  Endocrine following    Endocrine to follow up Monday     CAPILLARY BLOOD GLUCOSE  POCT Blood Glucose.: 224 mg/dL (25 Apr 2021 11:46)  POCT Blood Glucose.: 266 mg/dL (25 Apr 2021 07:51)  POCT Blood Glucose.: 246 mg/dL (24 Apr 2021 21:45)  POCT Blood Glucose.: 159 mg/dL (24 Apr 2021 17:04)    04-24    135  |  101  |  16  ----------------------------<  219<H>  4.4   |  22  |  0.88    Ca    8.6      24 Apr 2021 07:28  Phos  3.1     04-24  Mg     1.9     04-24    MEDICATIONS  (STANDING):  albumin human  5% IVPB 3750 milliLiter(s) IV Intermittent every 48 hours  amLODIPine   Tablet 10 milliGRAM(s) Oral daily  bisacodyl 5 milliGRAM(s) Oral at bedtime  calcium gluconate IVPB 2 Gram(s) IV Intermittent <User Schedule>  chlorhexidine 2% Cloths 1 Application(s) Topical daily  dextrose 40% Gel 15 Gram(s) Oral once  dextrose 5%. 1000 milliLiter(s) (50 mL/Hr) IV Continuous <Continuous>  dextrose 5%. 1000 milliLiter(s) (100 mL/Hr) IV Continuous <Continuous>  dextrose 50% Injectable 25 Gram(s) IV Push once  dextrose 50% Injectable 12.5 Gram(s) IV Push once  dextrose 50% Injectable 25 Gram(s) IV Push once  enoxaparin Injectable 40 milliGRAM(s) SubCutaneous daily  gabapentin 100 milliGRAM(s) Oral three times a day  glucagon  Injectable 1 milliGRAM(s) IntraMuscular once  hydrALAZINE 25 milliGRAM(s) Oral three times a day  HYDROmorphone  Injectable 1 milliGRAM(s) IV Push once  insulin glargine Injectable (LANTUS) 44 Unit(s) SubCutaneous at bedtime  insulin lispro (ADMELOG) corrective regimen sliding scale   SubCutaneous three times a day before meals  insulin lispro (ADMELOG) corrective regimen sliding scale   SubCutaneous at bedtime  insulin lispro Injectable (ADMELOG) 14 Unit(s) SubCutaneous before lunch  insulin lispro Injectable (ADMELOG) 14 Unit(s) SubCutaneous before dinner  insulin lispro Injectable (ADMELOG) 16 Unit(s) SubCutaneous before breakfast  losartan 100 milliGRAM(s) Oral daily  metoprolol tartrate 12.5 milliGRAM(s) Oral two times a day  mineral oil 30 milliLiter(s) Oral once  senna 2 Tablet(s) Oral two times a day  sodium chloride 0.9%. 1000 milliLiter(s) (75 mL/Hr) IV Continuous <Continuous>

## 2021-04-25 NOTE — PROGRESS NOTE ADULT - SUBJECTIVE AND OBJECTIVE BOX
Neurology Progress Note    S: Patient seen and examined. PLEX # 3 in progress. BP stable     Medication:  MEDICATIONS  (STANDING):  albumin human  5% IVPB 3750 milliLiter(s) IV Intermittent every 48 hours  amLODIPine   Tablet 10 milliGRAM(s) Oral daily  bisacodyl 5 milliGRAM(s) Oral at bedtime  calcium gluconate IVPB 2 Gram(s) IV Intermittent <User Schedule>  chlorhexidine 2% Cloths 1 Application(s) Topical daily  dextrose 40% Gel 15 Gram(s) Oral once  dextrose 5%. 1000 milliLiter(s) (50 mL/Hr) IV Continuous <Continuous>  dextrose 5%. 1000 milliLiter(s) (100 mL/Hr) IV Continuous <Continuous>  dextrose 50% Injectable 25 Gram(s) IV Push once  dextrose 50% Injectable 12.5 Gram(s) IV Push once  dextrose 50% Injectable 25 Gram(s) IV Push once  enoxaparin Injectable 40 milliGRAM(s) SubCutaneous daily  gabapentin 100 milliGRAM(s) Oral three times a day  glucagon  Injectable 1 milliGRAM(s) IntraMuscular once  hydrALAZINE 25 milliGRAM(s) Oral three times a day  HYDROmorphone  Injectable 1 milliGRAM(s) IV Push once  insulin glargine Injectable (LANTUS) 44 Unit(s) SubCutaneous at bedtime  insulin lispro (ADMELOG) corrective regimen sliding scale   SubCutaneous three times a day before meals  insulin lispro (ADMELOG) corrective regimen sliding scale   SubCutaneous at bedtime  insulin lispro Injectable (ADMELOG) 14 Unit(s) SubCutaneous before lunch  insulin lispro Injectable (ADMELOG) 14 Unit(s) SubCutaneous before dinner  insulin lispro Injectable (ADMELOG) 16 Unit(s) SubCutaneous before breakfast  losartan 100 milliGRAM(s) Oral daily  metoprolol tartrate 12.5 milliGRAM(s) Oral two times a day  mineral oil 30 milliLiter(s) Oral once  senna 2 Tablet(s) Oral two times a day  sodium chloride 0.9%. 1000 milliLiter(s) (75 mL/Hr) IV Continuous <Continuous>    MEDICATIONS  (PRN):  acetaminophen   Tablet .. 650 milliGRAM(s) Oral every 6 hours PRN Temp greater or equal to 38C (100.4F), Mild Pain (1 - 3), Moderate Pain (4 - 6)  acetaminophen   Tablet .. 650 milliGRAM(s) Oral every 6 hours PRN Mild Pain (1 - 3)  aluminum hydroxide/magnesium hydroxide/simethicone Suspension 30 milliLiter(s) Oral every 6 hours PRN Dyspepsia  dicyclomine 10 milliGRAM(s) Oral once PRN abdominal pain  HYDROmorphone  Injectable 1 milliGRAM(s) IV Push every 8 hours PRN Severe Pain (7 - 10)  oxycodone    5 mG/acetaminophen 325 mG 1 Tablet(s) Oral every 6 hours PRN Moderate Pain (4 - 6)  sodium chloride 0.9% lock flush 10 milliLiter(s) IV Push every 1 hour PRN Pre/post blood products, medications, blood draw, and to maintain line patency      Vitals:  Vital Signs Last 24 Hrs  T(C): 36.7 (25 Apr 2021 09:11), Max: 37.3 (24 Apr 2021 21:52)  T(F): 98.1 (25 Apr 2021 09:11), Max: 99.2 (24 Apr 2021 21:52)  HR: 86 (25 Apr 2021 09:11) (78 - 90)  BP: 136/80 (25 Apr 2021 09:11) (105/65 - 136/80)  BP(mean): --  RR: 17 (25 Apr 2021 09:11) (17 - 18)  SpO2: 99% (25 Apr 2021 09:11) (98% - 99%)    General Exam:   General Appearance: Appropriately dressed and in no acute distress       Head: Normocephalic, atraumatic and no dysmorphic features  Ear, Nose, and Throat: Moist mucous membranes  CVS: S1S2+  Resp: No SOB, no wheeze or rhonchi  Abd: soft NTND  Extremities: No edema, no cyanosis  Skin: No bruises, no rashes     Neurological Exam:  Mental Status: Awake, alert and oriented x 3.  Able to follow simple and complex verbal commands. Able to name and repeat. fluent speech. No obvious aphasia or dysarthria noted.   Cranial Nerves: PERRL, EOMI, VFFC, sensation V1-V3 intact,  no obvious facial asymmetry , equal elevation of palate, scm/trap 5/5, tongue is midline on protrusion. no obvious papilledema on fundoscopic exam. Hearing is grossly intact.   Motor: Normal bulk, tone and strength throughout B/L UE Fine finger movements were intact and symmetric. no tremors B/L LE 2-3/5  Sensation: Intact to light touch and pinprick throughout. no right/left confusion. no extinction to tactile on DSS. Romberg was negative.   Reflexes: 0 throughout at biceps, brachioradialis, triceps, patellars and ankles bilaterally and equal. only hyas LUE biceps 1+ No clonus. R toe and L toe were both downgoing.  Coordination: No dysmetria on FNF   Gait: unable    I personally reviewed the below data/images/labs:  CBC Full  -  ( 24 Apr 2021 07:28 )  WBC Count : 11.89 K/uL  RBC Count : 3.76 M/uL  Hemoglobin : 11.1 g/dL  Hematocrit : 33.3 %  Platelet Count - Automated : 217 K/uL  Mean Cell Volume : 88.6 fL  Mean Cell Hemoglobin : 29.5 pg  Mean Cell Hemoglobin Concentration : 33.3 gm/dL  Auto Neutrophil # : x  Auto Lymphocyte # : x  Auto Monocyte # : x  Auto Eosinophil # : x  Auto Basophil # : x  Auto Neutrophil % : x  Auto Lymphocyte % : x  Auto Monocyte % : x  Auto Eosinophil % : x  Auto Basophil % : x    04-24    135  |  101  |  16  ----------------------------<  219<H>  4.4   |  22  |  0.88    Ca    8.6      24 Apr 2021 07:28  Phos  3.1     04-24  Mg     1.9     04-24      < from: CT Head No Cont (04.02.21 @ 21:48) >    EXAM:  CT BRAIN        PROCEDURE DATE:  Apr 2 2021         INTERPRETATION:  HISTORY: Hypertension    Technique: CT of the head was performed without contrast.    Multiple contiguous axial images were acquired from the skullbase to the vertex without the administration of intravenous contrast.  Coronal and sagittal reformations were made.    COMPARISON: None.    FINDINGS:  The ventricles and sulci are within normal limits given the patient's age. No acute hemorrhage, mass effect, midline shift,hydrocephalus, or extra-axial fluid collections. Mild patchy hypoattenuation within the white matter, likely secondary chronic microscopy changes.    The calvarium is intact.    Small cyst or polyp in the right maxillary sinus. There are opacification of a few ethmoid air cells bilaterally as well as mild mucosal thickening of the left frontal sinus. The mastoid air cells are clear.    IMPRESSION:  No acute hemorrhage, mass effect, or midline shift.            DARRELL MIX MD; Resident Radiology  This document has been electronically signed.  PORTILLO HARPER MD; Attending Radiologist  This document has been electronically signed. Apr 2 2021 11:34PM    < end of copied text >  < from: MR Lumbar Spine w/wo IV Cont (04.16.21 @ 22:16) >    EXAM:  MR SPINE LUMBAR WAW IC      EXAM:  MR SPINE THORACIC WAW IC        PROCEDURE DATE:  Apr 16 2021         INTERPRETATION:  CLINICAL INDICATION: Bilateral leg weakness of unknown etiology.    Technique: MRI of the thoracic and lumbar spine was performed with and without contrast. A total 10 cc of Gadavist were administered intravenously.    COMPARISON: None.    FINDINGS:    THORACIC:    There is normal thoracic kyphosis. There are multiple small vertebral body endplate Schmorl's nodes. Otherwise, the vertebrae are normal in signal, height, and alignment. There is anterior endplate osteophytosis most pronounced from the most pronounced from T6-T10.    There are tiny disc bulges at T7-T8 and T8-T9, without significant central spinal canalstenosis or neuroforaminal narrowing.    The spinal cord is normal in course, caliber, and signal.    There is no abnormal enhancement within thoracic spine.    LUMBAR:    There is normal lumbar lordosis. There are mild-to-moderate type I degenerative endplate changes on the left at L4-L5. Otherwise, the vertebral bodies are normal in height and signal without fracture or dislocation.    There is disc space narrowing and desiccation at L4-L5 and L5-S1.    Evaluation of individual levels demonstrates:    L5-S1: Small posterior disc bulge, as well as mild right greater than left facet hypertrophy, resulting in moderate right and mild left neural foraminal stenosis. No significant spinal canal stenosis.    L4-L5: Small posterior disc bulge and superimposed small right central disc protrusion, including tiny annular fissure, resulting in mild spinal canal stenosis, as well as bilateral facet hypertrophy with moderate right and severe left neural foraminal stenosis.    L3-L4: Small posterior disc bulge, along with bilateral facet hypertrophy, resulting in mild spinal canal stenosis as well as mild bilateral neural foraminal stenosis    L2-L3: No significant disc herniation, central spinal canal stenosis, or neural foraminal narrowing. Mildbilateral facet hypertrophy.    L1-L2: Tiny posterior disc bulge without significant central spinal canal stenosis or neuroforaminal narrowing. Mild bilateral facet hypertrophy.    The conus is normal in position and morphology at the L1 level. Thereis smooth thickening and abnormal enhancement of the cauda equina and ventral nerve roots, a nonspecific finding.    There is no definite fluid collection or mass lesion within the visualized retroperitoneal or posterior paraspinal soft tissues.    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 Alternative etiologies include chronic inflammatory demyelinating polyneuropathy, Lyme disease, less likely arachnoiditis or carcinomatosis.    Results were discussed with ADELSO Elkins by Dr. Haji at 9:40 AM on 4/17/2021 with read back followed.              ROSIE HAJI MD; Attending Radiologist  This document has been electronically signed. Apr 17 2021  9:48AM    < end of copied text >  < from: MR Cervical Spine w/wo IV Cont (04.16.21 @ 22:16) >    EXAM:  MR SPINE CERVICAL WAW IC        PROCEDURE DATE:  Apr 16 2021         INTERPRETATION:  CLINICAL INDICATION: Patient with new weakness in lower extremities.    TECHNIQUE: MRI of the cervical spine was performed before and after intravenous contrast. A total 10 cc of Gadavist were administered.    COMPARISON: None.    FINDINGS:  Structures at the craniocervical and cervicomedullary junction are intact.    There is nonspecific straightening of the normal cervical lordosis. There is grade 1 degenerative retrolisthesis of C5 on C6. There is disc space narrowing at C3-C4 and C5-C6. The remaining intervertebral disc space heights are grossly preserved. There is anterior endplate osteophytosis from C3 to C6.    There are mild Modic type II degenerative endplate changes involving C3 CT 4 and C5-C6, along with mild endplate irregularity. Otherwise, the vertebrae demonstrate normal height and signal without fracture, dislocation or marrow edema.    The spinal cord demonstrates normal course and caliber without intrinsic cord signal abnormality.    The prevertebral and paravertebral soft tissues are within normal limits.    There is no abnormal enhancement.    There is diffuse disc desiccation. Evaluation of individual levels demonstrates:    C2-C3: No significant disc herniation, central spinal canal stenosis, although neural foraminal narrowing.    C3-C4: Small posterior disc osteophyte complexes, partially effacing the ventral thecal sac and resulting in mild spinal canal stenosis,as well as moderate right and mild left neural foraminal narrowing.    C4-C5: Tiny posterior disc osteophyte complex minimally effacing the ventral thecal sac, without significant spinal canal stenosis, as well as moderate left and mild right neural foraminal narrowing.    C5-C6: Tiny posterior disc osteophyte complex minimally effacing the ventral thecal sac, without significant spinal canal stenosis, as well as severe right and moderate left neural foraminal narrowing.    C6-C7: No significant disc herniation, central spinal canal stenosis, or neural foraminal narrowing.    C7-T1: No significant disc herniation, central spinal canal stenosis, or neural foraminal narrowing.    There is no soft tissue neck mass or fluid collection.    IMPRESSION:  No evidence for spinal cord compression, signal abnormality, or abnormal enhancement.    Multilevel degenerative changes, as described above, most pronounced at C3-C4 where there is mild spinal canal stenosis and C5-C6 where there is severe right and moderate left neural foraminal narrowing.              ROSIE HAJI MD; Attending Radiologist  This document has been electronically signed. Apr 17 2021  8:54AM    < end of copied text >

## 2021-04-25 NOTE — PROGRESS NOTE ADULT - SUBJECTIVE AND OBJECTIVE BOX
SUBJECTIVE / OVERNIGHT EVENTS:  --- Coverage for Dr. Garza ---   Feels well  AM labs pending.   tolerated 3rd PLEX treatment this am  no cp, no sob, no n/v/d. no abdominal pain.  no headache, no dizziness.   LE weakness slowly recovering per pt     --------------------------------------------------------------------------------------------  LABS:                        11.1   11.89 )-----------( 217      ( 24 Apr 2021 07:28 )             33.3     04-24    135  |  101  |  16  ----------------------------<  219<H>  4.4   |  22  |  0.88    Ca    8.6      24 Apr 2021 07:28  Phos  3.1     04-24  Mg     1.9     04-24        CAPILLARY BLOOD GLUCOSE      POCT Blood Glucose.: 224 mg/dL (25 Apr 2021 11:46)  POCT Blood Glucose.: 266 mg/dL (25 Apr 2021 07:51)  POCT Blood Glucose.: 246 mg/dL (24 Apr 2021 21:45)  POCT Blood Glucose.: 159 mg/dL (24 Apr 2021 17:04)            RADIOLOGY & ADDITIONAL TESTS:    Imaging Personally Reviewed:  [x] YES  [ ] NO    Consultant(s) Notes Reviewed:  [x] YES  [ ] NO    MEDICATIONS  (STANDING):  albumin human  5% IVPB 3750 milliLiter(s) IV Intermittent every 48 hours  amLODIPine   Tablet 10 milliGRAM(s) Oral daily  bisacodyl 5 milliGRAM(s) Oral at bedtime  calcium gluconate IVPB 2 Gram(s) IV Intermittent <User Schedule>  chlorhexidine 2% Cloths 1 Application(s) Topical daily  dextrose 40% Gel 15 Gram(s) Oral once  dextrose 5%. 1000 milliLiter(s) (50 mL/Hr) IV Continuous <Continuous>  dextrose 5%. 1000 milliLiter(s) (100 mL/Hr) IV Continuous <Continuous>  dextrose 50% Injectable 25 Gram(s) IV Push once  dextrose 50% Injectable 12.5 Gram(s) IV Push once  dextrose 50% Injectable 25 Gram(s) IV Push once  enoxaparin Injectable 40 milliGRAM(s) SubCutaneous daily  gabapentin 100 milliGRAM(s) Oral three times a day  glucagon  Injectable 1 milliGRAM(s) IntraMuscular once  hydrALAZINE 25 milliGRAM(s) Oral three times a day  HYDROmorphone  Injectable 1 milliGRAM(s) IV Push once  insulin glargine Injectable (LANTUS) 44 Unit(s) SubCutaneous at bedtime  insulin lispro (ADMELOG) corrective regimen sliding scale   SubCutaneous three times a day before meals  insulin lispro (ADMELOG) corrective regimen sliding scale   SubCutaneous at bedtime  insulin lispro Injectable (ADMELOG) 14 Unit(s) SubCutaneous before lunch  insulin lispro Injectable (ADMELOG) 14 Unit(s) SubCutaneous before dinner  insulin lispro Injectable (ADMELOG) 16 Unit(s) SubCutaneous before breakfast  losartan 100 milliGRAM(s) Oral daily  metoprolol tartrate 12.5 milliGRAM(s) Oral two times a day  mineral oil 30 milliLiter(s) Oral once  senna 2 Tablet(s) Oral two times a day  sodium chloride 0.9%. 1000 milliLiter(s) (75 mL/Hr) IV Continuous <Continuous>    MEDICATIONS  (PRN):  acetaminophen   Tablet .. 650 milliGRAM(s) Oral every 6 hours PRN Temp greater or equal to 38C (100.4F), Mild Pain (1 - 3), Moderate Pain (4 - 6)  acetaminophen   Tablet .. 650 milliGRAM(s) Oral every 6 hours PRN Mild Pain (1 - 3)  aluminum hydroxide/magnesium hydroxide/simethicone Suspension 30 milliLiter(s) Oral every 6 hours PRN Dyspepsia  dicyclomine 10 milliGRAM(s) Oral once PRN abdominal pain  HYDROmorphone  Injectable 1 milliGRAM(s) IV Push every 8 hours PRN Severe Pain (7 - 10)  oxycodone    5 mG/acetaminophen 325 mG 1 Tablet(s) Oral every 6 hours PRN Moderate Pain (4 - 6)  sodium chloride 0.9% lock flush 10 milliLiter(s) IV Push every 1 hour PRN Pre/post blood products, medications, blood draw, and to maintain line patency      Care Discussed with Consultants/Other Providers [x] YES  [ ] NO    Vital Signs Last 24 Hrs  T(C): 36.7 (25 Apr 2021 09:11), Max: 37.3 (24 Apr 2021 21:52)  T(F): 98.1 (25 Apr 2021 09:11), Max: 99.2 (24 Apr 2021 21:52)  HR: 82 (25 Apr 2021 12:02) (78 - 90)  BP: 121/67 (25 Apr 2021 12:02) (105/65 - 136/80)  BP(mean): --  RR: 16 (25 Apr 2021 12:02) (16 - 18)  SpO2: 100% (25 Apr 2021 12:02) (98% - 100%)  I&O's Summary    24 Apr 2021 07:01  -  25 Apr 2021 07:00  --------------------------------------------------------  IN: 0 mL / OUT: 1000 mL / NET: -1000 mL    PHYSICAL EXAM:  GENERAL: NAD, well-developed, comfortable, on room air  HEAD:  Atraumatic, Normocephalic  EYES: EOMI, PERRLA, conjunctiva and sclera clear  NECK: Supple, No JVD  CHEST/LUNG: Clear to auscultation bilaterally; No wheeze, right sided cath in place, dressing d/c/i  HEART: Regular rate and rhythm; No murmurs, rubs, or gallops  ABDOMEN: Soft, Nontender, Nondistended; Bowel sounds present  Neuro: AAOx3, no focal weakness, 5/5 b/l extremity strength  EXTREMITIES:  2+ Peripheral Pulses, No clubbing, cyanosis, or edema  SKIN: No rashes or lesions

## 2021-04-25 NOTE — PROGRESS NOTE ADULT - ASSESSMENT
51 yo male, PMH DM, HTN, hyperlipidemia; pt presented to the ED c/o abdominal cramping, generalized, and diarrhea following use of laxatives as above for constipation.  In the ED, WBC 10.82, Hb 13.6, CMP: sodium 126, chloride 89, glucose 281, CMP otherwise unremarkable.  VBG: Lactate 2.6 (later downtrended to 1.6).  Pt. was treated with IV hydration and dispo'd to CDU for continued care plan:  IV hydration, supportive care, AM labs, general observation care / monitoring. pt continued to have generalized weakness , numbness and on labs persistent hyponatermia      A/P:  ALLIE  baseline likely ~ 1  ALLIE possible sec to uncontrolled hyperkalemia on losartan  s/p NS   scr improved  resumed losartan  monitor bmp  avoid nephrotoxic agents    Hyponatremia:  Likely sec to hypovolemia+hyperglycemia. work up suggested SIADH  s/p NS 4/20 for ALLIE  Na improved  optimize dm control  free water restriction <1.2L/day.  ok TSH, serum cortisol level  Monitor Na level Q12  Na level should not increase more than 6meq in 24 hrs    Proteinuia/Hematuria:  Etiology?  Had Cystoscopy last year and was normal per patient  Possible sec to Diabetic Nephropathy but will need full vasculitis work up  check urine p/c ratio  c3 c4 hep b hep c hiv, k/l wnl, lexii neg  pendingANCA, Serum immunofixation, SPEP    RPR +, patient s/p treatment 2017 ID eval appreciated     Based on work up result he might need kidney biopsy, which can be planned as outpatient as his renal function is stable  D/W patient in detail above plan    Hypophosphatemia:  SUpplement as needed   montior    HTN  BP controlled   on norvasc and losartan, hydralazine  hold per perimeter   Monitor closely

## 2021-04-25 NOTE — PROGRESS NOTE ADULT - ASSESSMENT
51 yo male, PMH DM, HTN, hyperlipidemia; pt presented to the ED c/o abdominal cramping, generalized, and diarrhea following use of laxatives as above for constipation.  In the ED, WBC 10.82, Hb 13.6, CMP: sodium 126, chloride 89, glucose 281, CMP otherwise unremarkable.  VBG: Lactate 2.6 (later downtrended to 1.6).  Pt. was treated with IV hydration and dispo'd to CDU for continued care plan:  IV hydration, supportive care, general observation care / monitoring. pt continued to have generalized weakness , numbness and on labs persistent hyponatremia and now being admitted for further evaluation.    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    LP results reviewed. protein albumin dissociation   plasma exchange : treatment as planned  (HERB#3 on 4/25) tolerated.  neuro follow up.  RPR positive: treated and no further need for any treatment per ID     - hyponatremia :   likely multifactorial including hyperglycemia ( pseudohyponatremia ) and pre renal sec to diarrhea  ? ADH induced by pain   appreciate renal input  improved, continue to monitor     - DM: uncontrolled   appreciate endo input  cont insulin per endo   c/w Lantus and premeal TID    -HTN : holding ARB for now given hypotension during exchange   (with hold parameters)    - hypophosphatemia : repleted and monitor   labs daily    - constipation : had had colonoscopy 2 yrs ago with Dr. Rae   CT abd no acute path   no further inpt red : discussed with Dr Rae     - ALLIE : improved   avoid NSAIDS for now     dvt proph   d/w pt, all questions answered.

## 2021-04-26 LAB
ANION GAP SERPL CALC-SCNC: 9 MMOL/L — SIGNIFICANT CHANGE UP (ref 7–14)
BUN SERPL-MCNC: 15 MG/DL — SIGNIFICANT CHANGE UP (ref 7–23)
CALCIUM SERPL-MCNC: 8.7 MG/DL — SIGNIFICANT CHANGE UP (ref 8.4–10.5)
CHLORIDE SERPL-SCNC: 105 MMOL/L — SIGNIFICANT CHANGE UP (ref 98–107)
CO2 SERPL-SCNC: 26 MMOL/L — SIGNIFICANT CHANGE UP (ref 22–31)
CREAT SERPL-MCNC: 0.89 MG/DL — SIGNIFICANT CHANGE UP (ref 0.5–1.3)
GLUCOSE SERPL-MCNC: 207 MG/DL — HIGH (ref 70–99)
HCT VFR BLD CALC: 33 % — LOW (ref 39–50)
HGB BLD-MCNC: 10.9 G/DL — LOW (ref 13–17)
MAGNESIUM SERPL-MCNC: 1.9 MG/DL — SIGNIFICANT CHANGE UP (ref 1.6–2.6)
MCHC RBC-ENTMCNC: 30 PG — SIGNIFICANT CHANGE UP (ref 27–34)
MCHC RBC-ENTMCNC: 33 GM/DL — SIGNIFICANT CHANGE UP (ref 32–36)
MCV RBC AUTO: 90.9 FL — SIGNIFICANT CHANGE UP (ref 80–100)
MOG AB CSF QL CBA IFA: NEGATIVE — SIGNIFICANT CHANGE UP
NRBC # BLD: 0 /100 WBCS — SIGNIFICANT CHANGE UP
NRBC # FLD: 0 K/UL — SIGNIFICANT CHANGE UP
PHOSPHATE SERPL-MCNC: 3 MG/DL — SIGNIFICANT CHANGE UP (ref 2.5–4.5)
PLATELET # BLD AUTO: 247 K/UL — SIGNIFICANT CHANGE UP (ref 150–400)
POTASSIUM SERPL-MCNC: 4.1 MMOL/L — SIGNIFICANT CHANGE UP (ref 3.5–5.3)
POTASSIUM SERPL-SCNC: 4.1 MMOL/L — SIGNIFICANT CHANGE UP (ref 3.5–5.3)
RBC # BLD: 3.63 M/UL — LOW (ref 4.2–5.8)
RBC # FLD: 12.2 % — SIGNIFICANT CHANGE UP (ref 10.3–14.5)
SODIUM SERPL-SCNC: 140 MMOL/L — SIGNIFICANT CHANGE UP (ref 135–145)
WBC # BLD: 8.06 K/UL — SIGNIFICANT CHANGE UP (ref 3.8–10.5)
WBC # FLD AUTO: 8.06 K/UL — SIGNIFICANT CHANGE UP (ref 3.8–10.5)

## 2021-04-26 PROCEDURE — 99232 SBSQ HOSP IP/OBS MODERATE 35: CPT

## 2021-04-26 RX ORDER — CALCIUM GLUCONATE 100 MG/ML
2 VIAL (ML) INTRAVENOUS ONCE
Refills: 0 | Status: DISCONTINUED | OUTPATIENT
Start: 2021-04-27 | End: 2021-05-05

## 2021-04-26 RX ORDER — ALBUMIN HUMAN 25 %
3750 VIAL (ML) INTRAVENOUS ONCE
Refills: 0 | Status: DISCONTINUED | OUTPATIENT
Start: 2021-04-27 | End: 2021-05-05

## 2021-04-26 RX ADMIN — HYDROMORPHONE HYDROCHLORIDE 1 MILLIGRAM(S): 2 INJECTION INTRAMUSCULAR; INTRAVENOUS; SUBCUTANEOUS at 21:53

## 2021-04-26 RX ADMIN — ENOXAPARIN SODIUM 40 MILLIGRAM(S): 100 INJECTION SUBCUTANEOUS at 12:13

## 2021-04-26 RX ADMIN — Medication 25 MILLIGRAM(S): at 05:41

## 2021-04-26 RX ADMIN — OXYCODONE AND ACETAMINOPHEN 1 TABLET(S): 5; 325 TABLET ORAL at 01:10

## 2021-04-26 RX ADMIN — HYDROMORPHONE HYDROCHLORIDE 1 MILLIGRAM(S): 2 INJECTION INTRAMUSCULAR; INTRAVENOUS; SUBCUTANEOUS at 06:50

## 2021-04-26 RX ADMIN — Medication 16 UNIT(S): at 08:04

## 2021-04-26 RX ADMIN — GABAPENTIN 100 MILLIGRAM(S): 400 CAPSULE ORAL at 05:41

## 2021-04-26 RX ADMIN — HYDROMORPHONE HYDROCHLORIDE 1 MILLIGRAM(S): 2 INJECTION INTRAMUSCULAR; INTRAVENOUS; SUBCUTANEOUS at 14:00

## 2021-04-26 RX ADMIN — HYDROMORPHONE HYDROCHLORIDE 1 MILLIGRAM(S): 2 INJECTION INTRAMUSCULAR; INTRAVENOUS; SUBCUTANEOUS at 13:45

## 2021-04-26 RX ADMIN — SENNA PLUS 2 TABLET(S): 8.6 TABLET ORAL at 23:13

## 2021-04-26 RX ADMIN — OXYCODONE AND ACETAMINOPHEN 1 TABLET(S): 5; 325 TABLET ORAL at 00:24

## 2021-04-26 RX ADMIN — Medication 12.5 MILLIGRAM(S): at 05:42

## 2021-04-26 RX ADMIN — LOSARTAN POTASSIUM 100 MILLIGRAM(S): 100 TABLET, FILM COATED ORAL at 05:41

## 2021-04-26 RX ADMIN — Medication 4: at 08:03

## 2021-04-26 RX ADMIN — HYDROMORPHONE HYDROCHLORIDE 1 MILLIGRAM(S): 2 INJECTION INTRAMUSCULAR; INTRAVENOUS; SUBCUTANEOUS at 21:38

## 2021-04-26 RX ADMIN — INSULIN GLARGINE 48 UNIT(S): 100 INJECTION, SOLUTION SUBCUTANEOUS at 21:35

## 2021-04-26 RX ADMIN — Medication 25 MILLIGRAM(S): at 21:36

## 2021-04-26 RX ADMIN — CHLORHEXIDINE GLUCONATE 1 APPLICATION(S): 213 SOLUTION TOPICAL at 12:12

## 2021-04-26 RX ADMIN — AMLODIPINE BESYLATE 10 MILLIGRAM(S): 2.5 TABLET ORAL at 05:41

## 2021-04-26 RX ADMIN — GABAPENTIN 100 MILLIGRAM(S): 400 CAPSULE ORAL at 13:43

## 2021-04-26 RX ADMIN — Medication 12.5 MILLIGRAM(S): at 17:30

## 2021-04-26 RX ADMIN — Medication 14 UNIT(S): at 12:13

## 2021-04-26 RX ADMIN — Medication 14 UNIT(S): at 17:19

## 2021-04-26 RX ADMIN — Medication 5 MILLIGRAM(S): at 21:35

## 2021-04-26 RX ADMIN — HYDROMORPHONE HYDROCHLORIDE 1 MILLIGRAM(S): 2 INJECTION INTRAMUSCULAR; INTRAVENOUS; SUBCUTANEOUS at 05:47

## 2021-04-26 RX ADMIN — GABAPENTIN 100 MILLIGRAM(S): 400 CAPSULE ORAL at 21:35

## 2021-04-26 RX ADMIN — Medication 25 MILLIGRAM(S): at 13:43

## 2021-04-26 NOTE — PROGRESS NOTE ADULT - SUBJECTIVE AND OBJECTIVE BOX
Neurology Progress Note    S: Patient seen and examined. PLEX # 3 yesterday BP stable. RUE a bit weak but overall improving     Medication:  MEDICATIONS  (STANDING):  albumin human  5% IVPB 3750 milliLiter(s) IV Intermittent every 48 hours  amLODIPine   Tablet 10 milliGRAM(s) Oral daily  bisacodyl 5 milliGRAM(s) Oral at bedtime  calcium gluconate IVPB 2 Gram(s) IV Intermittent <User Schedule>  chlorhexidine 2% Cloths 1 Application(s) Topical daily  dextrose 40% Gel 15 Gram(s) Oral once  dextrose 5%. 1000 milliLiter(s) (50 mL/Hr) IV Continuous <Continuous>  dextrose 5%. 1000 milliLiter(s) (100 mL/Hr) IV Continuous <Continuous>  dextrose 50% Injectable 25 Gram(s) IV Push once  dextrose 50% Injectable 12.5 Gram(s) IV Push once  dextrose 50% Injectable 25 Gram(s) IV Push once  enoxaparin Injectable 40 milliGRAM(s) SubCutaneous daily  gabapentin 100 milliGRAM(s) Oral three times a day  glucagon  Injectable 1 milliGRAM(s) IntraMuscular once  hydrALAZINE 25 milliGRAM(s) Oral three times a day  HYDROmorphone  Injectable 1 milliGRAM(s) IV Push once  insulin glargine Injectable (LANTUS) 48 Unit(s) SubCutaneous at bedtime  insulin lispro (ADMELOG) corrective regimen sliding scale   SubCutaneous at bedtime  insulin lispro (ADMELOG) corrective regimen sliding scale   SubCutaneous three times a day before meals  insulin lispro Injectable (ADMELOG) 14 Unit(s) SubCutaneous before lunch  insulin lispro Injectable (ADMELOG) 14 Unit(s) SubCutaneous before dinner  insulin lispro Injectable (ADMELOG) 16 Unit(s) SubCutaneous before breakfast  losartan 100 milliGRAM(s) Oral daily  metoprolol tartrate 12.5 milliGRAM(s) Oral two times a day  mineral oil 30 milliLiter(s) Oral once  senna 2 Tablet(s) Oral two times a day  sodium chloride 0.9%. 1000 milliLiter(s) (75 mL/Hr) IV Continuous <Continuous>    MEDICATIONS  (PRN):  acetaminophen   Tablet .. 650 milliGRAM(s) Oral every 6 hours PRN Temp greater or equal to 38C (100.4F), Mild Pain (1 - 3), Moderate Pain (4 - 6)  acetaminophen   Tablet .. 650 milliGRAM(s) Oral every 6 hours PRN Mild Pain (1 - 3)  aluminum hydroxide/magnesium hydroxide/simethicone Suspension 30 milliLiter(s) Oral every 6 hours PRN Dyspepsia  dicyclomine 10 milliGRAM(s) Oral once PRN abdominal pain  HYDROmorphone  Injectable 1 milliGRAM(s) IV Push every 8 hours PRN Severe Pain (7 - 10)  oxycodone    5 mG/acetaminophen 325 mG 1 Tablet(s) Oral every 6 hours PRN Moderate Pain (4 - 6)  sodium chloride 0.9% lock flush 10 milliLiter(s) IV Push every 1 hour PRN Pre/post blood products, medications, blood draw, and to maintain line patency      Vitals:  Vital Signs Last 24 Hrs  T(C): 36.7 (26 Apr 2021 05:34), Max: 37 (25 Apr 2021 21:43)  T(F): 98.1 (26 Apr 2021 05:34), Max: 98.6 (25 Apr 2021 21:43)  HR: 88 (26 Apr 2021 05:34) (78 - 90)  BP: 151/86 (26 Apr 2021 05:34) (121/67 - 151/86)  BP(mean): --  RR: 17 (26 Apr 2021 05:34) (16 - 19)  SpO2: 99% (26 Apr 2021 05:34) (97% - 100%)    General Exam:   General Appearance: Appropriately dressed and in no acute distress       Head: Normocephalic, atraumatic and no dysmorphic features  Ear, Nose, and Throat: Moist mucous membranes  CVS: S1S2+  Resp: No SOB, no wheeze or rhonchi  Abd: soft NTND  Extremities: No edema, no cyanosis  Skin: No bruises, no rashes     Neurological Exam:  Mental Status: Awake, alert and oriented x 3.  Able to follow simple and complex verbal commands. Able to name and repeat. fluent speech. No obvious aphasia or dysarthria noted.   Cranial Nerves: PERRL, EOMI, VFFC, sensation V1-V3 intact,  no obvious facial asymmetry , equal elevation of palate, scm/trap 5/5, tongue is midline on protrusion. no obvious papilledema on fundoscopic exam. Hearing is grossly intact.   Motor: Normal bulk, tone and strength throughout B/L UE Fine finger movements were intact and symmetric. no tremors B/L LE 2-3/5  Sensation: Intact to light touch and pinprick throughout. no right/left confusion. no extinction to tactile on DSS. Romberg was negative.   Reflexes: 0 throughout at biceps, brachioradialis, triceps, patellars and ankles bilaterally and equal. only hyas LUE biceps 1+ No clonus. R toe and L toe were both downgoing.  Coordination: No dysmetria on FNF   Gait: unable    I personally reviewed the below data/images/labs:  CBC Full  -  ( 26 Apr 2021 07:49 )  WBC Count : 8.06 K/uL  RBC Count : 3.63 M/uL  Hemoglobin : 10.9 g/dL  Hematocrit : 33.0 %  Platelet Count - Automated : 247 K/uL  Mean Cell Volume : 90.9 fL  Mean Cell Hemoglobin : 30.0 pg  Mean Cell Hemoglobin Concentration : 33.0 gm/dL  Auto Neutrophil # : x  Auto Lymphocyte # : x  Auto Monocyte # : x  Auto Eosinophil # : x  Auto Basophil # : x  Auto Neutrophil % : x  Auto Lymphocyte % : x  Auto Monocyte % : x  Auto Eosinophil % : x  Auto Basophil % : x    04-26    140  |  105  |  15  ----------------------------<  207<H>  4.1   |  26  |  0.89    Ca    8.7      26 Apr 2021 07:49  Phos  3.0     04-26  Mg     1.9     04-26      < from: CT Head No Cont (04.02.21 @ 21:48) >    EXAM:  CT BRAIN        PROCEDURE DATE:  Apr 2 2021         INTERPRETATION:  HISTORY: Hypertension    Technique: CT of the head was performed without contrast.    Multiple contiguous axial images were acquired from the skullbase to the vertex without the administration of intravenous contrast.  Coronal and sagittal reformations were made.    COMPARISON: None.    FINDINGS:  The ventricles and sulci are within normal limits given the patient's age. No acute hemorrhage, mass effect, midline shift,hydrocephalus, or extra-axial fluid collections. Mild patchy hypoattenuation within the white matter, likely secondary chronic microscopy changes.    The calvarium is intact.    Small cyst or polyp in the right maxillary sinus. There are opacification of a few ethmoid air cells bilaterally as well as mild mucosal thickening of the left frontal sinus. The mastoid air cells are clear.    IMPRESSION:  No acute hemorrhage, mass effect, or midline shift.            DARRELL MIX MD; Resident Radiology  This document has been electronically signed.  PORTILLO HARPER MD; Attending Radiologist  This document has been electronically signed. Apr 2 2021 11:34PM    < end of copied text >  < from: MR Lumbar Spine w/wo IV Cont (04.16.21 @ 22:16) >    EXAM:  MR SPINE LUMBAR WAW IC      EXAM:  MR SPINE THORACIC WAW IC        PROCEDURE DATE:  Apr 16 2021         INTERPRETATION:  CLINICAL INDICATION: Bilateral leg weakness of unknown etiology.    Technique: MRI of the thoracic and lumbar spine was performed with and without contrast. A total 10 cc of Gadavist were administered intravenously.    COMPARISON: None.    FINDINGS:    THORACIC:    There is normal thoracic kyphosis. There are multiple small vertebral body endplate Schmorl's nodes. Otherwise, the vertebrae are normal in signal, height, and alignment. There is anterior endplate osteophytosis most pronounced from the most pronounced from T6-T10.    There are tiny disc bulges at T7-T8 and T8-T9, without significant central spinal canalstenosis or neuroforaminal narrowing.    The spinal cord is normal in course, caliber, and signal.    There is no abnormal enhancement within thoracic spine.    LUMBAR:    There is normal lumbar lordosis. There are mild-to-moderate type I degenerative endplate changes on the left at L4-L5. Otherwise, the vertebral bodies are normal in height and signal without fracture or dislocation.    There is disc space narrowing and desiccation at L4-L5 and L5-S1.    Evaluation of individual levels demonstrates:    L5-S1: Small posterior disc bulge, as well as mild right greater than left facet hypertrophy, resulting in moderate right and mild left neural foraminal stenosis. No significant spinal canal stenosis.    L4-L5: Small posterior disc bulge and superimposed small right central disc protrusion, including tiny annular fissure, resulting in mild spinal canal stenosis, as well as bilateral facet hypertrophy with moderate right and severe left neural foraminal stenosis.    L3-L4: Small posterior disc bulge, along with bilateral facet hypertrophy, resulting in mild spinal canal stenosis as well as mild bilateral neural foraminal stenosis    L2-L3: No significant disc herniation, central spinal canal stenosis, or neural foraminal narrowing. Mildbilateral facet hypertrophy.    L1-L2: Tiny posterior disc bulge without significant central spinal canal stenosis or neuroforaminal narrowing. Mild bilateral facet hypertrophy.    The conus is normal in position and morphology at the L1 level. Thereis smooth thickening and abnormal enhancement of the cauda equina and ventral nerve roots, a nonspecific finding.    There is no definite fluid collection or mass lesion within the visualized retroperitoneal or posterior paraspinal soft tissues.    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 Alternative etiologies include chronic inflammatory demyelinating polyneuropathy, Lyme disease, less likely arachnoiditis or carcinomatosis.    Results were discussed with ADELSO Elkins by Dr. Haji at 9:40 AM on 4/17/2021 with read back followed.              ROSIE HAJI MD; Attending Radiologist  This document has been electronically signed. Apr 17 2021  9:48AM    < end of copied text >  < from: MR Cervical Spine w/wo IV Cont (04.16.21 @ 22:16) >    EXAM:  MR SPINE CERVICAL WAW IC        PROCEDURE DATE:  Apr 16 2021         INTERPRETATION:  CLINICAL INDICATION: Patient with new weakness in lower extremities.    TECHNIQUE: MRI of the cervical spine was performed before and after intravenous contrast. A total 10 cc of Gadavist were administered.    COMPARISON: None.    FINDINGS:  Structures at the craniocervical and cervicomedullary junction are intact.    There is nonspecific straightening of the normal cervical lordosis. There is grade 1 degenerative retrolisthesis of C5 on C6. There is disc space narrowing at C3-C4 and C5-C6. The remaining intervertebral disc space heights are grossly preserved. There is anterior endplate osteophytosis from C3 to C6.    There are mild Modic type II degenerative endplate changes involving C3 CT 4 and C5-C6, along with mild endplate irregularity. Otherwise, the vertebrae demonstrate normal height and signal without fracture, dislocation or marrow edema.    The spinal cord demonstrates normal course and caliber without intrinsic cord signal abnormality.    The prevertebral and paravertebral soft tissues are within normal limits.    There is no abnormal enhancement.    There is diffuse disc desiccation. Evaluation of individual levels demonstrates:    C2-C3: No significant disc herniation, central spinal canal stenosis, although neural foraminal narrowing.    C3-C4: Small posterior disc osteophyte complexes, partially effacing the ventral thecal sac and resulting in mild spinal canal stenosis,as well as moderate right and mild left neural foraminal narrowing.    C4-C5: Tiny posterior disc osteophyte complex minimally effacing the ventral thecal sac, without significant spinal canal stenosis, as well as moderate left and mild right neural foraminal narrowing.    C5-C6: Tiny posterior disc osteophyte complex minimally effacing the ventral thecal sac, without significant spinal canal stenosis, as well as severe right and moderate left neural foraminal narrowing.    C6-C7: No significant disc herniation, central spinal canal stenosis, or neural foraminal narrowing.    C7-T1: No significant disc herniation, central spinal canal stenosis, or neural foraminal narrowing.    There is no soft tissue neck mass or fluid collection.    IMPRESSION:  No evidence for spinal cord compression, signal abnormality, or abnormal enhancement.    Multilevel degenerative changes, as described above, most pronounced at C3-C4 where there is mild spinal canal stenosis and C5-C6 where there is severe right and moderate left neural foraminal narrowing.              ROSIE HAJI MD; Attending Radiologist  This document has been electronically signed. Apr 17 2021  8:54AM    < end of copied text >

## 2021-04-26 NOTE — PROGRESS NOTE ADULT - ASSESSMENT
49 yo RH AA male with h/o DM, HTN, hyperlipidemia; pt presented to the ED c/o abdominal cramping, generalized, and diarrhea following use of laxatives as above for constipation. Neurology called for evaluation of LE weakness, unsteady gait. s/p shieley. Na improved. 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   - f/u ganglioside antibodies, Gq1b -->neg   - slow correction of sodium  - aggressive PT/OT  - B12 >2000, copper, Vit E, thiamine, magnesium heavy metals, zinc  -refused IVIG but agreed for PLEX  - telemetry  - check FS, glucose control <180  - GI/DVT ppx  - Counseling on diet, exercise, and medication adherence was done  - Counseling on smoking cessation and alcohol consumption offered when appropriate.  - Pain assessed and judicious use of narcotics when appropriate was discussed.    - Stroke education given when appropriate.  - Importance of fall prevention discussed.   - Differential diagnosis and plan of care discussed with patient and/or family and primary team  - Thank you for allowing me to participate in the care of this patient. Call with questions.     Geoffrey Gardner MD  Vascular Neurology  Office: [phone]

## 2021-04-26 NOTE — PROGRESS NOTE ADULT - SUBJECTIVE AND OBJECTIVE BOX
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 During PLEX#1 on 4/21 he had hypotension and procedure was terminated half way. Hence plan to hold off losartan. Wife refused IVIG. No interval events since last PLEX.    Vital Signs Last 24 Hrs  T(C): 36.7 (26 Apr 2021 10:12), Max: 37 (25 Apr 2021 21:43)  T(F): 98.1 (26 Apr 2021 10:12), Max: 98.6 (25 Apr 2021 21:43)  HR: 71 (26 Apr 2021 10:12) (71 - 90)  BP: 140/64 (26 Apr 2021 10:12) (121/67 - 151/86)  BP(mean): --  RR: 18 (26 Apr 2021 10:12) (16 - 19)  SpO2: 99% (26 Apr 2021 10:12) (97% - 100%)  Allergies    No Known Drug Allergies  shellfish (Urticaria)    Intolerances      PAST MEDICAL & SURGICAL HISTORY:  DM (diabetes mellitus)    HTN (hypertension)    Hyperlipidemia    H/O total knee replacement    Foot fracture                            10.9   8.06  )-----------( 247      ( 26 Apr 2021 07:49 )             33.0           [id number]    04-26    140  |  105  |  15  ----------------------------<  207<H>  4.1   |  26  |  0.89    Ca    8.7      26 Apr 2021 07:49  Phos  3.0     04-26  Mg     1.9     04-26      Assessment and Plan:   · Assessment	  50 year old male s/p LP and 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 Wife refused IVIG. During PLEX#1 on 4/21 he had hypotension, hence plan to hold off losartan.     PLEX #2 with 3750ml 5% albumin replacement.     PLEX #3 performed on 4/25 with 5% albumin as replacement fluid. Case d/w apheresis nurse. Procedure well tolerated with no adverse events.

## 2021-04-26 NOTE — PROGRESS NOTE ADULT - ASSESSMENT
50M uncontrolled DM2 HbA1c 11.5%, hyperglycemia related to difficulties obtaining adequate insulin during pandemic due to insurance/cost.    1) DM2 with hyperglycemia  Inpatient plan:  BG target 100-180 mg/dl  glucose is above goal, likely exacerbated by pain/stress  carb consistent diet  FS premeal and bedtime  contniue Lantus to 36 units qhs  Contniue Admelog to 16/14/14 before meals - HOLD if not eating   Continue Admelog moderate scale premeal and moderate bedtime scale  RD consult- appreciated    Discussed with patient regarding discharge planning, he now will have enough Tresiba and Novolog at home to proceed with these insulins due to [facility] program he is now a part of.   DC on Tresiba and Novolog pens doses TBD. Discussed option of mixed insulin in case of future issues obtaining insulin.  Can follow with outpatient endocrine Dr. Ban Velez, although he requested changing to [facility] endocrinology if he prefers [phone]. Appointments below:  [address] [place] [address], [address], [phone]  6/8/21 @ 11:00 AM with diabetes educator   7/12/21 @ 11:00 AM with Dr Montesinos     2) Hyponatremia  TSH, Free T4 and 8AM cortisol in acceptable range not indicative of endocrine cause of hyponatremia. Na- 134 today    3) Hypertension  controlled at this time   management per primary team    Farzaneh Bennett  Nurse Practitioner  Division of Endocrinology & Diabetes  Pager # [phone]      If after 6PM or before 9AM, or on weekends/holidays, please call endocrine answering service for assistance ([phone]).  For nonurgent matters email [email or web address] for assistance.

## 2021-04-26 NOTE — PROGRESS NOTE ADULT - SUBJECTIVE AND OBJECTIVE BOX
Chief Complaint: DM2    History:   tolerating some po  good appetite  No n/v  No hypoglycemia    MEDICATIONS  (STANDING):  albumin human  5% IVPB 3750 milliLiter(s) IV Intermittent every 48 hours  amLODIPine   Tablet 10 milliGRAM(s) Oral daily  bisacodyl 5 milliGRAM(s) Oral at bedtime  bisacodyl Suppository 10 milliGRAM(s) Rectal once  calcium gluconate IVPB 2 Gram(s) IV Intermittent <User Schedule>  dextrose 40% Gel 15 Gram(s) Oral once  dextrose 5%. 1000 milliLiter(s) (50 mL/Hr) IV Continuous <Continuous>  dextrose 5%. 1000 milliLiter(s) (100 mL/Hr) IV Continuous <Continuous>  dextrose 50% Injectable 25 Gram(s) IV Push once  dextrose 50% Injectable 12.5 Gram(s) IV Push once  dextrose 50% Injectable 25 Gram(s) IV Push once  diphenhydrAMINE 25 milliGRAM(s) Oral daily  gabapentin   Solution 100 milliGRAM(s) Oral three times a day  glucagon  Injectable 1 milliGRAM(s) IntraMuscular once  hydrALAZINE 25 milliGRAM(s) Oral three times a day  insulin glargine Injectable (LANTUS) 28 Unit(s) SubCutaneous at bedtime  insulin lispro (ADMELOG) corrective regimen sliding scale   SubCutaneous three times a day before meals  insulin lispro (ADMELOG) corrective regimen sliding scale   SubCutaneous at bedtime  insulin lispro Injectable (ADMELOG) 14 Unit(s) SubCutaneous three times a day before meals  losartan 100 milliGRAM(s) Oral daily  metoprolol tartrate 12.5 milliGRAM(s) Oral two times a day  senna 2 Tablet(s) Oral two times a day  sodium chloride 0.9%. 1000 milliLiter(s) (75 mL/Hr) IV Continuous <Continuous>    MEDICATIONS  (PRN):  acetaminophen   Tablet .. 650 milliGRAM(s) Oral every 6 hours PRN Temp greater or equal to 38C (100.4F), Mild Pain (1 - 3), Moderate Pain (4 - 6)  acetaminophen   Tablet .. 650 milliGRAM(s) Oral every 6 hours PRN Mild Pain (1 - 3)  aluminum hydroxide/magnesium hydroxide/simethicone Suspension 30 milliLiter(s) Oral every 6 hours PRN Dyspepsia  dicyclomine 10 milliGRAM(s) Oral once PRN abdominal pain  HYDROmorphone  Injectable 1 milliGRAM(s) IV Push every 8 hours PRN Severe Pain (7 - 10)  oxycodone    5 mG/acetaminophen 325 mG 1 Tablet(s) Oral every 6 hours PRN Moderate Pain (4 - 6)      Allergies    No Known Drug Allergies  shellfish (Urticaria)    Intolerances      Review of Systems:  ALL OTHER SYSTEMS REVIEWED AND NEGATIVE      Vital Signs Last 24 Hrs  T(C): 36.8 (26 Apr 2021 13:41), Max: 37 (25 Apr 2021 21:43)  T(F): 98.2 (26 Apr 2021 13:41), Max: 98.6 (25 Apr 2021 21:43)  HR: 81 (26 Apr 2021 13:41) (71 - 90)  BP: 123/65 (26 Apr 2021 13:41) (123/65 - 151/86)  BP(mean): --  RR: 17 (26 Apr 2021 13:41) (17 - 19)  SpO2: 98% (26 Apr 2021 13:41) (97% - 100%)  GENERAL: NAD, well-groomed, well-developed  EYES: No proptosis, no lid lag, anicteric  HEENT:  Atraumatic, Normocephalic, moist mucous membranes  RESPIRATORY: nonlabored respirations  PSYCH: Alert and oriented x 3, normal affect, normal mood    CAPILLARY BLOOD GLUCOSE    POCT Blood Glucose.: 119 mg/dL (26 Apr 2021 16:58)  POCT Blood Glucose.: 121 mg/dL (26 Apr 2021 12:01)  POCT Blood Glucose.: 206 mg/dL (26 Apr 2021 07:27)  POCT Blood Glucose.: 121 mg/dL (25 Apr 2021 21:34)  POCT Blood Glucose.: 175 mg/dL (21 Apr 2021 11:56)  POCT Blood Glucose.: 245 mg/dL (21 Apr 2021 07:26)  POCT Blood Glucose.: 168 mg/dL (20 Apr 2021 23:02)  POCT Blood Glucose.: 164 mg/dL (20 Apr 2021 17:16)  POCT Blood Glucose.: 232 mg/dL (20 Apr 2021 12:03)  POCT Blood Glucose.: 246 mg/dL (20 Apr 2021 07:33)  POCT Blood Glucose.: 157 mg/dL (19 Apr 2021 21:34)  POCT Blood Glucose.: 195 mg/dL (19 Apr 2021 18:01)      04-26    140  |  105  |  15  ----------------------------<  207<H>  4.1   |  26  |  0.89    Ca    8.7      26 Apr 2021 07:49  Phos  3.0     04-26  Mg     1.9     04-26            A1C with Estimated Average Glucose Result: 11.5 % (04-11-21 @ 07:40)      Thyroid Function Tests:  04-12 @ 06:36 TSH 3.00 FreeT4 1.7 T3 -- Anti TPO -- Anti Thyroglobulin Ab -- TSI --  04-11 @ 11:43 TSH 2.29 FreeT4 -- T3 -- Anti TPO -- Anti Thyroglobulin Ab -- TSI --

## 2021-04-26 NOTE — PROGRESS NOTE ADULT - ASSESSMENT
49 yo male, PMH DM, HTN, hyperlipidemia; pt presented to the ED c/o abdominal cramping, generalized, and diarrhea following use of laxatives as above for constipation.  In the ED, WBC 10.82, Hb 13.6, CMP: sodium 126, chloride 89, glucose 281, CMP otherwise unremarkable.  VBG: Lactate 2.6 (later downtrended to 1.6).  Pt. was treated with IV hydration and dispo'd to CDU for continued care plan:  IV hydration, supportive care, general observation care / monitoring. pt continued to have generalized weakness , numbness and on labs persistent hyponatremia and now being admitted for further evaluation.    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    LP results reviewed. protein albumin dissociation   plasma exchange : treatment as planned  (HERB#3 on 4/25) tolerated.  neuro follow up.  RPR positive: treated and no further need for any treatment per ID     - hyponatremia :   likely multifactorial including hyperglycemia ( pseudohyponatremia ) and pre renal sec to diarrhea  ? ADH induced by pain   appreciate renal input  improved, continue to monitor     - DM: uncontrolled   appreciate endo input  cont insulin per endo   c/w Lantus and premeal TID    -HTN : holding ARB for now given hypotension during exchange   (with hold parameters)    - hypophosphatemia : repleted and monitor   labs daily    - constipation : had had colonoscopy 2 yrs ago with Dr. Rae   CT abd no acute path   no further inpt red : discussed with Dr Rae     - ALLIE : improved   avoid NSAIDS for now     dvt proph   d/w pt, all questions answered.

## 2021-04-26 NOTE — PROGRESS NOTE ADULT - SUBJECTIVE AND OBJECTIVE BOX
Date of service: 04-26-21 @ 22:52      Patient is a 50y old  Male who presents with a chief complaint of GBS (26 Apr 2021 11:02)                                                               INTERVAL HPI/OVERNIGHT EVENTS:    REVIEW OF SYSTEMS:     CONSTITUTIONAL: No weakness, fevers or chills  EYES/ENT: No visual changes , no ear ache   NECK: No pain or stiffness  RESPIRATORY: No cough, wheezing,  No shortness of breath  CARDIOVASCULAR: No chest pain or palpitations  GASTROINTESTINAL: No abdominal pain  . No nausea, vomiting, or hematemesis; No diarrhea or constipation. No melena or hematochezia.  GENITOURINARY: No dysuria, frequency or hematuria  NEUROLOGICAL: no urinary or stool incotinence                                                                                                                                                                                                                                                                           Medications:  MEDICATIONS  (STANDING):  albumin human  5% IVPB 3750 milliLiter(s) IV Intermittent every 48 hours  amLODIPine   Tablet 10 milliGRAM(s) Oral daily  bisacodyl 5 milliGRAM(s) Oral at bedtime  calcium gluconate IVPB 2 Gram(s) IV Intermittent <User Schedule>  chlorhexidine 2% Cloths 1 Application(s) Topical daily  dextrose 40% Gel 15 Gram(s) Oral once  dextrose 5%. 1000 milliLiter(s) (50 mL/Hr) IV Continuous <Continuous>  dextrose 5%. 1000 milliLiter(s) (100 mL/Hr) IV Continuous <Continuous>  dextrose 50% Injectable 25 Gram(s) IV Push once  dextrose 50% Injectable 12.5 Gram(s) IV Push once  dextrose 50% Injectable 25 Gram(s) IV Push once  enoxaparin Injectable 40 milliGRAM(s) SubCutaneous daily  gabapentin 100 milliGRAM(s) Oral three times a day  glucagon  Injectable 1 milliGRAM(s) IntraMuscular once  hydrALAZINE 25 milliGRAM(s) Oral three times a day  HYDROmorphone  Injectable 1 milliGRAM(s) IV Push once  insulin glargine Injectable (LANTUS) 48 Unit(s) SubCutaneous at bedtime  insulin lispro (ADMELOG) corrective regimen sliding scale   SubCutaneous at bedtime  insulin lispro (ADMELOG) corrective regimen sliding scale   SubCutaneous three times a day before meals  insulin lispro Injectable (ADMELOG) 14 Unit(s) SubCutaneous before lunch  insulin lispro Injectable (ADMELOG) 14 Unit(s) SubCutaneous before dinner  insulin lispro Injectable (ADMELOG) 16 Unit(s) SubCutaneous before breakfast  losartan 100 milliGRAM(s) Oral daily  metoprolol tartrate 12.5 milliGRAM(s) Oral two times a day  mineral oil 30 milliLiter(s) Oral once  senna 2 Tablet(s) Oral two times a day  sodium chloride 0.9%. 1000 milliLiter(s) (75 mL/Hr) IV Continuous <Continuous>    MEDICATIONS  (PRN):  acetaminophen   Tablet .. 650 milliGRAM(s) Oral every 6 hours PRN Temp greater or equal to 38C (100.4F), Mild Pain (1 - 3), Moderate Pain (4 - 6)  acetaminophen   Tablet .. 650 milliGRAM(s) Oral every 6 hours PRN Mild Pain (1 - 3)  aluminum hydroxide/magnesium hydroxide/simethicone Suspension 30 milliLiter(s) Oral every 6 hours PRN Dyspepsia  dicyclomine 10 milliGRAM(s) Oral once PRN abdominal pain  HYDROmorphone  Injectable 1 milliGRAM(s) IV Push every 8 hours PRN Severe Pain (7 - 10)  oxycodone    5 mG/acetaminophen 325 mG 1 Tablet(s) Oral every 6 hours PRN Moderate Pain (4 - 6)  sodium chloride 0.9% lock flush 10 milliLiter(s) IV Push every 1 hour PRN Pre/post blood products, medications, blood draw, and to maintain line patency       Allergies    No Known Drug Allergies  shellfish (Urticaria)    Intolerances      Vital Signs Last 24 Hrs  T(C): 37.1 (26 Apr 2021 21:30), Max: 37.1 (26 Apr 2021 21:30)  T(F): 98.8 (26 Apr 2021 21:30), Max: 98.8 (26 Apr 2021 21:30)  HR: 84 (26 Apr 2021 21:30) (71 - 88)  BP: 145/80 (26 Apr 2021 21:30) (123/65 - 151/86)  BP(mean): --  RR: 18 (26 Apr 2021 21:30) (17 - 18)  SpO2: 98% (26 Apr 2021 21:30) (98% - 99%)  CAPILLARY BLOOD GLUCOSE      POCT Blood Glucose.: 179 mg/dL (26 Apr 2021 21:09)  POCT Blood Glucose.: 119 mg/dL (26 Apr 2021 16:58)  POCT Blood Glucose.: 121 mg/dL (26 Apr 2021 12:01)  POCT Blood Glucose.: 206 mg/dL (26 Apr 2021 07:27)      04-25 @ 07:01  -  04-26 @ 07:00  --------------------------------------------------------  IN: 0 mL / OUT: 200 mL / NET: -200 mL    04-26 @ 07:01  -  04-26 @ 22:52  --------------------------------------------------------  IN: 0 mL / OUT: 250 mL / NET: -250 mL      Physical Exam:    Daily     Daily   General:  NAD  HEENT:  Nonicteric, PERRLA  CV:  RRR, S1S2   Lungs:  CTA B/L, no wheezes, rales, rhonchi  Abdomen:  Soft, non-tender, no distended, positive BS  Extremities:  2+ pulses, no c/c, no edema  Skin:  Warm and dry, no rashes  :  No lucas  Neuro:  LE weakness                                                                                                                                                                                                                                                                                    LABS:                               10.9   8.06  )-----------( 247      ( 26 Apr 2021 07:49 )             33.0                      04-26    140  |  105  |  15  ----------------------------<  207<H>  4.1   |  26  |  0.89    Ca    8.7      26 Apr 2021 07:49  Phos  3.0     04-26  Mg     1.9     04-26                         RADIOLOGY & ADDITIONAL TESTS         I personally reviewed: [  ]EKG   [  ]CXR    [  ] CT      A/P:         Discussed with :     Francisco consultants' Notes   Time spent :

## 2021-04-26 NOTE — PROGRESS NOTE ADULT - ASSESSMENT
pt getting ivig.  continue current reigmen  recommend mirlax daily or bid for bowel regimen.  no acut gi compalints

## 2021-04-26 NOTE — PROGRESS NOTE ADULT - SUBJECTIVE AND OBJECTIVE BOX
Patient is a 50y Male     Patient is a 50y old  Male who presents with a chief complaint of abdominal pain and consitpaiton (25 Apr 2021 13:24)      HPI:  51 yo male, PMH DM, HTN, hyperlipidemia; pt presented to the ED c/o abdominal cramping, generalized, and diarrhea following use of laxatives as above for constipation.  In the ED, WBC 10.82, Hb 13.6, CMP: sodium 126, chloride 89, glucose 281, CMP otherwise unremarkable.  VBG: Lactate 2.6 (later downtrended to 1.6).  Pt. was treated with IV hydration and dispo'd to CDU for continued care plan:  IV hydration, supportive care, AM labs, general observation care / monitoring. pt continued to have generalized weakness , numbness and on labs persistent hyponatermia and now being admitted for furhter evlauation .  dnenies N/V/Abd pain   had bm   no urinary sx   no fever or chills   no cough   no cp /sob    (11 Apr 2021 15:50)      PAST MEDICAL & SURGICAL HISTORY:  DM (diabetes mellitus)    HTN (hypertension)    Hyperlipidemia    H/O total knee replacement    Foot fracture        MEDICATIONS  (STANDING):  albumin human  5% IVPB 3750 milliLiter(s) IV Intermittent every 48 hours  amLODIPine   Tablet 10 milliGRAM(s) Oral daily  bisacodyl 5 milliGRAM(s) Oral at bedtime  calcium gluconate IVPB 2 Gram(s) IV Intermittent <User Schedule>  chlorhexidine 2% Cloths 1 Application(s) Topical daily  dextrose 40% Gel 15 Gram(s) Oral once  dextrose 5%. 1000 milliLiter(s) (50 mL/Hr) IV Continuous <Continuous>  dextrose 5%. 1000 milliLiter(s) (100 mL/Hr) IV Continuous <Continuous>  dextrose 50% Injectable 25 Gram(s) IV Push once  dextrose 50% Injectable 12.5 Gram(s) IV Push once  dextrose 50% Injectable 25 Gram(s) IV Push once  enoxaparin Injectable 40 milliGRAM(s) SubCutaneous daily  gabapentin 100 milliGRAM(s) Oral three times a day  glucagon  Injectable 1 milliGRAM(s) IntraMuscular once  hydrALAZINE 25 milliGRAM(s) Oral three times a day  HYDROmorphone  Injectable 1 milliGRAM(s) IV Push once  insulin glargine Injectable (LANTUS) 48 Unit(s) SubCutaneous at bedtime  insulin lispro (ADMELOG) corrective regimen sliding scale   SubCutaneous at bedtime  insulin lispro (ADMELOG) corrective regimen sliding scale   SubCutaneous three times a day before meals  insulin lispro Injectable (ADMELOG) 14 Unit(s) SubCutaneous before lunch  insulin lispro Injectable (ADMELOG) 14 Unit(s) SubCutaneous before dinner  insulin lispro Injectable (ADMELOG) 16 Unit(s) SubCutaneous before breakfast  losartan 100 milliGRAM(s) Oral daily  metoprolol tartrate 12.5 milliGRAM(s) Oral two times a day  mineral oil 30 milliLiter(s) Oral once  senna 2 Tablet(s) Oral two times a day  sodium chloride 0.9%. 1000 milliLiter(s) (75 mL/Hr) IV Continuous <Continuous>      Allergies    No Known Drug Allergies  shellfish (Urticaria)    Intolerances        SOCIAL HISTORY:  Denies ETOh,Smoking,     FAMILY HISTORY:  No pertinent family history in first degree relatives        REVIEW OF SYSTEMS:    CONSTITUTIONAL: No weakness, fevers or chills  EYES/ENT: No visual changes;  No vertigo or throat pain   NECK: No pain or stiffness  RESPIRATORY: No cough, wheezing, hemoptysis; No shortness of breath  CARDIOVASCULAR: No chest pain or palpitations  GASTROINTESTINAL: No abdominal or epigastric pain. No nausea, vomiting, or hematemesis; No diarrhea or constipation. No melena or hematochezia.  GENITOURINARY: No dysuria, frequency or hematuria  NEUROLOGICAL: No numbness or weakness  SKIN: No itching, burning, rashes, or lesions   All other review of systems is negative unless indicated above.    VITAL:  T(C): , Max: 37 (04-25-21 @ 21:43)  T(F): , Max: 98.6 (04-25-21 @ 21:43)  HR: 88 (04-26-21 @ 05:34)  BP: 151/86 (04-26-21 @ 05:34)  BP(mean): --  RR: 17 (04-26-21 @ 05:34)  SpO2: 99% (04-26-21 @ 05:34)  Wt(kg): --    I and O's:    04-24 @ 07:01  -  04-25 @ 07:00  --------------------------------------------------------  IN: 0 mL / OUT: 1000 mL / NET: -1000 mL    04-25 @ 07:01  -  04-26 @ 07:00  --------------------------------------------------------  IN: 0 mL / OUT: 200 mL / NET: -200 mL          PHYSICAL EXAM:    Constitutional: NAD  HEENT: PERRLA,   Neck: No JVD  Respiratory: CTA B/L  Cardiovascular: S1 and S2  Gastrointestinal: BS+, soft, NT/ND  Extremities: No peripheral edema  Neurological: A/O x 3, no focal deficits  Psychiatric: Normal mood, normal affect  : No Jose  Skin: No rashes  Access: Not applicable  Back: No CVA tenderness    LABS:                        10.9   8.06  )-----------( 247      ( 26 Apr 2021 07:49 )             33.0     04-26    140  |  105  |  15  ----------------------------<  207<H>  4.1   |  26  |  0.89    Ca    8.7      26 Apr 2021 07:49  Phos  3.0     04-26  Mg     1.9     04-26            RADIOLOGY & ADDITIONAL STUDIES:

## 2021-04-26 NOTE — PROGRESS NOTE ADULT - SUBJECTIVE AND OBJECTIVE BOX
Patient is a 50y old  Male who presents with a chief complaint of GBS (26 Apr 2021 11:02)      Interval history: s/p PLEX yesterday  reports constipation resolved    REVIEW OF SYSTEMS  Constitutional - No fever, No weight loss, No fatigue  HEENT - No eye pain, No visual disturbances, No difficulty hearing, No tinnitus, No vertigo, No neck pain  Respiratory - No cough, No wheezing, No shortness of breath  Cardiovascular - No chest pain, No palpitations  Gastrointestinal - No abdominal pain, No nausea, No vomiting, No diarrhea, No constipation  Genitourinary - No dysuria, No frequency, No hematuria, No incontinence  Neurological - No headaches, No memory loss, + loss of strength, + numbness, No tremors  Skin - No itching, No rashes, No lesions   Endocrine - No temperature intolerance  Musculoskeletal - No joint pain, No joint swelling, No muscle pain  Psychiatric - No depression, No anxiety    PAST MEDICAL & SURGICAL HISTORY  DM (diabetes mellitus)    HTN (hypertension)    High cholesterol     H/O total knee replacement    Foot fracture         CURRENT FUNCTIONAL STATUS  4/23  Bed Mobility  Bed Mobility Training Rehab Potential: good, to achieve stated therapy goals  Bed Mobility Training Symptoms Noted During/After Treatment: c/o back pain - REE Cm made aware.  Bed Mobility Training Sit-to-Supine: moderate assist (50% patient effort);  2 person assist;  verbal cues;  set-up required;  bed rails  Bed Mobility Training Supine-to-Sit: moderate assist (50% patient effort);  2 person assist;  verbal cues;  set-up required;  bed rails  Bed Mobility Training Limitations: decreased ability to use legs for bridging/pushing;  impaired ability to control trunk for mobility;  decreased strength;  impaired balance;  impaired motor control;  impaired postural control;  pain    Sit-Stand Transfer Training  Sit-to-Stand Transfer Training Treatment not Performed: deferred due to patient with poor balance and tolerance     Therapeutic Exercise  Therapeutic Exercise Rehab Effort: good  Therapeutic Exercise Symptoms Noted During/After Treatment: none  Therapeutic Exercise Detail: Patient performed active assisted therapeutic exercises to extremities as tolerated in all planes while seated at bedside and supine in bed, home excercise programme reviewed and safety reinforced.         FAMILY HISTORY   No pertinent family history in first degree relatives        RECENT LABS/IMAGING  CBC Full  -  ( 26 Apr 2021 07:49 )  WBC Count : 8.06 K/uL  RBC Count : 3.63 M/uL  Hemoglobin : 10.9 g/dL  Hematocrit : 33.0 %  Platelet Count - Automated : 247 K/uL  Mean Cell Volume : 90.9 fL  Mean Cell Hemoglobin : 30.0 pg  Mean Cell Hemoglobin Concentration : 33.0 gm/dL  Auto Neutrophil # : x  Auto Lymphocyte # : x  Auto Monocyte # : x  Auto Eosinophil # : x  Auto Basophil # : x  Auto Neutrophil % : x  Auto Lymphocyte % : x  Auto Monocyte % : x  Auto Eosinophil % : x  Auto Basophil % : x    04-26    140  |  105  |  15  ----------------------------<  207<H>  4.1   |  26  |  0.89    Ca    8.7      26 Apr 2021 07:49  Phos  3.0     04-26  Mg     1.9     04-26          VITALS  T(C): 36.7 (04-26-21 @ 10:12), Max: 37 (04-25-21 @ 21:43)  HR: 71 (04-26-21 @ 10:12) (71 - 90)  BP: 140/64 (04-26-21 @ 10:12) (121/67 - 151/86)  RR: 18 (04-26-21 @ 10:12) (16 - 19)  SpO2: 99% (04-26-21 @ 10:12) (97% - 100%)  Wt(kg): --    ALLERGIES  No Known Drug Allergies  shellfish (Urticaria)      MEDICATIONS   acetaminophen   Tablet .. 650 milliGRAM(s) Oral every 6 hours PRN  acetaminophen   Tablet .. 650 milliGRAM(s) Oral every 6 hours PRN  albumin human  5% IVPB 3750 milliLiter(s) IV Intermittent every 48 hours  aluminum hydroxide/magnesium hydroxide/simethicone Suspension 30 milliLiter(s) Oral every 6 hours PRN  amLODIPine   Tablet 10 milliGRAM(s) Oral daily  bisacodyl 5 milliGRAM(s) Oral at bedtime  calcium gluconate IVPB 2 Gram(s) IV Intermittent <User Schedule>  chlorhexidine 2% Cloths 1 Application(s) Topical daily  dextrose 40% Gel 15 Gram(s) Oral once  dextrose 5%. 1000 milliLiter(s) IV Continuous <Continuous>  dextrose 5%. 1000 milliLiter(s) IV Continuous <Continuous>  dextrose 50% Injectable 25 Gram(s) IV Push once  dextrose 50% Injectable 12.5 Gram(s) IV Push once  dextrose 50% Injectable 25 Gram(s) IV Push once  dicyclomine 10 milliGRAM(s) Oral once PRN  enoxaparin Injectable 40 milliGRAM(s) SubCutaneous daily  gabapentin 100 milliGRAM(s) Oral three times a day  glucagon  Injectable 1 milliGRAM(s) IntraMuscular once  hydrALAZINE 25 milliGRAM(s) Oral three times a day  HYDROmorphone  Injectable 1 milliGRAM(s) IV Push every 8 hours PRN  HYDROmorphone  Injectable 1 milliGRAM(s) IV Push once  insulin glargine Injectable (LANTUS) 48 Unit(s) SubCutaneous at bedtime  insulin lispro (ADMELOG) corrective regimen sliding scale   SubCutaneous at bedtime  insulin lispro (ADMELOG) corrective regimen sliding scale   SubCutaneous three times a day before meals  insulin lispro Injectable (ADMELOG) 14 Unit(s) SubCutaneous before lunch  insulin lispro Injectable (ADMELOG) 14 Unit(s) SubCutaneous before dinner  insulin lispro Injectable (ADMELOG) 16 Unit(s) SubCutaneous before breakfast  losartan 100 milliGRAM(s) Oral daily  metoprolol tartrate 12.5 milliGRAM(s) Oral two times a day  mineral oil 30 milliLiter(s) Oral once  oxycodone    5 mG/acetaminophen 325 mG 1 Tablet(s) Oral every 6 hours PRN  senna 2 Tablet(s) Oral two times a day  sodium chloride 0.9% lock flush 10 milliLiter(s) IV Push every 1 hour PRN  sodium chloride 0.9%. 1000 milliLiter(s) IV Continuous <Continuous>      ----------------------------------------------------------------------------------------   PHYSICAL EXAM  Constitutional - NAD, Comfortable  HEENT -  + R neck plasmapheresis catheter, EOMI     Chest - no respiratory distress  Cardiovascular - RRR, S1S2   Abdomen - Soft, NTND  Extremities - No C/C/E, No calf tenderness   Neurologic Exam -                    Cognitive - Awake, Alert, AAO to self, place, date, year, situation     Communication - Fluent, No dysarthria     Cranial Nerves - CN 2-12 intact     Motor -                     LEFT    UE - ShAB 4/5, EF 4/5, EE 4/5, WE4/5,  4/5                    RIGHT UE - ShAB 4/5, EF 4/5, EE 4/5, WE4/5,  4/5                    LEFT    LE - HF 2/5, KE 3/5, DF 4/5, PF 4/5                    RIGHT LE - HF 2/5, KE 3/5, DF 4/5, PF 4/5        Sensory -impaired distal fingertips     Reflexes - brachoradialis 1+ b/l      OculoVestibular - No saccades, No nystagmus, VOR         Balance - WNL Static  Psychiatric - Mood stable, Affect WNL  ----------------------------------------------------------------------------------------  ASSESSMENT/PLAN  51 yo m p/w GI symptoms, now with weakness, 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  plasmapheresis yesterday (session #3, first session incomplete)  Pain -on gabapentin, dilaudid iv prn, oxy ir prn, with bowel regimen- must be off iv pain medication prior to discharge  dvt ppx: lovenox  continue bedside PT, please add OT  Rehab -   Recommend ACUTE inpatient rehabilitation for the functional deficits consisting of 3 hours of therapy/day & 24 hour RN/daily PMR physician for comorbid medical management. Will continue to follow for ongoing rehab needs and recommendations.

## 2021-04-26 NOTE — PROGRESS NOTE ADULT - SUBJECTIVE AND OBJECTIVE BOX
[facility]   MD WYATT CHRIS DO ANAM SIDDIQUI ANGELA WONG, PA    TEL:  OFFICE: [phone]  DR DAWSON CELL: [phone]  DR. TIRADO CELL: [phone]  DR. MORAN CELL: [phone]  CATINA PATEL CELL: [phone]    From 5pm-7am Answering Service [phone]    -- RENAL FOLLOW UP NOTE ---Date of Service 04-26-21 @ 12:41    Patient is a 50y old  Male who presents with a chief complaint of GBS (26 Apr 2021 11:02)      Patient seen and examined at bedside. No chest pain/sob    VITALS:  T(F): 98.1 (04-26-21 @ 10:12), Max: 98.6 (04-25-21 @ 21:43)  HR: 71 (04-26-21 @ 10:12)  BP: 140/64 (04-26-21 @ 10:12)  RR: 18 (04-26-21 @ 10:12)  SpO2: 99% (04-26-21 @ 10:12)  Wt(kg): --    04-25 @ 07:01  -  04-26 @ 07:00  --------------------------------------------------------  IN: 0 mL / OUT: 200 mL / NET: -200 mL          PHYSICAL EXAM:  Constitutional: NAD  Neck: No JVD  Respiratory: CTAB, no wheezes, rales or rhonchi  Cardiovascular: S1, S2, RRR  Gastrointestinal: BS+, soft, NT/ND  Extremities: No peripheral edema    Hospital Medications:   MEDICATIONS  (STANDING):  albumin human  5% IVPB 3750 milliLiter(s) IV Intermittent every 48 hours  amLODIPine   Tablet 10 milliGRAM(s) Oral daily  bisacodyl 5 milliGRAM(s) Oral at bedtime  calcium gluconate IVPB 2 Gram(s) IV Intermittent <User Schedule>  chlorhexidine 2% Cloths 1 Application(s) Topical daily  dextrose 40% Gel 15 Gram(s) Oral once  dextrose 5%. 1000 milliLiter(s) (50 mL/Hr) IV Continuous <Continuous>  dextrose 5%. 1000 milliLiter(s) (100 mL/Hr) IV Continuous <Continuous>  dextrose 50% Injectable 25 Gram(s) IV Push once  dextrose 50% Injectable 12.5 Gram(s) IV Push once  dextrose 50% Injectable 25 Gram(s) IV Push once  enoxaparin Injectable 40 milliGRAM(s) SubCutaneous daily  gabapentin 100 milliGRAM(s) Oral three times a day  glucagon  Injectable 1 milliGRAM(s) IntraMuscular once  hydrALAZINE 25 milliGRAM(s) Oral three times a day  HYDROmorphone  Injectable 1 milliGRAM(s) IV Push once  insulin glargine Injectable (LANTUS) 48 Unit(s) SubCutaneous at bedtime  insulin lispro (ADMELOG) corrective regimen sliding scale   SubCutaneous at bedtime  insulin lispro (ADMELOG) corrective regimen sliding scale   SubCutaneous three times a day before meals  insulin lispro Injectable (ADMELOG) 16 Unit(s) SubCutaneous before breakfast  insulin lispro Injectable (ADMELOG) 14 Unit(s) SubCutaneous before lunch  insulin lispro Injectable (ADMELOG) 14 Unit(s) SubCutaneous before dinner  losartan 100 milliGRAM(s) Oral daily  metoprolol tartrate 12.5 milliGRAM(s) Oral two times a day  mineral oil 30 milliLiter(s) Oral once  senna 2 Tablet(s) Oral two times a day  sodium chloride 0.9%. 1000 milliLiter(s) (75 mL/Hr) IV Continuous <Continuous>      LABS:  04-26    140  |  105  |  15  ----------------------------<  207<H>  4.1   |  26  |  0.89    Ca    8.7      26 Apr 2021 07:49  Phos  3.0     04-26  Mg     1.9     04-26      Creatinine Trend: 0.89 <--, 0.88 <--, 1.02 <--, 1.13 <--, 0.97 <--, 1.08 <--    Phosphorus Level, Serum: 3.0 mg/dL (04-26 @ 07:49)                              10.9   8.06  )-----------( 247      ( 26 Apr 2021 07:49 )             33.0     Urine Studies:  Urinalysis - [04-11-21 @ 12:32]      Color Light Yellow / Appearance Clear / SG 1.013 / pH 6.5      Gluc >= 1000 mg/dL / Ketone Small  / Bili Negative / Urobili <2 mg/dL       Blood Small / Protein 100 mg/dL / Leuk Est Negative / Nitrite Negative      RBC 10 / WBC 1 / Hyaline 1 / Gran  / Sq Epi  / Non Sq Epi 1 / Bacteria Negative      TSH 3.00      [04-12-21 @ 06:36]    HBsAg Nonreact      [04-18-21 @ 01:28]  HCV 0.40, Nonreact      [04-18-21 @ 01:28]  HIV Nonreact      [04-17-21 @ 16:16]    PIERO: titer Negative, pattern --      [04-13-21 @ 10:10]  C3 Complement 152      [04-13-21 @ 07:48]  C4 Complement 33      [04-13-21 @ 07:48]  ANCA: cANCA Negative, pANCA Negative, atypical ANCA Negative      [04-16-21 @ 12:16]  Syphilis Screen (Treponema Pallidum Ab) Positive      [04-13-21 @ 10:10]  Syphilis Screen (RPR Titer) 1:1      [04-13-21 @ 10:10]  Free Light Chains: kappa 1.51, lambda 1.89, ratio = 0.80      [04-13 @ 10:08]  Immunofixation Serum:   No Monoclonal Band Identified. KRIS Ferris M.D.    Reference Range: None Detected      [04-13-21 @ 07:48]  SPEP Interpretation: phase reaction. KRIS Ferris M.D.      [04-16-21 @ 08:31]    RADIOLOGY & ADDITIONAL STUDIES:

## 2021-04-27 ENCOUNTER — TRANSCRIPTION ENCOUNTER (OUTPATIENT)
Age: 51
End: 2021-04-27

## 2021-04-27 LAB
ANION GAP SERPL CALC-SCNC: 12 MMOL/L — SIGNIFICANT CHANGE UP (ref 7–14)
BUN SERPL-MCNC: 16 MG/DL — SIGNIFICANT CHANGE UP (ref 7–23)
CALCIUM SERPL-MCNC: 9.1 MG/DL — SIGNIFICANT CHANGE UP (ref 8.4–10.5)
CHLORIDE SERPL-SCNC: 104 MMOL/L — SIGNIFICANT CHANGE UP (ref 98–107)
CO2 SERPL-SCNC: 24 MMOL/L — SIGNIFICANT CHANGE UP (ref 22–31)
CORTIS AM PEAK SERPL-MCNC: 5.7 UG/DL — LOW (ref 6–18.4)
CREAT SERPL-MCNC: 0.97 MG/DL — SIGNIFICANT CHANGE UP (ref 0.5–1.3)
FIBRINOGEN PPP-MCNC: 492 MG/DL — SIGNIFICANT CHANGE UP (ref 290–520)
GLUCOSE SERPL-MCNC: 204 MG/DL — HIGH (ref 70–99)
HCT VFR BLD CALC: 33.5 % — LOW (ref 39–50)
HGB BLD-MCNC: 10.9 G/DL — LOW (ref 13–17)
INTERPRETATION SERPL IFE-IMP: SIGNIFICANT CHANGE UP
MAGNESIUM SERPL-MCNC: 1.9 MG/DL — SIGNIFICANT CHANGE UP (ref 1.6–2.6)
MCHC RBC-ENTMCNC: 29.3 PG — SIGNIFICANT CHANGE UP (ref 27–34)
MCHC RBC-ENTMCNC: 32.5 GM/DL — SIGNIFICANT CHANGE UP (ref 32–36)
MCV RBC AUTO: 90.1 FL — SIGNIFICANT CHANGE UP (ref 80–100)
NRBC # BLD: 0 /100 WBCS — SIGNIFICANT CHANGE UP
NRBC # FLD: 0 K/UL — SIGNIFICANT CHANGE UP
PHOSPHATE SERPL-MCNC: 3.6 MG/DL — SIGNIFICANT CHANGE UP (ref 2.5–4.5)
PLATELET # BLD AUTO: 254 K/UL — SIGNIFICANT CHANGE UP (ref 150–400)
POTASSIUM SERPL-MCNC: 4.2 MMOL/L — SIGNIFICANT CHANGE UP (ref 3.5–5.3)
POTASSIUM SERPL-SCNC: 4.2 MMOL/L — SIGNIFICANT CHANGE UP (ref 3.5–5.3)
RBC # BLD: 3.72 M/UL — LOW (ref 4.2–5.8)
RBC # FLD: 12.2 % — SIGNIFICANT CHANGE UP (ref 10.3–14.5)
SODIUM SERPL-SCNC: 140 MMOL/L — SIGNIFICANT CHANGE UP (ref 135–145)
T4 FREE SERPL-MCNC: 1.2 NG/DL — SIGNIFICANT CHANGE UP (ref 0.9–1.8)
TSH SERPL-MCNC: 4.2 UIU/ML — SIGNIFICANT CHANGE UP (ref 0.27–4.2)
WBC # BLD: 7.86 K/UL — SIGNIFICANT CHANGE UP (ref 3.8–10.5)
WBC # FLD AUTO: 7.86 K/UL — SIGNIFICANT CHANGE UP (ref 3.8–10.5)

## 2021-04-27 PROCEDURE — 99232 SBSQ HOSP IP/OBS MODERATE 35: CPT

## 2021-04-27 PROCEDURE — 36514 APHERESIS PLASMA: CPT

## 2021-04-27 RX ADMIN — HYDROMORPHONE HYDROCHLORIDE 1 MILLIGRAM(S): 2 INJECTION INTRAMUSCULAR; INTRAVENOUS; SUBCUTANEOUS at 14:23

## 2021-04-27 RX ADMIN — HYDROMORPHONE HYDROCHLORIDE 1 MILLIGRAM(S): 2 INJECTION INTRAMUSCULAR; INTRAVENOUS; SUBCUTANEOUS at 05:39

## 2021-04-27 RX ADMIN — GABAPENTIN 100 MILLIGRAM(S): 400 CAPSULE ORAL at 14:23

## 2021-04-27 RX ADMIN — Medication 16 UNIT(S): at 08:45

## 2021-04-27 RX ADMIN — Medication 14 UNIT(S): at 18:00

## 2021-04-27 RX ADMIN — SENNA PLUS 2 TABLET(S): 8.6 TABLET ORAL at 18:00

## 2021-04-27 RX ADMIN — INSULIN GLARGINE 48 UNIT(S): 100 INJECTION, SOLUTION SUBCUTANEOUS at 21:37

## 2021-04-27 RX ADMIN — Medication 25 MILLIGRAM(S): at 21:37

## 2021-04-27 RX ADMIN — HYDROMORPHONE HYDROCHLORIDE 1 MILLIGRAM(S): 2 INJECTION INTRAMUSCULAR; INTRAVENOUS; SUBCUTANEOUS at 22:58

## 2021-04-27 RX ADMIN — HYDROMORPHONE HYDROCHLORIDE 1 MILLIGRAM(S): 2 INJECTION INTRAMUSCULAR; INTRAVENOUS; SUBCUTANEOUS at 06:14

## 2021-04-27 RX ADMIN — Medication 12.5 MILLIGRAM(S): at 18:00

## 2021-04-27 RX ADMIN — ENOXAPARIN SODIUM 40 MILLIGRAM(S): 100 INJECTION SUBCUTANEOUS at 14:42

## 2021-04-27 RX ADMIN — Medication 2: at 17:59

## 2021-04-27 RX ADMIN — HYDROMORPHONE HYDROCHLORIDE 1 MILLIGRAM(S): 2 INJECTION INTRAMUSCULAR; INTRAVENOUS; SUBCUTANEOUS at 14:53

## 2021-04-27 RX ADMIN — Medication 25 MILLIGRAM(S): at 14:42

## 2021-04-27 RX ADMIN — Medication 4: at 08:44

## 2021-04-27 RX ADMIN — GABAPENTIN 100 MILLIGRAM(S): 400 CAPSULE ORAL at 05:54

## 2021-04-27 RX ADMIN — GABAPENTIN 100 MILLIGRAM(S): 400 CAPSULE ORAL at 21:37

## 2021-04-27 RX ADMIN — HYDROMORPHONE HYDROCHLORIDE 1 MILLIGRAM(S): 2 INJECTION INTRAMUSCULAR; INTRAVENOUS; SUBCUTANEOUS at 22:28

## 2021-04-27 RX ADMIN — CHLORHEXIDINE GLUCONATE 1 APPLICATION(S): 213 SOLUTION TOPICAL at 14:42

## 2021-04-27 RX ADMIN — SENNA PLUS 2 TABLET(S): 8.6 TABLET ORAL at 05:39

## 2021-04-27 NOTE — PROGRESS NOTE ADULT - ASSESSMENT
49 yo male, PMH DM, HTN, hyperlipidemia; pt presented to the ED c/o abdominal cramping, generalized, and diarrhea following use of laxatives as above for constipation.  In the ED, WBC 10.82, Hb 13.6, CMP: sodium 126, chloride 89, glucose 281, CMP otherwise unremarkable.  VBG: Lactate 2.6 (later downtrended to 1.6).  Pt. was treated with IV hydration and dispo'd to CDU for continued care plan:  IV hydration, supportive care, general observation care / monitoring. pt continued to have generalized weakness , numbness and on labs persistent hyponatremia and now being admitted for further evaluation.    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    LP results reviewed. protein albumin dissociation   plasma exchange : treatment as planned  (HERB# today )  tolerated.  neuro follow up.  RPR positive: treated and no further need for any treatment per ID     - hyponatremia :   likely multifactorial including hyperglycemia ( pseudohyponatremia ) and pre renal sec to diarrhea  ? ADH induced by pain   appreciate renal input  improved, continue to monitor     - DM: uncontrolled   appreciate endo input  cont insulin per endo   c/w Lantus and premeal TID    -HTN : holding ARB for now given hypotension during exchange   (with hold parameters)    - hypophosphatemia : repleted and monitor   labs daily    - constipation : had had colonoscopy 2 yrs ago with Dr. Rae   CT abd no acute path   no further inpt red : discussed with Dr Rae     - ALLIE : improved   avoid NSAIDS for now     dvt proph   d/w pt, all questions answered.

## 2021-04-27 NOTE — DISCHARGE NOTE PROVIDER - CARE PROVIDER_API CALL
Ted Garza)  Internal Medicine  [address] [facility] [address] [facility] [id number]  [place] [address]  Phone: [phone]  Fax: [phone]  Follow Up Time: 1 week

## 2021-04-27 NOTE — PROGRESS NOTE ADULT - SUBJECTIVE AND OBJECTIVE BOX
Neurology Progress Note    S: Patient seen and examined. PLEX # 4 today in progress. BP stable     Medication:  MEDICATIONS  (STANDING):  albumin human  5% IVPB 3750 milliLiter(s) IV Intermittent every 48 hours  amLODIPine   Tablet 10 milliGRAM(s) Oral daily  bisacodyl 5 milliGRAM(s) Oral at bedtime  calcium gluconate IVPB 2 Gram(s) IV Intermittent <User Schedule>  chlorhexidine 2% Cloths 1 Application(s) Topical daily  dextrose 40% Gel 15 Gram(s) Oral once  dextrose 5%. 1000 milliLiter(s) (50 mL/Hr) IV Continuous <Continuous>  dextrose 5%. 1000 milliLiter(s) (100 mL/Hr) IV Continuous <Continuous>  dextrose 50% Injectable 25 Gram(s) IV Push once  dextrose 50% Injectable 12.5 Gram(s) IV Push once  dextrose 50% Injectable 25 Gram(s) IV Push once  enoxaparin Injectable 40 milliGRAM(s) SubCutaneous daily  gabapentin 100 milliGRAM(s) Oral three times a day  glucagon  Injectable 1 milliGRAM(s) IntraMuscular once  hydrALAZINE 25 milliGRAM(s) Oral three times a day  HYDROmorphone  Injectable 1 milliGRAM(s) IV Push once  insulin glargine Injectable (LANTUS) 48 Unit(s) SubCutaneous at bedtime  insulin lispro (ADMELOG) corrective regimen sliding scale   SubCutaneous at bedtime  insulin lispro (ADMELOG) corrective regimen sliding scale   SubCutaneous three times a day before meals  insulin lispro Injectable (ADMELOG) 14 Unit(s) SubCutaneous before lunch  insulin lispro Injectable (ADMELOG) 14 Unit(s) SubCutaneous before dinner  insulin lispro Injectable (ADMELOG) 16 Unit(s) SubCutaneous before breakfast  losartan 100 milliGRAM(s) Oral daily  metoprolol tartrate 12.5 milliGRAM(s) Oral two times a day  mineral oil 30 milliLiter(s) Oral once  senna 2 Tablet(s) Oral two times a day  sodium chloride 0.9%. 1000 milliLiter(s) (75 mL/Hr) IV Continuous <Continuous>    MEDICATIONS  (PRN):  acetaminophen   Tablet .. 650 milliGRAM(s) Oral every 6 hours PRN Temp greater or equal to 38C (100.4F), Mild Pain (1 - 3), Moderate Pain (4 - 6)  acetaminophen   Tablet .. 650 milliGRAM(s) Oral every 6 hours PRN Mild Pain (1 - 3)  aluminum hydroxide/magnesium hydroxide/simethicone Suspension 30 milliLiter(s) Oral every 6 hours PRN Dyspepsia  dicyclomine 10 milliGRAM(s) Oral once PRN abdominal pain  HYDROmorphone  Injectable 1 milliGRAM(s) IV Push every 8 hours PRN Severe Pain (7 - 10)  oxycodone    5 mG/acetaminophen 325 mG 1 Tablet(s) Oral every 6 hours PRN Moderate Pain (4 - 6)  sodium chloride 0.9% lock flush 10 milliLiter(s) IV Push every 1 hour PRN Pre/post blood products, medications, blood draw, and to maintain line patency      Vitals:  Vital Signs Last 24 Hrs  T(C): 37.2 (27 Apr 2021 05:33), Max: 37.2 (27 Apr 2021 05:33)  T(F): 98.9 (27 Apr 2021 05:33), Max: 98.9 (27 Apr 2021 05:33)  HR: 80 (27 Apr 2021 05:33) (71 - 84)  BP: 133/74 (27 Apr 2021 05:33) (123/65 - 145/80)  BP(mean): --  RR: 18 (27 Apr 2021 05:33) (17 - 18)  SpO2: 100% (27 Apr 2021 05:33) (98% - 100%)    General Exam:   General Appearance: Appropriately dressed and in no acute distress       Head: Normocephalic, atraumatic and no dysmorphic features  Ear, Nose, and Throat: Moist mucous membranes  CVS: S1S2+  Resp: No SOB, no wheeze or rhonchi  Abd: soft NTND  Extremities: No edema, no cyanosis  Skin: No bruises, no rashes     Neurological Exam:  Mental Status: Awake, alert and oriented x 3.  Able to follow simple and complex verbal commands. Able to name and repeat. fluent speech. No obvious aphasia or dysarthria noted.   Cranial Nerves: PERRL, EOMI, VFFC, sensation V1-V3 intact,  no obvious facial asymmetry , equal elevation of palate, scm/trap 5/5, tongue is midline on protrusion. no obvious papilledema on fundoscopic exam. Hearing is grossly intact.   Motor: Normal bulk, tone and strength throughout B/L UE Fine finger movements were intact and symmetric. no tremors B/L LE 2-3/5  Sensation: Intact to light touch and pinprick throughout. no right/left confusion. no extinction to tactile on DSS. Romberg was negative.   Reflexes: 0 throughout at biceps, brachioradialis, triceps, patellars and ankles bilaterally and equal. only hyas LUE biceps 1+ No clonus. R toe and L toe were both downgoing.  Coordination: No dysmetria on FNF   Gait: unable    I personally reviewed the below data/images/labs:  CBC Full  -  ( 27 Apr 2021 07:21 )  WBC Count : 7.86 K/uL  RBC Count : 3.72 M/uL  Hemoglobin : 10.9 g/dL  Hematocrit : 33.5 %  Platelet Count - Automated : 254 K/uL  Mean Cell Volume : 90.1 fL  Mean Cell Hemoglobin : 29.3 pg  Mean Cell Hemoglobin Concentration : 32.5 gm/dL  Auto Neutrophil # : x  Auto Lymphocyte # : x  Auto Monocyte # : x  Auto Eosinophil # : x  Auto Basophil # : x  Auto Neutrophil % : x  Auto Lymphocyte % : x  Auto Monocyte % : x  Auto Eosinophil % : x  Auto Basophil % : x    04-27    140  |  104  |  16  ----------------------------<  204<H>  4.2   |  24  |  0.97    Ca    9.1      27 Apr 2021 07:21  Phos  3.6     04-27  Mg     1.9     04-27        < from: CT Head No Cont (04.02.21 @ 21:48) >    EXAM:  CT BRAIN        PROCEDURE DATE:  Apr 2 2021         INTERPRETATION:  HISTORY: Hypertension    Technique: CT of the head was performed without contrast.    Multiple contiguous axial images were acquired from the skullbase to the vertex without the administration of intravenous contrast.  Coronal and sagittal reformations were made.    COMPARISON: None.    FINDINGS:  The ventricles and sulci are within normal limits given the patient's age. No acute hemorrhage, mass effect, midline shift,hydrocephalus, or extra-axial fluid collections. Mild patchy hypoattenuation within the white matter, likely secondary chronic microscopy changes.    The calvarium is intact.    Small cyst or polyp in the right maxillary sinus. There are opacification of a few ethmoid air cells bilaterally as well as mild mucosal thickening of the left frontal sinus. The mastoid air cells are clear.    IMPRESSION:  No acute hemorrhage, mass effect, or midline shift.            DARRELL MIX MD; Resident Radiology  This document has been electronically signed.  PORTILLO HARPER MD; Attending Radiologist  This document has been electronically signed. Apr 2 2021 11:34PM    < end of copied text >  < from: MR Lumbar Spine w/wo IV Cont (04.16.21 @ 22:16) >    EXAM:  MR SPINE LUMBAR WAW IC      EXAM:  MR SPINE THORACIC WAW IC        PROCEDURE DATE:  Apr 16 2021         INTERPRETATION:  CLINICAL INDICATION: Bilateral leg weakness of unknown etiology.    Technique: MRI of the thoracic and lumbar spine was performed with and without contrast. A total 10 cc of Gadavist were administered intravenously.    COMPARISON: None.    FINDINGS:    THORACIC:    There is normal thoracic kyphosis. There are multiple small vertebral body endplate Schmorl's nodes. Otherwise, the vertebrae are normal in signal, height, and alignment. There is anterior endplate osteophytosis most pronounced from the most pronounced from T6-T10.    There are tiny disc bulges at T7-T8 and T8-T9, without significant central spinal canalstenosis or neuroforaminal narrowing.    The spinal cord is normal in course, caliber, and signal.    There is no abnormal enhancement within thoracic spine.    LUMBAR:    There is normal lumbar lordosis. There are mild-to-moderate type I degenerative endplate changes on the left at L4-L5. Otherwise, the vertebral bodies are normal in height and signal without fracture or dislocation.    There is disc space narrowing and desiccation at L4-L5 and L5-S1.    Evaluation of individual levels demonstrates:    L5-S1: Small posterior disc bulge, as well as mild right greater than left facet hypertrophy, resulting in moderate right and mild left neural foraminal stenosis. No significant spinal canal stenosis.    L4-L5: Small posterior disc bulge and superimposed small right central disc protrusion, including tiny annular fissure, resulting in mild spinal canal stenosis, as well as bilateral facet hypertrophy with moderate right and severe left neural foraminal stenosis.    L3-L4: Small posterior disc bulge, along with bilateral facet hypertrophy, resulting in mild spinal canal stenosis as well as mild bilateral neural foraminal stenosis    L2-L3: No significant disc herniation, central spinal canal stenosis, or neural foraminal narrowing. Mildbilateral facet hypertrophy.    L1-L2: Tiny posterior disc bulge without significant central spinal canal stenosis or neuroforaminal narrowing. Mild bilateral facet hypertrophy.    The conus is normal in position and morphology at the L1 level. Thereis smooth thickening and abnormal enhancement of the cauda equina and ventral nerve roots, a nonspecific finding.    There is no definite fluid collection or mass lesion within the visualized retroperitoneal or posterior paraspinal soft tissues.    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 Alternative etiologies include chronic inflammatory demyelinating polyneuropathy, Lyme disease, less likely arachnoiditis or carcinomatosis.    Results were discussed with ADELSO Elkins by Dr. Haji at 9:40 AM on 4/17/2021 with read back followed.              ROSIE HAJI MD; Attending Radiologist  This document has been electronically signed. Apr 17 2021  9:48AM    < end of copied text >  < from: MR Cervical Spine w/wo IV Cont (04.16.21 @ 22:16) >    EXAM:  MR SPINE CERVICAL WAW IC        PROCEDURE DATE:  Apr 16 2021         INTERPRETATION:  CLINICAL INDICATION: Patient with new weakness in lower extremities.    TECHNIQUE: MRI of the cervical spine was performed before and after intravenous contrast. A total 10 cc of Gadavist were administered.    COMPARISON: None.    FINDINGS:  Structures at the craniocervical and cervicomedullary junction are intact.    There is nonspecific straightening of the normal cervical lordosis. There is grade 1 degenerative retrolisthesis of C5 on C6. There is disc space narrowing at C3-C4 and C5-C6. The remaining intervertebral disc space heights are grossly preserved. There is anterior endplate osteophytosis from C3 to C6.    There are mild Modic type II degenerative endplate changes involving C3 CT 4 and C5-C6, along with mild endplate irregularity. Otherwise, the vertebrae demonstrate normal height and signal without fracture, dislocation or marrow edema.    The spinal cord demonstrates normal course and caliber without intrinsic cord signal abnormality.    The prevertebral and paravertebral soft tissues are within normal limits.    There is no abnormal enhancement.    There is diffuse disc desiccation. Evaluation of individual levels demonstrates:    C2-C3: No significant disc herniation, central spinal canal stenosis, although neural foraminal narrowing.    C3-C4: Small posterior disc osteophyte complexes, partially effacing the ventral thecal sac and resulting in mild spinal canal stenosis,as well as moderate right and mild left neural foraminal narrowing.    C4-C5: Tiny posterior disc osteophyte complex minimally effacing the ventral thecal sac, without significant spinal canal stenosis, as well as moderate left and mild right neural foraminal narrowing.    C5-C6: Tiny posterior disc osteophyte complex minimally effacing the ventral thecal sac, without significant spinal canal stenosis, as well as severe right and moderate left neural foraminal narrowing.    C6-C7: No significant disc herniation, central spinal canal stenosis, or neural foraminal narrowing.    C7-T1: No significant disc herniation, central spinal canal stenosis, or neural foraminal narrowing.    There is no soft tissue neck mass or fluid collection.    IMPRESSION:  No evidence for spinal cord compression, signal abnormality, or abnormal enhancement.    Multilevel degenerative changes, as described above, most pronounced at C3-C4 where there is mild spinal canal stenosis and C5-C6 where there is severe right and moderate left neural foraminal narrowing.              ROSIE HAJI MD; Attending Radiologist  This document has been electronically signed. Apr 17 2021  8:54AM    < end of copied text >

## 2021-04-27 NOTE — DISCHARGE NOTE PROVIDER - NSDCCPCAREPLAN_GEN_ALL_CORE_FT
PRINCIPAL DISCHARGE DIAGNOSIS  Diagnosis: Hyponatremia  Assessment and Plan of Treatment: low sodium levels noted, now improving  repeat BMP in 1 week      SECONDARY DISCHARGE DIAGNOSES  Diagnosis: Type 2 diabetes mellitus with hyperglycemia, with long-term current use of insulin  Assessment and Plan of Treatment: Continue your medication regimen and a consistent carbohydrate diet (Meaning eating the same amount of carbohydrates at the same time each day). Monitor blood glucose levels throughout the day before meals and at bedtime. Record blood sugars and bring to outpatient providers appointment in order to be reviewed by your doctor for management modifications. If your sugars are more than 400 or less than 70 you should contact your PCP immediately. Monitor for signs/symptoms of low blood glucose, such as, dizziness, altered mental status, or cool/clammy skin. In addition, monitor for signs/symptoms of high blood glucose, such as, feeling hot, dry, fatigued, or with increased thirst/urination. Make regular podiatry appointments in order to have feet checked for wounds and uncontrolled toe nail growth to prevent infections, as well as, appointments with an ophthalmologist to monitor your vision.

## 2021-04-27 NOTE — PROGRESS NOTE ADULT - SUBJECTIVE AND OBJECTIVE BOX
Patient is a 50y old  Male who presents with a chief complaint of abdominal pain (27 Apr 2021 08:44)      Interval history: patient seen by OT. States he is happy to be working with them.   last bm 3 days ago, requesting senna. nurse informed  receives iv pain medication.    REVIEW OF SYSTEMS  Constitutional - No fever, No weight loss, No fatigue  HEENT - No eye pain, No visual disturbances, No difficulty hearing, No tinnitus, No vertigo, No neck pain  Respiratory - No cough, No wheezing, No shortness of breath  Cardiovascular - No chest pain, No palpitations  Gastrointestinal - No abdominal pain, No nausea, No vomiting, No diarrhea, + constipation  Genitourinary - No dysuria, No frequency, No hematuria, No incontinence  Neurological - No headaches, No memory loss, + loss of strength, + numbness, No tremors  Skin - No itching, No rashes, No lesions   Endocrine - No temperature intolerance  Musculoskeletal - No joint pain, No joint swelling, No muscle pain  Psychiatric - No depression, No anxiety    PAST MEDICAL & SURGICAL HISTORY  DM (diabetes mellitus)    HTN (hypertension)    High cholesterol    Diabetes    HTN (hypertension)    Hyperlipidemia    No significant past surgical history    No significant past surgical history    H/O total knee replacement    Foot fracture           CURRENT FUNCTIONAL STATUS  4/26  Patient seen in bed, demonstrate rolling with moderate assistance of 2 persons and railing, supine to sit with maximum assistance of 2 persons, sitting balance poor and needing moderate assistance of 2 persons to maintain sitting at the edge of bed. Patient left in bed as found.       FAMILY HISTORY   No pertinent family history in first degree relatives        RECENT LABS/IMAGING  CBC Full  -  ( 27 Apr 2021 07:21 )  WBC Count : 7.86 K/uL  RBC Count : 3.72 M/uL  Hemoglobin : 10.9 g/dL  Hematocrit : 33.5 %  Platelet Count - Automated : 254 K/uL  Mean Cell Volume : 90.1 fL  Mean Cell Hemoglobin : 29.3 pg  Mean Cell Hemoglobin Concentration : 32.5 gm/dL  Auto Neutrophil # : x  Auto Lymphocyte # : x  Auto Monocyte # : x  Auto Eosinophil # : x  Auto Basophil # : x  Auto Neutrophil % : x  Auto Lymphocyte % : x  Auto Monocyte % : x  Auto Eosinophil % : x  Auto Basophil % : x    04-27    140  |  104  |  16  ----------------------------<  204<H>  4.2   |  24  |  0.97    Ca    9.1      27 Apr 2021 07:21  Phos  3.6     04-27  Mg     1.9     04-27          VITALS  T(C): 36.8 (04-27-21 @ 14:25), Max: 37.2 (04-27-21 @ 05:33)  HR: 85 (04-27-21 @ 14:25) (77 - 92)  BP: 129/80 (04-27-21 @ 14:25) (116/75 - 145/80)  RR: 18 (04-27-21 @ 14:25) (18 - 18)  SpO2: 100% (04-27-21 @ 14:25) (98% - 100%)  Wt(kg): --    ALLERGIES  No Known Drug Allergies  shellfish (Urticaria)      MEDICATIONS   acetaminophen   Tablet .. 650 milliGRAM(s) Oral every 6 hours PRN  acetaminophen   Tablet .. 650 milliGRAM(s) Oral every 6 hours PRN  albumin human  5% IVPB 3750 milliLiter(s) IV Intermittent every 48 hours  albumin human  5% IVPB 3750 milliLiter(s) IV Intermittent once  aluminum hydroxide/magnesium hydroxide/simethicone Suspension 30 milliLiter(s) Oral every 6 hours PRN  amLODIPine   Tablet 10 milliGRAM(s) Oral daily  bisacodyl 5 milliGRAM(s) Oral at bedtime  calcium gluconate IVPB 2 Gram(s) IV Intermittent <User Schedule>  calcium gluconate IVPB 2 Gram(s) IV Intermittent once  chlorhexidine 2% Cloths 1 Application(s) Topical daily  dextrose 40% Gel 15 Gram(s) Oral once  dextrose 5%. 1000 milliLiter(s) IV Continuous <Continuous>  dextrose 5%. 1000 milliLiter(s) IV Continuous <Continuous>  dextrose 50% Injectable 25 Gram(s) IV Push once  dextrose 50% Injectable 12.5 Gram(s) IV Push once  dextrose 50% Injectable 25 Gram(s) IV Push once  dicyclomine 10 milliGRAM(s) Oral once PRN  enoxaparin Injectable 40 milliGRAM(s) SubCutaneous daily  gabapentin 100 milliGRAM(s) Oral three times a day  glucagon  Injectable 1 milliGRAM(s) IntraMuscular once  hydrALAZINE 25 milliGRAM(s) Oral three times a day  HYDROmorphone  Injectable 1 milliGRAM(s) IV Push every 8 hours PRN  HYDROmorphone  Injectable 1 milliGRAM(s) IV Push once  insulin glargine Injectable (LANTUS) 48 Unit(s) SubCutaneous at bedtime  insulin lispro (ADMELOG) corrective regimen sliding scale   SubCutaneous at bedtime  insulin lispro (ADMELOG) corrective regimen sliding scale   SubCutaneous three times a day before meals  insulin lispro Injectable (ADMELOG) 14 Unit(s) SubCutaneous before lunch  insulin lispro Injectable (ADMELOG) 14 Unit(s) SubCutaneous before dinner  insulin lispro Injectable (ADMELOG) 16 Unit(s) SubCutaneous before breakfast  losartan 100 milliGRAM(s) Oral daily  metoprolol tartrate 12.5 milliGRAM(s) Oral two times a day  mineral oil 30 milliLiter(s) Oral once  oxycodone    5 mG/acetaminophen 325 mG 1 Tablet(s) Oral every 6 hours PRN  senna 2 Tablet(s) Oral two times a day  sodium chloride 0.9% lock flush 10 milliLiter(s) IV Push every 1 hour PRN  sodium chloride 0.9%. 1000 milliLiter(s) IV Continuous <Continuous>      ----------------------------------------------------------------------------------------    PHYSICAL EXAM  Constitutional - NAD, Comfortable  HEENT -  + R neck plasmapheresis catheter, EOMI     Chest - no respiratory distress  Cardiovascular - RRR, S1S2   Abdomen - Soft, NTND  Extremities - No C/C/E, No calf tenderness   Neurologic Exam -                    Cognitive - Awake, Alert, AAO to self, place, date, year, situation     Communication - Fluent, No dysarthria     Cranial Nerves - CN 2-12 intact     Motor -                     LEFT    UE - ShAB 4/5, EF 4/5, EE 4/5, WE4/5,  4/5                    RIGHT UE - ShAB 4/5, EF 4/5, EE 4/5, WE4/5,  4/5                    LEFT    LE - HF 2/5, KE 3/5, DF 4/5, PF 4/5                    RIGHT LE - HF 2/5, KE 3/5, DF 4/5, PF 4/5        Sensory -impaired distal fingertips      OculoVestibular - No saccades, No nystagmus, VOR         Balance - WNL Static  Psychiatric - Mood stable, Affect WNL  ----------------------------------------------------------------------------------------  ASSESSMENT/PLAN  51 yo m p/w GI symptoms, now with weakness, 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  receives PLEX treatments  Pain -on gabapentin, dilaudid iv prn, oxy ir prn, with bowel regimen- must be off iv pain medication prior to discharge  dvt ppx: lovenox  continue bedside PT and OT for bed mobility, transfers, ambulation with AD, adls  OOB to chair when sitting balance allows  Rehab -   Recommend ACUTE inpatient rehabilitation for the functional deficits consisting of 3 hours of therapy/day & 24 hour RN/daily PMR physician for comorbid medical management. Will continue to follow for ongoing rehab needs and recommendations.

## 2021-04-27 NOTE — PROGRESS NOTE ADULT - ASSESSMENT
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 Wife refused IVIG. During PLEX#1 on 4/21 he had hypotension, hence plan to hold off losartan.     PLEX#4 today with 3750ml of 5% albumin as replacement fluid. Case d/w Apheresis nurse. Procedure being well tolerated.    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 Wife refused IVIG. During PLEX#1 on 4/21 he had hypotension, hence plan to hold off losartan.     PLEX#4 today with 3750ml of 5% albumin as replacement fluid. Case d/w Apheresis nurse. Procedure being well tolerated.     PLEX#5 on 4/29 and dispo per neuro.

## 2021-04-27 NOTE — OCCUPATIONAL THERAPY INITIAL EVALUATION ADULT - PERTINENT HX OF CURRENT PROBLEM, REHAB EVAL
Pt is a 51 y/o male with PMH DM, HTN, hyperlipidemia; pt presented to the ED c/o abdominal cramping, generalized, and diarrhea following use of laxatives as above for constipation. Pt. was treated with IV hydration. IV hydration, supportive care, AM labs, general observation care / monitoring. Pt continued to have generalized weakness , numbness and on labs persistent hyponatremia and now being admitted for further evaluation .

## 2021-04-27 NOTE — PROGRESS NOTE ADULT - SUBJECTIVE AND OBJECTIVE BOX
[facility]   MD SEVEN CHRIS MD RUORU WONG, PA    TEL:  OFFICE: [phone]  DR DAWSON CELL: [phone]  SIMI PATEL CELL: [phone]  DR. MORAN CELL: [phone]  DR. TIRADO CELL: [phone]    FROM 5 PM - 7 AM PLEASE CALL ANSWERING SERVICE: [phone]    RENAL FOLLOW UP NOTE--Date of Service 04-27-21 @ 08:07  --------------------------------------------------------------------------------  HPI:      Pt seen and examined at bedside.   Denies SOB, chest pain     PAST HISTORY  --------------------------------------------------------------------------------  No significant changes to PMH, PSH, FHx, SHx, unless otherwise noted    ALLERGIES & MEDICATIONS  --------------------------------------------------------------------------------  Allergies    No Known Drug Allergies  shellfish (Urticaria)    Intolerances      Standing Inpatient Medications  albumin human  5% IVPB 3750 milliLiter(s) IV Intermittent once  albumin human  5% IVPB 3750 milliLiter(s) IV Intermittent every 48 hours  amLODIPine   Tablet 10 milliGRAM(s) Oral daily  bisacodyl 5 milliGRAM(s) Oral at bedtime  calcium gluconate IVPB 2 Gram(s) IV Intermittent <User Schedule>  calcium gluconate IVPB 2 Gram(s) IV Intermittent once  chlorhexidine 2% Cloths 1 Application(s) Topical daily  dextrose 40% Gel 15 Gram(s) Oral once  dextrose 5%. 1000 milliLiter(s) IV Continuous <Continuous>  dextrose 5%. 1000 milliLiter(s) IV Continuous <Continuous>  dextrose 50% Injectable 25 Gram(s) IV Push once  dextrose 50% Injectable 12.5 Gram(s) IV Push once  dextrose 50% Injectable 25 Gram(s) IV Push once  enoxaparin Injectable 40 milliGRAM(s) SubCutaneous daily  gabapentin 100 milliGRAM(s) Oral three times a day  glucagon  Injectable 1 milliGRAM(s) IntraMuscular once  hydrALAZINE 25 milliGRAM(s) Oral three times a day  HYDROmorphone  Injectable 1 milliGRAM(s) IV Push once  insulin glargine Injectable (LANTUS) 48 Unit(s) SubCutaneous at bedtime  insulin lispro (ADMELOG) corrective regimen sliding scale   SubCutaneous three times a day before meals  insulin lispro (ADMELOG) corrective regimen sliding scale   SubCutaneous at bedtime  insulin lispro Injectable (ADMELOG) 14 Unit(s) SubCutaneous before lunch  insulin lispro Injectable (ADMELOG) 14 Unit(s) SubCutaneous before dinner  insulin lispro Injectable (ADMELOG) 16 Unit(s) SubCutaneous before breakfast  losartan 100 milliGRAM(s) Oral daily  metoprolol tartrate 12.5 milliGRAM(s) Oral two times a day  mineral oil 30 milliLiter(s) Oral once  senna 2 Tablet(s) Oral two times a day  sodium chloride 0.9%. 1000 milliLiter(s) IV Continuous <Continuous>    PRN Inpatient Medications  acetaminophen   Tablet .. 650 milliGRAM(s) Oral every 6 hours PRN  acetaminophen   Tablet .. 650 milliGRAM(s) Oral every 6 hours PRN  aluminum hydroxide/magnesium hydroxide/simethicone Suspension 30 milliLiter(s) Oral every 6 hours PRN  dicyclomine 10 milliGRAM(s) Oral once PRN  HYDROmorphone  Injectable 1 milliGRAM(s) IV Push every 8 hours PRN  oxycodone    5 mG/acetaminophen 325 mG 1 Tablet(s) Oral every 6 hours PRN  sodium chloride 0.9% lock flush 10 milliLiter(s) IV Push every 1 hour PRN      REVIEW OF SYSTEMS  --------------------------------------------------------------------------------  General: no fever    MSK: no edema     VITALS/PHYSICAL EXAM  --------------------------------------------------------------------------------  T(C): 37.2 (04-27-21 @ 05:33), Max: 37.2 (04-27-21 @ 05:33)  HR: 80 (04-27-21 @ 05:33) (71 - 84)  BP: 133/74 (04-27-21 @ 05:33) (123/65 - 145/80)  RR: 18 (04-27-21 @ 05:33) (17 - 18)  SpO2: 100% (04-27-21 @ 05:33) (98% - 100%)  Wt(kg): --        04-26-21 @ 07:01  -  04-27-21 @ 07:00  --------------------------------------------------------  IN: 0 mL / OUT: 1250 mL / NET: -1250 mL      Physical Exam:  	Gen: NAD  	HEENT: MMM  	Pulm: CTA B/L  	CV: S1S2  	Abd: Soft, +BS  	Ext: No LE edema B/L                      Neuro: Awake   	Skin: Warm and Dry   	Vascular access: no HD catheter            no lance  LABS/STUDIES  --------------------------------------------------------------------------------              10.9   7.86  >-----------<  254      [04-27-21 @ 07:21]              33.5     140  |  104  |  16  ----------------------------<  204      [04-27-21 @ 07:21]  4.2   |  24  |  0.97        Ca     9.1     [04-27-21 @ 07:21]      Mg     1.9     [04-27-21 @ 07:21]      Phos  3.6     [04-27-21 @ 07:21]            Creatinine Trend:  SCr 0.97 [04-27 @ 07:21]  SCr 0.89 [04-26 @ 07:49]  SCr 0.88 [04-24 @ 07:28]  SCr 1.02 [04-23 @ 07:24]  SCr 1.13 [04-22 @ 07:36]    Urinalysis - [04-11-21 @ 12:32]      Color Light Yellow / Appearance Clear / SG 1.013 / pH 6.5      Gluc >= 1000 mg/dL / Ketone Small  / Bili Negative / Urobili <2 mg/dL       Blood Small / Protein 100 mg/dL / Leuk Est Negative / Nitrite Negative      RBC 10 / WBC 1 / Hyaline 1 / Gran  / Sq Epi  / Non Sq Epi 1 / Bacteria Negative      TSH 3.00      [04-12-21 @ 06:36]    HBsAg Nonreact      [04-18-21 @ 01:28]  HCV 0.40, Nonreact      [04-18-21 @ 01:28]  HIV Nonreact      [04-17-21 @ 16:16]    PIERO: titer Negative, pattern --      [04-13-21 @ 10:10]  C3 Complement 152      [04-13-21 @ 07:48]  C4 Complement 33      [04-13-21 @ 07:48]  ANCA: cANCA Negative, pANCA Negative, atypical ANCA Negative      [04-16-21 @ 12:16]  Syphilis Screen (Treponema Pallidum Ab) Positive      [04-13-21 @ 10:10]  Syphilis Screen (RPR Titer) 1:1      [04-13-21 @ 10:10]  Free Light Chains: kappa 1.51, lambda 1.89, ratio = 0.80      [04-13 @ 10:08]  Immunofixation Serum:   No Monoclonal Band Identified. KRIS Ferris M.D.    Reference Range: None Detected      [04-13-21 @ 07:48]  SPEP Interpretation: phase reaction. KRIS Ferris M.D.      [04-16-21 @ 08:31]

## 2021-04-27 NOTE — DISCHARGE NOTE PROVIDER - NSDCMRMEDTOKEN_GEN_ALL_CORE_FT
amLODIPine: orally once a day  losartan 100 mg oral tablet: 1 tab(s) orally once a day  NovoLOG:  15 units subcutaneous injection TID with meals  Tresiba 100 units/mL subcutaneous solution: 20 units in the evening   acetaminophen 325 mg oral tablet: 2 tab(s) orally every 6 hours, As needed, Temp greater or equal to 38C (100.4F), Mild Pain (1 - 3), Moderate Pain (4 - 6)  acetaminophen 325 mg oral tablet: 2 tab(s) orally every 6 hours, As needed, Mild Pain (1 - 3)  amLODIPine 10 mg oral tablet: 1 tab(s) orally once a day  bisacodyl 5 mg oral delayed release tablet: 1 tab(s) orally once a day (at bedtime)  chlorhexidine 2% topical pad: 1 application topically once a day  enoxaparin: 40 milligram(s) subcutaneous once a day  gabapentin 300 mg oral capsule: 1 cap(s) orally 3 times a day  hydrALAZINE 25 mg oral tablet: 1 tab(s) orally 3 times a day  lidocaine 5% topical film: Apply topically to affected area once a day  losartan 100 mg oral tablet: 1 tab(s) orally once a day  NovoLO units subcutaneous   with breakfast   14 units suncutaneous with lunch,   14 units subcutaneous with dinner  senna oral tablet: 2 tab(s) orally 2 times a day  Tresiba 100 units/mL subcutaneous solution: 52 unit(s) subcutaneous once a day (at bedtime)

## 2021-04-27 NOTE — PROGRESS NOTE ADULT - SUBJECTIVE AND OBJECTIVE BOX
Date of service: 21 @ 21:15      Patient is a 50y old  Male who presents with a chief complaint of abdominal pain (2021 08:44)                                                               INTERVAL HPI/OVERNIGHT EVENTS:    REVIEW OF SYSTEMS:     CONSTITUTIONAL: No weakness, fevers or chills  RESPIRATORY: No cough, wheezing,  No shortness of breath  CARDIOVASCULAR: No chest pain or palpitations  GASTROINTESTINAL: No abdominal pain  . No nausea, vomiting, or hematemesis; No diarrhea or constipation. No melena or hematochezia.  GENITOURINARY: No dysuria, frequency or hematuria  NEUROLOGICAL: LE weakness                                                                                                                                                                                                                                                                               Medications:  MEDICATIONS  (STANDING):  albumin human  5% IVPB 3750 milliLiter(s) IV Intermittent once  albumin human  5% IVPB 3750 milliLiter(s) IV Intermittent every 48 hours  amLODIPine   Tablet 10 milliGRAM(s) Oral daily  bisacodyl 5 milliGRAM(s) Oral at bedtime  calcium gluconate IVPB 2 Gram(s) IV Intermittent <User Schedule>  calcium gluconate IVPB 2 Gram(s) IV Intermittent once  chlorhexidine 2% Cloths 1 Application(s) Topical daily  dextrose 40% Gel 15 Gram(s) Oral once  dextrose 5%. 1000 milliLiter(s) (50 mL/Hr) IV Continuous <Continuous>  dextrose 5%. 1000 milliLiter(s) (100 mL/Hr) IV Continuous <Continuous>  dextrose 50% Injectable 25 Gram(s) IV Push once  dextrose 50% Injectable 12.5 Gram(s) IV Push once  dextrose 50% Injectable 25 Gram(s) IV Push once  enoxaparin Injectable 40 milliGRAM(s) SubCutaneous daily  gabapentin 100 milliGRAM(s) Oral three times a day  glucagon  Injectable 1 milliGRAM(s) IntraMuscular once  hydrALAZINE 25 milliGRAM(s) Oral three times a day  HYDROmorphone  Injectable 1 milliGRAM(s) IV Push once  insulin glargine Injectable (LANTUS) 48 Unit(s) SubCutaneous at bedtime  insulin lispro (ADMELOG) corrective regimen sliding scale   SubCutaneous three times a day before meals  insulin lispro (ADMELOG) corrective regimen sliding scale   SubCutaneous at bedtime  insulin lispro Injectable (ADMELOG) 14 Unit(s) SubCutaneous before lunch  insulin lispro Injectable (ADMELOG) 14 Unit(s) SubCutaneous before dinner  insulin lispro Injectable (ADMELOG) 16 Unit(s) SubCutaneous before breakfast  losartan 100 milliGRAM(s) Oral daily  metoprolol tartrate 12.5 milliGRAM(s) Oral two times a day  mineral oil 30 milliLiter(s) Oral once  senna 2 Tablet(s) Oral two times a day  sodium chloride 0.9%. 1000 milliLiter(s) (75 mL/Hr) IV Continuous <Continuous>    MEDICATIONS  (PRN):  acetaminophen   Tablet .. 650 milliGRAM(s) Oral every 6 hours PRN Temp greater or equal to 38C (100.4F), Mild Pain (1 - 3), Moderate Pain (4 - 6)  acetaminophen   Tablet .. 650 milliGRAM(s) Oral every 6 hours PRN Mild Pain (1 - 3)  aluminum hydroxide/magnesium hydroxide/simethicone Suspension 30 milliLiter(s) Oral every 6 hours PRN Dyspepsia  dicyclomine 10 milliGRAM(s) Oral once PRN abdominal pain  HYDROmorphone  Injectable 1 milliGRAM(s) IV Push every 8 hours PRN Severe Pain (7 - 10)  oxycodone    5 mG/acetaminophen 325 mG 1 Tablet(s) Oral every 6 hours PRN Moderate Pain (4 - 6)  sodium chloride 0.9% lock flush 10 milliLiter(s) IV Push every 1 hour PRN Pre/post blood products, medications, blood draw, and to maintain line patency       Allergies    No Known Drug Allergies  shellfish (Urticaria)    Intolerances      Vital Signs Last 24 Hrs  T(C): 36.7 (2021 17:06), Max: 37.2 (2021 05:33)  T(F): 98.1 (2021 17:06), Max: 98.9 (2021 05:33)  HR: 88 (2021 17:06) (77 - 92)  BP: 141/89 (2021 17:06) (116/75 - 145/80)  BP(mean): --  RR: 17 (2021 17:06) (17 - 18)  SpO2: 99% (2021 17:06) (98% - 100%)  CAPILLARY BLOOD GLUCOSE      POCT Blood Glucose.: 164 mg/dL (2021 21:14)  POCT Blood Glucose.: 171 mg/dL (2021 16:52)  POCT Blood Glucose.: 161 mg/dL (2021 12:13)  POCT Blood Glucose.: 225 mg/dL (2021 07:41)       @ 07: @ 07:00  --------------------------------------------------------  IN: 0 mL / OUT: 1250 mL / NET: -1250 mL     @ 07: @ 21:15  --------------------------------------------------------  IN: 0 mL / OUT: 350 mL / NET: -350 mL      Physical Exam:    Daily     Daily Weight in k.1 (2021 01:32)NAD   General:  NAD   HEENT:  Nonicteric, PERRLA  CV:  RRR, S1S2   Lungs:  CTA B/L, no wheezes, rales, rhonchi  Abdomen:  Soft, non-tender, no distended, positive BS  Extremities:  no edema   Neuro:  LE weakness                                                                                                                                                                                                                                                                                      LABS:                               10.9   7.86  )-----------( 254      ( 2021 07:21 )             33.5                          140  |  104  |  16  ----------------------------<  204<H>  4.2   |  24  |  0.97    Ca    9.1      2021 07:21  Phos  3.6       Mg     1.9                              RADIOLOGY & ADDITIONAL TESTS         I personally reviewed: [  ]EKG   [  ]CXR    [  ] CT      A/P:         Discussed with :     Francisco consultants' Notes   Time spent :

## 2021-04-27 NOTE — DISCHARGE NOTE PROVIDER - HOSPITAL COURSE
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    -LP results reviewed. protein albumin dissociation   plasma exchange : treatment as planned 5 sessions (4/21, 4/23, 4/25, 4/27)  -neuro follow up.  -RPR positive: treated and no further need for any treatment per ID     Abdominal pain   -GI following - Dr rae   -pain control with PO/ dilaudid/PO percocet   -CT abdomen negative for acute pathology GGO seen in lungs, diet advanced  -Dicyclomine ordered for abd pain     Hyponatremia/ALLIE  -nephro consulted  -osmolalities   -likely multifactorial including hyperglycemia ( pseudohyponatremia ) and pre renal sec to diarreah   ? ADH induced by pain   -s/p fluids - improving likely setting of hyperglycemia   -free water restriction <1.2L/day.  -f/u TSH, serum cortisol level 5.7 low    DM: uncontrolled   -A1C 11.5%  -Endo consulted  -TSH/T3 wnl, f/u cortisol--  -c/w Lantus 36 units qhs, Admelog 16/14/14  -c/w Admelog moderate scale premeal and moderate bedtime scale    + RPR  -ID consulted- treated 2017   -his RPR is 1:1 - no s/s of active syphilis  -treated with PCN in past  -no need for abx currently    HTN   - not well constrolled   -norvasc restarted     Numbness : likely nueropathic   LLE proximal weakness ??  limited exam sec to pain   otherwise no concerns for spinal involvement   -neuro consulted   check Xray     constipation   -: had had colonoscopy 2 yrs ago with Dr. Rae   hold further bowel regimen   cu ct a/p     Abnormal CT  - CT chest ordered to eval GGO seen on abdominal CT - faint b/l groundglass and reticular opacities with a slight peripheral predominance are of uncertain etiology  - CXR - enlarged heart, no focal consol    hematuria / proteinuria   - labs and urine ordered for vasculitis workup  - nephro following  - will need kidney biopsy likely as outpt     Dispo: rehab    On_________, case was discussed with __________, patient is medically cleared and optimized for discharge today. All medications were reviewed with attending, and sent to mutually agreed upon pharmacy.

## 2021-04-27 NOTE — DISCHARGE NOTE PROVIDER - NSDCFUSCHEDAPPT_GEN_ALL_CORE_FT
JAGUAR PRADO ; 06/08/2021 ; [facility] [address] [facility]  JAGUAR PRADO ; 07/12/2021 ; [facility] [address] [facility]

## 2021-04-27 NOTE — PROGRESS NOTE ADULT - SUBJECTIVE AND OBJECTIVE BOX
Patient is a 50y Male     Patient is a 50y old  Male who presents with a chief complaint of GBS (26 Apr 2021 11:02)      HPI:  51 yo male, PMH DM, HTN, hyperlipidemia; pt presented to the ED c/o abdominal cramping, generalized, and diarrhea following use of laxatives as above for constipation.  In the ED, WBC 10.82, Hb 13.6, CMP: sodium 126, chloride 89, glucose 281, CMP otherwise unremarkable.  VBG: Lactate 2.6 (later downtrended to 1.6).  Pt. was treated with IV hydration and dispo'd to CDU for continued care plan:  IV hydration, supportive care, AM labs, general observation care / monitoring. pt continued to have generalized weakness , numbness and on labs persistent hyponatermia and now being admitted for furhter evlauation .  dnenies N/V/Abd pain   had bm   no urinary sx   no fever or chills   no cough   no cp /sob    (11 Apr 2021 15:50)      PAST MEDICAL & SURGICAL HISTORY:  DM (diabetes mellitus)    HTN (hypertension)    Hyperlipidemia    H/O total knee replacement    Foot fracture        MEDICATIONS  (STANDING):  albumin human  5% IVPB 3750 milliLiter(s) IV Intermittent every 48 hours  albumin human  5% IVPB 3750 milliLiter(s) IV Intermittent once  amLODIPine   Tablet 10 milliGRAM(s) Oral daily  bisacodyl 5 milliGRAM(s) Oral at bedtime  calcium gluconate IVPB 2 Gram(s) IV Intermittent <User Schedule>  calcium gluconate IVPB 2 Gram(s) IV Intermittent once  chlorhexidine 2% Cloths 1 Application(s) Topical daily  dextrose 40% Gel 15 Gram(s) Oral once  dextrose 5%. 1000 milliLiter(s) (50 mL/Hr) IV Continuous <Continuous>  dextrose 5%. 1000 milliLiter(s) (100 mL/Hr) IV Continuous <Continuous>  dextrose 50% Injectable 25 Gram(s) IV Push once  dextrose 50% Injectable 12.5 Gram(s) IV Push once  dextrose 50% Injectable 25 Gram(s) IV Push once  enoxaparin Injectable 40 milliGRAM(s) SubCutaneous daily  gabapentin 100 milliGRAM(s) Oral three times a day  glucagon  Injectable 1 milliGRAM(s) IntraMuscular once  hydrALAZINE 25 milliGRAM(s) Oral three times a day  HYDROmorphone  Injectable 1 milliGRAM(s) IV Push once  insulin glargine Injectable (LANTUS) 48 Unit(s) SubCutaneous at bedtime  insulin lispro (ADMELOG) corrective regimen sliding scale   SubCutaneous at bedtime  insulin lispro (ADMELOG) corrective regimen sliding scale   SubCutaneous three times a day before meals  insulin lispro Injectable (ADMELOG) 16 Unit(s) SubCutaneous before breakfast  insulin lispro Injectable (ADMELOG) 14 Unit(s) SubCutaneous before lunch  insulin lispro Injectable (ADMELOG) 14 Unit(s) SubCutaneous before dinner  losartan 100 milliGRAM(s) Oral daily  metoprolol tartrate 12.5 milliGRAM(s) Oral two times a day  mineral oil 30 milliLiter(s) Oral once  senna 2 Tablet(s) Oral two times a day  sodium chloride 0.9%. 1000 milliLiter(s) (75 mL/Hr) IV Continuous <Continuous>      Allergies    No Known Drug Allergies  shellfish (Urticaria)    Intolerances        SOCIAL HISTORY:  Denies ETOh,Smoking,     FAMILY HISTORY:  No pertinent family history in first degree relatives        REVIEW OF SYSTEMS:    CONSTITUTIONAL: No weakness, fevers or chills  EYES/ENT: No visual changes;  No vertigo or throat pain   NECK: No pain or stiffness  RESPIRATORY: No cough, wheezing, hemoptysis; No shortness of breath  CARDIOVASCULAR: No chest pain or palpitations  GASTROINTESTINAL: No abdominal or epigastric pain. No nausea, vomiting, or hematemesis; No diarrhea or constipation. No melena or hematochezia.  GENITOURINARY: No dysuria, frequency or hematuria  NEUROLOGICAL: No numbness or weakness  SKIN: No itching, burning, rashes, or lesions   All other review of systems is negative unless indicated above.    VITAL:  T(C): , Max: 37.2 (04-27-21 @ 05:33)  T(F): , Max: 98.9 (04-27-21 @ 05:33)  HR: 80 (04-27-21 @ 05:33)  BP: 133/74 (04-27-21 @ 05:33)  BP(mean): --  RR: 18 (04-27-21 @ 05:33)  SpO2: 100% (04-27-21 @ 05:33)  Wt(kg): --    I and O's:    04-25 @ 07:01  -  04-26 @ 07:00  --------------------------------------------------------  IN: 0 mL / OUT: 200 mL / NET: -200 mL    04-26 @ 07:01  -  04-27 @ 07:00  --------------------------------------------------------  IN: 0 mL / OUT: 1250 mL / NET: -1250 mL          PHYSICAL EXAM:    Constitutional: NAD  HEENT: PERRLA,   Neck: No JVD  Respiratory: CTA B/L  Cardiovascular: S1 and S2  Gastrointestinal: BS+, soft, NT/ND  Extremities: No peripheral edema  Neurological: A/O x 3, no focal deficits  Psychiatric: Normal mood, normal affect  : No Jose  Skin: No rashes  Access: Not applicable  Back: No CVA tenderness    LABS:                        10.9   7.86  )-----------( 254      ( 27 Apr 2021 07:21 )             33.5     04-27    140  |  104  |  16  ----------------------------<  204<H>  4.2   |  24  |  0.97    Ca    9.1      27 Apr 2021 07:21  Phos  3.6     04-27  Mg     1.9     04-27            RADIOLOGY & ADDITIONAL STUDIES:

## 2021-04-27 NOTE — OCCUPATIONAL THERAPY INITIAL EVALUATION ADULT - SITTING BALANCE: DYNAMIC
note:



This SW met with the patient to assess for the need of community resources.  Patient is a 54 
year old male.  Patient was admitted to the hospital on 03/19/2021 for left foot necrosis.  
Patient is alert, oriented, and was eating lunch when SW entered the room.  Patient lives in 
[place], however has been staying with his mother in [place], where he will return to upon 
discharge.  Address is [address] [place] [address].  Patient was cooperative with 
this SW and engaged in dialogue, however at times needed to be redirected to respond 
questions, as patient wanted to discuss his cactus garden, future plans for house 
remodeling, and plans to work as a .  SW explored patient's needs for 
community resources, and patient stated that he did not have any needs at this time, as he 
was returning home to live with his mother.   At this time, no SS interventions are needed.  
Patient to be discharged home, when medically stable. poor minus

## 2021-04-27 NOTE — PROGRESS NOTE ADULT - SUBJECTIVE AND OBJECTIVE BOX
HPI:  49 yo male, PMH DM, HTN, hyperlipidemia; pt presented to the ED c/o abdominal cramping, generalized, and diarrhea following use of laxatives as above for constipation.  In the ED, WBC 10.82, Hb 13.6, CMP: sodium 126, chloride 89, glucose 281, CMP otherwise unremarkable.  VBG: Lactate 2.6 (later downtrended to 1.6).  Pt. was treated with IV hydration and dispo'd to CDU for continued care plan:  IV hydration, supportive care, AM labs, general observation care / monitoring. pt continued to have generalized weakness , numbness and on labs persistent hyponatermia and now being admitted for furhter evlauation .  dnenies N/V/Abd pain           Vital Signs Last 24 Hrs  T(C): 36.8 (27 Apr 2021 10:29), Max: 37.2 (27 Apr 2021 05:33)  T(F): 98.2 (27 Apr 2021 10:29), Max: 98.9 (27 Apr 2021 05:33)  HR: 92 (27 Apr 2021 10:29) (77 - 92)  BP: 116/75 (27 Apr 2021 10:29) (116/75 - 145/80)  BP(mean): --  RR: 18 (27 Apr 2021 10:29) (17 - 18)  SpO2: 99% (27 Apr 2021 10:29) (98% - 100%)                            10.9   7.86  )-----------( 254      ( 27 Apr 2021 07:21 )             33.5       04-27    140  |  104  |  16  ----------------------------<  204<H>  4.2   |  24  |  0.97    Ca    9.1      27 Apr 2021 07:21  Phos  3.6     04-27  Mg     1.9     04-27      Fibrinogen Assay: 492 mg/dL (04-27 @ 07:21)   HPI: BP stable, no interval changes since last PLEX on 4/25 which was uneventful.      Vital Signs Last 24 Hrs  T(C): 36.8 (27 Apr 2021 10:29), Max: 37.2 (27 Apr 2021 05:33)  T(F): 98.2 (27 Apr 2021 10:29), Max: 98.9 (27 Apr 2021 05:33)  HR: 92 (27 Apr 2021 10:29) (77 - 92)  BP: 116/75 (27 Apr 2021 10:29) (116/75 - 145/80)  BP(mean): --  RR: 18 (27 Apr 2021 10:29) (17 - 18)  SpO2: 99% (27 Apr 2021 10:29) (98% - 100%)                            10.9   7.86  )-----------( 254      ( 27 Apr 2021 07:21 )             33.5       04-27    140  |  104  |  16  ----------------------------<  204<H>  4.2   |  24  |  0.97    Ca    9.1      27 Apr 2021 07:21  Phos  3.6     04-27  Mg     1.9     04-27      Fibrinogen Assay: 492 mg/dL (04-27 @ 07:21)

## 2021-04-27 NOTE — PROGRESS NOTE ADULT - ASSESSMENT
49 yo male, PMH DM, HTN, hyperlipidemia; pt presented to the ED c/o abdominal cramping, generalized, and diarrhea following use of laxatives as above for constipation.  In the ED, WBC 10.82, Hb 13.6, CMP: sodium 126, chloride 89, glucose 281, CMP otherwise unremarkable.  VBG: Lactate 2.6 (later downtrended to 1.6).  Pt. was treated with IV hydration and dispo'd to CDU for continued care plan:  IV hydration, supportive care, AM labs, general observation care / monitoring. pt continued to have generalized weakness , numbness and on labs persistent hyponatermia      A/P:  ALLIE  baseline likely ~ 1  ALLIE possible sec to uncontrolled hyperkalemia on losartan  scr improved  resumed losartan  monitor bmp  avoid nephrotoxic agents    Hyponatremia:  Likely sec to hypovolemia+hyperglycemia. work up suggested SIADH  s/p NS 4/20 for ALLIE  Na improved  optimize dm control  free water restriction <1.2L/day.  ok TSH, serum cortisol level  Monitor Na level Q12  Na level should not increase more than 6meq in 24 hrs    Proteinuia/Hematuria:  Etiology?  Had Cystoscopy last year and was normal per patient  Possible sec to Diabetic Nephropathy but will need full vasculitis work up  check urine p/c ratio  c3 c4 hep b hep c hiv, k/l wnl, lexii neg  pendingANCA, Serum immunofixation, SPEP    RPR +, patient s/p treatment 2017 ID eval appreciated     Based on work up result he might need kidney biopsy, which can be planned as outpatient as his renal function is stable  D/W patient in detail above plan    Hypophosphatemia:  SUpplement as needed   montior    HTN  BP controlled   on norvasc and losartan, hydralazine  hold per perimeter   Monitor closely

## 2021-04-27 NOTE — PROGRESS NOTE ADULT - ASSESSMENT
49 yo RH AA male with h/o DM, HTN, hyperlipidemia; pt presented to the ED c/o abdominal cramping, generalized, and diarrhea following use of laxatives as above for constipation. Neurology called for evaluation of LE weakness, unsteady gait. s/p shieley. Na improved. 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 protein  c/w GBS given albumino cytologic dissociation  - GI following for constipation  - hyponatremia improved   - s/p shiley   - complete 5 sessions of PLEX  plan for treatment thursday #5/5, and d/c plan Friday to rehab   - f/u remaining LP results.   - f/u ganglioside antibodies, Gq1b -->neg   - slow correction of sodium  - aggressive PT/OT  - B12 >2000, copper, Vit E, thiamine, magnesium heavy metals, zinc  -refused IVIG but agreed for PLEX  - telemetry  - check FS, glucose control <180  - GI/DVT ppx  - Counseling on diet, exercise, and medication adherence was done  - Counseling on smoking cessation and alcohol consumption offered when appropriate.  - Pain assessed and judicious use of narcotics when appropriate was discussed.    - Stroke education given when appropriate.  - Importance of fall prevention discussed.   - Differential diagnosis and plan of care discussed with patient and/or family and primary team  - Thank you for allowing me to participate in the care of this patient. Call with questions.     Geoffrey Gardner MD  Vascular Neurology  Office: [phone]

## 2021-04-28 LAB
ANION GAP SERPL CALC-SCNC: 12 MMOL/L — SIGNIFICANT CHANGE UP (ref 7–14)
BUN SERPL-MCNC: 17 MG/DL — SIGNIFICANT CHANGE UP (ref 7–23)
CALCIUM SERPL-MCNC: 9 MG/DL — SIGNIFICANT CHANGE UP (ref 8.4–10.5)
CHLORIDE SERPL-SCNC: 105 MMOL/L — SIGNIFICANT CHANGE UP (ref 98–107)
CO2 SERPL-SCNC: 24 MMOL/L — SIGNIFICANT CHANGE UP (ref 22–31)
CREAT SERPL-MCNC: 0.85 MG/DL — SIGNIFICANT CHANGE UP (ref 0.5–1.3)
GLUCOSE SERPL-MCNC: 202 MG/DL — HIGH (ref 70–99)
HCT VFR BLD CALC: 33.8 % — LOW (ref 39–50)
HGB BLD-MCNC: 10.9 G/DL — LOW (ref 13–17)
MAGNESIUM SERPL-MCNC: 1.9 MG/DL — SIGNIFICANT CHANGE UP (ref 1.6–2.6)
MCHC RBC-ENTMCNC: 29.5 PG — SIGNIFICANT CHANGE UP (ref 27–34)
MCHC RBC-ENTMCNC: 32.2 GM/DL — SIGNIFICANT CHANGE UP (ref 32–36)
MCV RBC AUTO: 91.4 FL — SIGNIFICANT CHANGE UP (ref 80–100)
NRBC # BLD: 0 /100 WBCS — SIGNIFICANT CHANGE UP
NRBC # FLD: 0 K/UL — SIGNIFICANT CHANGE UP
PHOSPHATE SERPL-MCNC: 3.7 MG/DL — SIGNIFICANT CHANGE UP (ref 2.5–4.5)
PLATELET # BLD AUTO: 244 K/UL — SIGNIFICANT CHANGE UP (ref 150–400)
POTASSIUM SERPL-MCNC: 4.3 MMOL/L — SIGNIFICANT CHANGE UP (ref 3.5–5.3)
POTASSIUM SERPL-SCNC: 4.3 MMOL/L — SIGNIFICANT CHANGE UP (ref 3.5–5.3)
RBC # BLD: 3.7 M/UL — LOW (ref 4.2–5.8)
RBC # FLD: 12.1 % — SIGNIFICANT CHANGE UP (ref 10.3–14.5)
SODIUM SERPL-SCNC: 141 MMOL/L — SIGNIFICANT CHANGE UP (ref 135–145)
WBC # BLD: 8.69 K/UL — SIGNIFICANT CHANGE UP (ref 3.8–10.5)
WBC # FLD AUTO: 8.69 K/UL — SIGNIFICANT CHANGE UP (ref 3.8–10.5)

## 2021-04-28 PROCEDURE — 99232 SBSQ HOSP IP/OBS MODERATE 35: CPT

## 2021-04-28 RX ORDER — INSULIN LISPRO 100/ML
3 VIAL (ML) SUBCUTANEOUS
Refills: 0 | Status: DISCONTINUED | OUTPATIENT
Start: 2021-04-28 | End: 2021-04-30

## 2021-04-28 RX ORDER — ALBUMIN HUMAN 25 %
3750 VIAL (ML) INTRAVENOUS ONCE
Refills: 0 | Status: DISCONTINUED | OUTPATIENT
Start: 2021-04-29 | End: 2021-05-05

## 2021-04-28 RX ORDER — INSULIN GLARGINE 100 [IU]/ML
50 INJECTION, SOLUTION SUBCUTANEOUS AT BEDTIME
Refills: 0 | Status: DISCONTINUED | OUTPATIENT
Start: 2021-04-28 | End: 2021-04-29

## 2021-04-28 RX ORDER — CALCIUM GLUCONATE 100 MG/ML
2 VIAL (ML) INTRAVENOUS ONCE
Refills: 0 | Status: DISCONTINUED | OUTPATIENT
Start: 2021-04-29 | End: 2021-05-05

## 2021-04-28 RX ADMIN — OXYCODONE AND ACETAMINOPHEN 1 TABLET(S): 5; 325 TABLET ORAL at 10:37

## 2021-04-28 RX ADMIN — INSULIN GLARGINE 50 UNIT(S): 100 INJECTION, SOLUTION SUBCUTANEOUS at 21:45

## 2021-04-28 RX ADMIN — Medication 25 MILLIGRAM(S): at 22:01

## 2021-04-28 RX ADMIN — OXYCODONE AND ACETAMINOPHEN 1 TABLET(S): 5; 325 TABLET ORAL at 11:05

## 2021-04-28 RX ADMIN — CHLORHEXIDINE GLUCONATE 1 APPLICATION(S): 213 SOLUTION TOPICAL at 12:11

## 2021-04-28 RX ADMIN — HYDROMORPHONE HYDROCHLORIDE 1 MILLIGRAM(S): 2 INJECTION INTRAMUSCULAR; INTRAVENOUS; SUBCUTANEOUS at 14:29

## 2021-04-28 RX ADMIN — HYDROMORPHONE HYDROCHLORIDE 1 MILLIGRAM(S): 2 INJECTION INTRAMUSCULAR; INTRAVENOUS; SUBCUTANEOUS at 23:00

## 2021-04-28 RX ADMIN — HYDROMORPHONE HYDROCHLORIDE 1 MILLIGRAM(S): 2 INJECTION INTRAMUSCULAR; INTRAVENOUS; SUBCUTANEOUS at 06:32

## 2021-04-28 RX ADMIN — SENNA PLUS 2 TABLET(S): 8.6 TABLET ORAL at 17:13

## 2021-04-28 RX ADMIN — AMLODIPINE BESYLATE 10 MILLIGRAM(S): 2.5 TABLET ORAL at 06:53

## 2021-04-28 RX ADMIN — Medication 12.5 MILLIGRAM(S): at 17:13

## 2021-04-28 RX ADMIN — HYDROMORPHONE HYDROCHLORIDE 1 MILLIGRAM(S): 2 INJECTION INTRAMUSCULAR; INTRAVENOUS; SUBCUTANEOUS at 22:30

## 2021-04-28 RX ADMIN — Medication 2: at 12:09

## 2021-04-28 RX ADMIN — Medication 12.5 MILLIGRAM(S): at 07:01

## 2021-04-28 RX ADMIN — HYDROMORPHONE HYDROCHLORIDE 1 MILLIGRAM(S): 2 INJECTION INTRAMUSCULAR; INTRAVENOUS; SUBCUTANEOUS at 07:02

## 2021-04-28 RX ADMIN — Medication 4: at 07:40

## 2021-04-28 RX ADMIN — ENOXAPARIN SODIUM 40 MILLIGRAM(S): 100 INJECTION SUBCUTANEOUS at 12:09

## 2021-04-28 RX ADMIN — GABAPENTIN 100 MILLIGRAM(S): 400 CAPSULE ORAL at 22:01

## 2021-04-28 RX ADMIN — Medication 14 UNIT(S): at 17:13

## 2021-04-28 RX ADMIN — GABAPENTIN 100 MILLIGRAM(S): 400 CAPSULE ORAL at 06:53

## 2021-04-28 RX ADMIN — Medication 14 UNIT(S): at 12:09

## 2021-04-28 RX ADMIN — SENNA PLUS 2 TABLET(S): 8.6 TABLET ORAL at 07:02

## 2021-04-28 RX ADMIN — Medication 16 UNIT(S): at 07:40

## 2021-04-28 RX ADMIN — Medication 25 MILLIGRAM(S): at 06:53

## 2021-04-28 RX ADMIN — GABAPENTIN 100 MILLIGRAM(S): 400 CAPSULE ORAL at 14:29

## 2021-04-28 RX ADMIN — Medication 25 MILLIGRAM(S): at 14:29

## 2021-04-28 RX ADMIN — HYDROMORPHONE HYDROCHLORIDE 1 MILLIGRAM(S): 2 INJECTION INTRAMUSCULAR; INTRAVENOUS; SUBCUTANEOUS at 15:24

## 2021-04-28 NOTE — PROGRESS NOTE ADULT - SUBJECTIVE AND OBJECTIVE BOX
Date of service: 21 @ 16:16      Patient is a 50y old  Male who presents with a chief complaint of abdominal pian (2021 09:16)                                                               INTERVAL HPI/OVERNIGHT EVENTS:    REVIEW OF SYSTEMS:     CONSTITUTIONAL: No weakness, fevers or chills  RESPIRATORY: No cough, wheezing,  No shortness of breath  CARDIOVASCULAR: No chest pain or palpitations  GASTROINTESTINAL: No abdominal pain  . No nausea, vomiting, or hematemesis; No diarrhea or constipation. No melena or hematochezia.  GENITOURINARY: No dysuria, frequency or hematuria  NEUROLOGICAL: No numbness or weakness                                                                                                                                                                                                                                                                               Medications:  MEDICATIONS  (STANDING):  albumin human  5% IVPB 3750 milliLiter(s) IV Intermittent every 48 hours  albumin human  5% IVPB 3750 milliLiter(s) IV Intermittent once  amLODIPine   Tablet 10 milliGRAM(s) Oral daily  bisacodyl 5 milliGRAM(s) Oral at bedtime  calcium gluconate IVPB 2 Gram(s) IV Intermittent <User Schedule>  calcium gluconate IVPB 2 Gram(s) IV Intermittent once  chlorhexidine 2% Cloths 1 Application(s) Topical daily  dextrose 40% Gel 15 Gram(s) Oral once  dextrose 5%. 1000 milliLiter(s) (50 mL/Hr) IV Continuous <Continuous>  dextrose 5%. 1000 milliLiter(s) (100 mL/Hr) IV Continuous <Continuous>  dextrose 50% Injectable 25 Gram(s) IV Push once  dextrose 50% Injectable 12.5 Gram(s) IV Push once  dextrose 50% Injectable 25 Gram(s) IV Push once  enoxaparin Injectable 40 milliGRAM(s) SubCutaneous daily  gabapentin 100 milliGRAM(s) Oral three times a day  glucagon  Injectable 1 milliGRAM(s) IntraMuscular once  hydrALAZINE 25 milliGRAM(s) Oral three times a day  HYDROmorphone  Injectable 1 milliGRAM(s) IV Push once  insulin glargine Injectable (LANTUS) 50 Unit(s) SubCutaneous at bedtime  insulin lispro (ADMELOG) corrective regimen sliding scale   SubCutaneous three times a day before meals  insulin lispro (ADMELOG) corrective regimen sliding scale   SubCutaneous at bedtime  insulin lispro Injectable (ADMELOG) 3 Unit(s) SubCutaneous <User Schedule>  insulin lispro Injectable (ADMELOG) 14 Unit(s) SubCutaneous before lunch  insulin lispro Injectable (ADMELOG) 14 Unit(s) SubCutaneous before dinner  insulin lispro Injectable (ADMELOG) 16 Unit(s) SubCutaneous before breakfast  losartan 100 milliGRAM(s) Oral daily  metoprolol tartrate 12.5 milliGRAM(s) Oral two times a day  mineral oil 30 milliLiter(s) Oral once  senna 2 Tablet(s) Oral two times a day  sodium chloride 0.9%. 1000 milliLiter(s) (75 mL/Hr) IV Continuous <Continuous>    MEDICATIONS  (PRN):  acetaminophen   Tablet .. 650 milliGRAM(s) Oral every 6 hours PRN Temp greater or equal to 38C (100.4F), Mild Pain (1 - 3), Moderate Pain (4 - 6)  acetaminophen   Tablet .. 650 milliGRAM(s) Oral every 6 hours PRN Mild Pain (1 - 3)  aluminum hydroxide/magnesium hydroxide/simethicone Suspension 30 milliLiter(s) Oral every 6 hours PRN Dyspepsia  dicyclomine 10 milliGRAM(s) Oral once PRN abdominal pain  HYDROmorphone  Injectable 1 milliGRAM(s) IV Push every 8 hours PRN Severe Pain (7 - 10)  oxycodone    5 mG/acetaminophen 325 mG 1 Tablet(s) Oral every 6 hours PRN Moderate Pain (4 - 6)  sodium chloride 0.9% lock flush 10 milliLiter(s) IV Push every 1 hour PRN Pre/post blood products, medications, blood draw, and to maintain line patency       Allergies    No Known Drug Allergies  shellfish (Urticaria)    Intolerances      Vital Signs Last 24 Hrs  T(C): 36.8 (2021 14:24), Max: 36.9 (2021 01:57)  T(F): 98.2 (2021 14:24), Max: 98.4 (2021 01:57)  HR: 80 (2021 14:24) (80 - 88)  BP: 138/75 (2021 14:24) (135/69 - 150/88)  BP(mean): --  RR: 18 (2021 14:24) (17 - 18)  SpO2: 100% (2021 14:24) (99% - 100%)  CAPILLARY BLOOD GLUCOSE      POCT Blood Glucose.: 180 mg/dL (2021 11:55)  POCT Blood Glucose.: 250 mg/dL (2021 07:35)  POCT Blood Glucose.: 164 mg/dL (2021 21:14)  POCT Blood Glucose.: 171 mg/dL (2021 16:52)       @ 07:01  -   @ 07:00  --------------------------------------------------------  IN: 0 mL / OUT: 1350 mL / NET: -1350 mL      Physical Exam:    Daily     Daily Weight in k (2021 01:57)  General:  NAD   HEENT:  Nonicteric, PERRLA  CV:  RRR, S1S2   Lungs:  CTA B/L, no wheezes, rales, rhonchi  Abdomen:  Soft, non-tender, no distended, positive BS  Extremities:  no edema   Neuro:  improving LE weakness                                                                                                                                                                                                                                                                                 LABS:                               10.9   8.69  )-----------( 244      ( 2021 07:27 )             33.8                      28    141  |  105  |  17  ----------------------------<  202<H>  4.3   |  24  |  0.85    Ca    9.0      2021 07:27  Phos  3.7       Mg     1.9                              RADIOLOGY & ADDITIONAL TESTS         I personally reviewed: [  ]EKG   [  ]CXR    [  ] CT      A/P:         Discussed with :     Francisco consultants' Notes   Time spent :

## 2021-04-28 NOTE — PROGRESS NOTE ADULT - SUBJECTIVE AND OBJECTIVE BOX
Neurology Progress Note    S: Patient seen and examined. PLEX # 4 completed. #5 tomorrow    Medication:  MEDICATIONS  (STANDING):  albumin human  5% IVPB 3750 milliLiter(s) IV Intermittent every 48 hours  amLODIPine   Tablet 10 milliGRAM(s) Oral daily  bisacodyl 5 milliGRAM(s) Oral at bedtime  calcium gluconate IVPB 2 Gram(s) IV Intermittent <User Schedule>  chlorhexidine 2% Cloths 1 Application(s) Topical daily  dextrose 40% Gel 15 Gram(s) Oral once  dextrose 5%. 1000 milliLiter(s) (50 mL/Hr) IV Continuous <Continuous>  dextrose 5%. 1000 milliLiter(s) (100 mL/Hr) IV Continuous <Continuous>  dextrose 50% Injectable 25 Gram(s) IV Push once  dextrose 50% Injectable 12.5 Gram(s) IV Push once  dextrose 50% Injectable 25 Gram(s) IV Push once  enoxaparin Injectable 40 milliGRAM(s) SubCutaneous daily  gabapentin 100 milliGRAM(s) Oral three times a day  glucagon  Injectable 1 milliGRAM(s) IntraMuscular once  hydrALAZINE 25 milliGRAM(s) Oral three times a day  HYDROmorphone  Injectable 1 milliGRAM(s) IV Push once  insulin glargine Injectable (LANTUS) 48 Unit(s) SubCutaneous at bedtime  insulin lispro (ADMELOG) corrective regimen sliding scale   SubCutaneous at bedtime  insulin lispro (ADMELOG) corrective regimen sliding scale   SubCutaneous three times a day before meals  insulin lispro Injectable (ADMELOG) 14 Unit(s) SubCutaneous before lunch  insulin lispro Injectable (ADMELOG) 14 Unit(s) SubCutaneous before dinner  insulin lispro Injectable (ADMELOG) 16 Unit(s) SubCutaneous before breakfast  losartan 100 milliGRAM(s) Oral daily  metoprolol tartrate 12.5 milliGRAM(s) Oral two times a day  mineral oil 30 milliLiter(s) Oral once  senna 2 Tablet(s) Oral two times a day  sodium chloride 0.9%. 1000 milliLiter(s) (75 mL/Hr) IV Continuous <Continuous>    MEDICATIONS  (PRN):  acetaminophen   Tablet .. 650 milliGRAM(s) Oral every 6 hours PRN Temp greater or equal to 38C (100.4F), Mild Pain (1 - 3), Moderate Pain (4 - 6)  acetaminophen   Tablet .. 650 milliGRAM(s) Oral every 6 hours PRN Mild Pain (1 - 3)  aluminum hydroxide/magnesium hydroxide/simethicone Suspension 30 milliLiter(s) Oral every 6 hours PRN Dyspepsia  dicyclomine 10 milliGRAM(s) Oral once PRN abdominal pain  HYDROmorphone  Injectable 1 milliGRAM(s) IV Push every 8 hours PRN Severe Pain (7 - 10)  oxycodone    5 mG/acetaminophen 325 mG 1 Tablet(s) Oral every 6 hours PRN Moderate Pain (4 - 6)  sodium chloride 0.9% lock flush 10 milliLiter(s) IV Push every 1 hour PRN Pre/post blood products, medications, blood draw, and to maintain line patency      Vitals:  Vital Signs Last 24 Hrs  Vital Signs Last 24 Hrs  T(C): 36.7 (28 Apr 2021 09:23), Max: 36.9 (28 Apr 2021 01:57)  T(F): 98 (28 Apr 2021 09:23), Max: 98.4 (28 Apr 2021 01:57)  HR: 88 (28 Apr 2021 09:23) (83 - 92)  BP: 146/86 (28 Apr 2021 09:23) (116/75 - 150/88)  BP(mean): --  RR: 18 (28 Apr 2021 09:23) (17 - 18)  SpO2: 100% (28 Apr 2021 09:23) (99% - 100%)    General Exam:   General Appearance: Appropriately dressed and in no acute distress       Head: Normocephalic, atraumatic and no dysmorphic features  Ear, Nose, and Throat: Moist mucous membranes  CVS: S1S2+  Resp: No SOB, no wheeze or rhonchi  Abd: soft NTND  Extremities: No edema, no cyanosis  Skin: No bruises, no rashes     Neurological Exam:  Mental Status: Awake, alert and oriented x 3.  Able to follow simple and complex verbal commands. Able to name and repeat. fluent speech. No obvious aphasia or dysarthria noted.   Cranial Nerves: PERRL, EOMI, VFFC, sensation V1-V3 intact,  no obvious facial asymmetry , equal elevation of palate, scm/trap 5/5, tongue is midline on protrusion. no obvious papilledema on fundoscopic exam. Hearing is grossly intact.   Motor: Normal bulk, tone and strength throughout B/L UE Fine finger movements were intact and symmetric. no tremors B/L LE 2-3/5  Sensation: Intact to light touch and pinprick throughout. no right/left confusion. no extinction to tactile on DSS. Romberg was negative.   Reflexes: 0 throughout at biceps, brachioradialis, triceps, patellars and ankles bilaterally and equal. only hyas LUE biceps 1+ No clonus. R toe and L toe were both downgoing.  Coordination: No dysmetria on FNF   Gait: unable    I personally reviewed the below data/images/labs:  CBC Full  -  ( 28 Apr 2021 07:27 )  WBC Count : 8.69 K/uL  RBC Count : 3.70 M/uL  Hemoglobin : 10.9 g/dL  Hematocrit : 33.8 %  Platelet Count - Automated : 244 K/uL  Mean Cell Volume : 91.4 fL  Mean Cell Hemoglobin : 29.5 pg  Mean Cell Hemoglobin Concentration : 32.2 gm/dL  Auto Neutrophil # : x  Auto Lymphocyte # : x  Auto Monocyte # : x  Auto Eosinophil # : x  Auto Basophil # : x  Auto Neutrophil % : x  Auto Lymphocyte % : x  Auto Monocyte % : x  Auto Eosinophil % : x  Auto Basophil % : x    04-28    141  |  105  |  17  ----------------------------<  202<H>  4.3   |  24  |  0.85    Ca    9.0      28 Apr 2021 07:27  Phos  3.7     04-28  Mg     1.9     04-28          < from: CT Head No Cont (04.02.21 @ 21:48) >    EXAM:  CT BRAIN        PROCEDURE DATE:  Apr 2 2021         INTERPRETATION:  HISTORY: Hypertension    Technique: CT of the head was performed without contrast.    Multiple contiguous axial images were acquired from the skullbase to the vertex without the administration of intravenous contrast.  Coronal and sagittal reformations were made.    COMPARISON: None.    FINDINGS:  The ventricles and sulci are within normal limits given the patient's age. No acute hemorrhage, mass effect, midline shift,hydrocephalus, or extra-axial fluid collections. Mild patchy hypoattenuation within the white matter, likely secondary chronic microscopy changes.    The calvarium is intact.    Small cyst or polyp in the right maxillary sinus. There are opacification of a few ethmoid air cells bilaterally as well as mild mucosal thickening of the left frontal sinus. The mastoid air cells are clear.    IMPRESSION:  No acute hemorrhage, mass effect, or midline shift.            DARRELL MIX MD; Resident Radiology  This document has been electronically signed.  PORTILLO HARPER MD; Attending Radiologist  This document has been electronically signed. Apr 2 2021 11:34PM    < end of copied text >  < from: MR Lumbar Spine w/wo IV Cont (04.16.21 @ 22:16) >    EXAM:  MR SPINE LUMBAR WAW IC      EXAM:  MR SPINE THORACIC WAW IC        PROCEDURE DATE:  Apr 16 2021         INTERPRETATION:  CLINICAL INDICATION: Bilateral leg weakness of unknown etiology.    Technique: MRI of the thoracic and lumbar spine was performed with and without contrast. A total 10 cc of Gadavist were administered intravenously.    COMPARISON: None.    FINDINGS:    THORACIC:    There is normal thoracic kyphosis. There are multiple small vertebral body endplate Schmorl's nodes. Otherwise, the vertebrae are normal in signal, height, and alignment. There is anterior endplate osteophytosis most pronounced from the most pronounced from T6-T10.    There are tiny disc bulges at T7-T8 and T8-T9, without significant central spinal canalstenosis or neuroforaminal narrowing.    The spinal cord is normal in course, caliber, and signal.    There is no abnormal enhancement within thoracic spine.    LUMBAR:    There is normal lumbar lordosis. There are mild-to-moderate type I degenerative endplate changes on the left at L4-L5. Otherwise, the vertebral bodies are normal in height and signal without fracture or dislocation.    There is disc space narrowing and desiccation at L4-L5 and L5-S1.    Evaluation of individual levels demonstrates:    L5-S1: Small posterior disc bulge, as well as mild right greater than left facet hypertrophy, resulting in moderate right and mild left neural foraminal stenosis. No significant spinal canal stenosis.    L4-L5: Small posterior disc bulge and superimposed small right central disc protrusion, including tiny annular fissure, resulting in mild spinal canal stenosis, as well as bilateral facet hypertrophy with moderate right and severe left neural foraminal stenosis.    L3-L4: Small posterior disc bulge, along with bilateral facet hypertrophy, resulting in mild spinal canal stenosis as well as mild bilateral neural foraminal stenosis    L2-L3: No significant disc herniation, central spinal canal stenosis, or neural foraminal narrowing. Mildbilateral facet hypertrophy.    L1-L2: Tiny posterior disc bulge without significant central spinal canal stenosis or neuroforaminal narrowing. Mild bilateral facet hypertrophy.    The conus is normal in position and morphology at the L1 level. Thereis smooth thickening and abnormal enhancement of the cauda equina and ventral nerve roots, a nonspecific finding.    There is no definite fluid collection or mass lesion within the visualized retroperitoneal or posterior paraspinal soft tissues.    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 Alternative etiologies include chronic inflammatory demyelinating polyneuropathy, Lyme disease, less likely arachnoiditis or carcinomatosis.    Results were discussed with ADELSO Elkins by Dr. Haji at 9:40 AM on 4/17/2021 with read back followed.              ROSIE HAJI MD; Attending Radiologist  This document has been electronically signed. Apr 17 2021  9:48AM    < end of copied text >  < from: MR Cervical Spine w/wo IV Cont (04.16.21 @ 22:16) >    EXAM:  MR SPINE CERVICAL WAW IC        PROCEDURE DATE:  Apr 16 2021         INTERPRETATION:  CLINICAL INDICATION: Patient with new weakness in lower extremities.    TECHNIQUE: MRI of the cervical spine was performed before and after intravenous contrast. A total 10 cc of Gadavist were administered.    COMPARISON: None.    FINDINGS:  Structures at the craniocervical and cervicomedullary junction are intact.    There is nonspecific straightening of the normal cervical lordosis. There is grade 1 degenerative retrolisthesis of C5 on C6. There is disc space narrowing at C3-C4 and C5-C6. The remaining intervertebral disc space heights are grossly preserved. There is anterior endplate osteophytosis from C3 to C6.    There are mild Modic type II degenerative endplate changes involving C3 CT 4 and C5-C6, along with mild endplate irregularity. Otherwise, the vertebrae demonstrate normal height and signal without fracture, dislocation or marrow edema.    The spinal cord demonstrates normal course and caliber without intrinsic cord signal abnormality.    The prevertebral and paravertebral soft tissues are within normal limits.    There is no abnormal enhancement.    There is diffuse disc desiccation. Evaluation of individual levels demonstrates:    C2-C3: No significant disc herniation, central spinal canal stenosis, although neural foraminal narrowing.    C3-C4: Small posterior disc osteophyte complexes, partially effacing the ventral thecal sac and resulting in mild spinal canal stenosis,as well as moderate right and mild left neural foraminal narrowing.    C4-C5: Tiny posterior disc osteophyte complex minimally effacing the ventral thecal sac, without significant spinal canal stenosis, as well as moderate left and mild right neural foraminal narrowing.    C5-C6: Tiny posterior disc osteophyte complex minimally effacing the ventral thecal sac, without significant spinal canal stenosis, as well as severe right and moderate left neural foraminal narrowing.    C6-C7: No significant disc herniation, central spinal canal stenosis, or neural foraminal narrowing.    C7-T1: No significant disc herniation, central spinal canal stenosis, or neural foraminal narrowing.    There is no soft tissue neck mass or fluid collection.    IMPRESSION:  No evidence for spinal cord compression, signal abnormality, or abnormal enhancement.    Multilevel degenerative changes, as described above, most pronounced at C3-C4 where there is mild spinal canal stenosis and C5-C6 where there is severe right and moderate left neural foraminal narrowing.              ROSIE HAJI MD; Attending Radiologist  This document has been electronically signed. Apr 17 2021  8:54AM    < end of copied text >

## 2021-04-28 NOTE — PROGRESS NOTE ADULT - SUBJECTIVE AND OBJECTIVE BOX
Chief Complaint: DM2    History:   tolerating some po  good appetite  No n/v  No hypoglycemia  reprots he drank coconut water last night and had two cookies    MEDICATIONS  (STANDING):  albumin human  5% IVPB 3750 milliLiter(s) IV Intermittent every 48 hours  amLODIPine   Tablet 10 milliGRAM(s) Oral daily  bisacodyl 5 milliGRAM(s) Oral at bedtime  bisacodyl Suppository 10 milliGRAM(s) Rectal once  calcium gluconate IVPB 2 Gram(s) IV Intermittent <User Schedule>  dextrose 40% Gel 15 Gram(s) Oral once  dextrose 5%. 1000 milliLiter(s) (50 mL/Hr) IV Continuous <Continuous>  dextrose 5%. 1000 milliLiter(s) (100 mL/Hr) IV Continuous <Continuous>  dextrose 50% Injectable 25 Gram(s) IV Push once  dextrose 50% Injectable 12.5 Gram(s) IV Push once  dextrose 50% Injectable 25 Gram(s) IV Push once  diphenhydrAMINE 25 milliGRAM(s) Oral daily  gabapentin   Solution 100 milliGRAM(s) Oral three times a day  glucagon  Injectable 1 milliGRAM(s) IntraMuscular once  hydrALAZINE 25 milliGRAM(s) Oral three times a day  insulin glargine Injectable (LANTUS) 28 Unit(s) SubCutaneous at bedtime  insulin lispro (ADMELOG) corrective regimen sliding scale   SubCutaneous three times a day before meals  insulin lispro (ADMELOG) corrective regimen sliding scale   SubCutaneous at bedtime  insulin lispro Injectable (ADMELOG) 14 Unit(s) SubCutaneous three times a day before meals  losartan 100 milliGRAM(s) Oral daily  metoprolol tartrate 12.5 milliGRAM(s) Oral two times a day  senna 2 Tablet(s) Oral two times a day  sodium chloride 0.9%. 1000 milliLiter(s) (75 mL/Hr) IV Continuous <Continuous>    MEDICATIONS  (PRN):  acetaminophen   Tablet .. 650 milliGRAM(s) Oral every 6 hours PRN Temp greater or equal to 38C (100.4F), Mild Pain (1 - 3), Moderate Pain (4 - 6)  acetaminophen   Tablet .. 650 milliGRAM(s) Oral every 6 hours PRN Mild Pain (1 - 3)  aluminum hydroxide/magnesium hydroxide/simethicone Suspension 30 milliLiter(s) Oral every 6 hours PRN Dyspepsia  dicyclomine 10 milliGRAM(s) Oral once PRN abdominal pain  HYDROmorphone  Injectable 1 milliGRAM(s) IV Push every 8 hours PRN Severe Pain (7 - 10)  oxycodone    5 mG/acetaminophen 325 mG 1 Tablet(s) Oral every 6 hours PRN Moderate Pain (4 - 6)      Allergies    No Known Drug Allergies  shellfish (Urticaria)    Intolerances      Review of Systems:  ALL OTHER SYSTEMS REVIEWED AND NEGATIVE      Vital Signs Last 24 Hrs  T(C): 36.7 (28 Apr 2021 09:23), Max: 36.9 (28 Apr 2021 01:57)  T(F): 98 (28 Apr 2021 09:23), Max: 98.4 (28 Apr 2021 01:57)  HR: 88 (28 Apr 2021 09:23) (83 - 88)  BP: 146/86 (28 Apr 2021 09:23) (129/80 - 150/88)  BP(mean): --  RR: 18 (28 Apr 2021 09:23) (17 - 18)  SpO2: 100% (28 Apr 2021 09:23) (99% - 100%)  GENERAL: NAD, well-groomed, well-developed  EYES: No proptosis, no lid lag, anicteric  HEENT:  Atraumatic, Normocephalic, moist mucous membranes  RESPIRATORY: nonlabored respirations  PSYCH: Alert and oriented x 3, normal affect, normal mood      CAPILLARY BLOOD GLUCOSE      POCT Blood Glucose.: 180 mg/dL (28 Apr 2021 11:55)  POCT Blood Glucose.: 250 mg/dL (28 Apr 2021 07:35)  POCT Blood Glucose.: 164 mg/dL (27 Apr 2021 21:14)  POCT Blood Glucose.: 171 mg/dL (27 Apr 2021 16:52)    POCT Blood Glucose.: 119 mg/dL (26 Apr 2021 16:58)  POCT Blood Glucose.: 121 mg/dL (26 Apr 2021 12:01)  POCT Blood Glucose.: 206 mg/dL (26 Apr 2021 07:27)  POCT Blood Glucose.: 121 mg/dL (25 Apr 2021 21:34)  POCT Blood Glucose.: 175 mg/dL (21 Apr 2021 11:56)  POCT Blood Glucose.: 245 mg/dL (21 Apr 2021 07:26)  POCT Blood Glucose.: 168 mg/dL (20 Apr 2021 23:02)  POCT Blood Glucose.: 164 mg/dL (20 Apr 2021 17:16)  POCT Blood Glucose.: 232 mg/dL (20 Apr 2021 12:03)  POCT Blood Glucose.: 246 mg/dL (20 Apr 2021 07:33)  POCT Blood Glucose.: 157 mg/dL (19 Apr 2021 21:34)  POCT Blood Glucose.: 195 mg/dL (19 Apr 2021 18:01)      04-28    141  |  105  |  17  ----------------------------<  202<H>  4.3   |  24  |  0.85    Ca    9.0      28 Apr 2021 07:27  Phos  3.7     04-28  Mg     1.9     04-28            A1C with Estimated Average Glucose Result: 11.5 % (04-11-21 @ 07:40)      Thyroid Function Tests:  04-12 @ 06:36 TSH 3.00 FreeT4 1.7 T3 -- Anti TPO -- Anti Thyroglobulin Ab -- TSI --  04-11 @ 11:43 TSH 2.29 FreeT4 -- T3 -- Anti TPO -- Anti Thyroglobulin Ab -- TSI --

## 2021-04-28 NOTE — PROGRESS NOTE ADULT - ASSESSMENT
51 yo RH AA male with h/o DM, HTN, hyperlipidemia; pt presented to the ED c/o abdominal cramping, generalized, and diarrhea following use of laxatives as above for constipation. Neurology called for evaluation of LE weakness, unsteady gait. s/p shieley. Na improved. 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 option of IVIG given after PLEX refused   - f/u remaining LP results.   - f/u ganglioside antibodies, Gq1b -->neg   - slow correction of sodium  - aggressive PT/OT  - B12 >2000, copper, Vit E, thiamine, magnesium heavy metals, zinc  -refused IVIG but agreed for PLEX  - telemetry  - check FS, glucose control <180  - GI/DVT ppx  - Counseling on diet, exercise, and medication adherence was done  - Counseling on smoking cessation and alcohol consumption offered when appropriate.  - Pain assessed and judicious use of narcotics when appropriate was discussed.    - Stroke education given when appropriate.  - Importance of fall prevention discussed.   - Differential diagnosis and plan of care discussed with patient and/or family and primary team  - Thank you for allowing me to participate in the care of this patient. Call with questions.     Geoffrey aGrdner MD  Vascular Neurology  Office: [phone]

## 2021-04-28 NOTE — PROGRESS NOTE ADULT - ASSESSMENT
51 yo male, PMH DM, HTN, hyperlipidemia; pt presented to the ED c/o abdominal cramping, generalized, and diarrhea following use of laxatives as above for constipation.  In the ED, WBC 10.82, Hb 13.6, CMP: sodium 126, chloride 89, glucose 281, CMP otherwise unremarkable.  VBG: Lactate 2.6 (later downtrended to 1.6).  Pt. was treated with IV hydration and dispo'd to CDU for continued care plan:  IV hydration, supportive care, AM labs, general observation care / monitoring. pt continued to have generalized weakness , numbness and on labs persistent hyponatermia      A/P:  ALLIE  baseline likely ~ 1  ALLIE possible sec to uncontrolled hyperkalemia on losartan  scr improved  resumed losartan  monitor bmp  avoid nephrotoxic agents    Hyponatremia:  Likely sec to hypovolemia+hyperglycemia. work up suggested SIADH  s/p NS 4/20 for ALLIE  Na improved  optimize dm control  free water restriction <1.2L/day.  ok TSH, serum cortisol level  Monitor Na level Q12  Na level should not increase more than 6meq in 24 hrs    Proteinuia/Hematuria:  Etiology?  Had Cystoscopy last year and was normal per patient  Possible sec to Diabetic Nephropathy but will need full vasculitis work up  check urine p/c ratio  c3 c4 hep b hep c hiv, k/l wnl, lexii neg  pendingANCA, Serum immunofixation, SPEP    RPR +, patient s/p treatment 2017 ID eval appreciated     Based on work up result he might need kidney biopsy, which can be planned as outpatient as his renal function is stable  D/W patient in detail above plan    Hypophosphatemia:  SUpplement as needed   montior    HTN  BP controlled   on norvasc and losartan, hydralazine  hold per perimeter   Monitor closely

## 2021-04-28 NOTE — PROGRESS NOTE ADULT - ASSESSMENT
49 yo male, PMH DM, HTN, hyperlipidemia; pt presented to the ED c/o abdominal cramping, generalized, and diarrhea following use of laxatives as above for constipation.  In the ED, WBC 10.82, Hb 13.6, CMP: sodium 126, chloride 89, glucose 281, CMP otherwise unremarkable.  VBG: Lactate 2.6 (later downtrended to 1.6).  Pt. was treated with IV hydration and dispo'd to CDU for continued care plan:  IV hydration, supportive care, general observation care / monitoring. pt continued to have generalized weakness , numbness and on labs persistent hyponatremia and now being admitted for further evaluation.    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    LP results reviewed. protein albumin dissociation   plasma exchange : treatment as planned.. showing some improvement   neuro follow up.  RPR positive: treated and no further need for any treatment per ID     - hyponatremia :   likely multifactorial including hyperglycemia ( pseudohyponatremia ) and pre renal sec to diarrhea  ? ADH induced by pain   appreciate renal input  improved, continue to monitor     - DM: uncontrolled   appreciate endo input  cont insulin per endo   c/w Lantus and premeal TID    -HTN : holding ARB for now given hypotension during exchange   (with hold parameters)    - hypophosphatemia : repleted and monitor   labs daily    - constipation : had had colonoscopy 2 yrs ago with Dr. Rae   CT abd no acute path   no further inpt red : discussed with Dr Rae     - ALLIE : improved   avoid NSAIDS for now     dvt proph   d/w pt, all questions answered.

## 2021-04-28 NOTE — PROGRESS NOTE ADULT - SUBJECTIVE AND OBJECTIVE BOX
Patient is a 50y old  Male who presents with a chief complaint of abdominal pian (28 Apr 2021 09:16)      Interval history: pt had bm yesterday.  strength improving, 1 more treatment planned.        REVIEW OF SYSTEMS  Constitutional - No fever, No weight loss, No fatigue  HEENT - No eye pain, No visual disturbances, No difficulty hearing, No tinnitus, No vertigo, No neck pain  Respiratory - No cough, No wheezing, No shortness of breath  Cardiovascular - No chest pain, No palpitations  Gastrointestinal - No abdominal pain, No nausea, No vomiting, No diarrhea, No constipation  Genitourinary - No dysuria, No frequency, No hematuria, No incontinence  Neurological - No headaches, No memory loss, + loss of strength, + numbness, No tremors  Skin - No itching, No rashes, No lesions   Endocrine - No temperature intolerance  Musculoskeletal - + muscle pain  Psychiatric - No depression, No anxiety    PAST MEDICAL & SURGICAL HISTORY  DM (diabetes mellitus)    HTN (hypertension)    High cholesterol    Diabetes    HTN (hypertension)    Hyperlipidemia    No significant past surgical history    No significant past surgical history    H/O total knee replacement    Foot fracture       CURRENT FUNCTIONAL STATUS     Bed Mobility: Rolling/Turning:     · Level of [place]	moderate assist (50% patients effort)  · Physical Assist/Nonphysical Assist	1 person assist    Bed Mobility: Sit to Supine:     · Level of [place]	maximum assist (25% patients effort)  · Physical Assist/Nonphysical Assist	1 person assist    Bed Mobility: Supine to Sit:     · Level of [place]	maximum assist (25% patients effort)  · Physical Assist/Nonphysical Assist	1 person assist    Bed Mobility Analysis:     · Impairments Contributing to Impaired Bed Mobility	impaired balance; impaired motor control; decreased strength; impaired postural control    Transfer: Sit to Stand:     · Level of [place]	unable to perform; due to poor trunk control    Balance Skills Assessment:     · Sitting Balance: Static	poor balance   · Sitting Balance: Dynamic	poor minus     Sensory Examination:     Light Touch Sensation:   · Left UE	mild impairment   · Right UE	mild impairment       Fine Motor Coordination:     Fine Motor Coordination:   · Left Hand Thumb/Finger Opposition Skills	normal performance   · Right Hand Thumb/Finger Opposition Skills	normal performance   · Left Hand, Manipulation of Objects	mild impairment   · Right Hand, Manipulation of Objects	normal performance     Upper Body Dressing Training:     · Level of [place]	maximum assist (25% patients effort)  · Physical Assist/Nonphysical Assist	1 person assist    Lower Body Dressing Training:     · Level of [place]	unable to perform    Grooming Training:     · Level of [place]	minimum assist (75% patients effort)  · Physical Assist/Nonphysical Assist	1 person assist    Eating/Self-Feeding Training:     · Level of [place]	supervision  · Physical Assist/Nonphysical Assist	set-up required; 1 person assist        FAMILY HISTORY   No pertinent family history in first degree relatives        RECENT LABS/IMAGING  CBC Full  -  ( 28 Apr 2021 07:27 )  WBC Count : 8.69 K/uL  RBC Count : 3.70 M/uL  Hemoglobin : 10.9 g/dL  Hematocrit : 33.8 %  Platelet Count - Automated : 244 K/uL  Mean Cell Volume : 91.4 fL  Mean Cell Hemoglobin : 29.5 pg  Mean Cell Hemoglobin Concentration : 32.2 gm/dL  Auto Neutrophil # : x  Auto Lymphocyte # : x  Auto Monocyte # : x  Auto Eosinophil # : x  Auto Basophil # : x  Auto Neutrophil % : x  Auto Lymphocyte % : x  Auto Monocyte % : x  Auto Eosinophil % : x  Auto Basophil % : x    04-28    141  |  105  |  17  ----------------------------<  202<H>  4.3   |  24  |  0.85    Ca    9.0      28 Apr 2021 07:27  Phos  3.7     04-28  Mg     1.9     04-28          VITALS  T(C): 36.7 (04-28-21 @ 09:23), Max: 36.9 (04-28-21 @ 01:57)  HR: 88 (04-28-21 @ 09:23) (83 - 92)  BP: 146/86 (04-28-21 @ 09:23) (116/75 - 150/88)  RR: 18 (04-28-21 @ 09:23) (17 - 18)  SpO2: 100% (04-28-21 @ 09:23) (99% - 100%)  Wt(kg): --    ALLERGIES  No Known Drug Allergies  shellfish (Urticaria)      MEDICATIONS   acetaminophen   Tablet .. 650 milliGRAM(s) Oral every 6 hours PRN  acetaminophen   Tablet .. 650 milliGRAM(s) Oral every 6 hours PRN  albumin human  5% IVPB 3750 milliLiter(s) IV Intermittent every 48 hours  albumin human  5% IVPB 3750 milliLiter(s) IV Intermittent once  aluminum hydroxide/magnesium hydroxide/simethicone Suspension 30 milliLiter(s) Oral every 6 hours PRN  amLODIPine   Tablet 10 milliGRAM(s) Oral daily  bisacodyl 5 milliGRAM(s) Oral at bedtime  calcium gluconate IVPB 2 Gram(s) IV Intermittent once  calcium gluconate IVPB 2 Gram(s) IV Intermittent <User Schedule>  chlorhexidine 2% Cloths 1 Application(s) Topical daily  dextrose 40% Gel 15 Gram(s) Oral once  dextrose 5%. 1000 milliLiter(s) IV Continuous <Continuous>  dextrose 5%. 1000 milliLiter(s) IV Continuous <Continuous>  dextrose 50% Injectable 25 Gram(s) IV Push once  dextrose 50% Injectable 12.5 Gram(s) IV Push once  dextrose 50% Injectable 25 Gram(s) IV Push once  dicyclomine 10 milliGRAM(s) Oral once PRN  enoxaparin Injectable 40 milliGRAM(s) SubCutaneous daily  gabapentin 100 milliGRAM(s) Oral three times a day  glucagon  Injectable 1 milliGRAM(s) IntraMuscular once  hydrALAZINE 25 milliGRAM(s) Oral three times a day  HYDROmorphone  Injectable 1 milliGRAM(s) IV Push every 8 hours PRN  HYDROmorphone  Injectable 1 milliGRAM(s) IV Push once  insulin glargine Injectable (LANTUS) 48 Unit(s) SubCutaneous at bedtime  insulin lispro (ADMELOG) corrective regimen sliding scale   SubCutaneous three times a day before meals  insulin lispro (ADMELOG) corrective regimen sliding scale   SubCutaneous at bedtime  insulin lispro Injectable (ADMELOG) 16 Unit(s) SubCutaneous before breakfast  insulin lispro Injectable (ADMELOG) 14 Unit(s) SubCutaneous before lunch  insulin lispro Injectable (ADMELOG) 14 Unit(s) SubCutaneous before dinner  losartan 100 milliGRAM(s) Oral daily  metoprolol tartrate 12.5 milliGRAM(s) Oral two times a day  mineral oil 30 milliLiter(s) Oral once  oxycodone    5 mG/acetaminophen 325 mG 1 Tablet(s) Oral every 6 hours PRN  senna 2 Tablet(s) Oral two times a day  sodium chloride 0.9% lock flush 10 milliLiter(s) IV Push every 1 hour PRN  sodium chloride 0.9%. 1000 milliLiter(s) IV Continuous <Continuous>      ----------------------------------------------------------------------------------------    PHYSICAL EXAM  Constitutional - NAD, Comfortable  HEENT -  + R neck plasmapheresis catheter, EOMI     Chest - no respiratory distress  Cardiovascular - RRR, S1S2   Abdomen - Soft, NTND  Extremities - No C/C/E, No calf tenderness   Neurologic Exam -                    Cognitive - Awake, Alert, AAO to self, place, date, year, situation     Communication - Fluent, No dysarthria     Cranial Nerves - CN 2-12 intact     Motor -                     LEFT    UE - ShAB 4+/5, EF 4+/5, EE 4+/5, WE4+/5,  4+/5                    RIGHT UE - ShAB 4+/5, EF 4+/5, EE 4+/5, WE4/5,  4/5                    LEFT    LE - HF 3+/5, KE 3/5, DF 4/5, PF 4/5                    RIGHT LE - HF 4/5, KE 3/5, DF 4/5, PF 4/5        Sensory -impaired distal fingertips, toes     OculoVestibular - No saccades, No nystagmus, VOR         Balance - WNL Static  Psychiatric - Mood stable, Affect WNL  ----------------------------------------------------------------------------------------  ASSESSMENT/PLAN  51 yo m p/w GI symptoms, now with weakness, 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  receives PLEX treatments  constipation improved  strength improving  Pain -on gabapentin, dilaudid iv prn, oxy ir prn, with bowel regimen- must be off iv pain medication prior to discharge  dvt ppx: lovenox  continue bedside PT and OT for bed mobility, transfers, ambulation with AD, adls  OOB to chair when sitting balance allows  Rehab -   Recommend ACUTE inpatient rehabilitation for the functional deficits consisting of 3 hours of therapy/day & 24 hour RN/daily PMR physician for comorbid medical management. Will continue to follow for ongoing rehab needs and recommendations.

## 2021-04-28 NOTE — PROGRESS NOTE ADULT - SUBJECTIVE AND OBJECTIVE BOX
[facility]   MD WYATT CHRIS DO ANAM SIDDIQUI ANGELA WONG, PA    TEL:  OFFICE: [phone]  DR DAWSON CELL: [phone]  DR. TIRADO CELL: [phone]  DR. MORAN CELL: [phone]  CATINA PATEL CELL: [phone]    From 5pm-7am Answering Service [phone]    -- RENAL FOLLOW UP NOTE ---Date of Service 04-28-21 @ 12:38    Patient is a 50y old  Male who presents with a chief complaint of abdominal pian (28 Apr 2021 09:16)      Patient seen and examined at bedside. No chest pain/sob    VITALS:  T(F): 98 (04-28-21 @ 09:23), Max: 98.4 (04-28-21 @ 01:57)  HR: 88 (04-28-21 @ 09:23)  BP: 146/86 (04-28-21 @ 09:23)  RR: 18 (04-28-21 @ 09:23)  SpO2: 100% (04-28-21 @ 09:23)  Wt(kg): --    04-27 @ 07:01  -  04-28 @ 07:00  --------------------------------------------------------  IN: 0 mL / OUT: 1350 mL / NET: -1350 mL          PHYSICAL EXAM:  Constitutional: NAD  Neck: No JVD  Respiratory: CTAB, no wheezes, rales or rhonchi  Cardiovascular: S1, S2, RRR  Gastrointestinal: BS+, soft, NT/ND  Extremities: No peripheral edema    Hospital Medications:   MEDICATIONS  (STANDING):  albumin human  5% IVPB 3750 milliLiter(s) IV Intermittent every 48 hours  albumin human  5% IVPB 3750 milliLiter(s) IV Intermittent once  amLODIPine   Tablet 10 milliGRAM(s) Oral daily  bisacodyl 5 milliGRAM(s) Oral at bedtime  calcium gluconate IVPB 2 Gram(s) IV Intermittent <User Schedule>  calcium gluconate IVPB 2 Gram(s) IV Intermittent once  chlorhexidine 2% Cloths 1 Application(s) Topical daily  dextrose 40% Gel 15 Gram(s) Oral once  dextrose 5%. 1000 milliLiter(s) (50 mL/Hr) IV Continuous <Continuous>  dextrose 5%. 1000 milliLiter(s) (100 mL/Hr) IV Continuous <Continuous>  dextrose 50% Injectable 25 Gram(s) IV Push once  dextrose 50% Injectable 12.5 Gram(s) IV Push once  dextrose 50% Injectable 25 Gram(s) IV Push once  enoxaparin Injectable 40 milliGRAM(s) SubCutaneous daily  gabapentin 100 milliGRAM(s) Oral three times a day  glucagon  Injectable 1 milliGRAM(s) IntraMuscular once  hydrALAZINE 25 milliGRAM(s) Oral three times a day  HYDROmorphone  Injectable 1 milliGRAM(s) IV Push once  insulin glargine Injectable (LANTUS) 48 Unit(s) SubCutaneous at bedtime  insulin lispro (ADMELOG) corrective regimen sliding scale   SubCutaneous three times a day before meals  insulin lispro (ADMELOG) corrective regimen sliding scale   SubCutaneous at bedtime  insulin lispro Injectable (ADMELOG) 14 Unit(s) SubCutaneous before lunch  insulin lispro Injectable (ADMELOG) 14 Unit(s) SubCutaneous before dinner  insulin lispro Injectable (ADMELOG) 16 Unit(s) SubCutaneous before breakfast  losartan 100 milliGRAM(s) Oral daily  metoprolol tartrate 12.5 milliGRAM(s) Oral two times a day  mineral oil 30 milliLiter(s) Oral once  senna 2 Tablet(s) Oral two times a day  sodium chloride 0.9%. 1000 milliLiter(s) (75 mL/Hr) IV Continuous <Continuous>      LABS:  04-28    141  |  105  |  17  ----------------------------<  202<H>  4.3   |  24  |  0.85    Ca    9.0      28 Apr 2021 07:27  Phos  3.7     04-28  Mg     1.9     04-28      Creatinine Trend: 0.85 <--, 0.97 <--, 0.89 <--, 0.88 <--, 1.02 <--, 1.13 <--    Phosphorus Level, Serum: 3.7 mg/dL (04-28 @ 07:27)                              10.9   8.69  )-----------( 244      ( 28 Apr 2021 07:27 )             33.8     Urine Studies:  Urinalysis - [04-11-21 @ 12:32]      Color Light Yellow / Appearance Clear / SG 1.013 / pH 6.5      Gluc >= 1000 mg/dL / Ketone Small  / Bili Negative / Urobili <2 mg/dL       Blood Small / Protein 100 mg/dL / Leuk Est Negative / Nitrite Negative      RBC 10 / WBC 1 / Hyaline 1 / Gran  / Sq Epi  / Non Sq Epi 1 / Bacteria Negative      TSH 4.20      [04-27-21 @ 07:21]    HBsAg Nonreact      [04-18-21 @ 01:28]  HCV 0.40, Nonreact      [04-18-21 @ 01:28]  HIV Nonreact      [04-17-21 @ 16:16]    PIERO: titer Negative, pattern --      [04-13-21 @ 10:10]  C3 Complement 152      [04-13-21 @ 07:48]  C4 Complement 33      [04-13-21 @ 07:48]  ANCA: cANCA Negative, pANCA Negative, atypical ANCA Negative      [04-16-21 @ 12:16]  Syphilis Screen (Treponema Pallidum Ab) Positive      [04-13-21 @ 10:10]  Syphilis Screen (RPR Titer) 1:1      [04-13-21 @ 10:10]  Free Light Chains: kappa 1.51, lambda 1.89, ratio = 0.80      [04-13 @ 10:08]  Immunofixation Serum:   No Monoclonal Band Identified    Reference Range: None Detected      [04-16-21 @ 08:31]  SPEP Interpretation: phase reaction. KRIS Ferris M.D.      [04-16-21 @ 08:31]    RADIOLOGY & ADDITIONAL STUDIES:

## 2021-04-28 NOTE — PROGRESS NOTE ADULT - SUBJECTIVE AND OBJECTIVE BOX
Patient is a 50y Male     Patient is a 50y old  Male who presents with a chief complaint of abdominal pain (27 Apr 2021 08:44)      HPI:  51 yo male, PMH DM, HTN, hyperlipidemia; pt presented to the ED c/o abdominal cramping, generalized, and diarrhea following use of laxatives as above for constipation.  In the ED, WBC 10.82, Hb 13.6, CMP: sodium 126, chloride 89, glucose 281, CMP otherwise unremarkable.  VBG: Lactate 2.6 (later downtrended to 1.6).  Pt. was treated with IV hydration and dispo'd to CDU for continued care plan:  IV hydration, supportive care, AM labs, general observation care / monitoring. pt continued to have generalized weakness , numbness and on labs persistent hyponatermia and now being admitted for furhter evlauation .  dnenies N/V/Abd pain   had bm   no urinary sx   no fever or chills   no cough   no cp /sob    (11 Apr 2021 15:50)      PAST MEDICAL & SURGICAL HISTORY:  DM (diabetes mellitus)    HTN (hypertension)    Hyperlipidemia    H/O total knee replacement    Foot fracture        MEDICATIONS  (STANDING):  albumin human  5% IVPB 3750 milliLiter(s) IV Intermittent every 48 hours  albumin human  5% IVPB 3750 milliLiter(s) IV Intermittent once  amLODIPine   Tablet 10 milliGRAM(s) Oral daily  bisacodyl 5 milliGRAM(s) Oral at bedtime  calcium gluconate IVPB 2 Gram(s) IV Intermittent once  calcium gluconate IVPB 2 Gram(s) IV Intermittent <User Schedule>  chlorhexidine 2% Cloths 1 Application(s) Topical daily  dextrose 40% Gel 15 Gram(s) Oral once  dextrose 5%. 1000 milliLiter(s) (50 mL/Hr) IV Continuous <Continuous>  dextrose 5%. 1000 milliLiter(s) (100 mL/Hr) IV Continuous <Continuous>  dextrose 50% Injectable 25 Gram(s) IV Push once  dextrose 50% Injectable 12.5 Gram(s) IV Push once  dextrose 50% Injectable 25 Gram(s) IV Push once  enoxaparin Injectable 40 milliGRAM(s) SubCutaneous daily  gabapentin 100 milliGRAM(s) Oral three times a day  glucagon  Injectable 1 milliGRAM(s) IntraMuscular once  hydrALAZINE 25 milliGRAM(s) Oral three times a day  HYDROmorphone  Injectable 1 milliGRAM(s) IV Push once  insulin glargine Injectable (LANTUS) 48 Unit(s) SubCutaneous at bedtime  insulin lispro (ADMELOG) corrective regimen sliding scale   SubCutaneous three times a day before meals  insulin lispro (ADMELOG) corrective regimen sliding scale   SubCutaneous at bedtime  insulin lispro Injectable (ADMELOG) 16 Unit(s) SubCutaneous before breakfast  insulin lispro Injectable (ADMELOG) 14 Unit(s) SubCutaneous before lunch  insulin lispro Injectable (ADMELOG) 14 Unit(s) SubCutaneous before dinner  losartan 100 milliGRAM(s) Oral daily  metoprolol tartrate 12.5 milliGRAM(s) Oral two times a day  mineral oil 30 milliLiter(s) Oral once  senna 2 Tablet(s) Oral two times a day  sodium chloride 0.9%. 1000 milliLiter(s) (75 mL/Hr) IV Continuous <Continuous>      Allergies    No Known Drug Allergies  shellfish (Urticaria)    Intolerances        SOCIAL HISTORY:  Denies ETOh,Smoking,     FAMILY HISTORY:  No pertinent family history in first degree relatives        REVIEW OF SYSTEMS:    CONSTITUTIONAL: No weakness, fevers or chills  EYES/ENT: No visual changes;  No vertigo or throat pain   NECK: No pain or stiffness  RESPIRATORY: No cough, wheezing, hemoptysis; No shortness of breath  CARDIOVASCULAR: No chest pain or palpitations  GASTROINTESTINAL: No abdominal or epigastric pain. No nausea, vomiting, or hematemesis; No diarrhea or constipation. No melena or hematochezia.  GENITOURINARY: No dysuria, frequency or hematuria  NEUROLOGICAL: No numbness or weakness  SKIN: No itching, burning, rashes, or lesions   All other review of systems is negative unless indicated above.    VITAL:  T(C): , Max: 36.9 (04-28-21 @ 01:57)  T(F): , Max: 98.4 (04-28-21 @ 01:57)  HR: 86 (04-28-21 @ 06:52)  BP: 135/69 (04-28-21 @ 06:52)  BP(mean): --  RR: 18 (04-28-21 @ 06:52)  SpO2: 100% (04-28-21 @ 06:52)  Wt(kg): --    I and O's:    04-26 @ 07:01  -  04-27 @ 07:00  --------------------------------------------------------  IN: 0 mL / OUT: 1250 mL / NET: -1250 mL    04-27 @ 07:01  -  04-28 @ 07:00  --------------------------------------------------------  IN: 0 mL / OUT: 1350 mL / NET: -1350 mL          PHYSICAL EXAM:    Constitutional: NAD  HEENT: PERRLA,   Neck: No JVD  Respiratory: CTA B/L  Cardiovascular: S1 and S2  Gastrointestinal: BS+, soft, NT/ND  Extremities: No peripheral edema  Neurological: A/O x 3, no focal deficits  Psychiatric: Normal mood, normal affect  : No Jose  Skin: No rashes  Access: Not applicable  Back: No CVA tenderness    LABS:                        10.9   8.69  )-----------( 244      ( 28 Apr 2021 07:27 )             33.8     04-28    141  |  105  |  17  ----------------------------<  202<H>  4.3   |  24  |  0.85    Ca    9.0      28 Apr 2021 07:27  Phos  3.7     04-28  Mg     1.9     04-28            RADIOLOGY & ADDITIONAL STUDIES:

## 2021-04-28 NOTE — PROGRESS NOTE ADULT - ASSESSMENT
50M uncontrolled DM2 HbA1c 11.5%, hyperglycemia related to difficulties obtaining adequate insulin during pandemic due to insurance/cost.    1) DM2 with hyperglycemia  Inpatient plan:  BG target 100-180 mg/dl  glucose is above goal, likely exacerbated by pain/stress  carb consistent diet  FS premeal and bedtime  Increase Lantus to 50 units qhs  Contniue Admelog to 16/14/14 before meals - HOLD if not eating   Start Admelog 3 units sq QHS-pre bedtime snack- hold if doesnt have snack  Continue Admelog moderate scale premeal and moderate bedtime scale  RD consult- appreciated    Discussed with patient regarding discharge planning, he now will have enough Tresiba and Novolog at home to proceed with these insulins due to [facility] program he is now a part of.   DC on Tresiba and Novolog pens doses TBD. Discussed option of mixed insulin in case of future issues obtaining insulin.  Can follow with outpatient endocrine Dr. Ban Velez, although he requested changing to [facility] endocrinology if he prefers [phone]. Appointments below:  [address] [place] [address], [address], [phone]  6/8/21 @ 11:00 AM with diabetes educator   7/12/21 @ 11:00 AM with Dr Montesinos     2) Hyponatremia  TSH, Free T4 and 8AM cortisol in acceptable range not indicative of endocrine cause of hyponatremia. Na- 134 today    3) Hypertension  controlled at this time   management per primary team    Farzaneh Bennett  Nurse Practitioner  Division of Endocrinology & Diabetes  Pager # [phone]      If after 6PM or before 9AM, or on weekends/holidays, please call endocrine answering service for assistance ([phone]).  For nonurgent matters email [email or web address] for assistance.

## 2021-04-29 LAB
ANION GAP SERPL CALC-SCNC: 9 MMOL/L — SIGNIFICANT CHANGE UP (ref 7–14)
BUN SERPL-MCNC: 18 MG/DL — SIGNIFICANT CHANGE UP (ref 7–23)
CALCIUM SERPL-MCNC: 9.1 MG/DL — SIGNIFICANT CHANGE UP (ref 8.4–10.5)
CHLORIDE SERPL-SCNC: 104 MMOL/L — SIGNIFICANT CHANGE UP (ref 98–107)
CO2 SERPL-SCNC: 24 MMOL/L — SIGNIFICANT CHANGE UP (ref 22–31)
CREAT SERPL-MCNC: 0.88 MG/DL — SIGNIFICANT CHANGE UP (ref 0.5–1.3)
GLUCOSE SERPL-MCNC: 200 MG/DL — HIGH (ref 70–99)
HCT VFR BLD CALC: 34.2 % — LOW (ref 39–50)
HGB BLD-MCNC: 11.5 G/DL — LOW (ref 13–17)
MAGNESIUM SERPL-MCNC: 1.8 MG/DL — SIGNIFICANT CHANGE UP (ref 1.6–2.6)
MCHC RBC-ENTMCNC: 30 PG — SIGNIFICANT CHANGE UP (ref 27–34)
MCHC RBC-ENTMCNC: 33.6 GM/DL — SIGNIFICANT CHANGE UP (ref 32–36)
MCV RBC AUTO: 89.3 FL — SIGNIFICANT CHANGE UP (ref 80–100)
NRBC # BLD: 0 /100 WBCS — SIGNIFICANT CHANGE UP
NRBC # FLD: 0 K/UL — SIGNIFICANT CHANGE UP
PHOSPHATE SERPL-MCNC: 3.7 MG/DL — SIGNIFICANT CHANGE UP (ref 2.5–4.5)
PLATELET # BLD AUTO: 248 K/UL — SIGNIFICANT CHANGE UP (ref 150–400)
POTASSIUM SERPL-MCNC: 4.1 MMOL/L — SIGNIFICANT CHANGE UP (ref 3.5–5.3)
POTASSIUM SERPL-SCNC: 4.1 MMOL/L — SIGNIFICANT CHANGE UP (ref 3.5–5.3)
RBC # BLD: 3.83 M/UL — LOW (ref 4.2–5.8)
RBC # FLD: 12.3 % — SIGNIFICANT CHANGE UP (ref 10.3–14.5)
SARS-COV-2 RNA SPEC QL NAA+PROBE: SIGNIFICANT CHANGE UP
SODIUM SERPL-SCNC: 137 MMOL/L — SIGNIFICANT CHANGE UP (ref 135–145)
WBC # BLD: 8.28 K/UL — SIGNIFICANT CHANGE UP (ref 3.8–10.5)
WBC # FLD AUTO: 8.28 K/UL — SIGNIFICANT CHANGE UP (ref 3.8–10.5)

## 2021-04-29 PROCEDURE — 36514 APHERESIS PLASMA: CPT

## 2021-04-29 PROCEDURE — 99232 SBSQ HOSP IP/OBS MODERATE 35: CPT

## 2021-04-29 RX ORDER — INSULIN GLARGINE 100 [IU]/ML
52 INJECTION, SOLUTION SUBCUTANEOUS AT BEDTIME
Refills: 0 | Status: DISCONTINUED | OUTPATIENT
Start: 2021-04-29 | End: 2021-05-05

## 2021-04-29 RX ORDER — HYDROMORPHONE HYDROCHLORIDE 2 MG/ML
1 INJECTION INTRAMUSCULAR; INTRAVENOUS; SUBCUTANEOUS EVERY 8 HOURS
Refills: 0 | Status: DISCONTINUED | OUTPATIENT
Start: 2021-04-29 | End: 2021-04-29

## 2021-04-29 RX ORDER — HYDROMORPHONE HYDROCHLORIDE 2 MG/ML
1 INJECTION INTRAMUSCULAR; INTRAVENOUS; SUBCUTANEOUS EVERY 8 HOURS
Refills: 0 | Status: DISCONTINUED | OUTPATIENT
Start: 2021-04-29 | End: 2021-05-04

## 2021-04-29 RX ADMIN — GABAPENTIN 100 MILLIGRAM(S): 400 CAPSULE ORAL at 06:45

## 2021-04-29 RX ADMIN — Medication 3 UNIT(S): at 21:14

## 2021-04-29 RX ADMIN — HYDROMORPHONE HYDROCHLORIDE 1 MILLIGRAM(S): 2 INJECTION INTRAMUSCULAR; INTRAVENOUS; SUBCUTANEOUS at 21:06

## 2021-04-29 RX ADMIN — AMLODIPINE BESYLATE 10 MILLIGRAM(S): 2.5 TABLET ORAL at 18:26

## 2021-04-29 RX ADMIN — Medication 16 UNIT(S): at 07:59

## 2021-04-29 RX ADMIN — LOSARTAN POTASSIUM 100 MILLIGRAM(S): 100 TABLET, FILM COATED ORAL at 18:26

## 2021-04-29 RX ADMIN — OXYCODONE AND ACETAMINOPHEN 1 TABLET(S): 5; 325 TABLET ORAL at 10:21

## 2021-04-29 RX ADMIN — CHLORHEXIDINE GLUCONATE 1 APPLICATION(S): 213 SOLUTION TOPICAL at 12:27

## 2021-04-29 RX ADMIN — HYDROMORPHONE HYDROCHLORIDE 1 MILLIGRAM(S): 2 INJECTION INTRAMUSCULAR; INTRAVENOUS; SUBCUTANEOUS at 14:54

## 2021-04-29 RX ADMIN — Medication 25 MILLIGRAM(S): at 21:13

## 2021-04-29 RX ADMIN — Medication 14 UNIT(S): at 17:40

## 2021-04-29 RX ADMIN — GABAPENTIN 100 MILLIGRAM(S): 400 CAPSULE ORAL at 13:28

## 2021-04-29 RX ADMIN — HYDROMORPHONE HYDROCHLORIDE 1 MILLIGRAM(S): 2 INJECTION INTRAMUSCULAR; INTRAVENOUS; SUBCUTANEOUS at 15:24

## 2021-04-29 RX ADMIN — HYDROMORPHONE HYDROCHLORIDE 1 MILLIGRAM(S): 2 INJECTION INTRAMUSCULAR; INTRAVENOUS; SUBCUTANEOUS at 07:08

## 2021-04-29 RX ADMIN — HYDROMORPHONE HYDROCHLORIDE 1 MILLIGRAM(S): 2 INJECTION INTRAMUSCULAR; INTRAVENOUS; SUBCUTANEOUS at 06:35

## 2021-04-29 RX ADMIN — Medication 12.5 MILLIGRAM(S): at 18:26

## 2021-04-29 RX ADMIN — Medication 14 UNIT(S): at 12:41

## 2021-04-29 RX ADMIN — OXYCODONE AND ACETAMINOPHEN 1 TABLET(S): 5; 325 TABLET ORAL at 09:51

## 2021-04-29 RX ADMIN — ENOXAPARIN SODIUM 40 MILLIGRAM(S): 100 INJECTION SUBCUTANEOUS at 12:46

## 2021-04-29 RX ADMIN — GABAPENTIN 100 MILLIGRAM(S): 400 CAPSULE ORAL at 21:13

## 2021-04-29 RX ADMIN — OXYCODONE AND ACETAMINOPHEN 1 TABLET(S): 5; 325 TABLET ORAL at 23:21

## 2021-04-29 RX ADMIN — HYDROMORPHONE HYDROCHLORIDE 1 MILLIGRAM(S): 2 INJECTION INTRAMUSCULAR; INTRAVENOUS; SUBCUTANEOUS at 21:45

## 2021-04-29 RX ADMIN — INSULIN GLARGINE 52 UNIT(S): 100 INJECTION, SOLUTION SUBCUTANEOUS at 21:14

## 2021-04-29 RX ADMIN — Medication 6: at 07:59

## 2021-04-29 NOTE — PROGRESS NOTE ADULT - SUBJECTIVE AND OBJECTIVE BOX
Neurology Progress Note    S: Patient seen and examined. PLEX # 4 completed. #5 today, says he was able to stand wtih PT yesterday     Medication:  MEDICATIONS  (STANDING):  albumin human  5% IVPB 3750 milliLiter(s) IV Intermittent every 48 hours  amLODIPine   Tablet 10 milliGRAM(s) Oral daily  bisacodyl 5 milliGRAM(s) Oral at bedtime  calcium gluconate IVPB 2 Gram(s) IV Intermittent <User Schedule>  chlorhexidine 2% Cloths 1 Application(s) Topical daily  dextrose 40% Gel 15 Gram(s) Oral once  dextrose 5%. 1000 milliLiter(s) (50 mL/Hr) IV Continuous <Continuous>  dextrose 5%. 1000 milliLiter(s) (100 mL/Hr) IV Continuous <Continuous>  dextrose 50% Injectable 25 Gram(s) IV Push once  dextrose 50% Injectable 12.5 Gram(s) IV Push once  dextrose 50% Injectable 25 Gram(s) IV Push once  enoxaparin Injectable 40 milliGRAM(s) SubCutaneous daily  gabapentin 100 milliGRAM(s) Oral three times a day  glucagon  Injectable 1 milliGRAM(s) IntraMuscular once  hydrALAZINE 25 milliGRAM(s) Oral three times a day  HYDROmorphone  Injectable 1 milliGRAM(s) IV Push once  insulin glargine Injectable (LANTUS) 48 Unit(s) SubCutaneous at bedtime  insulin lispro (ADMELOG) corrective regimen sliding scale   SubCutaneous at bedtime  insulin lispro (ADMELOG) corrective regimen sliding scale   SubCutaneous three times a day before meals  insulin lispro Injectable (ADMELOG) 14 Unit(s) SubCutaneous before lunch  insulin lispro Injectable (ADMELOG) 14 Unit(s) SubCutaneous before dinner  insulin lispro Injectable (ADMELOG) 16 Unit(s) SubCutaneous before breakfast  losartan 100 milliGRAM(s) Oral daily  metoprolol tartrate 12.5 milliGRAM(s) Oral two times a day  mineral oil 30 milliLiter(s) Oral once  senna 2 Tablet(s) Oral two times a day  sodium chloride 0.9%. 1000 milliLiter(s) (75 mL/Hr) IV Continuous <Continuous>    MEDICATIONS  (PRN):  acetaminophen   Tablet .. 650 milliGRAM(s) Oral every 6 hours PRN Temp greater or equal to 38C (100.4F), Mild Pain (1 - 3), Moderate Pain (4 - 6)  acetaminophen   Tablet .. 650 milliGRAM(s) Oral every 6 hours PRN Mild Pain (1 - 3)  aluminum hydroxide/magnesium hydroxide/simethicone Suspension 30 milliLiter(s) Oral every 6 hours PRN Dyspepsia  dicyclomine 10 milliGRAM(s) Oral once PRN abdominal pain  HYDROmorphone  Injectable 1 milliGRAM(s) IV Push every 8 hours PRN Severe Pain (7 - 10)  oxycodone    5 mG/acetaminophen 325 mG 1 Tablet(s) Oral every 6 hours PRN Moderate Pain (4 - 6)  sodium chloride 0.9% lock flush 10 milliLiter(s) IV Push every 1 hour PRN Pre/post blood products, medications, blood draw, and to maintain line patency      Vitals:  Vital Signs Last 24 Hrs  T(C): 36.9 (29 Apr 2021 06:44), Max: 36.9 (29 Apr 2021 06:44)  T(F): 98.5 (29 Apr 2021 06:44), Max: 98.5 (29 Apr 2021 06:44)  HR: 88 (29 Apr 2021 06:44) (80 - 88)  BP: 138/68 (29 Apr 2021 06:44) (124/74 - 146/86)  BP(mean): --  RR: 18 (29 Apr 2021 06:44) (17 - 18)  SpO2: 99% (29 Apr 2021 06:44) (97% - 100%)    General Exam:   General Appearance: Appropriately dressed and in no acute distress       Head: Normocephalic, atraumatic and no dysmorphic features  Ear, Nose, and Throat: Moist mucous membranes  CVS: S1S2+  Resp: No SOB, no wheeze or rhonchi  Abd: soft NTND  Extremities: No edema, no cyanosis  Skin: No bruises, no rashes     Neurological Exam:  Mental Status: Awake, alert and oriented x 3.  Able to follow simple and complex verbal commands. Able to name and repeat. fluent speech. No obvious aphasia or dysarthria noted.   Cranial Nerves: PERRL, EOMI, VFFC, sensation V1-V3 intact,  no obvious facial asymmetry , equal elevation of palate, scm/trap 5/5, tongue is midline on protrusion. no obvious papilledema on fundoscopic exam. Hearing is grossly intact.   Motor: Normal bulk, tone and strength throughout B/L UE Fine finger movements were intact and symmetric. no tremors B/L LE 2-3/5  Sensation: Intact to light touch and pinprick throughout. no right/left confusion. no extinction to tactile on DSS. Romberg was negative.   Reflexes: 0 throughout at biceps, brachioradialis, triceps, patellars and ankles bilaterally and equal. only hyas LUE biceps 1+ No clonus. R toe and L toe were both downgoing.  Coordination: No dysmetria on FNF   Gait: unable    I personally reviewed the below data/images/labs:  CBC Full  -  ( 28 Apr 2021 07:27 )  WBC Count : 8.69 K/uL  RBC Count : 3.70 M/uL  Hemoglobin : 10.9 g/dL  Hematocrit : 33.8 %  Platelet Count - Automated : 244 K/uL  Mean Cell Volume : 91.4 fL  Mean Cell Hemoglobin : 29.5 pg  Mean Cell Hemoglobin Concentration : 32.2 gm/dL  Auto Neutrophil # : x  Auto Lymphocyte # : x  Auto Monocyte # : x  Auto Eosinophil # : x  Auto Basophil # : x  Auto Neutrophil % : x  Auto Lymphocyte % : x  Auto Monocyte % : x  Auto Eosinophil % : x  Auto Basophil % : x    04-28    141  |  105  |  17  ----------------------------<  202<H>  4.3   |  24  |  0.85    Ca    9.0      28 Apr 2021 07:27  Phos  3.7     04-28  Mg     1.9     04-28          < from: CT Head No Cont (04.02.21 @ 21:48) >    EXAM:  CT BRAIN        PROCEDURE DATE:  Apr 2 2021         INTERPRETATION:  HISTORY: Hypertension    Technique: CT of the head was performed without contrast.    Multiple contiguous axial images were acquired from the skullbase to the vertex without the administration of intravenous contrast.  Coronal and sagittal reformations were made.    COMPARISON: None.    FINDINGS:  The ventricles and sulci are within normal limits given the patient's age. No acute hemorrhage, mass effect, midline shift,hydrocephalus, or extra-axial fluid collections. Mild patchy hypoattenuation within the white matter, likely secondary chronic microscopy changes.    The calvarium is intact.    Small cyst or polyp in the right maxillary sinus. There are opacification of a few ethmoid air cells bilaterally as well as mild mucosal thickening of the left frontal sinus. The mastoid air cells are clear.    IMPRESSION:  No acute hemorrhage, mass effect, or midline shift.            DARRELL MIX MD; Resident Radiology  This document has been electronically signed.  PORTILLO HARPER MD; Attending Radiologist  This document has been electronically signed. Apr 2 2021 11:34PM    < end of copied text >  < from: MR Lumbar Spine w/wo IV Cont (04.16.21 @ 22:16) >    EXAM:  MR SPINE LUMBAR WAW IC      EXAM:  MR SPINE THORACIC WAW IC        PROCEDURE DATE:  Apr 16 2021         INTERPRETATION:  CLINICAL INDICATION: Bilateral leg weakness of unknown etiology.    Technique: MRI of the thoracic and lumbar spine was performed with and without contrast. A total 10 cc of Gadavist were administered intravenously.    COMPARISON: None.    FINDINGS:    THORACIC:    There is normal thoracic kyphosis. There are multiple small vertebral body endplate Schmorl's nodes. Otherwise, the vertebrae are normal in signal, height, and alignment. There is anterior endplate osteophytosis most pronounced from the most pronounced from T6-T10.    There are tiny disc bulges at T7-T8 and T8-T9, without significant central spinal canalstenosis or neuroforaminal narrowing.    The spinal cord is normal in course, caliber, and signal.    There is no abnormal enhancement within thoracic spine.    LUMBAR:    There is normal lumbar lordosis. There are mild-to-moderate type I degenerative endplate changes on the left at L4-L5. Otherwise, the vertebral bodies are normal in height and signal without fracture or dislocation.    There is disc space narrowing and desiccation at L4-L5 and L5-S1.    Evaluation of individual levels demonstrates:    L5-S1: Small posterior disc bulge, as well as mild right greater than left facet hypertrophy, resulting in moderate right and mild left neural foraminal stenosis. No significant spinal canal stenosis.    L4-L5: Small posterior disc bulge and superimposed small right central disc protrusion, including tiny annular fissure, resulting in mild spinal canal stenosis, as well as bilateral facet hypertrophy with moderate right and severe left neural foraminal stenosis.    L3-L4: Small posterior disc bulge, along with bilateral facet hypertrophy, resulting in mild spinal canal stenosis as well as mild bilateral neural foraminal stenosis    L2-L3: No significant disc herniation, central spinal canal stenosis, or neural foraminal narrowing. Mildbilateral facet hypertrophy.    L1-L2: Tiny posterior disc bulge without significant central spinal canal stenosis or neuroforaminal narrowing. Mild bilateral facet hypertrophy.    The conus is normal in position and morphology at the L1 level. Thereis smooth thickening and abnormal enhancement of the cauda equina and ventral nerve roots, a nonspecific finding.    There is no definite fluid collection or mass lesion within the visualized retroperitoneal or posterior paraspinal soft tissues.    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 Alternative etiologies include chronic inflammatory demyelinating polyneuropathy, Lyme disease, less likely arachnoiditis or carcinomatosis.    Results were discussed with ADELSO Elkins by Dr. Haji at 9:40 AM on 4/17/2021 with read back followed.              ROSIE HAJI MD; Attending Radiologist  This document has been electronically signed. Apr 17 2021  9:48AM    < end of copied text >  < from: MR Cervical Spine w/wo IV Cont (04.16.21 @ 22:16) >    EXAM:  MR SPINE CERVICAL WAW IC        PROCEDURE DATE:  Apr 16 2021         INTERPRETATION:  CLINICAL INDICATION: Patient with new weakness in lower extremities.    TECHNIQUE: MRI of the cervical spine was performed before and after intravenous contrast. A total 10 cc of Gadavist were administered.    COMPARISON: None.    FINDINGS:  Structures at the craniocervical and cervicomedullary junction are intact.    There is nonspecific straightening of the normal cervical lordosis. There is grade 1 degenerative retrolisthesis of C5 on C6. There is disc space narrowing at C3-C4 and C5-C6. The remaining intervertebral disc space heights are grossly preserved. There is anterior endplate osteophytosis from C3 to C6.    There are mild Modic type II degenerative endplate changes involving C3 CT 4 and C5-C6, along with mild endplate irregularity. Otherwise, the vertebrae demonstrate normal height and signal without fracture, dislocation or marrow edema.    The spinal cord demonstrates normal course and caliber without intrinsic cord signal abnormality.    The prevertebral and paravertebral soft tissues are within normal limits.    There is no abnormal enhancement.    There is diffuse disc desiccation. Evaluation of individual levels demonstrates:    C2-C3: No significant disc herniation, central spinal canal stenosis, although neural foraminal narrowing.    C3-C4: Small posterior disc osteophyte complexes, partially effacing the ventral thecal sac and resulting in mild spinal canal stenosis,as well as moderate right and mild left neural foraminal narrowing.    C4-C5: Tiny posterior disc osteophyte complex minimally effacing the ventral thecal sac, without significant spinal canal stenosis, as well as moderate left and mild right neural foraminal narrowing.    C5-C6: Tiny posterior disc osteophyte complex minimally effacing the ventral thecal sac, without significant spinal canal stenosis, as well as severe right and moderate left neural foraminal narrowing.    C6-C7: No significant disc herniation, central spinal canal stenosis, or neural foraminal narrowing.    C7-T1: No significant disc herniation, central spinal canal stenosis, or neural foraminal narrowing.    There is no soft tissue neck mass or fluid collection.    IMPRESSION:  No evidence for spinal cord compression, signal abnormality, or abnormal enhancement.    Multilevel degenerative changes, as described above, most pronounced at C3-C4 where there is mild spinal canal stenosis and C5-C6 where there is severe right and moderate left neural foraminal narrowing.              ROSIE HAJI MD; Attending Radiologist  This document has been electronically signed. Apr 17 2021  8:54AM    < end of copied text >

## 2021-04-29 NOTE — PROGRESS NOTE ADULT - SUBJECTIVE AND OBJECTIVE BOX
Chief Complaint: DM2    History: tolerating po  ate 1 cookie for bedtime snack, did not receive snack bedtime Admelog dose  no hypoglycemia events or symptoms    MEDICATIONS  (STANDING):  albumin human  5% IVPB 3750 milliLiter(s) IV Intermittent every 48 hours  albumin human  5% IVPB 3750 milliLiter(s) IV Intermittent once  albumin human  5% IVPB 3750 milliLiter(s) IV Intermittent once  amLODIPine   Tablet 10 milliGRAM(s) Oral daily  bisacodyl 5 milliGRAM(s) Oral at bedtime  calcium gluconate IVPB 2 Gram(s) IV Intermittent <User Schedule>  calcium gluconate IVPB 2 Gram(s) IV Intermittent once  calcium gluconate IVPB 2 Gram(s) IV Intermittent once  chlorhexidine 2% Cloths 1 Application(s) Topical daily  dextrose 40% Gel 15 Gram(s) Oral once  dextrose 5%. 1000 milliLiter(s) (50 mL/Hr) IV Continuous <Continuous>  dextrose 5%. 1000 milliLiter(s) (100 mL/Hr) IV Continuous <Continuous>  dextrose 50% Injectable 25 Gram(s) IV Push once  dextrose 50% Injectable 12.5 Gram(s) IV Push once  dextrose 50% Injectable 25 Gram(s) IV Push once  enoxaparin Injectable 40 milliGRAM(s) SubCutaneous daily  gabapentin 100 milliGRAM(s) Oral three times a day  glucagon  Injectable 1 milliGRAM(s) IntraMuscular once  hydrALAZINE 25 milliGRAM(s) Oral three times a day  HYDROmorphone  Injectable 1 milliGRAM(s) IV Push once  insulin glargine Injectable (LANTUS) 50 Unit(s) SubCutaneous at bedtime  insulin lispro (ADMELOG) corrective regimen sliding scale   SubCutaneous three times a day before meals  insulin lispro (ADMELOG) corrective regimen sliding scale   SubCutaneous at bedtime  insulin lispro Injectable (ADMELOG) 3 Unit(s) SubCutaneous <User Schedule>  insulin lispro Injectable (ADMELOG) 14 Unit(s) SubCutaneous before lunch  insulin lispro Injectable (ADMELOG) 14 Unit(s) SubCutaneous before dinner  insulin lispro Injectable (ADMELOG) 16 Unit(s) SubCutaneous before breakfast  losartan 100 milliGRAM(s) Oral daily  metoprolol tartrate 12.5 milliGRAM(s) Oral two times a day  mineral oil 30 milliLiter(s) Oral once  senna 2 Tablet(s) Oral two times a day  sodium chloride 0.9%. 1000 milliLiter(s) (75 mL/Hr) IV Continuous <Continuous>    MEDICATIONS  (PRN):  acetaminophen   Tablet .. 650 milliGRAM(s) Oral every 6 hours PRN Temp greater or equal to 38C (100.4F), Mild Pain (1 - 3), Moderate Pain (4 - 6)  acetaminophen   Tablet .. 650 milliGRAM(s) Oral every 6 hours PRN Mild Pain (1 - 3)  aluminum hydroxide/magnesium hydroxide/simethicone Suspension 30 milliLiter(s) Oral every 6 hours PRN Dyspepsia  dicyclomine 10 milliGRAM(s) Oral once PRN abdominal pain  HYDROmorphone  Injectable 1 milliGRAM(s) IV Push every 8 hours PRN Severe Pain (7 - 10)  oxycodone    5 mG/acetaminophen 325 mG 1 Tablet(s) Oral every 6 hours PRN Moderate Pain (4 - 6)  sodium chloride 0.9% lock flush 10 milliLiter(s) IV Push every 1 hour PRN Pre/post blood products, medications, blood draw, and to maintain line patency      Allergies    No Known Drug Allergies  shellfish (Urticaria)    Intolerances      Review of Systems:  ALL OTHER SYSTEMS REVIEWED AND NEGATIVE      PHYSICAL EXAM:  VITALS: T(C): 36.6 (04-29-21 @ 13:28)  T(F): 97.9 (04-29-21 @ 13:28), Max: 98.5 (04-29-21 @ 06:44)  HR: 88 (04-29-21 @ 13:28) (80 - 88)  BP: 143/79 (04-29-21 @ 13:28) (124/74 - 147/84)  RR:  (17 - 18)  SpO2:  (97% - 100%)  Wt(kg): --  GENERAL: NAD, well-groomed, well-developed  EYES: No proptosis, no lid lag, anicteric  HEENT:  Atraumatic, Normocephalic, moist mucous membranes  RESPIRATORY: nonlabored respirations, no wheezing  PSYCH: Alert and oriented x 3, normal affect, normal mood    CAPILLARY BLOOD GLUCOSE      POCT Blood Glucose.: 143 mg/dL (29 Apr 2021 12:10)  POCT Blood Glucose.: 262 mg/dL (29 Apr 2021 07:52)  POCT Blood Glucose.: 132 mg/dL (28 Apr 2021 21:04)  POCT Blood Glucose.: 148 mg/dL (28 Apr 2021 16:49)      04-29    137  |  104  |  18  ----------------------------<  200<H>  4.1   |  24  |  0.88    EGFR if : 114  EGFR if non : 99    Ca    9.1      04-29  Mg     1.8     04-29  Phos  3.7     04-29        A1C with Estimated Average Glucose Result: 11.5 % (04-11-21 @ 07:40)      Thyroid Function Tests:  04-27 @ 07:21 TSH 4.20 FreeT4 1.2 T3 -- Anti TPO -- Anti Thyroglobulin Ab -- TSI --  04-12 @ 06:36 TSH 3.00 FreeT4 1.7 T3 -- Anti TPO -- Anti Thyroglobulin Ab -- TSI --

## 2021-04-29 NOTE — PROGRESS NOTE ADULT - ASSESSMENT
51 yo male, PMH DM, HTN, hyperlipidemia; pt presented to the ED c/o abdominal cramping, generalized, and diarrhea following use of laxatives as above for constipation.  In the ED, WBC 10.82, Hb 13.6, CMP: sodium 126, chloride 89, glucose 281, CMP otherwise unremarkable.  VBG: Lactate 2.6 (later downtrended to 1.6).  Pt. was treated with IV hydration and dispo'd to CDU for continued care plan:  IV hydration, supportive care, AM labs, general observation care / monitoring. pt continued to have generalized weakness , numbness and on labs persistent hyponatermia      A/P:  ALLIE  baseline likely ~ 1  ALLIE possible sec to uncontrolled hyperkalemia on losartan  scr improved  resumed losartan  monitor bmp  avoid nephrotoxic agents    Hyponatremia:  Likely sec to hypovolemia+hyperglycemia. work up suggested SIADH  s/p NS 4/20 for ALLIE  Na improved  optimize dm control  free water restriction <1.2L/day.  ok TSH, serum cortisol level  Monitor Na level Q12  Na level should not increase more than 6meq in 24 hrs    Proteinuia/Hematuria:  Etiology?  Had Cystoscopy last year and was normal per patient  Possible sec to Diabetic Nephropathy but will need full vasculitis work up  check urine p/c ratio  c3 c4 hep b hep c hiv, k/l wnl, lexii neg  pendingANCA, Serum immunofixation, SPEP    RPR +, patient s/p treatment 2017 ID eval appreciated     Based on work up result he might need kidney biopsy, which can be planned as outpatient as his renal function is stable  D/W patient in detail above plan    Hypophosphatemia:  SUpplement as needed   montior    HTN  BP controlled   on norvasc and losartan, hydralazine  hold per perimeter   Monitor closely 51 yo male, PMH DM, HTN, hyperlipidemia; pt presented to the ED c/o abdominal cramping, generalized, and diarrhea following use of laxatives as above for constipation.  In the ED, WBC 10.82, Hb 13.6, CMP: sodium 126, chloride 89, glucose 281, CMP otherwise unremarkable.  VBG: Lactate 2.6 (later downtrended to 1.6).  Pt. was treated with IV hydration and dispo'd to CDU for continued care plan:  IV hydration, supportive care, general observation care / monitoring. pt continued to have generalized weakness , numbness and on labs persistent hyponatremia and now being admitted for further evaluation.    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    LP results reviewed. protein albumin dissociation   plasma exchange : treatment as planned.. showing some improvement   neuro follow up.  RPR positive: treated and no further need for any treatment per ID     - hyponatremia :   likely multifactorial including hyperglycemia ( pseudohyponatremia ) and pre renal sec to diarrhea  ? ADH induced by pain   appreciate renal input  improved, continue to monitor     - DM: uncontrolled   appreciate endo input  cont insulin per endo   c/w Lantus and premeal TID    -HTN : holding ARB for now given hypotension during exchange   (with hold parameters)    - hypophosphatemia : repleted and monitor   labs daily    - constipation : had had colonoscopy 2 yrs ago with Dr. Rae   CT abd no acute path   no further inpt red : discussed with Dr Rae     - ALLIE : improved   avoid NSAIDS for now     dvt proph   d/w pt, all questions answered.

## 2021-04-29 NOTE — PROGRESS NOTE ADULT - ASSESSMENT
50M uncontrolled DM2 HbA1c 11.5%, hyperglycemia related to difficulties obtaining adequate insulin during pandemic due to insurance/cost.    1) DM2 with hyperglycemia  Inpatient plan:  BG target 100-180 mg/dl  glucose is above goal in AM then improves during the day.  AM hyperglycemia may be related to snack eaten at night or just requiring higher dose of basal insulin or both.  carb consistent diet  FS premeal and bedtime  Increase Lantus to 52 units qhs  Contniue Admelog to 16/14/14 before meals - HOLD if not eating   Started Admelog 3 units sq QHS-pre bedtime snack- hold if doesn't have snack. Reinforced to patient to communicate with RN when eating his snack in order to receive his insulin.  Continue Admelog moderate scale premeal and moderate bedtime scale  RD consult- appreciated    Discussed with patient regarding discharge planning, he now will have enough Tresiba and Novolog at home to proceed with these insulins due to [facility] program he is now a part of.   DC on Tresiba and Novolog pens doses TBD. Discussed option of mixed insulin in case of future issues obtaining insulin.  He requested changing to [facility] endocrinology. Appointments below:  [address] [place] [address], [address], [phone]  6/8/21 @ 11:00 AM with diabetes educator   7/12/21 @ 11:00 AM with Dr Montesinos     2) Hyponatremia  TSH, Free T4 and 8AM cortisol in acceptable range not indicative of endocrine cause of hyponatremia. Na- 137 today    3) Hypertension  BP goal < 130/80, borderline/slightly high  management per primary team    David Rojas MD  Division of Endocrinology  Pager: [phone]    If after 6PM or before 9AM, or on weekends/holidays, please call endocrine answering service for assistance ([phone]).  For nonurgent matters email [email or web address] for assistance.

## 2021-04-29 NOTE — PROGRESS NOTE ADULT - ASSESSMENT
49 yo RH AA male with h/o DM, HTN, hyperlipidemia; pt presented to the ED c/o abdominal cramping, generalized, and diarrhea following use of laxatives as above for constipation. Neurology called for evaluation of LE weakness, unsteady gait. s/p shieley. Na improved.     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 protein  c/w GBS given albumino cytologic dissociation  - GI following for constipation  - hyponatremia improved   - s/p shiley   - complete 5 sessions of PLEX  #5/5 today, then d/c plan to AR  - f/u remaining LP results.   - f/u ganglioside antibodies, Gq1b -->neg   - slow correction of sodium  - aggressive PT/OT  - B12 >2000, copper WNL, Vit E WNL, thiamine WNL, magnesium heavy metals WNL, zinc  -refused IVIG but agreed for PLEX  - telemetry  - check FS, glucose control <180  - GI/DVT ppx  - Counseling on diet, exercise, and medication adherence was done  - Counseling on smoking cessation and alcohol consumption offered when appropriate.  - Pain assessed and judicious use of narcotics when appropriate was discussed.    - Stroke education given when appropriate.  - Importance of fall prevention discussed.   - Differential diagnosis and plan of care discussed with patient and/or family and primary team  - Thank you for allowing me to participate in the care of this patient. Call with questions.     Geoffrey Gardner MD  Vascular Neurology  Office: [phone]

## 2021-04-29 NOTE — PROGRESS NOTE ADULT - SUBJECTIVE AND OBJECTIVE BOX
Patient is a 50y Male     Patient is a 50y old  Male who presents with a chief complaint of abdominal pian (28 Apr 2021 09:16)      HPI:  51 yo male, PMH DM, HTN, hyperlipidemia; pt presented to the ED c/o abdominal cramping, generalized, and diarrhea following use of laxatives as above for constipation.  In the ED, WBC 10.82, Hb 13.6, CMP: sodium 126, chloride 89, glucose 281, CMP otherwise unremarkable.  VBG: Lactate 2.6 (later downtrended to 1.6).  Pt. was treated with IV hydration and dispo'd to CDU for continued care plan:  IV hydration, supportive care, AM labs, general observation care / monitoring. pt continued to have generalized weakness , numbness and on labs persistent hyponatermia and now being admitted for furhter evlauation .  dnenies N/V/Abd pain   had bm   no urinary sx   no fever or chills   no cough   no cp /sob    (11 Apr 2021 15:50)      PAST MEDICAL & SURGICAL HISTORY:  DM (diabetes mellitus)    HTN (hypertension)    Hyperlipidemia    H/O total knee replacement    Foot fracture        MEDICATIONS  (STANDING):  albumin human  5% IVPB 3750 milliLiter(s) IV Intermittent once  albumin human  5% IVPB 3750 milliLiter(s) IV Intermittent once  albumin human  5% IVPB 3750 milliLiter(s) IV Intermittent every 48 hours  amLODIPine   Tablet 10 milliGRAM(s) Oral daily  bisacodyl 5 milliGRAM(s) Oral at bedtime  calcium gluconate IVPB 2 Gram(s) IV Intermittent <User Schedule>  calcium gluconate IVPB 2 Gram(s) IV Intermittent once  calcium gluconate IVPB 2 Gram(s) IV Intermittent once  chlorhexidine 2% Cloths 1 Application(s) Topical daily  dextrose 40% Gel 15 Gram(s) Oral once  dextrose 5%. 1000 milliLiter(s) (50 mL/Hr) IV Continuous <Continuous>  dextrose 5%. 1000 milliLiter(s) (100 mL/Hr) IV Continuous <Continuous>  dextrose 50% Injectable 25 Gram(s) IV Push once  dextrose 50% Injectable 12.5 Gram(s) IV Push once  dextrose 50% Injectable 25 Gram(s) IV Push once  enoxaparin Injectable 40 milliGRAM(s) SubCutaneous daily  gabapentin 100 milliGRAM(s) Oral three times a day  glucagon  Injectable 1 milliGRAM(s) IntraMuscular once  hydrALAZINE 25 milliGRAM(s) Oral three times a day  HYDROmorphone  Injectable 1 milliGRAM(s) IV Push once  insulin glargine Injectable (LANTUS) 50 Unit(s) SubCutaneous at bedtime  insulin lispro (ADMELOG) corrective regimen sliding scale   SubCutaneous three times a day before meals  insulin lispro (ADMELOG) corrective regimen sliding scale   SubCutaneous at bedtime  insulin lispro Injectable (ADMELOG) 3 Unit(s) SubCutaneous <User Schedule>  insulin lispro Injectable (ADMELOG) 16 Unit(s) SubCutaneous before breakfast  insulin lispro Injectable (ADMELOG) 14 Unit(s) SubCutaneous before lunch  insulin lispro Injectable (ADMELOG) 14 Unit(s) SubCutaneous before dinner  losartan 100 milliGRAM(s) Oral daily  metoprolol tartrate 12.5 milliGRAM(s) Oral two times a day  mineral oil 30 milliLiter(s) Oral once  senna 2 Tablet(s) Oral two times a day  sodium chloride 0.9%. 1000 milliLiter(s) (75 mL/Hr) IV Continuous <Continuous>      Allergies    No Known Drug Allergies  shellfish (Urticaria)    Intolerances        SOCIAL HISTORY:  Denies ETOh,Smoking,     FAMILY HISTORY:  No pertinent family history in first degree relatives        REVIEW OF SYSTEMS:    CONSTITUTIONAL: No weakness, fevers or chills  EYES/ENT: No visual changes;  No vertigo or throat pain   NECK: No pain or stiffness  RESPIRATORY: No cough, wheezing, hemoptysis; No shortness of breath  CARDIOVASCULAR: No chest pain or palpitations  GASTROINTESTINAL: No abdominal or epigastric pain. No nausea, vomiting, or hematemesis; No diarrhea or constipation. No melena or hematochezia.  GENITOURINARY: No dysuria, frequency or hematuria  NEUROLOGICAL: No numbness or weakness  SKIN: No itching, burning, rashes, or lesions   All other review of systems is negative unless indicated above.    VITAL:  T(C): , Max: 36.9 (04-29-21 @ 06:44)  T(F): , Max: 98.5 (04-29-21 @ 06:44)  HR: 88 (04-29-21 @ 06:44)  BP: 138/68 (04-29-21 @ 06:44)  BP(mean): --  RR: 18 (04-29-21 @ 06:44)  SpO2: 99% (04-29-21 @ 06:44)  Wt(kg): --    I and O's:    04-27 @ 07:01  -  04-28 @ 07:00  --------------------------------------------------------  IN: 0 mL / OUT: 1350 mL / NET: -1350 mL    04-28 @ 07:01  -  04-29 @ 07:00  --------------------------------------------------------  IN: 0 mL / OUT: 500 mL / NET: -500 mL          PHYSICAL EXAM:    Constitutional: NAD  HEENT: PERRLA,   Neck: No JVD  Respiratory: CTA B/L  Cardiovascular: S1 and S2  Gastrointestinal: BS+, soft, NT/ND  Extremities: No peripheral edema  Neurological: A/O x 3, no focal deficits  Psychiatric: Normal mood, normal affect  : No Jose  Skin: No rashes  Access: Not applicable  Back: No CVA tenderness    LABS:                        10.9   8.69  )-----------( 244      ( 28 Apr 2021 07:27 )             33.8     04-28    141  |  105  |  17  ----------------------------<  202<H>  4.3   |  24  |  0.85    Ca    9.0      28 Apr 2021 07:27  Phos  3.7     04-28  Mg     1.9     04-28            RADIOLOGY & ADDITIONAL STUDIES:

## 2021-04-29 NOTE — PROGRESS NOTE ADULT - SUBJECTIVE AND OBJECTIVE BOX
Date of service: 04-29-21 @ 23:26      Patient is a 50y old  Male who presents with a chief complaint of constipaton (29 Apr 2021 07:31)                                                               INTERVAL HPI/OVERNIGHT EVENTS:    REVIEW OF SYSTEMS:     CONSTITUTIONAL: No weakness, fevers or chills  EYES/ENT: No visual changes , no ear ache   NECK: No pain or stiffness  RESPIRATORY: No cough, wheezing,  No shortness of breath  CARDIOVASCULAR: No chest pain or palpitations  GASTROINTESTINAL: No abdominal pain  . No nausea, vomiting, or hematemesis; No diarrhea or constipation. No melena or hematochezia.  GENITOURINARY: No dysuria, frequency or hematuria  NEUROLOGICAL: No numbness or weakness  SKIN: No itching, burning, rashes, or lesions                                                                                                                                                                                                                                                                                 Medications:  MEDICATIONS  (STANDING):  albumin human  5% IVPB 3750 milliLiter(s) IV Intermittent every 48 hours  albumin human  5% IVPB 3750 milliLiter(s) IV Intermittent once  albumin human  5% IVPB 3750 milliLiter(s) IV Intermittent once  amLODIPine   Tablet 10 milliGRAM(s) Oral daily  bisacodyl 5 milliGRAM(s) Oral at bedtime  calcium gluconate IVPB 2 Gram(s) IV Intermittent <User Schedule>  calcium gluconate IVPB 2 Gram(s) IV Intermittent once  calcium gluconate IVPB 2 Gram(s) IV Intermittent once  chlorhexidine 2% Cloths 1 Application(s) Topical daily  dextrose 40% Gel 15 Gram(s) Oral once  dextrose 5%. 1000 milliLiter(s) (50 mL/Hr) IV Continuous <Continuous>  dextrose 5%. 1000 milliLiter(s) (100 mL/Hr) IV Continuous <Continuous>  dextrose 50% Injectable 25 Gram(s) IV Push once  dextrose 50% Injectable 12.5 Gram(s) IV Push once  dextrose 50% Injectable 25 Gram(s) IV Push once  enoxaparin Injectable 40 milliGRAM(s) SubCutaneous daily  gabapentin 100 milliGRAM(s) Oral three times a day  glucagon  Injectable 1 milliGRAM(s) IntraMuscular once  hydrALAZINE 25 milliGRAM(s) Oral three times a day  insulin glargine Injectable (LANTUS) 52 Unit(s) SubCutaneous at bedtime  insulin lispro (ADMELOG) corrective regimen sliding scale   SubCutaneous three times a day before meals  insulin lispro (ADMELOG) corrective regimen sliding scale   SubCutaneous at bedtime  insulin lispro Injectable (ADMELOG) 3 Unit(s) SubCutaneous <User Schedule>  insulin lispro Injectable (ADMELOG) 16 Unit(s) SubCutaneous before breakfast  insulin lispro Injectable (ADMELOG) 14 Unit(s) SubCutaneous before lunch  insulin lispro Injectable (ADMELOG) 14 Unit(s) SubCutaneous before dinner  losartan 100 milliGRAM(s) Oral daily  metoprolol tartrate 12.5 milliGRAM(s) Oral two times a day  mineral oil 30 milliLiter(s) Oral once  senna 2 Tablet(s) Oral two times a day  sodium chloride 0.9%. 1000 milliLiter(s) (75 mL/Hr) IV Continuous <Continuous>    MEDICATIONS  (PRN):  acetaminophen   Tablet .. 650 milliGRAM(s) Oral every 6 hours PRN Temp greater or equal to 38C (100.4F), Mild Pain (1 - 3), Moderate Pain (4 - 6)  acetaminophen   Tablet .. 650 milliGRAM(s) Oral every 6 hours PRN Mild Pain (1 - 3)  aluminum hydroxide/magnesium hydroxide/simethicone Suspension 30 milliLiter(s) Oral every 6 hours PRN Dyspepsia  dicyclomine 10 milliGRAM(s) Oral once PRN abdominal pain  HYDROmorphone   Tablet 1 milliGRAM(s) Oral every 8 hours PRN Severe Pain (7 - 10)  sodium chloride 0.9% lock flush 10 milliLiter(s) IV Push every 1 hour PRN Pre/post blood products, medications, blood draw, and to maintain line patency       Allergies    No Known Drug Allergies  shellfish (Urticaria)    Intolerances      Vital Signs Last 24 Hrs  T(C): 36.5 (29 Apr 2021 21:09), Max: 36.9 (29 Apr 2021 06:44)  T(F): 97.7 (29 Apr 2021 21:09), Max: 98.5 (29 Apr 2021 06:44)  HR: 82 (29 Apr 2021 21:09) (82 - 99)  BP: 129/81 (29 Apr 2021 21:09) (124/74 - 147/84)  BP(mean): --  RR: 17 (29 Apr 2021 21:09) (17 - 18)  SpO2: 99% (29 Apr 2021 21:09) (98% - 100%)  CAPILLARY BLOOD GLUCOSE      POCT Blood Glucose.: 151 mg/dL (29 Apr 2021 21:07)  POCT Blood Glucose.: 148 mg/dL (29 Apr 2021 17:04)  POCT Blood Glucose.: 143 mg/dL (29 Apr 2021 12:10)  POCT Blood Glucose.: 262 mg/dL (29 Apr 2021 07:52)      04-28 @ 07:01  -  04-29 @ 07:00  --------------------------------------------------------  IN: 0 mL / OUT: 500 mL / NET: -500 mL    04-29 @ 07:01  -  04-29 @ 23:26  --------------------------------------------------------  IN: 0 mL / OUT: 300 mL / NET: -300 mL      Physical Exam:    Daily     Daily   General:  Well appearing, NAD, not cachetic  HEENT:  Nonicteric, PERRLA  CV:  RRR, S1S2   Lungs:  CTA B/L, no wheezes, rales, rhonchi  Abdomen:  Soft, non-tender, no distended, positive BS  Extremities:  2+ pulses, no c/c, no edema  Skin:  Warm and dry, no rashes  :  No lucas  Neuro:  AAOx3, non-focal, grossly intact                                                                                                                                                                                                                                                                                                LABS:                               11.5   8.28  )-----------( 248      ( 29 Apr 2021 08:13 )             34.2                      04-29    137  |  104  |  18  ----------------------------<  200<H>  4.1   |  24  |  0.88    Ca    9.1      29 Apr 2021 08:13  Phos  3.7     04-29  Mg     1.8     04-29                         RADIOLOGY & ADDITIONAL TESTS         I personally reviewed: [  ]EKG   [  ]CXR    [  ] CT      A/P:         Discussed with :     Francisco consultants' Notes   Time spent :   Date of service: 04-29-21 @ 23:26      Patient is a 50y old  Male who presents with a chief complaint of constipaton (29 Apr 2021 07:31)                                                               INTERVAL HPI/OVERNIGHT EVENTS:    REVIEW OF SYSTEMS:     CONSTITUTIONAL: No weakness, fevers or chills  EYES/ENT: No visual changes , no ear ache   NECK: No pain or stiffness  RESPIRATORY: No cough, wheezing,  No shortness of breath  CARDIOVASCULAR: No chest pain or palpitations  GASTROINTESTINAL: No abdominal pain  . No nausea, vomiting, or hematemesis; No diarrhea or constipation. No melena or hematochezia.  GENITOURINARY: No dysuria, frequency or hematuria  NEUROLOGICAL: No numbness or weakness                                                                                                                                                                                                                                                                               Medications:  MEDICATIONS  (STANDING):  albumin human  5% IVPB 3750 milliLiter(s) IV Intermittent every 48 hours  albumin human  5% IVPB 3750 milliLiter(s) IV Intermittent once  albumin human  5% IVPB 3750 milliLiter(s) IV Intermittent once  amLODIPine   Tablet 10 milliGRAM(s) Oral daily  bisacodyl 5 milliGRAM(s) Oral at bedtime  calcium gluconate IVPB 2 Gram(s) IV Intermittent <User Schedule>  calcium gluconate IVPB 2 Gram(s) IV Intermittent once  calcium gluconate IVPB 2 Gram(s) IV Intermittent once  chlorhexidine 2% Cloths 1 Application(s) Topical daily  dextrose 40% Gel 15 Gram(s) Oral once  dextrose 5%. 1000 milliLiter(s) (50 mL/Hr) IV Continuous <Continuous>  dextrose 5%. 1000 milliLiter(s) (100 mL/Hr) IV Continuous <Continuous>  dextrose 50% Injectable 25 Gram(s) IV Push once  dextrose 50% Injectable 12.5 Gram(s) IV Push once  dextrose 50% Injectable 25 Gram(s) IV Push once  enoxaparin Injectable 40 milliGRAM(s) SubCutaneous daily  gabapentin 100 milliGRAM(s) Oral three times a day  glucagon  Injectable 1 milliGRAM(s) IntraMuscular once  hydrALAZINE 25 milliGRAM(s) Oral three times a day  insulin glargine Injectable (LANTUS) 52 Unit(s) SubCutaneous at bedtime  insulin lispro (ADMELOG) corrective regimen sliding scale   SubCutaneous three times a day before meals  insulin lispro (ADMELOG) corrective regimen sliding scale   SubCutaneous at bedtime  insulin lispro Injectable (ADMELOG) 3 Unit(s) SubCutaneous <User Schedule>  insulin lispro Injectable (ADMELOG) 16 Unit(s) SubCutaneous before breakfast  insulin lispro Injectable (ADMELOG) 14 Unit(s) SubCutaneous before lunch  insulin lispro Injectable (ADMELOG) 14 Unit(s) SubCutaneous before dinner  losartan 100 milliGRAM(s) Oral daily  metoprolol tartrate 12.5 milliGRAM(s) Oral two times a day  mineral oil 30 milliLiter(s) Oral once  senna 2 Tablet(s) Oral two times a day  sodium chloride 0.9%. 1000 milliLiter(s) (75 mL/Hr) IV Continuous <Continuous>    MEDICATIONS  (PRN):  acetaminophen   Tablet .. 650 milliGRAM(s) Oral every 6 hours PRN Temp greater or equal to 38C (100.4F), Mild Pain (1 - 3), Moderate Pain (4 - 6)  acetaminophen   Tablet .. 650 milliGRAM(s) Oral every 6 hours PRN Mild Pain (1 - 3)  aluminum hydroxide/magnesium hydroxide/simethicone Suspension 30 milliLiter(s) Oral every 6 hours PRN Dyspepsia  dicyclomine 10 milliGRAM(s) Oral once PRN abdominal pain  HYDROmorphone   Tablet 1 milliGRAM(s) Oral every 8 hours PRN Severe Pain (7 - 10)  sodium chloride 0.9% lock flush 10 milliLiter(s) IV Push every 1 hour PRN Pre/post blood products, medications, blood draw, and to maintain line patency       Allergies    No Known Drug Allergies  shellfish (Urticaria)    Intolerances      Vital Signs Last 24 Hrs  T(C): 36.5 (29 Apr 2021 21:09), Max: 36.9 (29 Apr 2021 06:44)  T(F): 97.7 (29 Apr 2021 21:09), Max: 98.5 (29 Apr 2021 06:44)  HR: 82 (29 Apr 2021 21:09) (82 - 99)  BP: 129/81 (29 Apr 2021 21:09) (124/74 - 147/84)  BP(mean): --  RR: 17 (29 Apr 2021 21:09) (17 - 18)  SpO2: 99% (29 Apr 2021 21:09) (98% - 100%)  CAPILLARY BLOOD GLUCOSE      POCT Blood Glucose.: 151 mg/dL (29 Apr 2021 21:07)  POCT Blood Glucose.: 148 mg/dL (29 Apr 2021 17:04)  POCT Blood Glucose.: 143 mg/dL (29 Apr 2021 12:10)  POCT Blood Glucose.: 262 mg/dL (29 Apr 2021 07:52)      04-28 @ 07:01 - 04-29 @ 07:00  --------------------------------------------------------  IN: 0 mL / OUT: 500 mL / NET: -500 mL    04-29 @ 07:01 - 04-29 @ 23:26  --------------------------------------------------------  IN: 0 mL / OUT: 300 mL / NET: -300 mL      Physical Exam:    Daily     Daily   General:  Well appearing, NAD, not cachetic  HEENT:  Nonicteric, PERRLA  CV:  RRR, S1S2   Lungs:  CTA B/L, no wheezes, rales, rhonchi  Abdomen:  Soft, non-tender, no distended, positive BS  Extremities: no edema   Neuro:  AAOx3, non-focal, grossly intact                                                                                                                                                                                                                                                                                                LABS:                               11.5   8.28  )-----------( 248      ( 29 Apr 2021 08:13 )             34.2                      04-29    137  |  104  |  18  ----------------------------<  200<H>  4.1   |  24  |  0.88    Ca    9.1      29 Apr 2021 08:13  Phos  3.7     04-29  Mg     1.8     04-29                         RADIOLOGY & ADDITIONAL TESTS         I personally reviewed: [  ]EKG   [  ]CXR    [  ] CT      A/P:         Discussed with :     Francisco consultants' Notes   Time spent :

## 2021-04-29 NOTE — PROGRESS NOTE ADULT - SUBJECTIVE AND OBJECTIVE BOX
[facility]   MD WYATT CHRIS DO ANAM SIDDIQUI ANGELA WONG, PA    TEL:  OFFICE: [phone]  DR DAWSON CELL: [phone]  DR. TIRADO CELL: [phone]  DR. MORAN CELL: [phone]  CATINA PATEL CELL: [phone]    From 5pm-7am Answering Service [phone]    -- RENAL FOLLOW UP NOTE ---Date of Service 04-29-21 @ 13:13    Patient is a 50y old  Male who presents with a chief complaint of constipaton (29 Apr 2021 07:31)      Patient seen and examined at bedside. No chest pain/sob    VITALS:  T(F): 97.8 (04-29-21 @ 08:55), Max: 98.5 (04-29-21 @ 06:44)  HR: 88 (04-29-21 @ 08:55)  BP: 147/84 (04-29-21 @ 08:55)  RR: 18 (04-29-21 @ 08:55)  SpO2: 99% (04-29-21 @ 08:55)  Wt(kg): --    04-28 @ 07:01  -  04-29 @ 07:00  --------------------------------------------------------  IN: 0 mL / OUT: 500 mL / NET: -500 mL    04-29 @ 07:01  -  04-29 @ 13:13  --------------------------------------------------------  IN: 0 mL / OUT: 300 mL / NET: -300 mL          PHYSICAL EXAM:  Constitutional: NAD  Neck: No JVD  Respiratory: CTAB, no wheezes, rales or rhonchi  Cardiovascular: S1, S2, RRR  Gastrointestinal: BS+, soft, NT/ND  Extremities: No peripheral edema    Hospital Medications:   MEDICATIONS  (STANDING):  albumin human  5% IVPB 3750 milliLiter(s) IV Intermittent every 48 hours  albumin human  5% IVPB 3750 milliLiter(s) IV Intermittent once  albumin human  5% IVPB 3750 milliLiter(s) IV Intermittent once  amLODIPine   Tablet 10 milliGRAM(s) Oral daily  bisacodyl 5 milliGRAM(s) Oral at bedtime  calcium gluconate IVPB 2 Gram(s) IV Intermittent <User Schedule>  calcium gluconate IVPB 2 Gram(s) IV Intermittent once  calcium gluconate IVPB 2 Gram(s) IV Intermittent once  chlorhexidine 2% Cloths 1 Application(s) Topical daily  dextrose 40% Gel 15 Gram(s) Oral once  dextrose 5%. 1000 milliLiter(s) (50 mL/Hr) IV Continuous <Continuous>  dextrose 5%. 1000 milliLiter(s) (100 mL/Hr) IV Continuous <Continuous>  dextrose 50% Injectable 25 Gram(s) IV Push once  dextrose 50% Injectable 12.5 Gram(s) IV Push once  dextrose 50% Injectable 25 Gram(s) IV Push once  enoxaparin Injectable 40 milliGRAM(s) SubCutaneous daily  gabapentin 100 milliGRAM(s) Oral three times a day  glucagon  Injectable 1 milliGRAM(s) IntraMuscular once  hydrALAZINE 25 milliGRAM(s) Oral three times a day  HYDROmorphone  Injectable 1 milliGRAM(s) IV Push once  insulin glargine Injectable (LANTUS) 50 Unit(s) SubCutaneous at bedtime  insulin lispro (ADMELOG) corrective regimen sliding scale   SubCutaneous three times a day before meals  insulin lispro (ADMELOG) corrective regimen sliding scale   SubCutaneous at bedtime  insulin lispro Injectable (ADMELOG) 3 Unit(s) SubCutaneous <User Schedule>  insulin lispro Injectable (ADMELOG) 14 Unit(s) SubCutaneous before lunch  insulin lispro Injectable (ADMELOG) 14 Unit(s) SubCutaneous before dinner  insulin lispro Injectable (ADMELOG) 16 Unit(s) SubCutaneous before breakfast  losartan 100 milliGRAM(s) Oral daily  metoprolol tartrate 12.5 milliGRAM(s) Oral two times a day  mineral oil 30 milliLiter(s) Oral once  senna 2 Tablet(s) Oral two times a day  sodium chloride 0.9%. 1000 milliLiter(s) (75 mL/Hr) IV Continuous <Continuous>      LABS:  04-29    137  |  104  |  18  ----------------------------<  200<H>  4.1   |  24  |  0.88    Ca    9.1      29 Apr 2021 08:13  Phos  3.7     04-29  Mg     1.8     04-29      Creatinine Trend: 0.88 <--, 0.85 <--, 0.97 <--, 0.89 <--, 0.88 <--, 1.02 <--    Phosphorus Level, Serum: 3.7 mg/dL (04-29 @ 08:13)                              11.5   8.28  )-----------( 248      ( 29 Apr 2021 08:13 )             34.2     Urine Studies:  Urinalysis - [04-11-21 @ 12:32]      Color Light Yellow / Appearance Clear / SG 1.013 / pH 6.5      Gluc >= 1000 mg/dL / Ketone Small  / Bili Negative / Urobili <2 mg/dL       Blood Small / Protein 100 mg/dL / Leuk Est Negative / Nitrite Negative      RBC 10 / WBC 1 / Hyaline 1 / Gran  / Sq Epi  / Non Sq Epi 1 / Bacteria Negative      TSH 4.20      [04-27-21 @ 07:21]    HBsAg Nonreact      [04-18-21 @ 01:28]  HCV 0.40, Nonreact      [04-18-21 @ 01:28]  HIV Nonreact      [04-17-21 @ 16:16]    PIERO: titer Negative, pattern --      [04-13-21 @ 10:10]  C3 Complement 152      [04-13-21 @ 07:48]  C4 Complement 33      [04-13-21 @ 07:48]  ANCA: cANCA Negative, pANCA Negative, atypical ANCA Negative      [04-16-21 @ 12:16]  Syphilis Screen (Treponema Pallidum Ab) Positive      [04-13-21 @ 10:10]  Syphilis Screen (RPR Titer) 1:1      [04-13-21 @ 10:10]  Free Light Chains: kappa 1.51, lambda 1.89, ratio = 0.80      [04-13 @ 10:08]  Immunofixation Serum:   No Monoclonal Band Identified    Reference Range: None Detected      [04-16-21 @ 08:31]  SPEP Interpretation: phase reaction. P. Ferris, M.D.      [04-16-21 @ 08:31]    RADIOLOGY & ADDITIONAL STUDIES:

## 2021-04-29 NOTE — PROGRESS NOTE ADULT - SUBJECTIVE AND OBJECTIVE BOX
Patient is a 50y old  Male who presents with a chief complaint of constipaton (29 Apr 2021 07:31)      Interval history: pt states his last plasmapheresis session is today.  Denies new complaint      REVIEW OF SYSTEMS  Constitutional - No fever, No weight loss, No fatigue  HEENT - No eye pain, No visual disturbances, No difficulty hearing, No tinnitus, No vertigo, No neck pain  Respiratory - No cough, No wheezing, No shortness of breath  Cardiovascular - No chest pain, No palpitations  Gastrointestinal - No abdominal pain, No nausea, No vomiting, No diarrhea, No constipation  Genitourinary - No dysuria, No frequency, No hematuria, No incontinence  Neurological - No headaches, No memory loss, + loss of strength, + numbness, No tremors  Skin - No itching, No rashes, No lesions   Endocrine - No temperature intolerance  Musculoskeletal - No joint pain, No joint swelling, No muscle pain  Psychiatric - No depression, No anxiety    PAST MEDICAL & SURGICAL HISTORY  DM (diabetes mellitus)    HTN (hypertension)    High cholesterol    H/O total knee replacement    Foot fracture      CURRENT FUNCTIONAL STATUS  4/28  Progress Summary: Patient was received semi-supine in bed in NAD, agreeable and motivated to participate in PT session. Pt required moderate assistance x1 for bed mobility. Pt able to support himself while sitting at the edge of the bed without assistance. pt demonstrated improved ability to move LEs. Pt transferred sit<->stand with maximum assistance x2, +blocking knees to prevent buckling. Pt left safely in bed with head of bed elevated, all lines/tubes intact and call bell within reach. REE PADILLA aware         FAMILY HISTORY   No pertinent family history in first degree relatives        RECENT LABS/IMAGING  CBC Full  -  ( 29 Apr 2021 08:13 )  WBC Count : 8.28 K/uL  RBC Count : 3.83 M/uL  Hemoglobin : 11.5 g/dL  Hematocrit : 34.2 %  Platelet Count - Automated : 248 K/uL  Mean Cell Volume : 89.3 fL  Mean Cell Hemoglobin : 30.0 pg  Mean Cell Hemoglobin Concentration : 33.6 gm/dL  Auto Neutrophil # : x  Auto Lymphocyte # : x  Auto Monocyte # : x  Auto Eosinophil # : x  Auto Basophil # : x  Auto Neutrophil % : x  Auto Lymphocyte % : x  Auto Monocyte % : x  Auto Eosinophil % : x  Auto Basophil % : x    04-29    137  |  104  |  18  ----------------------------<  200<H>  4.1   |  24  |  0.88    Ca    9.1      29 Apr 2021 08:13  Phos  3.7     04-29  Mg     1.8     04-29          VITALS  T(C): 36.6 (04-29-21 @ 08:55), Max: 36.9 (04-29-21 @ 06:44)  HR: 88 (04-29-21 @ 08:55) (80 - 88)  BP: 147/84 (04-29-21 @ 08:55) (124/74 - 147/84)  RR: 18 (04-29-21 @ 08:55) (17 - 18)  SpO2: 99% (04-29-21 @ 08:55) (97% - 100%)  Wt(kg): --    ALLERGIES  No Known Drug Allergies  shellfish (Urticaria)      MEDICATIONS   acetaminophen   Tablet .. 650 milliGRAM(s) Oral every 6 hours PRN  acetaminophen   Tablet .. 650 milliGRAM(s) Oral every 6 hours PRN  albumin human  5% IVPB 3750 milliLiter(s) IV Intermittent every 48 hours  albumin human  5% IVPB 3750 milliLiter(s) IV Intermittent once  albumin human  5% IVPB 3750 milliLiter(s) IV Intermittent once  aluminum hydroxide/magnesium hydroxide/simethicone Suspension 30 milliLiter(s) Oral every 6 hours PRN  amLODIPine   Tablet 10 milliGRAM(s) Oral daily  bisacodyl 5 milliGRAM(s) Oral at bedtime  calcium gluconate IVPB 2 Gram(s) IV Intermittent <User Schedule>  calcium gluconate IVPB 2 Gram(s) IV Intermittent once  calcium gluconate IVPB 2 Gram(s) IV Intermittent once  chlorhexidine 2% Cloths 1 Application(s) Topical daily  dextrose 40% Gel 15 Gram(s) Oral once  dextrose 5%. 1000 milliLiter(s) IV Continuous <Continuous>  dextrose 5%. 1000 milliLiter(s) IV Continuous <Continuous>  dextrose 50% Injectable 25 Gram(s) IV Push once  dextrose 50% Injectable 12.5 Gram(s) IV Push once  dextrose 50% Injectable 25 Gram(s) IV Push once  dicyclomine 10 milliGRAM(s) Oral once PRN  enoxaparin Injectable 40 milliGRAM(s) SubCutaneous daily  gabapentin 100 milliGRAM(s) Oral three times a day  glucagon  Injectable 1 milliGRAM(s) IntraMuscular once  hydrALAZINE 25 milliGRAM(s) Oral three times a day  HYDROmorphone  Injectable 1 milliGRAM(s) IV Push once  HYDROmorphone  Injectable 1 milliGRAM(s) IV Push every 8 hours PRN  insulin glargine Injectable (LANTUS) 50 Unit(s) SubCutaneous at bedtime  insulin lispro (ADMELOG) corrective regimen sliding scale   SubCutaneous three times a day before meals  insulin lispro (ADMELOG) corrective regimen sliding scale   SubCutaneous at bedtime  insulin lispro Injectable (ADMELOG) 14 Unit(s) SubCutaneous before lunch  insulin lispro Injectable (ADMELOG) 14 Unit(s) SubCutaneous before dinner  insulin lispro Injectable (ADMELOG) 3 Unit(s) SubCutaneous <User Schedule>  insulin lispro Injectable (ADMELOG) 16 Unit(s) SubCutaneous before breakfast  losartan 100 milliGRAM(s) Oral daily  metoprolol tartrate 12.5 milliGRAM(s) Oral two times a day  mineral oil 30 milliLiter(s) Oral once  oxycodone    5 mG/acetaminophen 325 mG 1 Tablet(s) Oral every 6 hours PRN  senna 2 Tablet(s) Oral two times a day  sodium chloride 0.9% lock flush 10 milliLiter(s) IV Push every 1 hour PRN  sodium chloride 0.9%. 1000 milliLiter(s) IV Continuous <Continuous>      ----------------------------------------------------------------------------------------   PHYSICAL EXAM  Constitutional - NAD, Comfortable  HEENT -  + R neck plasmapheresis catheter, EOMI     Chest - no respiratory distress  Cardiovascular - RRR, S1S2   Abdomen - Soft, NTND  Extremities - No C/C/E, No calf tenderness   Neurologic Exam -                    Cognitive - Awake, Alert, AAO to self, place, date, year, situation     Communication - Fluent, No dysarthria     Cranial Nerves - CN 2-12 intact     Motor -                     LEFT    UE - ShAB 4+/5, EF 4+/5, EE 4+/5, WE4+/5,  4+/5                    RIGHT UE - ShAB 4+/5, EF 4+/5, EE 4+/5, WE4/5,  4/5                    LEFT    LE - HF 3+/5, KE 3/5, DF 4/5, PF 4/5                    RIGHT LE - HF 3/5, KE 3/5, DF 4/5, PF 4/5        Sensory -impaired distal fingertips, toes     OculoVestibular - No saccades, No nystagmus, VOR         Balance - WNL Static  Psychiatric - Mood stable, Affect WNL  ----------------------------------------------------------------------------------------  ASSESSMENT/PLAN  49 yo m p/w GI symptoms, now with weakness, 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  receives PLEX treatments- last treatment today   turn and position q2 to prevent skin breakdown  Pain -on gabapentin, dilaudid iv prn, oxy ir prn, with bowel regimen- must be off iv pain medication prior to discharge  dvt ppx: lovenox  continue bedside PT and OT for bed mobility, transfers, ambulation with AD, adls  OOB to chair when sitting balance allows  Rehab -   Recommend ACUTE inpatient rehabilitation for the functional deficits consisting of 3 hours of therapy/day & 24 hour RN/daily PMR physician for comorbid medical management. Will continue to follow for ongoing rehab needs and recommendations.

## 2021-04-30 LAB
ANION GAP SERPL CALC-SCNC: 12 MMOL/L — SIGNIFICANT CHANGE UP (ref 7–14)
BUN SERPL-MCNC: 15 MG/DL — SIGNIFICANT CHANGE UP (ref 7–23)
CALCIUM SERPL-MCNC: 8.8 MG/DL — SIGNIFICANT CHANGE UP (ref 8.4–10.5)
CHLORIDE SERPL-SCNC: 106 MMOL/L — SIGNIFICANT CHANGE UP (ref 98–107)
CO2 SERPL-SCNC: 24 MMOL/L — SIGNIFICANT CHANGE UP (ref 22–31)
CREAT SERPL-MCNC: 0.92 MG/DL — SIGNIFICANT CHANGE UP (ref 0.5–1.3)
GLUCOSE SERPL-MCNC: 195 MG/DL — HIGH (ref 70–99)
HCT VFR BLD CALC: 33.8 % — LOW (ref 39–50)
HGB BLD-MCNC: 11.2 G/DL — LOW (ref 13–17)
MAGNESIUM SERPL-MCNC: 1.8 MG/DL — SIGNIFICANT CHANGE UP (ref 1.6–2.6)
MCHC RBC-ENTMCNC: 29.7 PG — SIGNIFICANT CHANGE UP (ref 27–34)
MCHC RBC-ENTMCNC: 33.1 GM/DL — SIGNIFICANT CHANGE UP (ref 32–36)
MCV RBC AUTO: 89.7 FL — SIGNIFICANT CHANGE UP (ref 80–100)
NRBC # BLD: 0 /100 WBCS — SIGNIFICANT CHANGE UP
NRBC # FLD: 0 K/UL — SIGNIFICANT CHANGE UP
PHOSPHATE SERPL-MCNC: 3.6 MG/DL — SIGNIFICANT CHANGE UP (ref 2.5–4.5)
PLATELET # BLD AUTO: 250 K/UL — SIGNIFICANT CHANGE UP (ref 150–400)
POTASSIUM SERPL-MCNC: 4.1 MMOL/L — SIGNIFICANT CHANGE UP (ref 3.5–5.3)
POTASSIUM SERPL-SCNC: 4.1 MMOL/L — SIGNIFICANT CHANGE UP (ref 3.5–5.3)
RBC # BLD: 3.77 M/UL — LOW (ref 4.2–5.8)
RBC # FLD: 12.3 % — SIGNIFICANT CHANGE UP (ref 10.3–14.5)
SODIUM SERPL-SCNC: 142 MMOL/L — SIGNIFICANT CHANGE UP (ref 135–145)
WBC # BLD: 8.39 K/UL — SIGNIFICANT CHANGE UP (ref 3.8–10.5)
WBC # FLD AUTO: 8.39 K/UL — SIGNIFICANT CHANGE UP (ref 3.8–10.5)

## 2021-04-30 PROCEDURE — 99232 SBSQ HOSP IP/OBS MODERATE 35: CPT

## 2021-04-30 PROCEDURE — 99231 SBSQ HOSP IP/OBS SF/LOW 25: CPT

## 2021-04-30 RX ORDER — INSULIN LISPRO 100/ML
5 VIAL (ML) SUBCUTANEOUS
Refills: 0 | Status: DISCONTINUED | OUTPATIENT
Start: 2021-04-30 | End: 2021-05-05

## 2021-04-30 RX ORDER — OXYCODONE AND ACETAMINOPHEN 5; 325 MG/1; MG/1
1 TABLET ORAL ONCE
Refills: 0 | Status: DISCONTINUED | OUTPATIENT
Start: 2021-04-30 | End: 2021-04-30

## 2021-04-30 RX ADMIN — OXYCODONE AND ACETAMINOPHEN 1 TABLET(S): 5; 325 TABLET ORAL at 17:57

## 2021-04-30 RX ADMIN — Medication 14 UNIT(S): at 17:01

## 2021-04-30 RX ADMIN — HYDROMORPHONE HYDROCHLORIDE 1 MILLIGRAM(S): 2 INJECTION INTRAMUSCULAR; INTRAVENOUS; SUBCUTANEOUS at 13:17

## 2021-04-30 RX ADMIN — Medication 2: at 11:54

## 2021-04-30 RX ADMIN — CHLORHEXIDINE GLUCONATE 1 APPLICATION(S): 213 SOLUTION TOPICAL at 13:21

## 2021-04-30 RX ADMIN — GABAPENTIN 100 MILLIGRAM(S): 400 CAPSULE ORAL at 13:17

## 2021-04-30 RX ADMIN — Medication 4: at 07:41

## 2021-04-30 RX ADMIN — AMLODIPINE BESYLATE 10 MILLIGRAM(S): 2.5 TABLET ORAL at 05:15

## 2021-04-30 RX ADMIN — GABAPENTIN 100 MILLIGRAM(S): 400 CAPSULE ORAL at 05:14

## 2021-04-30 RX ADMIN — Medication 4: at 17:01

## 2021-04-30 RX ADMIN — HYDROMORPHONE HYDROCHLORIDE 1 MILLIGRAM(S): 2 INJECTION INTRAMUSCULAR; INTRAVENOUS; SUBCUTANEOUS at 21:51

## 2021-04-30 RX ADMIN — INSULIN GLARGINE 52 UNIT(S): 100 INJECTION, SOLUTION SUBCUTANEOUS at 21:52

## 2021-04-30 RX ADMIN — Medication 5 UNIT(S): at 21:52

## 2021-04-30 RX ADMIN — OXYCODONE AND ACETAMINOPHEN 1 TABLET(S): 5; 325 TABLET ORAL at 18:14

## 2021-04-30 RX ADMIN — SENNA PLUS 2 TABLET(S): 8.6 TABLET ORAL at 18:34

## 2021-04-30 RX ADMIN — HYDROMORPHONE HYDROCHLORIDE 1 MILLIGRAM(S): 2 INJECTION INTRAMUSCULAR; INTRAVENOUS; SUBCUTANEOUS at 05:13

## 2021-04-30 RX ADMIN — GABAPENTIN 100 MILLIGRAM(S): 400 CAPSULE ORAL at 21:50

## 2021-04-30 RX ADMIN — HYDROMORPHONE HYDROCHLORIDE 1 MILLIGRAM(S): 2 INJECTION INTRAMUSCULAR; INTRAVENOUS; SUBCUTANEOUS at 22:50

## 2021-04-30 RX ADMIN — Medication 14 UNIT(S): at 11:54

## 2021-04-30 RX ADMIN — HYDROMORPHONE HYDROCHLORIDE 1 MILLIGRAM(S): 2 INJECTION INTRAMUSCULAR; INTRAVENOUS; SUBCUTANEOUS at 14:10

## 2021-04-30 RX ADMIN — Medication 12.5 MILLIGRAM(S): at 18:35

## 2021-04-30 RX ADMIN — ENOXAPARIN SODIUM 40 MILLIGRAM(S): 100 INJECTION SUBCUTANEOUS at 13:17

## 2021-04-30 RX ADMIN — OXYCODONE AND ACETAMINOPHEN 1 TABLET(S): 5; 325 TABLET ORAL at 00:00

## 2021-04-30 RX ADMIN — HYDROMORPHONE HYDROCHLORIDE 1 MILLIGRAM(S): 2 INJECTION INTRAMUSCULAR; INTRAVENOUS; SUBCUTANEOUS at 05:45

## 2021-04-30 RX ADMIN — Medication 16 UNIT(S): at 07:42

## 2021-04-30 NOTE — PROGRESS NOTE ADULT - ASSESSMENT
51 yo male, PMH DM, HTN, hyperlipidemia; pt presented to the ED c/o abdominal cramping, generalized, and diarrhea following use of laxatives as above for constipation.  In the ED, WBC 10.82, Hb 13.6, CMP: sodium 126, chloride 89, glucose 281, CMP otherwise unremarkable.  VBG: Lactate 2.6 (later downtrended to 1.6).  Pt. was treated with IV hydration and dispo'd to CDU for continued care plan:  IV hydration, supportive care, general observation care / monitoring. pt continued to have generalized weakness , numbness and on labs persistent hyponatremia and now being admitted for further evaluation.    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    LP results reviewed. protein albumin dissociation   plasma exchange : treatment as planned.. showing some improvement   neuro follow up.  RPR positive: treated and no further need for any treatment per ID     - hyponatremia :   likely multifactorial including hyperglycemia ( pseudohyponatremia ) and pre renal sec to diarrhea  ? ADH induced by pain   appreciate renal input  improved, continue to monitor     - DM: uncontrolled   appreciate endo input  cont insulin per endo   c/w Lantus and premeal TID    -HTN : holding ARB for now given hypotension during exchange   (with hold parameters)    - hypophosphatemia : repleted and monitor   labs daily    - constipation : had had colonoscopy 2 yrs ago with Dr. Rae   CT abd no acute path   no further inpt red : discussed with Dr Rae     - ALLIE : improved   avoid NSAIDS for now     dvt proph   d/w pt, all questions answered.     plan for dc to rehab

## 2021-04-30 NOTE — PROGRESS NOTE ADULT - SUBJECTIVE AND OBJECTIVE BOX
Neurology Progress Note    S: Patient seen and examined. completed plex for 5 sessions. d/c plan. c/o diarhea     Medication:  MEDICATIONS  (STANDING):  albumin human  5% IVPB 3750 milliLiter(s) IV Intermittent every 48 hours  albumin human  5% IVPB 3750 milliLiter(s) IV Intermittent once  albumin human  5% IVPB 3750 milliLiter(s) IV Intermittent once  amLODIPine   Tablet 10 milliGRAM(s) Oral daily  bisacodyl 5 milliGRAM(s) Oral at bedtime  calcium gluconate IVPB 2 Gram(s) IV Intermittent <User Schedule>  calcium gluconate IVPB 2 Gram(s) IV Intermittent once  calcium gluconate IVPB 2 Gram(s) IV Intermittent once  chlorhexidine 2% Cloths 1 Application(s) Topical daily  dextrose 40% Gel 15 Gram(s) Oral once  dextrose 5%. 1000 milliLiter(s) (50 mL/Hr) IV Continuous <Continuous>  dextrose 5%. 1000 milliLiter(s) (100 mL/Hr) IV Continuous <Continuous>  dextrose 50% Injectable 25 Gram(s) IV Push once  dextrose 50% Injectable 12.5 Gram(s) IV Push once  dextrose 50% Injectable 25 Gram(s) IV Push once  enoxaparin Injectable 40 milliGRAM(s) SubCutaneous daily  gabapentin 100 milliGRAM(s) Oral three times a day  glucagon  Injectable 1 milliGRAM(s) IntraMuscular once  hydrALAZINE 25 milliGRAM(s) Oral three times a day  insulin glargine Injectable (LANTUS) 52 Unit(s) SubCutaneous at bedtime  insulin lispro (ADMELOG) corrective regimen sliding scale   SubCutaneous three times a day before meals  insulin lispro (ADMELOG) corrective regimen sliding scale   SubCutaneous at bedtime  insulin lispro Injectable (ADMELOG) 3 Unit(s) SubCutaneous <User Schedule>  insulin lispro Injectable (ADMELOG) 14 Unit(s) SubCutaneous before dinner  insulin lispro Injectable (ADMELOG) 16 Unit(s) SubCutaneous before breakfast  insulin lispro Injectable (ADMELOG) 14 Unit(s) SubCutaneous before lunch  losartan 100 milliGRAM(s) Oral daily  metoprolol tartrate 12.5 milliGRAM(s) Oral two times a day  mineral oil 30 milliLiter(s) Oral once  senna 2 Tablet(s) Oral two times a day  sodium chloride 0.9%. 1000 milliLiter(s) (75 mL/Hr) IV Continuous <Continuous>    MEDICATIONS  (PRN):  acetaminophen   Tablet .. 650 milliGRAM(s) Oral every 6 hours PRN Temp greater or equal to 38C (100.4F), Mild Pain (1 - 3), Moderate Pain (4 - 6)  acetaminophen   Tablet .. 650 milliGRAM(s) Oral every 6 hours PRN Mild Pain (1 - 3)  aluminum hydroxide/magnesium hydroxide/simethicone Suspension 30 milliLiter(s) Oral every 6 hours PRN Dyspepsia  dicyclomine 10 milliGRAM(s) Oral once PRN abdominal pain  HYDROmorphone   Tablet 1 milliGRAM(s) Oral every 8 hours PRN Severe Pain (7 - 10)  sodium chloride 0.9% lock flush 10 milliLiter(s) IV Push every 1 hour PRN Pre/post blood products, medications, blood draw, and to maintain line patency    Vitals:  Vital Signs Last 24 Hrs  T(C): 36.7 (30 Apr 2021 05:10), Max: 36.7 (30 Apr 2021 05:10)  T(F): 98 (30 Apr 2021 05:10), Max: 98 (30 Apr 2021 05:10)  HR: 85 (30 Apr 2021 05:10) (82 - 99)  BP: 101/57 (30 Apr 2021 05:10) (101/57 - 147/84)  BP(mean): --  RR: 18 (30 Apr 2021 05:10) (17 - 18)  SpO2: 100% (30 Apr 2021 05:10) (98% - 100%)    General Exam:   General Appearance: Appropriately dressed and in no acute distress       Head: Normocephalic, atraumatic and no dysmorphic features  Ear, Nose, and Throat: Moist mucous membranes  CVS: S1S2+  Resp: No SOB, no wheeze or rhonchi  Abd: soft NTND  Extremities: No edema, no cyanosis  Skin: No bruises, no rashes     Neurological Exam:  Mental Status: Awake, alert and oriented x 3.  Able to follow simple and complex verbal commands. Able to name and repeat. fluent speech. No obvious aphasia or dysarthria noted.   Cranial Nerves: PERRL, EOMI, VFFC, sensation V1-V3 intact,  no obvious facial asymmetry , equal elevation of palate, scm/trap 5/5, tongue is midline on protrusion. no obvious papilledema on fundoscopic exam. Hearing is grossly intact.   Motor: Normal bulk, tone and strength throughout B/L UE Fine finger movements were intact and symmetric. no tremors B/L LE 2-3/5  Sensation: Intact to light touch and pinprick throughout. no right/left confusion. no extinction to tactile on DSS. Romberg was negative.   Reflexes: 0 throughout at biceps, brachioradialis, triceps, patellars and ankles bilaterally and equal. only hyas LUE biceps 1+ No clonus. R toe and L toe were both downgoing.  Coordination: No dysmetria on FNF   Gait: unable    I personally reviewed the below data/images/labs:  CBC Full  -  ( 30 Apr 2021 07:15 )  WBC Count : 8.39 K/uL  RBC Count : 3.77 M/uL  Hemoglobin : 11.2 g/dL  Hematocrit : 33.8 %  Platelet Count - Automated : 250 K/uL  Mean Cell Volume : 89.7 fL  Mean Cell Hemoglobin : 29.7 pg  Mean Cell Hemoglobin Concentration : 33.1 gm/dL  Auto Neutrophil # : x  Auto Lymphocyte # : x  Auto Monocyte # : x  Auto Eosinophil # : x  Auto Basophil # : x  Auto Neutrophil % : x  Auto Lymphocyte % : x  Auto Monocyte % : x  Auto Eosinophil % : x  Auto Basophil % : x    04-30    142  |  106  |  15  ----------------------------<  195<H>  4.1   |  24  |  0.92    Ca    8.8      30 Apr 2021 07:15  Phos  3.6     04-30  Mg     1.8     04-30        < from: CT Head No Cont (04.02.21 @ 21:48) >    EXAM:  CT BRAIN        PROCEDURE DATE:  Apr 2 2021         INTERPRETATION:  HISTORY: Hypertension    Technique: CT of the head was performed without contrast.    Multiple contiguous axial images were acquired from the skullbase to the vertex without the administration of intravenous contrast.  Coronal and sagittal reformations were made.    COMPARISON: None.    FINDINGS:  The ventricles and sulci are within normal limits given the patient's age. No acute hemorrhage, mass effect, midline shift,hydrocephalus, or extra-axial fluid collections. Mild patchy hypoattenuation within the white matter, likely secondary chronic microscopy changes.    The calvarium is intact.    Small cyst or polyp in the right maxillary sinus. There are opacification of a few ethmoid air cells bilaterally as well as mild mucosal thickening of the left frontal sinus. The mastoid air cells are clear.    IMPRESSION:  No acute hemorrhage, mass effect, or midline shift.            DARRELL MIX MD; Resident Radiology  This document has been electronically signed.  PORTILLO HARPER MD; Attending Radiologist  This document has been electronically signed. Apr 2 2021 11:34PM    < end of copied text >  < from: MR Lumbar Spine w/wo IV Cont (04.16.21 @ 22:16) >    EXAM:  MR SPINE LUMBAR WAW IC      EXAM:  MR SPINE THORACIC WAW IC        PROCEDURE DATE:  Apr 16 2021         INTERPRETATION:  CLINICAL INDICATION: Bilateral leg weakness of unknown etiology.    Technique: MRI of the thoracic and lumbar spine was performed with and without contrast. A total 10 cc of Gadavist were administered intravenously.    COMPARISON: None.    FINDINGS:    THORACIC:    There is normal thoracic kyphosis. There are multiple small vertebral body endplate Schmorl's nodes. Otherwise, the vertebrae are normal in signal, height, and alignment. There is anterior endplate osteophytosis most pronounced from the most pronounced from T6-T10.    There are tiny disc bulges at T7-T8 and T8-T9, without significant central spinal canalstenosis or neuroforaminal narrowing.    The spinal cord is normal in course, caliber, and signal.    There is no abnormal enhancement within thoracic spine.    LUMBAR:    There is normal lumbar lordosis. There are mild-to-moderate type I degenerative endplate changes on the left at L4-L5. Otherwise, the vertebral bodies are normal in height and signal without fracture or dislocation.    There is disc space narrowing and desiccation at L4-L5 and L5-S1.    Evaluation of individual levels demonstrates:    L5-S1: Small posterior disc bulge, as well as mild right greater than left facet hypertrophy, resulting in moderate right and mild left neural foraminal stenosis. No significant spinal canal stenosis.    L4-L5: Small posterior disc bulge and superimposed small right central disc protrusion, including tiny annular fissure, resulting in mild spinal canal stenosis, as well as bilateral facet hypertrophy with moderate right and severe left neural foraminal stenosis.    L3-L4: Small posterior disc bulge, along with bilateral facet hypertrophy, resulting in mild spinal canal stenosis as well as mild bilateral neural foraminal stenosis    L2-L3: No significant disc herniation, central spinal canal stenosis, or neural foraminal narrowing. Mildbilateral facet hypertrophy.    L1-L2: Tiny posterior disc bulge without significant central spinal canal stenosis or neuroforaminal narrowing. Mild bilateral facet hypertrophy.    The conus is normal in position and morphology at the L1 level. Thereis smooth thickening and abnormal enhancement of the cauda equina and ventral nerve roots, a nonspecific finding.    There is no definite fluid collection or mass lesion within the visualized retroperitoneal or posterior paraspinal soft tissues.    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 Alternative etiologies include chronic inflammatory demyelinating polyneuropathy, Lyme disease, less likely arachnoiditis or carcinomatosis.    Results were discussed with ADELSO Elkins by Dr. Haji at 9:40 AM on 4/17/2021 with read back followed.              ROSIE HAJI MD; Attending Radiologist  This document has been electronically signed. Apr 17 2021  9:48AM    < end of copied text >  < from: MR Cervical Spine w/wo IV Cont (04.16.21 @ 22:16) >    EXAM:  MR SPINE CERVICAL WAW IC        PROCEDURE DATE:  Apr 16 2021         INTERPRETATION:  CLINICAL INDICATION: Patient with new weakness in lower extremities.    TECHNIQUE: MRI of the cervical spine was performed before and after intravenous contrast. A total 10 cc of Gadavist were administered.    COMPARISON: None.    FINDINGS:  Structures at the craniocervical and cervicomedullary junction are intact.    There is nonspecific straightening of the normal cervical lordosis. There is grade 1 degenerative retrolisthesis of C5 on C6. There is disc space narrowing at C3-C4 and C5-C6. The remaining intervertebral disc space heights are grossly preserved. There is anterior endplate osteophytosis from C3 to C6.    There are mild Modic type II degenerative endplate changes involving C3 CT 4 and C5-C6, along with mild endplate irregularity. Otherwise, the vertebrae demonstrate normal height and signal without fracture, dislocation or marrow edema.    The spinal cord demonstrates normal course and caliber without intrinsic cord signal abnormality.    The prevertebral and paravertebral soft tissues are within normal limits.    There is no abnormal enhancement.    There is diffuse disc desiccation. Evaluation of individual levels demonstrates:    C2-C3: No significant disc herniation, central spinal canal stenosis, although neural foraminal narrowing.    C3-C4: Small posterior disc osteophyte complexes, partially effacing the ventral thecal sac and resulting in mild spinal canal stenosis,as well as moderate right and mild left neural foraminal narrowing.    C4-C5: Tiny posterior disc osteophyte complex minimally effacing the ventral thecal sac, without significant spinal canal stenosis, as well as moderate left and mild right neural foraminal narrowing.    C5-C6: Tiny posterior disc osteophyte complex minimally effacing the ventral thecal sac, without significant spinal canal stenosis, as well as severe right and moderate left neural foraminal narrowing.    C6-C7: No significant disc herniation, central spinal canal stenosis, or neural foraminal narrowing.    C7-T1: No significant disc herniation, central spinal canal stenosis, or neural foraminal narrowing.    There is no soft tissue neck mass or fluid collection.    IMPRESSION:  No evidence for spinal cord compression, signal abnormality, or abnormal enhancement.    Multilevel degenerative changes, as described above, most pronounced at C3-C4 where there is mild spinal canal stenosis and C5-C6 where there is severe right and moderate left neural foraminal narrowing.              ROSIE HAJI MD; Attending Radiologist  This document has been electronically signed. Apr 17 2021  8:54AM    < end of copied text >

## 2021-04-30 NOTE — PROGRESS NOTE ADULT - SUBJECTIVE AND OBJECTIVE BOX
Chief Complaint: DM2    History: tolerating po  ate 1 cookie for bedtime snack, received snack time insulin.  Fasting BG imporved but not at goal. no hypoglycemia events or symptoms    MEDICATIONS  (STANDING):  albumin human  5% IVPB 3750 milliLiter(s) IV Intermittent every 48 hours  albumin human  5% IVPB 3750 milliLiter(s) IV Intermittent once  albumin human  5% IVPB 3750 milliLiter(s) IV Intermittent once  amLODIPine   Tablet 10 milliGRAM(s) Oral daily  bisacodyl 5 milliGRAM(s) Oral at bedtime  calcium gluconate IVPB 2 Gram(s) IV Intermittent <User Schedule>  calcium gluconate IVPB 2 Gram(s) IV Intermittent once  calcium gluconate IVPB 2 Gram(s) IV Intermittent once  chlorhexidine 2% Cloths 1 Application(s) Topical daily  dextrose 40% Gel 15 Gram(s) Oral once  dextrose 5%. 1000 milliLiter(s) (50 mL/Hr) IV Continuous <Continuous>  dextrose 5%. 1000 milliLiter(s) (100 mL/Hr) IV Continuous <Continuous>  dextrose 50% Injectable 25 Gram(s) IV Push once  dextrose 50% Injectable 12.5 Gram(s) IV Push once  dextrose 50% Injectable 25 Gram(s) IV Push once  enoxaparin Injectable 40 milliGRAM(s) SubCutaneous daily  gabapentin 100 milliGRAM(s) Oral three times a day  glucagon  Injectable 1 milliGRAM(s) IntraMuscular once  hydrALAZINE 25 milliGRAM(s) Oral three times a day  HYDROmorphone  Injectable 1 milliGRAM(s) IV Push once  insulin glargine Injectable (LANTUS) 50 Unit(s) SubCutaneous at bedtime  insulin lispro (ADMELOG) corrective regimen sliding scale   SubCutaneous three times a day before meals  insulin lispro (ADMELOG) corrective regimen sliding scale   SubCutaneous at bedtime  insulin lispro Injectable (ADMELOG) 3 Unit(s) SubCutaneous <User Schedule>  insulin lispro Injectable (ADMELOG) 14 Unit(s) SubCutaneous before lunch  insulin lispro Injectable (ADMELOG) 14 Unit(s) SubCutaneous before dinner  insulin lispro Injectable (ADMELOG) 16 Unit(s) SubCutaneous before breakfast  losartan 100 milliGRAM(s) Oral daily  metoprolol tartrate 12.5 milliGRAM(s) Oral two times a day  mineral oil 30 milliLiter(s) Oral once  senna 2 Tablet(s) Oral two times a day  sodium chloride 0.9%. 1000 milliLiter(s) (75 mL/Hr) IV Continuous <Continuous>    MEDICATIONS  (PRN):  acetaminophen   Tablet .. 650 milliGRAM(s) Oral every 6 hours PRN Temp greater or equal to 38C (100.4F), Mild Pain (1 - 3), Moderate Pain (4 - 6)  acetaminophen   Tablet .. 650 milliGRAM(s) Oral every 6 hours PRN Mild Pain (1 - 3)  aluminum hydroxide/magnesium hydroxide/simethicone Suspension 30 milliLiter(s) Oral every 6 hours PRN Dyspepsia  dicyclomine 10 milliGRAM(s) Oral once PRN abdominal pain  HYDROmorphone  Injectable 1 milliGRAM(s) IV Push every 8 hours PRN Severe Pain (7 - 10)  oxycodone    5 mG/acetaminophen 325 mG 1 Tablet(s) Oral every 6 hours PRN Moderate Pain (4 - 6)  sodium chloride 0.9% lock flush 10 milliLiter(s) IV Push every 1 hour PRN Pre/post blood products, medications, blood draw, and to maintain line patency      Allergies    No Known Drug Allergies  shellfish (Urticaria)    Intolerances      Review of Systems:  ALL OTHER SYSTEMS REVIEWED AND NEGATIVE      PHYSICAL EXAM:  Vital Signs Last 24 Hrs  T(C): 36.7 (30 Apr 2021 13:12), Max: 36.7 (30 Apr 2021 05:10)  T(F): 98.1 (30 Apr 2021 13:12), Max: 98.1 (30 Apr 2021 13:12)  HR: 80 (30 Apr 2021 13:12) (75 - 99)  BP: 103/56 (30 Apr 2021 13:12) (101/57 - 141/81)  BP(mean): --  RR: 16 (30 Apr 2021 13:12) (16 - 18)  SpO2: 100% (30 Apr 2021 13:12) (98% - 100%)  GENERAL: NAD, well-groomed, well-developed  EYES: No proptosis, no lid lag, anicteric  HEENT:  Atraumatic, Normocephalic, moist mucous membranes  RESPIRATORY: nonlabored respirations, no wheezing  PSYCH: Alert and oriented x 3, normal affect, normal mood      CAPILLARY BLOOD GLUCOSE      POCT Blood Glucose.: 162 mg/dL (30 Apr 2021 11:44)  POCT Blood Glucose.: 233 mg/dL (30 Apr 2021 07:32)  POCT Blood Glucose.: 151 mg/dL (29 Apr 2021 21:07)  POCT Blood Glucose.: 148 mg/dL (29 Apr 2021 17:04)    POCT Blood Glucose.: 143 mg/dL (29 Apr 2021 12:10)  POCT Blood Glucose.: 262 mg/dL (29 Apr 2021 07:52)  POCT Blood Glucose.: 132 mg/dL (28 Apr 2021 21:04)  POCT Blood Glucose.: 148 mg/dL (28 Apr 2021 16:49)    04-30    142  |  106  |  15  ----------------------------<  195<H>  4.1   |  24  |  0.92    Ca    8.8      30 Apr 2021 07:15  Phos  3.6     04-30  Mg     1.8     04-30        A1C with Estimated Average Glucose Result: 11.5 % (04-11-21 @ 07:40)      Thyroid Function Tests:  04-27 @ 07:21 TSH 4.20 FreeT4 1.2 T3 -- Anti TPO -- Anti Thyroglobulin Ab -- TSI --  04-12 @ 06:36 TSH 3.00 FreeT4 1.7 T3 -- Anti TPO -- Anti Thyroglobulin Ab -- TSI --

## 2021-04-30 NOTE — PROGRESS NOTE ADULT - ASSESSMENT
50M uncontrolled DM2 HbA1c 11.5%, hyperglycemia related to difficulties obtaining adequate insulin during pandemic due to insurance/cost.    1) DM2 with hyperglycemia  Inpatient plan:  BG target 100-180 mg/dl  glucose is above goal in AM then improves during the day.  AM hyperglycemia may be related to snack eaten at night or just requiring higher dose of basal insulin or both.  carb consistent diet  FS premeal and bedtime  Continue Lantus 52 units qhs  Contniue Admelog to 16/14/14 before meals - HOLD if not eating   Increased Admelog to 5 units sq QHS-pre bedtime snack- hold if doesn't have snack.   Reinforced to patient to communicate with RN when eating his snack in order to receive his insulin.  Continue Admelog moderate scale premeal and moderate bedtime scale  RD consult- appreciated    Discussed with patient regarding discharge planning, he now will have enough Tresiba and Novolog at home to proceed with these insulins due to [facility] program he is now a part of.   DC on Tresiba and Novolog pens doses TBD. Discussed option of mixed insulin in case of future issues obtaining insulin.  He requested changing to [facility] endocrinology. Appointments below:  [address] [place] [address], [address], [phone]  6/8/21 @ 11:00 AM with diabetes educator   7/12/21 @ 11:00 AM with Dr Montesinos     2) Hyponatremia  TSH, Free T4 and 8AM cortisol in acceptable range not indicative of endocrine cause of hyponatremia. Na- 142 today    3) Hypertension  BP goal < 130/80, borderline/slightly high  management per primary team    Farzaneh Bennett  Nurse Practitioner  Division of Endocrinology & Diabetes  Pager # [phone]      If after 6PM or before 9AM, or on weekends/holidays, please call endocrine answering service for assistance ([phone]).  For nonurgent matters email [email or web address] for assistance.

## 2021-04-30 NOTE — PROGRESS NOTE ADULT - ASSESSMENT
49 yo RH AA male with h/o DM, HTN, hyperlipidemia; pt presented to the ED c/o abdominal cramping, generalized, and diarrhea following use of laxatives as above for constipation. Neurology called for evaluation of LE weakness, unsteady gait. s/p shieley. Na improved.     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 protein  c/w GBS given albumino cytologic dissociation  - GI following for constipation  - hyponatremia improved   - s/p shiley --> can be removed   - completed 5 sessions of PLEX    - f/u remaining LP results.   - f/u ganglioside antibodies, Gq1b -->neg   - slow correction of sodium  - aggressive PT/OT  - B12 >2000, copper WNL, Vit E WNL, thiamine WNL, magnesium heavy metals WNL, zinc  -refused IVIG but agreed for PLEX  - telemetry  - check FS, glucose control <180  - GI/DVT ppx  - Counseling on diet, exercise, and medication adherence was done  - Counseling on smoking cessation and alcohol consumption offered when appropriate.  - Pain assessed and judicious use of narcotics when appropriate was discussed.    - Stroke education given when appropriate.  - Importance of fall prevention discussed.   - Differential diagnosis and plan of care discussed with patient and/or family and primary team  - Thank you for allowing me to participate in the care of this patient. Call with questions.   - d/c plan to JITENDRA Gardner MD  Vascular Neurology  Office: [phone]

## 2021-04-30 NOTE — PROGRESS NOTE ADULT - SUBJECTIVE AND OBJECTIVE BOX
Patient is a 50y Male     Patient is a 50y old  Male who presents with a chief complaint of constipaton (29 Apr 2021 07:31)      HPI:  51 yo male, PMH DM, HTN, hyperlipidemia; pt presented to the ED c/o abdominal cramping, generalized, and diarrhea following use of laxatives as above for constipation.  In the ED, WBC 10.82, Hb 13.6, CMP: sodium 126, chloride 89, glucose 281, CMP otherwise unremarkable.  VBG: Lactate 2.6 (later downtrended to 1.6).  Pt. was treated with IV hydration and dispo'd to CDU for continued care plan:  IV hydration, supportive care, AM labs, general observation care / monitoring. pt continued to have generalized weakness , numbness and on labs persistent hyponatermia and now being admitted for furhter evlauation .  dnenies N/V/Abd pain   had bm   no urinary sx   no fever or chills   no cough   no cp /sob    (11 Apr 2021 15:50)      PAST MEDICAL & SURGICAL HISTORY:  DM (diabetes mellitus)    HTN (hypertension)    Hyperlipidemia    H/O total knee replacement    Foot fracture        MEDICATIONS  (STANDING):  albumin human  5% IVPB 3750 milliLiter(s) IV Intermittent every 48 hours  albumin human  5% IVPB 3750 milliLiter(s) IV Intermittent once  albumin human  5% IVPB 3750 milliLiter(s) IV Intermittent once  amLODIPine   Tablet 10 milliGRAM(s) Oral daily  bisacodyl 5 milliGRAM(s) Oral at bedtime  calcium gluconate IVPB 2 Gram(s) IV Intermittent <User Schedule>  calcium gluconate IVPB 2 Gram(s) IV Intermittent once  calcium gluconate IVPB 2 Gram(s) IV Intermittent once  chlorhexidine 2% Cloths 1 Application(s) Topical daily  dextrose 40% Gel 15 Gram(s) Oral once  dextrose 5%. 1000 milliLiter(s) (50 mL/Hr) IV Continuous <Continuous>  dextrose 5%. 1000 milliLiter(s) (100 mL/Hr) IV Continuous <Continuous>  dextrose 50% Injectable 25 Gram(s) IV Push once  dextrose 50% Injectable 12.5 Gram(s) IV Push once  dextrose 50% Injectable 25 Gram(s) IV Push once  enoxaparin Injectable 40 milliGRAM(s) SubCutaneous daily  gabapentin 100 milliGRAM(s) Oral three times a day  glucagon  Injectable 1 milliGRAM(s) IntraMuscular once  hydrALAZINE 25 milliGRAM(s) Oral three times a day  insulin glargine Injectable (LANTUS) 52 Unit(s) SubCutaneous at bedtime  insulin lispro (ADMELOG) corrective regimen sliding scale   SubCutaneous three times a day before meals  insulin lispro (ADMELOG) corrective regimen sliding scale   SubCutaneous at bedtime  insulin lispro Injectable (ADMELOG) 3 Unit(s) SubCutaneous <User Schedule>  insulin lispro Injectable (ADMELOG) 16 Unit(s) SubCutaneous before breakfast  insulin lispro Injectable (ADMELOG) 14 Unit(s) SubCutaneous before lunch  insulin lispro Injectable (ADMELOG) 14 Unit(s) SubCutaneous before dinner  losartan 100 milliGRAM(s) Oral daily  metoprolol tartrate 12.5 milliGRAM(s) Oral two times a day  mineral oil 30 milliLiter(s) Oral once  senna 2 Tablet(s) Oral two times a day  sodium chloride 0.9%. 1000 milliLiter(s) (75 mL/Hr) IV Continuous <Continuous>      Allergies    No Known Drug Allergies  shellfish (Urticaria)    Intolerances        SOCIAL HISTORY:  Denies ETOh,Smoking,     FAMILY HISTORY:  No pertinent family history in first degree relatives        REVIEW OF SYSTEMS:    CONSTITUTIONAL: No weakness, fevers or chills  EYES/ENT: No visual changes;  No vertigo or throat pain   NECK: No pain or stiffness  RESPIRATORY: No cough, wheezing, hemoptysis; No shortness of breath  CARDIOVASCULAR: No chest pain or palpitations  GASTROINTESTINAL: No abdominal or epigastric pain. No nausea, vomiting, or hematemesis; No diarrhea or constipation. No melena or hematochezia.  GENITOURINARY: No dysuria, frequency or hematuria  NEUROLOGICAL: No numbness or weakness  SKIN: No itching, burning, rashes, or lesions   All other review of systems is negative unless indicated above.    VITAL:  T(C): , Max: 36.7 (04-30-21 @ 05:10)  T(F): , Max: 98 (04-30-21 @ 05:10)  HR: 85 (04-30-21 @ 05:10)  BP: 101/57 (04-30-21 @ 05:10)  BP(mean): --  RR: 18 (04-30-21 @ 05:10)  SpO2: 100% (04-30-21 @ 05:10)  Wt(kg): --    I and O's:    04-28 @ 07:01  -  04-29 @ 07:00  --------------------------------------------------------  IN: 0 mL / OUT: 500 mL / NET: -500 mL    04-29 @ 07:01  -  04-30 @ 07:00  --------------------------------------------------------  IN: 0 mL / OUT: 700 mL / NET: -700 mL          PHYSICAL EXAM:    Constitutional: NAD  HEENT: PERRLA,   Neck: No JVD  Respiratory: CTA B/L  Cardiovascular: S1 and S2  Gastrointestinal: BS+, soft, NT/ND  Extremities: No peripheral edema  Neurological: A/O x 3, no focal deficits  Psychiatric: Normal mood, normal affect  : No Jose  Skin: No rashes  Access: Not applicable  Back: No CVA tenderness    LABS:                        11.5   8.28  )-----------( 248      ( 29 Apr 2021 08:13 )             34.2     04-29    137  |  104  |  18  ----------------------------<  200<H>  4.1   |  24  |  0.88    Ca    9.1      29 Apr 2021 08:13  Phos  3.7     04-29  Mg     1.8     04-29            RADIOLOGY & ADDITIONAL STUDIES:

## 2021-04-30 NOTE — PROGRESS NOTE ADULT - SUBJECTIVE AND OBJECTIVE BOX
Date of service: 04-30-21 @ 15:35      Patient is a 50y old  Male who presents with a chief complaint of constipation (30 Apr 2021 07:08)                                                               INTERVAL HPI/OVERNIGHT EVENTS:    REVIEW OF SYSTEMS:     CONSTITUTIONAL: No weakness, fevers or chills  EYES/ENT: No visual changes , no ear ache   NECK: No pain or stiffness  RESPIRATORY: No cough, wheezing,  No shortness of breath  CARDIOVASCULAR: No chest pain or palpitations  GASTROINTESTINAL: No abdominal pain  . No nausea, vomiting, or hematemesis; No diarrhea or constipation. No melena or hematochezia.  GENITOURINARY: No dysuria, frequency or hematuria  NEUROLOGICAL: No numbness or weakness  no urinary or stool incontinence                                                                                                                                                                                                                                                                               Medications:  MEDICATIONS  (STANDING):  albumin human  5% IVPB 3750 milliLiter(s) IV Intermittent every 48 hours  albumin human  5% IVPB 3750 milliLiter(s) IV Intermittent once  albumin human  5% IVPB 3750 milliLiter(s) IV Intermittent once  amLODIPine   Tablet 10 milliGRAM(s) Oral daily  bisacodyl 5 milliGRAM(s) Oral at bedtime  calcium gluconate IVPB 2 Gram(s) IV Intermittent <User Schedule>  calcium gluconate IVPB 2 Gram(s) IV Intermittent once  calcium gluconate IVPB 2 Gram(s) IV Intermittent once  chlorhexidine 2% Cloths 1 Application(s) Topical daily  dextrose 40% Gel 15 Gram(s) Oral once  dextrose 5%. 1000 milliLiter(s) (50 mL/Hr) IV Continuous <Continuous>  dextrose 5%. 1000 milliLiter(s) (100 mL/Hr) IV Continuous <Continuous>  dextrose 50% Injectable 25 Gram(s) IV Push once  dextrose 50% Injectable 12.5 Gram(s) IV Push once  dextrose 50% Injectable 25 Gram(s) IV Push once  enoxaparin Injectable 40 milliGRAM(s) SubCutaneous daily  gabapentin 100 milliGRAM(s) Oral three times a day  glucagon  Injectable 1 milliGRAM(s) IntraMuscular once  hydrALAZINE 25 milliGRAM(s) Oral three times a day  insulin glargine Injectable (LANTUS) 52 Unit(s) SubCutaneous at bedtime  insulin lispro (ADMELOG) corrective regimen sliding scale   SubCutaneous three times a day before meals  insulin lispro (ADMELOG) corrective regimen sliding scale   SubCutaneous at bedtime  insulin lispro Injectable (ADMELOG) 5 Unit(s) SubCutaneous <User Schedule>  insulin lispro Injectable (ADMELOG) 16 Unit(s) SubCutaneous before breakfast  insulin lispro Injectable (ADMELOG) 14 Unit(s) SubCutaneous before lunch  insulin lispro Injectable (ADMELOG) 14 Unit(s) SubCutaneous before dinner  losartan 100 milliGRAM(s) Oral daily  metoprolol tartrate 12.5 milliGRAM(s) Oral two times a day  mineral oil 30 milliLiter(s) Oral once  senna 2 Tablet(s) Oral two times a day  sodium chloride 0.9%. 1000 milliLiter(s) (75 mL/Hr) IV Continuous <Continuous>    MEDICATIONS  (PRN):  acetaminophen   Tablet .. 650 milliGRAM(s) Oral every 6 hours PRN Temp greater or equal to 38C (100.4F), Mild Pain (1 - 3), Moderate Pain (4 - 6)  acetaminophen   Tablet .. 650 milliGRAM(s) Oral every 6 hours PRN Mild Pain (1 - 3)  aluminum hydroxide/magnesium hydroxide/simethicone Suspension 30 milliLiter(s) Oral every 6 hours PRN Dyspepsia  dicyclomine 10 milliGRAM(s) Oral once PRN abdominal pain  HYDROmorphone   Tablet 1 milliGRAM(s) Oral every 8 hours PRN Severe Pain (7 - 10)  sodium chloride 0.9% lock flush 10 milliLiter(s) IV Push every 1 hour PRN Pre/post blood products, medications, blood draw, and to maintain line patency       Allergies    No Known Drug Allergies  shellfish (Urticaria)    Intolerances      Vital Signs Last 24 Hrs  T(C): 36.7 (30 Apr 2021 13:12), Max: 36.7 (30 Apr 2021 05:10)  T(F): 98.1 (30 Apr 2021 13:12), Max: 98.1 (30 Apr 2021 13:12)  HR: 80 (30 Apr 2021 13:12) (75 - 99)  BP: 103/56 (30 Apr 2021 13:12) (101/57 - 141/81)  BP(mean): --  RR: 16 (30 Apr 2021 13:12) (16 - 18)  SpO2: 100% (30 Apr 2021 13:12) (98% - 100%)  CAPILLARY BLOOD GLUCOSE      POCT Blood Glucose.: 162 mg/dL (30 Apr 2021 11:44)  POCT Blood Glucose.: 233 mg/dL (30 Apr 2021 07:32)  POCT Blood Glucose.: 151 mg/dL (29 Apr 2021 21:07)  POCT Blood Glucose.: 148 mg/dL (29 Apr 2021 17:04)      04-29 @ 07:01  -  04-30 @ 07:00  --------------------------------------------------------  IN: 0 mL / OUT: 1000 mL / NET: -1000 mL      Physical Exam:    Daily     Daily   General:  Well appearing, NAD, not cachetic  HEENT:  Nonicteric, PERRLA  CV:  RRR, S1S2   Lungs:  CTA B/L, no wheezes, rales, rhonchi  Abdomen:  Soft, non-tender, no distended, positive BS  Extremities:  LE weakness   Neuro:  AAOx3, non-focal, grossly intact                                                                                                                                                                                                                                                                                                LABS:                               11.2   8.39  )-----------( 250      ( 30 Apr 2021 07:15 )             33.8                      04-30    142  |  106  |  15  ----------------------------<  195<H>  4.1   |  24  |  0.92    Ca    8.8      30 Apr 2021 07:15  Phos  3.6     04-30  Mg     1.8     04-30                         RADIOLOGY & ADDITIONAL TESTS         I personally reviewed: [  ]EKG   [  ]CXR    [  ] CT      A/P:         Discussed with :     Francisco consultants' Notes   Time spent :

## 2021-04-30 NOTE — CHART NOTE - NSCHARTNOTEFT_GEN_A_CORE
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 Wife refused IVIG. During PLEX#1 on 4/21 he had hypotension, hence plan to hold off losartan.     PLEX#4 on 4/27 uneventful. No interval events. Per wife his neuro status is improving. Telephone conversation.     PLEX#5 performed with 3750ml Albumin on 4/29 and procedure was well tolerated.  Care d/w Apheresis nurse. Dispo per neuro.

## 2021-04-30 NOTE — PROGRESS NOTE ADULT - SUBJECTIVE AND OBJECTIVE BOX
Patient is a 50y old  Male who presents with a chief complaint of constipation (30 Apr 2021 07:08)      Interval history: pt reports diarrhea yesterday, states it is resolved today. States pain controlled with oral medications.       REVIEW OF SYSTEMS  Constitutional - No fever, No weight loss, No fatigue  HEENT - No eye pain, No visual disturbances, No difficulty hearing, No tinnitus, No vertigo, No neck pain  Respiratory - No cough, No wheezing, No shortness of breath  Cardiovascular - No chest pain, No palpitations  Gastrointestinal - No abdominal pain, No nausea, No vomiting, No diarrhea, No constipation  Genitourinary - No dysuria, No frequency, No hematuria, No incontinence  Neurological - No headaches, No memory loss, + loss of strength, + numbness, No tremors  Skin - No itching, No rashes, No lesions   Endocrine - No temperature intolerance  Musculoskeletal - No joint pain, No joint swelling, No muscle pain  Psychiatric - No depression, No anxiety    PAST MEDICAL & SURGICAL HISTORY  DM (diabetes mellitus)    HTN (hypertension)    High cholesterol    Diabetes    HTN (hypertension)    Hyperlipidemia    No significant past surgical history    No significant past surgical history    H/O total knee replacement    Foot fracture      CURRENT FUNCTIONAL STATUS  4/28: Patient was received semi-supine in bed in NAD, agreeable and motivated to participate in PT session. Pt required moderate assistance x1 for bed mobility. Pt able to support himself while sitting at the edge of the bed without assistance. pt demonstrated improved ability to move LEs. Pt transferred sit<->stand with maximum assistance x2, +blocking knees to prevent buckling. Pt left safely in bed with head of bed elevated, all lines/tubes intact and call bell within reach.    FAMILY HISTORY   No pertinent family history in first degree relatives        RECENT LABS/IMAGING  CBC Full  -  ( 30 Apr 2021 07:15 )  WBC Count : 8.39 K/uL  RBC Count : 3.77 M/uL  Hemoglobin : 11.2 g/dL  Hematocrit : 33.8 %  Platelet Count - Automated : 250 K/uL  Mean Cell Volume : 89.7 fL  Mean Cell Hemoglobin : 29.7 pg  Mean Cell Hemoglobin Concentration : 33.1 gm/dL  Auto Neutrophil # : x  Auto Lymphocyte # : x  Auto Monocyte # : x  Auto Eosinophil # : x  Auto Basophil # : x  Auto Neutrophil % : x  Auto Lymphocyte % : x  Auto Monocyte % : x  Auto Eosinophil % : x  Auto Basophil % : x    04-30    142  |  106  |  15  ----------------------------<  195<H>  4.1   |  24  |  0.92    Ca    8.8      30 Apr 2021 07:15  Phos  3.6     04-30  Mg     1.8     04-30        VITALS  T(C): 36.1 (04-30-21 @ 10:03), Max: 36.7 (04-30-21 @ 05:10)  HR: 75 (04-30-21 @ 10:03) (75 - 99)  BP: 102/61 (04-30-21 @ 10:03) (101/57 - 143/79)  RR: 17 (04-30-21 @ 10:03) (17 - 18)  SpO2: 100% (04-30-21 @ 10:03) (98% - 100%)  Wt(kg): --    ALLERGIES  No Known Drug Allergies  shellfish (Urticaria)      MEDICATIONS   acetaminophen   Tablet .. 650 milliGRAM(s) Oral every 6 hours PRN  acetaminophen   Tablet .. 650 milliGRAM(s) Oral every 6 hours PRN  albumin human  5% IVPB 3750 milliLiter(s) IV Intermittent every 48 hours  albumin human  5% IVPB 3750 milliLiter(s) IV Intermittent once  albumin human  5% IVPB 3750 milliLiter(s) IV Intermittent once  aluminum hydroxide/magnesium hydroxide/simethicone Suspension 30 milliLiter(s) Oral every 6 hours PRN  amLODIPine   Tablet 10 milliGRAM(s) Oral daily  bisacodyl 5 milliGRAM(s) Oral at bedtime  calcium gluconate IVPB 2 Gram(s) IV Intermittent <User Schedule>  calcium gluconate IVPB 2 Gram(s) IV Intermittent once  calcium gluconate IVPB 2 Gram(s) IV Intermittent once  chlorhexidine 2% Cloths 1 Application(s) Topical daily  dextrose 40% Gel 15 Gram(s) Oral once  dextrose 5%. 1000 milliLiter(s) IV Continuous <Continuous>  dextrose 5%. 1000 milliLiter(s) IV Continuous <Continuous>  dextrose 50% Injectable 25 Gram(s) IV Push once  dextrose 50% Injectable 12.5 Gram(s) IV Push once  dextrose 50% Injectable 25 Gram(s) IV Push once  dicyclomine 10 milliGRAM(s) Oral once PRN  enoxaparin Injectable 40 milliGRAM(s) SubCutaneous daily  gabapentin 100 milliGRAM(s) Oral three times a day  glucagon  Injectable 1 milliGRAM(s) IntraMuscular once  hydrALAZINE 25 milliGRAM(s) Oral three times a day  HYDROmorphone   Tablet 1 milliGRAM(s) Oral every 8 hours PRN  insulin glargine Injectable (LANTUS) 52 Unit(s) SubCutaneous at bedtime  insulin lispro (ADMELOG) corrective regimen sliding scale   SubCutaneous three times a day before meals  insulin lispro (ADMELOG) corrective regimen sliding scale   SubCutaneous at bedtime  insulin lispro Injectable (ADMELOG) 3 Unit(s) SubCutaneous <User Schedule>  insulin lispro Injectable (ADMELOG) 16 Unit(s) SubCutaneous before breakfast  insulin lispro Injectable (ADMELOG) 14 Unit(s) SubCutaneous before lunch  insulin lispro Injectable (ADMELOG) 14 Unit(s) SubCutaneous before dinner  losartan 100 milliGRAM(s) Oral daily  metoprolol tartrate 12.5 milliGRAM(s) Oral two times a day  mineral oil 30 milliLiter(s) Oral once  senna 2 Tablet(s) Oral two times a day  sodium chloride 0.9% lock flush 10 milliLiter(s) IV Push every 1 hour PRN  sodium chloride 0.9%. 1000 milliLiter(s) IV Continuous <Continuous>      ----------------------------------------------------------------------------------------   PHYSICAL EXAM  Constitutional - NAD, Comfortable  HEENT -  + R neck plasmapheresis catheter, EOMI     Chest - no respiratory distress  Cardiovascular - RRR, S1S2   Abdomen - Soft, NTND  Extremities - No C/C/E, No calf tenderness   Neurologic Exam -                    Cognitive - Awake, Alert, AAO to self, place, date, year, situation     Communication - Fluent, No dysarthria     Cranial Nerves - CN 2-12 intact     Motor -                     LEFT    UE - ShAB 4+/5, EF 4+/5, EE 4+/5, WE4+/5,  4+/5                    RIGHT UE - ShAB 4+/5, EF 4+/5, EE 4+/5, WE4/5,  4/5                    LEFT    LE - HF 3+/5, KE 3/5, DF 4/5, PF 4/5                    RIGHT LE - HF 3/5, KE 3/5, DF 4/5, PF 4/5        Sensory -impaired distal fingertips, toes     OculoVestibular - No saccades, No nystagmus, VOR         Balance - WNL Static  Psychiatric - Mood stable, Affect WNL  ----------------------------------------------------------------------------------------  ASSESSMENT/PLAN  49 yo m p/w GI symptoms, now with weakness, 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  s/p plasmapheresis  turn and position q2 to prevent skin breakdown  shiley can be removed per neurology  Pain -tylenol prn, on gabapentin, dilaudid 1mg po q 8 prn (may need increased frequency), with bowel regimen   dvt ppx: lovenox  continue bedside PT and OT for bed mobility, transfers, ambulation with AD, adls  OOB to chair when sitting balance allows  Rehab -   Recommend ACUTE inpatient rehabilitation for the functional deficits consisting of 3 hours of therapy/day & 24 hour RN/daily PMR physician for comorbid medical management. Will continue to follow for ongoing rehab needs and recommendations.

## 2021-04-30 NOTE — PROGRESS NOTE ADULT - SUBJECTIVE AND OBJECTIVE BOX
[facility]   MD WYATT CHRIS DO ANAM SIDDIQUI ANGELA WONG, PA    TEL:  OFFICE: [phone]  DR DAWSON CELL: [phone]  DR. TIRADO CELL: [phone]  DR. MORAN CELL: [phone]  CATINA PATEL CELL: [phone]    From 5pm-7am Answering Service [phone]    -- RENAL FOLLOW UP NOTE ---Date of Service 04-30-21 @ 15:05    Patient is a 50y old  Male who presents with a chief complaint of constipation (30 Apr 2021 07:08)      Patient seen and examined at bedside. No chest pain/sob    VITALS:  T(F): 98.1 (04-30-21 @ 13:12), Max: 98.1 (04-30-21 @ 13:12)  HR: 80 (04-30-21 @ 13:12)  BP: 103/56 (04-30-21 @ 13:12)  RR: 16 (04-30-21 @ 13:12)  SpO2: 100% (04-30-21 @ 13:12)  Wt(kg): --    04-29 @ 07:01  -  04-30 @ 07:00  --------------------------------------------------------  IN: 0 mL / OUT: 1000 mL / NET: -1000 mL          PHYSICAL EXAM:  Constitutional: NAD  Neck: No JVD  Respiratory: CTAB, no wheezes, rales or rhonchi  Cardiovascular: S1, S2, RRR  Gastrointestinal: BS+, soft, NT/ND  Extremities: No peripheral edema    Hospital Medications:   MEDICATIONS  (STANDING):  albumin human  5% IVPB 3750 milliLiter(s) IV Intermittent every 48 hours  albumin human  5% IVPB 3750 milliLiter(s) IV Intermittent once  albumin human  5% IVPB 3750 milliLiter(s) IV Intermittent once  amLODIPine   Tablet 10 milliGRAM(s) Oral daily  bisacodyl 5 milliGRAM(s) Oral at bedtime  calcium gluconate IVPB 2 Gram(s) IV Intermittent <User Schedule>  calcium gluconate IVPB 2 Gram(s) IV Intermittent once  calcium gluconate IVPB 2 Gram(s) IV Intermittent once  chlorhexidine 2% Cloths 1 Application(s) Topical daily  dextrose 40% Gel 15 Gram(s) Oral once  dextrose 5%. 1000 milliLiter(s) (50 mL/Hr) IV Continuous <Continuous>  dextrose 5%. 1000 milliLiter(s) (100 mL/Hr) IV Continuous <Continuous>  dextrose 50% Injectable 25 Gram(s) IV Push once  dextrose 50% Injectable 12.5 Gram(s) IV Push once  dextrose 50% Injectable 25 Gram(s) IV Push once  enoxaparin Injectable 40 milliGRAM(s) SubCutaneous daily  gabapentin 100 milliGRAM(s) Oral three times a day  glucagon  Injectable 1 milliGRAM(s) IntraMuscular once  hydrALAZINE 25 milliGRAM(s) Oral three times a day  insulin glargine Injectable (LANTUS) 52 Unit(s) SubCutaneous at bedtime  insulin lispro (ADMELOG) corrective regimen sliding scale   SubCutaneous three times a day before meals  insulin lispro (ADMELOG) corrective regimen sliding scale   SubCutaneous at bedtime  insulin lispro Injectable (ADMELOG) 5 Unit(s) SubCutaneous <User Schedule>  insulin lispro Injectable (ADMELOG) 14 Unit(s) SubCutaneous before lunch  insulin lispro Injectable (ADMELOG) 14 Unit(s) SubCutaneous before dinner  insulin lispro Injectable (ADMELOG) 16 Unit(s) SubCutaneous before breakfast  losartan 100 milliGRAM(s) Oral daily  metoprolol tartrate 12.5 milliGRAM(s) Oral two times a day  mineral oil 30 milliLiter(s) Oral once  senna 2 Tablet(s) Oral two times a day  sodium chloride 0.9%. 1000 milliLiter(s) (75 mL/Hr) IV Continuous <Continuous>      LABS:  04-30    142  |  106  |  15  ----------------------------<  195<H>  4.1   |  24  |  0.92    Ca    8.8      30 Apr 2021 07:15  Phos  3.6     04-30  Mg     1.8     04-30      Creatinine Trend: 0.92 <--, 0.88 <--, 0.85 <--, 0.97 <--, 0.89 <--, 0.88 <--    Phosphorus Level, Serum: 3.6 mg/dL (04-30 @ 07:15)                              11.2   8.39  )-----------( 250      ( 30 Apr 2021 07:15 )             33.8     Urine Studies:  Urinalysis - [04-11-21 @ 12:32]      Color Light Yellow / Appearance Clear / SG 1.013 / pH 6.5      Gluc >= 1000 mg/dL / Ketone Small  / Bili Negative / Urobili <2 mg/dL       Blood Small / Protein 100 mg/dL / Leuk Est Negative / Nitrite Negative      RBC 10 / WBC 1 / Hyaline 1 / Gran  / Sq Epi  / Non Sq Epi 1 / Bacteria Negative      TSH 4.20      [04-27-21 @ 07:21]    HBsAg Nonreact      [04-18-21 @ 01:28]  HCV 0.40, Nonreact      [04-18-21 @ 01:28]  HIV Nonreact      [04-17-21 @ 16:16]    PIERO: titer Negative, pattern --      [04-13-21 @ 10:10]  C3 Complement 152      [04-13-21 @ 07:48]  C4 Complement 33      [04-13-21 @ 07:48]  ANCA: cANCA Negative, pANCA Negative, atypical ANCA Negative      [04-16-21 @ 12:16]  Syphilis Screen (Treponema Pallidum Ab) Positive      [04-13-21 @ 10:10]  Syphilis Screen (RPR Titer) 1:1      [04-13-21 @ 10:10]  Free Light Chains: kappa 1.51, lambda 1.89, ratio = 0.80      [04-13 @ 10:08]  Immunofixation Serum:   No Monoclonal Band Identified    Reference Range: None Detected      [04-16-21 @ 08:31]  SPEP Interpretation: phase reaction. KRIS Ferris M.D.      [04-16-21 @ 08:31]    RADIOLOGY & ADDITIONAL STUDIES:

## 2021-05-01 LAB
ANION GAP SERPL CALC-SCNC: 10 MMOL/L — SIGNIFICANT CHANGE UP (ref 7–14)
BUN SERPL-MCNC: 15 MG/DL — SIGNIFICANT CHANGE UP (ref 7–23)
CALCIUM SERPL-MCNC: 9 MG/DL — SIGNIFICANT CHANGE UP (ref 8.4–10.5)
CHLORIDE SERPL-SCNC: 105 MMOL/L — SIGNIFICANT CHANGE UP (ref 98–107)
CO2 SERPL-SCNC: 25 MMOL/L — SIGNIFICANT CHANGE UP (ref 22–31)
CREAT SERPL-MCNC: 0.96 MG/DL — SIGNIFICANT CHANGE UP (ref 0.5–1.3)
GLUCOSE SERPL-MCNC: 158 MG/DL — HIGH (ref 70–99)
HCT VFR BLD CALC: 31.6 % — LOW (ref 39–50)
HGB BLD-MCNC: 10.3 G/DL — LOW (ref 13–17)
MAGNESIUM SERPL-MCNC: 1.9 MG/DL — SIGNIFICANT CHANGE UP (ref 1.6–2.6)
MCHC RBC-ENTMCNC: 29.8 PG — SIGNIFICANT CHANGE UP (ref 27–34)
MCHC RBC-ENTMCNC: 32.6 GM/DL — SIGNIFICANT CHANGE UP (ref 32–36)
MCV RBC AUTO: 91.3 FL — SIGNIFICANT CHANGE UP (ref 80–100)
NRBC # BLD: 0 /100 WBCS — SIGNIFICANT CHANGE UP
NRBC # FLD: 0 K/UL — SIGNIFICANT CHANGE UP
PHOSPHATE SERPL-MCNC: 3.8 MG/DL — SIGNIFICANT CHANGE UP (ref 2.5–4.5)
PLATELET # BLD AUTO: 262 K/UL — SIGNIFICANT CHANGE UP (ref 150–400)
POTASSIUM SERPL-MCNC: 4 MMOL/L — SIGNIFICANT CHANGE UP (ref 3.5–5.3)
POTASSIUM SERPL-SCNC: 4 MMOL/L — SIGNIFICANT CHANGE UP (ref 3.5–5.3)
RBC # BLD: 3.46 M/UL — LOW (ref 4.2–5.8)
RBC # FLD: 12.4 % — SIGNIFICANT CHANGE UP (ref 10.3–14.5)
SODIUM SERPL-SCNC: 140 MMOL/L — SIGNIFICANT CHANGE UP (ref 135–145)
WBC # BLD: 7.36 K/UL — SIGNIFICANT CHANGE UP (ref 3.8–10.5)
WBC # FLD AUTO: 7.36 K/UL — SIGNIFICANT CHANGE UP (ref 3.8–10.5)

## 2021-05-01 RX ORDER — OXYCODONE AND ACETAMINOPHEN 5; 325 MG/1; MG/1
1 TABLET ORAL ONCE
Refills: 0 | Status: DISCONTINUED | OUTPATIENT
Start: 2021-05-01 | End: 2021-05-01

## 2021-05-01 RX ADMIN — SENNA PLUS 2 TABLET(S): 8.6 TABLET ORAL at 18:23

## 2021-05-01 RX ADMIN — OXYCODONE AND ACETAMINOPHEN 1 TABLET(S): 5; 325 TABLET ORAL at 12:39

## 2021-05-01 RX ADMIN — HYDROMORPHONE HYDROCHLORIDE 1 MILLIGRAM(S): 2 INJECTION INTRAMUSCULAR; INTRAVENOUS; SUBCUTANEOUS at 14:34

## 2021-05-01 RX ADMIN — Medication 12.5 MILLIGRAM(S): at 18:23

## 2021-05-01 RX ADMIN — AMLODIPINE BESYLATE 10 MILLIGRAM(S): 2.5 TABLET ORAL at 05:59

## 2021-05-01 RX ADMIN — HYDROMORPHONE HYDROCHLORIDE 1 MILLIGRAM(S): 2 INJECTION INTRAMUSCULAR; INTRAVENOUS; SUBCUTANEOUS at 06:55

## 2021-05-01 RX ADMIN — Medication 25 MILLIGRAM(S): at 22:07

## 2021-05-01 RX ADMIN — Medication 14 UNIT(S): at 12:16

## 2021-05-01 RX ADMIN — INSULIN GLARGINE 52 UNIT(S): 100 INJECTION, SOLUTION SUBCUTANEOUS at 22:07

## 2021-05-01 RX ADMIN — GABAPENTIN 100 MILLIGRAM(S): 400 CAPSULE ORAL at 13:24

## 2021-05-01 RX ADMIN — Medication 14 UNIT(S): at 17:11

## 2021-05-01 RX ADMIN — GABAPENTIN 100 MILLIGRAM(S): 400 CAPSULE ORAL at 22:07

## 2021-05-01 RX ADMIN — Medication 25 MILLIGRAM(S): at 06:00

## 2021-05-01 RX ADMIN — CHLORHEXIDINE GLUCONATE 1 APPLICATION(S): 213 SOLUTION TOPICAL at 12:40

## 2021-05-01 RX ADMIN — Medication 25 MILLIGRAM(S): at 13:25

## 2021-05-01 RX ADMIN — Medication 16 UNIT(S): at 08:00

## 2021-05-01 RX ADMIN — Medication 5 UNIT(S): at 22:10

## 2021-05-01 RX ADMIN — OXYCODONE AND ACETAMINOPHEN 1 TABLET(S): 5; 325 TABLET ORAL at 13:22

## 2021-05-01 RX ADMIN — GABAPENTIN 100 MILLIGRAM(S): 400 CAPSULE ORAL at 05:59

## 2021-05-01 RX ADMIN — ENOXAPARIN SODIUM 40 MILLIGRAM(S): 100 INJECTION SUBCUTANEOUS at 12:40

## 2021-05-01 RX ADMIN — HYDROMORPHONE HYDROCHLORIDE 1 MILLIGRAM(S): 2 INJECTION INTRAMUSCULAR; INTRAVENOUS; SUBCUTANEOUS at 22:39

## 2021-05-01 RX ADMIN — HYDROMORPHONE HYDROCHLORIDE 1 MILLIGRAM(S): 2 INJECTION INTRAMUSCULAR; INTRAVENOUS; SUBCUTANEOUS at 23:31

## 2021-05-01 RX ADMIN — HYDROMORPHONE HYDROCHLORIDE 1 MILLIGRAM(S): 2 INJECTION INTRAMUSCULAR; INTRAVENOUS; SUBCUTANEOUS at 05:59

## 2021-05-01 RX ADMIN — Medication 2: at 17:12

## 2021-05-01 NOTE — PROGRESS NOTE ADULT - ASSESSMENT
51 yo male, PMH DM, HTN, hyperlipidemia; pt presented to the ED c/o abdominal cramping, generalized, and diarrhea following use of laxatives as above for constipation.  In the ED, WBC 10.82, Hb 13.6, CMP: sodium 126, chloride 89, glucose 281, CMP otherwise unremarkable.  VBG: Lactate 2.6 (later downtrended to 1.6).  Pt. was treated with IV hydration and dispo'd to CDU for continued care plan:  IV hydration, supportive care, general observation care / monitoring. pt continued to have generalized weakness , numbness and on labs persistent hyponatremia and now being admitted for further evaluation.    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    LP results reviewed. protein albumin dissociation   plasma exchange : treatment as planned.. showing some improvement   neuro follow up appreciated  RPR positive: treated and no further need for any treatment per ID     - Hyponatremia :   likely multifactorial including hyperglycemia ( pseudohyponatremia ) and pre renal sec to diarrhea, resolved.   ? ADH induced by pain   appreciate renal input  improved, continue to monitor     - DM: uncontrolled   appreciate endo input  cont insulin per endo   on Lantus and premeal TID    -HTN : holding ARB for now given hypotension during exchange   (with hold parameters)    - hypophosphatemia : repleted and monitor   labs daily    - constipation : had had colonoscopy 2 yrs ago with Dr. Rae   CT abd no acute path   no further inpt red : discussed with Dr Rae     - ALLIE : improved   avoid NSAIDS for now     dvt proph   d/w pt, all questions answered.     plan for dc to rehab awaiting placement.

## 2021-05-01 NOTE — PROGRESS NOTE ADULT - SUBJECTIVE AND OBJECTIVE BOX
EVEJAGUAR  50y  Patient is a 50y old  Male who presents with a chief complaint of constipation (01 May 2021 10:32)    HPI:  Followed for Hyponatremia. Has GBS, s/p Plasmapheresis.    HEALTH ISSUES - PROBLEM Dx:  Type 2 diabetes mellitus with hyperglycemia, with long-term current use of insulin  Type 2 diabetes mellitus with hyperglycemia, with long-term current use of insulin    Essential hypertension  Essential hypertension    Hyponatremia  Hyponatremia          MEDICATIONS  (STANDING):  albumin human  5% IVPB 3750 milliLiter(s) IV Intermittent every 48 hours  albumin human  5% IVPB 3750 milliLiter(s) IV Intermittent once  albumin human  5% IVPB 3750 milliLiter(s) IV Intermittent once  amLODIPine   Tablet 10 milliGRAM(s) Oral daily  bisacodyl 5 milliGRAM(s) Oral at bedtime  calcium gluconate IVPB 2 Gram(s) IV Intermittent <User Schedule>  calcium gluconate IVPB 2 Gram(s) IV Intermittent once  calcium gluconate IVPB 2 Gram(s) IV Intermittent once  chlorhexidine 2% Cloths 1 Application(s) Topical daily  dextrose 40% Gel 15 Gram(s) Oral once  dextrose 5%. 1000 milliLiter(s) (50 mL/Hr) IV Continuous <Continuous>  dextrose 5%. 1000 milliLiter(s) (100 mL/Hr) IV Continuous <Continuous>  dextrose 50% Injectable 25 Gram(s) IV Push once  dextrose 50% Injectable 12.5 Gram(s) IV Push once  dextrose 50% Injectable 25 Gram(s) IV Push once  enoxaparin Injectable 40 milliGRAM(s) SubCutaneous daily  gabapentin 100 milliGRAM(s) Oral three times a day  glucagon  Injectable 1 milliGRAM(s) IntraMuscular once  hydrALAZINE 25 milliGRAM(s) Oral three times a day  insulin glargine Injectable (LANTUS) 52 Unit(s) SubCutaneous at bedtime  insulin lispro (ADMELOG) corrective regimen sliding scale   SubCutaneous three times a day before meals  insulin lispro (ADMELOG) corrective regimen sliding scale   SubCutaneous at bedtime  insulin lispro Injectable (ADMELOG) 5 Unit(s) SubCutaneous <User Schedule>  insulin lispro Injectable (ADMELOG) 14 Unit(s) SubCutaneous before lunch  insulin lispro Injectable (ADMELOG) 14 Unit(s) SubCutaneous before dinner  insulin lispro Injectable (ADMELOG) 16 Unit(s) SubCutaneous before breakfast  losartan 100 milliGRAM(s) Oral daily  metoprolol tartrate 12.5 milliGRAM(s) Oral two times a day  mineral oil 30 milliLiter(s) Oral once  senna 2 Tablet(s) Oral two times a day  sodium chloride 0.9%. 1000 milliLiter(s) (75 mL/Hr) IV Continuous <Continuous>    MEDICATIONS  (PRN):  acetaminophen   Tablet .. 650 milliGRAM(s) Oral every 6 hours PRN Temp greater or equal to 38C (100.4F), Mild Pain (1 - 3), Moderate Pain (4 - 6)  acetaminophen   Tablet .. 650 milliGRAM(s) Oral every 6 hours PRN Mild Pain (1 - 3)  aluminum hydroxide/magnesium hydroxide/simethicone Suspension 30 milliLiter(s) Oral every 6 hours PRN Dyspepsia  dicyclomine 10 milliGRAM(s) Oral once PRN abdominal pain  HYDROmorphone   Tablet 1 milliGRAM(s) Oral every 8 hours PRN Severe Pain (7 - 10)  sodium chloride 0.9% lock flush 10 milliLiter(s) IV Push every 1 hour PRN Pre/post blood products, medications, blood draw, and to maintain line patency    Vital Signs Last 24 Hrs  T(C): 36.6 (01 May 2021 17:25), Max: 36.9 (01 May 2021 13:29)  T(F): 97.9 (01 May 2021 17:25), Max: 98.4 (01 May 2021 13:29)  HR: 93 (01 May 2021 17:25) (75 - 93)  BP: 125/71 (01 May 2021 17:25) (106/59 - 131/72)  BP(mean): --  RR: 17 (01 May 2021 17:25) (16 - 17)  SpO2: 98% (01 May 2021 17:25) (98% - 99%)  Daily     Daily     PHYSICAL EXAM:  Constitutional:  He appears comfortable and not distressed. Not diaphoretic.    Neck:  The thyroid is normal. Trachea is midline.     Respiratory: The lungs are clear to auscultation. No dullness and expansion is normal.    Cardiovascular: S1 and S2 are normal. No mummurs, rubs or gallops are present.    Gastrointestinal: The abdomen is soft. No tenderness is present. No masses are present. Bowel sounds are normal.    Genitourinary: The bladder is not distended. No CVA tenderness is present.    Extremities: No edema is noted. No deformities are present.    Neurological: Cognition is normal.     Skin: No lesions are seen  or palpated.    Lymph Nodes: No lymphadenopathy is present.                            10.3   7.36  )-----------( 262      ( 01 May 2021 06:23 )             31.6     05-01    140  |  105  |  15  ----------------------------<  158<H>  4.0   |  25  |  0.96    Ca    9.0      01 May 2021 06:23  Phos  3.8     05-01  Mg     1.9     05-01

## 2021-05-01 NOTE — PROGRESS NOTE ADULT - SUBJECTIVE AND OBJECTIVE BOX
SUBJECTIVE / OVERNIGHT EVENTS:  --- Coverage for Dr. Garza ---   completed Plex treatments  Shiley removed  feeling better  no cp, no sob, no n/v/d. no abdominal pain.  no headache, no dizziness.   strength recovering.   await rehab     --------------------------------------------------------------------------------------------  LABS:                        10.3   7.36  )-----------( 262      ( 01 May 2021 06:23 )             31.6     05-01    140  |  105  |  15  ----------------------------<  158<H>  4.0   |  25  |  0.96    Ca    9.0      01 May 2021 06:23  Phos  3.8     05-01  Mg     1.9     05-01        CAPILLARY BLOOD GLUCOSE      POCT Blood Glucose.: 139 mg/dL (01 May 2021 07:11)  POCT Blood Glucose.: 169 mg/dL (30 Apr 2021 22:07)  POCT Blood Glucose.: 206 mg/dL (30 Apr 2021 16:38)  POCT Blood Glucose.: 162 mg/dL (30 Apr 2021 11:44)            RADIOLOGY & ADDITIONAL TESTS:    Imaging Personally Reviewed:  [x] YES  [ ] NO    Consultant(s) Notes Reviewed:  [x] YES  [ ] NO    MEDICATIONS  (STANDING):  albumin human  5% IVPB 3750 milliLiter(s) IV Intermittent every 48 hours  albumin human  5% IVPB 3750 milliLiter(s) IV Intermittent once  albumin human  5% IVPB 3750 milliLiter(s) IV Intermittent once  amLODIPine   Tablet 10 milliGRAM(s) Oral daily  bisacodyl 5 milliGRAM(s) Oral at bedtime  calcium gluconate IVPB 2 Gram(s) IV Intermittent <User Schedule>  calcium gluconate IVPB 2 Gram(s) IV Intermittent once  calcium gluconate IVPB 2 Gram(s) IV Intermittent once  chlorhexidine 2% Cloths 1 Application(s) Topical daily  dextrose 40% Gel 15 Gram(s) Oral once  dextrose 5%. 1000 milliLiter(s) (50 mL/Hr) IV Continuous <Continuous>  dextrose 5%. 1000 milliLiter(s) (100 mL/Hr) IV Continuous <Continuous>  dextrose 50% Injectable 25 Gram(s) IV Push once  dextrose 50% Injectable 12.5 Gram(s) IV Push once  dextrose 50% Injectable 25 Gram(s) IV Push once  enoxaparin Injectable 40 milliGRAM(s) SubCutaneous daily  gabapentin 100 milliGRAM(s) Oral three times a day  glucagon  Injectable 1 milliGRAM(s) IntraMuscular once  hydrALAZINE 25 milliGRAM(s) Oral three times a day  insulin glargine Injectable (LANTUS) 52 Unit(s) SubCutaneous at bedtime  insulin lispro (ADMELOG) corrective regimen sliding scale   SubCutaneous three times a day before meals  insulin lispro (ADMELOG) corrective regimen sliding scale   SubCutaneous at bedtime  insulin lispro Injectable (ADMELOG) 5 Unit(s) SubCutaneous <User Schedule>  insulin lispro Injectable (ADMELOG) 16 Unit(s) SubCutaneous before breakfast  insulin lispro Injectable (ADMELOG) 14 Unit(s) SubCutaneous before lunch  insulin lispro Injectable (ADMELOG) 14 Unit(s) SubCutaneous before dinner  losartan 100 milliGRAM(s) Oral daily  metoprolol tartrate 12.5 milliGRAM(s) Oral two times a day  mineral oil 30 milliLiter(s) Oral once  senna 2 Tablet(s) Oral two times a day  sodium chloride 0.9%. 1000 milliLiter(s) (75 mL/Hr) IV Continuous <Continuous>    MEDICATIONS  (PRN):  acetaminophen   Tablet .. 650 milliGRAM(s) Oral every 6 hours PRN Temp greater or equal to 38C (100.4F), Mild Pain (1 - 3), Moderate Pain (4 - 6)  acetaminophen   Tablet .. 650 milliGRAM(s) Oral every 6 hours PRN Mild Pain (1 - 3)  aluminum hydroxide/magnesium hydroxide/simethicone Suspension 30 milliLiter(s) Oral every 6 hours PRN Dyspepsia  dicyclomine 10 milliGRAM(s) Oral once PRN abdominal pain  HYDROmorphone   Tablet 1 milliGRAM(s) Oral every 8 hours PRN Severe Pain (7 - 10)  sodium chloride 0.9% lock flush 10 milliLiter(s) IV Push every 1 hour PRN Pre/post blood products, medications, blood draw, and to maintain line patency      Care Discussed with Consultants/Other Providers [x] YES  [ ] NO    Vital Signs Last 24 Hrs  T(C): 36.7 (01 May 2021 08:45), Max: 36.8 (01 May 2021 01:19)  T(F): 98.1 (01 May 2021 08:45), Max: 98.2 (01 May 2021 01:19)  HR: 85 (01 May 2021 08:45) (75 - 90)  BP: 125/66 (01 May 2021 08:45) (103/56 - 131/72)  BP(mean): --  RR: 16 (01 May 2021 08:45) (16 - 17)  SpO2: 98% (01 May 2021 08:45) (98% - 100%)  I&O's Summary    30 Apr 2021 07:01  -  01 May 2021 07:00  --------------------------------------------------------  IN: 0 mL / OUT: 200 mL / NET: -200 mL    01 May 2021 07:01  -  01 May 2021 10:35  --------------------------------------------------------  IN: 0 mL / OUT: 200 mL / NET: -200 mL    PHYSICAL EXAM:  GENERAL: NAD, well-developed, comfortable  HEAD:  Atraumatic, Normocephalic  EYES: EOMI, PERRLA, conjunctiva and sclera clear  NECK: Supple, No JVD  CHEST/LUNG: Clear to auscultation bilaterally; No wheeze, right chest central cath removed, healed. dressing d/c/i.  HEART: Regular rate and rhythm; No murmurs, rubs, or gallops  ABDOMEN: Soft, Nontender, Nondistended; Bowel sounds present  Neuro: AAOx3, no focal weakness, (mild left knee weakness)   EXTREMITIES:  2+ Peripheral Pulses, No clubbing, cyanosis, or edema  SKIN: No rashes or lesions

## 2021-05-01 NOTE — PROGRESS NOTE ADULT - ASSESSMENT
A/P:  ALLIE  Now at baseline.  monitor bmp  avoid nephrotoxic agents    Hyponatremia:  Likely due to hypovolemia+hyperglycemia. work up suggested SIADH    - Optimize dm control  - Free water restriction <1.2L/day.  - Monitor Na level Q12  - Na level should not increase more than 6meq in 24 hrs    Proteinuia/Hematuria:  Etiology?  Had Cystoscopy last year and was normal per patient  Possible sec to Diabetic Nephropathy but will need full vasculitis work up and based on work up result he might need kidney biopsy, which can be planned as outpatient as his renal function is stable  D/W patient in detail above plan    Hypophosphatemia:  Supplement as needed   - monitor     HTN  BP controlled   Now on Norvasc and losartan, hydralazine

## 2021-05-02 LAB
ANION GAP SERPL CALC-SCNC: 13 MMOL/L — SIGNIFICANT CHANGE UP (ref 7–14)
BUN SERPL-MCNC: 15 MG/DL — SIGNIFICANT CHANGE UP (ref 7–23)
CALCIUM SERPL-MCNC: 9.3 MG/DL — SIGNIFICANT CHANGE UP (ref 8.4–10.5)
CHLORIDE SERPL-SCNC: 104 MMOL/L — SIGNIFICANT CHANGE UP (ref 98–107)
CO2 SERPL-SCNC: 24 MMOL/L — SIGNIFICANT CHANGE UP (ref 22–31)
CREAT SERPL-MCNC: 0.93 MG/DL — SIGNIFICANT CHANGE UP (ref 0.5–1.3)
GLUCOSE SERPL-MCNC: 175 MG/DL — HIGH (ref 70–99)
HCT VFR BLD CALC: 32 % — LOW (ref 39–50)
HGB BLD-MCNC: 10.6 G/DL — LOW (ref 13–17)
MAGNESIUM SERPL-MCNC: 1.7 MG/DL — SIGNIFICANT CHANGE UP (ref 1.6–2.6)
MCHC RBC-ENTMCNC: 29.9 PG — SIGNIFICANT CHANGE UP (ref 27–34)
MCHC RBC-ENTMCNC: 33.1 GM/DL — SIGNIFICANT CHANGE UP (ref 32–36)
MCV RBC AUTO: 90.1 FL — SIGNIFICANT CHANGE UP (ref 80–100)
NRBC # BLD: 0 /100 WBCS — SIGNIFICANT CHANGE UP
NRBC # FLD: 0 K/UL — SIGNIFICANT CHANGE UP
PHOSPHATE SERPL-MCNC: 4.5 MG/DL — SIGNIFICANT CHANGE UP (ref 2.5–4.5)
PLATELET # BLD AUTO: 281 K/UL — SIGNIFICANT CHANGE UP (ref 150–400)
POTASSIUM SERPL-MCNC: 4.2 MMOL/L — SIGNIFICANT CHANGE UP (ref 3.5–5.3)
POTASSIUM SERPL-SCNC: 4.2 MMOL/L — SIGNIFICANT CHANGE UP (ref 3.5–5.3)
RBC # BLD: 3.55 M/UL — LOW (ref 4.2–5.8)
RBC # FLD: 12.4 % — SIGNIFICANT CHANGE UP (ref 10.3–14.5)
SODIUM SERPL-SCNC: 141 MMOL/L — SIGNIFICANT CHANGE UP (ref 135–145)
WBC # BLD: 7.4 K/UL — SIGNIFICANT CHANGE UP (ref 3.8–10.5)
WBC # FLD AUTO: 7.4 K/UL — SIGNIFICANT CHANGE UP (ref 3.8–10.5)

## 2021-05-02 RX ORDER — KETOROLAC TROMETHAMINE 30 MG/ML
10 SYRINGE (ML) INJECTION ONCE
Refills: 0 | Status: DISCONTINUED | OUTPATIENT
Start: 2021-05-02 | End: 2021-05-02

## 2021-05-02 RX ADMIN — Medication 25 MILLIGRAM(S): at 14:02

## 2021-05-02 RX ADMIN — Medication 16 UNIT(S): at 08:05

## 2021-05-02 RX ADMIN — GABAPENTIN 100 MILLIGRAM(S): 400 CAPSULE ORAL at 14:02

## 2021-05-02 RX ADMIN — Medication 14 UNIT(S): at 11:58

## 2021-05-02 RX ADMIN — HYDROMORPHONE HYDROCHLORIDE 1 MILLIGRAM(S): 2 INJECTION INTRAMUSCULAR; INTRAVENOUS; SUBCUTANEOUS at 18:21

## 2021-05-02 RX ADMIN — Medication 5 UNIT(S): at 22:06

## 2021-05-02 RX ADMIN — ENOXAPARIN SODIUM 40 MILLIGRAM(S): 100 INJECTION SUBCUTANEOUS at 11:58

## 2021-05-02 RX ADMIN — HYDROMORPHONE HYDROCHLORIDE 1 MILLIGRAM(S): 2 INJECTION INTRAMUSCULAR; INTRAVENOUS; SUBCUTANEOUS at 07:28

## 2021-05-02 RX ADMIN — SENNA PLUS 2 TABLET(S): 8.6 TABLET ORAL at 17:58

## 2021-05-02 RX ADMIN — GABAPENTIN 100 MILLIGRAM(S): 400 CAPSULE ORAL at 05:28

## 2021-05-02 RX ADMIN — GABAPENTIN 100 MILLIGRAM(S): 400 CAPSULE ORAL at 22:06

## 2021-05-02 RX ADMIN — LOSARTAN POTASSIUM 100 MILLIGRAM(S): 100 TABLET, FILM COATED ORAL at 05:25

## 2021-05-02 RX ADMIN — Medication 25 MILLIGRAM(S): at 05:25

## 2021-05-02 RX ADMIN — Medication 10 MILLIGRAM(S): at 20:36

## 2021-05-02 RX ADMIN — INSULIN GLARGINE 52 UNIT(S): 100 INJECTION, SOLUTION SUBCUTANEOUS at 22:07

## 2021-05-02 RX ADMIN — HYDROMORPHONE HYDROCHLORIDE 1 MILLIGRAM(S): 2 INJECTION INTRAMUSCULAR; INTRAVENOUS; SUBCUTANEOUS at 16:01

## 2021-05-02 RX ADMIN — AMLODIPINE BESYLATE 10 MILLIGRAM(S): 2.5 TABLET ORAL at 05:25

## 2021-05-02 RX ADMIN — Medication 2: at 08:05

## 2021-05-02 RX ADMIN — Medication 12.5 MILLIGRAM(S): at 17:58

## 2021-05-02 RX ADMIN — Medication 14 UNIT(S): at 17:57

## 2021-05-02 RX ADMIN — Medication 12.5 MILLIGRAM(S): at 05:25

## 2021-05-02 NOTE — PROGRESS NOTE ADULT - SUBJECTIVE AND OBJECTIVE BOX
Patient is a 50y Male     Patient is a 50y old  Male who presents with a chief complaint of constipation (01 May 2021 10:32)      HPI:  49 yo male, PMH DM, HTN, hyperlipidemia; pt presented to the ED c/o abdominal cramping, generalized, and diarrhea following use of laxatives as above for constipation.  In the ED, WBC 10.82, Hb 13.6, CMP: sodium 126, chloride 89, glucose 281, CMP otherwise unremarkable.  VBG: Lactate 2.6 (later downtrended to 1.6).  Pt. was treated with IV hydration and dispo'd to CDU for continued care plan:  IV hydration, supportive care, AM labs, general observation care / monitoring. pt continued to have generalized weakness , numbness and on labs persistent hyponatermia and now being admitted for furhter evlauation .  dnenies N/V/Abd pain   had bm   no urinary sx   no fever or chills   no cough   no cp /sob    (11 Apr 2021 15:50)      PAST MEDICAL & SURGICAL HISTORY:  DM (diabetes mellitus)    HTN (hypertension)    Hyperlipidemia    H/O total knee replacement    Foot fracture        MEDICATIONS  (STANDING):  albumin human  5% IVPB 3750 milliLiter(s) IV Intermittent once  albumin human  5% IVPB 3750 milliLiter(s) IV Intermittent every 48 hours  albumin human  5% IVPB 3750 milliLiter(s) IV Intermittent once  amLODIPine   Tablet 10 milliGRAM(s) Oral daily  bisacodyl 5 milliGRAM(s) Oral at bedtime  calcium gluconate IVPB 2 Gram(s) IV Intermittent <User Schedule>  calcium gluconate IVPB 2 Gram(s) IV Intermittent once  calcium gluconate IVPB 2 Gram(s) IV Intermittent once  chlorhexidine 2% Cloths 1 Application(s) Topical daily  dextrose 40% Gel 15 Gram(s) Oral once  dextrose 5%. 1000 milliLiter(s) (50 mL/Hr) IV Continuous <Continuous>  dextrose 5%. 1000 milliLiter(s) (100 mL/Hr) IV Continuous <Continuous>  dextrose 50% Injectable 25 Gram(s) IV Push once  dextrose 50% Injectable 12.5 Gram(s) IV Push once  dextrose 50% Injectable 25 Gram(s) IV Push once  enoxaparin Injectable 40 milliGRAM(s) SubCutaneous daily  gabapentin 100 milliGRAM(s) Oral three times a day  glucagon  Injectable 1 milliGRAM(s) IntraMuscular once  hydrALAZINE 25 milliGRAM(s) Oral three times a day  insulin glargine Injectable (LANTUS) 52 Unit(s) SubCutaneous at bedtime  insulin lispro (ADMELOG) corrective regimen sliding scale   SubCutaneous three times a day before meals  insulin lispro (ADMELOG) corrective regimen sliding scale   SubCutaneous at bedtime  insulin lispro Injectable (ADMELOG) 14 Unit(s) SubCutaneous before lunch  insulin lispro Injectable (ADMELOG) 14 Unit(s) SubCutaneous before dinner  insulin lispro Injectable (ADMELOG) 16 Unit(s) SubCutaneous before breakfast  insulin lispro Injectable (ADMELOG) 5 Unit(s) SubCutaneous <User Schedule>  losartan 100 milliGRAM(s) Oral daily  metoprolol tartrate 12.5 milliGRAM(s) Oral two times a day  mineral oil 30 milliLiter(s) Oral once  senna 2 Tablet(s) Oral two times a day  sodium chloride 0.9%. 1000 milliLiter(s) (75 mL/Hr) IV Continuous <Continuous>      Allergies    No Known Drug Allergies  shellfish (Urticaria)    Intolerances        SOCIAL HISTORY:  Denies ETOh,Smoking,     FAMILY HISTORY:  No pertinent family history in first degree relatives        REVIEW OF SYSTEMS:    CONSTITUTIONAL: No weakness, fevers or chills  EYES/ENT: No visual changes;  No vertigo or throat pain   NECK: No pain or stiffness  RESPIRATORY: No cough, wheezing, hemoptysis; No shortness of breath  CARDIOVASCULAR: No chest pain or palpitations  GASTROINTESTINAL: No abdominal or epigastric pain. No nausea, vomiting, or hematemesis; No diarrhea or constipation. No melena or hematochezia.  GENITOURINARY: No dysuria, frequency or hematuria  NEUROLOGICAL: No numbness or weakness  SKIN: No itching, burning, rashes, or lesions   All other review of systems is negative unless indicated above.    VITAL:  T(C): , Max: 37 (05-02-21 @ 05:24)  T(F): , Max: 98.6 (05-02-21 @ 05:24)  HR: 92 (05-02-21 @ 05:24)  BP: 141/82 (05-02-21 @ 05:24)  BP(mean): --  RR: 17 (05-02-21 @ 05:24)  SpO2: 98% (05-02-21 @ 05:24)  Wt(kg): --    I and O's:    04-30 @ 07:01  -  05-01 @ 07:00  --------------------------------------------------------  IN: 0 mL / OUT: 200 mL / NET: -200 mL    05-01 @ 07:01  -  05-02 @ 07:00  --------------------------------------------------------  IN: 0 mL / OUT: 1000 mL / NET: -1000 mL          PHYSICAL EXAM:    Constitutional: NAD  HEENT: PERRLA,   Neck: No JVD  Respiratory: CTA B/L  Cardiovascular: S1 and S2  Gastrointestinal: BS+, soft, NT/ND  Extremities: No peripheral edema  Neurological: A/O x 3, no focal deficits  Psychiatric: Normal mood, normal affect  : No Jose  Skin: No rashes  Access: Not applicable  Back: No CVA tenderness    LABS:                        10.6   7.40  )-----------( 281      ( 02 May 2021 07:40 )             32.0     05-01    140  |  105  |  15  ----------------------------<  158<H>  4.0   |  25  |  0.96    Ca    9.0      01 May 2021 06:23  Phos  3.8     05-01  Mg     1.9     05-01            RADIOLOGY & ADDITIONAL STUDIES:

## 2021-05-02 NOTE — PROGRESS NOTE ADULT - SUBJECTIVE AND OBJECTIVE BOX
JAGUAR PRADO  50y  Patient is a 50y old  Male who presents with a chief complaint of constipation (02 May 2021 08:01)    HPI:  Followed for ALLIE and Hyponatremia.    HEALTH ISSUES - PROBLEM Dx:  Type 2 diabetes mellitus with hyperglycemia, with long-term current use of insulin  Type 2 diabetes mellitus with hyperglycemia, with long-term current use of insulin    Essential hypertension  Essential hypertension    Hyponatremia  Hyponatremia          MEDICATIONS  (STANDING):  albumin human  5% IVPB 3750 milliLiter(s) IV Intermittent every 48 hours  albumin human  5% IVPB 3750 milliLiter(s) IV Intermittent once  albumin human  5% IVPB 3750 milliLiter(s) IV Intermittent once  amLODIPine   Tablet 10 milliGRAM(s) Oral daily  bisacodyl 5 milliGRAM(s) Oral at bedtime  calcium gluconate IVPB 2 Gram(s) IV Intermittent once  calcium gluconate IVPB 2 Gram(s) IV Intermittent <User Schedule>  calcium gluconate IVPB 2 Gram(s) IV Intermittent once  chlorhexidine 2% Cloths 1 Application(s) Topical daily  dextrose 40% Gel 15 Gram(s) Oral once  dextrose 5%. 1000 milliLiter(s) (50 mL/Hr) IV Continuous <Continuous>  dextrose 5%. 1000 milliLiter(s) (100 mL/Hr) IV Continuous <Continuous>  dextrose 50% Injectable 25 Gram(s) IV Push once  dextrose 50% Injectable 12.5 Gram(s) IV Push once  dextrose 50% Injectable 25 Gram(s) IV Push once  enoxaparin Injectable 40 milliGRAM(s) SubCutaneous daily  gabapentin 100 milliGRAM(s) Oral three times a day  glucagon  Injectable 1 milliGRAM(s) IntraMuscular once  hydrALAZINE 25 milliGRAM(s) Oral three times a day  insulin glargine Injectable (LANTUS) 52 Unit(s) SubCutaneous at bedtime  insulin lispro (ADMELOG) corrective regimen sliding scale   SubCutaneous three times a day before meals  insulin lispro (ADMELOG) corrective regimen sliding scale   SubCutaneous at bedtime  insulin lispro Injectable (ADMELOG) 5 Unit(s) SubCutaneous <User Schedule>  insulin lispro Injectable (ADMELOG) 14 Unit(s) SubCutaneous before lunch  insulin lispro Injectable (ADMELOG) 14 Unit(s) SubCutaneous before dinner  insulin lispro Injectable (ADMELOG) 16 Unit(s) SubCutaneous before breakfast  losartan 100 milliGRAM(s) Oral daily  metoprolol tartrate 12.5 milliGRAM(s) Oral two times a day  mineral oil 30 milliLiter(s) Oral once  senna 2 Tablet(s) Oral two times a day  sodium chloride 0.9%. 1000 milliLiter(s) (75 mL/Hr) IV Continuous <Continuous>    MEDICATIONS  (PRN):  acetaminophen   Tablet .. 650 milliGRAM(s) Oral every 6 hours PRN Temp greater or equal to 38C (100.4F), Mild Pain (1 - 3), Moderate Pain (4 - 6)  acetaminophen   Tablet .. 650 milliGRAM(s) Oral every 6 hours PRN Mild Pain (1 - 3)  aluminum hydroxide/magnesium hydroxide/simethicone Suspension 30 milliLiter(s) Oral every 6 hours PRN Dyspepsia  dicyclomine 10 milliGRAM(s) Oral once PRN abdominal pain  HYDROmorphone   Tablet 1 milliGRAM(s) Oral every 8 hours PRN Severe Pain (7 - 10)  sodium chloride 0.9% lock flush 10 milliLiter(s) IV Push every 1 hour PRN Pre/post blood products, medications, blood draw, and to maintain line patency    Vital Signs Last 24 Hrs  T(C): 37 (02 May 2021 05:24), Max: 37 (02 May 2021 05:24)  T(F): 98.6 (02 May 2021 05:24), Max: 98.6 (02 May 2021 05:24)  HR: 92 (02 May 2021 05:24) (88 - 93)  BP: 141/82 (02 May 2021 05:24) (118/71 - 141/82)  BP(mean): --  RR: 17 (02 May 2021 05:24) (16 - 18)  SpO2: 98% (02 May 2021 05:24) (96% - 98%)  Daily     Daily     PHYSICAL EXAM:  Constitutional: [place]  appears comfortable and not distressed. Not diaphoretic.    Respiratory: The lungs are clear to auscultation. No dullness and expansion is normal.    Cardiovascular: S1 and S2 are normal. No mummurs, rubs or gallops are present.    Gastrointestinal: The abdomen is soft. No tenderness is present. No masses are present. Bowel sounds are normal.    Genitourinary: The bladder is not distended. No CVA tenderness is present.    Extremities: No edema is noted. No deformities are present.    Neurological: Cognition is normal. Tone, power and sensation are normal. Gait is steady.    Skin: No lesions are seen  or palpated.                            10.6   7.40  )-----------( 281      ( 02 May 2021 07:40 )             32.0     05-02    141  |  104  |  15  ----------------------------<  175<H>  4.2   |  24  |  0.93    Ca    9.3      02 May 2021 07:40  Phos  4.5     05-02  Mg     1.7     05-02

## 2021-05-02 NOTE — CHART NOTE - NSCHARTNOTEFT_GEN_A_CORE
Discussed with patient the importance of doing a repeat covid swab today in order to prep patient for transfer to rehab. Patient continues to refuse repeat covid swab today. States it is "unfair" and that "it is not his fault" that he has to get it done again. SW made aware.

## 2021-05-02 NOTE — PROGRESS NOTE ADULT - SUBJECTIVE AND OBJECTIVE BOX
Date of service: 05-02-21 @ 22:22      Patient is a 50y old  Male who presents with a chief complaint of constipation (02 May 2021 08:01)                                                               INTERVAL HPI/OVERNIGHT EVENTS:    REVIEW OF SYSTEMS:     CONSTITUTIONAL: No weakness, fevers or chills  EYES/ENT: No visual changes , no ear ache   NECK: No pain or stiffness  RESPIRATORY: No cough, wheezing,  No shortness of breath  CARDIOVASCULAR: No chest pain or palpitations  GASTROINTESTINAL: No abdominal pain  . No nausea, vomiting, or hematemesis; No diarrhea or constipation. No melena or hematochezia.  GENITOURINARY: No dysuria, frequency or hematuria  NEUROLOGICAL: No numbness or weakness  SKIN: No itching, burning, rashes, or lesions                                                                                                                                                                                                                                                                                 Medications:  MEDICATIONS  (STANDING):  albumin human  5% IVPB 3750 milliLiter(s) IV Intermittent every 48 hours  albumin human  5% IVPB 3750 milliLiter(s) IV Intermittent once  albumin human  5% IVPB 3750 milliLiter(s) IV Intermittent once  amLODIPine   Tablet 10 milliGRAM(s) Oral daily  bisacodyl 5 milliGRAM(s) Oral at bedtime  calcium gluconate IVPB 2 Gram(s) IV Intermittent once  calcium gluconate IVPB 2 Gram(s) IV Intermittent once  calcium gluconate IVPB 2 Gram(s) IV Intermittent <User Schedule>  chlorhexidine 2% Cloths 1 Application(s) Topical daily  dextrose 40% Gel 15 Gram(s) Oral once  dextrose 5%. 1000 milliLiter(s) (50 mL/Hr) IV Continuous <Continuous>  dextrose 5%. 1000 milliLiter(s) (100 mL/Hr) IV Continuous <Continuous>  dextrose 50% Injectable 25 Gram(s) IV Push once  dextrose 50% Injectable 12.5 Gram(s) IV Push once  dextrose 50% Injectable 25 Gram(s) IV Push once  enoxaparin Injectable 40 milliGRAM(s) SubCutaneous daily  gabapentin 100 milliGRAM(s) Oral three times a day  glucagon  Injectable 1 milliGRAM(s) IntraMuscular once  hydrALAZINE 25 milliGRAM(s) Oral three times a day  insulin glargine Injectable (LANTUS) 52 Unit(s) SubCutaneous at bedtime  insulin lispro (ADMELOG) corrective regimen sliding scale   SubCutaneous three times a day before meals  insulin lispro (ADMELOG) corrective regimen sliding scale   SubCutaneous at bedtime  insulin lispro Injectable (ADMELOG) 5 Unit(s) SubCutaneous <User Schedule>  insulin lispro Injectable (ADMELOG) 14 Unit(s) SubCutaneous before lunch  insulin lispro Injectable (ADMELOG) 14 Unit(s) SubCutaneous before dinner  insulin lispro Injectable (ADMELOG) 16 Unit(s) SubCutaneous before breakfast  losartan 100 milliGRAM(s) Oral daily  metoprolol tartrate 12.5 milliGRAM(s) Oral two times a day  mineral oil 30 milliLiter(s) Oral once  senna 2 Tablet(s) Oral two times a day  sodium chloride 0.9%. 1000 milliLiter(s) (75 mL/Hr) IV Continuous <Continuous>    MEDICATIONS  (PRN):  acetaminophen   Tablet .. 650 milliGRAM(s) Oral every 6 hours PRN Temp greater or equal to 38C (100.4F), Mild Pain (1 - 3), Moderate Pain (4 - 6)  acetaminophen   Tablet .. 650 milliGRAM(s) Oral every 6 hours PRN Mild Pain (1 - 3)  aluminum hydroxide/magnesium hydroxide/simethicone Suspension 30 milliLiter(s) Oral every 6 hours PRN Dyspepsia  dicyclomine 10 milliGRAM(s) Oral once PRN abdominal pain  HYDROmorphone   Tablet 1 milliGRAM(s) Oral every 8 hours PRN Severe Pain (7 - 10)  sodium chloride 0.9% lock flush 10 milliLiter(s) IV Push every 1 hour PRN Pre/post blood products, medications, blood draw, and to maintain line patency       Allergies    No Known Drug Allergies  shellfish (Urticaria)    Intolerances      Vital Signs Last 24 Hrs  T(C): 36.9 (02 May 2021 21:35), Max: 37 (02 May 2021 05:24)  T(F): 98.4 (02 May 2021 21:35), Max: 98.6 (02 May 2021 05:24)  HR: 81 (02 May 2021 21:35) (79 - 92)  BP: 115/64 (02 May 2021 21:35) (114/64 - 141/82)  BP(mean): --  RR: 17 (02 May 2021 21:35) (17 - 17)  SpO2: 99% (02 May 2021 21:35) (96% - 99%)  CAPILLARY BLOOD GLUCOSE      POCT Blood Glucose.: 128 mg/dL (02 May 2021 21:28)  POCT Blood Glucose.: 118 mg/dL (02 May 2021 17:27)  POCT Blood Glucose.: 117 mg/dL (02 May 2021 11:42)  POCT Blood Glucose.: 197 mg/dL (02 May 2021 07:31)      05-01 @ 07:01  -  05-02 @ 07:00  --------------------------------------------------------  IN: 0 mL / OUT: 1000 mL / NET: -1000 mL    05-02 @ 07:01 - 05-02 @ 22:22  --------------------------------------------------------  IN: 0 mL / OUT: 300 mL / NET: -300 mL      Physical Exam:    Daily     Daily   General:  Well appearing, NAD, not cachetic  HEENT:  Nonicteric, PERRLA  CV:  RRR, S1S2   Lungs:  CTA B/L, no wheezes, rales, rhonchi  Abdomen:  Soft, non-tender, no distended, positive BS  Extremities: no edema   Neuro:  AAOx3,  LE weakness                                                                                                                                                                                                                                                                                         LABS:                               10.6   7.40  )-----------( 281      ( 02 May 2021 07:40 )             32.0                      05-02    141  |  104  |  15  ----------------------------<  175<H>  4.2   |  24  |  0.93    Ca    9.3      02 May 2021 07:40  Phos  4.5     05-02  Mg     1.7     05-02                         RADIOLOGY & ADDITIONAL TESTS         I personally reviewed: [  ]EKG   [  ]CXR    [  ] CT      A/P:         Discussed with :     Francisco consultants' Notes   Time spent :

## 2021-05-03 LAB
AQP4 H2O CHANNEL IGG CSF QL: NEGATIVE — SIGNIFICANT CHANGE UP
SARS-COV-2 RNA SPEC QL NAA+PROBE: SIGNIFICANT CHANGE UP

## 2021-05-03 PROCEDURE — 99232 SBSQ HOSP IP/OBS MODERATE 35: CPT

## 2021-05-03 RX ORDER — OXYCODONE HYDROCHLORIDE 5 MG/1
5 TABLET ORAL EVERY 6 HOURS
Refills: 0 | Status: DISCONTINUED | OUTPATIENT
Start: 2021-05-03 | End: 2021-05-04

## 2021-05-03 RX ORDER — HYDROMORPHONE HYDROCHLORIDE 2 MG/ML
1 INJECTION INTRAMUSCULAR; INTRAVENOUS; SUBCUTANEOUS ONCE
Refills: 0 | Status: DISCONTINUED | OUTPATIENT
Start: 2021-05-03 | End: 2021-05-03

## 2021-05-03 RX ADMIN — Medication 16 UNIT(S): at 08:05

## 2021-05-03 RX ADMIN — Medication 5 UNIT(S): at 21:14

## 2021-05-03 RX ADMIN — GABAPENTIN 100 MILLIGRAM(S): 400 CAPSULE ORAL at 14:07

## 2021-05-03 RX ADMIN — HYDROMORPHONE HYDROCHLORIDE 1 MILLIGRAM(S): 2 INJECTION INTRAMUSCULAR; INTRAVENOUS; SUBCUTANEOUS at 08:27

## 2021-05-03 RX ADMIN — Medication 12.5 MILLIGRAM(S): at 17:38

## 2021-05-03 RX ADMIN — HYDROMORPHONE HYDROCHLORIDE 1 MILLIGRAM(S): 2 INJECTION INTRAMUSCULAR; INTRAVENOUS; SUBCUTANEOUS at 16:48

## 2021-05-03 RX ADMIN — GABAPENTIN 100 MILLIGRAM(S): 400 CAPSULE ORAL at 21:13

## 2021-05-03 RX ADMIN — HYDROMORPHONE HYDROCHLORIDE 1 MILLIGRAM(S): 2 INJECTION INTRAMUSCULAR; INTRAVENOUS; SUBCUTANEOUS at 00:06

## 2021-05-03 RX ADMIN — Medication 25 MILLIGRAM(S): at 21:12

## 2021-05-03 RX ADMIN — Medication 14 UNIT(S): at 16:48

## 2021-05-03 RX ADMIN — HYDROMORPHONE HYDROCHLORIDE 1 MILLIGRAM(S): 2 INJECTION INTRAMUSCULAR; INTRAVENOUS; SUBCUTANEOUS at 00:45

## 2021-05-03 RX ADMIN — INSULIN GLARGINE 52 UNIT(S): 100 INJECTION, SOLUTION SUBCUTANEOUS at 21:12

## 2021-05-03 RX ADMIN — HYDROMORPHONE HYDROCHLORIDE 1 MILLIGRAM(S): 2 INJECTION INTRAMUSCULAR; INTRAVENOUS; SUBCUTANEOUS at 21:13

## 2021-05-03 RX ADMIN — Medication 14 UNIT(S): at 11:52

## 2021-05-03 RX ADMIN — AMLODIPINE BESYLATE 10 MILLIGRAM(S): 2.5 TABLET ORAL at 05:13

## 2021-05-03 RX ADMIN — ENOXAPARIN SODIUM 40 MILLIGRAM(S): 100 INJECTION SUBCUTANEOUS at 11:53

## 2021-05-03 RX ADMIN — HYDROMORPHONE HYDROCHLORIDE 1 MILLIGRAM(S): 2 INJECTION INTRAMUSCULAR; INTRAVENOUS; SUBCUTANEOUS at 21:30

## 2021-05-03 RX ADMIN — GABAPENTIN 100 MILLIGRAM(S): 400 CAPSULE ORAL at 05:13

## 2021-05-03 RX ADMIN — HYDROMORPHONE HYDROCHLORIDE 1 MILLIGRAM(S): 2 INJECTION INTRAMUSCULAR; INTRAVENOUS; SUBCUTANEOUS at 17:45

## 2021-05-03 RX ADMIN — Medication 25 MILLIGRAM(S): at 14:07

## 2021-05-03 RX ADMIN — Medication 25 MILLIGRAM(S): at 05:13

## 2021-05-03 RX ADMIN — Medication 2: at 08:05

## 2021-05-03 NOTE — PROVIDER CONTACT NOTE (OTHER) - SITUATION
Patients BP is 166/97mmHg.
Patient vitals signs were stable enough to be sent to CT.
Pt /90
patient had a osito episode for a second to 45 at 0045 and pause for 2.79 at 0055
Patient refused AM labs. Patient states he's tired of being poked everyday and wants a break.
Pt is still feeling constipated after the suppository
patient vitals signs were above parameters
Patient came in with abdominal cramping due to the use of multiple laxatives.
Patients BP is 160/98mmHg.
Patients BP is 161/97mmHg.
LUL nurse @ bedside doing PLEX therapy, pt BP 70/40. LUL SAMSON notified. PLEX held 500 cc bolus given.
Pt /107
patient vitals signs were above parameters

## 2021-05-03 NOTE — PROGRESS NOTE ADULT - ASSESSMENT
49 yo RH AA male with h/o DM, HTN, hyperlipidemia; pt presented to the ED c/o abdominal cramping, generalized, and diarrhea following use of laxatives as above for constipation. Neurology called for evaluation of LE weakness, unsteady gait. s/p shieley. Na improved.     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   - f/u ganglioside antibodies, Gq1b -->neg   - slow correction of sodium  - aggressive PT/OT  - B12 >2000, copper WNL, Vit E WNL, thiamine WNL, magnesium heavy metals WNL, zinc  -refused IVIG but agreed for PLEX  - telemetry  - check FS, glucose control <180  - GI/DVT ppx  - Counseling on diet, exercise, and medication adherence was done  - Counseling on smoking cessation and alcohol consumption offered when appropriate.  - Pain assessed and judicious use of narcotics when appropriate was discussed.    - Stroke education given when appropriate.  - Importance of fall prevention discussed.   - Differential diagnosis and plan of care discussed with patient and/or family and primary team  - Thank you for allowing me to participate in the care of this patient. Call with questions.   - d/c plan to [facility] in AM to morenita Gardner MD  Vascular Neurology  Office: [phone]

## 2021-05-03 NOTE — PROVIDER CONTACT NOTE (OTHER) - REASON
patient had a osito episode and pause
rechecked patient vitals
patient vitals signs above parameters
Patient refused AM labs
Patient was hypertensive to 168/99
Patients BP is 160/98mmHg.
Patients BP is 166/97mmHg.
patient vitals signs above parameters
Pt /90
hypotension during plasma exchange
Pt /107
Pt is still feeling constipated after the suppository
Patients BP is 161/97mmHg.

## 2021-05-03 NOTE — PROGRESS NOTE ADULT - ASSESSMENT
49 yo male, PMH DM, HTN, hyperlipidemia; pt presented to the ED c/o abdominal cramping, generalized, and diarrhea following use of laxatives as above for constipation.  In the ED, WBC 10.82, Hb 13.6, CMP: sodium 126, chloride 89, glucose 281, CMP otherwise unremarkable.  VBG: Lactate 2.6 (later downtrended to 1.6).  Pt. was treated with IV hydration and dispo'd to CDU for continued care plan:  IV hydration, supportive care, general observation care / monitoring. pt continued to have generalized weakness , numbness and on labs persistent hyponatremia and now being admitted for further evaluation.    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    LP results reviewed. protein albumin dissociation   plasma exchange : treatment as planned.. showing some improvement   neuro follow up appreciated  RPR positive: treated and no further need for any treatment per ID     - Hyponatremia :   likely multifactorial including hyperglycemia ( pseudohyponatremia ) and pre renal sec to diarrhea, resolved.   ? ADH induced by pain   appreciate renal input  improved, continue to monitor     - DM: uncontrolled   appreciate endo input  cont insulin per endo   on Lantus and premeal TID    -HTN : holding ARB for now given hypotension during exchange   (with hold parameters)    - hypophosphatemia : repleted and monitor   labs daily    - constipation : had had colonoscopy 2 yrs ago with Dr. Rae   CT abd no acute path   no further inpt red : discussed with Dr Rae     - ALLIE : improved   avoid NSAIDS for now     dvt proph   d/w pt, all questions answered.     plan for dc to rehab awaiting placement.

## 2021-05-03 NOTE — PROVIDER CONTACT NOTE (OTHER) - NAME OF MD/NP/PA/DO NOTIFIED:
Shayy Hogue, ACP
Arianna Reynoso ACP
Tanisha Joe, NP
Valerie Cheek
Shayy ACP
Arianna Reynoos ACP
Shayy Hogue, ACP
Shayy Sotelo, ACP
Arianna hooker ACP.
Provider Caty Garcia
Tanisha Joe
Arianna hooker ACP.
Provider Caty Garcia

## 2021-05-03 NOTE — PROVIDER CONTACT NOTE (OTHER) - DATE AND TIME:
12-Apr-2021 05:35
15-Apr-2021 23:16
13-Apr-2021 21:00
17-Apr-2021 00:15
21-Apr-2021 14:20
12-Apr-2021 02:33
12-Apr-2021 21:33
13-Apr-2021 00:55
17-Apr-2021 05:39
03-May-2021 07:26
13-Apr-2021 21:03
16-Apr-2021 22:45
22-Apr-2021 01:12

## 2021-05-03 NOTE — PROGRESS NOTE ADULT - ASSESSMENT
A/P:  ALLIE  Now at baseline.  monitor bmp  avoid nephrotoxic agents    Hyponatremia:  Likely due to hypovolemia+hyperglycemia. work up suggested SIADH    - Optimize dm control  - Free water restriction <1.2L/day.  - Monitor Na level QD  - Na level should not increase more than 6meq in 24 hrs    Proteinuia/Hematuria:  Etiology?  Had Cystoscopy last year and was normal per patient  Possible sec to Diabetic Nephropathy but will need full vasculitis work up and based on work up result he might need kidney biopsy, which can be planned as outpatient as his renal function is stable  D/W patient in detail above plan    Hypophosphatemia:  Supplement as needed   - monitor     HTN  BP controlled   Now on Norvasc and losartan, hydralazine

## 2021-05-03 NOTE — PROGRESS NOTE ADULT - SUBJECTIVE AND OBJECTIVE BOX
[facility] [place] [facility]   MD SEVEN CHRIS MD RUORU WONG, PA    TEL:  OFFICE: [phone]  DR DAWSON CELL: [phone]  SIMI PATEL CELL: [phone]  DR. MORAN CELL: [phone]  DR. TIRADO CELL: [phone]    FROM 5 PM - 7 AM PLEASE CALL ANSWERING SERVICE: [phone]    RENAL FOLLOW UP NOTE--Date of Service 05-03-21 @ 07:54  --------------------------------------------------------------------------------  HPI:      Pt seen and examined at bedside.   Denies SOB, chest pain     PAST HISTORY  --------------------------------------------------------------------------------  No significant changes to PMH, PSH, FHx, SHx, unless otherwise noted    ALLERGIES & MEDICATIONS  --------------------------------------------------------------------------------  Allergies    No Known Drug Allergies  shellfish (Urticaria)    Intolerances      Standing Inpatient Medications  albumin human  5% IVPB 3750 milliLiter(s) IV Intermittent every 48 hours  albumin human  5% IVPB 3750 milliLiter(s) IV Intermittent once  albumin human  5% IVPB 3750 milliLiter(s) IV Intermittent once  amLODIPine   Tablet 10 milliGRAM(s) Oral daily  bisacodyl 5 milliGRAM(s) Oral at bedtime  calcium gluconate IVPB 2 Gram(s) IV Intermittent <User Schedule>  calcium gluconate IVPB 2 Gram(s) IV Intermittent once  calcium gluconate IVPB 2 Gram(s) IV Intermittent once  chlorhexidine 2% Cloths 1 Application(s) Topical daily  dextrose 40% Gel 15 Gram(s) Oral once  dextrose 5%. 1000 milliLiter(s) IV Continuous <Continuous>  dextrose 5%. 1000 milliLiter(s) IV Continuous <Continuous>  dextrose 50% Injectable 25 Gram(s) IV Push once  dextrose 50% Injectable 12.5 Gram(s) IV Push once  dextrose 50% Injectable 25 Gram(s) IV Push once  enoxaparin Injectable 40 milliGRAM(s) SubCutaneous daily  gabapentin 100 milliGRAM(s) Oral three times a day  glucagon  Injectable 1 milliGRAM(s) IntraMuscular once  hydrALAZINE 25 milliGRAM(s) Oral three times a day  insulin glargine Injectable (LANTUS) 52 Unit(s) SubCutaneous at bedtime  insulin lispro (ADMELOG) corrective regimen sliding scale   SubCutaneous three times a day before meals  insulin lispro (ADMELOG) corrective regimen sliding scale   SubCutaneous at bedtime  insulin lispro Injectable (ADMELOG) 5 Unit(s) SubCutaneous <User Schedule>  insulin lispro Injectable (ADMELOG) 16 Unit(s) SubCutaneous before breakfast  insulin lispro Injectable (ADMELOG) 14 Unit(s) SubCutaneous before lunch  insulin lispro Injectable (ADMELOG) 14 Unit(s) SubCutaneous before dinner  losartan 100 milliGRAM(s) Oral daily  metoprolol tartrate 12.5 milliGRAM(s) Oral two times a day  mineral oil 30 milliLiter(s) Oral once  senna 2 Tablet(s) Oral two times a day  sodium chloride 0.9%. 1000 milliLiter(s) IV Continuous <Continuous>    PRN Inpatient Medications  acetaminophen   Tablet .. 650 milliGRAM(s) Oral every 6 hours PRN  acetaminophen   Tablet .. 650 milliGRAM(s) Oral every 6 hours PRN  aluminum hydroxide/magnesium hydroxide/simethicone Suspension 30 milliLiter(s) Oral every 6 hours PRN  dicyclomine 10 milliGRAM(s) Oral once PRN  HYDROmorphone   Tablet 1 milliGRAM(s) Oral every 8 hours PRN  sodium chloride 0.9% lock flush 10 milliLiter(s) IV Push every 1 hour PRN      REVIEW OF SYSTEMS  --------------------------------------------------------------------------------  General: no fever  CVS: no chest pain  RESP: no sob, no cough  ABD: no abdominal pain  : no dysuria,  MSK: no edema     VITALS/PHYSICAL EXAM  --------------------------------------------------------------------------------  T(C): 36.9 (05-03-21 @ 05:08), Max: 36.9 (05-02-21 @ 21:35)  HR: 81 (05-03-21 @ 05:08) (79 - 84)  BP: 124/68 (05-03-21 @ 05:08) (114/64 - 124/68)  RR: 16 (05-03-21 @ 05:08) (16 - 17)  SpO2: 100% (05-03-21 @ 05:08) (97% - 100%)  Wt(kg): --        05-02-21 @ 07:01  -  05-03-21 @ 07:00  --------------------------------------------------------  IN: 0 mL / OUT: 300 mL / NET: -300 mL      Physical Exam:  	Gen: NAD  	HEENT: MMM  	Pulm: CTA B/L  	CV: S1S2  	Abd: Soft, +BS  	Ext: No LE edema B/L                      Neuro: Awake alert  	Skin: Warm and Dry   	Vascular access: no HD catheter            no lucas  LABS/STUDIES  --------------------------------------------------------------------------------              10.6   7.40  >-----------<  281      [05-02-21 @ 07:40]              32.0     141  |  104  |  15  ----------------------------<  175      [05-02-21 @ 07:40]  4.2   |  24  |  0.93        Ca     9.3     [05-02-21 @ 07:40]      Mg     1.7     [05-02-21 @ 07:40]      Phos  4.5     [05-02-21 @ 07:40]            Creatinine Trend:  SCr 0.93 [05-02 @ 07:40]  SCr 0.96 [05-01 @ 06:23]  SCr 0.92 [04-30 @ 07:15]  SCr 0.88 [04-29 @ 08:13]  SCr 0.85 [04-28 @ 07:27]    Urinalysis - [04-11-21 @ 12:32]      Color Light Yellow / Appearance Clear / SG 1.013 / pH 6.5      Gluc >= 1000 mg/dL / Ketone Small  / Bili Negative / Urobili <2 mg/dL       Blood Small / Protein 100 mg/dL / Leuk Est Negative / Nitrite Negative      RBC 10 / WBC 1 / Hyaline 1 / Gran  / Sq Epi  / Non Sq Epi 1 / Bacteria Negative      TSH 4.20      [04-27-21 @ 07:21]    HBsAg Nonreact      [04-18-21 @ 01:28]  HCV 0.40, Nonreact      [04-18-21 @ 01:28]  HIV Nonreact      [04-17-21 @ 16:16]    PIERO: titer Negative, pattern --      [04-13-21 @ 10:10]  C3 Complement 152      [04-13-21 @ 07:48]  C4 Complement 33      [04-13-21 @ 07:48]  ANCA: cANCA Negative, pANCA Negative, atypical ANCA Negative      [04-16-21 @ 12:16]  Syphilis Screen (Treponema Pallidum Ab) Positive      [04-13-21 @ 10:10]  Syphilis Screen (RPR Titer) 1:1      [04-13-21 @ 10:10]  Free Light Chains: kappa 1.51, lambda 1.89, ratio = 0.80      [04-13 @ 10:08]  Immunofixation Serum:   No Monoclonal Band Identified    Reference Range: None Detected      [04-16-21 @ 08:31]  SPEP Interpretation: phase reaction. KRIS Ferris M.D.      [04-16-21 @ 08:31]

## 2021-05-03 NOTE — PROGRESS NOTE ADULT - SUBJECTIVE AND OBJECTIVE BOX
Patient is a 50y Male     Patient is a 50y old  Male who presents with a chief complaint of constipation (02 May 2021 08:01)      HPI:  51 yo male, PMH DM, HTN, hyperlipidemia; pt presented to the ED c/o abdominal cramping, generalized, and diarrhea following use of laxatives as above for constipation.  In the ED, WBC 10.82, Hb 13.6, CMP: sodium 126, chloride 89, glucose 281, CMP otherwise unremarkable.  VBG: Lactate 2.6 (later downtrended to 1.6).  Pt. was treated with IV hydration and dispo'd to CDU for continued care plan:  IV hydration, supportive care, AM labs, general observation care / monitoring. pt continued to have generalized weakness , numbness and on labs persistent hyponatermia and now being admitted for furhter evlauation .  dnenies N/V/Abd pain   had bm   no urinary sx   no fever or chills   no cough   no cp /sob    (11 Apr 2021 15:50)      PAST MEDICAL & SURGICAL HISTORY:  DM (diabetes mellitus)    HTN (hypertension)    Hyperlipidemia    H/O total knee replacement    Foot fracture        MEDICATIONS  (STANDING):  albumin human  5% IVPB 3750 milliLiter(s) IV Intermittent every 48 hours  albumin human  5% IVPB 3750 milliLiter(s) IV Intermittent once  albumin human  5% IVPB 3750 milliLiter(s) IV Intermittent once  amLODIPine   Tablet 10 milliGRAM(s) Oral daily  bisacodyl 5 milliGRAM(s) Oral at bedtime  calcium gluconate IVPB 2 Gram(s) IV Intermittent <User Schedule>  calcium gluconate IVPB 2 Gram(s) IV Intermittent once  calcium gluconate IVPB 2 Gram(s) IV Intermittent once  chlorhexidine 2% Cloths 1 Application(s) Topical daily  dextrose 40% Gel 15 Gram(s) Oral once  dextrose 5%. 1000 milliLiter(s) (50 mL/Hr) IV Continuous <Continuous>  dextrose 5%. 1000 milliLiter(s) (100 mL/Hr) IV Continuous <Continuous>  dextrose 50% Injectable 25 Gram(s) IV Push once  dextrose 50% Injectable 12.5 Gram(s) IV Push once  dextrose 50% Injectable 25 Gram(s) IV Push once  enoxaparin Injectable 40 milliGRAM(s) SubCutaneous daily  gabapentin 100 milliGRAM(s) Oral three times a day  glucagon  Injectable 1 milliGRAM(s) IntraMuscular once  hydrALAZINE 25 milliGRAM(s) Oral three times a day  insulin glargine Injectable (LANTUS) 52 Unit(s) SubCutaneous at bedtime  insulin lispro (ADMELOG) corrective regimen sliding scale   SubCutaneous three times a day before meals  insulin lispro (ADMELOG) corrective regimen sliding scale   SubCutaneous at bedtime  insulin lispro Injectable (ADMELOG) 5 Unit(s) SubCutaneous <User Schedule>  insulin lispro Injectable (ADMELOG) 14 Unit(s) SubCutaneous before lunch  insulin lispro Injectable (ADMELOG) 14 Unit(s) SubCutaneous before dinner  insulin lispro Injectable (ADMELOG) 16 Unit(s) SubCutaneous before breakfast  losartan 100 milliGRAM(s) Oral daily  metoprolol tartrate 12.5 milliGRAM(s) Oral two times a day  mineral oil 30 milliLiter(s) Oral once  senna 2 Tablet(s) Oral two times a day  sodium chloride 0.9%. 1000 milliLiter(s) (75 mL/Hr) IV Continuous <Continuous>      Allergies    No Known Drug Allergies  shellfish (Urticaria)    Intolerances        SOCIAL HISTORY:  Denies ETOh,Smoking,     FAMILY HISTORY:  No pertinent family history in first degree relatives        REVIEW OF SYSTEMS:    CONSTITUTIONAL: No weakness, fevers or chills  EYES/ENT: No visual changes;  No vertigo or throat pain   NECK: No pain or stiffness  RESPIRATORY: No cough, wheezing, hemoptysis; No shortness of breath  CARDIOVASCULAR: No chest pain or palpitations  GASTROINTESTINAL: No abdominal or epigastric pain. No nausea, vomiting, or hematemesis; No diarrhea or constipation. No melena or hematochezia.  GENITOURINARY: No dysuria, frequency or hematuria  NEUROLOGICAL: No numbness or weakness  SKIN: No itching, burning, rashes, or lesions   All other review of systems is negative unless indicated above.    VITAL:  T(C): , Max: 36.9 (05-02-21 @ 21:35)  T(F): , Max: 98.5 (05-03-21 @ 05:08)  HR: 81 (05-03-21 @ 05:08)  BP: 124/68 (05-03-21 @ 05:08)  BP(mean): --  RR: 16 (05-03-21 @ 05:08)  SpO2: 100% (05-03-21 @ 05:08)  Wt(kg): --    I and O's:    05-01 @ 07:01  -  05-02 @ 07:00  --------------------------------------------------------  IN: 0 mL / OUT: 1000 mL / NET: -1000 mL    05-02 @ 07:01  -  05-03 @ 07:00  --------------------------------------------------------  IN: 0 mL / OUT: 300 mL / NET: -300 mL          PHYSICAL EXAM:    Constitutional: NAD  HEENT: PERRLA,   Neck: No JVD  Respiratory: CTA B/L  Cardiovascular: S1 and S2  Gastrointestinal: BS+, soft, NT/ND  Extremities: No peripheral edema  Neurological: A/O x 3, no focal deficits  Psychiatric: Normal mood, normal affect  : No Jose  Skin: No rashes  Access: Not applicable  Back: No CVA tenderness    LABS:                        10.6   7.40  )-----------( 281      ( 02 May 2021 07:40 )             32.0     05-02    141  |  104  |  15  ----------------------------<  175<H>  4.2   |  24  |  0.93    Ca    9.3      02 May 2021 07:40  Phos  4.5     05-02  Mg     1.7     05-02            RADIOLOGY & ADDITIONAL STUDIES:

## 2021-05-03 NOTE — PROGRESS NOTE ADULT - SUBJECTIVE AND OBJECTIVE BOX
Patient is a 50y old  Male who presents with a chief complaint of consitpiation (03 May 2021 09:05)      HPI:  51 yo male, PMH DM, HTN, hyperlipidemia; pt presented to the ED c/o abdominal cramping, generalized, and diarrhea following use of laxatives as above for constipation.  In the ED, WBC 10.82, Hb 13.6, CMP: sodium 126, chloride 89, glucose 281, CMP otherwise unremarkable.  VBG: Lactate 2.6 (later downtrended to 1.6).  Pt. was treated with IV hydration and dispo'd to CDU for continued care plan:  IV hydration, supportive care, AM labs, general observation care / monitoring. pt continued to have generalized weakness , numbness and on labs persistent hyponatermia and now being admitted for furhter evlauation .  dnenies N/V/Abd pain   had bm   no urinary sx   no fever or chills   no cough   no cp /sob    (11 Apr 2021 15:50)    reports bm yesterday, states he wants to go to rehab. Covid swab pending.     REVIEW OF SYSTEMS  Constitutional - No fever, No weight loss, No fatigue  HEENT - No eye pain, No visual disturbances, No difficulty hearing, No tinnitus, No vertigo, No neck pain  Respiratory - No cough, No wheezing, No shortness of breath  Cardiovascular - No chest pain, No palpitations  Gastrointestinal - No abdominal pain, No nausea, No vomiting, No diarrhea, No constipation  Genitourinary - No dysuria, No frequency, No hematuria, No incontinence  Neurological - No headaches, No memory loss, + loss of strength, + numbness, No tremors  Skin - No itching, No rashes, No lesions   Endocrine - No temperature intolerance  Musculoskeletal - No joint pain, No joint swelling, No muscle pain  Psychiatric - No depression, No anxiety    PAST MEDICAL & SURGICAL HISTORY  DM (diabetes mellitus)    HTN (hypertension)    High cholesterol     H/O total knee replacement    Foot fracture         CURRENT FUNCTIONAL STATUS  5/1: Pt was received semi-supine in bed in NAD, agreeable and motivated to participate in PT session. Pt required minimum assistance x1 for bed mobility. pt demonstrated improved ability to move LEs. Pt transferred sit<->stand with maximum assistance x2, +blocking knees to prevent buckling. Pt left safely in bed with head of bed elevated 45 degrees, all lines/tubes intact and call bell within reach.     FAMILY HISTORY   No pertinent family history in first degree relatives      RECENT LABS/IMAGING  CBC Full  -  ( 02 May 2021 07:40 )  WBC Count : 7.40 K/uL  RBC Count : 3.55 M/uL  Hemoglobin : 10.6 g/dL  Hematocrit : 32.0 %  Platelet Count - Automated : 281 K/uL  Mean Cell Volume : 90.1 fL  Mean Cell Hemoglobin : 29.9 pg  Mean Cell Hemoglobin Concentration : 33.1 gm/dL  Auto Neutrophil # : x  Auto Lymphocyte # : x  Auto Monocyte # : x  Auto Eosinophil # : x  Auto Basophil # : x  Auto Neutrophil % : x  Auto Lymphocyte % : x  Auto Monocyte % : x  Auto Eosinophil % : x  Auto Basophil % : x    05-02    141  |  104  |  15  ----------------------------<  175<H>  4.2   |  24  |  0.93    Ca    9.3      02 May 2021 07:40  Phos  4.5     05-02  Mg     1.7     05-02          VITALS  T(C): 36.8 (05-03-21 @ 10:17), Max: 36.9 (05-02-21 @ 21:35)  HR: 79 (05-03-21 @ 10:17) (79 - 84)  BP: 126/74 (05-03-21 @ 10:17) (114/64 - 126/74)  RR: 18 (05-03-21 @ 10:17) (16 - 18)  SpO2: 96% (05-03-21 @ 10:17) (96% - 100%)  Wt(kg): --    ALLERGIES  No Known Drug Allergies  shellfish (Urticaria)      MEDICATIONS   acetaminophen   Tablet .. 650 milliGRAM(s) Oral every 6 hours PRN  acetaminophen   Tablet .. 650 milliGRAM(s) Oral every 6 hours PRN  albumin human  5% IVPB 3750 milliLiter(s) IV Intermittent every 48 hours  albumin human  5% IVPB 3750 milliLiter(s) IV Intermittent once  albumin human  5% IVPB 3750 milliLiter(s) IV Intermittent once  aluminum hydroxide/magnesium hydroxide/simethicone Suspension 30 milliLiter(s) Oral every 6 hours PRN  amLODIPine   Tablet 10 milliGRAM(s) Oral daily  bisacodyl 5 milliGRAM(s) Oral at bedtime  calcium gluconate IVPB 2 Gram(s) IV Intermittent <User Schedule>  calcium gluconate IVPB 2 Gram(s) IV Intermittent once  calcium gluconate IVPB 2 Gram(s) IV Intermittent once  chlorhexidine 2% Cloths 1 Application(s) Topical daily  dextrose 40% Gel 15 Gram(s) Oral once  dextrose 5%. 1000 milliLiter(s) IV Continuous <Continuous>  dextrose 5%. 1000 milliLiter(s) IV Continuous <Continuous>  dextrose 50% Injectable 25 Gram(s) IV Push once  dextrose 50% Injectable 12.5 Gram(s) IV Push once  dextrose 50% Injectable 25 Gram(s) IV Push once  dicyclomine 10 milliGRAM(s) Oral once PRN  enoxaparin Injectable 40 milliGRAM(s) SubCutaneous daily  gabapentin 100 milliGRAM(s) Oral three times a day  glucagon  Injectable 1 milliGRAM(s) IntraMuscular once  hydrALAZINE 25 milliGRAM(s) Oral three times a day  HYDROmorphone   Tablet 1 milliGRAM(s) Oral every 8 hours PRN  insulin glargine Injectable (LANTUS) 52 Unit(s) SubCutaneous at bedtime  insulin lispro (ADMELOG) corrective regimen sliding scale   SubCutaneous three times a day before meals  insulin lispro (ADMELOG) corrective regimen sliding scale   SubCutaneous at bedtime  insulin lispro Injectable (ADMELOG) 5 Unit(s) SubCutaneous <User Schedule>  insulin lispro Injectable (ADMELOG) 14 Unit(s) SubCutaneous before lunch  insulin lispro Injectable (ADMELOG) 14 Unit(s) SubCutaneous before dinner  insulin lispro Injectable (ADMELOG) 16 Unit(s) SubCutaneous before breakfast  losartan 100 milliGRAM(s) Oral daily  metoprolol tartrate 12.5 milliGRAM(s) Oral two times a day  mineral oil 30 milliLiter(s) Oral once  senna 2 Tablet(s) Oral two times a day  sodium chloride 0.9% lock flush 10 milliLiter(s) IV Push every 1 hour PRN  sodium chloride 0.9%. 1000 milliLiter(s) IV Continuous <Continuous>      ----------------------------------------------------------------------------------------   PHYSICAL EXAM  Constitutional - NAD, Comfortable  HEENT -  EOMI     Chest - no respiratory distress  Cardiovascular - RRR, S1S2   Abdomen - Soft, NTND  Extremities - No C/C/E, No calf tenderness   Neurologic Exam -                    Cognitive - Awake, Alert, AAO to self, place, date, year, situation     Communication - Fluent, No dysarthria     Cranial Nerves - CN 2-12 intact     Motor -                     LEFT    UE - ShAB 4+/5, EF 4+/5, EE 4+/5, WE4+/5,  4+/5                    RIGHT UE - ShAB 4+/5, EF 4+/5, EE 4+/5, WE4/5,  4/5                    LEFT    LE - HF 3+/5, KE 3/5, DF 4/5, PF 4/5                    RIGHT LE - HF 3/5, KE 3/5, DF 4/5, PF 4/5        Sensory -impaired distal fingertips, toes     OculoVestibular - No saccades, No nystagmus, VOR         Balance - WNL Static  Psychiatric - Mood stable, Affect WNL  ----------------------------------------------------------------------------------------  ASSESSMENT/PLAN  51 yo m p/w GI symptoms, now with weakness, 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  s/p plasmapheresis- catheter removed  turn and position q2 to prevent skin breakdown  Pain -tylenol prn, on gabapentin, dilaudid 1mg po q 8 prn with bowel regimen   dvt ppx: lovenox  continue bedside PT and OT for bed mobility, transfers, ambulation with AD, adls  OOB to chair when sitting balance allows  Rehab -   Recommend ACUTE inpatient rehabilitation for the functional deficits consisting of 3 hours of therapy/day & 24 hour RN/daily PMR physician for comorbid medical management. Will continue to follow for ongoing rehab needs and recommendations.

## 2021-05-03 NOTE — PROVIDER CONTACT NOTE (OTHER) - ACTION/TREATMENT ORDERED:
continue with 500cc bolus. Continue PLEX if bp stabilizes.
Provider Valerie Cheek to order IVP Ketorolac to manage pain.
fleet enema
pROVIDER NOTIFIED, PROVIDER STATED IT WAS OKAY TO BE SENT TO [facility]. wILL UPDATE WITH ANYCHANGES.
Administer amlodipine and losartan.
Given medication as ordered. Rechecked, patient below was ACP stated below 170 systolic. Patient was sent to CT scan after.
Given medication as ordered. Rechecked, patient below was ACP stated below 170 systolic. Patient was sent to CT scan after.
Provider made aware. Will medicate w/ morning BP meds and continue to monitor for developing s/s.
No actions ordered at this time. Will update with changes.
Provider made aware. No further interventions at this time. Will medicate pt for BP w/ morning meds and will continue to monitor for developing s/s.
No interventions at this time. Ok to recheck BP when giving am meds.
Provider notified. Okay to hold AM labs for now. Provider will notify nurse if labs need to be drawn.
Administer Metoprolol per orders. Reassess BP in 1-2 hours. Tramadol for pain.

## 2021-05-03 NOTE — PROVIDER CONTACT NOTE (OTHER) - RECOMMENDATIONS
Administer Metoprolol per orders. Reassess BP in 1 hour. Obtain order for pain medication.
Contact provider and continue to monitor for developing s/s.
Patient was given 5 mg of hydralazine. Rechecked. Patient below parameters.
Patient was given 5 mg of hydralazine. Rechecked. Patient below parameters.
Administer amlodipine and losartan per orders.
Contact provider, medicate pt w/ morning BP meds and continue to monitor for developing s/s.
Notify provider.
continue with 500cc bolus. Continue PLEX if bp stabilizes.
Fleet enema
Notify provider about patient refusal on AM labs

## 2021-05-03 NOTE — CHART NOTE - NSCHARTNOTEFT_GEN_A_CORE
NUTRITION FOLLOW-UP: Pt seen for follow-up visit. Pt continues to have a good appetite and po intake. Pt had no complaints of GI distress.  Pt had many questions concerning his diet and fluid restriction. Reviewed diet with Pt. All questions answered. Pt appeared to have a good understanding of his diet.    Weight: 91.9 kg    Labs: 183, 128, 118, 117, Glu-175    Current Diet: Consistent Carbohydrate, Renal w/evening snack, 1200 ml fluid restriction, No Pork Glucerna shake 2x/day    Enteral Recommendations:    RD to Remain Available: Arianna Webster MS, RDN, CDN pager [phone]     Additional Recommendations:   1) Monitor weight, labs, po intake and skin integrity.

## 2021-05-03 NOTE — PROGRESS NOTE ADULT - SUBJECTIVE AND OBJECTIVE BOX
Date of service: 05-03-21 @ 23:40      Patient is a 50y old  Male who presents with a chief complaint of consitpiation (03 May 2021 09:05)                                                               INTERVAL HPI/OVERNIGHT EVENTS:    REVIEW OF SYSTEMS:     CONSTITUTIONAL: No weakness, fevers or chills  EYES/ENT: No visual changes , no ear ache   NECK: No pain or stiffness  RESPIRATORY: No cough, wheezing,  No shortness of breath  CARDIOVASCULAR: No chest pain or palpitations  GASTROINTESTINAL: No abdominal pain  . No nausea, vomiting, or hematemesis; No diarrhea or constipation. No melena or hematochezia.  GENITOURINARY: No dysuria, frequency or hematuria  NEUROLOGICAL: No numbness or weakness  SKIN: No itching, burning, rashes, or lesions                                                                                                                                                                                                                                                                                 Medications:  MEDICATIONS  (STANDING):  albumin human  5% IVPB 3750 milliLiter(s) IV Intermittent every 48 hours  albumin human  5% IVPB 3750 milliLiter(s) IV Intermittent once  albumin human  5% IVPB 3750 milliLiter(s) IV Intermittent once  amLODIPine   Tablet 10 milliGRAM(s) Oral daily  bisacodyl 5 milliGRAM(s) Oral at bedtime  calcium gluconate IVPB 2 Gram(s) IV Intermittent once  calcium gluconate IVPB 2 Gram(s) IV Intermittent once  calcium gluconate IVPB 2 Gram(s) IV Intermittent <User Schedule>  chlorhexidine 2% Cloths 1 Application(s) Topical daily  dextrose 40% Gel 15 Gram(s) Oral once  dextrose 5%. 1000 milliLiter(s) (50 mL/Hr) IV Continuous <Continuous>  dextrose 5%. 1000 milliLiter(s) (100 mL/Hr) IV Continuous <Continuous>  dextrose 50% Injectable 25 Gram(s) IV Push once  dextrose 50% Injectable 12.5 Gram(s) IV Push once  dextrose 50% Injectable 25 Gram(s) IV Push once  enoxaparin Injectable 40 milliGRAM(s) SubCutaneous daily  gabapentin 100 milliGRAM(s) Oral three times a day  glucagon  Injectable 1 milliGRAM(s) IntraMuscular once  hydrALAZINE 25 milliGRAM(s) Oral three times a day  insulin glargine Injectable (LANTUS) 52 Unit(s) SubCutaneous at bedtime  insulin lispro (ADMELOG) corrective regimen sliding scale   SubCutaneous three times a day before meals  insulin lispro (ADMELOG) corrective regimen sliding scale   SubCutaneous at bedtime  insulin lispro Injectable (ADMELOG) 5 Unit(s) SubCutaneous <User Schedule>  insulin lispro Injectable (ADMELOG) 14 Unit(s) SubCutaneous before lunch  insulin lispro Injectable (ADMELOG) 14 Unit(s) SubCutaneous before dinner  insulin lispro Injectable (ADMELOG) 16 Unit(s) SubCutaneous before breakfast  losartan 100 milliGRAM(s) Oral daily  metoprolol tartrate 12.5 milliGRAM(s) Oral two times a day  mineral oil 30 milliLiter(s) Oral once  senna 2 Tablet(s) Oral two times a day  sodium chloride 0.9%. 1000 milliLiter(s) (75 mL/Hr) IV Continuous <Continuous>    MEDICATIONS  (PRN):  acetaminophen   Tablet .. 650 milliGRAM(s) Oral every 6 hours PRN Temp greater or equal to 38C (100.4F), Mild Pain (1 - 3), Moderate Pain (4 - 6)  acetaminophen   Tablet .. 650 milliGRAM(s) Oral every 6 hours PRN Mild Pain (1 - 3)  aluminum hydroxide/magnesium hydroxide/simethicone Suspension 30 milliLiter(s) Oral every 6 hours PRN Dyspepsia  dicyclomine 10 milliGRAM(s) Oral once PRN abdominal pain  HYDROmorphone   Tablet 1 milliGRAM(s) Oral every 8 hours PRN Severe Pain (7 - 10)  oxyCODONE    IR 5 milliGRAM(s) Oral every 6 hours PRN Moderate Pain (4 - 6)  sodium chloride 0.9% lock flush 10 milliLiter(s) IV Push every 1 hour PRN Pre/post blood products, medications, blood draw, and to maintain line patency       Allergies    No Known Drug Allergies  shellfish (Urticaria)    Intolerances      Vital Signs Last 24 Hrs  T(C): 36.8 (03 May 2021 21:00), Max: 36.9 (03 May 2021 05:08)  T(F): 98.3 (03 May 2021 21:00), Max: 98.5 (03 May 2021 05:08)  HR: 88 (03 May 2021 21:45) (79 - 93)  BP: 128/73 (03 May 2021 21:45) (112/62 - 713/-)  BP(mean): --  RR: 17 (03 May 2021 21:00) (16 - 18)  SpO2: 99% (03 May 2021 21:45) (96% - 100%)  CAPILLARY BLOOD GLUCOSE      POCT Blood Glucose.: 233 mg/dL (03 May 2021 21:06)  POCT Blood Glucose.: 118 mg/dL (03 May 2021 16:45)  POCT Blood Glucose.: 132 mg/dL (03 May 2021 11:49)  POCT Blood Glucose.: 183 mg/dL (03 May 2021 07:44)      05-02 @ 07:01  -  05-03 @ 07:00  --------------------------------------------------------  IN: 0 mL / OUT: 300 mL / NET: -300 mL    05-03 @ 07:01  -  05-03 @ 23:40  --------------------------------------------------------  IN: 0 mL / OUT: 250 mL / NET: -250 mL      Physical Exam:    Daily     Daily   General:  Well appearing, NAD, not cachetic  HEENT:  Nonicteric, PERRLA  CV:  RRR, S1S2   Lungs:  CTA B/L, no wheezes, rales, rhonchi  Abdomen:  Soft, non-tender, no distended, positive BS  Extremities:  2+ pulses, no c/c, no edema  Skin:  Warm and dry, no rashes  :  No lucas  Neuro:  AAOx3, non-focal, grossly intact                                                                                                                                                                                                                                                                                                LABS:                               10.6   7.40  )-----------( 281      ( 02 May 2021 07:40 )             32.0                      05-02    141  |  104  |  15  ----------------------------<  175<H>  4.2   |  24  |  0.93    Ca    9.3      02 May 2021 07:40  Phos  4.5     05-02  Mg     1.7     05-02                         RADIOLOGY & ADDITIONAL TESTS         I personally reviewed: [  ]EKG   [  ]CXR    [  ] CT      A/P:         Discussed with :     Francisco consultants' Notes   Time spent :

## 2021-05-03 NOTE — PROGRESS NOTE ADULT - SUBJECTIVE AND OBJECTIVE BOX
Neurology Progress Note    S: Patient seen and examined. completed plex for 5 sessions. d/c planning in AM to [facility]     Medication:  MEDICATIONS  (STANDING):  albumin human  5% IVPB 3750 milliLiter(s) IV Intermittent every 48 hours  albumin human  5% IVPB 3750 milliLiter(s) IV Intermittent once  albumin human  5% IVPB 3750 milliLiter(s) IV Intermittent once  amLODIPine   Tablet 10 milliGRAM(s) Oral daily  bisacodyl 5 milliGRAM(s) Oral at bedtime  calcium gluconate IVPB 2 Gram(s) IV Intermittent once  calcium gluconate IVPB 2 Gram(s) IV Intermittent once  calcium gluconate IVPB 2 Gram(s) IV Intermittent <User Schedule>  chlorhexidine 2% Cloths 1 Application(s) Topical daily  dextrose 40% Gel 15 Gram(s) Oral once  dextrose 5%. 1000 milliLiter(s) (50 mL/Hr) IV Continuous <Continuous>  dextrose 5%. 1000 milliLiter(s) (100 mL/Hr) IV Continuous <Continuous>  dextrose 50% Injectable 25 Gram(s) IV Push once  dextrose 50% Injectable 12.5 Gram(s) IV Push once  dextrose 50% Injectable 25 Gram(s) IV Push once  enoxaparin Injectable 40 milliGRAM(s) SubCutaneous daily  gabapentin 100 milliGRAM(s) Oral three times a day  glucagon  Injectable 1 milliGRAM(s) IntraMuscular once  hydrALAZINE 25 milliGRAM(s) Oral three times a day  insulin glargine Injectable (LANTUS) 52 Unit(s) SubCutaneous at bedtime  insulin lispro (ADMELOG) corrective regimen sliding scale   SubCutaneous three times a day before meals  insulin lispro (ADMELOG) corrective regimen sliding scale   SubCutaneous at bedtime  insulin lispro Injectable (ADMELOG) 5 Unit(s) SubCutaneous <User Schedule>  insulin lispro Injectable (ADMELOG) 16 Unit(s) SubCutaneous before breakfast  insulin lispro Injectable (ADMELOG) 14 Unit(s) SubCutaneous before lunch  insulin lispro Injectable (ADMELOG) 14 Unit(s) SubCutaneous before dinner  losartan 100 milliGRAM(s) Oral daily  metoprolol tartrate 12.5 milliGRAM(s) Oral two times a day  mineral oil 30 milliLiter(s) Oral once  senna 2 Tablet(s) Oral two times a day  sodium chloride 0.9%. 1000 milliLiter(s) (75 mL/Hr) IV Continuous <Continuous>    MEDICATIONS  (PRN):  acetaminophen   Tablet .. 650 milliGRAM(s) Oral every 6 hours PRN Temp greater or equal to 38C (100.4F), Mild Pain (1 - 3), Moderate Pain (4 - 6)  acetaminophen   Tablet .. 650 milliGRAM(s) Oral every 6 hours PRN Mild Pain (1 - 3)  aluminum hydroxide/magnesium hydroxide/simethicone Suspension 30 milliLiter(s) Oral every 6 hours PRN Dyspepsia  dicyclomine 10 milliGRAM(s) Oral once PRN abdominal pain  HYDROmorphone   Tablet 1 milliGRAM(s) Oral every 8 hours PRN Severe Pain (7 - 10)  oxyCODONE    IR 5 milliGRAM(s) Oral every 6 hours PRN Moderate Pain (4 - 6)  sodium chloride 0.9% lock flush 10 milliLiter(s) IV Push every 1 hour PRN Pre/post blood products, medications, blood draw, and to maintain line patency    Vitals:  Vital Signs Last 24 Hrs  T(C): 36.7 (03 May 2021 16:53), Max: 36.9 (02 May 2021 21:35)  T(F): 98.1 (03 May 2021 16:53), Max: 98.5 (03 May 2021 05:08)  HR: 91 (03 May 2021 16:53) (79 - 91)  BP: 146/83 (03 May 2021 16:53) (112/62 - 146/83)  BP(mean): --  RR: 17 (03 May 2021 16:53) (16 - 18)  SpO2: 99% (03 May 2021 16:53) (96% - 100%)    General Exam:   General Appearance: Appropriately dressed and in no acute distress       Head: Normocephalic, atraumatic and no dysmorphic features  Ear, Nose, and Throat: Moist mucous membranes  CVS: S1S2+  Resp: No SOB, no wheeze or rhonchi  Abd: soft NTND  Extremities: No edema, no cyanosis  Skin: No bruises, no rashes     Neurological Exam:  Mental Status: Awake, alert and oriented x 3.  Able to follow simple and complex verbal commands. Able to name and repeat. fluent speech. No obvious aphasia or dysarthria noted.   Cranial Nerves: PERRL, EOMI, VFFC, sensation V1-V3 intact,  no obvious facial asymmetry , equal elevation of palate, scm/trap 5/5, tongue is midline on protrusion. no obvious papilledema on fundoscopic exam. Hearing is grossly intact.   Motor: Normal bulk, tone and strength throughout B/L UE Fine finger movements were intact and symmetric. no tremors B/L LE 3-4-/5  Sensation: Intact to light touch and pinprick throughout. no right/left confusion. no extinction to tactile on DSS. Romberg was negative.   Reflexes: 0 throughout at biceps, brachioradialis, triceps, patellars and ankles bilaterally and equal. only hyas LUE biceps 1+ No clonus. R toe and L toe were both downgoing.  Coordination: No dysmetria on FNF   Gait: unable    I personally reviewed the below data/images/labs:  CBC Full  -  ( 02 May 2021 07:40 )  WBC Count : 7.40 K/uL  RBC Count : 3.55 M/uL  Hemoglobin : 10.6 g/dL  Hematocrit : 32.0 %  Platelet Count - Automated : 281 K/uL  Mean Cell Volume : 90.1 fL  Mean Cell Hemoglobin : 29.9 pg  Mean Cell Hemoglobin Concentration : 33.1 gm/dL  Auto Neutrophil # : x  Auto Lymphocyte # : x  Auto Monocyte # : x  Auto Eosinophil # : x  Auto Basophil # : x  Auto Neutrophil % : x  Auto Lymphocyte % : x  Auto Monocyte % : x  Auto Eosinophil % : x  Auto Basophil % : x    05-02    141  |  104  |  15  ----------------------------<  175<H>  4.2   |  24  |  0.93    Ca    9.3      02 May 2021 07:40  Phos  4.5     05-02  Mg     1.7     05-02      < from: CT Head No Cont (04.02.21 @ 21:48) >    EXAM:  CT BRAIN        PROCEDURE DATE:  Apr 2 2021         INTERPRETATION:  HISTORY: Hypertension    Technique: CT of the head was performed without contrast.    Multiple contiguous axial images were acquired from the skullbase to the vertex without the administration of intravenous contrast.  Coronal and sagittal reformations were made.    COMPARISON: None.    FINDINGS:  The ventricles and sulci are within normal limits given the patient's age. No acute hemorrhage, mass effect, midline shift,hydrocephalus, or extra-axial fluid collections. Mild patchy hypoattenuation within the white matter, likely secondary chronic microscopy changes.    The calvarium is intact.    Small cyst or polyp in the right maxillary sinus. There are opacification of a few ethmoid air cells bilaterally as well as mild mucosal thickening of the left frontal sinus. The mastoid air cells are clear.    IMPRESSION:  No acute hemorrhage, mass effect, or midline shift.            DARRELL MIX MD; Resident Radiology  This document has been electronically signed.  PORTILLO HARPER MD; Attending Radiologist  This document has been electronically signed. Apr 2 2021 11:34PM    < end of copied text >  < from: MR Lumbar Spine w/wo IV Cont (04.16.21 @ 22:16) >    EXAM:  MR SPINE LUMBAR WAW IC      EXAM:  MR SPINE THORACIC WAW IC        PROCEDURE DATE:  Apr 16 2021         INTERPRETATION:  CLINICAL INDICATION: Bilateral leg weakness of unknown etiology.    Technique: MRI of the thoracic and lumbar spine was performed with and without contrast. A total 10 cc of Gadavist were administered intravenously.    COMPARISON: None.    FINDINGS:    THORACIC:    There is normal thoracic kyphosis. There are multiple small vertebral body endplate Schmorl's nodes. Otherwise, the vertebrae are normal in signal, height, and alignment. There is anterior endplate osteophytosis most pronounced from the most pronounced from T6-T10.    There are tiny disc bulges at T7-T8 and T8-T9, without significant central spinal canalstenosis or neuroforaminal narrowing.    The spinal cord is normal in course, caliber, and signal.    There is no abnormal enhancement within thoracic spine.    LUMBAR:    There is normal lumbar lordosis. There are mild-to-moderate type I degenerative endplate changes on the left at L4-L5. Otherwise, the vertebral bodies are normal in height and signal without fracture or dislocation.    There is disc space narrowing and desiccation at L4-L5 and L5-S1.    Evaluation of individual levels demonstrates:    L5-S1: Small posterior disc bulge, as well as mild right greater than left facet hypertrophy, resulting in moderate right and mild left neural foraminal stenosis. No significant spinal canal stenosis.    L4-L5: Small posterior disc bulge and superimposed small right central disc protrusion, including tiny annular fissure, resulting in mild spinal canal stenosis, as well as bilateral facet hypertrophy with moderate right and severe left neural foraminal stenosis.    L3-L4: Small posterior disc bulge, along with bilateral facet hypertrophy, resulting in mild spinal canal stenosis as well as mild bilateral neural foraminal stenosis    L2-L3: No significant disc herniation, central spinal canal stenosis, or neural foraminal narrowing. Mildbilateral facet hypertrophy.    L1-L2: Tiny posterior disc bulge without significant central spinal canal stenosis or neuroforaminal narrowing. Mild bilateral facet hypertrophy.    The conus is normal in position and morphology at the L1 level. Thereis smooth thickening and abnormal enhancement of the cauda equina and ventral nerve roots, a nonspecific finding.    There is no definite fluid collection or mass lesion within the visualized retroperitoneal or posterior paraspinal soft tissues.    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 Alternative etiologies include chronic inflammatory demyelinating polyneuropathy, Lyme disease, less likely arachnoiditis or carcinomatosis.    Results were discussed with ADELSO Elkins by Dr. Haji at 9:40 AM on 4/17/2021 with read back followed.              ROSIE HAJI MD; Attending Radiologist  This document has been electronically signed. Apr 17 2021  9:48AM    < end of copied text >  < from: MR Cervical Spine w/wo IV Cont (04.16.21 @ 22:16) >    EXAM:  MR SPINE CERVICAL WAW IC        PROCEDURE DATE:  Apr 16 2021         INTERPRETATION:  CLINICAL INDICATION: Patient with new weakness in lower extremities.    TECHNIQUE: MRI of the cervical spine was performed before and after intravenous contrast. A total 10 cc of Gadavist were administered.    COMPARISON: None.    FINDINGS:  Structures at the craniocervical and cervicomedullary junction are intact.    There is nonspecific straightening of the normal cervical lordosis. There is grade 1 degenerative retrolisthesis of C5 on C6. There is disc space narrowing at C3-C4 and C5-C6. The remaining intervertebral disc space heights are grossly preserved. There is anterior endplate osteophytosis from C3 to C6.    There are mild Modic type II degenerative endplate changes involving C3 CT 4 and C5-C6, along with mild endplate irregularity. Otherwise, the vertebrae demonstrate normal height and signal without fracture, dislocation or marrow edema.    The spinal cord demonstrates normal course and caliber without intrinsic cord signal abnormality.    The prevertebral and paravertebral soft tissues are within normal limits.    There is no abnormal enhancement.    There is diffuse disc desiccation. Evaluation of individual levels demonstrates:    C2-C3: No significant disc herniation, central spinal canal stenosis, although neural foraminal narrowing.    C3-C4: Small posterior disc osteophyte complexes, partially effacing the ventral thecal sac and resulting in mild spinal canal stenosis,as well as moderate right and mild left neural foraminal narrowing.    C4-C5: Tiny posterior disc osteophyte complex minimally effacing the ventral thecal sac, without significant spinal canal stenosis, as well as moderate left and mild right neural foraminal narrowing.    C5-C6: Tiny posterior disc osteophyte complex minimally effacing the ventral thecal sac, without significant spinal canal stenosis, as well as severe right and moderate left neural foraminal narrowing.    C6-C7: No significant disc herniation, central spinal canal stenosis, or neural foraminal narrowing.    C7-T1: No significant disc herniation, central spinal canal stenosis, or neural foraminal narrowing.    There is no soft tissue neck mass or fluid collection.    IMPRESSION:  No evidence for spinal cord compression, signal abnormality, or abnormal enhancement.    Multilevel degenerative changes, as described above, most pronounced at C3-C4 where there is mild spinal canal stenosis and C5-C6 where there is severe right and moderate left neural foraminal narrowing.              ROSIE HAJI MD; Attending Radiologist  This document has been electronically signed. Apr 17 2021  8:54AM    < end of copied text >

## 2021-05-04 PROCEDURE — 99231 SBSQ HOSP IP/OBS SF/LOW 25: CPT

## 2021-05-04 PROCEDURE — 99232 SBSQ HOSP IP/OBS MODERATE 35: CPT

## 2021-05-04 RX ORDER — GABAPENTIN 400 MG/1
300 CAPSULE ORAL THREE TIMES A DAY
Refills: 0 | Status: DISCONTINUED | OUTPATIENT
Start: 2021-05-04 | End: 2021-05-05

## 2021-05-04 RX ORDER — SODIUM CHLORIDE 9 MG/ML
1000 INJECTION, SOLUTION INTRAVENOUS
Refills: 0 | Status: DISCONTINUED | OUTPATIENT
Start: 2021-05-05 | End: 2021-05-21

## 2021-05-04 RX ORDER — METOPROLOL TARTRATE 50 MG
12.5 TABLET ORAL
Refills: 0 | Status: DISCONTINUED | OUTPATIENT
Start: 2021-05-05 | End: 2021-05-21

## 2021-05-04 RX ORDER — INSULIN LISPRO 100/ML
14 VIAL (ML) SUBCUTANEOUS
Refills: 0 | Status: DISCONTINUED | OUTPATIENT
Start: 2021-05-05 | End: 2021-05-10

## 2021-05-04 RX ORDER — ENOXAPARIN SODIUM 100 MG/ML
40 INJECTION SUBCUTANEOUS DAILY
Refills: 0 | Status: DISCONTINUED | OUTPATIENT
Start: 2021-05-06 | End: 2021-05-21

## 2021-05-04 RX ORDER — INSULIN LISPRO 100/ML
5 VIAL (ML) SUBCUTANEOUS
Refills: 0 | Status: DISCONTINUED | OUTPATIENT
Start: 2021-05-05 | End: 2021-05-21

## 2021-05-04 RX ORDER — DEXTROSE 50 % IN WATER 50 %
25 SYRINGE (ML) INTRAVENOUS ONCE
Refills: 0 | Status: DISCONTINUED | OUTPATIENT
Start: 2021-05-05 | End: 2021-05-21

## 2021-05-04 RX ORDER — HYDROMORPHONE HYDROCHLORIDE 2 MG/ML
2 INJECTION INTRAMUSCULAR; INTRAVENOUS; SUBCUTANEOUS EVERY 6 HOURS
Refills: 0 | Status: DISCONTINUED | OUTPATIENT
Start: 2021-05-04 | End: 2021-05-05

## 2021-05-04 RX ORDER — OXYCODONE HYDROCHLORIDE 5 MG/1
5 TABLET ORAL EVERY 6 HOURS
Refills: 0 | Status: DISCONTINUED | OUTPATIENT
Start: 2021-05-05 | End: 2021-05-10

## 2021-05-04 RX ORDER — GLUCAGON INJECTION, SOLUTION 0.5 MG/.1ML
1 INJECTION, SOLUTION SUBCUTANEOUS ONCE
Refills: 0 | Status: DISCONTINUED | OUTPATIENT
Start: 2021-05-05 | End: 2021-05-21

## 2021-05-04 RX ORDER — INSULIN GLARGINE 100 [IU]/ML
52 INJECTION, SOLUTION SUBCUTANEOUS AT BEDTIME
Refills: 0 | Status: DISCONTINUED | OUTPATIENT
Start: 2021-05-05 | End: 2021-05-10

## 2021-05-04 RX ORDER — DEXTROSE 50 % IN WATER 50 %
12.5 SYRINGE (ML) INTRAVENOUS ONCE
Refills: 0 | Status: DISCONTINUED | OUTPATIENT
Start: 2021-05-05 | End: 2021-05-21

## 2021-05-04 RX ORDER — FAMOTIDINE 10 MG/ML
20 INJECTION INTRAVENOUS
Refills: 0 | Status: DISCONTINUED | OUTPATIENT
Start: 2021-05-05 | End: 2021-05-21

## 2021-05-04 RX ORDER — AMLODIPINE BESYLATE 2.5 MG/1
10 TABLET ORAL DAILY
Refills: 0 | Status: DISCONTINUED | OUTPATIENT
Start: 2021-05-06 | End: 2021-05-21

## 2021-05-04 RX ORDER — INSULIN LISPRO 100/ML
VIAL (ML) SUBCUTANEOUS AT BEDTIME
Refills: 0 | Status: DISCONTINUED | OUTPATIENT
Start: 2021-05-05 | End: 2021-05-21

## 2021-05-04 RX ORDER — HYDRALAZINE HCL 50 MG
25 TABLET ORAL THREE TIMES A DAY
Refills: 0 | Status: DISCONTINUED | OUTPATIENT
Start: 2021-05-05 | End: 2021-05-11

## 2021-05-04 RX ORDER — HYDROMORPHONE HYDROCHLORIDE 2 MG/ML
1 INJECTION INTRAMUSCULAR; INTRAVENOUS; SUBCUTANEOUS EVERY 8 HOURS
Refills: 0 | Status: DISCONTINUED | OUTPATIENT
Start: 2021-05-05 | End: 2021-05-05

## 2021-05-04 RX ORDER — LIDOCAINE 4 G/100G
1 CREAM TOPICAL DAILY
Refills: 0 | Status: DISCONTINUED | OUTPATIENT
Start: 2021-05-04 | End: 2021-05-05

## 2021-05-04 RX ORDER — GABAPENTIN 400 MG/1
100 CAPSULE ORAL THREE TIMES A DAY
Refills: 0 | Status: DISCONTINUED | OUTPATIENT
Start: 2021-05-05 | End: 2021-05-05

## 2021-05-04 RX ORDER — SENNA PLUS 8.6 MG/1
2 TABLET ORAL
Refills: 0 | Status: DISCONTINUED | OUTPATIENT
Start: 2021-05-05 | End: 2021-05-17

## 2021-05-04 RX ORDER — LOSARTAN POTASSIUM 100 MG/1
100 TABLET, FILM COATED ORAL DAILY
Refills: 0 | Status: DISCONTINUED | OUTPATIENT
Start: 2021-05-06 | End: 2021-05-21

## 2021-05-04 RX ORDER — DEXTROSE 50 % IN WATER 50 %
15 SYRINGE (ML) INTRAVENOUS ONCE
Refills: 0 | Status: DISCONTINUED | OUTPATIENT
Start: 2021-05-05 | End: 2021-05-21

## 2021-05-04 RX ORDER — INSULIN LISPRO 100/ML
VIAL (ML) SUBCUTANEOUS
Refills: 0 | Status: DISCONTINUED | OUTPATIENT
Start: 2021-05-05 | End: 2021-05-21

## 2021-05-04 RX ORDER — ACETAMINOPHEN 500 MG
650 TABLET ORAL EVERY 6 HOURS
Refills: 0 | Status: DISCONTINUED | OUTPATIENT
Start: 2021-05-05 | End: 2021-05-21

## 2021-05-04 RX ADMIN — SENNA PLUS 2 TABLET(S): 8.6 TABLET ORAL at 12:16

## 2021-05-04 RX ADMIN — Medication 16 UNIT(S): at 07:42

## 2021-05-04 RX ADMIN — HYDROMORPHONE HYDROCHLORIDE 1 MILLIGRAM(S): 2 INJECTION INTRAMUSCULAR; INTRAVENOUS; SUBCUTANEOUS at 09:20

## 2021-05-04 RX ADMIN — Medication 25 MILLIGRAM(S): at 13:53

## 2021-05-04 RX ADMIN — Medication 2: at 07:41

## 2021-05-04 RX ADMIN — OXYCODONE HYDROCHLORIDE 5 MILLIGRAM(S): 5 TABLET ORAL at 07:14

## 2021-05-04 RX ADMIN — HYDROMORPHONE HYDROCHLORIDE 2 MILLIGRAM(S): 2 INJECTION INTRAMUSCULAR; INTRAVENOUS; SUBCUTANEOUS at 21:54

## 2021-05-04 RX ADMIN — SENNA PLUS 2 TABLET(S): 8.6 TABLET ORAL at 21:39

## 2021-05-04 RX ADMIN — HYDROMORPHONE HYDROCHLORIDE 1 MILLIGRAM(S): 2 INJECTION INTRAMUSCULAR; INTRAVENOUS; SUBCUTANEOUS at 02:17

## 2021-05-04 RX ADMIN — HYDROMORPHONE HYDROCHLORIDE 1 MILLIGRAM(S): 2 INJECTION INTRAMUSCULAR; INTRAVENOUS; SUBCUTANEOUS at 10:20

## 2021-05-04 RX ADMIN — LOSARTAN POTASSIUM 100 MILLIGRAM(S): 100 TABLET, FILM COATED ORAL at 05:17

## 2021-05-04 RX ADMIN — INSULIN GLARGINE 52 UNIT(S): 100 INJECTION, SOLUTION SUBCUTANEOUS at 21:43

## 2021-05-04 RX ADMIN — HYDROMORPHONE HYDROCHLORIDE 1 MILLIGRAM(S): 2 INJECTION INTRAMUSCULAR; INTRAVENOUS; SUBCUTANEOUS at 03:00

## 2021-05-04 RX ADMIN — AMLODIPINE BESYLATE 10 MILLIGRAM(S): 2.5 TABLET ORAL at 05:17

## 2021-05-04 RX ADMIN — Medication 14 UNIT(S): at 16:49

## 2021-05-04 RX ADMIN — Medication 2: at 12:15

## 2021-05-04 RX ADMIN — ENOXAPARIN SODIUM 40 MILLIGRAM(S): 100 INJECTION SUBCUTANEOUS at 12:15

## 2021-05-04 RX ADMIN — GABAPENTIN 300 MILLIGRAM(S): 400 CAPSULE ORAL at 21:42

## 2021-05-04 RX ADMIN — GABAPENTIN 100 MILLIGRAM(S): 400 CAPSULE ORAL at 05:17

## 2021-05-04 RX ADMIN — Medication 25 MILLIGRAM(S): at 21:39

## 2021-05-04 RX ADMIN — Medication 5 MILLIGRAM(S): at 21:42

## 2021-05-04 RX ADMIN — Medication 14 UNIT(S): at 12:16

## 2021-05-04 RX ADMIN — HYDROMORPHONE HYDROCHLORIDE 2 MILLIGRAM(S): 2 INJECTION INTRAMUSCULAR; INTRAVENOUS; SUBCUTANEOUS at 16:50

## 2021-05-04 RX ADMIN — Medication 12.5 MILLIGRAM(S): at 05:17

## 2021-05-04 RX ADMIN — HYDROMORPHONE HYDROCHLORIDE 2 MILLIGRAM(S): 2 INJECTION INTRAMUSCULAR; INTRAVENOUS; SUBCUTANEOUS at 22:18

## 2021-05-04 RX ADMIN — HYDROMORPHONE HYDROCHLORIDE 2 MILLIGRAM(S): 2 INJECTION INTRAMUSCULAR; INTRAVENOUS; SUBCUTANEOUS at 15:54

## 2021-05-04 RX ADMIN — Medication 25 MILLIGRAM(S): at 05:17

## 2021-05-04 RX ADMIN — GABAPENTIN 100 MILLIGRAM(S): 400 CAPSULE ORAL at 13:53

## 2021-05-04 RX ADMIN — OXYCODONE HYDROCHLORIDE 5 MILLIGRAM(S): 5 TABLET ORAL at 08:00

## 2021-05-04 NOTE — H&P ADULT - HISTORY OF PRESENT ILLNESS
This is a 49 y/o male, PMH DM, HTN, hyperlipidemia; pt presented to the ED at [facility] on 4/10 with c/o abdominal cramping, generalized, and diarrhea following use of laxatives for constipation.  In the ED, WBC 10.82, Hb 13.6, CMP: sodium 126, chloride 89, glucose 281, CMP otherwise unremarkable.  VBG: Lactate 2.6 (later downtrended to 1.6).  Pt. was treated with IV hydration and transferred to CDU for continued care plan:  IV hydration, supportive care, AM labs, general observation care / monitoring. Patient continued to have generalized weakness, numbness and persistent hyponatremia. Patient admitted for further evaluation.  Neurology consulted. 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 Patient refused IVIG but underwent PLEX.   Nephrology consulted for ALLIE and hyponatremia. Likely due to hypovolemia in setting of hyperglycemia. Work up suggested SIADH. Recs to optimize DM control, continue free water restriction <1.2L/day, and monitor Na levels (Na level should not increase more than 6meq in 24 hrs). Also, noted to have proteinuia/hematuria. Renal felt sec to Diabetic Nephropathy but patient will need full vasculitis work up and based on work up result, he might need kidney biopsy which would be scheduled as an outpatient.   Endocrinology also following for uncontrolled diabetes. Insulin regimen adjusted with improvement in glucoses.   ID consulted due to h/o syphilis in the past. Patient had undergone treatment in the past. No need for further treatment.   Last night (5/3), patient reported increased left scapula pain and given IV Dilaudid. Patient attributed to increased use of UEs in PT. Pain management NP consulted. Recommended switch to PO Dilaudid.   Patient has been seen by PT, OT and PM&R attending with recommendations for acute IPR.   Patient is deemed medically stable for transfer to acute IPR at [facility] on 5/4.

## 2021-05-04 NOTE — PROGRESS NOTE ADULT - SUBJECTIVE AND OBJECTIVE BOX
[facility]   MD WYATT CHRIS DO ANAM SIDDIQUI ANGELA WONG, PA    TEL:  OFFICE: [phone]  DR DAWSON CELL: [phone]  DR. TIRADO CELL: [phone]  DR. MORAN CELL: [phone]  CATINA PATEL CELL: [phone]    From 5pm-7am Answering Service [phone]    -- RENAL FOLLOW UP NOTE ---Date of Service 05-04-21 @ 12:36    Patient is a 50y old  Male who presents with a chief complaint of consitpaiton, change in bowels (04 May 2021 09:08)      Patient seen and examined at bedside. No chest pain/sob    VITALS:  T(F): 98.2 (05-04-21 @ 09:07), Max: 98.3 (05-03-21 @ 21:00)  HR: 85 (05-04-21 @ 09:07)  BP: 121/73 (05-04-21 @ 09:07)  RR: 17 (05-04-21 @ 09:07)  SpO2: 97% (05-04-21 @ 09:07)  Wt(kg): --    05-03 @ 07:01  -  05-04 @ 07:00  --------------------------------------------------------  IN: 0 mL / OUT: 250 mL / NET: -250 mL          PHYSICAL EXAM:  Constitutional: NAD  Neck: No JVD  Respiratory: CTAB, no wheezes, rales or rhonchi  Cardiovascular: S1, S2, RRR  Gastrointestinal: BS+, soft, NT/ND  Extremities: No peripheral edema    Hospital Medications:   MEDICATIONS  (STANDING):  albumin human  5% IVPB 3750 milliLiter(s) IV Intermittent every 48 hours  albumin human  5% IVPB 3750 milliLiter(s) IV Intermittent once  albumin human  5% IVPB 3750 milliLiter(s) IV Intermittent once  amLODIPine   Tablet 10 milliGRAM(s) Oral daily  bisacodyl 5 milliGRAM(s) Oral at bedtime  calcium gluconate IVPB 2 Gram(s) IV Intermittent <User Schedule>  calcium gluconate IVPB 2 Gram(s) IV Intermittent once  calcium gluconate IVPB 2 Gram(s) IV Intermittent once  chlorhexidine 2% Cloths 1 Application(s) Topical daily  dextrose 40% Gel 15 Gram(s) Oral once  dextrose 5%. 1000 milliLiter(s) (50 mL/Hr) IV Continuous <Continuous>  dextrose 5%. 1000 milliLiter(s) (100 mL/Hr) IV Continuous <Continuous>  dextrose 50% Injectable 25 Gram(s) IV Push once  dextrose 50% Injectable 12.5 Gram(s) IV Push once  dextrose 50% Injectable 25 Gram(s) IV Push once  enoxaparin Injectable 40 milliGRAM(s) SubCutaneous daily  gabapentin 100 milliGRAM(s) Oral three times a day  glucagon  Injectable 1 milliGRAM(s) IntraMuscular once  hydrALAZINE 25 milliGRAM(s) Oral three times a day  insulin glargine Injectable (LANTUS) 52 Unit(s) SubCutaneous at bedtime  insulin lispro (ADMELOG) corrective regimen sliding scale   SubCutaneous three times a day before meals  insulin lispro (ADMELOG) corrective regimen sliding scale   SubCutaneous at bedtime  insulin lispro Injectable (ADMELOG) 5 Unit(s) SubCutaneous <User Schedule>  insulin lispro Injectable (ADMELOG) 16 Unit(s) SubCutaneous before breakfast  insulin lispro Injectable (ADMELOG) 14 Unit(s) SubCutaneous before lunch  insulin lispro Injectable (ADMELOG) 14 Unit(s) SubCutaneous before dinner  losartan 100 milliGRAM(s) Oral daily  metoprolol tartrate 12.5 milliGRAM(s) Oral two times a day  mineral oil 30 milliLiter(s) Oral once  senna 2 Tablet(s) Oral two times a day  sodium chloride 0.9%. 1000 milliLiter(s) (75 mL/Hr) IV Continuous <Continuous>      LABS:        Creatinine Trend: 0.93 <--, 0.96 <--, 0.92 <--, 0.88 <--, 0.85 <--            Urine Studies:  Urinalysis - [04-11-21 @ 12:32]      Color Light Yellow / Appearance Clear / SG 1.013 / pH 6.5      Gluc >= 1000 mg/dL / Ketone Small  / Bili Negative / Urobili <2 mg/dL       Blood Small / Protein 100 mg/dL / Leuk Est Negative / Nitrite Negative      RBC 10 / WBC 1 / Hyaline 1 / Gran  / Sq Epi  / Non Sq Epi 1 / Bacteria Negative      TSH 4.20      [04-27-21 @ 07:21]    HBsAg Nonreact      [04-18-21 @ 01:28]  HCV 0.40, Nonreact      [04-18-21 @ 01:28]  HIV Nonreact      [04-17-21 @ 16:16]    PIERO: titer Negative, pattern --      [04-13-21 @ 10:10]  C3 Complement 152      [04-13-21 @ 07:48]  C4 Complement 33      [04-13-21 @ 07:48]  ANCA: cANCA Negative, pANCA Negative, atypical ANCA Negative      [04-16-21 @ 12:16]  Syphilis Screen (Treponema Pallidum Ab) Positive      [04-13-21 @ 10:10]  Syphilis Screen (RPR Titer) 1:1      [04-13-21 @ 10:10]  Free Light Chains: kappa 1.51, lambda 1.89, ratio = 0.80      [04-13 @ 10:08]  Immunofixation Serum:   No Monoclonal Band Identified    Reference Range: None Detected      [04-16-21 @ 08:31]  SPEP Interpretation: phase reaction. KRIS Ferris M.D.      [04-16-21 @ 08:31]    RADIOLOGY & ADDITIONAL STUDIES:

## 2021-05-04 NOTE — PROGRESS NOTE ADULT - TIME BILLING
A/P
transfer center   pt , neuro
A/P
pt at length   neuro
pt, radio , nburse and PA
A/P
pt at length , Rehab medicine
pt, pa
A/P
pt , np ,  wife on phone and nurse
pt, pa
A/P
pt, pa
pt, wife , neuro IR , NP , anesthesiology and neuroradiology
A/P
pt , neuro
pt,  wife on phone , np and gi /neuro
A/P
wife on phone   pt   neuro   np
A/P

## 2021-05-04 NOTE — CONSULT NOTE ADULT - CONSULT REQUESTED DATE/TIME
15-Apr-2021 14:59
20-Apr-2021 15:13
21-Apr-2021 11:01
21-Apr-2021 15:30
12-Apr-2021 14:44
04-May-2021 14:11
11-Apr-2021 17:49
11-Apr-2021 12:52
20-Apr-2021 09:00

## 2021-05-04 NOTE — PROGRESS NOTE ADULT - PROBLEM SELECTOR PROBLEM 3
Hyponatremia

## 2021-05-04 NOTE — H&P ADULT - NSHPSOCIALHISTORY_GEN_ALL_CORE
Smoking - Denied  EtOH - Denied   Drugs - Denied    FUNCTIONAL HISTORY  Lives in basement of house, 6 stairs to enter. lives with his wife  Independent at baseline without device        FUNCTIONAL PROGRESS  5/3  Bed Mobility Training Rolling/Turning: moderate assist (50% patient effort);  1 person assist  Bed Mobility Training Scooting: supervision  Bed Mobility Training Sit-to-Supine: moderate assist (50% patient effort);  1 person assist  Bed Mobility Training Supine-to-Sit: moderate assist (50% patient effort);  1 person assist  Transfer Training Sit-to-Stand Transfer: maximum assist (25% patient effort);  2 person assist  Transfer Training Stand-to-Sit Transfer: maximum assist (25% patient effort);  2 person assist

## 2021-05-04 NOTE — PROGRESS NOTE ADULT - SUBJECTIVE AND OBJECTIVE BOX
Patient is a 50y Male     Patient is a 50y old  Male who presents with a chief complaint of consitpiation (03 May 2021 09:05)      HPI:  51 yo male, PMH DM, HTN, hyperlipidemia; pt presented to the ED c/o abdominal cramping, generalized, and diarrhea following use of laxatives as above for constipation.  In the ED, WBC 10.82, Hb 13.6, CMP: sodium 126, chloride 89, glucose 281, CMP otherwise unremarkable.  VBG: Lactate 2.6 (later downtrended to 1.6).  Pt. was treated with IV hydration and dispo'd to CDU for continued care plan:  IV hydration, supportive care, AM labs, general observation care / monitoring. pt continued to have generalized weakness , numbness and on labs persistent hyponatermia and now being admitted for furhter evlauation .  dnenies N/V/Abd pain   had bm   no urinary sx   no fever or chills   no cough   no cp /sob    (11 Apr 2021 15:50)      PAST MEDICAL & SURGICAL HISTORY:  DM (diabetes mellitus)    HTN (hypertension)    Hyperlipidemia    H/O total knee replacement    Foot fracture        MEDICATIONS  (STANDING):  albumin human  5% IVPB 3750 milliLiter(s) IV Intermittent once  albumin human  5% IVPB 3750 milliLiter(s) IV Intermittent every 48 hours  albumin human  5% IVPB 3750 milliLiter(s) IV Intermittent once  amLODIPine   Tablet 10 milliGRAM(s) Oral daily  bisacodyl 5 milliGRAM(s) Oral at bedtime  calcium gluconate IVPB 2 Gram(s) IV Intermittent <User Schedule>  calcium gluconate IVPB 2 Gram(s) IV Intermittent once  calcium gluconate IVPB 2 Gram(s) IV Intermittent once  chlorhexidine 2% Cloths 1 Application(s) Topical daily  dextrose 40% Gel 15 Gram(s) Oral once  dextrose 5%. 1000 milliLiter(s) (50 mL/Hr) IV Continuous <Continuous>  dextrose 5%. 1000 milliLiter(s) (100 mL/Hr) IV Continuous <Continuous>  dextrose 50% Injectable 25 Gram(s) IV Push once  dextrose 50% Injectable 12.5 Gram(s) IV Push once  dextrose 50% Injectable 25 Gram(s) IV Push once  enoxaparin Injectable 40 milliGRAM(s) SubCutaneous daily  gabapentin 100 milliGRAM(s) Oral three times a day  glucagon  Injectable 1 milliGRAM(s) IntraMuscular once  hydrALAZINE 25 milliGRAM(s) Oral three times a day  insulin glargine Injectable (LANTUS) 52 Unit(s) SubCutaneous at bedtime  insulin lispro (ADMELOG) corrective regimen sliding scale   SubCutaneous three times a day before meals  insulin lispro (ADMELOG) corrective regimen sliding scale   SubCutaneous at bedtime  insulin lispro Injectable (ADMELOG) 5 Unit(s) SubCutaneous <User Schedule>  insulin lispro Injectable (ADMELOG) 14 Unit(s) SubCutaneous before lunch  insulin lispro Injectable (ADMELOG) 14 Unit(s) SubCutaneous before dinner  insulin lispro Injectable (ADMELOG) 16 Unit(s) SubCutaneous before breakfast  losartan 100 milliGRAM(s) Oral daily  metoprolol tartrate 12.5 milliGRAM(s) Oral two times a day  mineral oil 30 milliLiter(s) Oral once  senna 2 Tablet(s) Oral two times a day  sodium chloride 0.9%. 1000 milliLiter(s) (75 mL/Hr) IV Continuous <Continuous>      Allergies    No Known Drug Allergies  shellfish (Urticaria)    Intolerances        SOCIAL HISTORY:  Denies ETOh,Smoking,     FAMILY HISTORY:  No pertinent family history in first degree relatives        REVIEW OF SYSTEMS:    CONSTITUTIONAL: No weakness, fevers or chills  EYES/ENT: No visual changes;  No vertigo or throat pain   NECK: No pain or stiffness  RESPIRATORY: No cough, wheezing, hemoptysis; No shortness of breath  CARDIOVASCULAR: No chest pain or palpitations  GASTROINTESTINAL: No abdominal or epigastric pain. No nausea, vomiting, or hematemesis; No diarrhea or constipation. No melena or hematochezia.  GENITOURINARY: No dysuria, frequency or hematuria  NEUROLOGICAL: No numbness or weakness  SKIN: No itching, burning, rashes, or lesions   All other review of systems is negative unless indicated above.    VITAL:  T(C): , Max: 36.8 (05-03-21 @ 10:17)  T(F): , Max: 98.3 (05-03-21 @ 21:00)  HR: 85 (05-04-21 @ 09:07)  BP: 121/73 (05-04-21 @ 09:07)  BP(mean): --  RR: 17 (05-04-21 @ 09:07)  SpO2: 97% (05-04-21 @ 09:07)  Wt(kg): --    I and O's:    05-02 @ 07:01  -  05-03 @ 07:00  --------------------------------------------------------  IN: 0 mL / OUT: 300 mL / NET: -300 mL    05-03 @ 07:01  -  05-04 @ 07:00  --------------------------------------------------------  IN: 0 mL / OUT: 250 mL / NET: -250 mL          PHYSICAL EXAM:    Constitutional: NAD  HEENT: PERRLA,   Neck: No JVD  Respiratory: CTA B/L  Cardiovascular: S1 and S2  Gastrointestinal: BS+, soft, NT/ND  Extremities: No peripheral edema  Neurological: A/O x 3, no focal deficits  Psychiatric: Normal mood, normal affect  : No Jose  Skin: No rashes  Access: Not applicable  Back: No CVA tenderness    LABS:                RADIOLOGY & ADDITIONAL STUDIES:

## 2021-05-04 NOTE — PROGRESS NOTE ADULT - ASSESSMENT
50M uncontrolled DM2 HbA1c 11.5%, hyperglycemia related to difficulties obtaining adequate insulin during pandemic due to insurance/cost.    1) DM2 with hyperglycemia  Inpatient plan:  BG target 100-180 mg/dl  AM fasting BG's improved and in target range  carb consistent diet  FS premeal and bedtime  Continue Lantus 52 units qhs  Contniue Admelog 16/14/14 before meals - HOLD if not eating   Continue Admelog 5 units sq QHS-pre bedtime snack- hold if doesn't have snack.   Reinforced to patient to communicate with RN when eating his snack in order to receive his insulin.  Continue Admelog moderate scale premeal and moderate bedtime scale  RD consult- appreciated    Discussed with patient regarding discharge planning, he now will have enough Tresiba and Novolog at home to proceed with these insulins due to [facility] program he is now a part of.   DC on Tresiba and Novolog pens doses TBD. Discussed option of mixed insulin in case of future issues obtaining insulin.  He requested changing to [facility] endocrinology. Appointments below:  [address] [place] [address], [address], [phone]  6/8/21 @ 11:00 AM with diabetes educator   7/12/21 @ 11:00 AM with Dr Montesinos     2) Hyponatremia  TSH, Free T4 and 8AM cortisol in acceptable range not indicative of endocrine cause of hyponatremia. Na- 140 today  Resolved    3) Hypertension  BP goal < 130/80, borderline/slightly high  management per primary team    Farzaneh Bennett  Nurse Practitioner  Division of Endocrinology & Diabetes  Pager # [phone]      If after 6PM or before 9AM, or on weekends/holidays, please call endocrine answering service for assistance ([phone]).  For nonurgent matters email [email or web address] for assistance.

## 2021-05-04 NOTE — PROGRESS NOTE ADULT - TIME-BASED
35
45
25
35
35
60
25
35
35
40
25
35
35
25
35
40
45
25
35
55
25
35
35
45
25

## 2021-05-04 NOTE — CONSULT NOTE ADULT - CONSULT REASON
Uncontrolled DM2
RPR 1:1, previously RPR 1:128
plasma exchange
Hyponatremia
eval for rehab
left scapula
weakness
Shiley placement
PLEX

## 2021-05-04 NOTE — PROGRESS NOTE ADULT - SUBJECTIVE AND OBJECTIVE BOX
Chief Complaint: DM2    History: tolerating po  overall FS improved.  Patient reports he does not like the chicken from the hospital.  no hypoglycemia events or symptoms    MEDICATIONS  (STANDING):  albumin human  5% IVPB 3750 milliLiter(s) IV Intermittent every 48 hours  albumin human  5% IVPB 3750 milliLiter(s) IV Intermittent once  albumin human  5% IVPB 3750 milliLiter(s) IV Intermittent once  amLODIPine   Tablet 10 milliGRAM(s) Oral daily  bisacodyl 5 milliGRAM(s) Oral at bedtime  calcium gluconate IVPB 2 Gram(s) IV Intermittent <User Schedule>  calcium gluconate IVPB 2 Gram(s) IV Intermittent once  calcium gluconate IVPB 2 Gram(s) IV Intermittent once  chlorhexidine 2% Cloths 1 Application(s) Topical daily  dextrose 40% Gel 15 Gram(s) Oral once  dextrose 5%. 1000 milliLiter(s) (50 mL/Hr) IV Continuous <Continuous>  dextrose 5%. 1000 milliLiter(s) (100 mL/Hr) IV Continuous <Continuous>  dextrose 50% Injectable 25 Gram(s) IV Push once  dextrose 50% Injectable 12.5 Gram(s) IV Push once  dextrose 50% Injectable 25 Gram(s) IV Push once  enoxaparin Injectable 40 milliGRAM(s) SubCutaneous daily  gabapentin 100 milliGRAM(s) Oral three times a day  glucagon  Injectable 1 milliGRAM(s) IntraMuscular once  hydrALAZINE 25 milliGRAM(s) Oral three times a day  HYDROmorphone  Injectable 1 milliGRAM(s) IV Push once  insulin glargine Injectable (LANTUS) 50 Unit(s) SubCutaneous at bedtime  insulin lispro (ADMELOG) corrective regimen sliding scale   SubCutaneous three times a day before meals  insulin lispro (ADMELOG) corrective regimen sliding scale   SubCutaneous at bedtime  insulin lispro Injectable (ADMELOG) 3 Unit(s) SubCutaneous <User Schedule>  insulin lispro Injectable (ADMELOG) 14 Unit(s) SubCutaneous before lunch  insulin lispro Injectable (ADMELOG) 14 Unit(s) SubCutaneous before dinner  insulin lispro Injectable (ADMELOG) 16 Unit(s) SubCutaneous before breakfast  losartan 100 milliGRAM(s) Oral daily  metoprolol tartrate 12.5 milliGRAM(s) Oral two times a day  mineral oil 30 milliLiter(s) Oral once  senna 2 Tablet(s) Oral two times a day  sodium chloride 0.9%. 1000 milliLiter(s) (75 mL/Hr) IV Continuous <Continuous>    MEDICATIONS  (PRN):  acetaminophen   Tablet .. 650 milliGRAM(s) Oral every 6 hours PRN Temp greater or equal to 38C (100.4F), Mild Pain (1 - 3), Moderate Pain (4 - 6)  acetaminophen   Tablet .. 650 milliGRAM(s) Oral every 6 hours PRN Mild Pain (1 - 3)  aluminum hydroxide/magnesium hydroxide/simethicone Suspension 30 milliLiter(s) Oral every 6 hours PRN Dyspepsia  dicyclomine 10 milliGRAM(s) Oral once PRN abdominal pain  HYDROmorphone  Injectable 1 milliGRAM(s) IV Push every 8 hours PRN Severe Pain (7 - 10)  oxycodone    5 mG/acetaminophen 325 mG 1 Tablet(s) Oral every 6 hours PRN Moderate Pain (4 - 6)  sodium chloride 0.9% lock flush 10 milliLiter(s) IV Push every 1 hour PRN Pre/post blood products, medications, blood draw, and to maintain line patency      Allergies    No Known Drug Allergies  shellfish (Urticaria)    Intolerances      Review of Systems:  ALL OTHER SYSTEMS REVIEWED AND NEGATIVE      PHYSICAL EXAM:  Vital Signs Last 24 Hrs  T(C): 36.8 (04 May 2021 13:50), Max: 36.8 (03 May 2021 21:00)  T(F): 98.3 (04 May 2021 13:50), Max: 98.3 (03 May 2021 21:00)  HR: 78 (04 May 2021 13:50) (78 - 98)  BP: 111/62 (04 May 2021 13:50) (111/62 - 713/-)  BP(mean): --  RR: 17 (04 May 2021 13:50) (17 - 17)  SpO2: 100% (04 May 2021 13:50) (97% - 100%)  GENERAL: NAD, well-groomed, well-developed  EYES: No proptosis, no lid lag, anicteric  HEENT:  Atraumatic, Normocephalic, moist mucous membranes  RESPIRATORY: nonlabored respirations, no wheezing  PSYCH: Alert and oriented x 3, normal affect, normal mood      CAPILLARY BLOOD GLUCOSE    POCT Blood Glucose.: 162 mg/dL (04 May 2021 12:05)  POCT Blood Glucose.: 172 mg/dL (04 May 2021 07:27)  POCT Blood Glucose.: 233 mg/dL (03 May 2021 21:06)  POCT Blood Glucose.: 118 mg/dL (03 May 2021 16:45)          A1C with Estimated Average Glucose Result: 11.5 % (04-11-21 @ 07:40)      Thyroid Function Tests:  04-27 @ 07:21 TSH 4.20 FreeT4 1.2 T3 -- Anti TPO -- Anti Thyroglobulin Ab -- TSI --  04-12 @ 06:36 TSH 3.00 FreeT4 1.7 T3 -- Anti TPO -- Anti Thyroglobulin Ab -- TSI --

## 2021-05-04 NOTE — PROGRESS NOTE ADULT - ASSESSMENT
51 yo RH AA male with h/o DM, HTN, hyperlipidemia; pt presented to the ED c/o abdominal cramping, generalized, and diarrhea following use of laxatives as above for constipation. Neurology called for evaluation of LE weakness, unsteady gait. s/p shieley. Na improved.     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   - f/u ganglioside antibodies, Gq1b -->neg   - slow correction of sodium  - aggressive PT/OT  - B12 >2000, copper WNL, Vit E WNL, thiamine WNL, magnesium heavy metals WNL, zinc  -refused IVIG but agreed for PLEX  - telemetry  - check FS, glucose control <180  - GI/DVT ppx  - Counseling on diet, exercise, and medication adherence was done  - Counseling on smoking cessation and alcohol consumption offered when appropriate.  - Pain assessed and judicious use of narcotics when appropriate was discussed.    - Stroke education given when appropriate.  - Importance of fall prevention discussed.   - Differential diagnosis and plan of care discussed with patient and/or family and primary team  - Thank you for allowing me to participate in the care of this patient. Call with questions.   - d/c plan to AR to morenita Gardner MD  Vascular Neurology  Office: [phone]

## 2021-05-04 NOTE — PROGRESS NOTE ADULT - TIME-BASED BILLING (NON-CRITICAL CARE)
Time-based billing (NON-critical care)

## 2021-05-04 NOTE — H&P ADULT - ASSESSMENT
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 Hospital course complicated by uncontrolled diabetes, hyponatremia, ALLIE, . Now admitted to [facility] after for initiation of a multidisciplinary rehab program consisting focused on functional mobility, transfers and ADLs (activities of daily living).        Comprehensive Multidisciplinary Rehab Program:  - Start comprehensive rehab program, 3 hours a day, 5 days a week.  - PT 1hr/day: Focused on improving strength, endurance, coordination, balance, functional mobility, and transfers  - OT 1hr/day: Focused on improving strength, fine motor skills, coordination, posture and ADLs.    - Speech Therapy 1hr/day: to diagnose and treat deficits in swallowing, cognition and communication.   - P&O as needed  - Activity Limitations: Decreased social, vocational and leisure activities, decreased self care and ADLs, decreased mobility, decreased ability to manage household and finances.     Participation Restrictions/Precautions:  - Weight bearing status: ****  - ROM restrictions: ***  - Precautions:    [ ] Falls   [ ] Spinal   [ ] Hip (Anterior or Posterior)       [ ] Seizure  [ ] Cardiac   [ ] Sternal    Rehab Diagnosis/Management:  Mood/Cognition:  - Neuropsychology consult  - [ ] Enhanced Supervision  [ ] Constant Observation    Sleep:   - Maintain quiet hours and low stim environment.  - Melatonin PRN to maximize participation in therapy during the day.   - Monitor sleep logs    Pain Management:  - Tylenol PRN  - Avoid sedating medications that may interfere with cognitive recovery    GI/Bowel:  - At risk for constipation due to neurologic diagnosis, immobility and/or medication use  - Senna QHS, Miralax PRN Daily  - GI ppx:    /Bladder:   - At risk for incontinence and retention due to neurologic diagnosis and limited mobility  - Currently patient voids:    [ ] independent     [ ] external collection device (condom cath)    [ ] Indwelling lucas catheter     [ ] Intermittent catheterization  - Continue catheter/bladder nursing protocol with bladder scans Q**** hours with straight cath for >***cc.  - Encourage timed voids every 4 hours while awake for independence and to promote continence during therapy.    Skin/Pressure Injury:   - At risk for pressure injury due to neurologic diagnosis and relative immobility.  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 Rae)  -CT abd and pelvis on 4/12 without acute process however GGO seen in the lungs   -Diet advanced   -Bentyl had been ordered once for abdominal pain   -Avoid constipation     Hyponatremia/ALLIE  -followed by nephro at [facility]  -likely multifactorial including hyperglycemia ( pseudohyponatremia ) and pre renal 2/2 to diarrhea (? ADH induced by pain)   -s/p fluids   -free water restriction <1.2L/day.      DM type 2: uncontrolled   -A1C 11.5%  -c/w Lantus 52 units qhs, Admelog 16/14/14 and SSI   -c/w Admelog 5 units at bedtime only if taking a snack   -Diabetes specialist consult   -Per notes from endocrine service "Discussed with patient regarding discharge planning, he now will have enough Tresiba and Novolog at home to proceed with these insulins due to [facility] program he is now a part of.   DC on Tresiba and Novolog pens doses TBD. Discussed option of mixed insulin in case of future issues obtaining insulin.  He requested changing to [facility] endocrinology. "    + RPR  -ID consulted at [facility]- treated 2017 (PCN)  -RPR titer 1:1 - no s/s of active syphilis  -Per ID, no need for treatment at present time     HTN   -C/w norvasc, losartan, hydralazine, metoprolol tartrate        Hematuria / proteinuria   - labs and urine ordered for vasculitis workup  -Per nephrology, will need kidney biopsy likely as outpt         ---------------  Code Status/Emergency Contact:    Outpatient Follow-up (Specialty/Name of physician):    [facility]. Appointments below:  [address] [facility], [address], [phone]  6/8/21 @ 11:00 AM with diabetes educator   7/12/21 @ 11:00 AM with Dr Montesinos       GI: Sr Rae   --------------  Goals: Safe discharge to home   Estimated Length of Stay: 10-14 days  Rehab Potential: Good  Medical Prognosis: Good  Estimated Disposition: Home with Home Care  ---------------    PRESCREEN COMPARISON:  I have reviewed the prescreen information and I have found no relevant changes between the preadmission screening and my post admission evaluation.    RATIONALE FOR INPATIENT ADMISSION: Patient demonstrates the following:  [X] Medically appropriate for rehabilitation admission  [X] Has attainable rehab goals with an appropriate initial discharge plan  [X]Has rehabilitation potential (expected to make a significant improvement within a reasonable period of time)  [X] Requires close medical management by a rehab physician, rehab nursing care, Hospitalist and comprehensive interdisciplinary team (including PT, OT and/or SLP, Prosthetics and Orthotics)

## 2021-05-04 NOTE — PROGRESS NOTE ADULT - ATTENDING COMMENTS
renal function stable  hyponatremia stable   monitor bmp
agree with note and plan above. patient reports pain controlled with PO pain medication.  recommend acute rehab once pain controlled off IV medication for 24 hours.
as above
as above
renal function improved  sodium improved
stable renal function
stable renal function
4/18 NEUROLOGY ATTENDING    49 yo RH AA male with h/o DM, HTN, hyperlipidemia; pt presented to the ED c/o abdominal cramping, generalized, and diarrhea following use of laxatives as above for constipation. Neurology called for evaluation of LE weakness, unsteady gait.    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    patient refusing IVIG as his [employer] doesn't allow blood products.    plan for LP tomorrow, please send for above studies  alternative to IVIG is PLEX but patient doesn't want treatment until LP obtained.  plan for LP tomorrow followed by central line and PLEX for 3-5 sessions if agreeable  family also considering transfer to [facility] closer to there home. explained that it would potentially delay treatment if he is transferred   will follow  Geoffrey Gardner MD  Vascular Neurology  Office: [phone]
ALLIE now  IVF and work up as above
stable renal function and electrolytes
if sodium worsens, start salt tab
improved renal function and sodium   monitor
improving serum na
patient with GBS, started plasmapharesis yesterday  reports back pain controlled  patient reports strength unchanged since yesterday but states he is unable to demonstrate LE strength due to positioning.    recommned acute inpatient rehab when medically cleared
renal function stable  hyponatremia stable   monitor bmp
renal function stable  hyponatremia stable   monitor bmp
fluid restriction   salt tab if worsening sodium  check urine na, urine osmo
improving renal function   sodium stable

## 2021-05-04 NOTE — PROGRESS NOTE ADULT - SUBJECTIVE AND OBJECTIVE BOX
Neurology Progress Note    S: Patient seen and examined. completed plex for 5 sessions. d/c planning to [facility]     Medication:  MEDICATIONS  (STANDING):  albumin human  5% IVPB 3750 milliLiter(s) IV Intermittent every 48 hours  albumin human  5% IVPB 3750 milliLiter(s) IV Intermittent once  albumin human  5% IVPB 3750 milliLiter(s) IV Intermittent once  amLODIPine   Tablet 10 milliGRAM(s) Oral daily  bisacodyl 5 milliGRAM(s) Oral at bedtime  calcium gluconate IVPB 2 Gram(s) IV Intermittent <User Schedule>  calcium gluconate IVPB 2 Gram(s) IV Intermittent once  calcium gluconate IVPB 2 Gram(s) IV Intermittent once  chlorhexidine 2% Cloths 1 Application(s) Topical daily  dextrose 40% Gel 15 Gram(s) Oral once  dextrose 5%. 1000 milliLiter(s) (50 mL/Hr) IV Continuous <Continuous>  dextrose 5%. 1000 milliLiter(s) (100 mL/Hr) IV Continuous <Continuous>  dextrose 50% Injectable 25 Gram(s) IV Push once  dextrose 50% Injectable 12.5 Gram(s) IV Push once  dextrose 50% Injectable 25 Gram(s) IV Push once  enoxaparin Injectable 40 milliGRAM(s) SubCutaneous daily  gabapentin 300 milliGRAM(s) Oral three times a day  glucagon  Injectable 1 milliGRAM(s) IntraMuscular once  hydrALAZINE 25 milliGRAM(s) Oral three times a day  insulin glargine Injectable (LANTUS) 52 Unit(s) SubCutaneous at bedtime  insulin lispro (ADMELOG) corrective regimen sliding scale   SubCutaneous three times a day before meals  insulin lispro (ADMELOG) corrective regimen sliding scale   SubCutaneous at bedtime  insulin lispro Injectable (ADMELOG) 5 Unit(s) SubCutaneous <User Schedule>  insulin lispro Injectable (ADMELOG) 14 Unit(s) SubCutaneous before lunch  insulin lispro Injectable (ADMELOG) 14 Unit(s) SubCutaneous before dinner  insulin lispro Injectable (ADMELOG) 16 Unit(s) SubCutaneous before breakfast  lidocaine   Patch 1 Patch Transdermal daily  losartan 100 milliGRAM(s) Oral daily  metoprolol tartrate 12.5 milliGRAM(s) Oral two times a day  mineral oil 30 milliLiter(s) Oral once  senna 2 Tablet(s) Oral two times a day  sodium chloride 0.9%. 1000 milliLiter(s) (75 mL/Hr) IV Continuous <Continuous>    MEDICATIONS  (PRN):  acetaminophen   Tablet .. 650 milliGRAM(s) Oral every 6 hours PRN Temp greater or equal to 38C (100.4F), Mild Pain (1 - 3), Moderate Pain (4 - 6)  acetaminophen   Tablet .. 650 milliGRAM(s) Oral every 6 hours PRN Mild Pain (1 - 3)  aluminum hydroxide/magnesium hydroxide/simethicone Suspension 30 milliLiter(s) Oral every 6 hours PRN Dyspepsia  dicyclomine 10 milliGRAM(s) Oral once PRN abdominal pain  HYDROmorphone   Tablet 2 milliGRAM(s) Oral every 6 hours PRN Severe Pain (7 - 10)  sodium chloride 0.9% lock flush 10 milliLiter(s) IV Push every 1 hour PRN Pre/post blood products, medications, blood draw, and to maintain line patency    Vitals:  Vital Signs Last 24 Hrs  T(C): 36.6 (04 May 2021 18:08), Max: 36.8 (03 May 2021 21:00)  T(F): 97.8 (04 May 2021 18:08), Max: 98.3 (03 May 2021 21:00)  HR: 85 (04 May 2021 18:08) (78 - 98)  BP: 107/57 (04 May 2021 18:08) (107/57 - 713/-)  BP(mean): --  RR: 18 (04 May 2021 18:08) (17 - 18)  SpO2: 100% (04 May 2021 18:08) (97% - 100%)    General Exam:   General Appearance: Appropriately dressed and in no acute distress       Head: Normocephalic, atraumatic and no dysmorphic features  Ear, Nose, and Throat: Moist mucous membranes  CVS: S1S2+  Resp: No SOB, no wheeze or rhonchi  Abd: soft NTND  Extremities: No edema, no cyanosis  Skin: No bruises, no rashes     Neurological Exam:  Mental Status: Awake, alert and oriented x 3.  Able to follow simple and complex verbal commands. Able to name and repeat. fluent speech. No obvious aphasia or dysarthria noted.   Cranial Nerves: PERRL, EOMI, VFFC, sensation V1-V3 intact,  no obvious facial asymmetry , equal elevation of palate, scm/trap 5/5, tongue is midline on protrusion. no obvious papilledema on fundoscopic exam. Hearing is grossly intact.   Motor: Normal bulk, tone and strength throughout B/L UE Fine finger movements were intact and symmetric. no tremors B/L LE 3-4-/5  Sensation: Intact to light touch and pinprick throughout. no right/left confusion. no extinction to tactile on DSS. Romberg was negative.   Reflexes: 0 throughout at biceps, brachioradialis, triceps, patellars and ankles bilaterally and equal. only hyas LUE biceps 1+ No clonus. R toe and L toe were both downgoing.  Coordination: No dysmetria on FNF   Gait: unable    I personally reviewed the below data/images/labs:  NO NEW LABS   < from: CT Head No Cont (04.02.21 @ 21:48) >    EXAM:  CT BRAIN        PROCEDURE DATE:  Apr 2 2021         INTERPRETATION:  HISTORY: Hypertension    Technique: CT of the head was performed without contrast.    Multiple contiguous axial images were acquired from the skullbase to the vertex without the administration of intravenous contrast.  Coronal and sagittal reformations were made.    COMPARISON: None.    FINDINGS:  The ventricles and sulci are within normal limits given the patient's age. No acute hemorrhage, mass effect, midline shift,hydrocephalus, or extra-axial fluid collections. Mild patchy hypoattenuation within the white matter, likely secondary chronic microscopy changes.    The calvarium is intact.    Small cyst or polyp in the right maxillary sinus. There are opacification of a few ethmoid air cells bilaterally as well as mild mucosal thickening of the left frontal sinus. The mastoid air cells are clear.    IMPRESSION:  No acute hemorrhage, mass effect, or midline shift.            DARRELL MIX MD; Resident Radiology  This document has been electronically signed.  PORTILLO HARPER MD; Attending Radiologist  This document has been electronically signed. Apr 2 2021 11:34PM    < end of copied text >  < from: MR Lumbar Spine w/wo IV Cont (04.16.21 @ 22:16) >    EXAM:  MR SPINE LUMBAR WAW IC      EXAM:  MR SPINE THORACIC WAW IC        PROCEDURE DATE:  Apr 16 2021         INTERPRETATION:  CLINICAL INDICATION: Bilateral leg weakness of unknown etiology.    Technique: MRI of the thoracic and lumbar spine was performed with and without contrast. A total 10 cc of Gadavist were administered intravenously.    COMPARISON: None.    FINDINGS:    THORACIC:    There is normal thoracic kyphosis. There are multiple small vertebral body endplate Schmorl's nodes. Otherwise, the vertebrae are normal in signal, height, and alignment. There is anterior endplate osteophytosis most pronounced from the most pronounced from T6-T10.    There are tiny disc bulges at T7-T8 and T8-T9, without significant central spinal canalstenosis or neuroforaminal narrowing.    The spinal cord is normal in course, caliber, and signal.    There is no abnormal enhancement within thoracic spine.    LUMBAR:    There is normal lumbar lordosis. There are mild-to-moderate type I degenerative endplate changes on the left at L4-L5. Otherwise, the vertebral bodies are normal in height and signal without fracture or dislocation.    There is disc space narrowing and desiccation at L4-L5 and L5-S1.    Evaluation of individual levels demonstrates:    L5-S1: Small posterior disc bulge, as well as mild right greater than left facet hypertrophy, resulting in moderate right and mild left neural foraminal stenosis. No significant spinal canal stenosis.    L4-L5: Small posterior disc bulge and superimposed small right central disc protrusion, including tiny annular fissure, resulting in mild spinal canal stenosis, as well as bilateral facet hypertrophy with moderate right and severe left neural foraminal stenosis.    L3-L4: Small posterior disc bulge, along with bilateral facet hypertrophy, resulting in mild spinal canal stenosis as well as mild bilateral neural foraminal stenosis    L2-L3: No significant disc herniation, central spinal canal stenosis, or neural foraminal narrowing. Mildbilateral facet hypertrophy.    L1-L2: Tiny posterior disc bulge without significant central spinal canal stenosis or neuroforaminal narrowing. Mild bilateral facet hypertrophy.    The conus is normal in position and morphology at the L1 level. Thereis smooth thickening and abnormal enhancement of the cauda equina and ventral nerve roots, a nonspecific finding.    There is no definite fluid collection or mass lesion within the visualized retroperitoneal or posterior paraspinal soft tissues.    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 Alternative etiologies include chronic inflammatory demyelinating polyneuropathy, Lyme disease, less likely arachnoiditis or carcinomatosis.    Results were discussed with ADELSO Elkins by Dr. Haji at 9:40 AM on 4/17/2021 with read back followed.              ROSIE HAJI MD; Attending Radiologist  This document has been electronically signed. Apr 17 2021  9:48AM    < end of copied text >  < from: MR Cervical Spine w/wo IV Cont (04.16.21 @ 22:16) >    EXAM:  MR SPINE CERVICAL WAW IC        PROCEDURE DATE:  Apr 16 2021         INTERPRETATION:  CLINICAL INDICATION: Patient with new weakness in lower extremities.    TECHNIQUE: MRI of the cervical spine was performed before and after intravenous contrast. A total 10 cc of Gadavist were administered.    COMPARISON: None.    FINDINGS:  Structures at the craniocervical and cervicomedullary junction are intact.    There is nonspecific straightening of the normal cervical lordosis. There is grade 1 degenerative retrolisthesis of C5 on C6. There is disc space narrowing at C3-C4 and C5-C6. The remaining intervertebral disc space heights are grossly preserved. There is anterior endplate osteophytosis from C3 to C6.    There are mild Modic type II degenerative endplate changes involving C3 CT 4 and C5-C6, along with mild endplate irregularity. Otherwise, the vertebrae demonstrate normal height and signal without fracture, dislocation or marrow edema.    The spinal cord demonstrates normal course and caliber without intrinsic cord signal abnormality.    The prevertebral and paravertebral soft tissues are within normal limits.    There is no abnormal enhancement.    There is diffuse disc desiccation. Evaluation of individual levels demonstrates:    C2-C3: No significant disc herniation, central spinal canal stenosis, although neural foraminal narrowing.    C3-C4: Small posterior disc osteophyte complexes, partially effacing the ventral thecal sac and resulting in mild spinal canal stenosis,as well as moderate right and mild left neural foraminal narrowing.    C4-C5: Tiny posterior disc osteophyte complex minimally effacing the ventral thecal sac, without significant spinal canal stenosis, as well as moderate left and mild right neural foraminal narrowing.    C5-C6: Tiny posterior disc osteophyte complex minimally effacing the ventral thecal sac, without significant spinal canal stenosis, as well as severe right and moderate left neural foraminal narrowing.    C6-C7: No significant disc herniation, central spinal canal stenosis, or neural foraminal narrowing.    C7-T1: No significant disc herniation, central spinal canal stenosis, or neural foraminal narrowing.    There is no soft tissue neck mass or fluid collection.    IMPRESSION:  No evidence for spinal cord compression, signal abnormality, or abnormal enhancement.    Multilevel degenerative changes, as described above, most pronounced at C3-C4 where there is mild spinal canal stenosis and C5-C6 where there is severe right and moderate left neural foraminal narrowing.              ROSIE HAJI MD; Attending Radiologist  This document has been electronically signed. Apr 17 2021  8:54AM    < end of copied text >

## 2021-05-04 NOTE — PROGRESS NOTE ADULT - PROBLEM SELECTOR PROBLEM 1
Type 2 diabetes mellitus with hyperglycemia, with long-term current use of insulin

## 2021-05-04 NOTE — PROGRESS NOTE ADULT - SUBJECTIVE AND OBJECTIVE BOX
49 yo male, PMH DM, HTN, hyperlipidemia; pt presented to the ED c/o abdominal cramping, generalized, and diarrhea following use of laxatives as above for constipation.  In the ED, WBC 10.82, Hb 13.6, CMP: sodium 126, chloride 89, glucose 281, CMP otherwise unremarkable.  VBG: Lactate 2.6 (later downtrended to 1.6).  Pt. was treated with IV hydration and dispo'd to CDU for continued care plan:  IV hydration, supportive care, AM labs, general observation care / monitoring. pt continued to have generalized weakness , numbness and on labs persistent hyponatremia and now being admitted for further evaluation .    Overnight, pt with acute flare up of left scapula pain. Attributes it ot increasing effort during PT. IV was placed and IV dilaudid was given. Today, pain has improved from 9-10 to 6. Pain is worse when sitting up and with left arm movement.    FUNCTIONAL PROGRESS  5/3  Bed Mobility  Bed Mobility Training Rehab Potential: good, to achieve stated therapy goals  Bed Mobility Training Symptoms Noted During/After Treatment: none  Bed Mobility Training Rolling/Turning: moderate assist (50% patient effort);  1 person assist;  verbal cues;  nonverbal cues (demo/gestures);  bed rails  Bed Mobility Training Scooting: supervsion;  supervision;  verbal cues;  nonverbal cues (demo/gestures)  Bed Mobility Training Sit-to-Supine: moderate assist (50% patient effort);  1 person assist;  nonverbal cues (demo/gestures);  verbal cues;  bed rails  Bed Mobility Training Supine-to-Sit: moderate assist (50% patient effort);  1 person assist;  nonverbal cues (demo/gestures);  verbal cues;  bed rails  Bed Mobility Training Limitations: impaired ability to control trunk for mobility;  decreased ability to use legs for bridging/pushing;  impaired balance;  impaired motor control;  impaired postural control;  decreased strength    Sit-Stand Transfer Training  Sit-to-Stand Transfer Training Rehab Potential: good, to achieve stated therapy goals  Sit-to-Stand Transfer Training Symptoms Noted During/After Treatment: none  Transfer Training Sit-to-Stand Transfer: maximum assist (25% patient effort);  2 person assist  Transfer Training Stand-to-Sit Transfer: maximum assist (25% patient effort);  2 person assist  Sit-to-Stand Transfer Training Transfer Safety Analysis: decreased weight-shifting ability;  decreased balance;  impaired balance;  decreased strength;  impaired motor control;  impaired postural control;  decreased ROM    Therapeutic Exercise  Therapeutic Exercise Rehab Effort: good  Therapeutic Exercise Symptoms Noted During/After Treatment: none  Therapeutic Exercise Detail: seated knee extensions x 10reps seated hip flexion x 10 reps trunk leaning forward x 10 repsanterior weightshifting onto legs x 10 repstrunk extension focusing on abdominal contraction with bilateral UE support 2 x 10 reps         REVIEW OF SYSTEMS  Constitutional - No fever,  No fatigue  HEENT - No vertigo, No neck pain  Neurological - No headaches, No memory loss, + loss of strength, + numbness, No tremors  Skin - No rashes, No lesions   Musculoskeletal - +left shoulder pain, No joint swelling, No muscle pain  Psychiatric - No depression, No anxiety    VITALS  T(C): 36.8 (05-04-21 @ 09:07), Max: 36.8 (05-03-21 @ 21:00)  HR: 85 (05-04-21 @ 09:07) (84 - 98)  BP: 121/73 (05-04-21 @ 09:07) (112/62 - 713/-)  RR: 17 (05-04-21 @ 09:07) (17 - 18)  SpO2: 97% (05-04-21 @ 09:07) (97% - 100%)  Wt(kg): --    MEDICATIONS   acetaminophen   Tablet .. 650 milliGRAM(s) every 6 hours PRN  acetaminophen   Tablet .. 650 milliGRAM(s) every 6 hours PRN  albumin human  5% IVPB 3750 milliLiter(s) every 48 hours  albumin human  5% IVPB 3750 milliLiter(s) once  albumin human  5% IVPB 3750 milliLiter(s) once  aluminum hydroxide/magnesium hydroxide/simethicone Suspension 30 milliLiter(s) every 6 hours PRN  amLODIPine   Tablet 10 milliGRAM(s) daily  bisacodyl 5 milliGRAM(s) at bedtime  calcium gluconate IVPB 2 Gram(s) <User Schedule>  calcium gluconate IVPB 2 Gram(s) once  calcium gluconate IVPB 2 Gram(s) once  chlorhexidine 2% Cloths 1 Application(s) daily  dextrose 40% Gel 15 Gram(s) once  dextrose 5%. 1000 milliLiter(s) <Continuous>  dextrose 5%. 1000 milliLiter(s) <Continuous>  dextrose 50% Injectable 25 Gram(s) once  dextrose 50% Injectable 12.5 Gram(s) once  dextrose 50% Injectable 25 Gram(s) once  dicyclomine 10 milliGRAM(s) once PRN  enoxaparin Injectable 40 milliGRAM(s) daily  gabapentin 100 milliGRAM(s) three times a day  glucagon  Injectable 1 milliGRAM(s) once  hydrALAZINE 25 milliGRAM(s) three times a day  HYDROmorphone   Tablet 1 milliGRAM(s) every 8 hours PRN  insulin glargine Injectable (LANTUS) 52 Unit(s) at bedtime  insulin lispro (ADMELOG) corrective regimen sliding scale   three times a day before meals  insulin lispro (ADMELOG) corrective regimen sliding scale   at bedtime  insulin lispro Injectable (ADMELOG) 5 Unit(s) <User Schedule>  insulin lispro Injectable (ADMELOG) 16 Unit(s) before breakfast  insulin lispro Injectable (ADMELOG) 14 Unit(s) before lunch  insulin lispro Injectable (ADMELOG) 14 Unit(s) before dinner  losartan 100 milliGRAM(s) daily  metoprolol tartrate 12.5 milliGRAM(s) two times a day  mineral oil 30 milliLiter(s) once  oxyCODONE    IR 5 milliGRAM(s) every 6 hours PRN  senna 2 Tablet(s) two times a day  sodium chloride 0.9% lock flush 10 milliLiter(s) every 1 hour PRN  sodium chloride 0.9%. 1000 milliLiter(s) <Continuous>      RECENT LABS - Reviewed          ----------------------------------------------------------------------------------------   PHYSICAL EXAM  Constitutional - NAD, Comfortable  HEENT -  EOMI     Chest - no respiratory distress  Cardiovascular - RRR, S1S2   Abdomen - Soft, NTND  Extremities - No C/C/E, No calf tenderness   Neurologic Exam -                    Cognitive - Awake, Alert, AAO to self, place, date, year, situation     Communication - Fluent, No dysarthria     Cranial Nerves - CN 2-12 intact     Motor -  Mild pain at left scapula with shoulder movement                    LEFT    UE - ShAB 4+/5, EF 4+/5, EE 4+/5, WE4+/5,  4+/5                    RIGHT UE - ShAB 4+/5, EF 4+/5, EE 4+/5, WE4/5,  4/5                    LEFT    LE - HF 3+/5, KE 3/5, DF 4/5, PF 4/5                    RIGHT LE - HF 3/5, KE 3/5, DF 4/5, PF 4/5        Sensory -impaired distal fingertips, toes     OculoVestibular - No saccades, No nystagmus, VOR         Balance - WNL Static  Psychiatric - Mood stable, Affect WNL  ----------------------------------------------------------------------------------------  ASSESSMENT/PLAN  49 yo m p/w GI symptoms, now with weakness, 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  s/p plasmapheresis- catheter removed  turn and position q2 to prevent skin breakdown  Pain -tylenol prn, on gabapentin, dilaudid 1mg po q 8 prn with bowel regimen, was given IV dilaudid overnight, try to avoid IV pain meds if possible  dvt ppx: lovenox  continue bedside PT and OT for bed mobility, transfers, ambulation with AD, adls  OOB to chair when sitting balance allows  Rehab -   Recommend ACUTE inpatient rehabilitation for the functional deficits consisting of 3 hours of therapy/day & 24 hour RN/daily PMR physician for comorbid medical management. Will continue to follow for ongoing rehab needs and recommendations.                51 yo male, PMH DM, HTN, hyperlipidemia; pt presented to the ED c/o abdominal cramping, generalized, and diarrhea following use of laxatives as above for constipation.  In the ED, WBC 10.82, Hb 13.6, CMP: sodium 126, chloride 89, glucose 281, CMP otherwise unremarkable.  VBG: Lactate 2.6 (later downtrended to 1.6).  Pt. was treated with IV hydration and dispo'd to CDU for continued care plan:  IV hydration, supportive care, AM labs, general observation care / monitoring. pt continued to have generalized weakness , numbness and on labs persistent hyponatremia and now being admitted for further evaluation .    Overnight, pt with acute flare up of left scapula pain. Attributes it ot increasing effort during PT.   IV dilaudid was given. Today, pain has improved from 9-10 to 6. Pain is worse when sitting up and with left arm movement.    FUNCTIONAL PROGRESS  5/3  Bed Mobility  Bed Mobility Training Rehab Potential: good, to achieve stated therapy goals  Bed Mobility Training Symptoms Noted During/After Treatment: none  Bed Mobility Training Rolling/Turning: moderate assist (50% patient effort);  1 person assist;  verbal cues;  nonverbal cues (demo/gestures);  bed rails  Bed Mobility Training Scooting: supervsion;  supervision;  verbal cues;  nonverbal cues (demo/gestures)  Bed Mobility Training Sit-to-Supine: moderate assist (50% patient effort);  1 person assist;  nonverbal cues (demo/gestures);  verbal cues;  bed rails  Bed Mobility Training Supine-to-Sit: moderate assist (50% patient effort);  1 person assist;  nonverbal cues (demo/gestures);  verbal cues;  bed rails  Bed Mobility Training Limitations: impaired ability to control trunk for mobility;  decreased ability to use legs for bridging/pushing;  impaired balance;  impaired motor control;  impaired postural control;  decreased strength    Sit-Stand Transfer Training  Sit-to-Stand Transfer Training Rehab Potential: good, to achieve stated therapy goals  Sit-to-Stand Transfer Training Symptoms Noted During/After Treatment: none  Transfer Training Sit-to-Stand Transfer: maximum assist (25% patient effort);  2 person assist  Transfer Training Stand-to-Sit Transfer: maximum assist (25% patient effort);  2 person assist  Sit-to-Stand Transfer Training Transfer Safety Analysis: decreased weight-shifting ability;  decreased balance;  impaired balance;  decreased strength;  impaired motor control;  impaired postural control;  decreased ROM    Therapeutic Exercise  Therapeutic Exercise Rehab Effort: good  Therapeutic Exercise Symptoms Noted During/After Treatment: none  Therapeutic Exercise Detail: seated knee extensions x 10reps seated hip flexion x 10 reps trunk leaning forward x 10 repsanterior weightshifting onto legs x 10 repstrunk extension focusing on abdominal contraction with bilateral UE support 2 x 10 reps         REVIEW OF SYSTEMS  Constitutional - No fever,  No fatigue  HEENT - No vertigo, No neck pain  Neurological - No headaches, No memory loss, + loss of strength, + numbness, No tremors  Skin - No rashes, No lesions   Musculoskeletal - +left shoulder pain, No joint swelling, No muscle pain  Psychiatric - No depression, No anxiety    VITALS  T(C): 36.8 (05-04-21 @ 09:07), Max: 36.8 (05-03-21 @ 21:00)  HR: 85 (05-04-21 @ 09:07) (84 - 98)  BP: 121/73 (05-04-21 @ 09:07) (112/62 - 713/-)  RR: 17 (05-04-21 @ 09:07) (17 - 18)  SpO2: 97% (05-04-21 @ 09:07) (97% - 100%)  Wt(kg): --    MEDICATIONS   acetaminophen   Tablet .. 650 milliGRAM(s) every 6 hours PRN  acetaminophen   Tablet .. 650 milliGRAM(s) every 6 hours PRN  albumin human  5% IVPB 3750 milliLiter(s) every 48 hours  albumin human  5% IVPB 3750 milliLiter(s) once  albumin human  5% IVPB 3750 milliLiter(s) once  aluminum hydroxide/magnesium hydroxide/simethicone Suspension 30 milliLiter(s) every 6 hours PRN  amLODIPine   Tablet 10 milliGRAM(s) daily  bisacodyl 5 milliGRAM(s) at bedtime  calcium gluconate IVPB 2 Gram(s) <User Schedule>  calcium gluconate IVPB 2 Gram(s) once  calcium gluconate IVPB 2 Gram(s) once  chlorhexidine 2% Cloths 1 Application(s) daily  dextrose 40% Gel 15 Gram(s) once  dextrose 5%. 1000 milliLiter(s) <Continuous>  dextrose 5%. 1000 milliLiter(s) <Continuous>  dextrose 50% Injectable 25 Gram(s) once  dextrose 50% Injectable 12.5 Gram(s) once  dextrose 50% Injectable 25 Gram(s) once  dicyclomine 10 milliGRAM(s) once PRN  enoxaparin Injectable 40 milliGRAM(s) daily  gabapentin 100 milliGRAM(s) three times a day  glucagon  Injectable 1 milliGRAM(s) once  hydrALAZINE 25 milliGRAM(s) three times a day  HYDROmorphone   Tablet 1 milliGRAM(s) every 8 hours PRN  insulin glargine Injectable (LANTUS) 52 Unit(s) at bedtime  insulin lispro (ADMELOG) corrective regimen sliding scale   three times a day before meals  insulin lispro (ADMELOG) corrective regimen sliding scale   at bedtime  insulin lispro Injectable (ADMELOG) 5 Unit(s) <User Schedule>  insulin lispro Injectable (ADMELOG) 16 Unit(s) before breakfast  insulin lispro Injectable (ADMELOG) 14 Unit(s) before lunch  insulin lispro Injectable (ADMELOG) 14 Unit(s) before dinner  losartan 100 milliGRAM(s) daily  metoprolol tartrate 12.5 milliGRAM(s) two times a day  mineral oil 30 milliLiter(s) once  oxyCODONE    IR 5 milliGRAM(s) every 6 hours PRN  senna 2 Tablet(s) two times a day  sodium chloride 0.9% lock flush 10 milliLiter(s) every 1 hour PRN  sodium chloride 0.9%. 1000 milliLiter(s) <Continuous>      RECENT LABS - Reviewed          ----------------------------------------------------------------------------------------   PHYSICAL EXAM  Constitutional - NAD, Comfortable  HEENT -  EOMI     Chest - no respiratory distress  Cardiovascular - RRR, S1S2   Abdomen - Soft, NTND  Extremities - No C/C/E, No calf tenderness   Neurologic Exam -                    Cognitive - Awake, Alert, AAO to self, place, date, year, situation     Communication - Fluent, No dysarthria     Cranial Nerves - CN 2-12 intact     Motor -  Mild pain at left scapula with shoulder movement                    LEFT    UE - ShAB 4+/5, EF 4+/5, EE 4+/5, WE4+/5,  4+/5                    RIGHT UE - ShAB 4+/5, EF 4+/5, EE 4+/5, WE4/5,  4/5                    LEFT    LE - HF 3+/5, KE 3/5, DF 4/5, PF 4/5                    RIGHT LE - HF 3/5, KE 3/5, DF 4/5, PF 4/5        Sensory -impaired distal fingertips, toes     OculoVestibular - No saccades, No nystagmus, VOR         Balance - WNL Static  Psychiatric - Mood stable, Affect WNL  ----------------------------------------------------------------------------------------  ASSESSMENT/PLAN  51 yo m p/w GI symptoms, now with weakness, 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  s/p plasmapheresis- catheter removed  turn and position q2 to prevent skin breakdown  Pain -tylenol prn, on gabapentin, dilaudid 1mg po q 8 prn with bowel regimen, was given IV dilaudid overnight   dvt ppx: lovenox  continue bedside PT and OT for bed mobility, transfers, ambulation with AD, adls  OOB to chair when sitting balance allows  Rehab -   Recommend ACUTE inpatient rehabilitation for the functional deficits consisting of 3 hours of therapy/day & 24 hour RN/daily PMR physician for comorbid medical management. Will continue to follow for ongoing rehab needs and recommendations.

## 2021-05-04 NOTE — PROGRESS NOTE ADULT - SUBJECTIVE AND OBJECTIVE BOX
Date of service: 21 @ 23:27      Patient is a 50y old  Male who presents with a chief complaint of consitpaiton, change in bowels (04 May 2021 09:08)                                                               INTERVAL HPI/OVERNIGHT EVENTS:    REVIEW OF SYSTEMS:     CONSTITUTIONAL: No weakness, fevers or chills  EYES/ENT: No visual changes , no ear ache   NECK: No pain or stiffness  RESPIRATORY: No cough, wheezing,  No shortness of breath  CARDIOVASCULAR: No chest pain or palpitations  GASTROINTESTINAL: No abdominal pain  . No nausea, vomiting, or hematemesis; No diarrhea or constipation. No melena or hematochezia.  GENITOURINARY: No dysuria, frequency or hematuria  NEUROLOGICAL: No numbness or weakness  SKIN: No itching, burning, rashes, or lesions                                                                                                                                                                                                                                                                                 Medications:  MEDICATIONS  (STANDING):  albumin human  5% IVPB 3750 milliLiter(s) IV Intermittent every 48 hours  albumin human  5% IVPB 3750 milliLiter(s) IV Intermittent once  albumin human  5% IVPB 3750 milliLiter(s) IV Intermittent once  amLODIPine   Tablet 10 milliGRAM(s) Oral daily  bisacodyl 5 milliGRAM(s) Oral at bedtime  calcium gluconate IVPB 2 Gram(s) IV Intermittent <User Schedule>  calcium gluconate IVPB 2 Gram(s) IV Intermittent once  calcium gluconate IVPB 2 Gram(s) IV Intermittent once  chlorhexidine 2% Cloths 1 Application(s) Topical daily  dextrose 40% Gel 15 Gram(s) Oral once  dextrose 5%. 1000 milliLiter(s) (50 mL/Hr) IV Continuous <Continuous>  dextrose 5%. 1000 milliLiter(s) (100 mL/Hr) IV Continuous <Continuous>  dextrose 50% Injectable 25 Gram(s) IV Push once  dextrose 50% Injectable 12.5 Gram(s) IV Push once  dextrose 50% Injectable 25 Gram(s) IV Push once  enoxaparin Injectable 40 milliGRAM(s) SubCutaneous daily  gabapentin 300 milliGRAM(s) Oral three times a day  glucagon  Injectable 1 milliGRAM(s) IntraMuscular once  hydrALAZINE 25 milliGRAM(s) Oral three times a day  insulin glargine Injectable (LANTUS) 52 Unit(s) SubCutaneous at bedtime  insulin lispro (ADMELOG) corrective regimen sliding scale   SubCutaneous three times a day before meals  insulin lispro (ADMELOG) corrective regimen sliding scale   SubCutaneous at bedtime  insulin lispro Injectable (ADMELOG) 5 Unit(s) SubCutaneous <User Schedule>  insulin lispro Injectable (ADMELOG) 14 Unit(s) SubCutaneous before lunch  insulin lispro Injectable (ADMELOG) 14 Unit(s) SubCutaneous before dinner  insulin lispro Injectable (ADMELOG) 16 Unit(s) SubCutaneous before breakfast  lidocaine   Patch 1 Patch Transdermal daily  losartan 100 milliGRAM(s) Oral daily  metoprolol tartrate 12.5 milliGRAM(s) Oral two times a day  mineral oil 30 milliLiter(s) Oral once  senna 2 Tablet(s) Oral two times a day  sodium chloride 0.9%. 1000 milliLiter(s) (75 mL/Hr) IV Continuous <Continuous>    MEDICATIONS  (PRN):  acetaminophen   Tablet .. 650 milliGRAM(s) Oral every 6 hours PRN Temp greater or equal to 38C (100.4F), Mild Pain (1 - 3), Moderate Pain (4 - 6)  acetaminophen   Tablet .. 650 milliGRAM(s) Oral every 6 hours PRN Mild Pain (1 - 3)  aluminum hydroxide/magnesium hydroxide/simethicone Suspension 30 milliLiter(s) Oral every 6 hours PRN Dyspepsia  dicyclomine 10 milliGRAM(s) Oral once PRN abdominal pain  HYDROmorphone   Tablet 2 milliGRAM(s) Oral every 6 hours PRN Severe Pain (7 - 10)  sodium chloride 0.9% lock flush 10 milliLiter(s) IV Push every 1 hour PRN Pre/post blood products, medications, blood draw, and to maintain line patency       Allergies    No Known Drug Allergies  shellfish (Urticaria)    Intolerances      Vital Signs Last 24 Hrs  T(C): 36.9 (04 May 2021 20:57), Max: 36.9 (04 May 2021 20:57)  T(F): 98.5 (04 May 2021 20:57), Max: 98.5 (04 May 2021 20:57)  HR: 92 (04 May 2021 20:57) (78 - 98)  BP: 137/75 (04 May 2021 20:57) (107/57 - 137/75)  BP(mean): --  RR: 18 (04 May 2021 20:57) (17 - 18)  SpO2: 98% (04 May 2021 20:57) (97% - 100%)  CAPILLARY BLOOD GLUCOSE      POCT Blood Glucose.: 111 mg/dL (04 May 2021 21:35)  POCT Blood Glucose.: 136 mg/dL (04 May 2021 16:40)  POCT Blood Glucose.: 162 mg/dL (04 May 2021 12:05)  POCT Blood Glucose.: 172 mg/dL (04 May 2021 07:27)      05-03 @ 07:01  -  05-04 @ 07:00  --------------------------------------------------------  IN: 0 mL / OUT: 250 mL / NET: -250 mL      Physical Exam:    Daily     Daily Weight in k.1 (04 May 2021 01:35)  General:  Well appearing, NAD, not cachetic  HEENT:  Nonicteric, PERRLA  CV:  RRR, S1S2   Lungs:  CTA B/L, no wheezes, rales, rhonchi  Abdomen:  Soft, non-tender, no distended, positive BS  Extremities:  2+ pulses, no c/c, no edema  Skin:  Warm and dry, no rashes  :  No lucas  Neuro:  AAOx3, non-focal, grossly intact                                                                                                                                                                                                                                                                                                LABS:                                                     RADIOLOGY & ADDITIONAL TESTS         I personally reviewed: [  ]EKG   [  ]CXR    [  ] CT      A/P:         Discussed with :     Francisco consultants' Notes   Time spent :

## 2021-05-04 NOTE — CONSULT NOTE ADULT - PROVIDER SPECIALTY LIST ADULT
Transfusion Medicine
Endocrinology
Intervent Radiology
Rehab Medicine
Infectious Disease
Pain Medicine
Nephrology
Neurology
Transfusion Medicine

## 2021-05-04 NOTE — CONSULT NOTE ADULT - SUBJECTIVE AND OBJECTIVE BOX
Chief Complaint:    HPI:  51 yo male, PMH DM, HTN, hyperlipidemia; pt presented to the ED c/o abdominal cramping, generalized, and diarrhea following use of laxatives as above for constipation.  In the ED, WBC 10.82, Hb 13.6, CMP: sodium 126, chloride 89, glucose 281, CMP otherwise unremarkable.  VBG: Lactate 2.6 (later downtrended to 1.6).  Pt. was treated with IV hydration and dispo'd to CDU for continued care plan:  IV hydration, supportive care, AM labs, general observation care / monitoring. pt continued to have generalized weakness , numbness and on labs persistent hyponatermia and now being admitted for furhter evlauation .  dnenies N/V/Abd pain   had bm   no urinary sx   no fever or chills   no cough   no cp /sob    (11 Apr 2021 15:50)      PAST MEDICAL & SURGICAL HISTORY:  DM (diabetes mellitus)    HTN (hypertension)    Hyperlipidemia    H/O total knee replacement    Foot fracture        FAMILY HISTORY:  No pertinent family history in first degree relatives        SOCIAL HISTORY:  [ ] Denies Smoking, Alcohol, or Drug Use    Allergies    No Known Drug Allergies  shellfish (Urticaria)    Intolerances        PAIN MEDICATIONS:  acetaminophen   Tablet .. 650 milliGRAM(s) Oral every 6 hours PRN  acetaminophen   Tablet .. 650 milliGRAM(s) Oral every 6 hours PRN  gabapentin 100 milliGRAM(s) Oral three times a day  HYDROmorphone   Tablet 1 milliGRAM(s) Oral every 8 hours PRN  oxyCODONE    IR 5 milliGRAM(s) Oral every 6 hours PRN    Heme:  enoxaparin Injectable 40 milliGRAM(s) SubCutaneous daily    Antibiotics:    Cardiovascular:  amLODIPine   Tablet 10 milliGRAM(s) Oral daily  hydrALAZINE 25 milliGRAM(s) Oral three times a day  losartan 100 milliGRAM(s) Oral daily  metoprolol tartrate 12.5 milliGRAM(s) Oral two times a day    GI:  aluminum hydroxide/magnesium hydroxide/simethicone Suspension 30 milliLiter(s) Oral every 6 hours PRN  bisacodyl 5 milliGRAM(s) Oral at bedtime  dicyclomine 10 milliGRAM(s) Oral once PRN  mineral oil 30 milliLiter(s) Oral once  senna 2 Tablet(s) Oral two times a day    Endocrine:  dextrose 40% Gel 15 Gram(s) Oral once  dextrose 50% Injectable 25 Gram(s) IV Push once  dextrose 50% Injectable 12.5 Gram(s) IV Push once  dextrose 50% Injectable 25 Gram(s) IV Push once  glucagon  Injectable 1 milliGRAM(s) IntraMuscular once  insulin glargine Injectable (LANTUS) 52 Unit(s) SubCutaneous at bedtime  insulin lispro (ADMELOG) corrective regimen sliding scale   SubCutaneous three times a day before meals  insulin lispro (ADMELOG) corrective regimen sliding scale   SubCutaneous at bedtime  insulin lispro Injectable (ADMELOG) 5 Unit(s) SubCutaneous <User Schedule>  insulin lispro Injectable (ADMELOG) 16 Unit(s) SubCutaneous before breakfast  insulin lispro Injectable (ADMELOG) 14 Unit(s) SubCutaneous before lunch  insulin lispro Injectable (ADMELOG) 14 Unit(s) SubCutaneous before dinner    All Other Medications:  albumin human  5% IVPB 3750 milliLiter(s) IV Intermittent every 48 hours  albumin human  5% IVPB 3750 milliLiter(s) IV Intermittent once  albumin human  5% IVPB 3750 milliLiter(s) IV Intermittent once  calcium gluconate IVPB 2 Gram(s) IV Intermittent <User Schedule>  calcium gluconate IVPB 2 Gram(s) IV Intermittent once  calcium gluconate IVPB 2 Gram(s) IV Intermittent once  chlorhexidine 2% Cloths 1 Application(s) Topical daily  dextrose 5%. 1000 milliLiter(s) IV Continuous <Continuous>  dextrose 5%. 1000 milliLiter(s) IV Continuous <Continuous>  sodium chloride 0.9% lock flush 10 milliLiter(s) IV Push every 1 hour PRN  sodium chloride 0.9%. 1000 milliLiter(s) IV Continuous <Continuous>      Vital Signs Last 24 Hrs  T(C): 36.8 (04 May 2021 13:50), Max: 36.8 (03 May 2021 21:00)  T(F): 98.3 (04 May 2021 13:50), Max: 98.3 (03 May 2021 21:00)  HR: 78 (04 May 2021 13:50) (78 - 98)  BP: 111/62 (04 May 2021 13:50) (111/62 - 713/-)  BP(mean): --  RR: 17 (04 May 2021 13:50) (17 - 17)  SpO2: 100% (04 May 2021 13:50) (97% - 100%)    PAIN SCORE:         SCALE USED: (1-10 VNRS)           LABS:              [X ]  [place]  Reviewed     Summary: Pt. complaining of pain 7/10 in left scapula. Describes pain as sharp and shooting at times and throbbing. Pt. states gabapentin was helping in the beginning but not as much now, also IV dilaudid was helping with the pain as well but was told that he needs to take oral meds which he was agreeable with then last night was given IV dilaudid. Pt. A&Ox3, NAD laying maintains eye contact and answers all questions appropriately       PHYSICAL EXAM:  GENERAL: Alert & Oriented x 3 in NAD, well-groomed, well-developed     Impression/Plan: Requested by medicine ACP to help manage pain.   Recommendations:  -  Consider discontinuing IV dilaudid and oxycodone  -  Consider ordering oral dilaudid 2mg q6hrs PRN for severe pain  -  Consider increasing gabapentin to 300mg q8hrs   -  Consider ordering an NSAID q8hrs standing for 2 days   -  Consider ordering lidoderm patch to affected area   -  Recommend [facility] for complementary and alternative therapies for pain, using a mid-body-spirit-emotion approach.      Will notify Chronic Pain attending on call, Dr. MIGUEL Becker  No further recommendations at this time, Chronic pain service to sign off. May call Chronic Pain Service if needed.   Thank you.

## 2021-05-05 ENCOUNTER — TRANSCRIPTION ENCOUNTER (OUTPATIENT)
Age: 51
End: 2021-05-05

## 2021-05-05 ENCOUNTER — INPATIENT (INPATIENT)
Facility: HOSPITAL | Age: 51
LOS: 15 days | Discharge: HOME | DRG: 949 | End: 2021-05-21
Attending: SPECIALIST | Admitting: SPECIALIST
Payer: COMMERCIAL

## 2021-05-05 VITALS
HEART RATE: 84 BPM | DIASTOLIC BLOOD PRESSURE: 68 MMHG | TEMPERATURE: 98 F | HEIGHT: 73 IN | WEIGHT: 216.71 LBS | OXYGEN SATURATION: 97 % | SYSTOLIC BLOOD PRESSURE: 104 MMHG | RESPIRATION RATE: 17 BRPM

## 2021-05-05 VITALS
OXYGEN SATURATION: 99 % | TEMPERATURE: 98 F | SYSTOLIC BLOOD PRESSURE: 121 MMHG | HEART RATE: 78 BPM | RESPIRATION RATE: 16 BRPM | DIASTOLIC BLOOD PRESSURE: 72 MMHG

## 2021-05-05 DIAGNOSIS — G61.0 GUILLAIN-BARRE SYNDROME: ICD-10-CM

## 2021-05-05 DIAGNOSIS — S92.909A UNSPECIFIED FRACTURE OF UNSPECIFIED FOOT, INITIAL ENCOUNTER FOR CLOSED FRACTURE: Chronic | ICD-10-CM

## 2021-05-05 DIAGNOSIS — Z96.659 PRESENCE OF UNSPECIFIED ARTIFICIAL KNEE JOINT: Chronic | ICD-10-CM

## 2021-05-05 LAB
GLUCOSE BLDC GLUCOMTR-MCNC: 124 MG/DL — HIGH (ref 70–99)
GLUCOSE BLDC GLUCOMTR-MCNC: 143 MG/DL — HIGH (ref 70–99)
GLUCOSE BLDC GLUCOMTR-MCNC: 147 MG/DL — HIGH (ref 70–99)

## 2021-05-05 PROCEDURE — 99232 SBSQ HOSP IP/OBS MODERATE 35: CPT

## 2021-05-05 PROCEDURE — 99223 1ST HOSP IP/OBS HIGH 75: CPT | Mod: GC

## 2021-05-05 RX ORDER — AMLODIPINE BESYLATE 2.5 MG/1
0 TABLET ORAL
Qty: 0 | Refills: 0 | DISCHARGE

## 2021-05-05 RX ORDER — INSULIN DEGLUDEC 100 U/ML
0 INJECTION, SOLUTION SUBCUTANEOUS
Qty: 0 | Refills: 0 | DISCHARGE

## 2021-05-05 RX ORDER — SENNA PLUS 8.6 MG/1
2 TABLET ORAL
Qty: 0 | Refills: 0 | DISCHARGE
Start: 2021-05-05

## 2021-05-05 RX ORDER — HYDROMORPHONE HYDROCHLORIDE 2 MG/ML
2 INJECTION INTRAMUSCULAR; INTRAVENOUS; SUBCUTANEOUS EVERY 8 HOURS
Refills: 0 | Status: DISCONTINUED | OUTPATIENT
Start: 2021-05-05 | End: 2021-05-06

## 2021-05-05 RX ORDER — GABAPENTIN 400 MG/1
300 CAPSULE ORAL ONCE
Refills: 0 | Status: COMPLETED | OUTPATIENT
Start: 2021-05-05 | End: 2021-05-05

## 2021-05-05 RX ORDER — AMLODIPINE BESYLATE 2.5 MG/1
1 TABLET ORAL
Qty: 0 | Refills: 0 | DISCHARGE
Start: 2021-05-05

## 2021-05-05 RX ORDER — ENOXAPARIN SODIUM 100 MG/ML
40 INJECTION SUBCUTANEOUS
Qty: 0 | Refills: 0 | DISCHARGE
Start: 2021-05-05

## 2021-05-05 RX ORDER — GABAPENTIN 400 MG/1
1 CAPSULE ORAL
Qty: 0 | Refills: 0 | DISCHARGE
Start: 2021-05-05

## 2021-05-05 RX ORDER — INSULIN ASPART 100 [IU]/ML
0 INJECTION, SOLUTION SUBCUTANEOUS
Qty: 0 | Refills: 0 | DISCHARGE

## 2021-05-05 RX ORDER — HYDRALAZINE HCL 50 MG
1 TABLET ORAL
Qty: 0 | Refills: 0 | DISCHARGE
Start: 2021-05-05

## 2021-05-05 RX ORDER — GABAPENTIN 400 MG/1
300 CAPSULE ORAL THREE TIMES A DAY
Refills: 0 | Status: DISCONTINUED | OUTPATIENT
Start: 2021-05-05 | End: 2021-05-05

## 2021-05-05 RX ORDER — ACETAMINOPHEN 500 MG
2 TABLET ORAL
Qty: 0 | Refills: 0 | DISCHARGE
Start: 2021-05-05

## 2021-05-05 RX ORDER — METOPROLOL TARTRATE 50 MG
0.5 TABLET ORAL
Qty: 30 | Refills: 0
Start: 2021-05-05 | End: 2021-06-03

## 2021-05-05 RX ORDER — LIDOCAINE 4 G/100G
1 CREAM TOPICAL
Qty: 0 | Refills: 0 | DISCHARGE
Start: 2021-05-05

## 2021-05-05 RX ORDER — GABAPENTIN 400 MG/1
300 CAPSULE ORAL EVERY 8 HOURS
Refills: 0 | Status: DISCONTINUED | OUTPATIENT
Start: 2021-05-05 | End: 2021-05-21

## 2021-05-05 RX ORDER — LOSARTAN POTASSIUM 100 MG/1
1 TABLET, FILM COATED ORAL
Qty: 0 | Refills: 0 | DISCHARGE
Start: 2021-05-05

## 2021-05-05 RX ORDER — CHLORHEXIDINE GLUCONATE 213 G/1000ML
1 SOLUTION TOPICAL
Qty: 0 | Refills: 0 | DISCHARGE
Start: 2021-05-05

## 2021-05-05 RX ADMIN — INSULIN GLARGINE 52 UNIT(S): 100 INJECTION, SOLUTION SUBCUTANEOUS at 22:22

## 2021-05-05 RX ADMIN — FAMOTIDINE 20 MILLIGRAM(S): 10 INJECTION INTRAVENOUS at 18:07

## 2021-05-05 RX ADMIN — GABAPENTIN 300 MILLIGRAM(S): 400 CAPSULE ORAL at 17:13

## 2021-05-05 RX ADMIN — Medication 25 MILLIGRAM(S): at 22:22

## 2021-05-05 RX ADMIN — HYDROMORPHONE HYDROCHLORIDE 2 MILLIGRAM(S): 2 INJECTION INTRAMUSCULAR; INTRAVENOUS; SUBCUTANEOUS at 17:13

## 2021-05-05 RX ADMIN — AMLODIPINE BESYLATE 10 MILLIGRAM(S): 2.5 TABLET ORAL at 05:29

## 2021-05-05 RX ADMIN — HYDROMORPHONE HYDROCHLORIDE 2 MILLIGRAM(S): 2 INJECTION INTRAMUSCULAR; INTRAVENOUS; SUBCUTANEOUS at 18:00

## 2021-05-05 RX ADMIN — GABAPENTIN 300 MILLIGRAM(S): 400 CAPSULE ORAL at 05:27

## 2021-05-05 RX ADMIN — HYDROMORPHONE HYDROCHLORIDE 2 MILLIGRAM(S): 2 INJECTION INTRAMUSCULAR; INTRAVENOUS; SUBCUTANEOUS at 08:00

## 2021-05-05 RX ADMIN — Medication 14 UNIT(S): at 18:03

## 2021-05-05 RX ADMIN — LOSARTAN POTASSIUM 100 MILLIGRAM(S): 100 TABLET, FILM COATED ORAL at 05:31

## 2021-05-05 RX ADMIN — Medication 25 MILLIGRAM(S): at 16:28

## 2021-05-05 RX ADMIN — ENOXAPARIN SODIUM 40 MILLIGRAM(S): 100 INJECTION SUBCUTANEOUS at 11:26

## 2021-05-05 RX ADMIN — Medication 12.5 MILLIGRAM(S): at 18:07

## 2021-05-05 RX ADMIN — Medication 12.5 MILLIGRAM(S): at 05:29

## 2021-05-05 RX ADMIN — Medication 25 MILLIGRAM(S): at 05:29

## 2021-05-05 RX ADMIN — GABAPENTIN 300 MILLIGRAM(S): 400 CAPSULE ORAL at 22:22

## 2021-05-05 RX ADMIN — Medication 16 UNIT(S): at 08:01

## 2021-05-05 NOTE — PATIENT PROFILE ADULT - PATIENT REPRESENTATIVE: ( YOU CAN CHOOSE ANY PERSON THAT CAN ASSIST YOU WITH YOUR HEALTH CARE PREFERENCES, DOES NOT HAVE TO BE A SPOUSE, IMMEDIATE FAMILY OR SIGNIFICANT OTHER/PARTNER)
Patient ID: Kaycee Huff is a 35 y o  male  Assessment/Plan:    Seizure-like activity ([address] )  Has had 1 additional episodes since his last appointment  Again occurred while in bed  Described by the brother as generalized shaking and moaning and unresponsive but with no incontinence or tongue bite  His sleep-deprived EEG was a normal awake and drowsing study  Again, there is a suspicion here that these episodes are of a non epileptogenic origin  Will need to proceed further with additional testing, including extended/ambulatory EEG   --48 hour ambulatory EEG   --proceed to MRI brain (okayed by Neurosurgery)  --again no AED coverage at this time  --patient does not drive nor hold an active 's license  S/P ventriculoperitoneal shunt  Seen by Neurosurgery  Shunt appears to be functional       He will follow up after completion of his additional studies  Subjective:    HPI  Patient, 35years of age, returns to review the results of additional testing in view of seizure-like episodes which she has had  He was accompanied today by his brother, Ghanshyam Fajardo, who was again the major supplier of history as patient is intellectually limited  After his initial evaluation, thought was that his episodes were of a non epileptogenic origin  Since last seen, patient has had 1 additional episode occurring approximately 2 weeks ago  That also occurred in bed  Brother witnessed the episode and described the patient as having generalized shaking and moaning and unresponsive but with no associated incontinence or tongue bite or other bodily injury  Apparent immediate return to full awareness  He did have his sleep-deprived EEG study performed and that was a normal awake and drowsing study  With his history of ventriculoperitoneal shunting, neurosurgery saw him  He is felt to have a functioning shot  Neurosurgery felt it was okay for him to move on to further evaluation with MRI brain      Past Medical History: Diagnosis Date    Cleft hard palate     Seizures ([facility] [address] )      Past Surgical History:   Procedure Laterality Date    BRAIN SURGERY      CLEFT LIP REPAIR       Social History     Socioeconomic History    Marital status: Single     Spouse name: None    Number of children: None    Years of education: None    Highest education level: None   Occupational History    Occupation: disabled   Social Needs    Financial resource strain: None    Food insecurity:     Worry: None     Inability: None    Transportation needs:     Medical: None     Non-medical: None   Tobacco Use    Smoking status: Current Every Day Smoker     Packs/day: 0 50     Types: Cigarettes    Smokeless tobacco: Former User   Substance and Sexual Activity    Alcohol use: Yes     Comment: rare    Drug use: Never    Sexual activity: Not Currently   Lifestyle    Physical activity:     Days per week: None     Minutes per session: None    Stress: None   Relationships    Social connections:     Talks on phone: None     Gets together: None     Attends Confucianism service: None     Active member of club or organization: None     Attends meetings of clubs or organizations: None     Relationship status: None    Intimate partner violence:     Fear of current or ex partner: None     Emotionally abused: None     Physically abused: None     Forced sexual activity: None   Other Topics Concern    None   Social History Narrative    Disabled    Single    No children    Lives with his mother  Family History   Problem Relation Age of Onset    Hypertension Mother     COPD Mother     Seizures Father     Heart disease Father     Cancer Family      No Known Allergies    Current Outpatient Medications:     meloxicam (MOBIC) 15 mg tablet, Take 1 tablet (15 mg total) by mouth daily, Disp: 90 tablet, Rfl: 3    Objective:    Blood pressure 122/82, pulse 67, resp  rate 18, height 6' 1" (1 854 m), weight 100 kg (221 lb)      Physical Exam  Head normocephalic  Eyes nonicteric  No audible anterior neck bruits  Lungs clear to auscultation  Rhythm regular  GI (abdomen) soft nontender  Bowel sounds present  No significant lower extremity edema  Neurological Exam  Alert  Once again pleasantly interactive  Again speech impediment noted  Utilizing walker for ambulatory security  Cranial nerves 2-12 tested and grossly intact except for a left facial asymmetry, unchanged from his initial examination  No lateralized extremity weakness  Muscle stretch reflexes bilaterally 1 throughout the upper extremities, bilaterally 1+ at the knees 2+ at the right ankle and 2 at the left ankle  ROS:    Review of Systems   Constitutional: Negative  Negative for appetite change and fever  HENT: Negative  Negative for hearing loss, tinnitus, trouble swallowing and voice change  Eyes: Negative  Negative for photophobia and pain  Respiratory: Negative  Negative for shortness of breath  Cardiovascular: Negative  Negative for palpitations  Gastrointestinal: Negative  Negative for nausea and vomiting  Endocrine: Negative  Negative for cold intolerance and heat intolerance  Genitourinary: Negative  Negative for dysuria, frequency and urgency  Musculoskeletal: Positive for gait problem  Negative for myalgias and neck pain  Walks with walker   Skin: Negative  Negative for rash  Allergic/Immunologic: Negative  Neurological: Negative for dizziness, tremors, seizures, syncope, facial asymmetry, speech difficulty, weakness, light-headedness, numbness and headaches  Hematological: Negative  Does not bruise/bleed easily  Psychiatric/Behavioral: Negative  Negative for confusion, hallucinations and sleep disturbance  I personally reviewed the ROS as entered by the medical assistant  *Please note this document was created using voice recognition software and may contain sound-alike word errors  * yes

## 2021-05-05 NOTE — PROGRESS NOTE ADULT - SUBJECTIVE AND OBJECTIVE BOX
Patient is a 50y old  Male who presents with a chief complaint of consitpaiton (05 May 2021 08:52)      HPI:  51 yo male, PMH DM, HTN, hyperlipidemia; pt presented to the ED c/o abdominal cramping, generalized, and diarrhea following use of laxatives as above for constipation.  In the ED, WBC 10.82, Hb 13.6, CMP: sodium 126, chloride 89, glucose 281, CMP otherwise unremarkable.  VBG: Lactate 2.6 (later downtrended to 1.6).  Pt. was treated with IV hydration and dispo'd to CDU for continued care plan:  IV hydration, supportive care, AM labs, general observation care / monitoring. pt continued to have generalized weakness , numbness and on labs persistent hyponatermia and now being admitted for furhter evlauation .  dnenies N/V/Abd pain   had bm   no urinary sx   no fever or chills   no cough   no cp /sob    (11 Apr 2021 15:50)    reports bm yesterday    REVIEW OF SYSTEMS  Constitutional - No fever, No weight loss, No fatigue  HEENT - No eye pain, No visual disturbances, No difficulty hearing, No tinnitus, No vertigo, No neck pain  Respiratory - No cough, No wheezing, No shortness of breath  Cardiovascular - No chest pain, No palpitations  Gastrointestinal - No abdominal pain, No nausea, No vomiting, No diarrhea, No constipation  Genitourinary - No dysuria, No frequency, No hematuria, No incontinence  Neurological - No headaches, No memory loss, + loss of strength, + numbness, No tremors  Skin - No itching, No rashes, No lesions   Endocrine - No temperature intolerance  Musculoskeletal - No joint pain, No joint swelling, No muscle pain  Psychiatric - No depression, No anxiety    PAST MEDICAL & SURGICAL HISTORY  DM (diabetes mellitus)    HTN (hypertension)    High cholesterol    H/O total knee replacement    Foot fracture        CURRENT FUNCTIONAL STATUS  5/4 pt received supine in bed in NAD +HOB elevated. pt reported scapula pain however pt was willing to participate in session, performed therapeutic exercises in supine, pt deferred mobility due to scapula discomfort. pt was left comfortable in bed +HOB elevated, REE centeno .     FAMILY HISTORY   No pertinent family history in first degree relatives      VITALS  T(C): 36.7 (05-05-21 @ 10:13), Max: 36.9 (05-04-21 @ 20:57)  HR: 78 (05-05-21 @ 10:13) (78 - 95)  BP: 121/72 (05-05-21 @ 10:13) (107/57 - 139/78)  RR: 16 (05-05-21 @ 10:13) (16 - 18)  SpO2: 99% (05-05-21 @ 10:13) (97% - 100%)  Wt(kg): --    ALLERGIES  No Known Drug Allergies  shellfish (Urticaria)      MEDICATIONS   acetaminophen   Tablet .. 650 milliGRAM(s) Oral every 6 hours PRN  acetaminophen   Tablet .. 650 milliGRAM(s) Oral every 6 hours PRN  albumin human  5% IVPB 3750 milliLiter(s) IV Intermittent every 48 hours  albumin human  5% IVPB 3750 milliLiter(s) IV Intermittent once  albumin human  5% IVPB 3750 milliLiter(s) IV Intermittent once  aluminum hydroxide/magnesium hydroxide/simethicone Suspension 30 milliLiter(s) Oral every 6 hours PRN  amLODIPine   Tablet 10 milliGRAM(s) Oral daily  bisacodyl 5 milliGRAM(s) Oral at bedtime  calcium gluconate IVPB 2 Gram(s) IV Intermittent <User Schedule>  calcium gluconate IVPB 2 Gram(s) IV Intermittent once  calcium gluconate IVPB 2 Gram(s) IV Intermittent once  chlorhexidine 2% Cloths 1 Application(s) Topical daily  dextrose 40% Gel 15 Gram(s) Oral once  dextrose 5%. 1000 milliLiter(s) IV Continuous <Continuous>  dextrose 5%. 1000 milliLiter(s) IV Continuous <Continuous>  dextrose 50% Injectable 25 Gram(s) IV Push once  dextrose 50% Injectable 12.5 Gram(s) IV Push once  dextrose 50% Injectable 25 Gram(s) IV Push once  dicyclomine 10 milliGRAM(s) Oral once PRN  enoxaparin Injectable 40 milliGRAM(s) SubCutaneous daily  gabapentin 300 milliGRAM(s) Oral three times a day  glucagon  Injectable 1 milliGRAM(s) IntraMuscular once  hydrALAZINE 25 milliGRAM(s) Oral three times a day  HYDROmorphone   Tablet 2 milliGRAM(s) Oral every 6 hours PRN  insulin glargine Injectable (LANTUS) 52 Unit(s) SubCutaneous at bedtime  insulin lispro (ADMELOG) corrective regimen sliding scale   SubCutaneous three times a day before meals  insulin lispro (ADMELOG) corrective regimen sliding scale   SubCutaneous at bedtime  insulin lispro Injectable (ADMELOG) 5 Unit(s) SubCutaneous <User Schedule>  insulin lispro Injectable (ADMELOG) 14 Unit(s) SubCutaneous before lunch  insulin lispro Injectable (ADMELOG) 14 Unit(s) SubCutaneous before dinner  insulin lispro Injectable (ADMELOG) 16 Unit(s) SubCutaneous before breakfast  lidocaine   Patch 1 Patch Transdermal daily  losartan 100 milliGRAM(s) Oral daily  metoprolol tartrate 12.5 milliGRAM(s) Oral two times a day  mineral oil 30 milliLiter(s) Oral once  senna 2 Tablet(s) Oral two times a day  sodium chloride 0.9% lock flush 10 milliLiter(s) IV Push every 1 hour PRN  sodium chloride 0.9%. 1000 milliLiter(s) IV Continuous <Continuous>      ----------------------------------------------------------------------------------------   PHYSICAL EXAM  Constitutional - NAD, Comfortable  HEENT -  EOMI     Chest - no respiratory distress  Cardiovascular - RRR, S1S2   Abdomen - Soft, NTND  Extremities - No C/C/E, No calf tenderness   Neurologic Exam -                    Cognitive - Awake, Alert, AAO to self, place, date, year, situation     Communication - Fluent, No dysarthria     Cranial Nerves - CN 2-12 intact     Motor -                       LEFT    UE - ShAB 4+/5, EF 4+/5, EE 4+/5, WE4+/5,  4+/5                    RIGHT UE - ShAB 4+/5, EF 4+/5, EE 4+/5, WE4/5,  4/5                    LEFT    LE - HF 3+/5, KE 3/5, DF 4/5, PF 4/5                    RIGHT LE - HF 3/5, KE 3/5, DF 4/5, PF 4/5        Sensory -impaired distal fingertips, toes- improving per patient     OculoVestibular - No saccades, No nystagmus, VOR         Balance - WNL Static  Psychiatric - Mood stable, Affect WNL  ----------------------------------------------------------------------------------------  ASSESSMENT/PLAN  51 yo m p/w GI symptoms, now with weakness, 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  s/p plasmapheresis   turn and position q2 to prevent skin breakdown  Pain -tylenol prn, on gabapentin, dilaudid 2mg po q 6prn with bowel regimen, lidocaine patch  dvt ppx: lovenox  continue bedside PT and OT for bed mobility, transfers, ambulation with AD, adls  OOB to chair when sitting balance allows  Rehab -   Recommend ACUTE inpatient rehabilitation for the functional deficits consisting of 3 hours of therapy/day & 24 hour RN/daily PMR physician for comorbid medical management. Will continue to follow for ongoing rehab needs and recommendations.

## 2021-05-05 NOTE — DISCHARGE NOTE NURSING/CASE MANAGEMENT/SOCIAL WORK - NSDCPECAREGIVERED_GEN_ALL_CORE
Medline and carenotes for well as DC Medications and side effects literature for patient reference./Yes

## 2021-05-05 NOTE — PROGRESS NOTE ADULT - NSICDXPILOT_GEN_ALL_CORE
[place]

## 2021-05-05 NOTE — PROGRESS NOTE ADULT - SUBJECTIVE AND OBJECTIVE BOX
Called patient and relayed below instructions. Patient has only been taking Furosemide once daily so instructed to increase to 40mg daily. Instructed to continue monitoring BP and to call in a few days with an update. Patient verbalized understanding. Patient is a 50y Male     Patient is a 50y old  Male who presents with a chief complaint of consitpaiton, change in bowels (04 May 2021 09:08)      HPI:  51 yo male, PMH DM, HTN, hyperlipidemia; pt presented to the ED c/o abdominal cramping, generalized, and diarrhea following use of laxatives as above for constipation.  In the ED, WBC 10.82, Hb 13.6, CMP: sodium 126, chloride 89, glucose 281, CMP otherwise unremarkable.  VBG: Lactate 2.6 (later downtrended to 1.6).  Pt. was treated with IV hydration and dispo'd to CDU for continued care plan:  IV hydration, supportive care, AM labs, general observation care / monitoring. pt continued to have generalized weakness , numbness and on labs persistent hyponatermia and now being admitted for furhter evlauation .  dnenies N/V/Abd pain   had bm   no urinary sx   no fever or chills   no cough   no cp /sob    (11 Apr 2021 15:50)      PAST MEDICAL & SURGICAL HISTORY:  DM (diabetes mellitus)    HTN (hypertension)    Hyperlipidemia    H/O total knee replacement    Foot fracture        MEDICATIONS  (STANDING):  albumin human  5% IVPB 3750 milliLiter(s) IV Intermittent every 48 hours  albumin human  5% IVPB 3750 milliLiter(s) IV Intermittent once  albumin human  5% IVPB 3750 milliLiter(s) IV Intermittent once  amLODIPine   Tablet 10 milliGRAM(s) Oral daily  bisacodyl 5 milliGRAM(s) Oral at bedtime  calcium gluconate IVPB 2 Gram(s) IV Intermittent <User Schedule>  calcium gluconate IVPB 2 Gram(s) IV Intermittent once  calcium gluconate IVPB 2 Gram(s) IV Intermittent once  chlorhexidine 2% Cloths 1 Application(s) Topical daily  dextrose 40% Gel 15 Gram(s) Oral once  dextrose 5%. 1000 milliLiter(s) (50 mL/Hr) IV Continuous <Continuous>  dextrose 5%. 1000 milliLiter(s) (100 mL/Hr) IV Continuous <Continuous>  dextrose 50% Injectable 25 Gram(s) IV Push once  dextrose 50% Injectable 12.5 Gram(s) IV Push once  dextrose 50% Injectable 25 Gram(s) IV Push once  enoxaparin Injectable 40 milliGRAM(s) SubCutaneous daily  gabapentin 300 milliGRAM(s) Oral three times a day  glucagon  Injectable 1 milliGRAM(s) IntraMuscular once  hydrALAZINE 25 milliGRAM(s) Oral three times a day  insulin glargine Injectable (LANTUS) 52 Unit(s) SubCutaneous at bedtime  insulin lispro (ADMELOG) corrective regimen sliding scale   SubCutaneous three times a day before meals  insulin lispro (ADMELOG) corrective regimen sliding scale   SubCutaneous at bedtime  insulin lispro Injectable (ADMELOG) 5 Unit(s) SubCutaneous <User Schedule>  insulin lispro Injectable (ADMELOG) 16 Unit(s) SubCutaneous before breakfast  insulin lispro Injectable (ADMELOG) 14 Unit(s) SubCutaneous before lunch  insulin lispro Injectable (ADMELOG) 14 Unit(s) SubCutaneous before dinner  lidocaine   Patch 1 Patch Transdermal daily  losartan 100 milliGRAM(s) Oral daily  metoprolol tartrate 12.5 milliGRAM(s) Oral two times a day  mineral oil 30 milliLiter(s) Oral once  senna 2 Tablet(s) Oral two times a day  sodium chloride 0.9%. 1000 milliLiter(s) (75 mL/Hr) IV Continuous <Continuous>      Allergies    No Known Drug Allergies  shellfish (Urticaria)    Intolerances        SOCIAL HISTORY:  Denies ETOh,Smoking,     FAMILY HISTORY:  No pertinent family history in first degree relatives        REVIEW OF SYSTEMS:    CONSTITUTIONAL: No weakness, fevers or chills  EYES/ENT: No visual changes;  No vertigo or throat pain   NECK: No pain or stiffness  RESPIRATORY: No cough, wheezing, hemoptysis; No shortness of breath  CARDIOVASCULAR: No chest pain or palpitations  GASTROINTESTINAL: No abdominal or epigastric pain. No nausea, vomiting, or hematemesis; No diarrhea or constipation. No melena or hematochezia.  GENITOURINARY: No dysuria, frequency or hematuria  NEUROLOGICAL: No numbness or weakness  SKIN: No itching, burning, rashes, or lesions   All other review of systems is negative unless indicated above.    VITAL:  T(C): , Max: 36.9 (05-04-21 @ 20:57)  T(F): , Max: 98.5 (05-04-21 @ 20:57)  HR: 94 (05-05-21 @ 05:26)  BP: 139/78 (05-05-21 @ 05:26)  BP(mean): --  RR: 16 (05-05-21 @ 05:26)  SpO2: 97% (05-05-21 @ 05:26)  Wt(kg): --    I and O's:    05-03 @ 07:01  -  05-04 @ 07:00  --------------------------------------------------------  IN: 0 mL / OUT: 250 mL / NET: -250 mL    05-04 @ 07:01  -  05-05 @ 07:00  --------------------------------------------------------  IN: 0 mL / OUT: 425 mL / NET: -425 mL          PHYSICAL EXAM:    Constitutional: NAD  HEENT: PERRLA,   Neck: No JVD  Respiratory: CTA B/L  Cardiovascular: S1 and S2  Gastrointestinal: BS+, soft, NT/ND  Extremities: No peripheral edema  Neurological: A/O x 3, no focal deficits  Psychiatric: Normal mood, normal affect  : No Jose  Skin: No rashes  Access: Not applicable  Back: No CVA tenderness    LABS:                RADIOLOGY & ADDITIONAL STUDIES:

## 2021-05-05 NOTE — PROGRESS NOTE ADULT - SUBJECTIVE AND OBJECTIVE BOX
pt stable for dc   see discharge summary   fu with Neuro   pain management and PT at rehab  see dc summary

## 2021-05-05 NOTE — H&P ADULT - HISTORY OF PRESENT ILLNESS
This is a 51 y/o male, PMH DM, HTN, hyperlipidemia; pt presented to the ED at [facility] on 4/10 with c/o abdominal cramping, generalized, and diarrhea following use of laxatives for constipation.  In the ED, WBC 10.82, Hb 13.6, CMP: sodium 126, chloride 89, glucose 281, CMP otherwise unremarkable.  VBG: Lactate 2.6 (later downtrended to 1.6).  Pt. was treated with IV hydration and transferred to CDU for continued care plan:  IV hydration, supportive care, AM labs, general observation care / monitoring. Patient continued to have generalized weakness, numbness and persistent hyponatremia. Patient admitted for further evaluation.  Neurology consulted. 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 Patient refused IVIG but underwent PLEX.   Nephrology consulted for ALLIE and hyponatremia. Likely due to hypovolemia in setting of hyperglycemia. Work up suggested SIADH. Recs to optimize DM control, continue free water restriction <1.2L/day, and monitor Na levels (Na level should not increase more than 6meq in 24 hrs). Also, noted to have proteinuia/hematuria. Renal felt sec to Diabetic Nephropathy but patient will need full vasculitis work up and based on work up result, he might need kidney biopsy which would be scheduled as an outpatient.   Endocrinology also following for uncontrolled diabetes. Insulin regimen adjusted with improvement in glucoses.   ID consulted due to h/o syphilis in the past. Patient had undergone treatment in the past. No need for further treatment.   Last night (5/3), patient reported increased left scapula pain and given IV Dilaudid. Patient attributed to increased use of UEs in PT. Pain management NP consulted. Recommended switch to PO Dilaudid.   Patient has been seen by PT, OT and PM&R attending with recommendations for acute IPR.   Patient is deemed medically stable for transfer to acute IPR at [facility] on 5/4.  This is a 51 y/o male, [facility] DM, HTN, hyperlipidemia; pt presented to the ED at [facility] on 4/10 with c/o abdominal cramping, generalized, and diarrhea following use of laxatives for constipation.  In the ED, WBC 10.82, Hb 13.6, CMP: sodium 126, chloride 89, glucose 281, CMP otherwise unremarkable.  VBG: Lactate 2.6 (later downtrended to 1.6).  Pt. was treated with IV hydration and transferred to CDU for continued care plan:  IV hydration, supportive care, AM labs, general observation care / monitoring. Patient continued to have generalized weakness, numbness and persistent hyponatremia. Patient admitted for further evaluation.  Neurology consulted. 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 Patient refused IVIG but underwent PLEX.   Nephrology consulted for ALLIE and hyponatremia. Likely due to hypovolemia in setting of hyperglycemia. Work up suggested SIADH. Recs to optimize DM control, continue free water restriction <1.2L/day, and monitor Na levels (Na level should not increase more than 6meq in 24 hrs). Also, noted to have proteinuia/hematuria. Renal felt sec to Diabetic Nephropathy but patient will need full vasculitis work up and based on work up result, he might need kidney biopsy which would be scheduled as an outpatient.   Endocrinology also following for uncontrolled diabetes. Insulin regimen adjusted with improvement in glucoses.   ID consulted due to h/o syphilis in the past. Patient had undergone treatment in the past. No need for further treatment.   Last night (5/3), patient reported increased left scapula pain and given IV Dilaudid. Patient attributed to increased use of UEs in PT. Pain management NP consulted. Recommended switch to PO Dilaudid.   Patient has been seen by PT, OT and PM&R attending with recommendations for acute IPR.   Patient is deemed medically stable for transfer to acute IPR at [facility] on 5/4 and arrived 5/5.

## 2021-05-05 NOTE — CHART NOTE - NSCHARTNOTESELECT_GEN_ALL_CORE
ACP/Event Note
ACP/Event Note
Chart Note/Nutrition Services
Endocrine
Event Note
Pre IR Note/Event Note
Endocrinology/Event Note
Event Note
Neurology
Neurology
Neurology/Event Note
apheresis/Blood Bank
endocrinology/Event Note
apheresis/Blood Bank

## 2021-05-05 NOTE — DISCHARGE NOTE NURSING/CASE MANAGEMENT/SOCIAL WORK - NSDPDISTO_GEN_ALL_CORE
VSS. Afebrile. Tiny po diet.  Seen by PT and MD, cleared for transfer to [facility] acute as per safe Dc plan./Acute rehab

## 2021-05-05 NOTE — H&P ADULT - NSHPSOCIALHISTORY_GEN_ALL_CORE
Smoking - Denied  EtOH - Denied   Drugs - Denied    FUNCTIONAL HISTORY  Lives in basement of house, 6 stairs to enter. lives with his wife  Independent at baseline without device    FUNCTIONAL PROGRESS  5/3  Bed Mobility Training Rolling/Turning: moderate assist (50% patient effort);  1 person assist  Bed Mobility Training Scooting: supervision  Bed Mobility Training Sit-to-Supine: moderate assist (50% patient effort);  1 person assist  Bed Mobility Training Supine-to-Sit: moderate assist (50% patient effort);  1 person assist  Transfer Training Sit-to-Stand Transfer: maximum assist (25% patient effort);  2 person assist  Transfer Training Stand-to-Sit Transfer: maximum assist (25% patient effort);  2 person assist Smoking - Denied  EtOH - Social  Drugs - Denied    FUNCTIONAL HISTORY  Lives in ground floor of house, 5stairs to enter. Lives with his wife who works 12 hrs a day as a nanny.  Independent at baseline without device    FUNCTIONAL PROGRESS  5/3  Bed Mobility Training Rolling/Turning: moderate assist (50% patient effort);  1 person assist  Bed Mobility Training Scooting: supervision  Bed Mobility Training Sit-to-Supine: moderate assist (50% patient effort);  1 person assist  Bed Mobility Training Supine-to-Sit: moderate assist (50% patient effort);  1 person assist  Transfer Training Sit-to-Stand Transfer: maximum assist (25% patient effort);  2 person assist  Transfer Training Stand-to-Sit Transfer: maximum assist (25% patient effort);  2 person assist

## 2021-05-05 NOTE — CHART NOTE - NSCHARTNOTEFT_GEN_A_CORE
50M uncontrolled DM2 HbA1c 11.5%, hyperglycemia related to difficulties obtaining adequate insulin during pandemic due to insurance/cost.    CAPILLARY BLOOD GLUCOSE    POCT Blood Glucose.: 111 mg/dL (05 May 2021 11:44)  POCT Blood Glucose.: 143 mg/dL (05 May 2021 07:23)  POCT Blood Glucose.: 111 mg/dL (04 May 2021 21:35)  POCT Blood Glucose.: 136 mg/dL (04 May 2021 16:40)      1) DM2 with hyperglycemia  Inpatient plan:  BG target 100-180 mg/dl  AM fasting BG's improved and in target range  carb consistent diet  FS premeal and bedtime  Continue Lantus 52 units qhs  Contniue Admelog 16/14/14 before meals - HOLD if not eating   Continue Admelog 5 units sq QHS-pre bedtime snack- hold if doesn't have snack.   Reinforced to patient to communicate with RN when eating his snack in order to receive his insulin.  Continue Admelog moderate scale premeal and moderate bedtime scale  RD consult- appreciated    Discussed with patient regarding discharge planning, he now will have enough Tresiba and Novolog at home to proceed with these insulins due to [facility] program he is now a part of.   DC on Tresiba pen - 52 units sq qhs  and Novolog pens 16/14/14  Patient will need BD osmar pen needles 4x daily.    Please ask patient if he needs new glucometer, test strips, lancets, alcohol pads  Also please prescribe glucagon kit for hypoglycemia or Baqsuimi nasal spray for hypoglycemia.  WHichever is lowest cost or covered by insurance.  He requested changing to [facility] endocrinology. Appointments below:  [address] [place] [address], [address], [phone]  6/8/21 @ 11:00 AM with diabetes educator   7/12/21 @ 11:00 AM with Dr Montesinos

## 2021-05-05 NOTE — DISCHARGE NOTE NURSING/CASE MANAGEMENT/SOCIAL WORK - NSDCFUADDAPPT_GEN_ALL_CORE_FT
Follow-up with Endocrinologist for diabetes management in the office as instructed for post-op check as well as with PMD for continuity of care.

## 2021-05-05 NOTE — H&P ADULT - NSICDXPASTSURGICALHX_GEN_ALL_CORE_FT
PAST SURGICAL HISTORY:  Foot fracture     H/O total knee replacement     
PAST SURGICAL HISTORY:  Foot fracture     H/O total knee replacement

## 2021-05-05 NOTE — H&P ADULT - NSHPREVIEWOFSYSTEMS_GEN_ALL_CORE
Constitutional - No fever, No weight loss, No fatigue  HEENT - No eye pain, No visual disturbances, No difficulty hearing, No tinnitus, No vertigo, No neck pain  Respiratory - No cough, No wheezing, No shortness of breath  Cardiovascular - No chest pain, No palpitations  Gastrointestinal - No abdominal pain, No nausea, No vomiting, No diarrhea, No constipation  Genitourinary - No dysuria, No frequency, No hematuria, No incontinence  Neurological - No headaches, No memory loss, + loss of strength, + numbness, No tremors  Skin - No itching, No rashes, No lesions   Endocrine - No temperature intolerance  Musculoskeletal - No joint pain, No joint swelling, No muscle pain  Psychiatric - No depression, No anxiety

## 2021-05-05 NOTE — DISCHARGE NOTE NURSING/CASE MANAGEMENT/SOCIAL WORK - PATIENT PORTAL LINK FT
You can access the FollowMyHealth Patient Portal offered by [facility] by registering at the following website: [email or web address]. By joining [facility]’s FollowMyHealth portal, you will also be able to view your health information using other applications (apps) compatible with our system.

## 2021-05-05 NOTE — H&P ADULT - ATTENDING COMMENTS
Patient seen at bedside. 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 Patient treated with Plasmapheresis.  Begin full rehab program  On Lovenox for DVT prophylaxis    Rehab program, LOS, prognosis discussed with patient and wife at bedside

## 2021-05-05 NOTE — H&P ADULT - ASSESSMENT
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 Hospital course complicated by uncontrolled diabetes, hyponatremia, ALLIE, . Now admitted to [facility] after for initiation of a multidisciplinary rehab program consisting focused on functional mobility, transfers and ADLs (activities of daily living).    Comprehensive Multidisciplinary Rehab Program:  - Start comprehensive rehab program, 3 hours a day, 5 days a week.  - PT 1hr/day: Focused on improving strength, endurance, coordination, balance, functional mobility, and transfers  - OT 1hr/day: Focused on improving strength, fine motor skills, coordination, posture and ADLs.    - Speech Therapy 1hr/day: to diagnose and treat deficits in swallowing, cognition and communication.   - P&O as needed  - Activity Limitations: Decreased social, vocational and leisure activities, decreased self care and ADLs, decreased mobility, decreased ability to manage household and finances.     Participation Restrictions/Precautions:  - Weight bearing status: ****  - ROM restrictions: ***  - Precautions:    [ ] Falls   [ ] Spinal   [ ] Hip (Anterior or Posterior)       [ ] Seizure  [ ] Cardiac   [ ] Sternal    Rehab Diagnosis/Management:  Mood/Cognition:  - Neuropsychology consult  - [ ] Enhanced Supervision  [ ] Constant Observation    Sleep:   - Maintain quiet hours and low stim environment.  - Melatonin PRN to maximize participation in therapy during the day.   - Monitor sleep logs    Pain Management:  - Tylenol PRN  - Avoid sedating medications that may interfere with cognitive recovery    GI/Bowel:  - At risk for constipation due to neurologic diagnosis, immobility and/or medication use  - Senna QHS, Miralax PRN Daily  - GI ppx:    /Bladder:   - At risk for incontinence and retention due to neurologic diagnosis and limited mobility  - Currently patient voids:    [ ] independent     [ ] external collection device (condom cath)    [ ] Indwelling lucas catheter     [ ] Intermittent catheterization  - Continue catheter/bladder nursing protocol with bladder scans Q**** hours with straight cath for >***cc.  - Encourage timed voids every 4 hours while awake for independence and to promote continence during therapy.    Skin/Pressure Injury:   - At risk for pressure injury due to neurologic diagnosis and relative immobility.  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 Rae)  -CT abd and pelvis on 4/12 without acute process however GGO seen in the lungs   -Diet advanced   -Bentyl had been ordered once for abdominal pain   -Avoid constipation     Hyponatremia/ALLIE  -followed by nephro at [facility]  -likely multifactorial including hyperglycemia ( pseudohyponatremia ) and pre renal 2/2 to diarrhea (? ADH induced by pain)   -s/p fluids   -free water restriction <1.2L/day.      DM type 2: uncontrolled   -A1C 11.5%  -c/w Lantus 52 units qhs, Admelog 16/14/14 and SSI   -c/w Admelog 5 units at bedtime only if taking a snack   -Diabetes specialist consult   -Per notes from endocrine service "Discussed with patient regarding discharge planning, he now will have enough Tresiba and Novolog at home to proceed with these insulins due to [facility] program he is now a part of.   DC on Tresiba and Novolog pens doses TBD. Discussed option of mixed insulin in case of future issues obtaining insulin.  He requested changing to [facility] endocrinology. "    + RPR  -ID consulted at [facility]- treated 2017 (PCN)  -RPR titer 1:1 - no s/s of active syphilis  -Per ID, no need for treatment at present time     HTN   -C/w norvasc, losartan, hydralazine, metoprolol tartrate        Hematuria / proteinuria   - labs and urine ordered for vasculitis workup  -Per nephrology, will need kidney biopsy likely as outpt         ---------------  Code Status/Emergency Contact:    Outpatient Follow-up (Specialty/Name of physician):    [facility]. Appointments below:  [address] [facility], [address], [phone]  6/8/21 @ 11:00 AM with diabetes educator   7/12/21 @ 11:00 AM with Dr Montesinos       GI: Sr Rae   --------------  Goals: Safe discharge to home   Estimated Length of Stay: 10-14 days  Rehab Potential: Good  Medical Prognosis: Good  Estimated Disposition: Home with Home Care  ---------------    PRESCREEN COMPARISON:  I have reviewed the prescreen information and I have found no relevant changes between the preadmission screening and my post admission evaluation.    RATIONALE FOR INPATIENT ADMISSION: Patient demonstrates the following:  [X] Medically appropriate for rehabilitation admission  [X] Has attainable rehab goals with an appropriate initial discharge plan  [X]Has rehabilitation potential (expected to make a significant improvement within a reasonable period of time)  [X] Requires close medical management by a rehab physician, rehab nursing care, Hospitalist and comprehensive interdisciplinary team (including PT, OT and/or SLP, Prosthetics and Orthotics)   JAGUAR PRADO is a 50y with a history of DM, HTN, hyperlipidemia who presented to [facility]  on 4/10 with complaint of abdominal pain, diarrhea from laxative use and weakness and 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 Hospital course complicated by uncontrolled diabetes, hyponatremia, ALLIE, . Now admitted to [facility] after for initiation of a multidisciplinary rehab program consisting focused on functional mobility, transfers and ADLs (activities of daily living).    Comprehensive Multidisciplinary Rehab Program:  - Start comprehensive rehab program, 3 hours a day, 5 days a week.  - PT 2hr/day: Focused on improving strength, endurance, coordination, balance, functional mobility, and transfers  - OT 1hr/day: Focused on improving strength, fine motor skills, coordination, posture and ADLs.     - P&O as needed  - Activity Limitations: Decreased social, vocational and leisure activities, decreased self care and ADLs, decreased mobility, decreased ability to manage household and finances.     Participation Restrictions/Precautions:  - Weight bearing status: Full  - ROM restrictions: right ankle d/t prior ankle surgery  - Precautions:    [x] Falls   [ ] Spinal   [ ] Hip (Anterior or Posterior)       [ ] Seizure  [ ] Cardiac   [ ] Sternal    Rehab Diagnosis/Management:  Mood/Cognition:  - Appropriate cognition and mood.    Sleep:   - Melatonin PRN can be added if needed to maximize participation in therapy during the day.     Pain Management:  - Tylenol PRN, Dilaudid prn  - gabapentin 100mg tid     GI/Bowel:  - At risk for constipation due to neurologic diagnosis, immobility and/or medication use  - Senna QHS, Miralax PRN Daily  - GI ppx:    /Bladder:   - At risk for incontinence and retention due to neurologic diagnosis and limited mobility  - Currently patient voids:    [x] independently  - Continue catheter/bladder nursing protocol with bladder scans per routine.  - Encourage timed voids every 4 hours while awake for independence and to promote continence during therapy.    Skin/Pressure Injury:   - At risk for pressure injury due to neurologic diagnosis and relative immobility.  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 Rae)  -CT abd and pelvis on 4/12 without acute process however GGO seen in the lungs   -Diet advanced   -Bentyl had been ordered once for abdominal pain   -Avoid constipation     Hyponatremia/ALLIE  -followed by nephro at [facility]  -likely multifactorial including hyperglycemia ( pseudohyponatremia ) and pre renal 2/2 to diarrhea (? ADH induced by pain)   -s/p fluids   -free water restriction <1.2L/day.    DM type 2: uncontrolled   -A1C 11.5%  -c/w Lantus 52 units qhs, Admelog 14 before dinner  -c/w Admelog 5 units at bedtime only if taking a snack   -Diabetes specialist consult   - at home Pt was on Tresiba 30 units qhs and Novolog 15 units premeal TID.  -Per notes from endocrine service "Discussed with patient regarding discharge planning, he now will have enough Tresiba and Novolog at home to proceed with these insulins due to [facility] program he is now a part of.   DC on Tresiba and Novolog pens doses TBD. Discussed option of mixed insulin in case of future issues obtaining insulin.  He requested changing to [facility] endocrinology. "    + RPR  -ID consulted at [facility]- treated 2017 (PCN)  -RPR titer 1:1 - no s/s of active syphilis  -Per ID, no need for treatment at present time     HTN   -C/w norvasc, losartan, hydralazine, metoprolol tartrate    Hematuria / proteinuria   - labs and urine ordered for vasculitis workup  -Per nephrology, will need kidney biopsy likely as outpt     ---------------  Code Status/Emergency Contact:    Outpatient Follow-up (Specialty/Name of physician):    [facility] endocrinology. Appointments below:  [address] [place] [address], [address],   6/8/21 @ 11:00 AM with diabetes educator   7/12/21 @ 11:00 AM with Dr Montesinos       GI: Dr Rae   --------------  Goals: Safe discharge to home   Estimated Length of Stay: 10-14 days  Rehab Potential: Good  Medical Prognosis: Good  Estimated Disposition: Home with Home Care  ---------------    PRESCREEN COMPARISON:  I have reviewed the prescreen information and I have found no relevant changes between the preadmission screening and my post admission evaluation.    RATIONALE FOR INPATIENT ADMISSION: Patient demonstrates the following:  [X] Medically appropriate for rehabilitation admission  [X] Has attainable rehab goals with an appropriate initial discharge plan  [X]Has rehabilitation potential (expected to make a significant improvement within a reasonable period of time)  [X] Requires close medical management by a rehab physician, rehab nursing care, Hospitalist and comprehensive interdisciplinary team (including PT, OT and/or SLP, Prosthetics and Orthotics)   JAGUAR PRADO is a 50y with a history of DM, HTN, hyperlipidemia who presented to [facility]  on 4/10 with complaint of abdominal pain, diarrhea from laxative use and weakness and 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 Hospital course complicated by uncontrolled diabetes, hyponatremia, ALLIE, . Now admitted to [facility] after for initiation of a multidisciplinary rehab program consisting focused on functional mobility, transfers and ADLs (activities of daily living).    Comprehensive Multidisciplinary Rehab Program:  - Start comprehensive rehab program, 3 hours a day, 5 days a week.  - PT 2hr/day: Focused on improving strength, endurance, coordination, balance, functional mobility, and transfers  - OT 1hr/day: Focused on improving strength, fine motor skills, coordination, posture and ADLs.     - P&O as needed  - Activity Limitations: Decreased social, vocational and leisure activities, decreased self care and ADLs, decreased mobility, decreased ability to manage household and finances.     Participation Restrictions/Precautions:  - Weight bearing status: Full  - ROM restrictions: right ankle d/t prior ankle surgery  - Precautions:    [x] Falls   [ ] Spinal   [ ] Hip (Anterior or Posterior)       [ ] Seizure  [ ] Cardiac   [ ] Sternal    Rehab Diagnosis/Management:  Mood/Cognition:  - Appropriate cognition and mood.    Sleep:   - Melatonin PRN can be added if needed to maximize participation in therapy during the day.     Pain Management:  - Tylenol PRN, Dilaudid prn  - gabapentin 300mg tid     GI/Bowel:  - At risk for constipation due to neurologic diagnosis, immobility and/or medication use  - Senna QHS, Miralax PRN Daily  - GI ppx: pepcid 20mg bid    /Bladder:   - At risk for incontinence and retention due to neurologic diagnosis and limited mobility  - Currently patient voids:    [x] independently  - Continue catheter/bladder nursing protocol with bladder scans per routine.  - Encourage timed voids every 4 hours while awake for independence and to promote continence during therapy.    Skin/Pressure Injury:   - At risk for pressure injury due to neurologic diagnosis and relative immobility.  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 Rae)  -CT abd and pelvis on 4/12 without acute process however GGO seen in the lungs   -Diet advanced   -Bentyl had been ordered once for abdominal pain   -Avoid constipation     Hyponatremia/ALLIE  -followed by nephro at [facility]  -likely multifactorial including hyperglycemia ( pseudohyponatremia ) and pre renal 2/2 to diarrhea (? ADH induced by pain)   -s/p fluids   -free water restriction <1.2L/day.    DM type 2: uncontrolled   -A1C 11.5%  -c/w Lantus 52 units qhs, Admelog 14 before dinner  -c/w Admelog 5 units at bedtime only if taking a snack   -Diabetes specialist consult   - at home Pt was on Tresiba 30 units qhs and Novolog 15 units premeal TID.  -Per notes from endocrine service "Discussed with patient regarding discharge planning, he now will have enough Tresiba and Novolog at home to proceed with these insulins due to [facility] program he is now a part of.   DC on Tresiba and Novolog pens doses TBD. Discussed option of mixed insulin in case of future issues obtaining insulin.  He requested changing to [facility] endocrinology. "    + RPR  -ID consulted at [facility]- treated 2017 (PCN)  -RPR titer 1:1 - no s/s of active syphilis  -Per ID, no need for treatment at present time     HTN   -C/w norvasc, losartan, hydralazine, metoprolol tartrate    Hematuria / proteinuria   - labs and urine ordered for vasculitis workup  -Per nephrology, will need kidney biopsy likely as outpt     ---------------  Code Status/Emergency Contact:    Outpatient Follow-up (Specialty/Name of physician):    [facility] endocrinology. Appointments below:  [address] [facility], [address], [phone]  6/8/21 @ 11:00 AM with diabetes educator   7/12/21 @ 11:00 AM with Dr Montesinos       GI: Dr Rae   --------------  Goals: Safe discharge to home   Estimated Length of Stay: 10-14 days  Rehab Potential: Good  Medical Prognosis: Good  Estimated Disposition: Home with Home Care  ---------------    PRESCREEN COMPARISON:  I have reviewed the prescreen information and I have found no relevant changes between the preadmission screening and my post admission evaluation.    RATIONALE FOR INPATIENT ADMISSION: Patient demonstrates the following:  [X] Medically appropriate for rehabilitation admission  [X] Has attainable rehab goals with an appropriate initial discharge plan  [X]Has rehabilitation potential (expected to make a significant improvement within a reasonable period of time)  [X] Requires close medical management by a rehab physician, rehab nursing care, Hospitalist and comprehensive interdisciplinary team (including PT, OT and/or SLP, Prosthetics and Orthotics)   JAGUAR PRADO is a 50y with a history of DM, HTN, hyperlipidemia who presented to [facility]  on 4/10 with complaint of abdominal pain, diarrhea from laxative use and weakness and 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 Hospital course complicated by uncontrolled diabetes, hyponatremia, ALLIE, . Now admitted to [facility] after for initiation of a multidisciplinary rehab program consisting focused on functional mobility, transfers and ADLs (activities of daily living).    Comprehensive Multidisciplinary Rehab Program:  - Start comprehensive rehab program, 3 hours a day, 5 days a week.  - PT 2hr/day: Focused on improving strength, endurance, coordination, balance, functional mobility, and transfers  - OT 1hr/day: Focused on improving strength, fine motor skills, coordination, posture and ADLs.         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 Rae)  -CT abd and pelvis on 4/12 without acute process however GGO seen in the lungs   -Diet advanced   -Bentyl had been ordered once for abdominal pain   -Avoid constipation     Hyponatremia/ALLIE  -followed by nephro at [facility]  -likely multifactorial including hyperglycemia ( pseudohyponatremia ) and pre renal 2/2 to diarrhea (? ADH induced by pain)   -s/p fluids   -free water restriction <1.2L/day.    DM type 2 with neuropathy : uncontrolled   -A1C 11.5%  -c/w Lantus 52 units qhs, Admelog 14 before dinner  -c/w Admelog 5 units at bedtime only if taking a snack   -Diabetes specialist consult   - at home Pt was on Tresiba 30 units qhs and Novolog 15 units premeal TID.  -Per notes from endocrine service "Discussed with patient regarding discharge planning, he now will have enough Tresiba and Novolog at home to proceed with these insulins due to [facility] program he is now a part of.   DC on Tresiba and Novolog pens doses TBD. Discussed option of mixed insulin in case of future issues obtaining insulin.  He requested changing to [facility] endocrinology. "    + RPR  -ID consulted at [facility]- treated 2017 (PCN)  -RPR titer 1:1 - no s/s of active syphilis  -Per ID, no need for treatment at present time     HTN   -C/w norvasc, losartan, hydralazine, metoprolol tartrate    Hematuria / proteinuria   - labs and urine ordered for vasculitis workup  -Per nephrology, will need kidney biopsy likely as outpt     Mood/Cognition:  - Appropriate cognition and mood.    Sleep:   - Melatonin PRN     Pain Management:- Tylenol PRN, Dilaudid prn  - gabapentin 300mg tid     GI/Bowel:  - At risk for constipation due to neurologic diagnosis, immobility and/or medication use  - Senna QHS, Miralax PRN Daily  - GI ppx: pepcid 20mg bid    /Bladder: toileting prn         Skin/Pressure Injury:   - At risk for pressure injury due to neurologic diagnosis and relative immobility.  - Skin assessment on admission admission: No acute skin lesions  -- Nursing to monitor skin Qshift    Diet/Dysphagia:  - Diet Consistency/Modifications: CC, 1200ml FR, Glucerna BID, no shellfish    DVT ppx:- lovenox   - SCDs    Participation Restrictions/Precautions:  - Weight bearing status: Full  - ROM restrictions: right ankle d/t prior ankle surgery  - Precautions:    [x] Falls          ---------------  Code Status/Emergency Contact:    Outpatient Follow-up (Specialty/Name of physician):    [facility]. Appointments below:  [address] [place] [address], [address], [phone]  6/8/21 @ 11:00 AM with diabetes educator   7/12/21 @ 11:00 AM with Dr Montesinos       GI: Dr Rae   --------------  Estimated Length of Stay: 10-14 days  Rehab Potential: Good  Medical Prognosis: Good  Estimated Disposition: Home with Home Care  ---------------

## 2021-05-05 NOTE — H&P ADULT - NSICDXPASTMEDICALHX_GEN_ALL_CORE_FT
PAST MEDICAL HISTORY:  DM (diabetes mellitus)     HTN (hypertension)     Hyperlipidemia     
PAST MEDICAL HISTORY:  DM (diabetes mellitus)     HTN (hypertension)     Hyperlipidemia

## 2021-05-05 NOTE — DISCHARGE NOTE NURSING/CASE MANAGEMENT/SOCIAL WORK - NSDCPNINST_GEN_ALL_CORE
Continue Diabetes management with Insulin administration at home as per MD orders and teach-back instruction, diet and glucose monitoring, know your A1C blood level and follow up with PMD for continuity of care. Remember hand hygiene, skin inspection for prevention of infection.

## 2021-05-05 NOTE — PROGRESS NOTE ADULT - PROVIDER SPECIALTY LIST ADULT
Endocrinology
Gastroenterology
Internal Medicine
Nephrology
Neurology
Rehab Medicine
Rehab Medicine
Endocrinology
Endocrinology
Gastroenterology
Gastroenterology
Internal Medicine
Nephrology
Neurology
Rehab Medicine
Gastroenterology
Internal Medicine
Nephrology
Neurology
Rehab Medicine
Transfusion Medicine
Endocrinology
Endocrinology
Gastroenterology
Internal Medicine
Nephrology
Neurology
Rehab Medicine
Transfusion Medicine
Endocrinology
Gastroenterology
Nephrology
Endocrinology
Internal Medicine
Internal Medicine
Transfusion Medicine
Nephrology
Nephrology

## 2021-05-05 NOTE — H&P ADULT - NSHPPHYSICALEXAM_GEN_ALL_CORE
Vital Signs  T(C): 36.7 (05-05-21 @ 10:13), Max: 36.9 (05-04-21 @ 20:57)  HR: 78 (05-05-21 @ 10:13) (78 - 95)  BP: 121/72 (05-05-21 @ 10:13) (107/57 - 139/78)  RR: 16 (05-05-21 @ 10:13) (16 - 18)  SpO2: 99% (05-05-21 @ 10:13) (97% - 100%)    Gen - NAD, Comfortable  HEENT - NCAT, EOMI, MMM  Neck - Supple, No limited ROM  Pulm - CTAB, No wheeze, No rhonchi, No crackles  Cardiovascular - RRR, S1S2, No m/r/g  Abdomen - Soft, NT/ND, +BS  Extremities - No C/C/E, No calf tenderness  Neuro-     Cognitive - Awake, Alert, AAO to self, place, date, year, situation     Communication - Fluent, No dysarthria     Attention: Intact      Judgement: Good evidence of judgement     Memory: Recall 3 objects immediate and 3 min later         Cranial Nerves - CN 2-12 intact     Motor -                       LEFT    UE - ShAB 4+/5, EF 4+/5, EE 4+/5, WE4+/5,  4+/5                    RIGHT UE - ShAB 4+/5, EF 4+/5, EE 4+/5, WE 4/5,  4/5                    LEFT    LE - HF 3+/5, KE 3/5, DF 4/5, PF 4/5                    RIGHT LE - HF 3/5, KE 3/5, DF 4/5, PF 4/5        Sensory -impaired distal fingertips, toes- improving per patient     Reflexes - DTR Intact, No primitive reflex     Coordination - FTN intact     Tone - normal  Psychiatric - Mood stable, Affect WNL  Skin: Vital Signs  T(C): 36.7 (05-05-21 @ 10:13), Max: 36.9 (05-04-21 @ 20:57)  HR: 78 (05-05-21 @ 10:13) (78 - 95)  BP: 121/72 (05-05-21 @ 10:13) (107/57 - 139/78)  RR: 16 (05-05-21 @ 10:13) (16 - 18)  SpO2: 99% (05-05-21 @ 10:13) (97% - 100%)    Gen - NAD, Comfortable  HEENT - NCAT, EOMI, MMM  Neck - Supple, No limited ROM  Pulm - CTAB, No wheeze, No rhonchi, No crackles  Cardiovascular - RRR, S1S2, No m/r/g  Abdomen - Soft, NT/ND, +BS  Extremities - No C/C/E, No calf tenderness  Neuro-     Cognitive - Awake, Alert, AAO to self, place, date, year, situation     Communication - Fluent, No dysarthria     Attention: Intact      Judgement: Good evidence of judgement     Memory: Recall 3 objects immediate and 3 min later         Cranial Nerves - CN 2-12 intact     Motor -                       LEFT    UE - ShAB 5/5, EF 5/5, EE 5/5, WE 5/5,  5/5                    RIGHT UE - ShAB 4+/5, EF 4+/5, EE 5/5, WE 5/5,  5/5                    LEFT    LE - HF 2/5, KE 4/5, DF 5/5, PF 5/5                    RIGHT LE - HF 2/5, KE 4/5, DF 5/5, PF 5/5     Sensory - impaired distal fingertips, toes- improving per patient (pt also reports baseline diabetic neuropathy)     Proprioception - Impaired at bilateral halluces     Reflexes - DTR Intact, No primitive reflex     Coordination - FTN intact     Tone - normal  Psychiatric - Mood stable, Affect WNL  Skin: No skin lesions noted on exam. Healed surgical incisons from prior surgery on left knee, right ankle, and RLQ. Vital Signs  T(C): 36.7 (05-05-21 @ 10:13), Max: 36.9 (05-04-21 @ 20:57)  HR: 78 (05-05-21 @ 10:13) (78 - 95)  BP: 121/72 (05-05-21 @ 10:13) (107/57 - 139/78)  RR: 16 (05-05-21 @ 10:13) (16 - 18)  SpO2: 99% (05-05-21 @ 10:13) (97% - 100%)    Gen - NAD, Comfortable  HEENT - NCAT, EOMI, MMM  Neck - Supple, No limited ROM  Pulm - CTAB, No wheeze, No rhonchi, No crackles  Cardiovascular - RRR, S1S2, No m/r/g  Abdomen - Soft, NT/ND, +BS  Extremities - No C/C/E, No calf tenderness  Neuro-     Cognitive - Awake, Alert, AAO to self, place, date, year, situation     Communication - Fluent, No dysarthria     Attention: Intact      Judgement: Good evidence of judgement     Memory: Recall 3 objects immediate and 3 min later         Cranial Nerves - CN 2-12 intact     Motor -                       LEFT    UE - ShAB 5/5, EF 5/5, EE 5/5, WE 5/5,  5/5                    RIGHT UE - ShAB 4+/5, EF 4+/5, EE 5/5, WE 5/5,  5/5                    LEFT    LE - HF 2/5, KE 4/5, DF 5/5, PF 5/5                    RIGHT LE - HF 2/5, KE 4/5, DF 5/5, PF 5/5     Sensory - impaired distal fingertips, toes- improving per patient (pt also reports baseline diabetic neuropathy)     Proprioception - Impaired at bilateral halluces     Reflexes - DTR Intact, No primitive reflex     Coordination - FTN intact     Tone - normal  Psychiatric - Mood stable, Affect WNL  Skin: No skin lesions noted on exam. Healed surgical incisions from prior surgery on left knee, right ankle, and RLQ.

## 2021-05-05 NOTE — H&P ADULT - NSHPLABSRESULTS_GEN_ALL_CORE
RPR Titer (04.13.21 @ 10:10)    RPR Titer: 1:1      Vitamin B1, Serum (04.17.21 @ 12:22)    Vitamin B1, Serum: 129.9: This test was developed and its performance characteristics  determined by [facility]. It has not been cleared or  approved by the Food and Drug Administration.  Performed At:  [facility] [id number]  Duarte Cedeno MD Ph:[phone] nmol/L      Cerebrospinal Fluid Cell Count-1 (04.19.21 @ 20:21)    CSF Color: Colorless    Tube Type: Tube 2    CSF Appearance: Clear    CSF Lymphocytes: 16 %    CSF Monocytes/Macrophages: 4 %    CSF Segmented Neutrophils: 0 %    Appearance Spun: Colorless    Total Cells Counted, Spinal Fluid: 20 Cells    Total Nucleated Cell Count, CSF: 1 cells/uL    RBC Count - Spinal Fluid: 0: Red Cell count correlates with the number and proportion of cells on  cytospin preparation. cells/uL
RPR Titer (04.13.21 @ 10:10)    RPR Titer: 1:1      Vitamin B1, Serum (04.17.21 @ 12:22)    Vitamin B1, Serum: 129.9: This test was developed and its performance characteristics  determined by [facility]. It has not been cleared or  approved by the Food and Drug Administration.  Performed At:  [facility] [id number]  Duarte Cedeno MD Ph:[phone] nmol/L      Cerebrospinal Fluid Cell Count-1 (04.19.21 @ 20:21)    CSF Color: Colorless    Tube Type: Tube 2    CSF Appearance: Clear    CSF Lymphocytes: 16 %    CSF Monocytes/Macrophages: 4 %    CSF Segmented Neutrophils: 0 %    Appearance Spun: Colorless    Total Cells Counted, Spinal Fluid: 20 Cells    Total Nucleated Cell Count, CSF: 1 cells/uL    RBC Count - Spinal Fluid: 0: Red Cell count correlates with the number and proportion of cells on  cytospin preparation. cells/uL

## 2021-05-06 LAB
ALBUMIN SERPL ELPH-MCNC: 3.5 G/DL — SIGNIFICANT CHANGE UP (ref 3.3–5)
ALP SERPL-CCNC: 68 U/L — SIGNIFICANT CHANGE UP (ref 40–120)
ALT FLD-CCNC: 41 U/L — SIGNIFICANT CHANGE UP (ref 10–45)
ANION GAP SERPL CALC-SCNC: 9 MMOL/L — SIGNIFICANT CHANGE UP (ref 5–17)
AST SERPL-CCNC: 21 U/L — SIGNIFICANT CHANGE UP (ref 10–40)
BASOPHILS # BLD AUTO: 0.04 K/UL — SIGNIFICANT CHANGE UP (ref 0–0.2)
BASOPHILS NFR BLD AUTO: 0.6 % — SIGNIFICANT CHANGE UP (ref 0–2)
BILIRUB SERPL-MCNC: 0.4 MG/DL — SIGNIFICANT CHANGE UP (ref 0.2–1.2)
BUN SERPL-MCNC: 18 MG/DL — SIGNIFICANT CHANGE UP (ref 7–23)
CALCIUM SERPL-MCNC: 9.3 MG/DL — SIGNIFICANT CHANGE UP (ref 8.4–10.5)
CHLORIDE SERPL-SCNC: 107 MMOL/L — SIGNIFICANT CHANGE UP (ref 96–108)
CO2 SERPL-SCNC: 27 MMOL/L — SIGNIFICANT CHANGE UP (ref 22–31)
COVID-19 SPIKE DOMAIN AB INTERP: POSITIVE
COVID-19 SPIKE DOMAIN ANTIBODY RESULT: 195 U/ML — HIGH
CREAT SERPL-MCNC: 1.03 MG/DL — SIGNIFICANT CHANGE UP (ref 0.5–1.3)
EOSINOPHIL # BLD AUTO: 0.25 K/UL — SIGNIFICANT CHANGE UP (ref 0–0.5)
EOSINOPHIL NFR BLD AUTO: 3.7 % — SIGNIFICANT CHANGE UP (ref 0–6)
GLUCOSE BLDC GLUCOMTR-MCNC: 103 MG/DL — HIGH (ref 70–99)
GLUCOSE BLDC GLUCOMTR-MCNC: 145 MG/DL — HIGH (ref 70–99)
GLUCOSE BLDC GLUCOMTR-MCNC: 180 MG/DL — HIGH (ref 70–99)
GLUCOSE BLDC GLUCOMTR-MCNC: 91 MG/DL — SIGNIFICANT CHANGE UP (ref 70–99)
GLUCOSE SERPL-MCNC: 150 MG/DL — HIGH (ref 70–99)
HCT VFR BLD CALC: 30.7 % — LOW (ref 39–50)
HGB BLD-MCNC: 10.2 G/DL — LOW (ref 13–17)
IMM GRANULOCYTES NFR BLD AUTO: 0.6 % — SIGNIFICANT CHANGE UP (ref 0–1.5)
LYMPHOCYTES # BLD AUTO: 2.13 K/UL — SIGNIFICANT CHANGE UP (ref 1–3.3)
LYMPHOCYTES # BLD AUTO: 31.6 % — SIGNIFICANT CHANGE UP (ref 13–44)
MAGNESIUM SERPL-MCNC: 1.9 MG/DL — SIGNIFICANT CHANGE UP (ref 1.6–2.6)
MCHC RBC-ENTMCNC: 29.5 PG — SIGNIFICANT CHANGE UP (ref 27–34)
MCHC RBC-ENTMCNC: 33.2 GM/DL — SIGNIFICANT CHANGE UP (ref 32–36)
MCV RBC AUTO: 88.7 FL — SIGNIFICANT CHANGE UP (ref 80–100)
MONOCYTES # BLD AUTO: 0.64 K/UL — SIGNIFICANT CHANGE UP (ref 0–0.9)
MONOCYTES NFR BLD AUTO: 9.5 % — SIGNIFICANT CHANGE UP (ref 2–14)
NEUTROPHILS # BLD AUTO: 3.63 K/UL — SIGNIFICANT CHANGE UP (ref 1.8–7.4)
NEUTROPHILS NFR BLD AUTO: 54 % — SIGNIFICANT CHANGE UP (ref 43–77)
NRBC # BLD: 0 /100 WBCS — SIGNIFICANT CHANGE UP (ref 0–0)
PHOSPHATE SERPL-MCNC: 5.2 MG/DL — HIGH (ref 2.5–4.5)
PLATELET # BLD AUTO: 290 K/UL — SIGNIFICANT CHANGE UP (ref 150–400)
POTASSIUM SERPL-MCNC: 4.1 MMOL/L — SIGNIFICANT CHANGE UP (ref 3.5–5.3)
POTASSIUM SERPL-SCNC: 4.1 MMOL/L — SIGNIFICANT CHANGE UP (ref 3.5–5.3)
PROT SERPL-MCNC: 6.3 G/DL — SIGNIFICANT CHANGE UP (ref 6–8.3)
RBC # BLD: 3.46 M/UL — LOW (ref 4.2–5.8)
RBC # FLD: 12.2 % — SIGNIFICANT CHANGE UP (ref 10.3–14.5)
SARS-COV-2 IGG+IGM SERPL QL IA: 195 U/ML — HIGH
SARS-COV-2 IGG+IGM SERPL QL IA: POSITIVE
SODIUM SERPL-SCNC: 143 MMOL/L — SIGNIFICANT CHANGE UP (ref 135–145)
WBC # BLD: 6.73 K/UL — SIGNIFICANT CHANGE UP (ref 3.8–10.5)
WBC # FLD AUTO: 6.73 K/UL — SIGNIFICANT CHANGE UP (ref 3.8–10.5)

## 2021-05-06 PROCEDURE — 99232 SBSQ HOSP IP/OBS MODERATE 35: CPT

## 2021-05-06 PROCEDURE — 99255 IP/OBS CONSLTJ NEW/EST HI 80: CPT

## 2021-05-06 RX ORDER — INSULIN LISPRO 100/ML
16 VIAL (ML) SUBCUTANEOUS
Refills: 0 | Status: DISCONTINUED | OUTPATIENT
Start: 2021-05-06 | End: 2021-05-10

## 2021-05-06 RX ORDER — HYDROMORPHONE HYDROCHLORIDE 2 MG/ML
2 INJECTION INTRAMUSCULAR; INTRAVENOUS; SUBCUTANEOUS EVERY 6 HOURS
Refills: 0 | Status: DISCONTINUED | OUTPATIENT
Start: 2021-05-06 | End: 2021-05-10

## 2021-05-06 RX ORDER — INSULIN LISPRO 100/ML
14 VIAL (ML) SUBCUTANEOUS
Refills: 0 | Status: DISCONTINUED | OUTPATIENT
Start: 2021-05-06 | End: 2021-05-10

## 2021-05-06 RX ADMIN — Medication 14 UNIT(S): at 17:11

## 2021-05-06 RX ADMIN — Medication 14 UNIT(S): at 11:45

## 2021-05-06 RX ADMIN — ENOXAPARIN SODIUM 40 MILLIGRAM(S): 100 INJECTION SUBCUTANEOUS at 11:44

## 2021-05-06 RX ADMIN — HYDROMORPHONE HYDROCHLORIDE 2 MILLIGRAM(S): 2 INJECTION INTRAMUSCULAR; INTRAVENOUS; SUBCUTANEOUS at 01:43

## 2021-05-06 RX ADMIN — Medication 2: at 11:45

## 2021-05-06 RX ADMIN — OXYCODONE HYDROCHLORIDE 5 MILLIGRAM(S): 5 TABLET ORAL at 17:18

## 2021-05-06 RX ADMIN — GABAPENTIN 300 MILLIGRAM(S): 400 CAPSULE ORAL at 14:21

## 2021-05-06 RX ADMIN — FAMOTIDINE 20 MILLIGRAM(S): 10 INJECTION INTRAVENOUS at 06:20

## 2021-05-06 RX ADMIN — HYDROMORPHONE HYDROCHLORIDE 2 MILLIGRAM(S): 2 INJECTION INTRAMUSCULAR; INTRAVENOUS; SUBCUTANEOUS at 21:44

## 2021-05-06 RX ADMIN — FAMOTIDINE 20 MILLIGRAM(S): 10 INJECTION INTRAVENOUS at 17:11

## 2021-05-06 RX ADMIN — HYDROMORPHONE HYDROCHLORIDE 2 MILLIGRAM(S): 2 INJECTION INTRAMUSCULAR; INTRAVENOUS; SUBCUTANEOUS at 10:20

## 2021-05-06 RX ADMIN — Medication 25 MILLIGRAM(S): at 21:45

## 2021-05-06 RX ADMIN — GABAPENTIN 300 MILLIGRAM(S): 400 CAPSULE ORAL at 06:20

## 2021-05-06 RX ADMIN — Medication 650 MILLIGRAM(S): at 06:46

## 2021-05-06 RX ADMIN — OXYCODONE HYDROCHLORIDE 5 MILLIGRAM(S): 5 TABLET ORAL at 06:20

## 2021-05-06 RX ADMIN — HYDROMORPHONE HYDROCHLORIDE 2 MILLIGRAM(S): 2 INJECTION INTRAMUSCULAR; INTRAVENOUS; SUBCUTANEOUS at 02:02

## 2021-05-06 RX ADMIN — INSULIN GLARGINE 52 UNIT(S): 100 INJECTION, SOLUTION SUBCUTANEOUS at 21:50

## 2021-05-06 RX ADMIN — Medication 12.5 MILLIGRAM(S): at 06:20

## 2021-05-06 RX ADMIN — HYDROMORPHONE HYDROCHLORIDE 2 MILLIGRAM(S): 2 INJECTION INTRAMUSCULAR; INTRAVENOUS; SUBCUTANEOUS at 10:50

## 2021-05-06 RX ADMIN — OXYCODONE HYDROCHLORIDE 5 MILLIGRAM(S): 5 TABLET ORAL at 06:46

## 2021-05-06 RX ADMIN — AMLODIPINE BESYLATE 10 MILLIGRAM(S): 2.5 TABLET ORAL at 06:20

## 2021-05-06 RX ADMIN — HYDROMORPHONE HYDROCHLORIDE 2 MILLIGRAM(S): 2 INJECTION INTRAMUSCULAR; INTRAVENOUS; SUBCUTANEOUS at 22:06

## 2021-05-06 RX ADMIN — GABAPENTIN 300 MILLIGRAM(S): 400 CAPSULE ORAL at 21:45

## 2021-05-06 RX ADMIN — LOSARTAN POTASSIUM 100 MILLIGRAM(S): 100 TABLET, FILM COATED ORAL at 06:20

## 2021-05-06 RX ADMIN — OXYCODONE HYDROCHLORIDE 5 MILLIGRAM(S): 5 TABLET ORAL at 17:53

## 2021-05-06 RX ADMIN — Medication 650 MILLIGRAM(S): at 06:23

## 2021-05-06 RX ADMIN — Medication 25 MILLIGRAM(S): at 06:20

## 2021-05-06 NOTE — CONSULT NOTE ADULT - ASSESSMENT
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 Hospital course complicated by uncontrolled diabetes, hyponatremia, ALLIE, . Now admitted to [facility] after for initiation of a multidisciplinary rehab program consisting focused on functional mobility, transfers and ADLs (activities of daily living).    GBS  -numbness and weakness improving  -S/p PLEX  -gabapentin 300mg q8  -Follow up with neuro after dc (f/u remaining LP results)  -pain management as per rehab  -would taper down dilaudid    Hyponatremia/ALLIE - resolved  -followed by nephro at [facility]  -likely multifactorial including hyperglycemia ( pseudohyponatremia ) and pre renal 2/2 to diarrhea (? ADH induced by pain)   -s/p fluids   -free water restriction <1.2L/day.    DM type 2 with neuropathy : uncontrolled   -A1C 11.5%  -c/w Lantus 52 units qhs, Admelog 14 before dinner  -c/w Admelog 5 units at bedtime only if taking a snack   -at home Pt was on Tresiba 30 units qhs and Novolog 15 units premeal TID.    Anemia likely anemia of chronic disease  -H/H stable  -f/u anemia workup    + RPR  -ID consulted at [facility]- treated 2017 (PCN)  -RPR titer 1:1 - no s/s of active syphilis  -Per ID, no need for treatment at present time     HTN   -BP acceptable  -C/w norvasc, losartan, hydralazine, metoprolol tartrate    Hematuria / proteinuria   -hematuria resolved  -labs and urine ordered for vasculitis workup  -Per nephrology, will need kidney biopsy likely as outpt       Sleep:  - Melatonin PRN     Diet/Dysphagia:  - Diet Consistency/Modifications: CC, 1200ml FR, Glucerna BID, no shellfish    DVT ppx:- lovenox   - SCDs

## 2021-05-06 NOTE — CONSULT NOTE ADULT - SUBJECTIVE AND OBJECTIVE BOX
49 y/o male, PMH DM, HTN, hyperlipidemia; pt presented to the ED at [facility] on 4/10 with c/o abdominal cramping, generalized, and diarrhea following use of laxatives for constipation. 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 Patient refused IVIG but underwent PLEX. pt also had hyponatremia. work up suggestive of SIADH  Also, noted to have proteinuia/hematuria. Renal felt sec to Diabetic Nephropathy but patient will need full vasculitis work up and based on work up result, he might need kidney biopsy which would be scheduled as an outpatient. ID consulted due to h/o syphilis in the past. Patient had undergone treatment in the past. No need for further treatment.     Patient now reports numbness/tingling improved and muscle strength improved. Pt c/o increased left scapula pain for a few days. Patient attributed to increased use of UEs in PT. reports that pain is 8/10 at its worse, comes and goes, radiates down his spine, exacerbates by movement. No fever, chills, cp, sob, abd pain, n/v/d, dysuria.     PAST MEDICAL & SURGICAL HISTORY:  DM (diabetes mellitus)    HTN (hypertension)    Hyperlipidemia    H/O total knee replacement    Foot fracture    Family history: Mother, alive, history of colon canacer and diabetes      social history: denies smoking, ETOH, or illicit drug        ALLERGIES:  No Known Drug Allergies  shellfish (Urticaria)    MEDICATIONS  (STANDING):  amLODIPine   Tablet 10 milliGRAM(s) Oral daily  bisacodyl 5 milliGRAM(s) Oral at bedtime  dextrose 40% Gel 15 Gram(s) Oral once  dextrose 5%. 1000 milliLiter(s) (50 mL/Hr) IV Continuous <Continuous>  dextrose 5%. 1000 milliLiter(s) (100 mL/Hr) IV Continuous <Continuous>  dextrose 50% Injectable 25 Gram(s) IV Push once  dextrose 50% Injectable 12.5 Gram(s) IV Push once  dextrose 50% Injectable 25 Gram(s) IV Push once  enoxaparin Injectable 40 milliGRAM(s) SubCutaneous daily  famotidine    Tablet 20 milliGRAM(s) Oral two times a day  gabapentin 300 milliGRAM(s) Oral every 8 hours  glucagon  Injectable 1 milliGRAM(s) IntraMuscular once  hydrALAZINE 25 milliGRAM(s) Oral three times a day  insulin glargine Injectable (LANTUS) 52 Unit(s) SubCutaneous at bedtime  insulin lispro (ADMELOG) corrective regimen sliding scale   SubCutaneous three times a day before meals  insulin lispro (ADMELOG) corrective regimen sliding scale   SubCutaneous at bedtime  insulin lispro Injectable (ADMELOG) 14 Unit(s) SubCutaneous before dinner  insulin lispro Injectable (ADMELOG) 5 Unit(s) SubCutaneous <User Schedule>  insulin lispro Injectable (ADMELOG) 14 Unit(s) SubCutaneous before lunch  insulin lispro Injectable (ADMELOG) 16 Unit(s) SubCutaneous before breakfast  losartan 100 milliGRAM(s) Oral daily  metoprolol tartrate 12.5 milliGRAM(s) Oral two times a day  senna 2 Tablet(s) Oral two times a day    MEDICATIONS  (PRN):  acetaminophen   Tablet .. 650 milliGRAM(s) Oral every 6 hours PRN Mild Pain (1 - 3)  HYDROmorphone   Tablet 2 milliGRAM(s) Oral every 8 hours PRN Severe Pain (7 - 10)  oxyCODONE    IR 5 milliGRAM(s) Oral every 6 hours PRN Moderate Pain (4 - 6)    Vital Signs Last 24 Hrs  T(F): 98 (06 May 2021 07:45), Max: 98.4 (05 May 2021 14:08)  HR: 75 (06 May 2021 07:45) (75 - 94)  BP: 115/74 (06 May 2021 07:45) (104/68 - 145/91)  RR: 15 (06 May 2021 07:45) (15 - 17)  SpO2: 97% (06 May 2021 07:45) (97% - 98%)  I&O's Summary    PHYSICAL EXAM:  General: NAD, A/O x 3  ENT: MMM  Neck: Supple, No JVD  Lungs: Clear to auscultation bilaterally  Cardio: RRR, S1/S2, No murmurs  Abdomen: Soft, Nontender, Nondistended; Bowel sounds present  Extremities: No calf tenderness, No pitting edema  neuro: right UE and LE 4/5, L UE and LLE 4+/5     LABS:                        10.2   6.73  )-----------( 290      ( 06 May 2021 06:30 )             30.7     05-06    143  |  107  |  18  ----------------------------<  150  4.1   |  27  |  1.03    Ca    9.3      06 May 2021 06:30  Phos  5.2     05-06  Mg     1.9     05-06    TPro  6.3  /  Alb  3.5  /  TBili  0.4  /  DBili  x   /  AST  21  /  ALT  41  /  AlkPhos  68  05-06    eGFR if Non African American: 83 mL/min/1.73M2 (05-06-21 @ 06:30)  eGFR if : 96 mL/min/1.73M2 (05-06-21 @ 06:30)    POCT Blood Glucose.: 180 mg/dL (06 May 2021 11:43)  POCT Blood Glucose.: 145 mg/dL (06 May 2021 07:53)  POCT Blood Glucose.: 124 mg/dL (05 May 2021 22:21)  POCT Blood Glucose.: 143 mg/dL (05 May 2021 18:02)  POCT Blood Glucose.: 147 mg/dL (05 May 2021 13:59)      RADIOLOGY & ADDITIONAL TESTS:      Care Discussed with Consultants/Other Providers: Rehab team

## 2021-05-06 NOTE — PROGRESS NOTE ADULT - ASSESSMENT
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 Hospital course complicated by uncontrolled diabetes, hyponatremia, ALLIE, . Now admitted to [facility] after for initiation of a multidisciplinary rehab program consisting focused on functional mobility, transfers and ADLs (activities of daily living).    Comprehensive Multidisciplinary Rehab Program:  - Start comprehensive rehab program, 3 hours a day, 5 days a week.  - PT 2hr/day: Focused on improving strength, endurance, coordination, balance, functional mobility, and transfers  - OT 1hr/day: Focused on improving strength, fine motor skills, coordination, posture and ADLs.       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 Rae)  -CT abd and pelvis on 4/12 without acute process however GGO seen in the lungs   -Diet advanced   -Bentyl had been ordered once for abdominal pain   -Avoid constipation     Hyponatremia/ALLIE  -followed by nephro at [facility]  -likely multifactorial including hyperglycemia ( pseudohyponatremia ) and pre renal 2/2 to diarrhea (? ADH induced by pain)   -s/p fluids   -free water restriction <1.2L/day.    DM type 2 with neuropathy : uncontrolled   -A1C 11.5%  -c/w Lantus 52 units qhs, Admelog 16-14-14 before meals  -c/w Admelog 5 units at bedtime only if taking a snack   -Diabetes specialist consulted at [facility] 5/5/21  - at home Pt was on Tresiba 30 units qhs and Novolog 15 units premeal TID.  -Per notes from endocrine service "Discussed with patient regarding discharge planning, he now will have enough Tresiba and Novolog at home to proceed with these insulins due to [facility] program he is now a part of.   DC on Tresiba and Novolog pens doses TBD. Discussed option of mixed insulin in case of future issues obtaining insulin.  He requested changing to [facility] endocrinology. "  - DC recs from [facility] Endocrine/Diabetic NP:   "DC on Tresiba pen - 52 units sq qhs  and Novolog pens 16/14/14  Patient will need BD osmar pen needles 4x daily.    Please ask patient if he needs new glucometer, test strips, lancets, alcohol pads  Also please prescribe glucagon kit for hypoglycemia or Baqsuimi nasal spray for hypoglycemia.  WHichever is lowest cost or covered by insurance."    + RPR  -ID consulted at [facility]- treated 2017 (PCN)  -RPR titer 1:1 - no s/s of active syphilis  -Per ID, no need for treatment at present time     HTN   -C/w norvasc, losartan, hydralazine, metoprolol tartrate    Hematuria / proteinuria   - labs and urine ordered for vasculitis workup  -Per nephrology, will need kidney biopsy likely as outpt     Mood/Cognition:  - Appropriate cognition and mood.    Sleep:   - Melatonin PRN     Pain Management:- Tylenol PRN, Dilaudid prn  - gabapentin 300mg tid     GI/Bowel:  - At risk for constipation due to neurologic diagnosis, immobility and/or medication use  - Senna QHS, Miralax PRN Daily  - GI ppx: pepcid 20mg bid    /Bladder: toileting prn         Skin/Pressure Injury:   - At risk for pressure injury due to neurologic diagnosis and relative immobility.  - Skin assessment on admission admission: No acute skin lesions  -- Nursing to monitor skin Qshift    Diet/Dysphagia:  - Diet Consistency/Modifications: CC, 1200ml FR, Glucerna BID, no shellfish    DVT ppx:- lovenox   - SCDs    Participation Restrictions/Precautions:  - Weight bearing status: Full  - ROM restrictions: right ankle d/t prior ankle surgery  - Precautions:    [x] Falls          ---------------  Code Status/Emergency Contact:    Outpatient Follow-up (Specialty/Name of physician):    [facility] endocrinology. Appointments below:  [address] [place] [address], [address], [phone]  6/8/21 @ 11:00 AM with diabetes educator   7/12/21 @ 11:00 AM with Dr Montesinos       GI: Dr Rae

## 2021-05-06 NOTE — PROGRESS NOTE ADULT - SUBJECTIVE AND OBJECTIVE BOX
HPI:  This is a 51 y/o male, PMH DM, HTN, hyperlipidemia; pt presented to the ED at [facility] on 4/10 with c/o abdominal cramping, generalized, and diarrhea following use of laxatives for constipation.  In the ED, WBC 10.82, Hb 13.6, CMP: sodium 126, chloride 89, glucose 281, CMP otherwise unremarkable.  VBG: Lactate 2.6 (later downtrended to 1.6).  Pt. was treated with IV hydration and transferred to CDU for continued care plan:  IV hydration, supportive care, AM labs, general observation care / monitoring. Patient continued to have generalized weakness, numbness and persistent hyponatremia. Patient admitted for further evaluation.  Neurology consulted. 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 Patient refused IVIG but underwent PLEX.   Nephrology consulted for ALLIE and hyponatremia. Likely due to hypovolemia in setting of hyperglycemia. Work up suggested SIADH. Recs to optimize DM control, continue free water restriction <1.2L/day, and monitor Na levels (Na level should not increase more than 6meq in 24 hrs). Also, noted to have proteinuia/hematuria. Renal felt sec to Diabetic Nephropathy but patient will need full vasculitis work up and based on work up result, he might need kidney biopsy which would be scheduled as an outpatient.   Endocrinology also following for uncontrolled diabetes. Insulin regimen adjusted with improvement in glucoses.   ID consulted due to h/o syphilis in the past. Patient had undergone treatment in the past. No need for further treatment.   Last night (5/3), patient reported increased left scapula pain and given IV Dilaudid. Patient attributed to increased use of UEs in PT. Pain management NP consulted. Recommended switch to PO Dilaudid.   Patient has been seen by PT, OT and PM&R attending with recommendations for acute IPR.   Patient is deemed medically stable for transfer to acute IPR at [facility] on 5/4 and arrived 5/5. (05 May 2021 13:33)      INTERVAL HPI/OVERNIGHT EVENTS:  Chart Reviewed and patient seen at bedside.  Patient says he got raw chicken last night for dinner. Ended up eating a turkey sandwich for dinner in the end, which was satisfactory.  Refused COVID-19 surveillance swab this AM. Is aware of the risks to himself and others.   +BM, no complaints otherwise.    ROS:  Denies fevers, chills, chest pain, shortness of breath, abdominal pain, headaches, nausea/vomiting    Vital Signs Last 24 Hrs  T(C): 36.7 (06 May 2021 07:45), Max: 36.9 (05 May 2021 14:08)  T(F): 98 (06 May 2021 07:45), Max: 98.4 (05 May 2021 14:08)  HR: 75 (06 May 2021 07:45) (75 - 94)  BP: 115/74 (06 May 2021 07:45) (104/68 - 145/91)  BP(mean): --  RR: 15 (06 May 2021 07:45) (15 - 17)  SpO2: 97% (06 May 2021 07:45) (97% - 99%)    Physical Exam:  Gen - NAD, Comfortable  HEENT - NCAT, EOMI, MMM  Neck - Supple, No limited ROM  Pulm - CTAB, No wheeze, No rhonchi, No crackles  Cardiovascular - RRR, S1S2, No m/r/g  Abdomen - Soft, NT/ND, +BS  Extremities - No C/C/E, No calf tenderness  Neuro-     Cognitive - Awake, Alert, AAO to self, place, date, year, situation     Communication - Fluent, No dysarthria     Motor -                       LEFT    UE - ShAB 5/5, EF 5/5, EE 5/5, WE 5/5,  5/5                    RIGHT UE - ShAB 4+/5, EF 4+/5, EE 5/5, WE 5/5,  5/5                    LEFT    LE - HF 2/5, KE 4/5, DF 5/5, PF 5/5                    RIGHT LE - HF 2/5, KE 4/5, DF 5/5, PF 5/5     Sensory - impaired distal fingertips, toes- improving per patient (pt also reports baseline diabetic neuropathy)  Psychiatric - Mood stable, Affect WNL    LABS:  05-06    143  |  107  |  18  ----------------------------<  150<H>  4.1   |  27  |  1.03    Ca    9.3      06 May 2021 06:30  Phos  5.2     05-06  Mg     1.9     05-06    TPro  6.3  /  Alb  3.5  /  TBili  0.4  /  DBili  x   /  AST  21  /  ALT  41  /  AlkPhos  68  05-06                                              10.2   6.73  )-----------( 290      ( 06 May 2021 06:30 )             30.7     CAPILLARY BLOOD GLUCOSE      POCT Blood Glucose.: 145 mg/dL (06 May 2021 07:53)  POCT Blood Glucose.: 124 mg/dL (05 May 2021 22:21)  POCT Blood Glucose.: 143 mg/dL (05 May 2021 18:02)  POCT Blood Glucose.: 147 mg/dL (05 May 2021 13:59)  POCT Blood Glucose.: 111 mg/dL (05 May 2021 11:44)      MEDICATIONS:  MEDICATIONS  (STANDING):  amLODIPine   Tablet 10 milliGRAM(s) Oral daily  bisacodyl 5 milliGRAM(s) Oral at bedtime  dextrose 40% Gel 15 Gram(s) Oral once  dextrose 5%. 1000 milliLiter(s) (50 mL/Hr) IV Continuous <Continuous>  dextrose 5%. 1000 milliLiter(s) (100 mL/Hr) IV Continuous <Continuous>  dextrose 50% Injectable 25 Gram(s) IV Push once  dextrose 50% Injectable 12.5 Gram(s) IV Push once  dextrose 50% Injectable 25 Gram(s) IV Push once  enoxaparin Injectable 40 milliGRAM(s) SubCutaneous daily  famotidine    Tablet 20 milliGRAM(s) Oral two times a day  gabapentin 300 milliGRAM(s) Oral every 8 hours  glucagon  Injectable 1 milliGRAM(s) IntraMuscular once  hydrALAZINE 25 milliGRAM(s) Oral three times a day  insulin glargine Injectable (LANTUS) 52 Unit(s) SubCutaneous at bedtime  insulin lispro (ADMELOG) corrective regimen sliding scale   SubCutaneous three times a day before meals  insulin lispro (ADMELOG) corrective regimen sliding scale   SubCutaneous at bedtime  insulin lispro Injectable (ADMELOG) 14 Unit(s) SubCutaneous before dinner  insulin lispro Injectable (ADMELOG) 5 Unit(s) SubCutaneous <User Schedule>  losartan 100 milliGRAM(s) Oral daily  metoprolol tartrate 12.5 milliGRAM(s) Oral two times a day  senna 2 Tablet(s) Oral two times a day    MEDICATIONS  (PRN):  acetaminophen   Tablet .. 650 milliGRAM(s) Oral every 6 hours PRN Mild Pain (1 - 3)  HYDROmorphone   Tablet 2 milliGRAM(s) Oral every 8 hours PRN Severe Pain (7 - 10)  oxyCODONE    IR 5 milliGRAM(s) Oral every 6 hours PRN Moderate Pain (4 - 6)

## 2021-05-07 LAB
GLUCOSE BLDC GLUCOMTR-MCNC: 122 MG/DL — HIGH (ref 70–99)
GLUCOSE BLDC GLUCOMTR-MCNC: 139 MG/DL — HIGH (ref 70–99)
GLUCOSE BLDC GLUCOMTR-MCNC: 194 MG/DL — HIGH (ref 70–99)
GLUCOSE BLDC GLUCOMTR-MCNC: 81 MG/DL — SIGNIFICANT CHANGE UP (ref 70–99)

## 2021-05-07 PROCEDURE — 99232 SBSQ HOSP IP/OBS MODERATE 35: CPT

## 2021-05-07 RX ADMIN — ENOXAPARIN SODIUM 40 MILLIGRAM(S): 100 INJECTION SUBCUTANEOUS at 12:09

## 2021-05-07 RX ADMIN — HYDROMORPHONE HYDROCHLORIDE 2 MILLIGRAM(S): 2 INJECTION INTRAMUSCULAR; INTRAVENOUS; SUBCUTANEOUS at 19:55

## 2021-05-07 RX ADMIN — GABAPENTIN 300 MILLIGRAM(S): 400 CAPSULE ORAL at 21:47

## 2021-05-07 RX ADMIN — Medication 25 MILLIGRAM(S): at 06:22

## 2021-05-07 RX ADMIN — HYDROMORPHONE HYDROCHLORIDE 2 MILLIGRAM(S): 2 INJECTION INTRAMUSCULAR; INTRAVENOUS; SUBCUTANEOUS at 20:17

## 2021-05-07 RX ADMIN — GABAPENTIN 300 MILLIGRAM(S): 400 CAPSULE ORAL at 06:23

## 2021-05-07 RX ADMIN — HYDROMORPHONE HYDROCHLORIDE 2 MILLIGRAM(S): 2 INJECTION INTRAMUSCULAR; INTRAVENOUS; SUBCUTANEOUS at 06:23

## 2021-05-07 RX ADMIN — HYDROMORPHONE HYDROCHLORIDE 2 MILLIGRAM(S): 2 INJECTION INTRAMUSCULAR; INTRAVENOUS; SUBCUTANEOUS at 13:10

## 2021-05-07 RX ADMIN — HYDROMORPHONE HYDROCHLORIDE 2 MILLIGRAM(S): 2 INJECTION INTRAMUSCULAR; INTRAVENOUS; SUBCUTANEOUS at 12:41

## 2021-05-07 RX ADMIN — Medication 2: at 08:01

## 2021-05-07 RX ADMIN — Medication 5 MILLIGRAM(S): at 21:47

## 2021-05-07 RX ADMIN — INSULIN GLARGINE 52 UNIT(S): 100 INJECTION, SOLUTION SUBCUTANEOUS at 21:48

## 2021-05-07 RX ADMIN — SENNA PLUS 2 TABLET(S): 8.6 TABLET ORAL at 17:31

## 2021-05-07 RX ADMIN — Medication 25 MILLIGRAM(S): at 21:47

## 2021-05-07 RX ADMIN — Medication 12.5 MILLIGRAM(S): at 06:23

## 2021-05-07 RX ADMIN — LOSARTAN POTASSIUM 100 MILLIGRAM(S): 100 TABLET, FILM COATED ORAL at 06:23

## 2021-05-07 RX ADMIN — Medication 5 UNIT(S): at 21:47

## 2021-05-07 RX ADMIN — AMLODIPINE BESYLATE 10 MILLIGRAM(S): 2.5 TABLET ORAL at 06:23

## 2021-05-07 RX ADMIN — FAMOTIDINE 20 MILLIGRAM(S): 10 INJECTION INTRAVENOUS at 06:23

## 2021-05-07 RX ADMIN — HYDROMORPHONE HYDROCHLORIDE 2 MILLIGRAM(S): 2 INJECTION INTRAMUSCULAR; INTRAVENOUS; SUBCUTANEOUS at 06:47

## 2021-05-07 RX ADMIN — FAMOTIDINE 20 MILLIGRAM(S): 10 INJECTION INTRAVENOUS at 17:31

## 2021-05-07 RX ADMIN — Medication 25 MILLIGRAM(S): at 14:45

## 2021-05-07 RX ADMIN — Medication 16 UNIT(S): at 08:02

## 2021-05-07 RX ADMIN — Medication 14 UNIT(S): at 17:31

## 2021-05-07 RX ADMIN — GABAPENTIN 300 MILLIGRAM(S): 400 CAPSULE ORAL at 14:45

## 2021-05-07 RX ADMIN — Medication 12.5 MILLIGRAM(S): at 17:31

## 2021-05-07 NOTE — DIETITIAN INITIAL EVALUATION ADULT. - PERSON TAUGHT/METHOD
Education Provided on Need for Increased Fluids/Fiber & Consistent Carbohydrate Diet/verbal instruction/patient instructed

## 2021-05-07 NOTE — DIETITIAN INITIAL EVALUATION ADULT. - ADD RECOMMEND
1) Monitor Weights, Intake, Tolerance, Skin, POCT & Labwork   2) Education Provided on Need for Increased Fluids/Fiber & Consistent Carbohydrate Diet 3) Continue Nutrition Plan of Care

## 2021-05-07 NOTE — DIETITIAN INITIAL EVALUATION ADULT. - PERTINENT MEDS FT
Lovenox, Metoprolol, Lantus, Admelog, Norvasc, Dilaudid, Cozaar, Depression, Hydralazine, Metoprolol,

## 2021-05-07 NOTE — DIETITIAN INITIAL EVALUATION ADULT. - ORAL INTAKE PTA/DIET HISTORY
Patient Does Follow Diabetic Style Diet @Home, Consumes 3 Meals a Day w/ Snack In Between & Does Take Vitamin/Supplements @Home (Vitamin D, Multivitamin, Fish Oil)    on Consistent Carbohydrate Diet w/ Thin Liquids , 1,500ml/day Fluid Restriction & Glucerna 8oz PO BID (Per Patient Request)   Education Provided on Need for Increased Fluids/Fiber & Consistent Carbohydrate Diet

## 2021-05-07 NOTE — DIETITIAN INITIAL EVALUATION ADULT. - NSPROEDALEARNPREF_GEN_A_NUR
Education Provided on Need for Increased Fluids/Fiber & Consistent Carbohydrate Diet/verbal instruction

## 2021-05-07 NOTE — PROGRESS NOTE ADULT - ASSESSMENT
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 Hospital course complicated by uncontrolled diabetes, hyponatremia, ALLIE, . Now admitted to [facility] after for initiation of a multidisciplinary rehab program consisting focused on functional mobility, transfers and ADLs (activities of daily living).    Comprehensive Multidisciplinary Rehab Program:  - Start comprehensive rehab program, 3 hours a day, 5 days a week.  - PT 2hr/day: Focused on improving strength, endurance, coordination, balance, functional mobility, and transfers  - OT 1hr/day: Focused on improving strength, fine motor skills, coordination, posture and ADLs.       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 Rae)  -CT abd and pelvis on 4/12 without acute process however GGO seen in the lungs   -Diet advanced   -Bentyl had been ordered once for abdominal pain   -Avoid constipation     Hyponatremia/ALLIE  -followed by nephro at [facility]  -likely multifactorial including hyperglycemia ( pseudohyponatremia ) and pre renal 2/2 to diarrhea (? ADH induced by pain)   -s/p fluids   -free water restriction <1.2L/day.    DM type 2 with neuropathy : uncontrolled   -A1C 11.5%  -c/w Lantus 52 units qhs, Admelog 16-14-14 before meals  -c/w Admelog 5 units at bedtime only if taking a snack   -Diabetes specialist consulted at [facility] 5/5/21  - at home Pt was on Tresiba 30 units qhs and Novolog 15 units premeal TID.  -Per notes from endocrine service "Discussed with patient regarding discharge planning, he now will have enough Tresiba and Novolog at home to proceed with these insulins due to [employer] [employer] program he is now a part of.   DC on Tresiba and Novolog pens doses TBD. Discussed option of mixed insulin in case of future issues obtaining insulin.  He requested changing to [facility] endocrinology. "  - DC recs from [facility] Endocrine/Diabetic NP:   "DC on Tresiba pen - 52 units sq qhs  and Novolog pens 16/14/14  Patient will need BD osmar pen needles 4x daily.    Please ask patient if he needs new glucometer, test strips, lancets, alcohol pads  Also please prescribe glucagon kit for hypoglycemia or Baqsuimi nasal spray for hypoglycemia.  WHichever is lowest cost or covered by insurance."    + RPR  -ID consulted at [facility]- treated 2017 (PCN)  -RPR titer 1:1 - no s/s of active syphilis  -Per ID, no need for treatment at present time     HTN   -C/w norvasc, losartan, hydralazine, metoprolol tartrate    Hematuria / proteinuria   - labs and urine ordered for vasculitis workup  -Per nephrology, will need kidney biopsy likely as outpt     Mood/Cognition:  - Appropriate cognition and mood.    Sleep:   - Melatonin PRN     Pain Management:-   Tylenol PRN, Oxycodone, Dilaudid prn  - gabapentin 300mg tid     GI/Bowel:  - At risk for constipation due to neurologic diagnosis, immobility and/or medication use  - Senna QHS, Miralax PRN Daily  - GI ppx: pepcid 20mg bid    /Bladder: toileting prn         Skin/Pressure Injury:   - At risk for pressure injury due to neurologic diagnosis and relative immobility.  - Skin assessment on admission admission: No acute skin lesions  -- Nursing to monitor skin Qshift    Diet/Dysphagia:  - Diet Consistency/Modifications: CC, 1200ml FR, Glucerna BID, no shellfish    DVT ppx:- lovenox   - SCDs    Participation Restrictions/Precautions:  - Weight bearing status: Full  - ROM restrictions: right ankle d/t prior ankle surgery  - Precautions:    [x] Falls          ---------------  Code Status/Emergency Contact:    Outpatient Follow-up (Specialty/Name of physician):    [facility] endocrinology. Appointments below:  [address] [place] [address], [address], [phone]  6/8/21 @ 11:00 AM with diabetes educator   7/12/21 @ 11:00 AM with Dr Montesinos       GI: Dr Rae

## 2021-05-07 NOTE — DIETITIAN INITIAL EVALUATION ADULT. - EDUCATION DIETARY MODIFICATIONS
Education Provided on Need for Increased Fluids/Fiber & Consistent Carbohydrate Diet/(2) meets goals/outcomes/verbalization

## 2021-05-07 NOTE — DIETITIAN INITIAL EVALUATION ADULT. - OTHER INFO
Spoke with Lenora, informed the order was for a blood draw for TB Quantiferon. Once the results post I will send them to [facility]. Lenora acknowledged an understanding. Appointment made for Monday 2/17/20.   Initial Nutrition Assessment   50yr Old Male   Dx: GBS  Diet - Consistent Carbohydrate Diet w/ Thin Liquids, 1,500ml/day Fluid Restriction & Glucerna 8oz PO BID  Shellfish Food Allergy/Intolerance  Tolerates Diet Well - No Chewing/Swallowing Complications (Per Patient)  Consumed 100% of 1st Meal (as Per Documentation)  No Pressure Ulcers (as Per Nursing Flow Sheets)  No Edema Noted (as Per Nursing Flow Sheets)  No Recent Nausea/Vomiting/Diarrhea & Some Recent Constipation (as Per Patient)

## 2021-05-07 NOTE — ADVANCED PRACTICE NURSE CONSULT - RECOMMEDATIONS
Recommendations:  Decrease pre- lunch and dinner Admelog dose from – 14 units to 12 units -Given decrease in BG to 90 – 100 range before dinner and HS on 5/6/21 in response to Ademlog 14 units before lunch and dinner.  FBG above goal range- Increase Lantus from 52 units to 54 units- may need to consider splitting dose for more effectiveness.   Discharge Recommendations:  Discharge pt on home Insulins of   Tresiba equal to Lantus dose needed to control BG in hospital- pt has Tresiba and pen needles and will not need script  Novolog AC- according to doses needed to control BG in hospital- pt has Novolog and pen needles and will not need script  FS AC and HS- pt has meter and supplies- will not need script  Instructed pt to schedule appt with [facility] Endocrinology, Dr. David Mcdaniel, per pt preference, for 2-4 2eeks after discharge.   Recommendations:  Decrease pre- breakfast Ademolg from 16 units to 10 units- given decrease of BG to 81 before lunch today  Decrease pre- lunch and dinner Admelog dose from – 14 units to 10 units -Given decrease in BG to 90 – 100 range before dinner and HS on 5/6/21 in response to Ademlog 14 units before lunch and dinner.  FBG above goal range- Increase Lantus from 52 units to 54 units- may need to consider splitting dose for more effectiveness.   Discharge Recommendations:  Discharge pt on home Insulins of   Tresiba equal to Lantus dose needed to control BG in hospital- pt has Tresiba and pen needles and will not need script  Novolog AC- according to doses needed to control BG in hospital- pt has Novolog and pen needles and will not need script  FS AC and HS- pt has meter and supplies- will not need script  Instructed pt to schedule appt with [facility] Endocrinology, Dr. David Mcdaniel, per pt preference, for 2-4 2eeks after discharge.

## 2021-05-07 NOTE — DIETITIAN INITIAL EVALUATION ADULT. - PERTINENT LABORATORY DATA
5/6   Na-143  K-4.1  BUN-18  Creat-1.03  Gluc- 150H  Hemoglobin A1c - 11.5H (on 4/11)   POCT (over Last 3 Days) - Ranging from

## 2021-05-07 NOTE — ADVANCED PRACTICE NURSE CONSULT - ASSESSMENT
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 Hospital course complicated by uncontrolled diabetes, hyponatremia, ALLIE, . Now admitted to [facility] after for initiation of a multidisciplinary rehab program consisting focused on functional mobility, transfers and ADLs (activities of daily living).     T2DM  with neuropathy : uncontrolled   -A1C 11.5% on  4/11/21  POCT – 5/6/21- before breakfast- 145, before lunch- 180, before dinner- 143, HS- 124  POCT- 5/7/21- Before breakfast- 194  Current Diabetes Regimen:  - Lantus 52 units qhs,   - Admelog 16 units before breakfast (received fist does today, 5/7/21) and 14 units before lunch and dinner as of 5/6/21  -Admelog 5 units at bedtime only if taking a snack – did not have on 5/6/21  - Gabapentin 300 mg every 8 hours for neuropathy    Assessment:  Pt was dx with T2DM in 2005. Endocrinologist Dr. Skyla Perez [phone]. PCP- Lavon Dooley- [phone].   Eyes- blurred vision at present-maybe due to current uncontrolled BG- pt has yearly eye exams- advised to have vision reassessed when BG controlled for 3 months.  Teeth- No issues- pt reports seeing dentist every 6 months  Feet- + neuropathy, numbness in both great toes, denies pain, burning, numbness in other areas,  skin intact, hx of traumatic wound to right middle toe which is currently healed. Pt reports every 3 month podiatry follow up- Dr. Quincy Fuentes- [phone]    Non-smoker.    Home Diabetes Regimen:  Tresiba 28 units daily and Novolog 15 units premeal 3Xs/D. However, pt admits to "rationing insulin" since 3/2020 because he was laid of and co-pay form his insulin was over $1200/month. Pt did contact [employer]  and was approved for free Tresiba and NovoLog but due to confusion with endocrinologist office his medication was not provided to him. Pt states that he has now arranged for his [facility] to send his Tresiba and Novolog to his PCP office, so this issue should be resolved.     Pt reports trial of metformin in past but ended up in hospital with dehydration due to diarrhea- pt reports dose was started twice/day and not titrated. Explained to pt that metformin has GI side effects which can be minimized if started out at 500 mg with dinner once a day and titrated up very slowly over 4-6 week period of time. Pt continues to be resistant to trying metformin and given past experience, I recommend leaving this for outpatient Endocrinology assessment/management. PT does need referral to Endocrinology upon discharge for assessment/ management in outpatient setting.  Pt states he will change Endocrinologist upon discharge to [facility] Endocr[facility]ology because he was unhappy with previous Endocrinologist. Provided pt with contact information for [facility] Endocrinology- [phone].    Pt states he is now motivated to make lifestyle changes to manage his diabetes and expressed hopes of "getting off of medications for diabetes". Educated pt on T2DM disease process, progression, importance of proper management, medical follow up, and learning diabetes self management skills. Reframed pt expectations to include benefits of lifelong diabetes medications especially those that offer cardio and renal protection. Education validated via teach back   Educated pt on S/S of hypoglycemia and tx per 15/15 rule. Pt validated education via teach back.     Pt has a FS freedom meter at home and supplies delivered through [employer].  Pt able to states how and when to perform FS.     Educated pt on Insulin storage, expiration, use of insulin pen, injection technique, and site rotation.     Provided written resources of Leaving the hospital with Diabetes, Insulin pen use, S/S and Tx of hypoglycemia handout form from [employer].   Showed pt how to view Diabetes Education Videos and provided written directions to navigate to videos.     Recommendations:  Decrease pre- lunch and dinner Admelog dose from – 14 units to 12 units -Given decrease in BG to 90 – 100 range before dinner and HS on 5/6/21 in response to Ademlog 14 units before lunch and dinner.  FBG above goal range- Increase Lantus from 52 units to 54 units- may need to consider splitting dose for more effectiveness.   Discharge Recommendations:  Discharge pt on home Insulins of   Tresiba equal to Lantus dose needed to control BG in hospital- pt has Tresiba and pen needles and will not need script  Novolog AC- according to doses needed to control BG in hospital- pt has Novolog and pen needles and will not need script  FS AC and HS- pt has meter and supplies- will not need script  Instructed pt to schedule appt with [facility] Endocrinology, Dr. David Mcdaniel, per pt preference, for 2-4 2eeks after discharge.     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 Hospital course complicated by uncontrolled diabetes, hyponatremia, ALLIE, . Now admitted to [facility] after for initiation of a multidisciplinary rehab program consisting focused on functional mobility, transfers and ADLs (activities of daily living).     T2DM  with neuropathy : uncontrolled   -A1C 11.5% on  4/11/21  POCT – 5/6/21- before breakfast- 145, before lunch- 180, before dinner- 143, HS- 124  POCT- 5/7/21- Before breakfast- 194 Before Lunch 81  Current Diabetes Regimen:  - Lantus 52 units qhs,   - Admelog 16 units before breakfast (received fist does today, 5/7/21) and 14 units before lunch and dinner as of 5/6/21  -Admelog 5 units at bedtime only if taking a snack – did not have on 5/6/21  - Gabapentin 300 mg every 8 hours for neuropathy    Assessment:  Pt was dx with T2DM in 2005. Endocrinologist Dr. Skyla Perez [phone]. PCP- Lavon Dooley- [phone].   Eyes- blurred vision at present-maybe due to current uncontrolled BG- pt has yearly eye exams- advised to have vision reassessed when BG controlled for 3 months.  Teeth- No issues- pt reports seeing dentist every 6 months  Feet- + neuropathy, numbness in both great toes, denies pain, burning, numbness in other areas,  skin intact, hx of traumatic wound to right middle toe which is currently healed. Pt reports every 3 month podiatry follow up- Dr. Quincy Fuentes- [phone]    Non-smoker.    Home Diabetes Regimen:  Tresiba 28 units daily and Novolog 15 units premeal 3Xs/D. However, pt admits to "rationing insulin" since 3/2020 because he was laid of and co-pay form his insulin was over $1200/month. Pt did contact [employer]  and was approved for free Tresiba and NovoLog but due to confusion with endocrinologist office his medication was not provided to him. Pt states that he has now arranged for his [employer] to send his Tresiba and Novolog to his PCP office, so this issue should be resolved.     Pt reports trial of metformin in past but ended up in hospital with dehydration due to diarrhea- pt reports dose was started twice/day and not titrated. Explained to pt that metformin has GI side effects which can be minimized if started out at 500 mg with dinner once a day and titrated up very slowly over 4-6 week period of time. Pt continues to be resistant to trying metformin and given past experience, I recommend leaving this for outpatient Endocrinology assessment/management. PT does need referral to Endocrinology upon discharge for assessment/ management in outpatient setting.  Pt states he will change Endocrinologist upon discharge to [facility] because he was unhappy with previous Endocrinologist. Provided pt with contact information for [facility]- [phone].    Pt states he is now motivated to make lifestyle changes to manage his diabetes and expressed hopes of "getting off of medications for diabetes". Educated pt on T2DM disease process, progression, importance of proper management, medical follow up, and learning diabetes self management skills. Reframed pt expectations to include benefits of lifelong diabetes medications especially those that offer cardio and renal protection. Education validated via teach back   Educated pt on S/S of hypoglycemia and tx per 15/15 rule. Pt validated education via teach back.     Pt has a FS freedom meter at home and supplies delivered through [employer].  Pt able to states how and when to perform FS.     Educated pt on Insulin storage, expiration, use of insulin pen, injection technique, and site rotation.     Provided written resources of Leaving the hospital with Diabetes, Insulin pen use, S/S and Tx of hypoglycemia handout form from [employer].   Showed pt how to view Diabetes Education Videos and provided written directions to navigate to videos.     Recommendations:  Decrease pre- breakfast Ademolg from 16 units to 10 units- given decrease of BG to 81 before lunch today, pre- lunch and dinner Admelog dose from – 14 units to 10 units -Given decrease in BG to 90 – 100 range before dinner and HS on 5/6/21 in response to Ademlog 14 units before lunch and dinner.  FBG above goal range- Increase Lantus from 52 units to 54 units- may need to consider splitting dose for more effectiveness.   Discharge Recommendations:  Discharge pt on home Insulins of   Tresiba equal to Lantus dose needed to control BG in hospital- pt has Tresiba and pen needles and will not need script  Novolog AC- according to doses needed to control BG in hospital- pt has Novolog and pen needles and will not need script  FS AC and HS- pt has meter and supplies- will not need script  Instructed pt to schedule appt with [facility] Endocrinology, Dr. David Mcdaniel, per pt preference, for 2-4 2eeks after discharge.

## 2021-05-07 NOTE — PROGRESS NOTE ADULT - SUBJECTIVE AND OBJECTIVE BOX
HPI:  This is a 49 y/o male, PMH DM, HTN, hyperlipidemia; pt presented to the ED at [facility] on 4/10 with c/o abdominal cramping, generalized, and diarrhea following use of laxatives for constipation.  In the ED, WBC 10.82, Hb 13.6, CMP: sodium 126, chloride 89, glucose 281, CMP otherwise unremarkable.  VBG: Lactate 2.6 (later downtrended to 1.6).  Pt. was treated with IV hydration and transferred to CDU for continued care plan:  IV hydration, supportive care, AM labs, general observation care / monitoring. Patient continued to have generalized weakness, numbness and persistent hyponatremia. Patient admitted for further evaluation.  Neurology consulted. 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 Patient refused IVIG but underwent PLEX.   Nephrology consulted for ALLIE and hyponatremia. Likely due to hypovolemia in setting of hyperglycemia. Work up suggested SIADH. Recs to optimize DM control, continue free water restriction <1.2L/day, and monitor Na levels (Na level should not increase more than 6meq in 24 hrs). Also, noted to have proteinuia/hematuria. Renal felt sec to Diabetic Nephropathy but patient will need full vasculitis work up and based on work up result, he might need kidney biopsy which would be scheduled as an outpatient.   Endocrinology also following for uncontrolled diabetes. Insulin regimen adjusted with improvement in glucoses.   ID consulted due to h/o syphilis in the past. Patient had undergone treatment in the past. No need for further treatment.   Last night (5/3), patient reported increased left scapula pain and given IV Dilaudid. Patient attributed to increased use of UEs in PT. Pain management NP consulted. Recommended switch to PO Dilaudid.   Patient has been seen by PT, OT and PM&R attending with recommendations for acute IPR.   Patient is deemed medically stable [facility] and arrived 5/5. (05 May 2021 13:33)      INTERVAL HPI/OVERNIGHT EVENTS:  Chart Reviewed and patient seen at bedside.  Reports sleeping well and having BM yesterday.   Reports doing well in therapy. Improved balance and improved Proximal LE strength.    ROS:  Denies fevers, chills, chest pain, shortness of breath, abdominal pain, headaches, nausea/vomiting    Vital Signs Last 24 Hrs  T(C): 36.6 (07 May 2021 07:40), Max: 36.7 (06 May 2021 20:46)  T(F): 97.9 (07 May 2021 07:40), Max: 98 (06 May 2021 20:46)  HR: 83 (07 May 2021 07:40) (73 - 97)  BP: 130/76 (07 May 2021 07:40) (108/66 - 130/76)  BP(mean): --  RR: 15 (07 May 2021 07:40) (15 - 16)  SpO2: 96% (07 May 2021 07:40) (96% - 99%)    Physical Exam:  Gen - NAD, Comfortable  HEENT - NCAT, EOMI, MMM  Neck - Supple, No limited ROM  Pulm - CTAB, No wheeze, No rhonchi, No crackles  Cardiovascular - RRR, S1S2, No m/r/g  Abdomen - Soft, NT/ND, +BS  Extremities - No C/C/E, No calf tenderness  Neuro-     Cognitive - Awake, Alert, AAO to self, place, date, year, situation     Communication - Fluent, No dysarthria     Motor -                       LEFT    UE - ShAB 5/5, EF 5/5, EE 5/5, WE 5/5,  5/5                    RIGHT UE - ShAB 4+/5, EF 4+/5, EE 5/5, WE 5/5,  5/5                    LEFT    LE - HF 4/5, KE 4/5, DF 5/5, PF 5/5                    RIGHT LE - HF 4/5, KE 4/5, DF 5/5, PF 5/5     Sensory - impaired distal fingertips, toes- improving per patient (pt also reports baseline diabetic neuropathy)  Psychiatric - Mood stable, Affect WNL      LABS:  05-06    143  |  107  |  18  ----------------------------<  150<H>  4.1   |  27  |  1.03    Ca    9.3      06 May 2021 06:30  Phos  5.2     05-06  Mg     1.9     05-06    TPro  6.3  /  Alb  3.5  /  TBili  0.4  /  DBili  x   /  AST  21  /  ALT  41  /  AlkPhos  68  05-06                                              10.2   6.73  )-----------( 290      ( 06 May 2021 06:30 )             30.7     CAPILLARY BLOOD GLUCOSE      POCT Blood Glucose.: 81 mg/dL (07 May 2021 11:53)  POCT Blood Glucose.: 194 mg/dL (07 May 2021 07:35)  POCT Blood Glucose.: 91 mg/dL (06 May 2021 21:37)  POCT Blood Glucose.: 103 mg/dL (06 May 2021 16:52)      MEDICATIONS:  MEDICATIONS  (STANDING):  amLODIPine   Tablet 10 milliGRAM(s) Oral daily  bisacodyl 5 milliGRAM(s) Oral at bedtime  dextrose 40% Gel 15 Gram(s) Oral once  dextrose 5%. 1000 milliLiter(s) (50 mL/Hr) IV Continuous <Continuous>  dextrose 5%. 1000 milliLiter(s) (100 mL/Hr) IV Continuous <Continuous>  dextrose 50% Injectable 25 Gram(s) IV Push once  dextrose 50% Injectable 12.5 Gram(s) IV Push once  dextrose 50% Injectable 25 Gram(s) IV Push once  enoxaparin Injectable 40 milliGRAM(s) SubCutaneous daily  famotidine    Tablet 20 milliGRAM(s) Oral two times a day  gabapentin 300 milliGRAM(s) Oral every 8 hours  glucagon  Injectable 1 milliGRAM(s) IntraMuscular once  hydrALAZINE 25 milliGRAM(s) Oral three times a day  insulin glargine Injectable (LANTUS) 52 Unit(s) SubCutaneous at bedtime  insulin lispro (ADMELOG) corrective regimen sliding scale   SubCutaneous three times a day before meals  insulin lispro (ADMELOG) corrective regimen sliding scale   SubCutaneous at bedtime  insulin lispro Injectable (ADMELOG) 14 Unit(s) SubCutaneous before dinner  insulin lispro Injectable (ADMELOG) 5 Unit(s) SubCutaneous <User Schedule>  insulin lispro Injectable (ADMELOG) 14 Unit(s) SubCutaneous before lunch  insulin lispro Injectable (ADMELOG) 16 Unit(s) SubCutaneous before breakfast  losartan 100 milliGRAM(s) Oral daily  metoprolol tartrate 12.5 milliGRAM(s) Oral two times a day  senna 2 Tablet(s) Oral two times a day    MEDICATIONS  (PRN):  acetaminophen   Tablet .. 650 milliGRAM(s) Oral every 6 hours PRN Mild Pain (1 - 3)  HYDROmorphone   Tablet 2 milliGRAM(s) Oral every 6 hours PRN Severe Pain (7 - 10)  oxyCODONE    IR 5 milliGRAM(s) Oral every 6 hours PRN Moderate Pain (4 - 6)

## 2021-05-08 LAB
GLUCOSE BLDC GLUCOMTR-MCNC: 129 MG/DL — HIGH (ref 70–99)
GLUCOSE BLDC GLUCOMTR-MCNC: 171 MG/DL — HIGH (ref 70–99)
GLUCOSE BLDC GLUCOMTR-MCNC: 230 MG/DL — HIGH (ref 70–99)
GLUCOSE BLDC GLUCOMTR-MCNC: 85 MG/DL — SIGNIFICANT CHANGE UP (ref 70–99)

## 2021-05-08 PROCEDURE — 99232 SBSQ HOSP IP/OBS MODERATE 35: CPT

## 2021-05-08 RX ADMIN — Medication 5 MILLIGRAM(S): at 22:18

## 2021-05-08 RX ADMIN — Medication 25 MILLIGRAM(S): at 14:39

## 2021-05-08 RX ADMIN — AMLODIPINE BESYLATE 10 MILLIGRAM(S): 2.5 TABLET ORAL at 06:04

## 2021-05-08 RX ADMIN — GABAPENTIN 300 MILLIGRAM(S): 400 CAPSULE ORAL at 22:35

## 2021-05-08 RX ADMIN — INSULIN GLARGINE 52 UNIT(S): 100 INJECTION, SOLUTION SUBCUTANEOUS at 22:52

## 2021-05-08 RX ADMIN — ENOXAPARIN SODIUM 40 MILLIGRAM(S): 100 INJECTION SUBCUTANEOUS at 14:40

## 2021-05-08 RX ADMIN — HYDROMORPHONE HYDROCHLORIDE 2 MILLIGRAM(S): 2 INJECTION INTRAMUSCULAR; INTRAVENOUS; SUBCUTANEOUS at 13:10

## 2021-05-08 RX ADMIN — Medication 14 UNIT(S): at 17:37

## 2021-05-08 RX ADMIN — Medication 4: at 08:18

## 2021-05-08 RX ADMIN — Medication 25 MILLIGRAM(S): at 06:04

## 2021-05-08 RX ADMIN — HYDROMORPHONE HYDROCHLORIDE 2 MILLIGRAM(S): 2 INJECTION INTRAMUSCULAR; INTRAVENOUS; SUBCUTANEOUS at 19:37

## 2021-05-08 RX ADMIN — Medication 12.5 MILLIGRAM(S): at 06:04

## 2021-05-08 RX ADMIN — OXYCODONE HYDROCHLORIDE 5 MILLIGRAM(S): 5 TABLET ORAL at 08:00

## 2021-05-08 RX ADMIN — LOSARTAN POTASSIUM 100 MILLIGRAM(S): 100 TABLET, FILM COATED ORAL at 06:04

## 2021-05-08 RX ADMIN — OXYCODONE HYDROCHLORIDE 5 MILLIGRAM(S): 5 TABLET ORAL at 07:35

## 2021-05-08 RX ADMIN — Medication 25 MILLIGRAM(S): at 22:19

## 2021-05-08 RX ADMIN — Medication 5 UNIT(S): at 22:20

## 2021-05-08 RX ADMIN — GABAPENTIN 300 MILLIGRAM(S): 400 CAPSULE ORAL at 06:04

## 2021-05-08 RX ADMIN — FAMOTIDINE 20 MILLIGRAM(S): 10 INJECTION INTRAVENOUS at 06:04

## 2021-05-08 RX ADMIN — HYDROMORPHONE HYDROCHLORIDE 2 MILLIGRAM(S): 2 INJECTION INTRAMUSCULAR; INTRAVENOUS; SUBCUTANEOUS at 04:01

## 2021-05-08 RX ADMIN — Medication 12.5 MILLIGRAM(S): at 18:29

## 2021-05-08 RX ADMIN — FAMOTIDINE 20 MILLIGRAM(S): 10 INJECTION INTRAVENOUS at 18:29

## 2021-05-08 RX ADMIN — HYDROMORPHONE HYDROCHLORIDE 2 MILLIGRAM(S): 2 INJECTION INTRAMUSCULAR; INTRAVENOUS; SUBCUTANEOUS at 13:30

## 2021-05-08 RX ADMIN — HYDROMORPHONE HYDROCHLORIDE 2 MILLIGRAM(S): 2 INJECTION INTRAMUSCULAR; INTRAVENOUS; SUBCUTANEOUS at 20:00

## 2021-05-08 RX ADMIN — Medication 16 UNIT(S): at 08:15

## 2021-05-08 RX ADMIN — GABAPENTIN 300 MILLIGRAM(S): 400 CAPSULE ORAL at 14:39

## 2021-05-08 RX ADMIN — HYDROMORPHONE HYDROCHLORIDE 2 MILLIGRAM(S): 2 INJECTION INTRAMUSCULAR; INTRAVENOUS; SUBCUTANEOUS at 04:22

## 2021-05-08 RX ADMIN — SENNA PLUS 2 TABLET(S): 8.6 TABLET ORAL at 22:18

## 2021-05-08 NOTE — PROGRESS NOTE ADULT - SUBJECTIVE AND OBJECTIVE BOX
Patient is a 50y old  Male who presents with a chief complaint of 03.4 GBS (08 May 2021 10:50)    Patient seen and examined at bedside. denies headache, fever, chills, cp, sob, n/v, abd pain    ALLERGIES:  No Known Drug Allergies  shellfish (Urticaria)    MEDICATIONS  (STANDING):  amLODIPine   Tablet 10 milliGRAM(s) Oral daily  bisacodyl 5 milliGRAM(s) Oral at bedtime  dextrose 40% Gel 15 Gram(s) Oral once  dextrose 5%. 1000 milliLiter(s) (50 mL/Hr) IV Continuous <Continuous>  dextrose 5%. 1000 milliLiter(s) (100 mL/Hr) IV Continuous <Continuous>  dextrose 50% Injectable 25 Gram(s) IV Push once  dextrose 50% Injectable 12.5 Gram(s) IV Push once  dextrose 50% Injectable 25 Gram(s) IV Push once  enoxaparin Injectable 40 milliGRAM(s) SubCutaneous daily  famotidine    Tablet 20 milliGRAM(s) Oral two times a day  gabapentin 300 milliGRAM(s) Oral every 8 hours  glucagon  Injectable 1 milliGRAM(s) IntraMuscular once  hydrALAZINE 25 milliGRAM(s) Oral three times a day  insulin glargine Injectable (LANTUS) 52 Unit(s) SubCutaneous at bedtime  insulin lispro (ADMELOG) corrective regimen sliding scale   SubCutaneous three times a day before meals  insulin lispro (ADMELOG) corrective regimen sliding scale   SubCutaneous at bedtime  insulin lispro Injectable (ADMELOG) 14 Unit(s) SubCutaneous before dinner  insulin lispro Injectable (ADMELOG) 5 Unit(s) SubCutaneous <User Schedule>  insulin lispro Injectable (ADMELOG) 14 Unit(s) SubCutaneous before lunch  insulin lispro Injectable (ADMELOG) 16 Unit(s) SubCutaneous before breakfast  losartan 100 milliGRAM(s) Oral daily  metoprolol tartrate 12.5 milliGRAM(s) Oral two times a day  senna 2 Tablet(s) Oral two times a day    MEDICATIONS  (PRN):  acetaminophen   Tablet .. 650 milliGRAM(s) Oral every 6 hours PRN Mild Pain (1 - 3)  HYDROmorphone   Tablet 2 milliGRAM(s) Oral every 6 hours PRN Severe Pain (7 - 10)  oxyCODONE    IR 5 milliGRAM(s) Oral every 6 hours PRN Moderate Pain (4 - 6)    Vital Signs Last 24 Hrs  T(F): 97 (08 May 2021 09:49), Max: 98.2 (07 May 2021 20:27)  HR: 79 (08 May 2021 09:49) (79 - 85)  BP: 120/70 (08 May 2021 09:49) (106/66 - 124/74)  RR: 16 (08 May 2021 09:49) (15 - 16)  SpO2: 97% (08 May 2021 09:49) (97% - 98%)  I&O's Summary    BMI (kg/m2): 28.6 (05-05-21 @ 14:08)  PHYSICAL EXAM:  General: NAD, A/O x 3  ENT: MMM  Neck: Supple, No JVD  Lungs: Clear to auscultation bilaterally  Cardio: RRR, S1/S2, No murmurs  Abdomen: Soft, Nontender, Nondistended; Bowel sounds present  Extremities: No calf tenderness, No pitting edema  Neuro: right UE and LE 4/5, L UE and LLE 4+/5     LABS:                        10.2   6.73  )-----------( 290      ( 06 May 2021 06:30 )             30.7       05-06    143  |  107  |  18  ----------------------------<  150  4.1   |  27  |  1.03    Ca    9.3      06 May 2021 06:30  Phos  5.2     05-06  Mg     1.9     05-06    TPro  6.3  /  Alb  3.5  /  TBili  0.4  /  DBili  x   /  AST  21  /  ALT  41  /  AlkPhos  68  05-06     eGFR if Non African American: 83 mL/min/1.73M2 (05-06-21 @ 06:30)  eGFR if : 96 mL/min/1.73M2 (05-06-21 @ 06:30)    POCT Blood Glucose.: 230 mg/dL (08 May 2021 07:39)  POCT Blood Glucose.: 139 mg/dL (07 May 2021 21:43)  POCT Blood Glucose.: 122 mg/dL (07 May 2021 17:05)  POCT Blood Glucose.: 81 mg/dL (07 May 2021 11:53)    RADIOLOGY & ADDITIONAL TESTS: reviewed    Care Discussed with Consultants/Other Providers: yes

## 2021-05-08 NOTE — PROGRESS NOTE ADULT - SUBJECTIVE AND OBJECTIVE BOX
Pt. seen and examined at bedside.  No overnight events.  Feels legs are getting stronger.  Has some LBP.  Able to ambulate to bathroom.        REVIEW OF SYSTEMS  Constitutional - No fever,  No fatigue  Neurological - No headaches, ++ loss of strength  Musculoskeletal - No joint pain, No joint swelling, ++ LBP    VITALS  T(C): 36.1 (05-08-21 @ 09:49), Max: 36.8 (05-07-21 @ 20:27)  HR: 79 (05-08-21 @ 09:49) (79 - 85)  BP: 120/70 (05-08-21 @ 09:49) (106/66 - 124/74)  RR: 16 (05-08-21 @ 09:49) (15 - 16)  SpO2: 97% (05-08-21 @ 09:49) (97% - 98%)  Wt(kg): --       MEDICATIONS   acetaminophen   Tablet .. 650 milliGRAM(s) every 6 hours PRN  amLODIPine   Tablet 10 milliGRAM(s) daily  bisacodyl 5 milliGRAM(s) at bedtime  dextrose 40% Gel 15 Gram(s) once  dextrose 5%. 1000 milliLiter(s) <Continuous>  dextrose 5%. 1000 milliLiter(s) <Continuous>  dextrose 50% Injectable 25 Gram(s) once  dextrose 50% Injectable 12.5 Gram(s) once  dextrose 50% Injectable 25 Gram(s) once  enoxaparin Injectable 40 milliGRAM(s) daily  famotidine    Tablet 20 milliGRAM(s) two times a day  gabapentin 300 milliGRAM(s) every 8 hours  glucagon  Injectable 1 milliGRAM(s) once  hydrALAZINE 25 milliGRAM(s) three times a day  HYDROmorphone   Tablet 2 milliGRAM(s) every 6 hours PRN  insulin glargine Injectable (LANTUS) 52 Unit(s) at bedtime  insulin lispro (ADMELOG) corrective regimen sliding scale   three times a day before meals  insulin lispro (ADMELOG) corrective regimen sliding scale   at bedtime  insulin lispro Injectable (ADMELOG) 14 Unit(s) before dinner  insulin lispro Injectable (ADMELOG) 5 Unit(s) <User Schedule>  insulin lispro Injectable (ADMELOG) 14 Unit(s) before lunch  insulin lispro Injectable (ADMELOG) 16 Unit(s) before breakfast  losartan 100 milliGRAM(s) daily  metoprolol tartrate 12.5 milliGRAM(s) two times a day  oxyCODONE    IR 5 milliGRAM(s) every 6 hours PRN  senna 2 Tablet(s) two times a day      RECENT LABS/IMAGING                      POCT Blood Glucose.: 230 mg/dL (05-08-21 @ 07:39)  POCT Blood Glucose.: 139 mg/dL (05-07-21 @ 21:43)  POCT Blood Glucose.: 122 mg/dL (05-07-21 @ 17:05)  POCT Blood Glucose.: 81 mg/dL (05-07-21 @ 11:53)    ---------  PHYSICAL EXAM  Constitutional - NAD, Comfortable  Pulm - Breathing comfortably, No wheezing  Abd - Soft, NTND  Extremities - No edema, No calf tenderness  Neurologic Exam -                    Cognitive - Awake, Alert     Communication - Fluent     Motor - SOME AG STRENGTH IN LEGS     Sensory - Intact to LT  Psychiatric - Mood WNL, Affect WNL    ASSESSMENT/PLAN  50y Male with functional deficits after GBS DX.  Continue current medical management  Pain - Tylenol PRN; DILAUDID; ERICA  DVT PPX - enoxaparin Injectable 40 milliGRAM(s) daily  Active issues - NONE  Continue 3hrs a day of comprehensive rehab program.

## 2021-05-08 NOTE — PROGRESS NOTE ADULT - ASSESSMENT
49 y/o M T2DM, HTN, HLD who presented to [facility]  on 4/10 with complaint of abdominal pain, diarrhea from laxative use and weakness and 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 Hospital course complicated by uncontrolled diabetes, hyponatremia, ALLIE, . Now admitted to [facility] after for initiation of a multidisciplinary rehab program consisting focused on functional mobility, transfers and ADLs (activities of daily living).    GBS- s/p PLEX  -Continue comprehensive rehab program - PT/OT/SLP per rehab team  -Pain management, bowel regimen per rehab  -Numbness and weakness improving  -Gabapentin 300mg q8  -Follow up with neuro after dc (f/u remaining LP results)    Hyponatremia/ALLIE - resolved  -Followed by nephro at [facility]  -Likely multifactorial including hyperglycemia (pseudohyponatremia) and pre renal 2/2 to diarrhea (? ADH induced by pain)   -S/p fluids   -Free water restriction <1.2L/day    T2DM with neuropathy: uncontrolled HbA1C 11.5%  -c/w Lantus 52 units qhs, Admelog 16un before breakfast, 14un before lunch and dinner  -c/w Admelog 5 units at bedtime only if taking a snack   -at home Pt was on Tresiba 30 units qhs and Novolog 15 units premeal TID.    Anemia likely anemia of chronic disease  -H/H stable  -f/u anemia workup    + RPR  -ID consulted at [facility]- treated 2017 (PCN)  -RPR titer 1:1 - no s/s of active syphilis  -Per ID, no need for treatment at present time     HTN - BP acceptable  -Cont norvasc, losartan, hydralazine, metoprolol tartrate    Hematuria / proteinuria   -hematuria resolved  -labs and urine ordered for vasculitis workup  -Per nephrology, will need kidney biopsy likely as outpt     DVT ppx - lovenox, SCDs

## 2021-05-09 LAB
GLUCOSE BLDC GLUCOMTR-MCNC: 130 MG/DL — HIGH (ref 70–99)
GLUCOSE BLDC GLUCOMTR-MCNC: 154 MG/DL — HIGH (ref 70–99)
GLUCOSE BLDC GLUCOMTR-MCNC: 182 MG/DL — HIGH (ref 70–99)
GLUCOSE BLDC GLUCOMTR-MCNC: 197 MG/DL — HIGH (ref 70–99)
GLUCOSE BLDC GLUCOMTR-MCNC: 79 MG/DL — SIGNIFICANT CHANGE UP (ref 70–99)

## 2021-05-09 PROCEDURE — 99232 SBSQ HOSP IP/OBS MODERATE 35: CPT

## 2021-05-09 PROCEDURE — 99233 SBSQ HOSP IP/OBS HIGH 50: CPT

## 2021-05-09 RX ORDER — NYSTATIN CREAM 100000 [USP'U]/G
1 CREAM TOPICAL
Refills: 0 | Status: DISCONTINUED | OUTPATIENT
Start: 2021-05-09 | End: 2021-05-10

## 2021-05-09 RX ADMIN — AMLODIPINE BESYLATE 10 MILLIGRAM(S): 2.5 TABLET ORAL at 05:34

## 2021-05-09 RX ADMIN — Medication 12.5 MILLIGRAM(S): at 17:14

## 2021-05-09 RX ADMIN — Medication 2: at 17:14

## 2021-05-09 RX ADMIN — LOSARTAN POTASSIUM 100 MILLIGRAM(S): 100 TABLET, FILM COATED ORAL at 05:33

## 2021-05-09 RX ADMIN — HYDROMORPHONE HYDROCHLORIDE 2 MILLIGRAM(S): 2 INJECTION INTRAMUSCULAR; INTRAVENOUS; SUBCUTANEOUS at 08:34

## 2021-05-09 RX ADMIN — OXYCODONE HYDROCHLORIDE 5 MILLIGRAM(S): 5 TABLET ORAL at 19:19

## 2021-05-09 RX ADMIN — OXYCODONE HYDROCHLORIDE 5 MILLIGRAM(S): 5 TABLET ORAL at 05:02

## 2021-05-09 RX ADMIN — SENNA PLUS 2 TABLET(S): 8.6 TABLET ORAL at 05:33

## 2021-05-09 RX ADMIN — Medication 16 UNIT(S): at 08:19

## 2021-05-09 RX ADMIN — Medication 25 MILLIGRAM(S): at 21:28

## 2021-05-09 RX ADMIN — INSULIN GLARGINE 52 UNIT(S): 100 INJECTION, SOLUTION SUBCUTANEOUS at 21:29

## 2021-05-09 RX ADMIN — Medication 2: at 08:19

## 2021-05-09 RX ADMIN — FAMOTIDINE 20 MILLIGRAM(S): 10 INJECTION INTRAVENOUS at 17:14

## 2021-05-09 RX ADMIN — ENOXAPARIN SODIUM 40 MILLIGRAM(S): 100 INJECTION SUBCUTANEOUS at 13:15

## 2021-05-09 RX ADMIN — Medication 25 MILLIGRAM(S): at 14:06

## 2021-05-09 RX ADMIN — Medication 14 UNIT(S): at 17:14

## 2021-05-09 RX ADMIN — FAMOTIDINE 20 MILLIGRAM(S): 10 INJECTION INTRAVENOUS at 05:33

## 2021-05-09 RX ADMIN — OXYCODONE HYDROCHLORIDE 5 MILLIGRAM(S): 5 TABLET ORAL at 18:49

## 2021-05-09 RX ADMIN — GABAPENTIN 300 MILLIGRAM(S): 400 CAPSULE ORAL at 21:28

## 2021-05-09 RX ADMIN — GABAPENTIN 300 MILLIGRAM(S): 400 CAPSULE ORAL at 05:33

## 2021-05-09 RX ADMIN — HYDROMORPHONE HYDROCHLORIDE 2 MILLIGRAM(S): 2 INJECTION INTRAMUSCULAR; INTRAVENOUS; SUBCUTANEOUS at 08:04

## 2021-05-09 RX ADMIN — GABAPENTIN 300 MILLIGRAM(S): 400 CAPSULE ORAL at 14:05

## 2021-05-09 RX ADMIN — SENNA PLUS 2 TABLET(S): 8.6 TABLET ORAL at 17:14

## 2021-05-09 NOTE — PROVIDER CONTACT NOTE (OTHER) - ACTION/TREATMENT ORDERED:
ok to hold lunchtime pre-meal insulin as per md. pt eating lunch. has good po intake
Continue to monitor.

## 2021-05-09 NOTE — PROGRESS NOTE ADULT - ASSESSMENT
51 y/o M T2DM, HTN, HLD who presented to [facility]  on 4/10 with complaint of abdominal pain, diarrhea from laxative use and weakness and 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 Hospital course complicated by uncontrolled diabetes, hyponatremia, ALLIE, . Now admitted to [facility] after for initiation of a multidisciplinary rehab program consisting focused on functional mobility, transfers and ADLs (activities of daily living).    GBS- s/p PLEX  -Continue comprehensive rehab program - PT/OT/SLP per rehab team  -Pain management, bowel regimen per rehab  -Numbness and weakness improving  -Gabapentin 300mg q8  -Follow up with neuro after dc (f/u remaining LP results)    Hyponatremia/ALLIE - resolved  -Followed by nephro at [facility]  -Likely multifactorial including hyperglycemia (pseudohyponatremia) and pre renal 2/2 to diarrhea (? ADH induced by pain)   -S/p fluids   -Free water restriction <1.2L/day    T2DM with neuropathy: uncontrolled HbA1C 11.5%  -c/w Lantus 52 units qhs, Admelog 16un before breakfast, 14un before lunch and dinner  -On Admelog 5 units at bedtime only if taking a snack - noted with elevated AM BS despite taking additional 5un admelog prn - increase to 8un and monitor  -at home Pt was on Tresiba 30 units qhs and Novolog 15 units premeal TID.  -Nutrition consult    Anemia likely anemia of chronic disease  -H/H stable  -f/u anemia workup    RPR Positive  -ID consulted at [facility]- treated 2017 (PCN)  -RPR titer 1:1 - no s/s of active syphilis  -Per ID, no need for treatment at present time     HTN - BP acceptable  -Cont norvasc, losartan, hydralazine, metoprolol tartrate    Hematuria / proteinuria   -hematuria resolved  -labs and urine ordered for vasculitis workup  -Per nephrology, will need kidney biopsy likely as outpt     DVT ppx - lovenox, SCDs

## 2021-05-09 NOTE — PROGRESS NOTE ADULT - SUBJECTIVE AND OBJECTIVE BOX
Patient is a 50y old  Male who presents with a chief complaint of 03.4 GBS (08 May 2021 11:00)      Patient seen and examined at bedside.    ALLERGIES:  No Known Drug Allergies  shellfish (Urticaria)    MEDICATIONS  (STANDING):  amLODIPine   Tablet 10 milliGRAM(s) Oral daily  bisacodyl 5 milliGRAM(s) Oral at bedtime  dextrose 40% Gel 15 Gram(s) Oral once  dextrose 5%. 1000 milliLiter(s) (50 mL/Hr) IV Continuous <Continuous>  dextrose 5%. 1000 milliLiter(s) (100 mL/Hr) IV Continuous <Continuous>  dextrose 50% Injectable 25 Gram(s) IV Push once  dextrose 50% Injectable 12.5 Gram(s) IV Push once  dextrose 50% Injectable 25 Gram(s) IV Push once  enoxaparin Injectable 40 milliGRAM(s) SubCutaneous daily  famotidine    Tablet 20 milliGRAM(s) Oral two times a day  gabapentin 300 milliGRAM(s) Oral every 8 hours  glucagon  Injectable 1 milliGRAM(s) IntraMuscular once  hydrALAZINE 25 milliGRAM(s) Oral three times a day  insulin glargine Injectable (LANTUS) 52 Unit(s) SubCutaneous at bedtime  insulin lispro (ADMELOG) corrective regimen sliding scale   SubCutaneous three times a day before meals  insulin lispro (ADMELOG) corrective regimen sliding scale   SubCutaneous at bedtime  insulin lispro Injectable (ADMELOG) 14 Unit(s) SubCutaneous before dinner  insulin lispro Injectable (ADMELOG) 5 Unit(s) SubCutaneous <User Schedule>  insulin lispro Injectable (ADMELOG) 14 Unit(s) SubCutaneous before lunch  insulin lispro Injectable (ADMELOG) 16 Unit(s) SubCutaneous before breakfast  losartan 100 milliGRAM(s) Oral daily  metoprolol tartrate 12.5 milliGRAM(s) Oral two times a day  senna 2 Tablet(s) Oral two times a day    MEDICATIONS  (PRN):  acetaminophen   Tablet .. 650 milliGRAM(s) Oral every 6 hours PRN Mild Pain (1 - 3)  HYDROmorphone   Tablet 2 milliGRAM(s) Oral every 6 hours PRN Severe Pain (7 - 10)  oxyCODONE    IR 5 milliGRAM(s) Oral every 6 hours PRN Moderate Pain (4 - 6)    Vital Signs Last 24 Hrs  T(F): 97.8 (09 May 2021 04:57), Max: 98.4 (08 May 2021 22:00)  HR: 82 (09 May 2021 04:57) (79 - 90)  BP: 125/74 (09 May 2021 04:57) (120/70 - 133/74)  RR: 18 (09 May 2021 04:57) (16 - 18)  SpO2: 99% (09 May 2021 04:57) (96% - 99%)  I&O's Summary    BMI (kg/m2): 28.6 (05-05-21 @ 14:08)    PHYSICAL EXAM:  General: NAD, A/O x 3  ENT: MMM  Neck: Supple, No JVD  Lungs: Clear to auscultation bilaterally  Cardio: RRR, S1/S2, No murmurs  Abdomen: Soft, Nontender, Nondistended; Bowel sounds present  Extremities: No calf tenderness, No pitting edema  Neuro: right UE and LE 4/5, L UE and LLE 4+/5     LABS:  POCT Blood Glucose.: 197 mg/dL (09 May 2021 07:51)  POCT Blood Glucose.: 171 mg/dL (08 May 2021 22:05)  POCT Blood Glucose.: 129 mg/dL (08 May 2021 17:23)  POCT Blood Glucose.: 85 mg/dL (08 May 2021 12:12)    RADIOLOGY & ADDITIONAL TESTS: reviewed    Care Discussed with Consultants/Other Providers: yes - rehab Patient is a 50y old  Male who presents with a chief complaint of 03.4 GBS (08 May 2021 11:00)    Patient seen and examined at bedside. doing well. no complaints. continues to snack qhs    ALLERGIES:  No Known Drug Allergies  shellfish (Urticaria)    MEDICATIONS  (STANDING):  amLODIPine   Tablet 10 milliGRAM(s) Oral daily  bisacodyl 5 milliGRAM(s) Oral at bedtime  dextrose 40% Gel 15 Gram(s) Oral once  dextrose 5%. 1000 milliLiter(s) (50 mL/Hr) IV Continuous <Continuous>  dextrose 5%. 1000 milliLiter(s) (100 mL/Hr) IV Continuous <Continuous>  dextrose 50% Injectable 25 Gram(s) IV Push once  dextrose 50% Injectable 12.5 Gram(s) IV Push once  dextrose 50% Injectable 25 Gram(s) IV Push once  enoxaparin Injectable 40 milliGRAM(s) SubCutaneous daily  famotidine    Tablet 20 milliGRAM(s) Oral two times a day  gabapentin 300 milliGRAM(s) Oral every 8 hours  glucagon  Injectable 1 milliGRAM(s) IntraMuscular once  hydrALAZINE 25 milliGRAM(s) Oral three times a day  insulin glargine Injectable (LANTUS) 52 Unit(s) SubCutaneous at bedtime  insulin lispro (ADMELOG) corrective regimen sliding scale   SubCutaneous three times a day before meals  insulin lispro (ADMELOG) corrective regimen sliding scale   SubCutaneous at bedtime  insulin lispro Injectable (ADMELOG) 14 Unit(s) SubCutaneous before dinner  insulin lispro Injectable (ADMELOG) 5 Unit(s) SubCutaneous <User Schedule>  insulin lispro Injectable (ADMELOG) 14 Unit(s) SubCutaneous before lunch  insulin lispro Injectable (ADMELOG) 16 Unit(s) SubCutaneous before breakfast  losartan 100 milliGRAM(s) Oral daily  metoprolol tartrate 12.5 milliGRAM(s) Oral two times a day  senna 2 Tablet(s) Oral two times a day    MEDICATIONS  (PRN):  acetaminophen   Tablet .. 650 milliGRAM(s) Oral every 6 hours PRN Mild Pain (1 - 3)  HYDROmorphone   Tablet 2 milliGRAM(s) Oral every 6 hours PRN Severe Pain (7 - 10)  oxyCODONE    IR 5 milliGRAM(s) Oral every 6 hours PRN Moderate Pain (4 - 6)    Vital Signs Last 24 Hrs  T(F): 97.8 (09 May 2021 04:57), Max: 98.4 (08 May 2021 22:00)  HR: 82 (09 May 2021 04:57) (79 - 90)  BP: 125/74 (09 May 2021 04:57) (120/70 - 133/74)  RR: 18 (09 May 2021 04:57) (16 - 18)  SpO2: 99% (09 May 2021 04:57) (96% - 99%)  I&O's Summary    BMI (kg/m2): 28.6 (05-05-21 @ 14:08)    PHYSICAL EXAM:  General: NAD, A/O x 3  ENT: MMM  Neck: Supple, No JVD  Lungs: Clear to auscultation bilaterally  Cardio: RRR, S1/S2, No murmurs  Abdomen: Soft, Nontender, Nondistended; Bowel sounds present  Extremities: No calf tenderness, No pitting edema  Neuro: right UE and LE 4/5, L UE and LLE 4+/5     LABS:  POCT Blood Glucose.: 197 mg/dL (09 May 2021 07:51)  POCT Blood Glucose.: 171 mg/dL (08 May 2021 22:05)  POCT Blood Glucose.: 129 mg/dL (08 May 2021 17:23)  POCT Blood Glucose.: 85 mg/dL (08 May 2021 12:12)    RADIOLOGY & ADDITIONAL TESTS: reviewed    Care Discussed with Consultants/Other Providers: yes - rehab

## 2021-05-09 NOTE — PROGRESS NOTE ADULT - SUBJECTIVE AND OBJECTIVE BOX
Pt. seen and examined at bedside.  No overnight events.  Feels legs getting stronger.  Walking short distances w/ RW      REVIEW OF SYSTEMS  Constitutional - No fever,  No fatigue  Neurological - No headaches, +++  loss of strength  Musculoskeletal - No joint pain, No joint swelling, No muscle pain    VITALS  T(C): 36.6 (05-09-21 @ 04:57), Max: 36.9 (05-08-21 @ 22:00)  HR: 82 (05-09-21 @ 04:57) (82 - 90)  BP: 125/74 (05-09-21 @ 04:57) (125/74 - 133/74)  RR: 18 (05-09-21 @ 04:57) (16 - 18)  SpO2: 99% (05-09-21 @ 04:57) (96% - 99%)  Wt(kg): --       MEDICATIONS   acetaminophen   Tablet .. 650 milliGRAM(s) every 6 hours PRN  amLODIPine   Tablet 10 milliGRAM(s) daily  bisacodyl 5 milliGRAM(s) at bedtime  dextrose 40% Gel 15 Gram(s) once  dextrose 5%. 1000 milliLiter(s) <Continuous>  dextrose 5%. 1000 milliLiter(s) <Continuous>  dextrose 50% Injectable 25 Gram(s) once  dextrose 50% Injectable 12.5 Gram(s) once  dextrose 50% Injectable 25 Gram(s) once  enoxaparin Injectable 40 milliGRAM(s) daily  famotidine    Tablet 20 milliGRAM(s) two times a day  gabapentin 300 milliGRAM(s) every 8 hours  glucagon  Injectable 1 milliGRAM(s) once  hydrALAZINE 25 milliGRAM(s) three times a day  HYDROmorphone   Tablet 2 milliGRAM(s) every 6 hours PRN  insulin glargine Injectable (LANTUS) 52 Unit(s) at bedtime  insulin lispro (ADMELOG) corrective regimen sliding scale   three times a day before meals  insulin lispro (ADMELOG) corrective regimen sliding scale   at bedtime  insulin lispro Injectable (ADMELOG) 14 Unit(s) before dinner  insulin lispro Injectable (ADMELOG) 5 Unit(s) <User Schedule>  insulin lispro Injectable (ADMELOG) 14 Unit(s) before lunch  insulin lispro Injectable (ADMELOG) 16 Unit(s) before breakfast  losartan 100 milliGRAM(s) daily  metoprolol tartrate 12.5 milliGRAM(s) two times a day  oxyCODONE    IR 5 milliGRAM(s) every 6 hours PRN  senna 2 Tablet(s) two times a day      RECENT LABS/IMAGING                      POCT Blood Glucose.: 197 mg/dL (05-09-21 @ 07:51)  POCT Blood Glucose.: 171 mg/dL (05-08-21 @ 22:05)  POCT Blood Glucose.: 129 mg/dL (05-08-21 @ 17:23)  POCT Blood Glucose.: 85 mg/dL (05-08-21 @ 12:12)    ---------  PHYSICAL EXAM  Constitutional - NAD, Comfortable  Pulm - Breathing comfortably, No wheezing  Abd - Soft, NTND  Extremities - No edema, No calf tenderness  Neurologic Exam -                    Cognitive - Awake, Alert     Communication - Fluent     Motor - HAS AG STRENGTH B/L LE     Sensory - Intact to LT  Psychiatric - Mood WNL, Affect WNL    ASSESSMENT/PLAN  50y Male with functional deficits after NEW ONSET GBS DX.  Continue current medical management  Pain - Tylenol PRN; ERICA; DILAUDID + OXY PRN  DVT PPX - enoxaparin Injectable 40 milliGRAM(s) daily  Active issues - NONE   Continue 3hrs a day of comprehensive rehab program.

## 2021-05-10 LAB
ALBUMIN SERPL ELPH-MCNC: 3.4 G/DL — SIGNIFICANT CHANGE UP (ref 3.3–5)
ALP SERPL-CCNC: 91 U/L — SIGNIFICANT CHANGE UP (ref 40–120)
ALT FLD-CCNC: 42 U/L — SIGNIFICANT CHANGE UP (ref 10–45)
ANION GAP SERPL CALC-SCNC: 10 MMOL/L — SIGNIFICANT CHANGE UP (ref 5–17)
AST SERPL-CCNC: 18 U/L — SIGNIFICANT CHANGE UP (ref 10–40)
BILIRUB SERPL-MCNC: 0.2 MG/DL — SIGNIFICANT CHANGE UP (ref 0.2–1.2)
BUN SERPL-MCNC: 17 MG/DL — SIGNIFICANT CHANGE UP (ref 7–23)
CALCIUM SERPL-MCNC: 9.1 MG/DL — SIGNIFICANT CHANGE UP (ref 8.4–10.5)
CHLORIDE SERPL-SCNC: 105 MMOL/L — SIGNIFICANT CHANGE UP (ref 96–108)
CO2 SERPL-SCNC: 28 MMOL/L — SIGNIFICANT CHANGE UP (ref 22–31)
CREAT SERPL-MCNC: 1.12 MG/DL — SIGNIFICANT CHANGE UP (ref 0.5–1.3)
GLUCOSE BLDC GLUCOMTR-MCNC: 103 MG/DL — HIGH (ref 70–99)
GLUCOSE BLDC GLUCOMTR-MCNC: 115 MG/DL — HIGH (ref 70–99)
GLUCOSE BLDC GLUCOMTR-MCNC: 115 MG/DL — HIGH (ref 70–99)
GLUCOSE BLDC GLUCOMTR-MCNC: 149 MG/DL — HIGH (ref 70–99)
GLUCOSE BLDC GLUCOMTR-MCNC: 200 MG/DL — HIGH (ref 70–99)
GLUCOSE SERPL-MCNC: 243 MG/DL — HIGH (ref 70–99)
HCT VFR BLD CALC: 29 % — LOW (ref 39–50)
HGB BLD-MCNC: 9.7 G/DL — LOW (ref 13–17)
MCHC RBC-ENTMCNC: 30.5 PG — SIGNIFICANT CHANGE UP (ref 27–34)
MCHC RBC-ENTMCNC: 33.4 GM/DL — SIGNIFICANT CHANGE UP (ref 32–36)
MCV RBC AUTO: 91.2 FL — SIGNIFICANT CHANGE UP (ref 80–100)
NRBC # BLD: 0 /100 WBCS — SIGNIFICANT CHANGE UP (ref 0–0)
PLATELET # BLD AUTO: 299 K/UL — SIGNIFICANT CHANGE UP (ref 150–400)
POTASSIUM SERPL-MCNC: 4.2 MMOL/L — SIGNIFICANT CHANGE UP (ref 3.5–5.3)
POTASSIUM SERPL-SCNC: 4.2 MMOL/L — SIGNIFICANT CHANGE UP (ref 3.5–5.3)
PROT SERPL-MCNC: 6.4 G/DL — SIGNIFICANT CHANGE UP (ref 6–8.3)
RBC # BLD: 3.18 M/UL — LOW (ref 4.2–5.8)
RBC # FLD: 12.3 % — SIGNIFICANT CHANGE UP (ref 10.3–14.5)
SODIUM SERPL-SCNC: 143 MMOL/L — SIGNIFICANT CHANGE UP (ref 135–145)
WBC # BLD: 5.66 K/UL — SIGNIFICANT CHANGE UP (ref 3.8–10.5)
WBC # FLD AUTO: 5.66 K/UL — SIGNIFICANT CHANGE UP (ref 3.8–10.5)

## 2021-05-10 PROCEDURE — 99232 SBSQ HOSP IP/OBS MODERATE 35: CPT | Mod: GC

## 2021-05-10 PROCEDURE — 99233 SBSQ HOSP IP/OBS HIGH 50: CPT

## 2021-05-10 RX ORDER — INSULIN LISPRO 100/ML
10 VIAL (ML) SUBCUTANEOUS
Refills: 0 | Status: DISCONTINUED | OUTPATIENT
Start: 2021-05-10 | End: 2021-05-11

## 2021-05-10 RX ORDER — OXYCODONE HYDROCHLORIDE 5 MG/1
5 TABLET ORAL EVERY 6 HOURS
Refills: 0 | Status: DISCONTINUED | OUTPATIENT
Start: 2021-05-10 | End: 2021-05-14

## 2021-05-10 RX ORDER — INSULIN GLARGINE 100 [IU]/ML
54 INJECTION, SOLUTION SUBCUTANEOUS AT BEDTIME
Refills: 0 | Status: DISCONTINUED | OUTPATIENT
Start: 2021-05-10 | End: 2021-05-14

## 2021-05-10 RX ORDER — HYDROMORPHONE HYDROCHLORIDE 2 MG/ML
2 INJECTION INTRAMUSCULAR; INTRAVENOUS; SUBCUTANEOUS EVERY 6 HOURS
Refills: 0 | Status: DISCONTINUED | OUTPATIENT
Start: 2021-05-10 | End: 2021-05-14

## 2021-05-10 RX ADMIN — Medication 10 UNIT(S): at 12:08

## 2021-05-10 RX ADMIN — Medication 10 UNIT(S): at 17:29

## 2021-05-10 RX ADMIN — OXYCODONE HYDROCHLORIDE 5 MILLIGRAM(S): 5 TABLET ORAL at 05:18

## 2021-05-10 RX ADMIN — OXYCODONE HYDROCHLORIDE 5 MILLIGRAM(S): 5 TABLET ORAL at 22:58

## 2021-05-10 RX ADMIN — FAMOTIDINE 20 MILLIGRAM(S): 10 INJECTION INTRAVENOUS at 05:16

## 2021-05-10 RX ADMIN — Medication 25 MILLIGRAM(S): at 21:30

## 2021-05-10 RX ADMIN — INSULIN GLARGINE 54 UNIT(S): 100 INJECTION, SOLUTION SUBCUTANEOUS at 21:30

## 2021-05-10 RX ADMIN — LOSARTAN POTASSIUM 100 MILLIGRAM(S): 100 TABLET, FILM COATED ORAL at 08:31

## 2021-05-10 RX ADMIN — AMLODIPINE BESYLATE 10 MILLIGRAM(S): 2.5 TABLET ORAL at 05:16

## 2021-05-10 RX ADMIN — Medication 25 MILLIGRAM(S): at 05:17

## 2021-05-10 RX ADMIN — Medication 12.5 MILLIGRAM(S): at 17:02

## 2021-05-10 RX ADMIN — Medication 25 MILLIGRAM(S): at 15:07

## 2021-05-10 RX ADMIN — OXYCODONE HYDROCHLORIDE 5 MILLIGRAM(S): 5 TABLET ORAL at 12:03

## 2021-05-10 RX ADMIN — ENOXAPARIN SODIUM 40 MILLIGRAM(S): 100 INJECTION SUBCUTANEOUS at 12:09

## 2021-05-10 RX ADMIN — HYDROMORPHONE HYDROCHLORIDE 2 MILLIGRAM(S): 2 INJECTION INTRAMUSCULAR; INTRAVENOUS; SUBCUTANEOUS at 15:07

## 2021-05-10 RX ADMIN — SENNA PLUS 2 TABLET(S): 8.6 TABLET ORAL at 17:02

## 2021-05-10 RX ADMIN — Medication 5 MILLIGRAM(S): at 21:29

## 2021-05-10 RX ADMIN — OXYCODONE HYDROCHLORIDE 5 MILLIGRAM(S): 5 TABLET ORAL at 12:33

## 2021-05-10 RX ADMIN — HYDROMORPHONE HYDROCHLORIDE 2 MILLIGRAM(S): 2 INJECTION INTRAMUSCULAR; INTRAVENOUS; SUBCUTANEOUS at 15:37

## 2021-05-10 RX ADMIN — OXYCODONE HYDROCHLORIDE 5 MILLIGRAM(S): 5 TABLET ORAL at 22:16

## 2021-05-10 RX ADMIN — HYDROMORPHONE HYDROCHLORIDE 2 MILLIGRAM(S): 2 INJECTION INTRAMUSCULAR; INTRAVENOUS; SUBCUTANEOUS at 08:48

## 2021-05-10 RX ADMIN — Medication 2: at 08:13

## 2021-05-10 RX ADMIN — OXYCODONE HYDROCHLORIDE 5 MILLIGRAM(S): 5 TABLET ORAL at 05:55

## 2021-05-10 RX ADMIN — GABAPENTIN 300 MILLIGRAM(S): 400 CAPSULE ORAL at 15:07

## 2021-05-10 RX ADMIN — Medication 16 UNIT(S): at 08:13

## 2021-05-10 RX ADMIN — GABAPENTIN 300 MILLIGRAM(S): 400 CAPSULE ORAL at 21:30

## 2021-05-10 RX ADMIN — SENNA PLUS 2 TABLET(S): 8.6 TABLET ORAL at 05:18

## 2021-05-10 RX ADMIN — Medication 12.5 MILLIGRAM(S): at 08:31

## 2021-05-10 RX ADMIN — Medication 1 APPLICATION(S): at 17:41

## 2021-05-10 RX ADMIN — GABAPENTIN 300 MILLIGRAM(S): 400 CAPSULE ORAL at 05:16

## 2021-05-10 RX ADMIN — HYDROMORPHONE HYDROCHLORIDE 2 MILLIGRAM(S): 2 INJECTION INTRAMUSCULAR; INTRAVENOUS; SUBCUTANEOUS at 08:18

## 2021-05-10 RX ADMIN — FAMOTIDINE 20 MILLIGRAM(S): 10 INJECTION INTRAVENOUS at 17:02

## 2021-05-10 RX ADMIN — HYDROMORPHONE HYDROCHLORIDE 2 MILLIGRAM(S): 2 INJECTION INTRAMUSCULAR; INTRAVENOUS; SUBCUTANEOUS at 23:53

## 2021-05-10 NOTE — PROGRESS NOTE ADULT - ASSESSMENT
49 y/o M T2DM, HTN, HLD who presented to [facility]  on 4/10 with complaint of abdominal pain, diarrhea from laxative use and weakness and 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 Hospital course complicated by uncontrolled diabetes, hyponatremia, ALLIE. Now admitted to [facility] after for initiation of a multidisciplinary rehab program consisting focused on functional mobility, transfers and ADLs (activities of daily living).    GBS - s/p PLEX  -Continue comprehensive rehab program - PT/OT/SLP per rehab team  -Pain management, bowel regimen per rehab  -Numbness and weakness improving  -Gabapentin 300mg q8  -Follow up with neuro after dc (f/u remaining LP results)    Hyponatremia/ALLIE - resolved  -Followed by nephro at [facility]  -Likely multifactorial including hyperglycemia (pseudohyponatremia) and pre renal 2/2 to diarrhea (? ADH induced by pain)   -S/p fluids   -Free water restriction <1.2L/day    T2DM with neuropathy: uncontrolled HbA1C 11.5%  -c/w Lantus 52 units qhs, Admelog 16un before breakfast. Decrease admelog to 10un before lunch. Continue admelog 14un before dinner  -On Admelog 5 units at bedtime only if taking a snack - noted with elevated AM BS despite taking additional 5un admelog prn - increase to 8un and monitor  -at home Pt was on Tresiba 30 units qhs and Novolog 15 units premeal TID.  -Nutrition consult    Anemia likely anemia of chronic disease  -H/H stable  -f/u anemia workup    RPR Positive  -ID consulted at [facility]- treated 2017 (PCN)  -RPR titer 1:1 - no s/s of active syphilis  -Per ID, no need for treatment at present time     HTN - BP acceptable  -Cont norvasc, losartan, hydralazine, metoprolol tartrate    Hematuria / proteinuria   -hematuria resolved  -labs and urine ordered for vasculitis workup  -Per nephrology, will need kidney biopsy likely as outpt     DVT ppx - lovenox, SCDs 49 y/o M T2DM, HTN, HLD who presented to [facility]  on 4/10 with complaint of abdominal pain, diarrhea from laxative use and weakness and 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 Hospital course complicated by uncontrolled diabetes, hyponatremia, ALLIE. Now admitted to [facility] after for initiation of a multidisciplinary rehab program consisting focused on functional mobility, transfers and ADLs (activities of daily living).    GBS - s/p PLEX  -Continue comprehensive rehab program - PT/OT/SLP per rehab team  -Pain management, bowel regimen per rehab  -Numbness and weakness improving  -Gabapentin 300mg q8  -Follow up with neuro after dc (f/u remaining LP results)    Hyponatremia/ALLIE - resolved  -Followed by nephro at [facility]  -Likely multifactorial including hyperglycemia (pseudohyponatremia) and pre renal 2/2 to diarrhea (? ADH induced by pain)   -S/p fluids   -Free water restriction <1.2L/day    T2DM with neuropathy: uncontrolled HbA1C 11.5%  -At home Pt was on Tresiba 30 units qhs and Novolog 15 units premeal TID.  -Noted low BG pre lunch. Decrease admelog 16un to 10un at breakfast.   -Decrease admelog from 14un to 10un at lunch and dinner  -FBG still elevated - increase lantus 52un to 54un qhs  -Admelog 5 units at bedtime only if taking a snack - pt planning to decrease carbs qhs  -Discharge Recc - discharge pt on home insulin of Tresiba equal to Lantus dose needed to control BG in hospital- pt has Tresiba and pen needles and will not need script   Mealtime insulin - discharge on Novolog AC- according to admelog doses - pt has Novolog and pen needles and will not need script FS AC and HS- pt has meter and supplies- will not need script  -Pt instructed to schedule appt with [facility]ology, Dr. David Mcdaniel, per pt preference, for 2-4 weeks after discharge    Anemia likely anemia of chronic disease  -H/H stable  -f/u anemia workup    RPR Positive  -ID consulted at [facility]- treated 2017 (PCN)  -RPR titer 1:1 - no s/s of active syphilis  -Per ID, no need for treatment at present time     HTN - BP acceptable  -Cont norvasc, losartan, hydralazine, metoprolol tartrate    Hematuria / proteinuria   -hematuria resolved  -labs and urine ordered for vasculitis workup  -Per nephrology, will need kidney biopsy likely as outpt     DVT ppx - lovenox, SCDs

## 2021-05-10 NOTE — PROGRESS NOTE ADULT - ASSESSMENT
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 Hospital course complicated by uncontrolled diabetes, hyponatremia, ALLIE, . Now admitted to [facility] after for initiation of a multidisciplinary rehab program consisting focused on functional mobility, transfers and ADLs (activities of daily living).    Comprehensive Multidisciplinary Rehab Program:  - Start comprehensive rehab program, 3 hours a day, 5 days a week.  - PT 2hr/day: Focused on improving strength, endurance, coordination, balance, functional mobility, and transfers  - OT 1hr/day: Focused on improving strength, fine motor skills, coordination, posture and ADLs.       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 Rae)  -CT abd and pelvis on 4/12 without acute process however GGO seen in the lungs   -Diet advanced   -Bentyl had been ordered once for abdominal pain   -Avoid constipation     Hyponatremia/ALLIE  -followed by nephro at [facility]  -likely multifactorial including hyperglycemia ( pseudohyponatremia ) and pre renal 2/2 to diarrhea (? ADH induced by pain)   -s/p fluids  adn 12.L fluid restiction  -will dc fluid restriction      DM type 2 with neuropathy : uncontrolled   -A1C 11.5%  -c/w Lantus 52 units qhs, Admelog 16-14-14 before meals  -c/w Admelog 5 units at bedtime only if taking a snack   -Diabetes specialist consulted at [facility] 5/5/21  - at home Pt was on Tresiba 30 units qhs and Novolog 15 units premeal TID.  -Per notes from endocrine service "Discussed with patient regarding discharge planning, he now will have enough Tresiba and Novolog at home to proceed with these insulins due to [facility] program he is now a part of.   DC on Tresiba and Novolog pens doses TBD. Discussed option of mixed insulin in case of future issues obtaining insulin.  He requested changing to [facility] endocrinology. "  - DC recs from [facility] Endocrine/Diabetic NP:   "DC on Tresiba pen - 52 units sq qhs  and Novolog pens 16/14/14  Patient will need BD osmar pen needles 4x daily.    Please ask patient if he needs new glucometer, test strips, lancets, alcohol pads  Also please prescribe glucagon kit for hypoglycemia or Baqsuimi nasal spray for hypoglycemia.  WHichever is lowest cost or covered by insurance."    + RPR  -ID consulted at [facility]- treated 2017 (PCN)  -RPR titer 1:1 - no s/s of active syphilis  -Per ID, no need for treatment at present time     HTN   -C/w norvasc, losartan, hydralazine, metoprolol tartrate    Hematuria / proteinuria   - labs and urine ordered for vasculitis workup  -Per nephrology, will need kidney biopsy likely as outpt     Mood/Cognition:  - Appropriate cognition and mood.    Sleep:   - Melatonin PRN     Pain Management:-   Tylenol PRN, Oxycodone, Dilaudid prn  - gabapentin 300mg tid     GI/Bowel:  - At risk for constipation due to neurologic diagnosis, immobility and/or medication use  - Senna QHS, Miralax PRN Daily  - GI ppx: pepcid 20mg bid    /Bladder: toileting prn         Skin/Pressure Injury:   - At risk for pressure injury due to neurologic diagnosis and relative immobility.  - Skin assessment on admission admission: No acute skin lesions  -- Nursing to monitor skin Qshift    Diet/Dysphagia:  - Diet Consistency/Modifications: CC, 1200ml FR, Glucerna BID, no shellfish    DVT ppx:- lovenox   - SCDs    Participation Restrictions/Precautions:  - Weight bearing status: Full  - ROM restrictions: right ankle d/t prior ankle surgery  - Precautions:    [x] Falls          ---------------  Code Status/Emergency Contact:    Outpatient Follow-up (Specialty/Name of physician):    [facility] endocrinology. 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 Hospital course complicated by uncontrolled diabetes, hyponatremia, ALLIE, . Now admitted to [facility] after for initiation of a multidisciplinary rehab program consisting focused on functional mobility, transfers and ADLs (activities of daily living).    Comprehensive Multidisciplinary Rehab Program:  - Start comprehensive rehab program, 3 hours a day, 5 days a week.  - PT 2hr/day: Focused on improving strength, endurance, coordination, balance, functional mobility, and transfers  - OT 1hr/day: Focused on improving strength, fine motor skills, coordination, posture and ADLs.       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 ADH induced by pain)   -s/p fluids  and 12.L fluid restiction  -will dc fluid restriction as sodium now normal       DM type 2 with neuropathy : uncontrolled   -A1C 11.5%  -c/w Lantus 52 units qhs, Admelog 16-14-14 before meals  -c/w Admelog 5 units at bedtime only if taking a snack   - at home Pt was on Tresiba 30 units qhs and Novolog 15 units premeal TID.    + RPR  -ID consulted at [facility]- treated 2017 (PCN)  -RPR titer 1:1 - no s/s of active syphilis  -Per ID, no need for treatment at present time     HTN   -C/w norvasc, losartan, hydralazine, metoprolol tartrate    Hematuria / proteinuria   - labs and urine ordered for vasculitis workup  -Per nephrology, will need kidney biopsy likely as outpt     Mood/Cognition:  - Appropriate cognition and mood.    Sleep:   - Melatonin PRN     Pain Management:-   Tylenol PRN, Oxycodone, Dilaudid prn  - gabapentin 300mg tid     GI/Bowel:  - At risk for constipation due to neurologic diagnosis, immobility and/or medication use  - Senna QHS, Miralax PRN Daily  - GI ppx: pepcid 20mg bid    /Bladder: toileting prn       Skin/Pressure Injury: OOB as tolerated   -   Diet:   - Diet Consistency/Modifications: CC Glucerna BID, no shellfish    DVT ppx:- lovenox   - SCDs    Participation Restrictions/Precautions:  - - ROM restrictions: right ankle d/t prior ankle surgery  - Precautions:    [x] Falls          ---------------  Code Status/Emergency Contact:    Outpatient Follow-up (Specialty/Name of physician):    [facility]. Appointments below:  [address] [place] [address], [address], [phone]  6/8/21 @ 11:00 AM with diabetes educator   7/12/21 @ 11:00 AM with Dr Montesinos       GI: Dr Rae

## 2021-05-10 NOTE — PROGRESS NOTE ADULT - SUBJECTIVE AND OBJECTIVE BOX
Patient is a 50y old  Male who presents with a chief complaint of 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 noted with BG 79 before lunch, and 130 after lunch, asymptomatic.     ALLERGIES:  No Known Drug Allergies  shellfish (Urticaria)    MEDICATIONS  (STANDING):  amLODIPine   Tablet 10 milliGRAM(s) Oral daily  bisacodyl 5 milliGRAM(s) Oral at bedtime  dextrose 40% Gel 15 Gram(s) Oral once  dextrose 5%. 1000 milliLiter(s) (50 mL/Hr) IV Continuous <Continuous>  dextrose 5%. 1000 milliLiter(s) (100 mL/Hr) IV Continuous <Continuous>  dextrose 50% Injectable 25 Gram(s) IV Push once  dextrose 50% Injectable 12.5 Gram(s) IV Push once  dextrose 50% Injectable 25 Gram(s) IV Push once  enoxaparin Injectable 40 milliGRAM(s) SubCutaneous daily  famotidine    Tablet 20 milliGRAM(s) Oral two times a day  gabapentin 300 milliGRAM(s) Oral every 8 hours  glucagon  Injectable 1 milliGRAM(s) IntraMuscular once  hydrALAZINE 25 milliGRAM(s) Oral three times a day  insulin glargine Injectable (LANTUS) 52 Unit(s) SubCutaneous at bedtime  insulin lispro (ADMELOG) corrective regimen sliding scale   SubCutaneous three times a day before meals  insulin lispro (ADMELOG) corrective regimen sliding scale   SubCutaneous at bedtime  insulin lispro Injectable (ADMELOG) 14 Unit(s) SubCutaneous before dinner  insulin lispro Injectable (ADMELOG) 5 Unit(s) SubCutaneous <User Schedule>  insulin lispro Injectable (ADMELOG) 14 Unit(s) SubCutaneous before lunch  insulin lispro Injectable (ADMELOG) 16 Unit(s) SubCutaneous before breakfast  losartan 100 milliGRAM(s) Oral daily  metoprolol tartrate 12.5 milliGRAM(s) Oral two times a day  nystatin Cream 1 Application(s) Topical two times a day  senna 2 Tablet(s) Oral two times a day    MEDICATIONS  (PRN):  acetaminophen   Tablet .. 650 milliGRAM(s) Oral every 6 hours PRN Mild Pain (1 - 3)  HYDROmorphone   Tablet 2 milliGRAM(s) Oral every 6 hours PRN Severe Pain (7 - 10)  oxyCODONE    IR 5 milliGRAM(s) Oral every 6 hours PRN Moderate Pain (4 - 6)    Vital Signs Last 24 Hrs  T(F): 97.6 (09 May 2021 21:37), Max: 97.6 (09 May 2021 21:37)  HR: 74 (10 May 2021 05:26) (70 - 97)  BP: 130/80 (10 May 2021 05:26) (127/80 - 151/88)  RR: 18 (09 May 2021 21:37) (16 - 18)  SpO2: 96% (09 May 2021 21:37) (95% - 96%)  I&O's Summary    BMI (kg/m2): 28.6 (05-05-21 @ 14:08)    PHYSICAL EXAM:  General: NAD, A/O x 3  ENT: MMM  Neck: Supple, No JVD  Lungs: Clear to auscultation bilaterally  Cardio: RRR, S1/S2, No murmurs  Abdomen: Soft, Nontender, Nondistended; Bowel sounds present  Extremities: No calf tenderness, No pitting edema  Neuro: right UE and LE 4/5, L UE and LLE 4+/5     LABS:                        9.7    5.66  )-----------( 299      ( 10 May 2021 07:05 )             29.0       05-10    143  |  105  |  17  ----------------------------<  243  4.2   |  28  |  1.12    Ca    9.1      10 May 2021 07:05    TPro  6.4  /  Alb  3.4  /  TBili  0.2  /  DBili  x   /  AST  18  /  ALT  42  /  AlkPhos  91  05-10     eGFR if Non African American: 75 mL/min/1.73M2 (05-10-21 @ 07:05)  eGFR if African American: 87 mL/min/1.73M2 (05-10-21 @ 07:05)    POCT Blood Glucose.: 200 mg/dL (10 May 2021 08:00)  POCT Blood Glucose.: 182 mg/dL (09 May 2021 21:23)  POCT Blood Glucose.: 154 mg/dL (09 May 2021 16:40)  POCT Blood Glucose.: 130 mg/dL (09 May 2021 12:41)  POCT Blood Glucose.: 79 mg/dL (09 May 2021 12:15)    RADIOLOGY & ADDITIONAL TESTS: reviewed    Care Discussed with Consultants/Other Providers: yes Patient is a 50y old  Male who presents with a chief complaint of 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 noted with BG 79 before lunch, and 130 after lunch, asymptomatic. requesting tuna as bedtime snack and less carbs. denies headache, fever, chills, cp, sob, n/v, abd pain.      ALLERGIES:  No Known Drug Allergies  shellfish (Urticaria)    MEDICATIONS  (STANDING):  amLODIPine   Tablet 10 milliGRAM(s) Oral daily  bisacodyl 5 milliGRAM(s) Oral at bedtime  dextrose 40% Gel 15 Gram(s) Oral once  dextrose 5%. 1000 milliLiter(s) (50 mL/Hr) IV Continuous <Continuous>  dextrose 5%. 1000 milliLiter(s) (100 mL/Hr) IV Continuous <Continuous>  dextrose 50% Injectable 25 Gram(s) IV Push once  dextrose 50% Injectable 12.5 Gram(s) IV Push once  dextrose 50% Injectable 25 Gram(s) IV Push once  enoxaparin Injectable 40 milliGRAM(s) SubCutaneous daily  famotidine    Tablet 20 milliGRAM(s) Oral two times a day  gabapentin 300 milliGRAM(s) Oral every 8 hours  glucagon  Injectable 1 milliGRAM(s) IntraMuscular once  hydrALAZINE 25 milliGRAM(s) Oral three times a day  insulin glargine Injectable (LANTUS) 52 Unit(s) SubCutaneous at bedtime  insulin lispro (ADMELOG) corrective regimen sliding scale   SubCutaneous three times a day before meals  insulin lispro (ADMELOG) corrective regimen sliding scale   SubCutaneous at bedtime  insulin lispro Injectable (ADMELOG) 14 Unit(s) SubCutaneous before dinner  insulin lispro Injectable (ADMELOG) 5 Unit(s) SubCutaneous <User Schedule>  insulin lispro Injectable (ADMELOG) 14 Unit(s) SubCutaneous before lunch  insulin lispro Injectable (ADMELOG) 16 Unit(s) SubCutaneous before breakfast  losartan 100 milliGRAM(s) Oral daily  metoprolol tartrate 12.5 milliGRAM(s) Oral two times a day  nystatin Cream 1 Application(s) Topical two times a day  senna 2 Tablet(s) Oral two times a day    MEDICATIONS  (PRN):  acetaminophen   Tablet .. 650 milliGRAM(s) Oral every 6 hours PRN Mild Pain (1 - 3)  HYDROmorphone   Tablet 2 milliGRAM(s) Oral every 6 hours PRN Severe Pain (7 - 10)  oxyCODONE    IR 5 milliGRAM(s) Oral every 6 hours PRN Moderate Pain (4 - 6)    Vital Signs Last 24 Hrs  T(F): 97.6 (09 May 2021 21:37), Max: 97.6 (09 May 2021 21:37)  HR: 74 (10 May 2021 05:26) (70 - 97)  BP: 130/80 (10 May 2021 05:26) (127/80 - 151/88)  RR: 18 (09 May 2021 21:37) (16 - 18)  SpO2: 96% (09 May 2021 21:37) (95% - 96%)  I&O's Summary    BMI (kg/m2): 28.6 (05-05-21 @ 14:08)    PHYSICAL EXAM:  General: NAD, A/O x 3  ENT: MMM  Neck: Supple, No JVD  Lungs: Respirations unlabored. Clear to auscultation bilaterally  Cardio: RRR, S1/S2, No murmurs  Abdomen: Soft, Nontender, Nondistended; Bowel sounds present  Extremities: No calf tenderness, No pitting edema  Neuro: right UE and LE 4/5, L UE and LLE 4+/5     LABS:                        9.7    5.66  )-----------( 299      ( 10 May 2021 07:05 )             29.0       05-10    143  |  105  |  17  ----------------------------<  243  4.2   |  28  |  1.12    Ca    9.1      10 May 2021 07:05    TPro  6.4  /  Alb  3.4  /  TBili  0.2  /  DBili  x   /  AST  18  /  ALT  42  /  AlkPhos  91  05-10     eGFR if Non African American: 75 mL/min/1.73M2 (05-10-21 @ 07:05)  eGFR if African American: 87 mL/min/1.73M2 (05-10-21 @ 07:05)    POCT Blood Glucose.: 200 mg/dL (10 May 2021 08:00)  POCT Blood Glucose.: 182 mg/dL (09 May 2021 21:23)  POCT Blood Glucose.: 154 mg/dL (09 May 2021 16:40)  POCT Blood Glucose.: 130 mg/dL (09 May 2021 12:41)  POCT Blood Glucose.: 79 mg/dL (09 May 2021 12:15)    RADIOLOGY & ADDITIONAL TESTS: reviewed    Care Discussed with Consultants/Other Providers: yes

## 2021-05-10 NOTE — PROGRESS NOTE ADULT - SUBJECTIVE AND OBJECTIVE BOX
HPI:  This is a 51 y/o male, PMH DM, HTN, hyperlipidemia; pt presented to the ED at [facility] on 4/10 with c/o abdominal cramping, generalized, and diarrhea following use of laxatives for constipation.  In the ED, WBC 10.82, Hb 13.6, CMP: sodium 126, chloride 89, glucose 281, CMP otherwise unremarkable.  VBG: Lactate 2.6 (later downtrended to 1.6).  Pt. was treated with IV hydration and transferred to CDU for continued care plan:  IV hydration, supportive care, AM labs, general observation care / monitoring. Patient continued to have generalized weakness, numbness and persistent hyponatremia. Patient admitted for further evaluation.  Neurology consulted. 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 Patient refused IVIG but underwent PLEX.   Nephrology consulted for ALLIE and hyponatremia. Likely due to hypovolemia in setting of hyperglycemia. Work up suggested SIADH. Recs to optimize DM control, continue free water restriction <1.2L/day, and monitor Na levels (Na level should not increase more than 6meq in 24 hrs). Also, noted to have proteinuia/hematuria. Renal felt sec to Diabetic Nephropathy but patient will need full vasculitis work up and based on work up result, he might need kidney biopsy which would be scheduled as an outpatient.   Endocrinology also following for uncontrolled diabetes. Insulin regimen adjusted with improvement in glucoses.   ID consulted due to h/o syphilis in the past. Patient had undergone treatment in the past. No need for further treatment.   Last night (5/3), patient reported increased left scapula pain and given IV Dilaudid. Patient attributed to increased use of UEs in PT. Pain management NP consulted. Recommended switch to PO Dilaudid.   Patient has been seen by PT, OT and PM&R attending with recommendations for acute IPR.   Patient is deemed medically stable for transfer to acute IPR at [facility] on 5/4 and arrived 5/5. (05 May 2021 13:33)      INTERVAL HPI/OVERNIGHT EVENTS:  Chart Reviewed and patient seen at bedside.  Reports doing well in thearpy. Reports improvement of feet funal infection w/ clotrimazole from home.  +BM yesterday, No complaints or issues today.    ROS:  Denies fevers, chills, chest pain, shortness of breath, abdominal pain, headaches, nausea/vomiting    Vital Signs Last 24 Hrs  T(C): 36.5 (10 May 2021 08:34), Max: 36.5 (10 May 2021 08:34)  T(F): 97.7 (10 May 2021 08:34), Max: 97.7 (10 May 2021 08:34)  HR: 102 (10 May 2021 08:34) (70 - 102)  BP: 136/78 (10 May 2021 08:34) (127/80 - 151/88)  BP(mean): --  RR: 16 (10 May 2021 08:34) (16 - 18)  SpO2: 96% (10 May 2021 08:34) (95% - 96%)    Physical Exam:  Gen - NAD, Comfortable  HEENT - NCAT, EOMI, MMM  Neck - Supple, No limited ROM  Pulm - CTAB, No wheeze, No rhonchi, No crackles  Cardiovascular - RRR, S1S2, No m/r/g  Abdomen - Soft, NT/ND, +BS  Extremities - No C/C/E, No calf tenderness  Neuro-     Cognitive - Awake, Alert, AAO to self, place, date, year, situation     Communication - Fluent, No dysarthria     Motor -                       LEFT    UE - ShAB 5/5, EF 5/5, EE 5/5, WE 5/5,  5/5                    RIGHT UE - ShAB 4+/5, EF 4+/5, EE 5/5, WE 5/5,  5/5                    LEFT    LE - HF 4/5, KE 4/5, DF 5/5, PF 5/5                    RIGHT LE - HF 4/5, KE 4/5, DF 5/5, PF 5/5     Sensory - impaired distal fingertips, toes- improving per patient (pt also reports baseline diabetic neuropathy)  Psychiatric - Mood stable, Affect WNL        LABS:  05-10    143  |  105  |  17  ----------------------------<  243<H>  4.2   |  28  |  1.12    Ca    9.1      10 May 2021 07:05    TPro  6.4  /  Alb  3.4  /  TBili  0.2  /  DBili  x   /  AST  18  /  ALT  42  /  AlkPhos  91  05-10                                              9.7    5.66  )-----------( 299      ( 10 May 2021 07:05 )             29.0     CAPILLARY BLOOD GLUCOSE      POCT Blood Glucose.: 103 mg/dL (10 May 2021 12:05)  POCT Blood Glucose.: 200 mg/dL (10 May 2021 08:00)  POCT Blood Glucose.: 182 mg/dL (09 May 2021 21:23)  POCT Blood Glucose.: 154 mg/dL (09 May 2021 16:40)      MEDICATIONS:  MEDICATIONS  (STANDING):  amLODIPine   Tablet 10 milliGRAM(s) Oral daily  bisacodyl 5 milliGRAM(s) Oral at bedtime  clotrimazole 1% Cream 1 Application(s) Topical two times a day  dextrose 40% Gel 15 Gram(s) Oral once  dextrose 5%. 1000 milliLiter(s) (50 mL/Hr) IV Continuous <Continuous>  dextrose 5%. 1000 milliLiter(s) (100 mL/Hr) IV Continuous <Continuous>  dextrose 50% Injectable 25 Gram(s) IV Push once  dextrose 50% Injectable 12.5 Gram(s) IV Push once  dextrose 50% Injectable 25 Gram(s) IV Push once  enoxaparin Injectable 40 milliGRAM(s) SubCutaneous daily  famotidine    Tablet 20 milliGRAM(s) Oral two times a day  gabapentin 300 milliGRAM(s) Oral every 8 hours  glucagon  Injectable 1 milliGRAM(s) IntraMuscular once  hydrALAZINE 25 milliGRAM(s) Oral three times a day  insulin glargine Injectable (LANTUS) 54 Unit(s) SubCutaneous at bedtime  insulin lispro (ADMELOG) corrective regimen sliding scale   SubCutaneous three times a day before meals  insulin lispro (ADMELOG) corrective regimen sliding scale   SubCutaneous at bedtime  insulin lispro Injectable (ADMELOG) 5 Unit(s) SubCutaneous <User Schedule>  insulin lispro Injectable (ADMELOG) 10 Unit(s) SubCutaneous before breakfast  insulin lispro Injectable (ADMELOG) 10 Unit(s) SubCutaneous before lunch  insulin lispro Injectable (ADMELOG) 10 Unit(s) SubCutaneous before dinner  losartan 100 milliGRAM(s) Oral daily  metoprolol tartrate 12.5 milliGRAM(s) Oral two times a day  senna 2 Tablet(s) Oral two times a day    MEDICATIONS  (PRN):  acetaminophen   Tablet .. 650 milliGRAM(s) Oral every 6 hours PRN Mild Pain (1 - 3)  HYDROmorphone   Tablet 2 milliGRAM(s) Oral every 6 hours PRN Severe Pain (7 - 10)  oxyCODONE    IR 5 milliGRAM(s) Oral every 6 hours PRN Moderate Pain (4 - 6)

## 2021-05-11 LAB
GLUCOSE BLDC GLUCOMTR-MCNC: 141 MG/DL — HIGH (ref 70–99)
GLUCOSE BLDC GLUCOMTR-MCNC: 185 MG/DL — HIGH (ref 70–99)
GLUCOSE BLDC GLUCOMTR-MCNC: 96 MG/DL — SIGNIFICANT CHANGE UP (ref 70–99)
GLUCOSE BLDC GLUCOMTR-MCNC: 97 MG/DL — SIGNIFICANT CHANGE UP (ref 70–99)

## 2021-05-11 PROCEDURE — 99232 SBSQ HOSP IP/OBS MODERATE 35: CPT

## 2021-05-11 PROCEDURE — 99232 SBSQ HOSP IP/OBS MODERATE 35: CPT | Mod: GC

## 2021-05-11 RX ORDER — HYDRALAZINE HCL 50 MG
10 TABLET ORAL THREE TIMES A DAY
Refills: 0 | Status: DISCONTINUED | OUTPATIENT
Start: 2021-05-11 | End: 2021-05-21

## 2021-05-11 RX ORDER — INSULIN LISPRO 100/ML
10 VIAL (ML) SUBCUTANEOUS
Refills: 0 | Status: DISCONTINUED | OUTPATIENT
Start: 2021-05-11 | End: 2021-05-21

## 2021-05-11 RX ADMIN — Medication 25 MILLIGRAM(S): at 05:34

## 2021-05-11 RX ADMIN — Medication 1 APPLICATION(S): at 05:33

## 2021-05-11 RX ADMIN — ENOXAPARIN SODIUM 40 MILLIGRAM(S): 100 INJECTION SUBCUTANEOUS at 12:11

## 2021-05-11 RX ADMIN — FAMOTIDINE 20 MILLIGRAM(S): 10 INJECTION INTRAVENOUS at 05:33

## 2021-05-11 RX ADMIN — INSULIN GLARGINE 54 UNIT(S): 100 INJECTION, SOLUTION SUBCUTANEOUS at 22:14

## 2021-05-11 RX ADMIN — OXYCODONE HYDROCHLORIDE 5 MILLIGRAM(S): 5 TABLET ORAL at 08:35

## 2021-05-11 RX ADMIN — SENNA PLUS 2 TABLET(S): 8.6 TABLET ORAL at 17:19

## 2021-05-11 RX ADMIN — OXYCODONE HYDROCHLORIDE 5 MILLIGRAM(S): 5 TABLET ORAL at 17:59

## 2021-05-11 RX ADMIN — OXYCODONE HYDROCHLORIDE 5 MILLIGRAM(S): 5 TABLET ORAL at 08:05

## 2021-05-11 RX ADMIN — FAMOTIDINE 20 MILLIGRAM(S): 10 INJECTION INTRAVENOUS at 17:18

## 2021-05-11 RX ADMIN — Medication 10 UNIT(S): at 17:18

## 2021-05-11 RX ADMIN — OXYCODONE HYDROCHLORIDE 5 MILLIGRAM(S): 5 TABLET ORAL at 17:22

## 2021-05-11 RX ADMIN — HYDROMORPHONE HYDROCHLORIDE 2 MILLIGRAM(S): 2 INJECTION INTRAMUSCULAR; INTRAVENOUS; SUBCUTANEOUS at 00:45

## 2021-05-11 RX ADMIN — AMLODIPINE BESYLATE 10 MILLIGRAM(S): 2.5 TABLET ORAL at 05:32

## 2021-05-11 RX ADMIN — Medication 1 APPLICATION(S): at 17:19

## 2021-05-11 RX ADMIN — HYDROMORPHONE HYDROCHLORIDE 2 MILLIGRAM(S): 2 INJECTION INTRAMUSCULAR; INTRAVENOUS; SUBCUTANEOUS at 14:12

## 2021-05-11 RX ADMIN — Medication 10 UNIT(S): at 12:11

## 2021-05-11 RX ADMIN — Medication 5 UNIT(S): at 22:21

## 2021-05-11 RX ADMIN — HYDROMORPHONE HYDROCHLORIDE 2 MILLIGRAM(S): 2 INJECTION INTRAMUSCULAR; INTRAVENOUS; SUBCUTANEOUS at 22:14

## 2021-05-11 RX ADMIN — GABAPENTIN 300 MILLIGRAM(S): 400 CAPSULE ORAL at 05:35

## 2021-05-11 RX ADMIN — GABAPENTIN 300 MILLIGRAM(S): 400 CAPSULE ORAL at 22:13

## 2021-05-11 RX ADMIN — HYDROMORPHONE HYDROCHLORIDE 2 MILLIGRAM(S): 2 INJECTION INTRAMUSCULAR; INTRAVENOUS; SUBCUTANEOUS at 14:50

## 2021-05-11 RX ADMIN — Medication 2: at 08:04

## 2021-05-11 RX ADMIN — Medication 10 MILLIGRAM(S): at 22:08

## 2021-05-11 RX ADMIN — GABAPENTIN 300 MILLIGRAM(S): 400 CAPSULE ORAL at 14:12

## 2021-05-11 RX ADMIN — HYDROMORPHONE HYDROCHLORIDE 2 MILLIGRAM(S): 2 INJECTION INTRAMUSCULAR; INTRAVENOUS; SUBCUTANEOUS at 22:55

## 2021-05-11 RX ADMIN — SENNA PLUS 2 TABLET(S): 8.6 TABLET ORAL at 05:33

## 2021-05-11 RX ADMIN — Medication 12.5 MILLIGRAM(S): at 17:39

## 2021-05-11 RX ADMIN — Medication 10 UNIT(S): at 08:05

## 2021-05-11 NOTE — PROGRESS NOTE ADULT - ASSESSMENT
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 Hospital course complicated by uncontrolled diabetes, hyponatremia, ALLIE, . Now admitted to [facility] after for initiation of a multidisciplinary rehab program consisting focused on functional mobility, transfers and ADLs (activities of daily living).    Comprehensive Multidisciplinary Rehab Program:  - Start comprehensive rehab program, 3 hours a day, 5 days a week.  - PT 2hr/day: Focused on improving strength, endurance, coordination, balance, functional mobility, and transfers  - OT 1hr/day: Focused on improving strength, fine motor skills, coordination, posture and ADLs.       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 ADH induced by pain)   -s/p fluids  and 12.L fluid restiction  -dced fluid restriction as sodium now normal     DM type 2 with neuropathy : uncontrolled   -A1C 11.5%  -c/w Lantus 52 units qhs, Admelog 16-14-14 before meals  -c/w Admelog 5 units at bedtime only if taking a snack   -at home Pt was on Tresiba 30 units qhs and Novolog 15 units premeal TID.    + RPR  -ID consulted at [facility]- treated 2017 (PCN)  -RPR titer 1:1 - no s/s of active syphilis  -Per ID, no need for treatment at present time     HTN   -C/w norvasc, losartan, hydralazine, metoprolol tartrate    Hematuria / proteinuria   - labs and urine ordered for vasculitis workup  -Per nephrology, will need kidney biopsy likely as outpt     Mood/Cognition:  - Appropriate cognition and mood.    Sleep:   - Melatonin PRN     Pain Management:  Tylenol PRN, Oxycodone, Dilaudid prn  - gabapentin 300mg tid     GI/Bowel:  - At risk for constipation due to neurologic diagnosis, immobility and/or medication use  - Senna QHS, Miralax PRN Daily  - GI ppx: pepcid 20mg bid    /Bladder: toileting prn       Skin/Pressure Injury: OOB as tolerated     Diet:   - Diet Consistency/Modifications: CC Glucerna BID, no shellfish    DVT ppx:- lovenox   - SCDs    Participation Restrictions/Precautions:  - - ROM restrictions: right ankle d/t prior ankle surgery  - Precautions:    [x] Falls      ---------------  Code Status/Emergency Contact:    Outpatient Follow-up (Specialty/Name of physician):    [facility] 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 Hospital course complicated by uncontrolled diabetes, hyponatremia, ALLIE, . Now admitted to [facility] after for initiation of a multidisciplinary rehab program consisting focused on functional mobility, transfers and ADLs (activities of daily living).    Comprehensive Multidisciplinary Rehab Program:  - Start comprehensive rehab program, 3 hours a day, 5 days a week.  - PT 2hr/day: Focused on improving strength, endurance, coordination, balance, functional mobility, and transfers  - OT 1hr/day: Focused on improving strength, fine motor skills, coordination, posture and ADLs.       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 ADH induced by pain)   -s/p fluids  and 12.L fluid restiction  -dced fluid restriction as sodium now normal     DM type 2 with neuropathy : uncontrolled   -A1C 11.5%  -c/w Lantus 52 units qhs, Admelog 16-14-14 before meals  -c/w Admelog 5 units at bedtime only if taking a snack   -at home Pt was on Tresiba 30 units qhs and Novolog 15 units premeal TID.    + RPR  -ID consulted at [facility]- treated 2017 (PCN)  -RPR titer 1:1 - no s/s of active syphilis  -Per ID, no need for treatment at present time     HTN   -C/w norvasc, losartan, hydralazine, metoprolol tartrate    Hematuria / proteinuria   - labs and urine ordered for vasculitis workup  -Per nephrology, will need kidney biopsy likely as outpt     Mood/Cognition:  - Appropriate cognition and mood.    Sleep:   - Melatonin PRN     Pain Management:  Tylenol PRN, Oxycodone, Dilaudid prn  - gabapentin 300mg tid     GI/Bowel:  - At risk for constipation due to neurologic diagnosis, immobility and/or medication use  - Senna QHS, Miralax PRN Daily  - GI ppx: pepcid 20mg bid    /Bladder: toileting prn       Skin/Pressure Injury: OOB as tolerated     Diet:   - Diet Consistency/Modifications: CC Glucerna BID, no shellfish    DVT ppx:- lovenox   - SCDs    Participation Restrictions/Precautions:  - - ROM restrictions: right ankle d/t prior ankle surgery  - Precautions:    [x] Falls      IDT :  - EOD Wed   - Livin YVAN, 0 steps inside, wife works 12 hrs a day as nanny but can take time off to assist. Prefers outpatient PT and OT.   - Goal: Mod I    ---------------  Code Status/Emergency Contact:    Outpatient Follow-up (Specialty/Name of physician):    [facility] endocrinology. 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 Hospital course complicated by uncontrolled diabetes, hyponatremia, ALLIE, . Now admitted to [facility] after for initiation of a multidisciplinary rehab program consisting focused on functional mobility, transfers and ADLs (activities of daily living).    Comprehensive Multidisciplinary Rehab Program:  - Start comprehensive rehab program, 3 hours a day, 5 days a week.  - PT 2hr/day: Focused on improving strength, endurance, coordination, balance, functional mobility, and transfers  - OT 1hr/day: Focused on improving strength, fine motor skills, coordination, posture and ADLs.       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 ADH induced by pain)   -s/p fluids  and 12.L fluid restiction  -dced fluid restriction as sodium now normal     DM type 2 with neuropathy : uncontrolled   -A1C 11.5%  -c/w Lantus 54 units qhs, Admelog 10/10/10 WITH MEALS  -c/w Admelog 5 units at bedtime only if taking a snack   -at home Pt was on Tresiba 30 units qhs and Novolog 15 units premeal TID.    + RPR  -ID consulted at [facility]- treated 2017 (PCN)  -RPR titer 1:1 - no s/s of active syphilis  -Per ID, no need for treatment at present time     HTN   -C/w norvasc, losartan, hydralazine, metoprolol tartrate    Hematuria / proteinuria   - labs and urine ordered for vasculitis workup  -Per nephrology, will need kidney biopsy likely as outpt     Mood/Cognition:  - Appropriate cognition and mood.    Sleep:   - Melatonin PRN     Pain Management:  Tylenol PRN, Oxycodone, Dilaudid prn  - gabapentin 300mg tid     GI/Bowel:  - At risk for constipation due to neurologic diagnosis, immobility and/or medication use  - Senna QHS, Miralax PRN Daily  - GI ppx: pepcid 20mg bid    /Bladder: toileting prn       Skin/Pressure Injury: OOB as tolerated     Diet:   - Diet Consistency/Modifications: CC Glucerna BID, no shellfish    DVT ppx:- lovenox   - SCDs    Participation Restrictions/Precautions:  - - ROM restrictions: right ankle d/t prior ankle surgery  - Precautions:    [x] Falls      IDT :  - EOD Wed   - Livin YVAN, 0 steps inside, wife works 12 hrs a day as nanny but can take time off to assist. Prefers outpatient PT and OT.   - Goal: Mod I    ---------------  Code Status/Emergency Contact:    Outpatient Follow-up (Specialty/Name of physician):    [facility] endocrinology. Appointments below:  [address] [place] [address], [address], [phone]  21 @ 11:00 AM with diabetes educator   21 @ 11:00 AM with Dr Montesinos       GI: Dr Rae

## 2021-05-11 NOTE — PROGRESS NOTE ADULT - SUBJECTIVE AND OBJECTIVE BOX
Patient is a 50y old  Male who presents with a chief complaint of 03.4 GBS (10 May 2021 12:47)      Patient seen and examined at bedside. doing well no complaints. some fatigue after AM therapy    ALLERGIES:  No Known Drug Allergies  shellfish (Urticaria)    MEDICATIONS  (STANDING):  amLODIPine   Tablet 10 milliGRAM(s) Oral daily  bisacodyl 5 milliGRAM(s) Oral at bedtime  clotrimazole 1% Cream 1 Application(s) Topical two times a day  dextrose 40% Gel 15 Gram(s) Oral once  dextrose 5%. 1000 milliLiter(s) (50 mL/Hr) IV Continuous <Continuous>  dextrose 5%. 1000 milliLiter(s) (100 mL/Hr) IV Continuous <Continuous>  dextrose 50% Injectable 25 Gram(s) IV Push once  dextrose 50% Injectable 12.5 Gram(s) IV Push once  dextrose 50% Injectable 25 Gram(s) IV Push once  enoxaparin Injectable 40 milliGRAM(s) SubCutaneous daily  famotidine    Tablet 20 milliGRAM(s) Oral two times a day  gabapentin 300 milliGRAM(s) Oral every 8 hours  glucagon  Injectable 1 milliGRAM(s) IntraMuscular once  hydrALAZINE 25 milliGRAM(s) Oral three times a day  insulin glargine Injectable (LANTUS) 54 Unit(s) SubCutaneous at bedtime  insulin lispro (ADMELOG) corrective regimen sliding scale   SubCutaneous three times a day before meals  insulin lispro (ADMELOG) corrective regimen sliding scale   SubCutaneous at bedtime  insulin lispro Injectable (ADMELOG) 5 Unit(s) SubCutaneous <User Schedule>  insulin lispro Injectable (ADMELOG) 10 Unit(s) SubCutaneous before breakfast  insulin lispro Injectable (ADMELOG) 10 Unit(s) SubCutaneous before lunch  insulin lispro Injectable (ADMELOG) 10 Unit(s) SubCutaneous before dinner  losartan 100 milliGRAM(s) Oral daily  metoprolol tartrate 12.5 milliGRAM(s) Oral two times a day  senna 2 Tablet(s) Oral two times a day    MEDICATIONS  (PRN):  acetaminophen   Tablet .. 650 milliGRAM(s) Oral every 6 hours PRN Mild Pain (1 - 3)  HYDROmorphone   Tablet 2 milliGRAM(s) Oral every 6 hours PRN Severe Pain (7 - 10)  oxyCODONE    IR 5 milliGRAM(s) Oral every 6 hours PRN Moderate Pain (4 - 6)    Vital Signs Last 24 Hrs  T(F): 97.6 (11 May 2021 08:01), Max: 97.6 (11 May 2021 08:01)  HR: 91 (11 May 2021 08:01) (80 - 91)  BP: 108/69 (11 May 2021 08:01) (108/69 - 127/76)  RR: 15 (11 May 2021 08:01) (15 - 18)  SpO2: 98% (11 May 2021 08:01) (98% - 98%)  I&O's Summary    PHYSICAL EXAM:  General: NAD, A/O x 3  ENT: MMM  Neck: Supple, No JVD  Lungs: Respirations unlabored. Clear to auscultation bilaterally  Cardio: RRR, S1/S2, No murmurs  Abdomen: Soft, Nontender, Nondistended; Bowel sounds present  Extremities: No calf tenderness, No pitting edema  Neuro: right UE and LE 4/5, L UE and LLE 4+/5     LABS:                        9.7    5.66  )-----------( 299      ( 10 May 2021 07:05 )             29.0       05-10    143  |  105  |  17  ----------------------------<  243  4.2   |  28  |  1.12    Ca    9.1      10 May 2021 07:05    TPro  6.4  /  Alb  3.4  /  TBili  0.2  /  DBili  x   /  AST  18  /  ALT  42  /  AlkPhos  91  05-10     eGFR if Non African American: 75 mL/min/1.73M2 (05-10-21 @ 07:05)  eGFR if African American: 87 mL/min/1.73M2 (05-10-21 @ 07:05)    POCT Blood Glucose.: 185 mg/dL (11 May 2021 07:57)  POCT Blood Glucose.: 149 mg/dL (10 May 2021 21:28)  POCT Blood Glucose.: 115 mg/dL (10 May 2021 16:53)  POCT Blood Glucose.: 115 mg/dL (10 May 2021 16:53)  POCT Blood Glucose.: 103 mg/dL (10 May 2021 12:05)    RADIOLOGY & ADDITIONAL TESTS: reviewed    Care Discussed with Consultants/Other Providers: yes - rehab

## 2021-05-11 NOTE — PROGRESS NOTE ADULT - SUBJECTIVE AND OBJECTIVE BOX
HPI:  This is a 51 y/o male, PMH DM, HTN, hyperlipidemia; pt presented to the ED at [facility] on 4/10 with c/o abdominal cramping, generalized, and diarrhea following use of laxatives for constipation.  In the ED, WBC 10.82, Hb 13.6, CMP: sodium 126, chloride 89, glucose 281, CMP otherwise unremarkable.  VBG: Lactate 2.6 (later downtrended to 1.6).  Pt. was treated with IV hydration and transferred to CDU for continued care plan:  IV hydration, supportive care, AM labs, general observation care / monitoring. Patient continued to have generalized weakness, numbness and persistent hyponatremia. Patient admitted for further evaluation.  Neurology consulted. 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 Patient refused IVIG but underwent PLEX.   Nephrology consulted for ALLIE and hyponatremia. Likely due to hypovolemia in setting of hyperglycemia. Work up suggested SIADH. Recs to optimize DM control, continue free water restriction <1.2L/day, and monitor Na levels (Na level should not increase more than 6meq in 24 hrs). Also, noted to have proteinuia/hematuria. Renal felt sec to Diabetic Nephropathy but patient will need full vasculitis work up and based on work up result, he might need kidney biopsy which would be scheduled as an outpatient.   Endocrinology also following for uncontrolled diabetes. Insulin regimen adjusted with improvement in glucoses.   ID consulted due to h/o syphilis in the past. Patient had undergone treatment in the past. No need for further treatment.   Last night (5/3), patient reported increased left scapula pain and given IV Dilaudid. Patient attributed to increased use of UEs in PT. Pain management NP consulted. Recommended switch to PO Dilaudid.   Patient has been seen by PT, OT and PM&R attending with recommendations for acute IPR.   Patient is deemed medically stable for transfer to acute IPR at [facility] on 5/4 and arrived 5/5. (05 May 2021 13:33)      INTERVAL HPI/OVERNIGHT EVENTS:  Chart Reviewed and patient seen at bedside.  Patient reports +BM, good appetite, and making good progress in therapy.  Excited to utilize LORRAINE today.     ROS:  Denies fevers, chills, chest pain, shortness of breath, abdominal pain, headaches, nausea/vomiting    Vital Signs Last 24 Hrs  T(C): 36.4 (11 May 2021 08:01), Max: 36.4 (11 May 2021 08:01)  T(F): 97.6 (11 May 2021 08:01), Max: 97.6 (11 May 2021 08:01)  HR: 91 (11 May 2021 08:01) (80 - 91)  BP: 108/69 (11 May 2021 08:01) (108/69 - 127/76)  BP(mean): --  RR: 15 (11 May 2021 08:01) (15 - 18)  SpO2: 98% (11 May 2021 08:01) (98% - 98%)    Physical Exam:  Gen - NAD, Comfortable  HEENT - NCAT, EOMI, MMM  Neck - Supple, No limited ROM  Pulm - CTAB, No wheeze, No rhonchi, No crackles  Cardiovascular - RRR, S1S2, No m/r/g  Abdomen - Soft, NT/ND, +BS  Extremities - No C/C/E, No calf tenderness  Neuro-     Cognitive - Awake, Alert, AAO to self, place, date, year, situation     Communication - Fluent, No dysarthria     Motor -                       LEFT    UE - ShAB 5/5, EF 5/5, EE 5/5, WE 5/5,  5/5                    RIGHT UE - ShAB 5/5, EF 5/5, EE 5/5, WE 5/5,  5/5                    LEFT    LE - HF 5/5, KE 4/5, DF 5/5, PF 5/5                    RIGHT LE - HF 5/5, KE 4/5, DF 5/5, PF 5/5     Sensory - impaired distal fingertips, toes- improving per patient (pt also reports baseline diabetic neuropathy)  Psychiatric - Mood stable, Affect WNL        LABS:  05-10    143  |  105  |  17  ----------------------------<  243<H>  4.2   |  28  |  1.12    Ca    9.1      10 May 2021 07:05    TPro  6.4  /  Alb  3.4  /  TBili  0.2  /  DBili  x   /  AST  18  /  ALT  42  /  AlkPhos  91  05-10                                              9.7    5.66  )-----------( 299      ( 10 May 2021 07:05 )             29.0     CAPILLARY BLOOD GLUCOSE      POCT Blood Glucose.: 96 mg/dL (11 May 2021 11:55)  POCT Blood Glucose.: 185 mg/dL (11 May 2021 07:57)  POCT Blood Glucose.: 149 mg/dL (10 May 2021 21:28)  POCT Blood Glucose.: 115 mg/dL (10 May 2021 16:53)  POCT Blood Glucose.: 115 mg/dL (10 May 2021 16:53)      MEDICATIONS:  MEDICATIONS  (STANDING):  amLODIPine   Tablet 10 milliGRAM(s) Oral daily  bisacodyl 5 milliGRAM(s) Oral at bedtime  clotrimazole 1% Cream 1 Application(s) Topical two times a day  dextrose 40% Gel 15 Gram(s) Oral once  dextrose 5%. 1000 milliLiter(s) (50 mL/Hr) IV Continuous <Continuous>  dextrose 5%. 1000 milliLiter(s) (100 mL/Hr) IV Continuous <Continuous>  dextrose 50% Injectable 25 Gram(s) IV Push once  dextrose 50% Injectable 12.5 Gram(s) IV Push once  dextrose 50% Injectable 25 Gram(s) IV Push once  enoxaparin Injectable 40 milliGRAM(s) SubCutaneous daily  famotidine    Tablet 20 milliGRAM(s) Oral two times a day  gabapentin 300 milliGRAM(s) Oral every 8 hours  glucagon  Injectable 1 milliGRAM(s) IntraMuscular once  hydrALAZINE 25 milliGRAM(s) Oral three times a day  insulin glargine Injectable (LANTUS) 54 Unit(s) SubCutaneous at bedtime  insulin lispro (ADMELOG) corrective regimen sliding scale   SubCutaneous three times a day before meals  insulin lispro (ADMELOG) corrective regimen sliding scale   SubCutaneous at bedtime  insulin lispro Injectable (ADMELOG) 5 Unit(s) SubCutaneous <User Schedule>  insulin lispro Injectable (ADMELOG) 10 Unit(s) SubCutaneous before breakfast  insulin lispro Injectable (ADMELOG) 10 Unit(s) SubCutaneous before lunch  insulin lispro Injectable (ADMELOG) 10 Unit(s) SubCutaneous before dinner  losartan 100 milliGRAM(s) Oral daily  metoprolol tartrate 12.5 milliGRAM(s) Oral two times a day  senna 2 Tablet(s) Oral two times a day    MEDICATIONS  (PRN):  acetaminophen   Tablet .. 650 milliGRAM(s) Oral every 6 hours PRN Mild Pain (1 - 3)  HYDROmorphone   Tablet 2 milliGRAM(s) Oral every 6 hours PRN Severe Pain (7 - 10)  oxyCODONE    IR 5 milliGRAM(s) Oral every 6 hours PRN Moderate Pain (4 - 6)         HPI:  This is a 51 y/o male, PMH DM, HTN, hyperlipidemia; pt presented to the ED at [facility] on 4/10 with c/o abdominal cramping, generalized, and diarrhea following use of laxatives for constipation.  In the ED, WBC 10.82, Hb 13.6, CMP: sodium 126, chloride 89, glucose 281, CMP otherwise unremarkable.  VBG: Lactate 2.6 (later downtrended to 1.6).  Pt. was treated with IV hydration and transferred to CDU for continued care plan:  IV hydration, supportive care, AM labs, general observation care / monitoring. Patient continued to have generalized weakness, numbness and persistent hyponatremia. Patient admitted for further evaluation.  Neurology consulted. 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 Patient refused IVIG but underwent PLEX.   Nephrology consulted for ALLIE and hyponatremia. Likely due to hypovolemia in setting of hyperglycemia. Work up suggested SIADH. Recs to optimize DM control, continue free water restriction <1.2L/day, and monitor Na levels (Na level should not increase more than 6meq in 24 hrs). Also, noted to have proteinuia/hematuria. Renal felt sec to Diabetic Nephropathy but patient will need full vasculitis work up and based on work up result, he might need kidney biopsy which would be scheduled as an outpatient.   Endocrinology also following for uncontrolled diabetes. Insulin regimen adjusted with improvement in glucoses.   ID consulted due to h/o syphilis in the past. Patient had undergone treatment in the past. No need for further treatment.   Last night (5/3), patient reported increased left scapula pain and given IV Dilaudid. Patient attributed to increased use of UEs in PT. Pain management NP consulted. Recommended switch to PO Dilaudid.   Patient has been seen by PT, OT and PM&R attending with recommendations for acute IPR.   Patient is deemed medically stable for transfer to acute IPR at [facility] on 5/4 and arrived 5/5. (05 May 2021 13:33)      INTERVAL HPI/OVERNIGHT EVENTS:  Chart Reviewed and patient seen at bedside.  Patient reports +BM, good appetite, and making good progress in therapy.  Excited to utilize LORRAINE today.     ROS:  Denies fevers, chills, chest pain, shortness of breath, abdominal pain, headaches, nausea/vomiting    Vital Signs Last 24 Hrs  T(C): 36.4 (11 May 2021 08:01), Max: 36.4 (11 May 2021 08:01)  T(F): 97.6 (11 May 2021 08:01), Max: 97.6 (11 May 2021 08:01)  HR: 91 (11 May 2021 08:01) (80 - 91)  BP: 108/69 (11 May 2021 08:01) (108/69 - 127/76)  BP(mean): --  RR: 15 (11 May 2021 08:01) (15 - 18)  SpO2: 98% (11 May 2021 08:01) (98% - 98%)    Physical Exam:  Gen - NAD, Comfortable  HEENT - NCAT, EOMI, MMM  Neck - Supple, No limited ROM  Pulm - CTAB, No wheeze, No rhonchi, No crackles  Cardiovascular - RRR, S1S2, No m/r/g  Abdomen - Soft, NT/ND, +BS  Extremities - No C/C/E, No calf tenderness  Neuro-     Cognitive - Awake, Alert, AAO to self, place, date, year, situation     Communication - Fluent, No dysarthria     Motor -                       LEFT    UE - ShAB 5/5, EF 5/5, EE 5/5, WE 5/5,  5/5                    RIGHT UE - ShAB 5/5, EF 5/5, EE 5/5, WE 5/5,  5/5                    LEFT    LE - HF 5/5, KE 4/5, DF 5/5, PF 5/5                    RIGHT LE - HF 5/5, KE 4/5, DF 5/5, PF 5/5     Sensory - impaired distal fingertips, toes- improving per patient (pt also reports baseline diabetic neuropathy)  Psychiatric - Mood stable, Affect WNL    Functional: Eating Indepedent, Grooming Mod I. Bathing Min A, Shower transfer Mod A, Toileting Min A. Mod A transfers, Ambulation Min-Mod A 50ft w/ WC follow      LABS:  05-10    143  |  105  |  17  ----------------------------<  243<H>  4.2   |  28  |  1.12    Ca    9.1      10 May 2021 07:05    TPro  6.4  /  Alb  3.4  /  TBili  0.2  /  DBili  x   /  AST  18  /  ALT  42  /  AlkPhos  91  05-10                                              9.7    5.66  )-----------( 299      ( 10 May 2021 07:05 )             29.0     CAPILLARY BLOOD GLUCOSE      POCT Blood Glucose.: 96 mg/dL (11 May 2021 11:55)  POCT Blood Glucose.: 185 mg/dL (11 May 2021 07:57)  POCT Blood Glucose.: 149 mg/dL (10 May 2021 21:28)  POCT Blood Glucose.: 115 mg/dL (10 May 2021 16:53)  POCT Blood Glucose.: 115 mg/dL (10 May 2021 16:53)      MEDICATIONS:  MEDICATIONS  (STANDING):  amLODIPine   Tablet 10 milliGRAM(s) Oral daily  bisacodyl 5 milliGRAM(s) Oral at bedtime  clotrimazole 1% Cream 1 Application(s) Topical two times a day  dextrose 40% Gel 15 Gram(s) Oral once  dextrose 5%. 1000 milliLiter(s) (50 mL/Hr) IV Continuous <Continuous>  dextrose 5%. 1000 milliLiter(s) (100 mL/Hr) IV Continuous <Continuous>  dextrose 50% Injectable 25 Gram(s) IV Push once  dextrose 50% Injectable 12.5 Gram(s) IV Push once  dextrose 50% Injectable 25 Gram(s) IV Push once  enoxaparin Injectable 40 milliGRAM(s) SubCutaneous daily  famotidine    Tablet 20 milliGRAM(s) Oral two times a day  gabapentin 300 milliGRAM(s) Oral every 8 hours  glucagon  Injectable 1 milliGRAM(s) IntraMuscular once  hydrALAZINE 25 milliGRAM(s) Oral three times a day  insulin glargine Injectable (LANTUS) 54 Unit(s) SubCutaneous at bedtime  insulin lispro (ADMELOG) corrective regimen sliding scale   SubCutaneous three times a day before meals  insulin lispro (ADMELOG) corrective regimen sliding scale   SubCutaneous at bedtime  insulin lispro Injectable (ADMELOG) 5 Unit(s) SubCutaneous <User Schedule>  insulin lispro Injectable (ADMELOG) 10 Unit(s) SubCutaneous before breakfast  insulin lispro Injectable (ADMELOG) 10 Unit(s) SubCutaneous before lunch  insulin lispro Injectable (ADMELOG) 10 Unit(s) SubCutaneous before dinner  losartan 100 milliGRAM(s) Oral daily  metoprolol tartrate 12.5 milliGRAM(s) Oral two times a day  senna 2 Tablet(s) Oral two times a day    MEDICATIONS  (PRN):  acetaminophen   Tablet .. 650 milliGRAM(s) Oral every 6 hours PRN Mild Pain (1 - 3)  HYDROmorphone   Tablet 2 milliGRAM(s) Oral every 6 hours PRN Severe Pain (7 - 10)  oxyCODONE    IR 5 milliGRAM(s) Oral every 6 hours PRN Moderate Pain (4 - 6)

## 2021-05-11 NOTE — PROGRESS NOTE ADULT - ASSESSMENT
51 y/o M T2DM, HTN, HLD who presented to [facility]  on 4/10 with complaint of abdominal pain, diarrhea from laxative use and weakness and 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 Hospital course complicated by uncontrolled diabetes, hyponatremia, ALLIE. Now admitted to [facility] after for initiation of a multidisciplinary rehab program consisting focused on functional mobility, transfers and ADLs (activities of daily living).    GBS - s/p PLEX  -Continue comprehensive rehab program - PT/OT/SLP per rehab team  -Pain management, bowel regimen per rehab  -Numbness and weakness improving  -Gabapentin 300mg q8  -Follow up with neuro after dc (f/u remaining LP results)    Hyponatremia/ALLIE - resolved  -Followed by nephro at [facility]  -Likely multifactorial including hyperglycemia (pseudohyponatremia) and pre renal 2/2 to diarrhea (? ADH induced by pain)   -Off IVF  -Free water restriction <1.2L/day    T2DM with neuropathy: uncontrolled HbA1C 11.5%  -At home Pt was on Tresiba 30 units qhs and Novolog 15 units premeal TID.  -Noted low BG pre lunch. Decrease admelog 16un to 10un at breakfast.   -Decrease admelog from 14un to 10un at lunch and dinner  -FBG still elevated - increase lantus 52un to 54un qhs  -Admelog 5 units at bedtime only if taking a snack - pt planning to decrease carbs qhs  -Discharge Recc - discharge pt on home insulin of Tresiba equal to Lantus dose needed to control BG in hospital- pt has Tresiba and pen needles and will not need script   Mealtime insulin - discharge on Novolog AC- according to admelog doses - pt has Novolog and pen needles and will not need script FS AC and HS- pt has meter and supplies- will not need script  -Pt instructed to schedule appt with [facility] Endocrinology, Dr. David Mcdaniel, per pt preference, for 2-4 weeks after discharge    Anemia likely anemia of chronic disease  -H/H stable  -f/u anemia workup    RPR Positive  -ID consulted at [facility]- treated 2017 (PCN)  -RPR titer 1:1 - no s/s of active syphilis  -Per ID, no need for treatment at present time     HTN - BP acceptable  -Cont norvasc, losartan, hydralazine, metoprolol tartrate    Hematuria / proteinuria   -hematuria resolved  -labs and urine ordered for vasculitis workup  -Per nephrology, will need kidney biopsy likely as outpt     DVT ppx - lovenox, SCDs

## 2021-05-12 LAB
GLUCOSE BLDC GLUCOMTR-MCNC: 105 MG/DL — HIGH (ref 70–99)
GLUCOSE BLDC GLUCOMTR-MCNC: 154 MG/DL — HIGH (ref 70–99)
GLUCOSE BLDC GLUCOMTR-MCNC: 165 MG/DL — HIGH (ref 70–99)
GLUCOSE BLDC GLUCOMTR-MCNC: 99 MG/DL — SIGNIFICANT CHANGE UP (ref 70–99)

## 2021-05-12 PROCEDURE — 99232 SBSQ HOSP IP/OBS MODERATE 35: CPT

## 2021-05-12 RX ADMIN — Medication 12.5 MILLIGRAM(S): at 17:44

## 2021-05-12 RX ADMIN — GABAPENTIN 300 MILLIGRAM(S): 400 CAPSULE ORAL at 14:12

## 2021-05-12 RX ADMIN — HYDROMORPHONE HYDROCHLORIDE 2 MILLIGRAM(S): 2 INJECTION INTRAMUSCULAR; INTRAVENOUS; SUBCUTANEOUS at 18:10

## 2021-05-12 RX ADMIN — HYDROMORPHONE HYDROCHLORIDE 2 MILLIGRAM(S): 2 INJECTION INTRAMUSCULAR; INTRAVENOUS; SUBCUTANEOUS at 18:31

## 2021-05-12 RX ADMIN — Medication 1 APPLICATION(S): at 17:40

## 2021-05-12 RX ADMIN — OXYCODONE HYDROCHLORIDE 5 MILLIGRAM(S): 5 TABLET ORAL at 12:23

## 2021-05-12 RX ADMIN — Medication 2: at 12:20

## 2021-05-12 RX ADMIN — Medication 1 APPLICATION(S): at 06:02

## 2021-05-12 RX ADMIN — Medication 10 MILLIGRAM(S): at 05:54

## 2021-05-12 RX ADMIN — HYDROMORPHONE HYDROCHLORIDE 2 MILLIGRAM(S): 2 INJECTION INTRAMUSCULAR; INTRAVENOUS; SUBCUTANEOUS at 09:18

## 2021-05-12 RX ADMIN — Medication 5 UNIT(S): at 21:07

## 2021-05-12 RX ADMIN — Medication 2: at 08:06

## 2021-05-12 RX ADMIN — Medication 10 UNIT(S): at 17:40

## 2021-05-12 RX ADMIN — FAMOTIDINE 20 MILLIGRAM(S): 10 INJECTION INTRAVENOUS at 17:40

## 2021-05-12 RX ADMIN — Medication 10 UNIT(S): at 08:05

## 2021-05-12 RX ADMIN — AMLODIPINE BESYLATE 10 MILLIGRAM(S): 2.5 TABLET ORAL at 05:54

## 2021-05-12 RX ADMIN — OXYCODONE HYDROCHLORIDE 5 MILLIGRAM(S): 5 TABLET ORAL at 13:00

## 2021-05-12 RX ADMIN — GABAPENTIN 300 MILLIGRAM(S): 400 CAPSULE ORAL at 21:07

## 2021-05-12 RX ADMIN — HYDROMORPHONE HYDROCHLORIDE 2 MILLIGRAM(S): 2 INJECTION INTRAMUSCULAR; INTRAVENOUS; SUBCUTANEOUS at 09:50

## 2021-05-12 RX ADMIN — Medication 10 MILLIGRAM(S): at 14:12

## 2021-05-12 RX ADMIN — FAMOTIDINE 20 MILLIGRAM(S): 10 INJECTION INTRAVENOUS at 05:54

## 2021-05-12 RX ADMIN — Medication 5 MILLIGRAM(S): at 21:06

## 2021-05-12 RX ADMIN — OXYCODONE HYDROCHLORIDE 5 MILLIGRAM(S): 5 TABLET ORAL at 05:55

## 2021-05-12 RX ADMIN — OXYCODONE HYDROCHLORIDE 5 MILLIGRAM(S): 5 TABLET ORAL at 06:30

## 2021-05-12 RX ADMIN — INSULIN GLARGINE 54 UNIT(S): 100 INJECTION, SOLUTION SUBCUTANEOUS at 21:34

## 2021-05-12 RX ADMIN — ENOXAPARIN SODIUM 40 MILLIGRAM(S): 100 INJECTION SUBCUTANEOUS at 12:21

## 2021-05-12 RX ADMIN — Medication 10 MILLIGRAM(S): at 21:06

## 2021-05-12 RX ADMIN — Medication 10 UNIT(S): at 12:20

## 2021-05-12 RX ADMIN — GABAPENTIN 300 MILLIGRAM(S): 400 CAPSULE ORAL at 05:54

## 2021-05-12 NOTE — PROGRESS NOTE ADULT - SUBJECTIVE AND OBJECTIVE BOX
Patient is a 50y old  Male who presents with a chief complaint of 03.4 GBS (11 May 2021 13:39)      Patient seen and examined at bedside. doing well. no acute medical complaints.     ALLERGIES:  No Known Drug Allergies  shellfish (Urticaria)    MEDICATIONS  (STANDING):  amLODIPine   Tablet 10 milliGRAM(s) Oral daily  bisacodyl 5 milliGRAM(s) Oral at bedtime  clotrimazole 1% Cream 1 Application(s) Topical two times a day  dextrose 40% Gel 15 Gram(s) Oral once  dextrose 5%. 1000 milliLiter(s) (50 mL/Hr) IV Continuous <Continuous>  dextrose 5%. 1000 milliLiter(s) (100 mL/Hr) IV Continuous <Continuous>  dextrose 50% Injectable 25 Gram(s) IV Push once  dextrose 50% Injectable 12.5 Gram(s) IV Push once  dextrose 50% Injectable 25 Gram(s) IV Push once  enoxaparin Injectable 40 milliGRAM(s) SubCutaneous daily  famotidine    Tablet 20 milliGRAM(s) Oral two times a day  gabapentin 300 milliGRAM(s) Oral every 8 hours  glucagon  Injectable 1 milliGRAM(s) IntraMuscular once  hydrALAZINE 10 milliGRAM(s) Oral three times a day  insulin glargine Injectable (LANTUS) 54 Unit(s) SubCutaneous at bedtime  insulin lispro (ADMELOG) corrective regimen sliding scale   SubCutaneous three times a day before meals  insulin lispro (ADMELOG) corrective regimen sliding scale   SubCutaneous at bedtime  insulin lispro Injectable (ADMELOG) 5 Unit(s) SubCutaneous <User Schedule>  insulin lispro Injectable (ADMELOG) 10 Unit(s) SubCutaneous with breakfast  insulin lispro Injectable (ADMELOG) 10 Unit(s) SubCutaneous with lunch  insulin lispro Injectable (ADMELOG) 10 Unit(s) SubCutaneous with dinner  losartan 100 milliGRAM(s) Oral daily  metoprolol tartrate 12.5 milliGRAM(s) Oral two times a day  senna 2 Tablet(s) Oral two times a day    MEDICATIONS  (PRN):  acetaminophen   Tablet .. 650 milliGRAM(s) Oral every 6 hours PRN Mild Pain (1 - 3)  HYDROmorphone   Tablet 2 milliGRAM(s) Oral every 6 hours PRN Severe Pain (7 - 10)  oxyCODONE    IR 5 milliGRAM(s) Oral every 6 hours PRN Moderate Pain (4 - 6)    Vital Signs Last 24 Hrs  T(F): 98.1 (12 May 2021 07:51), Max: 98.1 (12 May 2021 07:51)  HR: 86 (12 May 2021 07:51) (86 - 100)  BP: 127/75 (12 May 2021 07:51) (114/75 - 127/75)  RR: 15 (12 May 2021 07:51) (15 - 18)  SpO2: 97% (12 May 2021 07:51) (97% - 98%)  I&O's Summary    PHYSICAL EXAM:  General: NAD, A/O x3  ENT: MMM  Neck: Supple, No JVD  Lungs: Respirations unlabored. Clear to auscultation bilaterally  Cardio: RRR, S1/S2, No murmurs  Abdomen: Soft, Nontender, Nondistended; Bowel sounds present  Extremities: No calf tenderness, No pitting edema  Neuro: right UE and LE 4/5, L UE and LLE 4+/5     LABS:                        9.7    5.66  )-----------( 299      ( 10 May 2021 07:05 )             29.0       05-10    143  |  105  |  17  ----------------------------<  243  4.2   |  28  |  1.12    Ca    9.1      10 May 2021 07:05    TPro  6.4  /  Alb  3.4  /  TBili  0.2  /  DBili  x   /  AST  18  /  ALT  42  /  AlkPhos  91  05-10     eGFR if Non African American: 75 mL/min/1.73M2 (05-10-21 @ 07:05)  eGFR if African American: 87 mL/min/1.73M2 (05-10-21 @ 07:05)    POCT Blood Glucose.: 165 mg/dL (12 May 2021 07:44)  POCT Blood Glucose.: 141 mg/dL (11 May 2021 21:43)  POCT Blood Glucose.: 97 mg/dL (11 May 2021 16:48)  POCT Blood Glucose.: 96 mg/dL (11 May 2021 11:55)    RADIOLOGY & ADDITIONAL TESTS: reviewed    Care Discussed with Consultants/Other Providers: yes - rehab

## 2021-05-12 NOTE — PROGRESS NOTE ADULT - ASSESSMENT
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 Hospital course complicated by uncontrolled diabetes, hyponatremia, ALLIE. Now admitted to [facility] after for initiation of a multidisciplinary rehab program consisting focused on functional mobility, transfers and ADLs (activities of daily living).    GBS - s/p PLEX  -Continue comprehensive rehab program - PT/OT/SLP per rehab team  -Pain management, bowel regimen per rehab  -Numbness and weakness improving  -Gabapentin 300mg q8  -Follow up with neuro after dc (f/u remaining LP results)    Hyponatremia/ALLIE - resolved  -Followed by nephro at [facility]  -Likely multifactorial including hyperglycemia (pseudohyponatremia) and pre renal 2/2 to diarrhea (? ADH induced by pain)   -Off IVF  -Free water restriction <1.2L/day    T2DM with neuropathy: uncontrolled HbA1C 11.5%  -At home Pt was on Tresiba 30 units qhs and Novolog 15 units premeal TID.  -Noted low BG pre lunch. Decrease admelog 16un to 10un at breakfast.   -Decrease admelog from 14un to 10un at lunch and dinner  -FBG still elevated - increase lantus 52un to 54un qhs  -Admelog 5 units at bedtime only if taking a snack - pt planning to decrease carbs qhs  -Discharge Recc - discharge pt on home insulin of Tresiba equal to Lantus dose needed to control BG in hospital- pt has Tresiba and pen needles and will not need script   Mealtime insulin - discharge on Novolog AC- according to admelog doses - pt has Novolog and pen needles and will not need script FS AC and HS- pt has meter and supplies- will not need script  -Pt instructed to schedule appt with [facility] Endocrinology, Dr. David Mcdaniel, per pt preference, for 2-4 weeks after discharge  -Concerned about medication access post discharge as A1c uncontrolled due to inability to obtain insulin 2/2 insurance and pandemic - recommend discussion with pharmacy and PCP regarding med access     Anemia likely anemia of chronic disease  -H/H stable  -f/u anemia workup    RPR Positive  -ID consulted at [facility]- treated 2017 (PCN)  -RPR titer 1:1 - no s/s of active syphilis  -Per ID, no need for treatment at present time     HTN - BP acceptable  -Cont norvasc, losartan, metoprolol tartrate   -Hydralazine decreased to 10mg q8    Hematuria / proteinuria   -hematuria resolved  -labs and urine ordered for vasculitis workup  -Per nephrology, will need kidney biopsy likely as outpt     DVT ppx - lovenox, SCDs

## 2021-05-12 NOTE — PROGRESS NOTE ADULT - SUBJECTIVE AND OBJECTIVE BOX
HPI:  This is a 51 y/o male, PMH DM, HTN, hyperlipidemia; pt presented to the ED at [facility] on 4/10 with c/o abdominal cramping, generalized, and diarrhea following use of laxatives for constipation.  In the ED, WBC 10.82, Hb 13.6, CMP: sodium 126, chloride 89, glucose 281, CMP otherwise unremarkable.  VBG: Lactate 2.6 (later downtrended to 1.6).  Pt. was treated with IV hydration and transferred to CDU for continued care plan:  IV hydration, supportive care, AM labs, general observation care / monitoring. Patient continued to have generalized weakness, numbness and persistent hyponatremia. Patient admitted for further evaluation.  Neurology consulted. 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 Patient refused IVIG but underwent PLEX.   Nephrology consulted for ALLIE and hyponatremia. Likely due to hypovolemia in setting of hyperglycemia. Work up suggested SIADH. Recs to optimize DM control, continue free water restriction <1.2L/day, and monitor Na levels (Na level should not increase more than 6meq in 24 hrs). Also, noted to have proteinuia/hematuria. Renal felt sec to Diabetic Nephropathy but patient will need full vasculitis work up and based on work up result, he might need kidney biopsy which would be scheduled as an outpatient.   Endocrinology also following for uncontrolled diabetes. Insulin regimen adjusted with improvement in glucoses.   ID consulted due to h/o syphilis in the past. Patient had undergone treatment in the past. No need for further treatment.   Last night (5/3), patient reported increased left scapula pain and given IV Dilaudid. Patient attributed to increased use of UEs in PT. Pain management NP consulted. Recommended switch to PO Dilaudid.   Patient has been seen by PT, OT and PM&R attending with recommendations for acute IPR.   Patient is deemed medically stable for transfer to acute IPR at [facility] on 5/4 and arrived 5/5. (05 May 2021 13:33)      INTERVAL HPI/OVERNIGHT EVENTS:  Chart Reviewed and patient seen at bedside.  No new issues overnight  Slept well  Denies any new pain  Had some back pain after doing PT yesterday      ROS:  Denies fever, chest pain, shortness of breath, abdominal pain,  nausea/vomiting    Vital Signs Last 24 Hrs  T(C): 36.7 (12 May 2021 07:51), Max: 36.7 (12 May 2021 07:51)  T(F): 98.1 (12 May 2021 07:51), Max: 98.1 (12 May 2021 07:51)  HR: 86 (12 May 2021 07:51) (86 - 100)  BP: 127/75 (12 May 2021 07:51) (114/75 - 127/75)  BP(mean): --  RR: 15 (12 May 2021 07:51) (15 - 18)  SpO2: 97% (12 May 2021 07:51) (97% - 98%)      Physical Exam:  Gen - NAD, Comfortable  Pulm - CTAB,   Cardiovascular - RRR, S1S2,   Abdomen - Soft, NT/ND, +BS  Extremities - No C/C/E, No calf tenderness  Neuro-     Cognitive - Awake, Alert, AAO to self, place, date, year, situation     Communication - Fluent, No dysarthria     Motor -                       LEFT    UE - ShAB 5/5, EF 5/5, EE 5/5, WE 5/5,  5/5                    RIGHT UE - ShAB 5/5, EF 5/5, EE 5/5, WE 5/5,  5/5                    LEFT    LE - HF 5/5, KE 4/5, DF 5/5, PF 5/5                    RIGHT LE - HF 5/5, KE 4/5, DF 5/5, PF 5/5     Sensory - impaired distal fingertips, toes- improving per patient (pt also reports baseline diabetic neuropathy)  Psychiatric - Mood stable, Affect WNL    Functional: Eating Independent Grooming Mod I. Bathing Min A, Shower transfer Mod A, Toileting Min A. Mod A transfers, Ambulation Min-Mod A 50ft w/ WC follow      LABS:  05-10    143  |  105  |  17  ----------------------------<  243<H>  4.2   |  28  |  1.12    Ca    9.1      10 May 2021 07:05    TPro  6.4  /  Alb  3.4  /  TBili  0.2  /  DBili  x   /  AST  18  /  ALT  42  /  AlkPhos  91  05-10                                              9.7    5.66  )-----------( 299      ( 10 May 2021 07:05 )             29.0       CAPILLARY BLOOD GLUCOSE      POCT Blood Glucose.: 165 mg/dL (12 May 2021 07:44)  POCT Blood Glucose.: 141 mg/dL (11 May 2021 21:43)  POCT Blood Glucose.: 97 mg/dL (11 May 2021 16:48)  POCT Blood Glucose.: 96 mg/dL (11 May 2021 11:55)      MEDICATIONS  (STANDING):  amLODIPine   Tablet 10 milliGRAM(s) Oral daily  bisacodyl 5 milliGRAM(s) Oral at bedtime  clotrimazole 1% Cream 1 Application(s) Topical two times a day  dextrose 40% Gel 15 Gram(s) Oral once  dextrose 5%. 1000 milliLiter(s) (50 mL/Hr) IV Continuous <Continuous>  dextrose 5%. 1000 milliLiter(s) (100 mL/Hr) IV Continuous <Continuous>  dextrose 50% Injectable 25 Gram(s) IV Push once  dextrose 50% Injectable 12.5 Gram(s) IV Push once  dextrose 50% Injectable 25 Gram(s) IV Push once  enoxaparin Injectable 40 milliGRAM(s) SubCutaneous daily  famotidine    Tablet 20 milliGRAM(s) Oral two times a day  gabapentin 300 milliGRAM(s) Oral every 8 hours  glucagon  Injectable 1 milliGRAM(s) IntraMuscular once  hydrALAZINE 10 milliGRAM(s) Oral three times a day  insulin glargine Injectable (LANTUS) 54 Unit(s) SubCutaneous at bedtime  insulin lispro (ADMELOG) corrective regimen sliding scale   SubCutaneous three times a day before meals  insulin lispro (ADMELOG) corrective regimen sliding scale   SubCutaneous at bedtime  insulin lispro Injectable (ADMELOG) 5 Unit(s) SubCutaneous <User Schedule>  insulin lispro Injectable (ADMELOG) 10 Unit(s) SubCutaneous with breakfast  insulin lispro Injectable (ADMELOG) 10 Unit(s) SubCutaneous with lunch  insulin lispro Injectable (ADMELOG) 10 Unit(s) SubCutaneous with dinner  losartan 100 milliGRAM(s) Oral daily  metoprolol tartrate 12.5 milliGRAM(s) Oral two times a day  senna 2 Tablet(s) Oral two times a day    MEDICATIONS  (PRN):  acetaminophen   Tablet .. 650 milliGRAM(s) Oral every 6 hours PRN Mild Pain (1 - 3)  HYDROmorphone   Tablet 2 milliGRAM(s) Oral every 6 hours PRN Severe Pain (7 - 10)  oxyCODONE    IR 5 milliGRAM(s) Oral every 6 hours PRN Moderate Pain (4 - 6)

## 2021-05-12 NOTE — PROGRESS NOTE ADULT - ASSESSMENT
JAGUAR PRADO is a 50y with a history of DM, HTN, hyperlipidemia who presented to [facility]  on 4/10 with complaint of abdominal pain, diarrhea from laxative use and weakness and 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 Hospital course complicated by uncontrolled diabetes, hyponatremia, ALLIE, . Now admitted to [facility] after for initiation of a multidisciplinary rehab program consisting focused on functional mobility, transfers and ADLs (activities of daily living).    Comprehensive Multidisciplinary Rehab Program:  - Continue comprehensive rehab program, 3 hours a day, 5 days a week.  - PT 2hr/day: Focused on improving strength, endurance, coordination, balance, functional mobility, and transfers  - OT 1hr/day: Focused on improving strength, fine motor skills, coordination, posture and ADLs.       LE weakness with LP + protein with absent reflexes and noted cauda equina/nerve root enhancement suggestive of GBS  -S/p PLEX  -Follow up with neuro after dc (f/u remaining LP results)  -ganglioside antibodies, Gq1b -->neg      Hyponatremia/ALLIE  -followed by nephro at [facility]  -likely multifactorial including hyperglycemia ( pseudohyponatremia ) and pre renal 2/2 to diarrhea (? ADH induced by pain)   -dced fluid restriction as sodium now normal - fu labs in am     DM type 2 with neuropathy : uncontrolled   -A1C 11.5%  -c/w Lantus 54 units qhs, Admelog 10/10/10 WITH MEALS  -c/w Admelog 5 units at bedtime only if taking a snack   -at home Pt was on Tresiba 30 units qhs and Novolog 15 units premeal TID.    + RPR  -ID consulted at [facility]- treated 2017 (PCN)  -RPR titer 1:1 - no s/s of active syphilis  -Per ID, no need for treatment at present time     HTN : -C/w norvasc, losartan, hydralazine, metoprolol tartrate ( Hydralazine dose reduced to 10mg q8h     Hematuria / proteinuria   - labs and urine ordered for vasculitis workup  -Per nephrology, will need kidney biopsy likely as outpt     Mood/Cognition:  - Appropriate cognition and mood.    Sleep:   - Melatonin PRN     Pain Management:  Tylenol PRN, Oxycodone, Dilaudid prn  - gabapentin 300mg tid     GI/Bowel:- Senna QHS, Miralax PRN Daily  - GI ppx: pepcid 20mg bid    /Bladder: toileting prn       Skin/Pressure Injury: OOB as tolerated     Diet:   - Diet Consistency/Modifications: CC Glucerna BID, no shellfish    DVT ppx:- lovenox   - SCDs    Participation Restrictions/Precautions:  - Precautions:  [x] Falls      IDT :  - TDD   - Livin YVAN, 0 steps inside, wife works 12 hrs a day as nanny but can take time off to assist. Prefers outpatient PT and OT.   - Goal: Mod I

## 2021-05-13 LAB
ALBUMIN SERPL ELPH-MCNC: 3.4 G/DL — SIGNIFICANT CHANGE UP (ref 3.3–5)
ALP SERPL-CCNC: 104 U/L — SIGNIFICANT CHANGE UP (ref 40–120)
ALT FLD-CCNC: 39 U/L — SIGNIFICANT CHANGE UP (ref 10–45)
ANION GAP SERPL CALC-SCNC: 8 MMOL/L — SIGNIFICANT CHANGE UP (ref 5–17)
AST SERPL-CCNC: 19 U/L — SIGNIFICANT CHANGE UP (ref 10–40)
BILIRUB SERPL-MCNC: 0.2 MG/DL — SIGNIFICANT CHANGE UP (ref 0.2–1.2)
BUN SERPL-MCNC: 16 MG/DL — SIGNIFICANT CHANGE UP (ref 7–23)
CALCIUM SERPL-MCNC: 8.9 MG/DL — SIGNIFICANT CHANGE UP (ref 8.4–10.5)
CHLORIDE SERPL-SCNC: 105 MMOL/L — SIGNIFICANT CHANGE UP (ref 96–108)
CO2 SERPL-SCNC: 29 MMOL/L — SIGNIFICANT CHANGE UP (ref 22–31)
CREAT SERPL-MCNC: 1.11 MG/DL — SIGNIFICANT CHANGE UP (ref 0.5–1.3)
GLUCOSE BLDC GLUCOMTR-MCNC: 140 MG/DL — HIGH (ref 70–99)
GLUCOSE BLDC GLUCOMTR-MCNC: 153 MG/DL — HIGH (ref 70–99)
GLUCOSE BLDC GLUCOMTR-MCNC: 191 MG/DL — HIGH (ref 70–99)
GLUCOSE BLDC GLUCOMTR-MCNC: 209 MG/DL — HIGH (ref 70–99)
GLUCOSE SERPL-MCNC: 177 MG/DL — HIGH (ref 70–99)
HCT VFR BLD CALC: 28.8 % — LOW (ref 39–50)
HGB BLD-MCNC: 9.7 G/DL — LOW (ref 13–17)
MCHC RBC-ENTMCNC: 30.5 PG — SIGNIFICANT CHANGE UP (ref 27–34)
MCHC RBC-ENTMCNC: 33.7 GM/DL — SIGNIFICANT CHANGE UP (ref 32–36)
MCV RBC AUTO: 90.6 FL — SIGNIFICANT CHANGE UP (ref 80–100)
NRBC # BLD: 0 /100 WBCS — SIGNIFICANT CHANGE UP (ref 0–0)
PLATELET # BLD AUTO: 294 K/UL — SIGNIFICANT CHANGE UP (ref 150–400)
POTASSIUM SERPL-MCNC: 3.7 MMOL/L — SIGNIFICANT CHANGE UP (ref 3.5–5.3)
POTASSIUM SERPL-SCNC: 3.7 MMOL/L — SIGNIFICANT CHANGE UP (ref 3.5–5.3)
PROT SERPL-MCNC: 6.5 G/DL — SIGNIFICANT CHANGE UP (ref 6–8.3)
RBC # BLD: 3.18 M/UL — LOW (ref 4.2–5.8)
RBC # FLD: 12.3 % — SIGNIFICANT CHANGE UP (ref 10.3–14.5)
SODIUM SERPL-SCNC: 142 MMOL/L — SIGNIFICANT CHANGE UP (ref 135–145)
WBC # BLD: 5.64 K/UL — SIGNIFICANT CHANGE UP (ref 3.8–10.5)
WBC # FLD AUTO: 5.64 K/UL — SIGNIFICANT CHANGE UP (ref 3.8–10.5)

## 2021-05-13 PROCEDURE — 99232 SBSQ HOSP IP/OBS MODERATE 35: CPT

## 2021-05-13 RX ADMIN — HYDROMORPHONE HYDROCHLORIDE 2 MILLIGRAM(S): 2 INJECTION INTRAMUSCULAR; INTRAVENOUS; SUBCUTANEOUS at 10:55

## 2021-05-13 RX ADMIN — GABAPENTIN 300 MILLIGRAM(S): 400 CAPSULE ORAL at 14:46

## 2021-05-13 RX ADMIN — GABAPENTIN 300 MILLIGRAM(S): 400 CAPSULE ORAL at 05:57

## 2021-05-13 RX ADMIN — Medication 1 APPLICATION(S): at 17:25

## 2021-05-13 RX ADMIN — Medication 10 MILLIGRAM(S): at 21:52

## 2021-05-13 RX ADMIN — SENNA PLUS 2 TABLET(S): 8.6 TABLET ORAL at 05:58

## 2021-05-13 RX ADMIN — HYDROMORPHONE HYDROCHLORIDE 2 MILLIGRAM(S): 2 INJECTION INTRAMUSCULAR; INTRAVENOUS; SUBCUTANEOUS at 23:03

## 2021-05-13 RX ADMIN — Medication 1 APPLICATION(S): at 05:58

## 2021-05-13 RX ADMIN — LOSARTAN POTASSIUM 100 MILLIGRAM(S): 100 TABLET, FILM COATED ORAL at 05:59

## 2021-05-13 RX ADMIN — Medication 5 MILLIGRAM(S): at 21:52

## 2021-05-13 RX ADMIN — Medication 10 UNIT(S): at 08:05

## 2021-05-13 RX ADMIN — OXYCODONE HYDROCHLORIDE 5 MILLIGRAM(S): 5 TABLET ORAL at 06:04

## 2021-05-13 RX ADMIN — AMLODIPINE BESYLATE 10 MILLIGRAM(S): 2.5 TABLET ORAL at 05:58

## 2021-05-13 RX ADMIN — SENNA PLUS 2 TABLET(S): 8.6 TABLET ORAL at 17:25

## 2021-05-13 RX ADMIN — GABAPENTIN 300 MILLIGRAM(S): 400 CAPSULE ORAL at 21:52

## 2021-05-13 RX ADMIN — OXYCODONE HYDROCHLORIDE 5 MILLIGRAM(S): 5 TABLET ORAL at 14:51

## 2021-05-13 RX ADMIN — OXYCODONE HYDROCHLORIDE 5 MILLIGRAM(S): 5 TABLET ORAL at 15:20

## 2021-05-13 RX ADMIN — Medication 10 UNIT(S): at 12:14

## 2021-05-13 RX ADMIN — Medication 650 MILLIGRAM(S): at 07:41

## 2021-05-13 RX ADMIN — Medication 10 UNIT(S): at 17:25

## 2021-05-13 RX ADMIN — FAMOTIDINE 20 MILLIGRAM(S): 10 INJECTION INTRAVENOUS at 05:58

## 2021-05-13 RX ADMIN — Medication 12.5 MILLIGRAM(S): at 17:24

## 2021-05-13 RX ADMIN — Medication 2: at 08:05

## 2021-05-13 RX ADMIN — Medication 5 UNIT(S): at 21:52

## 2021-05-13 RX ADMIN — INSULIN GLARGINE 54 UNIT(S): 100 INJECTION, SOLUTION SUBCUTANEOUS at 21:52

## 2021-05-13 RX ADMIN — Medication 10 MILLIGRAM(S): at 05:58

## 2021-05-13 RX ADMIN — HYDROMORPHONE HYDROCHLORIDE 2 MILLIGRAM(S): 2 INJECTION INTRAMUSCULAR; INTRAVENOUS; SUBCUTANEOUS at 11:30

## 2021-05-13 RX ADMIN — Medication 650 MILLIGRAM(S): at 08:10

## 2021-05-13 RX ADMIN — FAMOTIDINE 20 MILLIGRAM(S): 10 INJECTION INTRAVENOUS at 17:25

## 2021-05-13 RX ADMIN — Medication 12.5 MILLIGRAM(S): at 05:57

## 2021-05-13 RX ADMIN — ENOXAPARIN SODIUM 40 MILLIGRAM(S): 100 INJECTION SUBCUTANEOUS at 12:14

## 2021-05-13 RX ADMIN — Medication 2: at 12:14

## 2021-05-13 RX ADMIN — HYDROMORPHONE HYDROCHLORIDE 2 MILLIGRAM(S): 2 INJECTION INTRAMUSCULAR; INTRAVENOUS; SUBCUTANEOUS at 21:53

## 2021-05-13 NOTE — PROGRESS NOTE ADULT - ASSESSMENT
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 Hospital course complicated by uncontrolled diabetes, hyponatremia, ALLIE, . Now admitted to [facility] after for initiation of a multidisciplinary rehab program consisting focused on functional mobility, transfers and ADLs (activities of daily living).    Comprehensive Multidisciplinary Rehab Program:  - Continue comprehensive rehab program, 3 hours a day, 5 days a week.  - PT 2hr/day: Focused on improving strength, endurance, coordination, balance, functional mobility, and transfers  - OT 1hr/day: Focused on improving strength, fine motor skills, coordination, posture and ADLs.       LE weakness - w/u  suggestive of GBS  -S/p PLEX  -Follow up with neuro after dc (f/u remaining LP results)  -ganglioside antibodies, Gq1b -->neg      Hyponatremia/ALLIE- now resolved   -  DM type 2 with neuropathy : uncontrolled   -A1C 11.5%  -c/w Lantus 54 units qhs, Admelog 10/10/10 WITH MEALS  -c/w Admelog 5 units at bedtime only if taking a snack   -at home Pt was on Tresiba 30 units qhs and Novolog 15 units premeal TID.    + RPR  -ID consulted at [facility]- treated 2017 (PCN)  -RPR titer 1:1 - no s/s of active syphilis  -Per ID, no need for treatment at present time     HTN : -C/w norvasc, losartan, hydralazine, metoprolol tartrate ( Hydralazine dose reduced to 10mg q8h     Hematuria / proteinuria   - labs and urine ordered for vasculitis workup  -Per nephrology, will need kidney biopsy likely as outpt     Sleep:   - Melatonin PRN     Pain Management:  Tylenol PRN, Oxycodone, Dilaudid prn  - gabapentin 300mg tid     GI/Bowel:- Senna QHS, Miralax PRN Daily  - GI ppx: pepcid 20mg bid    /Bladder: toileting prn       Skin/Pressure Injury: OOB as tolerated     Diet:   - Diet Consistency/Modifications: CC Glucerna BID, no shellfish    DVT ppx:- lovenox   - SCDs    Participation Restrictions/Precautions:  - Precautions:  [x] Falls      IDT 5/11:  - TDD 5/26. Progressing well in therapy  -- Goal: Mod I    Labs stable  Hospitalist rowena noted

## 2021-05-13 NOTE — PROGRESS NOTE ADULT - ASSESSMENT
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 Hospital course complicated by uncontrolled diabetes, hyponatremia, ALLIE. Now admitted to [facility] after for initiation of a multidisciplinary rehab program consisting focused on functional mobility, transfers and ADLs (activities of daily living).    GBS - s/p PLEX  -Continue comprehensive rehab program - PT/OT/SLP per rehab team  -Pain management, bowel regimen per rehab  -Numbness and weakness improving  -Gabapentin 300mg q8  -Follow up with neuro after dc (f/u remaining LP results)    Hyponatremia/ALLIE - resolved  -Followed by nephro at [facility]  -Likely multifactorial including hyperglycemia (pseudohyponatremia) and pre renal 2/2 to diarrhea (? ADH induced by pain)   -Off IVF  -Free water restriction <1.2L/day    T2DM with neuropathy: uncontrolled HbA1C 11.5%  -At home Pt was on Tresiba 30 units qhs and Novolog 15 units premeal TID.  -Continue admelog 10un TID AC  -Lantus 54un qhs  -Admelog 5 units at bedtime only if taking a snack  -Discharge Recc - discharge pt on home insulin of Tresiba equal to Lantus dose needed to control BG in hospital- pt has Tresiba and pen needles and will not need script   Mealtime insulin - discharge on Novolog AC- according to admelog doses - pt has Novolog and pen needles and will not need script FS AC and HS- pt has meter and supplies- will not need script  -Pt instructed to schedule appt with [facility]ology, Dr. David Mcdaniel, per pt preference, for 2-4 weeks after discharge  -Concerned about medication access post discharge as A1c uncontrolled due to inability to obtain insulin 2/2 insurance and pandemic - recommend discussion with pharmacy and PCP regarding med access     Anemia likely anemia of chronic disease  -H/H stable    RPR Positive  -ID consulted at [facility]- treated 2017 (PCN)  -RPR titer 1:1 - no s/s of active syphilis  -Per ID, no need for treatment at present time     HTN - BP acceptable  -Cont norvasc, losartan, metoprolol tartrate   -Hydralazine decreased to 10mg q8    Hematuria / proteinuria   -hematuria resolved  -labs and urine ordered for vasculitis workup  -Per nephrology, will need kidney biopsy likely as outpt     DVT ppx - lovenox, SCDs

## 2021-05-13 NOTE — PROGRESS NOTE ADULT - SUBJECTIVE AND OBJECTIVE BOX
Patient is a 50y old  Male who presents with a chief complaint of 03.4 GBS (13 May 2021 11:22)      Patient seen and examined at bedside. doing well no complaints. had snack overnight, took additional admelog 5un     ALLERGIES:  No Known Drug Allergies  shellfish (Urticaria)    MEDICATIONS  (STANDING):  amLODIPine   Tablet 10 milliGRAM(s) Oral daily  bisacodyl 5 milliGRAM(s) Oral at bedtime  clotrimazole 1% Cream 1 Application(s) Topical two times a day  dextrose 40% Gel 15 Gram(s) Oral once  dextrose 5%. 1000 milliLiter(s) (50 mL/Hr) IV Continuous <Continuous>  dextrose 5%. 1000 milliLiter(s) (100 mL/Hr) IV Continuous <Continuous>  dextrose 50% Injectable 25 Gram(s) IV Push once  dextrose 50% Injectable 12.5 Gram(s) IV Push once  dextrose 50% Injectable 25 Gram(s) IV Push once  enoxaparin Injectable 40 milliGRAM(s) SubCutaneous daily  famotidine    Tablet 20 milliGRAM(s) Oral two times a day  gabapentin 300 milliGRAM(s) Oral every 8 hours  glucagon  Injectable 1 milliGRAM(s) IntraMuscular once  hydrALAZINE 10 milliGRAM(s) Oral three times a day  insulin glargine Injectable (LANTUS) 54 Unit(s) SubCutaneous at bedtime  insulin lispro (ADMELOG) corrective regimen sliding scale   SubCutaneous three times a day before meals  insulin lispro (ADMELOG) corrective regimen sliding scale   SubCutaneous at bedtime  insulin lispro Injectable (ADMELOG) 5 Unit(s) SubCutaneous <User Schedule>  insulin lispro Injectable (ADMELOG) 10 Unit(s) SubCutaneous with breakfast  insulin lispro Injectable (ADMELOG) 10 Unit(s) SubCutaneous with lunch  insulin lispro Injectable (ADMELOG) 10 Unit(s) SubCutaneous with dinner  losartan 100 milliGRAM(s) Oral daily  metoprolol tartrate 12.5 milliGRAM(s) Oral two times a day  senna 2 Tablet(s) Oral two times a day    MEDICATIONS  (PRN):  acetaminophen   Tablet .. 650 milliGRAM(s) Oral every 6 hours PRN Mild Pain (1 - 3)  HYDROmorphone   Tablet 2 milliGRAM(s) Oral every 6 hours PRN Severe Pain (7 - 10)  oxyCODONE    IR 5 milliGRAM(s) Oral every 6 hours PRN Moderate Pain (4 - 6)    Vital Signs Last 24 Hrs  T(F): 97.4 (13 May 2021 07:39), Max: 97.9 (12 May 2021 20:54)  HR: 82 (13 May 2021 07:39) (82 - 96)  BP: 144/81 (13 May 2021 07:39) (141/82 - 147/79)  RR: 15 (13 May 2021 07:39) (15 - 16)  SpO2: 97% (13 May 2021 07:39) (96% - 99%)  I&O's Summary      PHYSICAL EXAM:  General: NAD, A/O x3  ENT: MMM  Neck: Supple, No JVD  Lungs: Respirations unlabored. Clear to auscultation bilaterally  Cardio: RRR, S1/S2, No murmurs  Abdomen: Soft, Nontender, Nondistended; Bowel sounds present  Extremities: No calf tenderness, No pitting edema  Neuro: right UE and LE 4/5, L UE and LLE 4+/5     LABS:                        9.7    5.64  )-----------( 294      ( 13 May 2021 05:30 )             28.8       05-13    142  |  105  |  16  ----------------------------<  177  3.7   |  29  |  1.11    Ca    8.9      13 May 2021 05:30    TPro  6.5  /  Alb  3.4  /  TBili  0.2  /  DBili  x   /  AST  19  /  ALT  39  /  AlkPhos  104  05-13     eGFR if Non African American: 76 mL/min/1.73M2 (05-13-21 @ 05:30)  eGFR if : 88 mL/min/1.73M2 (05-13-21 @ 05:30)    POCT Blood Glucose.: 191 mg/dL (13 May 2021 07:42)  POCT Blood Glucose.: 99 mg/dL (12 May 2021 20:52)  POCT Blood Glucose.: 105 mg/dL (12 May 2021 17:03)  POCT Blood Glucose.: 154 mg/dL (12 May 2021 12:03)    RADIOLOGY & ADDITIONAL TESTS: reviewed    Care Discussed with Consultants/Other Providers: yes - rehab

## 2021-05-13 NOTE — PROGRESS NOTE ADULT - SUBJECTIVE AND OBJECTIVE BOX
HPI:  This is a 51 y/o male, PMH DM, HTN, hyperlipidemia; pt presented to the ED at [facility] on 4/10 with c/o abdominal cramping, generalized, and diarrhea following use of laxatives for constipation.  In the ED, WBC 10.82, Hb 13.6, CMP: sodium 126, chloride 89, glucose 281, CMP otherwise unremarkable.  VBG: Lactate 2.6 (later downtrended to 1.6).  Pt. was treated with IV hydration and transferred to CDU for continued care plan:  IV hydration, supportive care, AM labs, general observation care / monitoring. Patient continued to have generalized weakness, numbness and persistent hyponatremia. Patient admitted for further evaluation.  Neurology consulted. 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 Patient refused IVIG but underwent PLEX.   Nephrology consulted for ALLIE and hyponatremia. Likely due to hypovolemia in setting of hyperglycemia. Work up suggested SIADH. Recs to optimize DM control, continue free water restriction <1.2L/day, and monitor Na levels (Na level should not increase more than 6meq in 24 hrs). Also, noted to have proteinuia/hematuria. Renal felt sec to Diabetic Nephropathy but patient will need full vasculitis work up and based on work up result, he might need kidney biopsy which would be scheduled as an outpatient.   Endocrinology also following for uncontrolled diabetes. Insulin regimen adjusted with improvement in glucoses.   ID consulted due to h/o syphilis in the past. Patient had undergone treatment in the past. No need for further treatment.   Last night (5/3), patient reported increased left scapula pain and given IV Dilaudid. Patient attributed to increased use of UEs in PT. Pain management NP consulted. Recommended switch to PO Dilaudid.   Patient has been seen by PT, OT and PM&R attending with recommendations for acute IPR.   Patient is deemed medically stable for transfer to acute IPR at [facility] on 5/4 and arrived 5/5. (05 May 2021 13:33)      INTERVAL HPI/OVERNIGHT EVENTS:  Chart Reviewed and patient seen at bedside.  Had some muscle soreness after therapy  Slept well  Denies any urinary or bowel incontinence       ROS:  Denies fever, chest pain, shortness of breath, abdominal pain,  nausea/vomiting    Vital Signs Last 24 Hrs  T(C): 36.3 (13 May 2021 07:39), Max: 36.6 (12 May 2021 20:54)  T(F): 97.4 (13 May 2021 07:39), Max: 97.9 (12 May 2021 20:54)  HR: 82 (13 May 2021 07:39) (82 - 96)  BP: 144/81 (13 May 2021 07:39) (141/82 - 147/79)  BP(mean): --  RR: 15 (13 May 2021 07:39) (15 - 16)  SpO2: 97% (13 May 2021 07:39) (96% - 99%)        Physical Exam:  Gen - NAD, Comfortable  Pulm - CTAB,   Cardiovascular - RRR, S1S2,   Abdomen - Soft, NT/ND, +BS  Extremities - No C/C/E, No calf tenderness  Neuro-     Cognitive - Awake, Alert, AAO to self, place, date, year, situation     Communication - Fluent, No dysarthria     Motor -                       LEFT    UE - ShAB 5/5, EF 5/5, EE 5/5, WE 5/5,  5/5                    RIGHT UE - ShAB 5/5, EF 5/5, EE 5/5, WE 5/5,  5/5                    LEFT    LE - HF 5/5, KE 4/5, DF 5/5, PF 5/5                    RIGHT LE - HF 5/5, KE 4/5, DF 5/5, PF 5/5     Sensory - impaired distal fingertips, toes- improving per patient (pt also reports baseline diabetic neuropathy)  Psychiatric - Mood stable, Affect WNL    Functional: Eating Independent Grooming Mod I. Bathing Min A, Shower transfer Mod A, Toileting Min A. Mod A transfers, Ambulation Min-Mod A 50ft w/ WC follow      MEDICATIONS  (STANDING):  amLODIPine   Tablet 10 milliGRAM(s) Oral daily  bisacodyl 5 milliGRAM(s) Oral at bedtime  clotrimazole 1% Cream 1 Application(s) Topical two times a day  dextrose 40% Gel 15 Gram(s) Oral once  dextrose 5%. 1000 milliLiter(s) (50 mL/Hr) IV Continuous <Continuous>  dextrose 5%. 1000 milliLiter(s) (100 mL/Hr) IV Continuous <Continuous>  dextrose 50% Injectable 25 Gram(s) IV Push once  dextrose 50% Injectable 12.5 Gram(s) IV Push once  dextrose 50% Injectable 25 Gram(s) IV Push once  enoxaparin Injectable 40 milliGRAM(s) SubCutaneous daily  famotidine    Tablet 20 milliGRAM(s) Oral two times a day  gabapentin 300 milliGRAM(s) Oral every 8 hours  glucagon  Injectable 1 milliGRAM(s) IntraMuscular once  hydrALAZINE 10 milliGRAM(s) Oral three times a day  insulin glargine Injectable (LANTUS) 54 Unit(s) SubCutaneous at bedtime  insulin lispro (ADMELOG) corrective regimen sliding scale   SubCutaneous three times a day before meals  insulin lispro (ADMELOG) corrective regimen sliding scale   SubCutaneous at bedtime  insulin lispro Injectable (ADMELOG) 5 Unit(s) SubCutaneous <User Schedule>  insulin lispro Injectable (ADMELOG) 10 Unit(s) SubCutaneous with breakfast  insulin lispro Injectable (ADMELOG) 10 Unit(s) SubCutaneous with lunch  insulin lispro Injectable (ADMELOG) 10 Unit(s) SubCutaneous with dinner  losartan 100 milliGRAM(s) Oral daily  metoprolol tartrate 12.5 milliGRAM(s) Oral two times a day  senna 2 Tablet(s) Oral two times a day    MEDICATIONS  (PRN):  acetaminophen   Tablet .. 650 milliGRAM(s) Oral every 6 hours PRN Mild Pain (1 - 3)  HYDROmorphone   Tablet 2 milliGRAM(s) Oral every 6 hours PRN Severe Pain (7 - 10)  oxyCODONE    IR 5 milliGRAM(s) Oral every 6 hours PRN Moderate Pain (4 - 6)                            9.7    5.64  )-----------( 294      ( 13 May 2021 05:30 )             28.8     05-13    142  |  105  |  16  ----------------------------<  177<H>  3.7   |  29  |  1.11    Ca    8.9      13 May 2021 05:30    TPro  6.5  /  Alb  3.4  /  TBili  0.2  /  DBili  x   /  AST  19  /  ALT  39  /  AlkPhos  104  05-13    CAPILLARY BLOOD GLUCOSE      POCT Blood Glucose.: 191 mg/dL (13 May 2021 07:42)  POCT Blood Glucose.: 99 mg/dL (12 May 2021 20:52)  POCT Blood Glucose.: 105 mg/dL (12 May 2021 17:03)  POCT Blood Glucose.: 154 mg/dL (12 May 2021 12:03)

## 2021-05-14 LAB
GLUCOSE BLDC GLUCOMTR-MCNC: 132 MG/DL — HIGH (ref 70–99)
GLUCOSE BLDC GLUCOMTR-MCNC: 134 MG/DL — HIGH (ref 70–99)
GLUCOSE BLDC GLUCOMTR-MCNC: 215 MG/DL — HIGH (ref 70–99)
GLUCOSE BLDC GLUCOMTR-MCNC: 215 MG/DL — HIGH (ref 70–99)

## 2021-05-14 PROCEDURE — 99233 SBSQ HOSP IP/OBS HIGH 50: CPT

## 2021-05-14 PROCEDURE — 99232 SBSQ HOSP IP/OBS MODERATE 35: CPT

## 2021-05-14 RX ORDER — INSULIN GLARGINE 100 [IU]/ML
58 INJECTION, SOLUTION SUBCUTANEOUS AT BEDTIME
Refills: 0 | Status: DISCONTINUED | OUTPATIENT
Start: 2021-05-14 | End: 2021-05-19

## 2021-05-14 RX ORDER — HYDROMORPHONE HYDROCHLORIDE 2 MG/ML
2 INJECTION INTRAMUSCULAR; INTRAVENOUS; SUBCUTANEOUS EVERY 6 HOURS
Refills: 0 | Status: DISCONTINUED | OUTPATIENT
Start: 2021-05-14 | End: 2021-05-19

## 2021-05-14 RX ORDER — OXYCODONE HYDROCHLORIDE 5 MG/1
5 TABLET ORAL EVERY 6 HOURS
Refills: 0 | Status: DISCONTINUED | OUTPATIENT
Start: 2021-05-14 | End: 2021-05-19

## 2021-05-14 RX ADMIN — Medication 1 APPLICATION(S): at 05:49

## 2021-05-14 RX ADMIN — Medication 4: at 08:03

## 2021-05-14 RX ADMIN — OXYCODONE HYDROCHLORIDE 5 MILLIGRAM(S): 5 TABLET ORAL at 05:47

## 2021-05-14 RX ADMIN — SENNA PLUS 2 TABLET(S): 8.6 TABLET ORAL at 05:46

## 2021-05-14 RX ADMIN — HYDROMORPHONE HYDROCHLORIDE 2 MILLIGRAM(S): 2 INJECTION INTRAMUSCULAR; INTRAVENOUS; SUBCUTANEOUS at 10:00

## 2021-05-14 RX ADMIN — Medication 5 MILLIGRAM(S): at 22:19

## 2021-05-14 RX ADMIN — INSULIN GLARGINE 58 UNIT(S): 100 INJECTION, SOLUTION SUBCUTANEOUS at 22:19

## 2021-05-14 RX ADMIN — Medication 10 MILLIGRAM(S): at 05:46

## 2021-05-14 RX ADMIN — OXYCODONE HYDROCHLORIDE 5 MILLIGRAM(S): 5 TABLET ORAL at 06:22

## 2021-05-14 RX ADMIN — FAMOTIDINE 20 MILLIGRAM(S): 10 INJECTION INTRAVENOUS at 05:46

## 2021-05-14 RX ADMIN — SENNA PLUS 2 TABLET(S): 8.6 TABLET ORAL at 18:12

## 2021-05-14 RX ADMIN — GABAPENTIN 300 MILLIGRAM(S): 400 CAPSULE ORAL at 05:46

## 2021-05-14 RX ADMIN — Medication 12.5 MILLIGRAM(S): at 18:12

## 2021-05-14 RX ADMIN — HYDROMORPHONE HYDROCHLORIDE 2 MILLIGRAM(S): 2 INJECTION INTRAMUSCULAR; INTRAVENOUS; SUBCUTANEOUS at 18:15

## 2021-05-14 RX ADMIN — LOSARTAN POTASSIUM 100 MILLIGRAM(S): 100 TABLET, FILM COATED ORAL at 05:47

## 2021-05-14 RX ADMIN — Medication 1 APPLICATION(S): at 22:18

## 2021-05-14 RX ADMIN — Medication 12.5 MILLIGRAM(S): at 05:46

## 2021-05-14 RX ADMIN — GABAPENTIN 300 MILLIGRAM(S): 400 CAPSULE ORAL at 22:19

## 2021-05-14 RX ADMIN — HYDROMORPHONE HYDROCHLORIDE 2 MILLIGRAM(S): 2 INJECTION INTRAMUSCULAR; INTRAVENOUS; SUBCUTANEOUS at 09:25

## 2021-05-14 RX ADMIN — AMLODIPINE BESYLATE 10 MILLIGRAM(S): 2.5 TABLET ORAL at 05:46

## 2021-05-14 RX ADMIN — OXYCODONE HYDROCHLORIDE 5 MILLIGRAM(S): 5 TABLET ORAL at 22:19

## 2021-05-14 RX ADMIN — ENOXAPARIN SODIUM 40 MILLIGRAM(S): 100 INJECTION SUBCUTANEOUS at 12:26

## 2021-05-14 RX ADMIN — Medication 10 MILLIGRAM(S): at 22:19

## 2021-05-14 RX ADMIN — Medication 10 UNIT(S): at 17:36

## 2021-05-14 RX ADMIN — FAMOTIDINE 20 MILLIGRAM(S): 10 INJECTION INTRAVENOUS at 18:12

## 2021-05-14 RX ADMIN — OXYCODONE HYDROCHLORIDE 5 MILLIGRAM(S): 5 TABLET ORAL at 12:27

## 2021-05-14 RX ADMIN — Medication 5 UNIT(S): at 22:19

## 2021-05-14 RX ADMIN — OXYCODONE HYDROCHLORIDE 5 MILLIGRAM(S): 5 TABLET ORAL at 13:00

## 2021-05-14 RX ADMIN — HYDROMORPHONE HYDROCHLORIDE 2 MILLIGRAM(S): 2 INJECTION INTRAMUSCULAR; INTRAVENOUS; SUBCUTANEOUS at 18:30

## 2021-05-14 RX ADMIN — Medication 10 UNIT(S): at 08:03

## 2021-05-14 RX ADMIN — Medication 10 UNIT(S): at 12:26

## 2021-05-14 RX ADMIN — GABAPENTIN 300 MILLIGRAM(S): 400 CAPSULE ORAL at 14:13

## 2021-05-14 NOTE — PROGRESS NOTE ADULT - SUBJECTIVE AND OBJECTIVE BOX
Patient is a 50y old  Male who presents with a chief complaint of 03.4 GBS (13 May 2021 11:44)      Patient seen and examined at bedside.    ALLERGIES:  No Known Drug Allergies  shellfish (Urticaria)    MEDICATIONS  (STANDING):  amLODIPine   Tablet 10 milliGRAM(s) Oral daily  bisacodyl 5 milliGRAM(s) Oral at bedtime  clotrimazole 1% Cream 1 Application(s) Topical two times a day  dextrose 40% Gel 15 Gram(s) Oral once  dextrose 5%. 1000 milliLiter(s) (50 mL/Hr) IV Continuous <Continuous>  dextrose 5%. 1000 milliLiter(s) (100 mL/Hr) IV Continuous <Continuous>  dextrose 50% Injectable 25 Gram(s) IV Push once  dextrose 50% Injectable 12.5 Gram(s) IV Push once  dextrose 50% Injectable 25 Gram(s) IV Push once  enoxaparin Injectable 40 milliGRAM(s) SubCutaneous daily  famotidine    Tablet 20 milliGRAM(s) Oral two times a day  gabapentin 300 milliGRAM(s) Oral every 8 hours  glucagon  Injectable 1 milliGRAM(s) IntraMuscular once  hydrALAZINE 10 milliGRAM(s) Oral three times a day  insulin glargine Injectable (LANTUS) 54 Unit(s) SubCutaneous at bedtime  insulin lispro (ADMELOG) corrective regimen sliding scale   SubCutaneous three times a day before meals  insulin lispro (ADMELOG) corrective regimen sliding scale   SubCutaneous at bedtime  insulin lispro Injectable (ADMELOG) 5 Unit(s) SubCutaneous <User Schedule>  insulin lispro Injectable (ADMELOG) 10 Unit(s) SubCutaneous with breakfast  insulin lispro Injectable (ADMELOG) 10 Unit(s) SubCutaneous with lunch  insulin lispro Injectable (ADMELOG) 10 Unit(s) SubCutaneous with dinner  losartan 100 milliGRAM(s) Oral daily  metoprolol tartrate 12.5 milliGRAM(s) Oral two times a day  senna 2 Tablet(s) Oral two times a day    MEDICATIONS  (PRN):  acetaminophen   Tablet .. 650 milliGRAM(s) Oral every 6 hours PRN Mild Pain (1 - 3)  HYDROmorphone   Tablet 2 milliGRAM(s) Oral every 6 hours PRN Severe Pain (7 - 10)  oxyCODONE    IR 5 milliGRAM(s) Oral every 6 hours PRN Moderate Pain (4 - 6)    Vital Signs Last 24 Hrs  T(F): 97.4 (14 May 2021 08:00), Max: 97.6 (13 May 2021 19:59)  HR: 83 (14 May 2021 08:00) (83 - 99)  BP: 122/73 (14 May 2021 08:00) (112/70 - 133/84)  RR: 16 (14 May 2021 08:00) (15 - 16)  SpO2: 96% (14 May 2021 08:00) (96% - 97%)  I&O's Summary      PHYSICAL EXAM:  General: NAD, A/O x3  ENT: MMM  Neck: Supple, No JVD  Lungs: Respirations unlabored. Clear to auscultation bilaterally  Cardio: RRR, S1/S2, No murmurs  Abdomen: Soft, Nontender, Nondistended; Bowel sounds present  Extremities: No calf tenderness, No pitting edema  Neuro: right UE and LE 4/5, L UE and LLE 4+/5     LABS:                        9.7    5.64  )-----------( 294      ( 13 May 2021 05:30 )             28.8       05-13    142  |  105  |  16  ----------------------------<  177  3.7   |  29  |  1.11    Ca    8.9      13 May 2021 05:30    TPro  6.5  /  Alb  3.4  /  TBili  0.2  /  DBili  x   /  AST  19  /  ALT  39  /  AlkPhos  104  05-13     eGFR if Non African American: 76 mL/min/1.73M2 (05-13-21 @ 05:30)  eGFR if : 88 mL/min/1.73M2 (05-13-21 @ 05:30)    POCT Blood Glucose.: 215 mg/dL (14 May 2021 07:28)  POCT Blood Glucose.: 209 mg/dL (13 May 2021 21:49)  POCT Blood Glucose.: 140 mg/dL (13 May 2021 16:45)  POCT Blood Glucose.: 153 mg/dL (13 May 2021 12:04)    RADIOLOGY & ADDITIONAL TESTS: reviewed    Care Discussed with Consultants/Other Providers: yes - rehab Patient is a 50y old  Male who presents with a chief complaint of 03.4 GBS (13 May 2021 11:44)      Patient seen and examined at bedside. doing well. no acute medical complaints. denies headache, fever, chills, cp, sob, n/v, abd pain. ate sandwich, nuts, other snacks overnight.     ALLERGIES:  No Known Drug Allergies  shellfish (Urticaria)    MEDICATIONS  (STANDING):  amLODIPine   Tablet 10 milliGRAM(s) Oral daily  bisacodyl 5 milliGRAM(s) Oral at bedtime  clotrimazole 1% Cream 1 Application(s) Topical two times a day  dextrose 40% Gel 15 Gram(s) Oral once  dextrose 5%. 1000 milliLiter(s) (50 mL/Hr) IV Continuous <Continuous>  dextrose 5%. 1000 milliLiter(s) (100 mL/Hr) IV Continuous <Continuous>  dextrose 50% Injectable 25 Gram(s) IV Push once  dextrose 50% Injectable 12.5 Gram(s) IV Push once  dextrose 50% Injectable 25 Gram(s) IV Push once  enoxaparin Injectable 40 milliGRAM(s) SubCutaneous daily  famotidine    Tablet 20 milliGRAM(s) Oral two times a day  gabapentin 300 milliGRAM(s) Oral every 8 hours  glucagon  Injectable 1 milliGRAM(s) IntraMuscular once  hydrALAZINE 10 milliGRAM(s) Oral three times a day  insulin glargine Injectable (LANTUS) 54 Unit(s) SubCutaneous at bedtime  insulin lispro (ADMELOG) corrective regimen sliding scale   SubCutaneous three times a day before meals  insulin lispro (ADMELOG) corrective regimen sliding scale   SubCutaneous at bedtime  insulin lispro Injectable (ADMELOG) 5 Unit(s) SubCutaneous <User Schedule>  insulin lispro Injectable (ADMELOG) 10 Unit(s) SubCutaneous with breakfast  insulin lispro Injectable (ADMELOG) 10 Unit(s) SubCutaneous with lunch  insulin lispro Injectable (ADMELOG) 10 Unit(s) SubCutaneous with dinner  losartan 100 milliGRAM(s) Oral daily  metoprolol tartrate 12.5 milliGRAM(s) Oral two times a day  senna 2 Tablet(s) Oral two times a day    MEDICATIONS  (PRN):  acetaminophen   Tablet .. 650 milliGRAM(s) Oral every 6 hours PRN Mild Pain (1 - 3)  HYDROmorphone   Tablet 2 milliGRAM(s) Oral every 6 hours PRN Severe Pain (7 - 10)  oxyCODONE    IR 5 milliGRAM(s) Oral every 6 hours PRN Moderate Pain (4 - 6)    Vital Signs Last 24 Hrs  T(F): 97.4 (14 May 2021 08:00), Max: 97.6 (13 May 2021 19:59)  HR: 83 (14 May 2021 08:00) (83 - 99)  BP: 122/73 (14 May 2021 08:00) (112/70 - 133/84)  RR: 16 (14 May 2021 08:00) (15 - 16)  SpO2: 96% (14 May 2021 08:00) (96% - 97%)  I&O's Summary      PHYSICAL EXAM:  General: NAD, A/O x3  ENT: MMM  Neck: Supple, No JVD  Lungs: Respirations unlabored. Clear to auscultation bilaterally  Cardio: RRR, S1/S2, No murmurs  Abdomen: Soft, Nontender, Nondistended; Bowel sounds present  Extremities: No calf tenderness, No pitting edema  Neuro: right UE and LE 4/5, L UE and LLE 4+/5     LABS:                        9.7    5.64  )-----------( 294      ( 13 May 2021 05:30 )             28.8       05-13    142  |  105  |  16  ----------------------------<  177  3.7   |  29  |  1.11    Ca    8.9      13 May 2021 05:30    TPro  6.5  /  Alb  3.4  /  TBili  0.2  /  DBili  x   /  AST  19  /  ALT  39  /  AlkPhos  104  05-13     eGFR if Non African American: 76 mL/min/1.73M2 (05-13-21 @ 05:30)  eGFR if : 88 mL/min/1.73M2 (05-13-21 @ 05:30)    POCT Blood Glucose.: 215 mg/dL (14 May 2021 07:28)  POCT Blood Glucose.: 209 mg/dL (13 May 2021 21:49)  POCT Blood Glucose.: 140 mg/dL (13 May 2021 16:45)  POCT Blood Glucose.: 153 mg/dL (13 May 2021 12:04)    RADIOLOGY & ADDITIONAL TESTS: reviewed    Care Discussed with Consultants/Other Providers: yes - rehab

## 2021-05-14 NOTE — PROGRESS NOTE ADULT - SUBJECTIVE AND OBJECTIVE BOX
HPI:  This is a 51 y/o male, PMH DM, HTN, hyperlipidemia; pt presented to the ED at [facility] on 4/10 with c/o abdominal cramping, generalized, and diarrhea following use of laxatives for constipation.  In the ED, WBC 10.82, Hb 13.6, CMP: sodium 126, chloride 89, glucose 281, CMP otherwise unremarkable.  VBG: Lactate 2.6 (later downtrended to 1.6).  Pt. was treated with IV hydration and transferred to CDU for continued care plan:  IV hydration, supportive care, AM labs, general observation care / monitoring. Patient continued to have generalized weakness, numbness and persistent hyponatremia. Patient admitted for further evaluation.  Neurology consulted. 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 Patient refused IVIG but underwent PLEX.   Nephrology consulted for ALLIE and hyponatremia. Likely due to hypovolemia in setting of hyperglycemia. Work up suggested SIADH. Recs to optimize DM control, continue free water restriction <1.2L/day, and monitor Na levels (Na level should not increase more than 6meq in 24 hrs). Also, noted to have proteinuia/hematuria. Renal felt sec to Diabetic Nephropathy but patient will need full vasculitis work up and based on work up result, he might need kidney biopsy which would be scheduled as an outpatient.   Endocrinology also following for uncontrolled diabetes. Insulin regimen adjusted with improvement in glucoses.   ID consulted due to h/o syphilis in the past. Patient had undergone treatment in the past. No need for further treatment.   Last night (5/3), patient reported increased left scapula pain and given IV Dilaudid. Patient attributed to increased use of UEs in PT. Pain management NP consulted. Recommended switch to PO Dilaudid.   Patient has been seen by PT, OT and PM&R attending with recommendations for acute IPR.   Patient is deemed medically stable for transfer to acute IPR at [facility] on 5/4 and arrived 5/5. (05 May 2021 13:33)      INTERVAL HPI/OVERNIGHT EVENTS:  Chart Reviewed and patient seen at bedside and in PT gym- trial of gait training on LORRAINE   denies any new issues overnight  Slept well.   No new pain   Blood sugars elevated. - Patient states that he took snacks last evening     ROS:  Denies fever, chest pain, shortness of breath, abdominal pain,  nausea/vomiting    Vital Signs Last 24 Hrs  T(C): 36.3 (14 May 2021 08:00), Max: 36.4 (13 May 2021 19:59)  T(F): 97.4 (14 May 2021 08:00), Max: 97.6 (13 May 2021 19:59)  HR: 83 (14 May 2021 08:00) (83 - 99)  BP: 122/73 (14 May 2021 08:00) (112/70 - 133/84)  BP(mean): --  RR: 16 (14 May 2021 08:00) (15 - 16)  SpO2: 96% (14 May 2021 08:00) (96% - 97%)      Physical Exam:  Gen - NAD, Comfortable  Pulm - CTAB,   Cardiovascular - RRR, S1S2,   Abdomen - Soft, NT/ND, +BS  Extremities - No C/C/E, No calf tenderness     Motor -                       LEFT    UE - ShAB 5/5, EF 5/5, EE 5/5, WE 5/5,  5/5                    RIGHT UE - ShAB 5/5, EF 5/5, EE 5/5, WE 5/5,  5/5                    LEFT    LE - HF 5/5, KE 4/5, DF 5/5, PF 5/5                    RIGHT LE - HF 5/5, KE 4/5, DF 5/5, PF 5/5     Sensory - impaired distal fingertips, toes- improving per patient (pt also reports baseline diabetic neuropathy)  Psychiatric - Mood stable, Affect WNL      MEDICATIONS  (STANDING):  amLODIPine   Tablet 10 milliGRAM(s) Oral daily  bisacodyl 5 milliGRAM(s) Oral at bedtime  clotrimazole 1% Cream 1 Application(s) Topical two times a day  dextrose 40% Gel 15 Gram(s) Oral once  dextrose 5%. 1000 milliLiter(s) (50 mL/Hr) IV Continuous <Continuous>  dextrose 5%. 1000 milliLiter(s) (100 mL/Hr) IV Continuous <Continuous>  dextrose 50% Injectable 25 Gram(s) IV Push once  dextrose 50% Injectable 12.5 Gram(s) IV Push once  dextrose 50% Injectable 25 Gram(s) IV Push once  enoxaparin Injectable 40 milliGRAM(s) SubCutaneous daily  famotidine    Tablet 20 milliGRAM(s) Oral two times a day  gabapentin 300 milliGRAM(s) Oral every 8 hours  glucagon  Injectable 1 milliGRAM(s) IntraMuscular once  hydrALAZINE 10 milliGRAM(s) Oral three times a day  insulin glargine Injectable (LANTUS) 58 Unit(s) SubCutaneous at bedtime  insulin lispro (ADMELOG) corrective regimen sliding scale   SubCutaneous three times a day before meals  insulin lispro (ADMELOG) corrective regimen sliding scale   SubCutaneous at bedtime  insulin lispro Injectable (ADMELOG) 5 Unit(s) SubCutaneous <User Schedule>  insulin lispro Injectable (ADMELOG) 10 Unit(s) SubCutaneous with breakfast  insulin lispro Injectable (ADMELOG) 10 Unit(s) SubCutaneous with lunch  insulin lispro Injectable (ADMELOG) 10 Unit(s) SubCutaneous with dinner  losartan 100 milliGRAM(s) Oral daily  metoprolol tartrate 12.5 milliGRAM(s) Oral two times a day  senna 2 Tablet(s) Oral two times a day    MEDICATIONS  (PRN):  acetaminophen   Tablet .. 650 milliGRAM(s) Oral every 6 hours PRN Mild Pain (1 - 3)  HYDROmorphone   Tablet 2 milliGRAM(s) Oral every 6 hours PRN Severe Pain (7 - 10)  oxyCODONE    IR 5 milliGRAM(s) Oral every 6 hours PRN Moderate Pain (4 - 6)                        9.7    5.64  )-----------( 294      ( 13 May 2021 05:30 )             28.8     05-13    142  |  105  |  16  ----------------------------<  177<H>  3.7   |  29  |  1.11    Ca    8.9      13 May 2021 05:30    TPro  6.5  /  Alb  3.4  /  TBili  0.2  /  DBili  x   /  AST  19  /  ALT  39  /  AlkPhos  104  05-13      CAPILLARY BLOOD GLUCOSE      POCT Blood Glucose.: 134 mg/dL (14 May 2021 12:04)  POCT Blood Glucose.: 215 mg/dL (14 May 2021 07:28)  POCT Blood Glucose.: 209 mg/dL (13 May 2021 21:49)  POCT Blood Glucose.: 140 mg/dL (13 May 2021 16:45)

## 2021-05-14 NOTE — PROGRESS NOTE ADULT - ASSESSMENT
49 y/o M T2DM, HTN, HLD who presented to [facility]  on 4/10 with complaint of abdominal pain, diarrhea from laxative use and weakness and 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 Hospital course complicated by uncontrolled diabetes, hyponatremia, ALLIE. Now admitted to [facility] after for initiation of a multidisciplinary rehab program consisting focused on functional mobility, transfers and ADLs (activities of daily living).    GBS - s/p PLEX  -Continue comprehensive rehab program - PT/OT/SLP per rehab team  -Pain management, bowel regimen per rehab  -Numbness and weakness improving  -Gabapentin 300mg q8  -Follow up with neuro after dc (f/u remaining LP results)    Hyponatremia/ALLIE - resolved  -Followed by nephro at [facility]  -Likely multifactorial including hyperglycemia (pseudohyponatremia) and pre renal 2/2 to diarrhea (? ADH induced by pain)   -Off IVF  -Free water restriction <1.2L/day    T2DM with neuropathy: uncontrolled HbA1C 11.5%  -At home Pt was on Tresiba 30 units qhs and Novolog 15 units premeal TID.  -Continue admelog 10un TID AC  -Lantus 54un qhs - increase to 58un   -Admelog 5 units at bedtime only if taking a snack  -Discharge Recc - discharge pt on home insulin of Tresiba equal to Lantus dose needed to control BG in hospital- pt has Tresiba and pen needles and will not need script   Mealtime insulin - discharge on Novolog AC- according to admelog doses - pt has Novolog and pen needles and will not need script FS AC and HS- pt has meter and supplies- will not need script  -Pt instructed to schedule appt with [facility] Endocrinology, Dr. David Mcdaniel, per pt preference, for 2-4 weeks after discharge  -Concerned about medication access post discharge as A1c uncontrolled due to inability to obtain insulin 2/2 insurance and pandemic - recommend discussion with pharmacy and PCP regarding med access     Anemia likely anemia of chronic disease  -H/H stable    RPR Positive  -ID consulted at [facility]- treated 2017 (PCN)  -RPR titer 1:1 - no s/s of active syphilis  -Per ID, no need for treatment at present time     HTN - BP acceptable  -Cont norvasc, losartan, metoprolol tartrate   -Hydralazine decreased to 10mg q8    Hematuria / proteinuria   -hematuria resolved  -labs and urine ordered for vasculitis workup  -Per nephrology, will need kidney biopsy likely as outpt     DVT ppx - lovenox, SCDs   51 y/o M T2DM, HTN, HLD who presented to [facility]  on 4/10 with complaint of abdominal pain, diarrhea from laxative use and weakness and 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 Hospital course complicated by uncontrolled diabetes, hyponatremia, ALLIE. Now admitted to [facility] after for initiation of a multidisciplinary rehab program consisting focused on functional mobility, transfers and ADLs (activities of daily living).    GBS - s/p PLEX  -Continue comprehensive rehab program - PT/OT/SLP per rehab team  -Pain management, bowel regimen per rehab  -Numbness and weakness improving  -Gabapentin 300mg q8  -Follow up with neuro after dc (f/u remaining LP results)    Hyponatremia/ALLIE - resolved  -Followed by nephro at [facility]  -Likely multifactorial including hyperglycemia (pseudohyponatremia) and pre renal 2/2 to diarrhea (? ADH induced by pain)   -Off IVF  -Free water restriction <1.2L/day    T2DM with neuropathy: uncontrolled HbA1C 11.5%  -At home Pt was on Tresiba 30 units qhs and Novolog 15 units premeal TID  -Continue admelog 10un TID AC  -Lantus 54un qhs - increase to 58un, consider splitting dose if uncontrolled  -Admelog 5 units at bedtime only if taking a snack  -Discharge Recc - discharge pt on home insulin of Tresiba equal to Lantus dose needed to control BG in hospital- pt has Tresiba and pen needles and will not need script   Mealtime insulin - discharge on Novolog AC- according to admelog doses - pt has Novolog and pen needles and will not need script FS AC and HS- pt has meter and supplies- will not need script  -Pt instructed to schedule appt with [facility], Dr. David Mcdaniel, per pt preference, for 2-4 weeks after discharge  -Concerned about medication access post discharge as A1c uncontrolled due to inability to obtain insulin 2/2 insurance and pandemic - recommend discussion with pharmacy and PCP regarding med access     Anemia likely anemia of chronic disease  -H/H stable    RPR Positive  -ID consulted at [facility]- treated 2017 (PCN)  -RPR titer 1:1 - no s/s of active syphilis  -Per ID, no need for treatment at present time     HTN - BP acceptable  -Cont norvasc, losartan, metoprolol tartrate   -Hydralazine decreased to 10mg q8    Hematuria / proteinuria   -hematuria resolved  -labs and urine ordered for vasculitis workup  -Per nephrology, will need kidney biopsy likely as outpt     DVT ppx - lovenox, SCDs

## 2021-05-14 NOTE — PROGRESS NOTE ADULT - SUBJECTIVE AND OBJECTIVE BOX
HPI:  This is a 49 y/o male, PMH DM, HTN, hyperlipidemia; pt presented to the ED at [facility] on 4/10 with c/o abdominal cramping, generalized, and diarrhea following use of laxatives for constipation.  In the ED, WBC 10.82, Hb 13.6, CMP: sodium 126, chloride 89, glucose 281, CMP otherwise unremarkable.  VBG: Lactate 2.6 (later downtrended to 1.6).  Pt. was treated with IV hydration and transferred to CDU for continued care plan:  IV hydration, supportive care, AM labs, general observation care / monitoring. Patient continued to have generalized weakness, numbness and persistent hyponatremia. Patient admitted for further evaluation.  Neurology consulted. 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 Patient refused IVIG but underwent PLEX.   Nephrology consulted for ALLIE and hyponatremia. Likely due to hypovolemia in setting of hyperglycemia. Work up suggested SIADH. Recs to optimize DM control, continue free water restriction <1.2L/day, and monitor Na levels (Na level should not increase more than 6meq in 24 hrs). Also, noted to have proteinuia/hematuria. Renal felt sec to Diabetic Nephropathy but patient will need full vasculitis work up and based on work up result, he might need kidney biopsy which would be scheduled as an outpatient.   Endocrinology also following for uncontrolled diabetes. Insulin regimen adjusted with improvement in glucoses.   ID consulted due to h/o syphilis in the past. Patient had undergone treatment in the past. No need for further treatment.   Last night (5/3), patient reported increased left scapula pain and given IV Dilaudid. Patient attributed to increased use of UEs in PT. Pain management NP consulted. Recommended switch to PO Dilaudid.   Patient has been seen by PT, OT and PM&R attending with recommendations for acute IPR.   Patient is deemed medically stable for transfer to acute IPR at [facility] on 5/4 and arrived 5/5. (05 May 2021 13:33)      INTERVAL HPI/OVERNIGHT EVENTS:  Chart Reviewed and patient seen at therapy.  Says he feels well. Had +BM 2x last night 2x this morning.      ROS:  Denies fevers, chills, chest pain, shortness of breath, abdominal pain, headaches, nausea/vomiting    Vital Signs Last 24 Hrs  T(C): 36.3 (14 May 2021 08:00), Max: 36.4 (13 May 2021 19:59)  T(F): 97.4 (14 May 2021 08:00), Max: 97.6 (13 May 2021 19:59)  HR: 83 (14 May 2021 08:00) (83 - 99)  BP: 122/73 (14 May 2021 08:00) (112/70 - 133/84)  BP(mean): --  RR: 16 (14 May 2021 08:00) (15 - 16)  SpO2: 96% (14 May 2021 08:00) (96% - 97%)    Physical Exam:  Gen - NAD, Comfortable  Pulm - CTAB,   Cardiovascular - RRR, S1S2,   Abdomen - Soft, NT/ND, +BS  Extremities - No C/C/E, No calf tenderness  Neuro-     Cognitive - Awake, Alert, AAO to self, place, date, year, situation     Communication - Fluent, No dysarthria     Motor -                       LEFT    UE - ShAB 5/5, EF 5/5, EE 5/5, WE 5/5,  5/5                    RIGHT UE - ShAB 5/5, EF 5/5, EE 5/5, WE 5/5,  5/5                    LEFT    LE - HF 5/5, KE 4/5, DF 5/5, PF 5/5                    RIGHT LE - HF 5/5, KE 4/5, DF 5/5, PF 5/5     Sensory - impaired distal fingertips, toes- improving per patient (pt also reports baseline diabetic neuropathy)  Psychiatric - Mood stable, Affect WNL    Functional: Eating Independent Grooming Mod I. Bathing Min A, Shower transfer Mod A, Toileting Min A. Mod A transfers, Ambulation Min-Mod A 50ft w/ WC follow        LABS:  05-13    142  |  105  |  16  ----------------------------<  177<H>  3.7   |  29  |  1.11    Ca    8.9      13 May 2021 05:30    TPro  6.5  /  Alb  3.4  /  TBili  0.2  /  DBili  x   /  AST  19  /  ALT  39  /  AlkPhos  104  05-13                                              9.7    5.64  )-----------( 294      ( 13 May 2021 05:30 )             28.8     CAPILLARY BLOOD GLUCOSE      POCT Blood Glucose.: 134 mg/dL (14 May 2021 12:04)  POCT Blood Glucose.: 215 mg/dL (14 May 2021 07:28)  POCT Blood Glucose.: 209 mg/dL (13 May 2021 21:49)  POCT Blood Glucose.: 140 mg/dL (13 May 2021 16:45)      MEDICATIONS:  MEDICATIONS  (STANDING):  amLODIPine   Tablet 10 milliGRAM(s) Oral daily  bisacodyl 5 milliGRAM(s) Oral at bedtime  clotrimazole 1% Cream 1 Application(s) Topical two times a day  dextrose 40% Gel 15 Gram(s) Oral once  dextrose 5%. 1000 milliLiter(s) (50 mL/Hr) IV Continuous <Continuous>  dextrose 5%. 1000 milliLiter(s) (100 mL/Hr) IV Continuous <Continuous>  dextrose 50% Injectable 25 Gram(s) IV Push once  dextrose 50% Injectable 12.5 Gram(s) IV Push once  dextrose 50% Injectable 25 Gram(s) IV Push once  enoxaparin Injectable 40 milliGRAM(s) SubCutaneous daily  famotidine    Tablet 20 milliGRAM(s) Oral two times a day  gabapentin 300 milliGRAM(s) Oral every 8 hours  glucagon  Injectable 1 milliGRAM(s) IntraMuscular once  hydrALAZINE 10 milliGRAM(s) Oral three times a day  insulin glargine Injectable (LANTUS) 58 Unit(s) SubCutaneous at bedtime  insulin lispro (ADMELOG) corrective regimen sliding scale   SubCutaneous three times a day before meals  insulin lispro (ADMELOG) corrective regimen sliding scale   SubCutaneous at bedtime  insulin lispro Injectable (ADMELOG) 5 Unit(s) SubCutaneous <User Schedule>  insulin lispro Injectable (ADMELOG) 10 Unit(s) SubCutaneous with breakfast  insulin lispro Injectable (ADMELOG) 10 Unit(s) SubCutaneous with lunch  insulin lispro Injectable (ADMELOG) 10 Unit(s) SubCutaneous with dinner  losartan 100 milliGRAM(s) Oral daily  metoprolol tartrate 12.5 milliGRAM(s) Oral two times a day  senna 2 Tablet(s) Oral two times a day    MEDICATIONS  (PRN):  acetaminophen   Tablet .. 650 milliGRAM(s) Oral every 6 hours PRN Mild Pain (1 - 3)  HYDROmorphone   Tablet 2 milliGRAM(s) Oral every 6 hours PRN Severe Pain (7 - 10)  oxyCODONE    IR 5 milliGRAM(s) Oral every 6 hours PRN Moderate Pain (4 - 6)

## 2021-05-14 NOTE — PROGRESS NOTE ADULT - ASSESSMENT
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 Hospital course complicated by uncontrolled diabetes, hyponatremia, ALLIE, . Now admitted to [facility] after for initiation of a multidisciplinary rehab program consisting focused on functional mobility, transfers and ADLs (activities of daily living).    Comprehensive Multidisciplinary Rehab Program:  - Continue comprehensive rehab program, 3 hours a day, 5 days a week.  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    + RPR  -ID consulted at [facility]- treated 2017 (PCN)  -RPR titer 1:1 - no s/s of active syphilis  -Per ID, no need for treatment at present time     HTN : -C/w norvasc, losartan, hydralazine, metoprolol tartrate    Hematuria / proteinuria   - labs and urine ordered for vasculitis workup  -Per nephrology, will need kidney biopsy likely as outpt     Sleep: - Melatonin PRN     Pain Management:Tylenol PRN, Oxycodone, Dilaudid prn  - gabapentin 300mg tid     GI/Bowel:- Senna QHS, Miralax PRN Daily  - GI ppx: pepcid 20mg bid    /Bladder: toileting prn       Skin/Pressure Injury: OOB as tolerated     Diet: CC Glucerna BID, no shellfish    DVT ppx:- lovenox   - SCDs    Participation Restrictions/Precautions:  - Precautions:  [x] Falls      IDT 5/11:  - TDD 5/26. Progressing well in therapy  -- Goal: Mod I

## 2021-05-14 NOTE — PROGRESS NOTE ADULT - ASSESSMENT
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 Hospital course complicated by uncontrolled diabetes, hyponatremia, ALLIE, . Now admitted to [facility] after for initiation of a multidisciplinary rehab program consisting focused on functional mobility, transfers and ADLs (activities of daily living).    Comprehensive Multidisciplinary Rehab Program:  - Continue comprehensive rehab program, 3 hours a day, 5 days a week.  - PT 2hr/day: Focused on improving strength, endurance, coordination, balance, functional mobility, and transfers  - OT 1hr/day: Focused on improving strength, fine motor skills, coordination, posture and ADLs.       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    + RPR  -ID consulted at [facility]- treated 2017 (PCN)  -RPR titer 1:1 - no s/s of active syphilis  -Per ID, no need for treatment at present time     HTN : -C/w norvasc, losartan, hydralazine, metoprolol tartrate ( Hydralazine dose reduced to 10mg q8h     Hematuria / proteinuria   - labs and urine ordered for vasculitis workup  -Per nephrology, will need kidney biopsy likely as outpt     Sleep:   - Melatonin PRN     Pain Management:  Tylenol PRN, Oxycodone, Dilaudid prn  - gabapentin 300mg tid     GI/Bowel:- Senna QHS, Miralax PRN Daily  - GI ppx: pepcid 20mg bid    /Bladder: toileting prn       Skin/Pressure Injury: OOB as tolerated     Diet:   - Diet Consistency/Modifications: CC Glucerna BID, no shellfish    DVT ppx:- lovenox   - SCDs    Participation Restrictions/Precautions:  - Precautions:  [x] Falls      IDT 5/11:  - TDD 5/26. Progressing well in therapy  -- Goal: Mod I    Labs stable  Hospitalist rowena noted

## 2021-05-15 LAB
GLUCOSE BLDC GLUCOMTR-MCNC: 130 MG/DL — HIGH (ref 70–99)
GLUCOSE BLDC GLUCOMTR-MCNC: 154 MG/DL — HIGH (ref 70–99)
GLUCOSE BLDC GLUCOMTR-MCNC: 164 MG/DL — HIGH (ref 70–99)
GLUCOSE BLDC GLUCOMTR-MCNC: 184 MG/DL — HIGH (ref 70–99)

## 2021-05-15 PROCEDURE — 99232 SBSQ HOSP IP/OBS MODERATE 35: CPT

## 2021-05-15 RX ORDER — LIDOCAINE 4 G/100G
1 CREAM TOPICAL DAILY
Refills: 0 | Status: DISCONTINUED | OUTPATIENT
Start: 2021-05-15 | End: 2021-05-21

## 2021-05-15 RX ADMIN — Medication 10 MILLIGRAM(S): at 21:47

## 2021-05-15 RX ADMIN — Medication 12.5 MILLIGRAM(S): at 17:54

## 2021-05-15 RX ADMIN — Medication 2: at 12:27

## 2021-05-15 RX ADMIN — Medication 1 APPLICATION(S): at 06:02

## 2021-05-15 RX ADMIN — GABAPENTIN 300 MILLIGRAM(S): 400 CAPSULE ORAL at 21:47

## 2021-05-15 RX ADMIN — GABAPENTIN 300 MILLIGRAM(S): 400 CAPSULE ORAL at 14:00

## 2021-05-15 RX ADMIN — FAMOTIDINE 20 MILLIGRAM(S): 10 INJECTION INTRAVENOUS at 06:02

## 2021-05-15 RX ADMIN — GABAPENTIN 300 MILLIGRAM(S): 400 CAPSULE ORAL at 06:02

## 2021-05-15 RX ADMIN — LOSARTAN POTASSIUM 100 MILLIGRAM(S): 100 TABLET, FILM COATED ORAL at 06:02

## 2021-05-15 RX ADMIN — Medication 10 UNIT(S): at 08:29

## 2021-05-15 RX ADMIN — Medication 12.5 MILLIGRAM(S): at 06:03

## 2021-05-15 RX ADMIN — HYDROMORPHONE HYDROCHLORIDE 2 MILLIGRAM(S): 2 INJECTION INTRAMUSCULAR; INTRAVENOUS; SUBCUTANEOUS at 16:00

## 2021-05-15 RX ADMIN — OXYCODONE HYDROCHLORIDE 5 MILLIGRAM(S): 5 TABLET ORAL at 11:03

## 2021-05-15 RX ADMIN — Medication 5 MILLIGRAM(S): at 21:46

## 2021-05-15 RX ADMIN — LIDOCAINE 1 PATCH: 4 CREAM TOPICAL at 21:47

## 2021-05-15 RX ADMIN — FAMOTIDINE 20 MILLIGRAM(S): 10 INJECTION INTRAVENOUS at 17:55

## 2021-05-15 RX ADMIN — ENOXAPARIN SODIUM 40 MILLIGRAM(S): 100 INJECTION SUBCUTANEOUS at 12:26

## 2021-05-15 RX ADMIN — HYDROMORPHONE HYDROCHLORIDE 2 MILLIGRAM(S): 2 INJECTION INTRAMUSCULAR; INTRAVENOUS; SUBCUTANEOUS at 14:00

## 2021-05-15 RX ADMIN — Medication 2: at 08:29

## 2021-05-15 RX ADMIN — AMLODIPINE BESYLATE 10 MILLIGRAM(S): 2.5 TABLET ORAL at 06:02

## 2021-05-15 RX ADMIN — SENNA PLUS 2 TABLET(S): 8.6 TABLET ORAL at 06:02

## 2021-05-15 RX ADMIN — Medication 10 MILLIGRAM(S): at 06:02

## 2021-05-15 RX ADMIN — INSULIN GLARGINE 58 UNIT(S): 100 INJECTION, SOLUTION SUBCUTANEOUS at 21:58

## 2021-05-15 RX ADMIN — OXYCODONE HYDROCHLORIDE 5 MILLIGRAM(S): 5 TABLET ORAL at 11:30

## 2021-05-15 RX ADMIN — Medication 10 UNIT(S): at 17:55

## 2021-05-15 RX ADMIN — Medication 10 UNIT(S): at 12:26

## 2021-05-15 RX ADMIN — SENNA PLUS 2 TABLET(S): 8.6 TABLET ORAL at 17:54

## 2021-05-15 NOTE — PROGRESS NOTE ADULT - SUBJECTIVE AND OBJECTIVE BOX
Cc: GBS    HPI: Patient with no new medical issues today.  Pain controlled, no chest pain, no N/V, no Fevers/Chills. No other new ROS  Has been tolerating rehabilitation program.    acetaminophen   Tablet .. 650 milliGRAM(s) Oral every 6 hours PRN  amLODIPine   Tablet 10 milliGRAM(s) Oral daily  bisacodyl 5 milliGRAM(s) Oral at bedtime  clotrimazole 1% Cream 1 Application(s) Topical two times a day  dextrose 40% Gel 15 Gram(s) Oral once  dextrose 5%. 1000 milliLiter(s) IV Continuous <Continuous>  dextrose 5%. 1000 milliLiter(s) IV Continuous <Continuous>  dextrose 50% Injectable 25 Gram(s) IV Push once  dextrose 50% Injectable 12.5 Gram(s) IV Push once  dextrose 50% Injectable 25 Gram(s) IV Push once  enoxaparin Injectable 40 milliGRAM(s) SubCutaneous daily  famotidine    Tablet 20 milliGRAM(s) Oral two times a day  gabapentin 300 milliGRAM(s) Oral every 8 hours  glucagon  Injectable 1 milliGRAM(s) IntraMuscular once  hydrALAZINE 10 milliGRAM(s) Oral three times a day  HYDROmorphone   Tablet 2 milliGRAM(s) Oral every 6 hours PRN  insulin glargine Injectable (LANTUS) 58 Unit(s) SubCutaneous at bedtime  insulin lispro (ADMELOG) corrective regimen sliding scale   SubCutaneous three times a day before meals  insulin lispro (ADMELOG) corrective regimen sliding scale   SubCutaneous at bedtime  insulin lispro Injectable (ADMELOG) 5 Unit(s) SubCutaneous <User Schedule>  insulin lispro Injectable (ADMELOG) 10 Unit(s) SubCutaneous with breakfast  insulin lispro Injectable (ADMELOG) 10 Unit(s) SubCutaneous with lunch  insulin lispro Injectable (ADMELOG) 10 Unit(s) SubCutaneous with dinner  losartan 100 milliGRAM(s) Oral daily  metoprolol tartrate 12.5 milliGRAM(s) Oral two times a day  oxyCODONE    IR 5 milliGRAM(s) Oral every 6 hours PRN  senna 2 Tablet(s) Oral two times a day    T(C): 36.6 (05-14-21 @ 22:00), Max: 36.6 (05-14-21 @ 22:00)  HR: 79 (05-15-21 @ 06:01) (79 - 85)  BP: 153/67 (05-15-21 @ 06:01) (137/81 - 153/67)  RR: 15 (05-14-21 @ 22:00) (15 - 15)  SpO2: 97% (05-14-21 @ 22:00) (97% - 97%)    In NAD  HEENT- EOMI  Heart- Well Perfused  Lungs- No resp distress, no use of accessory resp muscles  Abd- + BS, NT  Ext- No calf pain  Neuro- Exam unchanged  Psych- Affect wnl    Imp: Patient with diagnosis of GBS admitted for comprehensive acute rehabilitation.    Plan:  - Continue therapies  - DVT prophylaxis - Lovenox  - Skin- Turn q2h, check skin daily  - Continue current medications; patient medically stable.   - Patient is stable to continue current rehabilitation program.

## 2021-05-16 LAB
GLUCOSE BLDC GLUCOMTR-MCNC: 141 MG/DL — HIGH (ref 70–99)
GLUCOSE BLDC GLUCOMTR-MCNC: 151 MG/DL — HIGH (ref 70–99)
GLUCOSE BLDC GLUCOMTR-MCNC: 213 MG/DL — HIGH (ref 70–99)
GLUCOSE BLDC GLUCOMTR-MCNC: 244 MG/DL — HIGH (ref 70–99)

## 2021-05-16 PROCEDURE — 99232 SBSQ HOSP IP/OBS MODERATE 35: CPT

## 2021-05-16 RX ORDER — CYCLOBENZAPRINE HYDROCHLORIDE 10 MG/1
5 TABLET, FILM COATED ORAL DAILY
Refills: 0 | Status: DISCONTINUED | OUTPATIENT
Start: 2021-05-16 | End: 2021-05-17

## 2021-05-16 RX ORDER — LIDOCAINE 4 G/100G
1 CREAM TOPICAL EVERY 24 HOURS
Refills: 0 | Status: DISCONTINUED | OUTPATIENT
Start: 2021-05-16 | End: 2021-05-21

## 2021-05-16 RX ADMIN — Medication 10 MILLIGRAM(S): at 13:26

## 2021-05-16 RX ADMIN — Medication 4: at 07:44

## 2021-05-16 RX ADMIN — Medication 10 UNIT(S): at 12:10

## 2021-05-16 RX ADMIN — GABAPENTIN 300 MILLIGRAM(S): 400 CAPSULE ORAL at 13:26

## 2021-05-16 RX ADMIN — LIDOCAINE 1 PATCH: 4 CREAM TOPICAL at 08:43

## 2021-05-16 RX ADMIN — OXYCODONE HYDROCHLORIDE 5 MILLIGRAM(S): 5 TABLET ORAL at 11:00

## 2021-05-16 RX ADMIN — LIDOCAINE 1 PATCH: 4 CREAM TOPICAL at 21:28

## 2021-05-16 RX ADMIN — SENNA PLUS 2 TABLET(S): 8.6 TABLET ORAL at 06:14

## 2021-05-16 RX ADMIN — ENOXAPARIN SODIUM 40 MILLIGRAM(S): 100 INJECTION SUBCUTANEOUS at 12:11

## 2021-05-16 RX ADMIN — Medication 4: at 17:23

## 2021-05-16 RX ADMIN — OXYCODONE HYDROCHLORIDE 5 MILLIGRAM(S): 5 TABLET ORAL at 10:49

## 2021-05-16 RX ADMIN — OXYCODONE HYDROCHLORIDE 5 MILLIGRAM(S): 5 TABLET ORAL at 18:32

## 2021-05-16 RX ADMIN — FAMOTIDINE 20 MILLIGRAM(S): 10 INJECTION INTRAVENOUS at 06:14

## 2021-05-16 RX ADMIN — Medication 10 UNIT(S): at 07:44

## 2021-05-16 RX ADMIN — LIDOCAINE 1 PATCH: 4 CREAM TOPICAL at 12:11

## 2021-05-16 RX ADMIN — Medication 12.5 MILLIGRAM(S): at 17:22

## 2021-05-16 RX ADMIN — Medication 12.5 MILLIGRAM(S): at 06:15

## 2021-05-16 RX ADMIN — FAMOTIDINE 20 MILLIGRAM(S): 10 INJECTION INTRAVENOUS at 17:22

## 2021-05-16 RX ADMIN — Medication 5 UNIT(S): at 21:41

## 2021-05-16 RX ADMIN — Medication 10 MILLIGRAM(S): at 21:40

## 2021-05-16 RX ADMIN — LIDOCAINE 1 PATCH: 4 CREAM TOPICAL at 07:43

## 2021-05-16 RX ADMIN — OXYCODONE HYDROCHLORIDE 5 MILLIGRAM(S): 5 TABLET ORAL at 19:17

## 2021-05-16 RX ADMIN — SENNA PLUS 2 TABLET(S): 8.6 TABLET ORAL at 17:22

## 2021-05-16 RX ADMIN — GABAPENTIN 300 MILLIGRAM(S): 400 CAPSULE ORAL at 06:14

## 2021-05-16 RX ADMIN — INSULIN GLARGINE 58 UNIT(S): 100 INJECTION, SOLUTION SUBCUTANEOUS at 21:40

## 2021-05-16 RX ADMIN — HYDROMORPHONE HYDROCHLORIDE 2 MILLIGRAM(S): 2 INJECTION INTRAMUSCULAR; INTRAVENOUS; SUBCUTANEOUS at 13:26

## 2021-05-16 RX ADMIN — LIDOCAINE 1 PATCH: 4 CREAM TOPICAL at 13:27

## 2021-05-16 RX ADMIN — LOSARTAN POTASSIUM 100 MILLIGRAM(S): 100 TABLET, FILM COATED ORAL at 08:17

## 2021-05-16 RX ADMIN — GABAPENTIN 300 MILLIGRAM(S): 400 CAPSULE ORAL at 21:39

## 2021-05-16 RX ADMIN — HYDROMORPHONE HYDROCHLORIDE 2 MILLIGRAM(S): 2 INJECTION INTRAMUSCULAR; INTRAVENOUS; SUBCUTANEOUS at 14:00

## 2021-05-16 RX ADMIN — AMLODIPINE BESYLATE 10 MILLIGRAM(S): 2.5 TABLET ORAL at 08:17

## 2021-05-16 RX ADMIN — Medication 10 UNIT(S): at 17:23

## 2021-05-16 NOTE — PROGRESS NOTE ADULT - SUBJECTIVE AND OBJECTIVE BOX
HPI:  This is a 51 y/o male, PMH DM, HTN, hyperlipidemia; pt presented to the ED at [facility] on 4/10 with c/o abdominal cramping, generalized, and diarrhea following use of laxatives for constipation.  In the ED, WBC 10.82, Hb 13.6, CMP: sodium 126, chloride 89, glucose 281, CMP otherwise unremarkable.  VBG: Lactate 2.6 (later downtrended to 1.6).  Pt. was treated with IV hydration and transferred to CDU for continued care plan:  IV hydration, supportive care, AM labs, general observation care / monitoring. Patient continued to have generalized weakness, numbness and persistent hyponatremia. Patient admitted for further evaluation.  Neurology consulted. 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 Patient refused IVIG but underwent PLEX.   Nephrology consulted for ALLIE and hyponatremia. Likely due to hypovolemia in setting of hyperglycemia. Work up suggested SIADH. Recs to optimize DM control, continue free water restriction <1.2L/day, and monitor Na levels (Na level should not increase more than 6meq in 24 hrs). Also, noted to have proteinuia/hematuria. Renal felt sec to Diabetic Nephropathy but patient will need full vasculitis work up and based on work up result, he might need kidney biopsy which would be scheduled as an outpatient.   Endocrinology also following for uncontrolled diabetes. Insulin regimen adjusted with improvement in glucoses.   ID consulted due to h/o syphilis in the past. Patient had undergone treatment in the past. No need for further treatment.   Last night (5/3), patient reported increased left scapula pain and given IV Dilaudid. Patient attributed to increased use of UEs in PT. Pain management NP consulted. Recommended switch to PO Dilaudid.   Patient has been seen by PT, OT and PM&R attending with recommendations for acute IPR.   Patient is deemed medically stable for transfer to acute IPR at [facility] on 5/4 and arrived 5/5. (05 May 2021 13:33)      Subjective    Wants something else other than opioids fro pain/tightness.       PAST MEDICAL & SURGICAL HISTORY:  DM (diabetes mellitus)    HTN (hypertension)    Hyperlipidemia    H/O total knee replacement    Foot fracture        MedsMEDICATIONS  (STANDING):  amLODIPine   Tablet 10 milliGRAM(s) Oral daily  bisacodyl 5 milliGRAM(s) Oral at bedtime  clotrimazole 1% Cream 1 Application(s) Topical two times a day  dextrose 40% Gel 15 Gram(s) Oral once  dextrose 5%. 1000 milliLiter(s) (100 mL/Hr) IV Continuous <Continuous>  dextrose 5%. 1000 milliLiter(s) (50 mL/Hr) IV Continuous <Continuous>  dextrose 50% Injectable 25 Gram(s) IV Push once  dextrose 50% Injectable 12.5 Gram(s) IV Push once  dextrose 50% Injectable 25 Gram(s) IV Push once  enoxaparin Injectable 40 milliGRAM(s) SubCutaneous daily  famotidine    Tablet 20 milliGRAM(s) Oral two times a day  gabapentin 300 milliGRAM(s) Oral every 8 hours  glucagon  Injectable 1 milliGRAM(s) IntraMuscular once  hydrALAZINE 10 milliGRAM(s) Oral three times a day  insulin glargine Injectable (LANTUS) 58 Unit(s) SubCutaneous at bedtime  insulin lispro (ADMELOG) corrective regimen sliding scale   SubCutaneous three times a day before meals  insulin lispro (ADMELOG) corrective regimen sliding scale   SubCutaneous at bedtime  insulin lispro Injectable (ADMELOG) 5 Unit(s) SubCutaneous <User Schedule>  insulin lispro Injectable (ADMELOG) 10 Unit(s) SubCutaneous with breakfast  insulin lispro Injectable (ADMELOG) 10 Unit(s) SubCutaneous with lunch  insulin lispro Injectable (ADMELOG) 10 Unit(s) SubCutaneous with dinner  lidocaine   Patch 1 Patch Transdermal daily  losartan 100 milliGRAM(s) Oral daily  metoprolol tartrate 12.5 milliGRAM(s) Oral two times a day  senna 2 Tablet(s) Oral two times a day    MEDICATIONS  (PRN):  acetaminophen   Tablet .. 650 milliGRAM(s) Oral every 6 hours PRN Mild Pain (1 - 3)  HYDROmorphone   Tablet 2 milliGRAM(s) Oral every 6 hours PRN Severe Pain (7 - 10)  oxyCODONE    IR 5 milliGRAM(s) Oral every 6 hours PRN Moderate Pain (4 - 6)      Vital Signs Last 24 Hrs  T(C): 36.7 (16 May 2021 08:13), Max: 36.9 (15 May 2021 20:28)  T(F): 98.1 (16 May 2021 08:13), Max: 98.4 (15 May 2021 20:28)  HR: 71 (16 May 2021 08:13) (71 - 106)  BP: 153/90 (16 May 2021 08:13) (123/73 - 153/90)  BP(mean): --  RR: 14 (16 May 2021 08:13) (14 - 15)  SpO2: 96% (16 May 2021 08:13) (96% - 98%)  I&O's Summary      PHYSICAL EXAM:  GENERAL: NAD  NECK: Supple  NERVOUS SYSTEM:  awake and alert  HEART: S1s2 NL , RRR  CHEST/LUNG: Clear to percussion bilaterally  ABDOMEN: Soft, Nontender, Nondistended; Bowel sounds present  EXTREMITIES:  No edema      LABS:              RVP:          Tox:           CAPILLARY BLOOD GLUCOSE      POCT Blood Glucose.: 141 mg/dL (16 May 2021 11:39)  POCT Blood Glucose.: 244 mg/dL (16 May 2021 07:30)  POCT Blood Glucose.: 154 mg/dL (15 May 2021 21:46)  POCT Blood Glucose.: 130 mg/dL (15 May 2021 17:19)      Imaging Personally Reviewed:  [ ] YES  [ ] NO        Care Discussed with Consultants/Other Providers [ x] YES  [ ] NO

## 2021-05-16 NOTE — PROGRESS NOTE ADULT - SUBJECTIVE AND OBJECTIVE BOX
Cc: GBS    HPI: Patient with no new medical issues today.  Pain controlled, no chest pain, no N/V, no Fevers/Chills. No other new ROS  Has been tolerating rehabilitation program.    MEDICATIONS  (STANDING):  amLODIPine   Tablet 10 milliGRAM(s) Oral daily  bisacodyl 5 milliGRAM(s) Oral at bedtime  clotrimazole 1% Cream 1 Application(s) Topical two times a day  dextrose 40% Gel 15 Gram(s) Oral once  dextrose 5%. 1000 milliLiter(s) (100 mL/Hr) IV Continuous <Continuous>  dextrose 5%. 1000 milliLiter(s) (50 mL/Hr) IV Continuous <Continuous>  dextrose 50% Injectable 25 Gram(s) IV Push once  dextrose 50% Injectable 12.5 Gram(s) IV Push once  dextrose 50% Injectable 25 Gram(s) IV Push once  enoxaparin Injectable 40 milliGRAM(s) SubCutaneous daily  famotidine    Tablet 20 milliGRAM(s) Oral two times a day  gabapentin 300 milliGRAM(s) Oral every 8 hours  glucagon  Injectable 1 milliGRAM(s) IntraMuscular once  hydrALAZINE 10 milliGRAM(s) Oral three times a day  insulin glargine Injectable (LANTUS) 58 Unit(s) SubCutaneous at bedtime  insulin lispro (ADMELOG) corrective regimen sliding scale   SubCutaneous three times a day before meals  insulin lispro (ADMELOG) corrective regimen sliding scale   SubCutaneous at bedtime  insulin lispro Injectable (ADMELOG) 5 Unit(s) SubCutaneous <User Schedule>  insulin lispro Injectable (ADMELOG) 10 Unit(s) SubCutaneous with breakfast  insulin lispro Injectable (ADMELOG) 10 Unit(s) SubCutaneous with lunch  insulin lispro Injectable (ADMELOG) 10 Unit(s) SubCutaneous with dinner  lidocaine   Patch 1 Patch Transdermal daily  losartan 100 milliGRAM(s) Oral daily  metoprolol tartrate 12.5 milliGRAM(s) Oral two times a day  senna 2 Tablet(s) Oral two times a day    MEDICATIONS  (PRN):  acetaminophen   Tablet .. 650 milliGRAM(s) Oral every 6 hours PRN Mild Pain (1 - 3)  HYDROmorphone   Tablet 2 milliGRAM(s) Oral every 6 hours PRN Severe Pain (7 - 10)  oxyCODONE    IR 5 milliGRAM(s) Oral every 6 hours PRN Moderate Pain (4 - 6)    Vital Signs Last 24 Hrs  T(C): 36.7 (16 May 2021 08:13), Max: 36.9 (15 May 2021 20:28)  T(F): 98.1 (16 May 2021 08:13), Max: 98.4 (15 May 2021 20:28)  HR: 71 (16 May 2021 08:13) (71 - 106)  BP: 153/90 (16 May 2021 08:13) (123/73 - 153/90)  BP(mean): --  RR: 14 (16 May 2021 08:13) (14 - 15)  SpO2: 96% (16 May 2021 08:13) (96% - 98%)    In NAD  HEENT- EOMI  Heart- Well Perfused  Lungs- No resp distress, no use of accessory resp muscles  Abd- + BS, NT  Ext- No calf pain  Neuro- Exam unchanged  Psych- Affect wnl    Imp: Patient with diagnosis of GBS admitted for comprehensive acute rehabilitation.    Plan:  - Continue therapies  - DVT prophylaxis - Lovenox  - Skin- Turn q2h, check skin daily  - Continue current medications; patient medically stable.   - Patient is stable to continue current rehabilitation program.

## 2021-05-16 NOTE — PROGRESS NOTE ADULT - ASSESSMENT
GBS,  s/p PLEX  PT/OT per rehab  Pain control per rehab(d/w Resident re: pt's request to be something else other than opiods, also advised pt to inform Dr. Stern tomorrow re: his request)    Hyponatremia/ALLIE    hematuria/proteinura prior to rehab admission  Resolved  f/u with renal as outpt    DM2, A1c 11.5  Lantus/Lispro  f/u with endo as outpt    Anemia  h/h stable  monitor for now    HTN  Norvasc, Hydralazine, losartan, metoprolol

## 2021-05-17 LAB
ALBUMIN SERPL ELPH-MCNC: 3.4 G/DL — SIGNIFICANT CHANGE UP (ref 3.3–5)
ALP SERPL-CCNC: 89 U/L — SIGNIFICANT CHANGE UP (ref 40–120)
ALT FLD-CCNC: 38 U/L — SIGNIFICANT CHANGE UP (ref 10–45)
ANION GAP SERPL CALC-SCNC: 9 MMOL/L — SIGNIFICANT CHANGE UP (ref 5–17)
AST SERPL-CCNC: 19 U/L — SIGNIFICANT CHANGE UP (ref 10–40)
BILIRUB SERPL-MCNC: 0.1 MG/DL — LOW (ref 0.2–1.2)
BUN SERPL-MCNC: 14 MG/DL — SIGNIFICANT CHANGE UP (ref 7–23)
CALCIUM SERPL-MCNC: 9.2 MG/DL — SIGNIFICANT CHANGE UP (ref 8.4–10.5)
CHLORIDE SERPL-SCNC: 105 MMOL/L — SIGNIFICANT CHANGE UP (ref 96–108)
CO2 SERPL-SCNC: 27 MMOL/L — SIGNIFICANT CHANGE UP (ref 22–31)
CREAT SERPL-MCNC: 1.24 MG/DL — SIGNIFICANT CHANGE UP (ref 0.5–1.3)
GLUCOSE BLDC GLUCOMTR-MCNC: 108 MG/DL — HIGH (ref 70–99)
GLUCOSE BLDC GLUCOMTR-MCNC: 136 MG/DL — HIGH (ref 70–99)
GLUCOSE BLDC GLUCOMTR-MCNC: 137 MG/DL — HIGH (ref 70–99)
GLUCOSE BLDC GLUCOMTR-MCNC: 170 MG/DL — HIGH (ref 70–99)
GLUCOSE SERPL-MCNC: 151 MG/DL — HIGH (ref 70–99)
HCT VFR BLD CALC: 29.1 % — LOW (ref 39–50)
HGB BLD-MCNC: 9.9 G/DL — LOW (ref 13–17)
MCHC RBC-ENTMCNC: 29.9 PG — SIGNIFICANT CHANGE UP (ref 27–34)
MCHC RBC-ENTMCNC: 34 GM/DL — SIGNIFICANT CHANGE UP (ref 32–36)
MCV RBC AUTO: 87.9 FL — SIGNIFICANT CHANGE UP (ref 80–100)
NRBC # BLD: 0 /100 WBCS — SIGNIFICANT CHANGE UP (ref 0–0)
PLATELET # BLD AUTO: 260 K/UL — SIGNIFICANT CHANGE UP (ref 150–400)
POTASSIUM SERPL-MCNC: 4 MMOL/L — SIGNIFICANT CHANGE UP (ref 3.5–5.3)
POTASSIUM SERPL-SCNC: 4 MMOL/L — SIGNIFICANT CHANGE UP (ref 3.5–5.3)
PROT SERPL-MCNC: 6.5 G/DL — SIGNIFICANT CHANGE UP (ref 6–8.3)
RBC # BLD: 3.31 M/UL — LOW (ref 4.2–5.8)
RBC # FLD: 12.6 % — SIGNIFICANT CHANGE UP (ref 10.3–14.5)
SODIUM SERPL-SCNC: 141 MMOL/L — SIGNIFICANT CHANGE UP (ref 135–145)
WBC # BLD: 6.27 K/UL — SIGNIFICANT CHANGE UP (ref 3.8–10.5)
WBC # FLD AUTO: 6.27 K/UL — SIGNIFICANT CHANGE UP (ref 3.8–10.5)

## 2021-05-17 PROCEDURE — 99232 SBSQ HOSP IP/OBS MODERATE 35: CPT | Mod: GC

## 2021-05-17 RX ORDER — CYCLOBENZAPRINE HYDROCHLORIDE 10 MG/1
5 TABLET, FILM COATED ORAL AT BEDTIME
Refills: 0 | Status: DISCONTINUED | OUTPATIENT
Start: 2021-05-18 | End: 2021-05-21

## 2021-05-17 RX ORDER — SENNA PLUS 8.6 MG/1
2 TABLET ORAL AT BEDTIME
Refills: 0 | Status: DISCONTINUED | OUTPATIENT
Start: 2021-05-17 | End: 2021-05-21

## 2021-05-17 RX ORDER — CYCLOBENZAPRINE HYDROCHLORIDE 10 MG/1
5 TABLET, FILM COATED ORAL AT BEDTIME
Refills: 0 | Status: DISCONTINUED | OUTPATIENT
Start: 2021-05-17 | End: 2021-05-17

## 2021-05-17 RX ADMIN — Medication 2: at 08:11

## 2021-05-17 RX ADMIN — Medication 10 UNIT(S): at 08:11

## 2021-05-17 RX ADMIN — Medication 12.5 MILLIGRAM(S): at 17:28

## 2021-05-17 RX ADMIN — FAMOTIDINE 20 MILLIGRAM(S): 10 INJECTION INTRAVENOUS at 17:28

## 2021-05-17 RX ADMIN — LIDOCAINE 1 PATCH: 4 CREAM TOPICAL at 19:44

## 2021-05-17 RX ADMIN — LOSARTAN POTASSIUM 100 MILLIGRAM(S): 100 TABLET, FILM COATED ORAL at 06:08

## 2021-05-17 RX ADMIN — Medication 10 MILLIGRAM(S): at 14:21

## 2021-05-17 RX ADMIN — OXYCODONE HYDROCHLORIDE 5 MILLIGRAM(S): 5 TABLET ORAL at 08:12

## 2021-05-17 RX ADMIN — Medication 10 MILLIGRAM(S): at 06:08

## 2021-05-17 RX ADMIN — LIDOCAINE 1 PATCH: 4 CREAM TOPICAL at 21:52

## 2021-05-17 RX ADMIN — Medication 1 APPLICATION(S): at 06:09

## 2021-05-17 RX ADMIN — LIDOCAINE 1 PATCH: 4 CREAM TOPICAL at 01:27

## 2021-05-17 RX ADMIN — Medication 5 UNIT(S): at 21:51

## 2021-05-17 RX ADMIN — LIDOCAINE 1 PATCH: 4 CREAM TOPICAL at 08:39

## 2021-05-17 RX ADMIN — ENOXAPARIN SODIUM 40 MILLIGRAM(S): 100 INJECTION SUBCUTANEOUS at 12:18

## 2021-05-17 RX ADMIN — FAMOTIDINE 20 MILLIGRAM(S): 10 INJECTION INTRAVENOUS at 06:07

## 2021-05-17 RX ADMIN — GABAPENTIN 300 MILLIGRAM(S): 400 CAPSULE ORAL at 06:11

## 2021-05-17 RX ADMIN — AMLODIPINE BESYLATE 10 MILLIGRAM(S): 2.5 TABLET ORAL at 06:07

## 2021-05-17 RX ADMIN — CYCLOBENZAPRINE HYDROCHLORIDE 5 MILLIGRAM(S): 10 TABLET, FILM COATED ORAL at 12:24

## 2021-05-17 RX ADMIN — Medication 1 APPLICATION(S): at 21:48

## 2021-05-17 RX ADMIN — Medication 10 UNIT(S): at 12:18

## 2021-05-17 RX ADMIN — OXYCODONE HYDROCHLORIDE 5 MILLIGRAM(S): 5 TABLET ORAL at 09:00

## 2021-05-17 RX ADMIN — Medication 12.5 MILLIGRAM(S): at 06:09

## 2021-05-17 RX ADMIN — Medication 10 MILLIGRAM(S): at 21:48

## 2021-05-17 RX ADMIN — Medication 10 UNIT(S): at 17:28

## 2021-05-17 RX ADMIN — INSULIN GLARGINE 58 UNIT(S): 100 INJECTION, SOLUTION SUBCUTANEOUS at 21:50

## 2021-05-17 RX ADMIN — LIDOCAINE 1 PATCH: 4 CREAM TOPICAL at 00:11

## 2021-05-17 NOTE — PROGRESS NOTE ADULT - ASSESSMENT
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 Hospital course complicated by uncontrolled diabetes, hyponatremia, ALLIE, . Now admitted to [facility] after for initiation of a multidisciplinary rehab program consisting focused on functional mobility, transfers and ADLs (activities of daily living).    Comprehensive Multidisciplinary Rehab Program:  - Continue comprehensive rehab program, 3 hours a day, 5 days a week.  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    + RPR  -ID consulted at [facility]- treated 2017 (PCN)  -RPR titer 1:1 - no s/s of active syphilis  -Per ID, no need for treatment at present time     HTN : -C/w norvasc, losartan, hydralazine, metoprolol tartrate    Hematuria / proteinuria   - labs and urine ordered for vasculitis workup  -Per nephrology, will need kidney biopsy likely as outpt     Sleep: - Melatonin PRN     Pain Management:Tylenol PRN, Oxycodone, Dilaudid prn  - gabapentin 300mg tid   - flexeril for muscle spasms    GI/Bowel:- Senna QHS  - GI ppx: pepcid 20mg bid    /Bladder: toileting prn       Skin/Pressure Injury: OOB as tolerated     Diet: CC Glucerna BID, no shellfish    DVT ppx:- lovenox   - SCDs    Participation Restrictions/Precautions:  - Precautions:  [x] Falls      IDT 5/11:  - TDD 5/26. Progressing well in therapy  -- Goal: Mod I

## 2021-05-17 NOTE — PROGRESS NOTE ADULT - SUBJECTIVE AND OBJECTIVE BOX
HPI:  This is a 49 y/o male, PMH DM, HTN, hyperlipidemia; pt presented to the ED at [facility] on 4/10 with c/o abdominal cramping, generalized, and diarrhea following use of laxatives for constipation.  In the ED, WBC 10.82, Hb 13.6, CMP: sodium 126, chloride 89, glucose 281, CMP otherwise unremarkable.  VBG: Lactate 2.6 (later downtrended to 1.6).  Pt. was treated with IV hydration and transferred to CDU for continued care plan:  IV hydration, supportive care, AM labs, general observation care / monitoring. Patient continued to have generalized weakness, numbness and persistent hyponatremia. Patient admitted for further evaluation.  Neurology consulted. 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 Patient refused IVIG but underwent PLEX.   Nephrology consulted for ALLIE and hyponatremia. Likely due to hypovolemia in setting of hyperglycemia. Work up suggested SIADH. Recs to optimize DM control, continue free water restriction <1.2L/day, and monitor Na levels (Na level should not increase more than 6meq in 24 hrs). Also, noted to have proteinuia/hematuria. Renal felt sec to Diabetic Nephropathy but patient will need full vasculitis work up and based on work up result, he might need kidney biopsy which would be scheduled as an outpatient.   Endocrinology also following for uncontrolled diabetes. Insulin regimen adjusted with improvement in glucoses.   ID consulted due to h/o syphilis in the past. Patient had undergone treatment in the past. No need for further treatment.   Last night (5/3), patient reported increased left scapula pain and given IV Dilaudid. Patient attributed to increased use of UEs in PT. Pain management NP consulted. Recommended switch to PO Dilaudid.   Patient has been seen by PT, OT and PM&R attending with recommendations for acute IPR.   Patient is deemed medically stable for transfer to acute IPR at [facility] on 5/4 and arrived 5/5. (05 May 2021 13:33)      INTERVAL HPI/OVERNIGHT EVENTS:  Chart Reviewed and patient seen at bedside  Patient had episode of muscle spasms last night.  +BM twice  No other complaints or issues.      ROS:  Denies fevers, chills, chest pain, shortness of breath, abdominal pain, headaches, nausea/vomiting    Vital Signs Last 24 Hrs  T(C): 36.2 (17 May 2021 08:00), Max: 36.4 (16 May 2021 21:46)  T(F): 97.2 (17 May 2021 08:00), Max: 97.6 (16 May 2021 21:46)  HR: 77 (17 May 2021 08:00) (70 - 83)  BP: 119/74 (17 May 2021 08:00) (119/72 - 148/80)  BP(mean): --  RR: 16 (17 May 2021 08:00) (16 - 16)  SpO2: 97% (17 May 2021 08:00) (97% - 98%)    Physical Exam:  Gen - NAD, Comfortable  Pulm - CTAB,   Cardiovascular - RRR, S1S2,   Abdomen - Soft, NT/ND, +BS  Extremities - No C/C/E, No calf tenderness     Motor -                       LEFT    UE - ShAB 5/5, EF 5/5, EE 5/5, WE 5/5,  5/5                    RIGHT UE - ShAB 5/5, EF 5/5, EE 5/5, WE 5/5,  5/5                    LEFT    LE - HF 5/5, KE 4/5, DF 5/5, PF 5/5                    RIGHT LE - HF 5/5, KE 4/5, DF 5/5, PF 5/5     Sensory - impaired distal fingertips, toes- improving per patient (pt also reports baseline diabetic neuropathy)  Psychiatric - Mood stable, Affect WNL    LABS:  05-17    141  |  105  |  14  ----------------------------<  151<H>  4.0   |  27  |  1.24    Ca    9.2      17 May 2021 06:23    TPro  6.5  /  Alb  3.4  /  TBili  0.1<L>  /  DBili  x   /  AST  19  /  ALT  38  /  AlkPhos  89  05-17                                              9.9    6.27  )-----------( 260      ( 17 May 2021 06:23 )             29.1     CAPILLARY BLOOD GLUCOSE      POCT Blood Glucose.: 108 mg/dL (17 May 2021 12:17)  POCT Blood Glucose.: 170 mg/dL (17 May 2021 07:52)  POCT Blood Glucose.: 151 mg/dL (16 May 2021 21:38)  POCT Blood Glucose.: 213 mg/dL (16 May 2021 16:33)      MEDICATIONS:  MEDICATIONS  (STANDING):  amLODIPine   Tablet 10 milliGRAM(s) Oral daily  bisacodyl 5 milliGRAM(s) Oral at bedtime  clotrimazole 1% Cream 1 Application(s) Topical two times a day  dextrose 40% Gel 15 Gram(s) Oral once  dextrose 5%. 1000 milliLiter(s) (50 mL/Hr) IV Continuous <Continuous>  dextrose 5%. 1000 milliLiter(s) (100 mL/Hr) IV Continuous <Continuous>  dextrose 50% Injectable 25 Gram(s) IV Push once  dextrose 50% Injectable 12.5 Gram(s) IV Push once  dextrose 50% Injectable 25 Gram(s) IV Push once  enoxaparin Injectable 40 milliGRAM(s) SubCutaneous daily  famotidine    Tablet 20 milliGRAM(s) Oral two times a day  gabapentin 300 milliGRAM(s) Oral every 8 hours  glucagon  Injectable 1 milliGRAM(s) IntraMuscular once  hydrALAZINE 10 milliGRAM(s) Oral three times a day  insulin glargine Injectable (LANTUS) 58 Unit(s) SubCutaneous at bedtime  insulin lispro (ADMELOG) corrective regimen sliding scale   SubCutaneous three times a day before meals  insulin lispro (ADMELOG) corrective regimen sliding scale   SubCutaneous at bedtime  insulin lispro Injectable (ADMELOG) 5 Unit(s) SubCutaneous <User Schedule>  insulin lispro Injectable (ADMELOG) 10 Unit(s) SubCutaneous with breakfast  insulin lispro Injectable (ADMELOG) 10 Unit(s) SubCutaneous with lunch  insulin lispro Injectable (ADMELOG) 10 Unit(s) SubCutaneous with dinner  lidocaine   Patch 1 Patch Transdermal daily  lidocaine   Patch 1 Patch Transdermal every 24 hours  losartan 100 milliGRAM(s) Oral daily  metoprolol tartrate 12.5 milliGRAM(s) Oral two times a day  senna 2 Tablet(s) Oral two times a day    MEDICATIONS  (PRN):  acetaminophen   Tablet .. 650 milliGRAM(s) Oral every 6 hours PRN Mild Pain (1 - 3)  cyclobenzaprine 5 milliGRAM(s) Oral daily PRN Muscle Spasm  HYDROmorphone   Tablet 2 milliGRAM(s) Oral every 6 hours PRN Severe Pain (7 - 10)  oxyCODONE    IR 5 milliGRAM(s) Oral every 6 hours PRN Moderate Pain (4 - 6)

## 2021-05-18 LAB
GLUCOSE BLDC GLUCOMTR-MCNC: 136 MG/DL — HIGH (ref 70–99)
GLUCOSE BLDC GLUCOMTR-MCNC: 138 MG/DL — HIGH (ref 70–99)
GLUCOSE BLDC GLUCOMTR-MCNC: 146 MG/DL — HIGH (ref 70–99)
GLUCOSE BLDC GLUCOMTR-MCNC: 223 MG/DL — HIGH (ref 70–99)

## 2021-05-18 PROCEDURE — 99232 SBSQ HOSP IP/OBS MODERATE 35: CPT | Mod: GC

## 2021-05-18 RX ADMIN — INSULIN GLARGINE 58 UNIT(S): 100 INJECTION, SOLUTION SUBCUTANEOUS at 21:42

## 2021-05-18 RX ADMIN — Medication 12.5 MILLIGRAM(S): at 06:11

## 2021-05-18 RX ADMIN — OXYCODONE HYDROCHLORIDE 5 MILLIGRAM(S): 5 TABLET ORAL at 14:06

## 2021-05-18 RX ADMIN — Medication 1 APPLICATION(S): at 06:10

## 2021-05-18 RX ADMIN — Medication 10 UNIT(S): at 08:38

## 2021-05-18 RX ADMIN — Medication 10 UNIT(S): at 17:20

## 2021-05-18 RX ADMIN — FAMOTIDINE 20 MILLIGRAM(S): 10 INJECTION INTRAVENOUS at 18:10

## 2021-05-18 RX ADMIN — Medication 12.5 MILLIGRAM(S): at 18:10

## 2021-05-18 RX ADMIN — LOSARTAN POTASSIUM 100 MILLIGRAM(S): 100 TABLET, FILM COATED ORAL at 06:10

## 2021-05-18 RX ADMIN — CYCLOBENZAPRINE HYDROCHLORIDE 5 MILLIGRAM(S): 10 TABLET, FILM COATED ORAL at 19:29

## 2021-05-18 RX ADMIN — Medication 10 UNIT(S): at 12:18

## 2021-05-18 RX ADMIN — Medication 4: at 08:38

## 2021-05-18 RX ADMIN — Medication 5 UNIT(S): at 21:42

## 2021-05-18 RX ADMIN — AMLODIPINE BESYLATE 10 MILLIGRAM(S): 2.5 TABLET ORAL at 06:09

## 2021-05-18 RX ADMIN — FAMOTIDINE 20 MILLIGRAM(S): 10 INJECTION INTRAVENOUS at 06:10

## 2021-05-18 RX ADMIN — Medication 10 MILLIGRAM(S): at 06:10

## 2021-05-18 RX ADMIN — Medication 10 MILLIGRAM(S): at 21:38

## 2021-05-18 RX ADMIN — ENOXAPARIN SODIUM 40 MILLIGRAM(S): 100 INJECTION SUBCUTANEOUS at 16:33

## 2021-05-18 RX ADMIN — OXYCODONE HYDROCHLORIDE 5 MILLIGRAM(S): 5 TABLET ORAL at 08:44

## 2021-05-18 RX ADMIN — LIDOCAINE 1 PATCH: 4 CREAM TOPICAL at 19:08

## 2021-05-18 RX ADMIN — Medication 10 MILLIGRAM(S): at 16:33

## 2021-05-18 NOTE — CHART NOTE - NSCHARTNOTEFT_GEN_A_CORE
Nutrition Follow Up Note  Hospital Course   (Per Electronic Medical Record)    Source:  Patient [X]  Medical Record [X]      Diet:   Consistent Carbohydrate Diet w/ Thin Liquids  Tolerates Diet Consistency Well  No Chewing/Swallowing Difficulties  No Recent Nausea, Vomiting, Diarrhea or Constipation (as Per Patient)  Consumes 100% of Meals (as Per Documentation) - States Good PO Intake/Appetite   on Glucerna 8oz PO BID (Provides 440kcal-20grams of Protein) - (Per Patient Request)   Patient Takes Nutrition Supplement   Education Provided on Blood Glucose Management    Enteral/Parenteral Nutrition: Not Applicable    Current Weight: 220.9lb on 5/16  Obtain Weights Weekly  Weights Currently Stable @This Time     Pertinent Medications: MEDICATIONS  (STANDING):  amLODIPine   Tablet 10 milliGRAM(s) Oral daily  clotrimazole 1% Cream 1 Application(s) Topical two times a day  dextrose 40% Gel 15 Gram(s) Oral once  dextrose 5%. 1000 milliLiter(s) (50 mL/Hr) IV Continuous <Continuous>  dextrose 5%. 1000 milliLiter(s) (100 mL/Hr) IV Continuous <Continuous>  dextrose 50% Injectable 25 Gram(s) IV Push once  dextrose 50% Injectable 12.5 Gram(s) IV Push once  dextrose 50% Injectable 25 Gram(s) IV Push once  enoxaparin Injectable 40 milliGRAM(s) SubCutaneous daily  famotidine    Tablet 20 milliGRAM(s) Oral two times a day  gabapentin 300 milliGRAM(s) Oral every 8 hours  glucagon  Injectable 1 milliGRAM(s) IntraMuscular once  hydrALAZINE 10 milliGRAM(s) Oral three times a day  insulin glargine Injectable (LANTUS) 58 Unit(s) SubCutaneous at bedtime  insulin lispro (ADMELOG) corrective regimen sliding scale   SubCutaneous three times a day before meals  insulin lispro (ADMELOG) corrective regimen sliding scale   SubCutaneous at bedtime  insulin lispro Injectable (ADMELOG) 5 Unit(s) SubCutaneous <User Schedule>  insulin lispro Injectable (ADMELOG) 10 Unit(s) SubCutaneous with breakfast  insulin lispro Injectable (ADMELOG) 10 Unit(s) SubCutaneous with lunch  insulin lispro Injectable (ADMELOG) 10 Unit(s) SubCutaneous with dinner  lidocaine   Patch 1 Patch Transdermal daily  lidocaine   Patch 1 Patch Transdermal every 24 hours  losartan 100 milliGRAM(s) Oral daily  metoprolol tartrate 12.5 milliGRAM(s) Oral two times a day    MEDICATIONS  (PRN):  acetaminophen   Tablet .. 650 milliGRAM(s) Oral every 6 hours PRN Mild Pain (1 - 3)  cyclobenzaprine 5 milliGRAM(s) Oral at bedtime PRN Muscle Spasm  HYDROmorphone   Tablet 2 milliGRAM(s) Oral every 6 hours PRN Severe Pain (7 - 10)  oxyCODONE    IR 5 milliGRAM(s) Oral every 6 hours PRN Moderate Pain (4 - 6)  senna 2 Tablet(s) Oral at bedtime PRN Constipation    Pertinent Labs:  05-17 Na141 mmol/L Glu 151 mg/dL<H> K+ 4.0 mmol/L Cr  1.24 mg/dL BUN 14 mg/dL 05-17 Alb 3.4 g/dL    POCT (over Last 3 Days) - Ranging from 108-244    Skin: No Pressure Ulcers     Edema: None Noted     Last Bowel Movement: on 5/17    Estimated Needs:   [X] No Change Since Previous Assessment    Previous Nutrition Diagnosis:   Altered Nutrition Related Labwork     Nutrition Diagnosis is [X] Ongoing - Education Provided on Blood Glucose Management     New Nutrition Diagnosis: [X] Not Applicable    Interventions:   1. Education Provided on Blood Glucose Management   2. Recommend Continue Nutrition Plan of Care     Monitoring & Evaluation:   [X] Weights   [X] PO Intake   [X] Skin Integrity   [X] Follow Up (Per Protocol)  [X] Tolerance to Diet Prescription   [X] Other: Labs & POCT    Registered Dietitian/Nutritionist Remains Available.  Brant Gutierrez RDN    Pager # [phone]  Phone# [phone]
REHABILITATION DIAGNOSIS/IMPAIRMENT GROUP CODE:  Guillian Middlebrook' syndrome     COMORBIDITIES/COMPLICATING CONDITIONS IMPACTING REHABILITATION:  HEALTH ISSUES - PROBLEM Dx:  LE weakness  Impaired gait and mobility       PAST MEDICAL & SURGICAL HISTORY:  DM (diabetes mellitus)-2     HTN (hypertension)    Hyperlipidemia    H/O total knee replacement    Foot fracture        Based upon consideration of the patient's impairments, functional status, complicating conditions and any other contributing factors and after information garnered from the assessments of all therapy disciplines involved in treating the patient and other pertinent clinicians:    INTERDISCIPLINARY REHABILITATION INTERVENTIONS:    [x  ] Transfer Training  [ x  ] Bed Mobility  [ x  ] Therapeutic Exercise  [x   ] Balance/Coordination Exercises  [ x  ] Locomotion retraining  [  x ] Stairs  [  x ] Functional Transfer Training  [ x  ] Bowel/Bladder program  [x   ] Pain Management  [x   ] Skin/Wound Care  [   ] Visual/Perceptual Training  [   ] Therapeutic Recreation Activities  [ x  ] Neuromuscular Re-education  [ x  ] Activities of Daily Living  [   ] Speech Exercise  [   ] Swallowing Exercises  [   ] Vital Stim  [   ] Dietary Supplements  [   ] Calorie Count  [   ] Cognitive Exercises  [   ] Cognitive/Linguistic Treatment  [   ] Behavior Program  [   ] Neuropsych Therapy  [   ] Patient/Family Counseling  [ x  ] Family Training  [   ] Community Re-entry  [   ] Orthotic Evaluation  [   ] Prosthetic Eval/Training    MEDICAL PROGNOSIS:  fair     REHAB POTENTIAL:  fair   EXPECTED DAILY THERAPY:         PT:2 hrs/day        OT:1 hr/day        ST:NA       p&O:na     EXPECTED INTENSITY OF PROGRAM:  3 HRS/DAY     EXPECTED FREQUENCY OF PROGRAM:  5 Days/week     ESTIMATED LOS:  16-18days     ESTIMATED DISPOSITION:  home     INTERDISCIPLINARY FUNCTIONAL OUTCOME/GOALS:         Gait/Mobility:modified independent        Transfers:modified independent        ADLs:modified independent        Functional Transfers:modified independent        Medication Management:modified independent        Communication:modified independent        Cognitive:na       Dysphagia:na       Bladder:modified independent        Bowel:modified independent
Nutrition Follow Up Note  Hospital Course   (Per Electronic Medical Record)    Source:  Patient [X]  Medical Record [X]      Diet:   Consistent Carbohydrate Diet w/ Thin Liquids - D/C Fluid Restriction on 5/11   Tolerates Diet Consistency Well  No Chewing/Swallowing Difficulties  No Recent Nausea, Vomiting or Constipation & Some Recent Diarrhea (as Per Patient)   Consumes 100% of Meals (as Per Documentation) - States Good PO Intake/Appetite   on Glucerna 8oz PO BID (Provides 440kcal-20grams of Protein) - Patient Takes Nutrition Supplement   Education Provided on Blood Glucose Management    Enteral/Parenteral Nutrition: Not Applicable    Current Weight: 216.7lb on 5/5  Obtain New Weight  Obtain Weights Weekly     Pertinent Medications: MEDICATIONS  (STANDING):  amLODIPine   Tablet 10 milliGRAM(s) Oral daily  bisacodyl 5 milliGRAM(s) Oral at bedtime  clotrimazole 1% Cream 1 Application(s) Topical two times a day  dextrose 40% Gel 15 Gram(s) Oral once  dextrose 5%. 1000 milliLiter(s) (50 mL/Hr) IV Continuous <Continuous>  dextrose 5%. 1000 milliLiter(s) (100 mL/Hr) IV Continuous <Continuous>  dextrose 50% Injectable 25 Gram(s) IV Push once  dextrose 50% Injectable 12.5 Gram(s) IV Push once  dextrose 50% Injectable 25 Gram(s) IV Push once  enoxaparin Injectable 40 milliGRAM(s) SubCutaneous daily  famotidine    Tablet 20 milliGRAM(s) Oral two times a day  gabapentin 300 milliGRAM(s) Oral every 8 hours  glucagon  Injectable 1 milliGRAM(s) IntraMuscular once  hydrALAZINE 10 milliGRAM(s) Oral three times a day  insulin glargine Injectable (LANTUS) 54 Unit(s) SubCutaneous at bedtime  insulin lispro (ADMELOG) corrective regimen sliding scale   SubCutaneous three times a day before meals  insulin lispro (ADMELOG) corrective regimen sliding scale   SubCutaneous at bedtime  insulin lispro Injectable (ADMELOG) 5 Unit(s) SubCutaneous <User Schedule>  insulin lispro Injectable (ADMELOG) 10 Unit(s) SubCutaneous with breakfast  insulin lispro Injectable (ADMELOG) 10 Unit(s) SubCutaneous with lunch  insulin lispro Injectable (ADMELOG) 10 Unit(s) SubCutaneous with dinner  losartan 100 milliGRAM(s) Oral daily  metoprolol tartrate 12.5 milliGRAM(s) Oral two times a day  senna 2 Tablet(s) Oral two times a day    MEDICATIONS  (PRN):  acetaminophen   Tablet .. 650 milliGRAM(s) Oral every 6 hours PRN Mild Pain (1 - 3)  HYDROmorphone   Tablet 2 milliGRAM(s) Oral every 6 hours PRN Severe Pain (7 - 10)  oxyCODONE    IR 5 milliGRAM(s) Oral every 6 hours PRN Moderate Pain (4 - 6)    Pertinent Labs:  05-10 Na143 mmol/L Glu 243 mg/dL<H> K+ 4.2 mmol/L Cr  1.12 mg/dL BUN 17 mg/dL 05-06 Phos 5.2 mg/dL<H> 05-10 Alb 3.4 g/dL    POCT (over Last 3 Days) - Ranging from     Skin: No Pressure Ulcers     Edema: None Noted     Last Bowel Movement: on 5/11    Estimated Needs:   [X] No Change Since Previous Assessment    Previous Nutrition Diagnosis:   Altered Nutrition Related Labwork     Nutrition Diagnosis is [X] Ongoing - Education Provided on Blood Glucose Management     New Nutrition Diagnosis: [X] Not Applicable    Interventions:   1. Education Provided on Blood Glucose Management   2. Recommend POC    Monitoring & Evaluation:   [X] Weights   [X] PO Intake   [X] Skin Integrity   [X] Follow Up (Per Protocol)  [X] Tolerance to Diet Prescription   [X] Other: Labs & POCT    Registered Dietitian/Nutritionist Remains Available.  Brant Gutierrez RDN    Pager # [phone]  Phone# [phone]

## 2021-05-18 NOTE — PROGRESS NOTE ADULT - SUBJECTIVE AND OBJECTIVE BOX
HPI:  This is a 49 y/o male, PMH DM, HTN, hyperlipidemia; pt presented to the ED at [facility] on 4/10 with c/o abdominal cramping, generalized, and diarrhea following use of laxatives for constipation.  In the ED, WBC 10.82, Hb 13.6, CMP: sodium 126, chloride 89, glucose 281, CMP otherwise unremarkable.  VBG: Lactate 2.6 (later downtrended to 1.6).  Pt. was treated with IV hydration and transferred to CDU for continued care plan:  IV hydration, supportive care, AM labs, general observation care / monitoring. Patient continued to have generalized weakness, numbness and persistent hyponatremia. Patient admitted for further evaluation.  Neurology consulted. 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 Patient refused IVIG but underwent PLEX.   Nephrology consulted for ALLIE and hyponatremia. Likely due to hypovolemia in setting of hyperglycemia. Work up suggested SIADH. Recs to optimize DM control, continue free water restriction <1.2L/day, and monitor Na levels (Na level should not increase more than 6meq in 24 hrs). Also, noted to have proteinuia/hematuria. Renal felt sec to Diabetic Nephropathy but patient will need full vasculitis work up and based on work up result, he might need kidney biopsy which would be scheduled as an outpatient.   Endocrinology also following for uncontrolled diabetes. Insulin regimen adjusted with improvement in glucoses.   ID consulted due to h/o syphilis in the past. Patient had undergone treatment in the past. No need for further treatment.   Last night (5/3), patient reported increased left scapula pain and given IV Dilaudid. Patient attributed to increased use of UEs in PT. Pain management NP consulted. Recommended switch to PO Dilaudid.   Patient has been seen by PT, OT and PM&R attending with recommendations for acute IPR.   Patient is deemed medically stable for transfer to acute IPR at [facility] on 5/4 and arrived 5/5. (05 May 2021 13:33)      INTERVAL HPI/OVERNIGHT EVENTS:  Chart Reviewed and patient seen at bedside.  Patient with no complaints or issues.   Reports good sleep, appetite and progress in therapy.  +BM    ROS:  Denies fevers, chills, chest pain, shortness of breath, abdominal pain, headaches, nausea/vomiting    Vital Signs Last 24 Hrs  T(C): 36.4 (18 May 2021 08:00), Max: 36.4 (18 May 2021 08:00)  T(F): 97.5 (18 May 2021 08:00), Max: 97.5 (18 May 2021 08:00)  HR: 88 (18 May 2021 08:00) (66 - 88)  BP: 119/74 (18 May 2021 08:00) (119/74 - 130/80)  BP(mean): --  RR: 16 (18 May 2021 08:00) (16 - 18)  SpO2: 97% (18 May 2021 08:00) (97% - 98%)    Physical Exam:  Gen - NAD, Comfortable  Pulm - CTAB,   Cardiovascular - RRR, S1S2,   Abdomen - Soft, NT/ND, +BS  Extremities - No C/C/E, No calf tenderness     Motor -                       LEFT    UE - ShAB 5/5, EF 5/5, EE 5/5, WE 5/5,  5/5                    RIGHT UE - ShAB 5/5, EF 5/5, EE 5/5, WE 5/5,  5/5                    LEFT    LE - HF 5/5, KE 4/5, DF 5/5, PF 5/5                    RIGHT LE - HF 5/5, KE 4/5, DF 5/5, PF 5/5     Sensory - impaired distal fingertips, toes- improving per patient (pt also reports baseline diabetic neuropathy)  Psychiatric - Mood stable, Affect WNL    Functional:   OT: Independent eating, Mod I grooming, CG Bathing, Supervision Dressing  PT: Min A ambulation 100ft RW, Min A transfers      LABS:  05-17    141  |  105  |  14  ----------------------------<  151<H>  4.0   |  27  |  1.24    Ca    9.2      17 May 2021 06:23    TPro  6.5  /  Alb  3.4  /  TBili  0.1<L>  /  DBili  x   /  AST  19  /  ALT  38  /  AlkPhos  89  05-17                                              9.9    6.27  )-----------( 260      ( 17 May 2021 06:23 )             29.1     CAPILLARY BLOOD GLUCOSE      POCT Blood Glucose.: 146 mg/dL (18 May 2021 12:14)  POCT Blood Glucose.: 223 mg/dL (18 May 2021 07:55)  POCT Blood Glucose.: 137 mg/dL (17 May 2021 21:47)  POCT Blood Glucose.: 136 mg/dL (17 May 2021 17:23)      MEDICATIONS:  MEDICATIONS  (STANDING):  amLODIPine   Tablet 10 milliGRAM(s) Oral daily  clotrimazole 1% Cream 1 Application(s) Topical two times a day  dextrose 40% Gel 15 Gram(s) Oral once  dextrose 5%. 1000 milliLiter(s) (50 mL/Hr) IV Continuous <Continuous>  dextrose 5%. 1000 milliLiter(s) (100 mL/Hr) IV Continuous <Continuous>  dextrose 50% Injectable 25 Gram(s) IV Push once  dextrose 50% Injectable 12.5 Gram(s) IV Push once  dextrose 50% Injectable 25 Gram(s) IV Push once  enoxaparin Injectable 40 milliGRAM(s) SubCutaneous daily  famotidine    Tablet 20 milliGRAM(s) Oral two times a day  gabapentin 300 milliGRAM(s) Oral every 8 hours  glucagon  Injectable 1 milliGRAM(s) IntraMuscular once  hydrALAZINE 10 milliGRAM(s) Oral three times a day  insulin glargine Injectable (LANTUS) 58 Unit(s) SubCutaneous at bedtime  insulin lispro (ADMELOG) corrective regimen sliding scale   SubCutaneous three times a day before meals  insulin lispro (ADMELOG) corrective regimen sliding scale   SubCutaneous at bedtime  insulin lispro Injectable (ADMELOG) 5 Unit(s) SubCutaneous <User Schedule>  insulin lispro Injectable (ADMELOG) 10 Unit(s) SubCutaneous with breakfast  insulin lispro Injectable (ADMELOG) 10 Unit(s) SubCutaneous with lunch  insulin lispro Injectable (ADMELOG) 10 Unit(s) SubCutaneous with dinner  lidocaine   Patch 1 Patch Transdermal daily  lidocaine   Patch 1 Patch Transdermal every 24 hours  losartan 100 milliGRAM(s) Oral daily  metoprolol tartrate 12.5 milliGRAM(s) Oral two times a day    MEDICATIONS  (PRN):  acetaminophen   Tablet .. 650 milliGRAM(s) Oral every 6 hours PRN Mild Pain (1 - 3)  cyclobenzaprine 5 milliGRAM(s) Oral at bedtime PRN Muscle Spasm  HYDROmorphone   Tablet 2 milliGRAM(s) Oral every 6 hours PRN Severe Pain (7 - 10)  oxyCODONE    IR 5 milliGRAM(s) Oral every 6 hours PRN Moderate Pain (4 - 6)  senna 2 Tablet(s) Oral at bedtime PRN Constipation

## 2021-05-19 LAB
GLUCOSE BLDC GLUCOMTR-MCNC: 190 MG/DL — HIGH (ref 70–99)
GLUCOSE BLDC GLUCOMTR-MCNC: 200 MG/DL — HIGH (ref 70–99)
GLUCOSE BLDC GLUCOMTR-MCNC: 209 MG/DL — HIGH (ref 70–99)

## 2021-05-19 PROCEDURE — 99232 SBSQ HOSP IP/OBS MODERATE 35: CPT

## 2021-05-19 RX ORDER — INSULIN GLARGINE 100 [IU]/ML
60 INJECTION, SOLUTION SUBCUTANEOUS AT BEDTIME
Refills: 0 | Status: DISCONTINUED | OUTPATIENT
Start: 2021-05-19 | End: 2021-05-21

## 2021-05-19 RX ADMIN — Medication 2: at 17:13

## 2021-05-19 RX ADMIN — ENOXAPARIN SODIUM 40 MILLIGRAM(S): 100 INJECTION SUBCUTANEOUS at 11:45

## 2021-05-19 RX ADMIN — INSULIN GLARGINE 60 UNIT(S): 100 INJECTION, SOLUTION SUBCUTANEOUS at 23:00

## 2021-05-19 RX ADMIN — Medication 1 APPLICATION(S): at 17:14

## 2021-05-19 RX ADMIN — Medication 12.5 MILLIGRAM(S): at 17:14

## 2021-05-19 RX ADMIN — AMLODIPINE BESYLATE 10 MILLIGRAM(S): 2.5 TABLET ORAL at 06:34

## 2021-05-19 RX ADMIN — Medication 1 APPLICATION(S): at 06:34

## 2021-05-19 RX ADMIN — Medication 10 UNIT(S): at 11:46

## 2021-05-19 RX ADMIN — Medication 10 MILLIGRAM(S): at 22:11

## 2021-05-19 RX ADMIN — Medication 4: at 07:57

## 2021-05-19 RX ADMIN — Medication 10 MILLIGRAM(S): at 06:34

## 2021-05-19 RX ADMIN — GABAPENTIN 300 MILLIGRAM(S): 400 CAPSULE ORAL at 15:32

## 2021-05-19 RX ADMIN — LOSARTAN POTASSIUM 100 MILLIGRAM(S): 100 TABLET, FILM COATED ORAL at 06:35

## 2021-05-19 RX ADMIN — OXYCODONE HYDROCHLORIDE 5 MILLIGRAM(S): 5 TABLET ORAL at 08:04

## 2021-05-19 RX ADMIN — GABAPENTIN 300 MILLIGRAM(S): 400 CAPSULE ORAL at 22:11

## 2021-05-19 RX ADMIN — LIDOCAINE 1 PATCH: 4 CREAM TOPICAL at 07:33

## 2021-05-19 RX ADMIN — CYCLOBENZAPRINE HYDROCHLORIDE 5 MILLIGRAM(S): 10 TABLET, FILM COATED ORAL at 22:11

## 2021-05-19 RX ADMIN — Medication 2: at 11:47

## 2021-05-19 RX ADMIN — LIDOCAINE 1 PATCH: 4 CREAM TOPICAL at 09:33

## 2021-05-19 RX ADMIN — FAMOTIDINE 20 MILLIGRAM(S): 10 INJECTION INTRAVENOUS at 17:14

## 2021-05-19 RX ADMIN — Medication 10 UNIT(S): at 07:56

## 2021-05-19 RX ADMIN — FAMOTIDINE 20 MILLIGRAM(S): 10 INJECTION INTRAVENOUS at 06:34

## 2021-05-19 RX ADMIN — Medication 10 MILLIGRAM(S): at 15:31

## 2021-05-19 RX ADMIN — LIDOCAINE 1 PATCH: 4 CREAM TOPICAL at 11:45

## 2021-05-19 RX ADMIN — Medication 10 UNIT(S): at 17:13

## 2021-05-19 RX ADMIN — Medication 12.5 MILLIGRAM(S): at 06:35

## 2021-05-19 RX ADMIN — OXYCODONE HYDROCHLORIDE 5 MILLIGRAM(S): 5 TABLET ORAL at 09:34

## 2021-05-19 RX ADMIN — LIDOCAINE 1 PATCH: 4 CREAM TOPICAL at 19:00

## 2021-05-19 RX ADMIN — LIDOCAINE 1 PATCH: 4 CREAM TOPICAL at 23:00

## 2021-05-19 RX ADMIN — Medication 5 UNIT(S): at 22:11

## 2021-05-19 NOTE — PROGRESS NOTE ADULT - ASSESSMENT
50y with a history of DM, HTN, hyperlipidemia who presented to [facility]  on 4/10 with complaint of abdominal pain, diarrhea from laxative use and weakness and 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 Hospital course complicated by uncontrolled diabetes, hyponatremia, ALLIE, . Now admitted to [facility] after for initiation of a multidisciplinary rehab program consisting focused on functional mobility, transfers and ADLs (activities of daily living).    Comprehensive Multidisciplinary Rehab Program:  - Continue comprehensive rehab program, 3 hours a day, 5 days a week.  - PT 2hr/day: - OT 1hr/day:     LE weakness - w/u  suggestive of GBS  -S/p PLEX  -Follow up with neuro after dc (f/u remaining LP results)  -ganglioside antibodies, Gq1b -->neg    Hyponatremia/ALLIE- now resolved     DM type 2 with neuropathy : uncontrolled   -A1C 11.5%  -c/w Lantus increased to 60  units qhs, ( 5/19 ) Admelog 10/10/10 with meals   -c/w Admelog 5 units at bedtime only if taking a snack   -at home Pt was on Tresiba 30 units qhs and Novolog 15 units premeal TID.    + RPR  -ID consulted at [facility]- treated 2017 (PCN)  -RPR titer 1:1 - no s/s of active syphilis  -Per ID, no need for treatment at present time     HTN : -C/w norvasc, losartan, hydralazine, metoprolol tartrate    Hematuria / proteinuria :--Per nephrology, will need kidney biopsy likely as outpt     Sleep: - Melatonin PRN     Pain Management:Tylenol PRN, - gabapentin 300mg tid   - flexeril qhs for muscle spasms    GI/Bowel:- Senna QHS  - GI ppx: pepcid 20mg bid    /Bladder: toileting prn       Skin/Pressure Injury: OOB as tolerated     Diet: CC Glucerna BID, no shellfish    DVT ppx:- lovenox   - SCDs    IDT 5/18:  - TDD 5/22 SAT  -- Goal: Mod I  Family training today

## 2021-05-19 NOTE — PROGRESS NOTE ADULT - SUBJECTIVE AND OBJECTIVE BOX
Patient is a 50y old  Male who presents with a chief complaint of 03.4 GBS (18 May 2021 12:30)      Patient seen and examined at bedside.  Denies chest pain, dyspnea, abd pain.    ALLERGIES:  No Known Drug Allergies  shellfish (Urticaria)    MEDICATIONS  (STANDING):  amLODIPine   Tablet 10 milliGRAM(s) Oral daily  clotrimazole 1% Cream 1 Application(s) Topical two times a day  dextrose 40% Gel 15 Gram(s) Oral once  dextrose 5%. 1000 milliLiter(s) (50 mL/Hr) IV Continuous <Continuous>  dextrose 5%. 1000 milliLiter(s) (100 mL/Hr) IV Continuous <Continuous>  dextrose 50% Injectable 25 Gram(s) IV Push once  dextrose 50% Injectable 12.5 Gram(s) IV Push once  dextrose 50% Injectable 25 Gram(s) IV Push once  enoxaparin Injectable 40 milliGRAM(s) SubCutaneous daily  famotidine    Tablet 20 milliGRAM(s) Oral two times a day  gabapentin 300 milliGRAM(s) Oral every 8 hours  glucagon  Injectable 1 milliGRAM(s) IntraMuscular once  hydrALAZINE 10 milliGRAM(s) Oral three times a day  insulin glargine Injectable (LANTUS) 60 Unit(s) SubCutaneous at bedtime  insulin lispro (ADMELOG) corrective regimen sliding scale   SubCutaneous three times a day before meals  insulin lispro (ADMELOG) corrective regimen sliding scale   SubCutaneous at bedtime  insulin lispro Injectable (ADMELOG) 5 Unit(s) SubCutaneous <User Schedule>  insulin lispro Injectable (ADMELOG) 10 Unit(s) SubCutaneous with breakfast  insulin lispro Injectable (ADMELOG) 10 Unit(s) SubCutaneous with lunch  insulin lispro Injectable (ADMELOG) 10 Unit(s) SubCutaneous with dinner  lidocaine   Patch 1 Patch Transdermal daily  lidocaine   Patch 1 Patch Transdermal every 24 hours  losartan 100 milliGRAM(s) Oral daily  metoprolol tartrate 12.5 milliGRAM(s) Oral two times a day    MEDICATIONS  (PRN):  acetaminophen   Tablet .. 650 milliGRAM(s) Oral every 6 hours PRN Mild Pain (1 - 3)  cyclobenzaprine 5 milliGRAM(s) Oral at bedtime PRN Muscle Spasm  HYDROmorphone   Tablet 2 milliGRAM(s) Oral every 6 hours PRN Severe Pain (7 - 10)  oxyCODONE    IR 5 milliGRAM(s) Oral every 6 hours PRN Moderate Pain (4 - 6)  senna 2 Tablet(s) Oral at bedtime PRN Constipation    Vital Signs Last 24 Hrs  T(F): 97.6 (19 May 2021 07:32), Max: 97.6 (19 May 2021 07:32)  HR: 90 (19 May 2021 07:32) (86 - 90)  BP: 132/80 (19 May 2021 07:32) (122/76 - 132/80)  RR: 16 (19 May 2021 07:32) (16 - 18)  SpO2: 97% (19 May 2021 07:32) (97% - 98%)  I&O's Summary      PHYSICAL EXAM:  General: NAD, A/O x 3  ENT: MMM  Neck: Supple, No JVD  Lungs: Clear to auscultation bilaterally  Cardio: RRR, S1/S2, No murmurs  Abdomen: Soft, Nontender, Nondistended; Bowel sounds present  Extremities: No calf tenderness, No pitting edema    LABS:                        9.9    6.27  )-----------( 260      ( 17 May 2021 06:23 )             29.1       05-17    141  |  105  |  14  ----------------------------<  151  4.0   |  27  |  1.24    Ca    9.2      17 May 2021 06:23    TPro  6.5  /  Alb  3.4  /  TBili  0.1  /  DBili  x   /  AST  19  /  ALT  38  /  AlkPhos  89  05-17     eGFR if Non African American: 67 mL/min/1.73M2 (05-17-21 @ 06:23)  eGFR if : 77 mL/min/1.73M2 (05-17-21 @ 06:23)                           POCT Blood Glucose.: 209 mg/dL (19 May 2021 07:32)  POCT Blood Glucose.: 138 mg/dL (18 May 2021 21:41)  POCT Blood Glucose.: 136 mg/dL (18 May 2021 17:00)  POCT Blood Glucose.: 146 mg/dL (18 May 2021 12:14)            RADIOLOGY & ADDITIONAL TESTS:    Care Discussed with Consultants/Other Providers:

## 2021-05-19 NOTE — PROGRESS NOTE ADULT - ASSESSMENT
GBS,  s/p PLEX  PT/OT per rehab  Pain control per rehab(d/w Resident re: pt's request to be something else other than opiods, also advised pt to inform Dr. Stern tomorrow re: his request)    Hyponatremia/ALLIE    hematuria/proteinura prior to rehab admission  Resolved  f/u with renal as outpt    DM2, A1c 11.5  Lantus increased from 58u to 60u QHS for tonight  Lispro 10 u 3x a day with meals  Lispro 5u in evening only if he has evening snack  f/u with endo as outpt    Anemia  h/h stable  monitor for now    HTN  Norvasc, Hydralazine, losartan, metoprolol

## 2021-05-19 NOTE — PROGRESS NOTE ADULT - SUBJECTIVE AND OBJECTIVE BOX
HPI:  This is a 51 y/o male, PMH DM, HTN, hyperlipidemia; pt presented to the ED at [facility] on 4/10 with c/o abdominal cramping, generalized, and diarrhea following use of laxatives for constipation.    Pt. was treated with IV hydration and transferred to CDU for continued care plan:  IV hydration, supportive care, AM labs, general observation care / monitoring. Patient continued to have generalized weakness, numbness and persistent hyponatremia. Patient admitted for further evaluation.  Neurology consulted. 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 Patient refused IVIG but underwent PLEX.   Nephrology consulted for ALLIE and hyponatremia. Likely due to hypovolemia in setting of hyperglycemia. Work up suggested SIADH. Recs to optimize DM control, continue free water restriction <1.2L/day, and monitor Na levels (Na level should not increase more than 6meq in 24 hrs). Also, noted to have proteinuia/hematuria. Renal felt sec to Diabetic Nephropathy but patient will need full vasculitis work up and based on work up result, he might need kidney biopsy which would be scheduled as an outpatient.   Endocrinology also following for uncontrolled diabetes. Insulin regimen adjusted with improvement in glucoses.   ID consulted due to h/o syphilis in the past. Patient had undergone treatment in the past. No need for further treatment.   Last night (5/3), patient reported increased left scapula pain and given IV Dilaudid. Patient attributed to increased use of UEs in PT. Pain management NP consulted. Recommended switch to PO Dilaudid.   Patient has been seen by PT, OT and PM&R attending with recommendations for acute IPR.   Patient is deemed medically stable for transfer to acute IPR at [facility] on 5/4 and arrived 5/5. (05 May 2021 13:33)      INTERVAL HPI/OVERNIGHT EVENTS:  Chart Reviewed and patient seen at bedside this am  Concerned that his blood sugar was in 200S this am  Patient states that he feels ok   Has some left knee discomfort       ROS:  Denies fevers, chills, chest pain, shortness of breath, abdominal pain, headaches, nausea/vomiting    Vital Signs Last 24 Hrs  T(C): 36.4 (19 May 2021 07:32), Max: 36.4 (18 May 2021 21:48)  T(F): 97.6 (19 May 2021 07:32), Max: 97.6 (19 May 2021 07:32)  HR: 90 (19 May 2021 07:32) (86 - 90)  BP: 132/80 (19 May 2021 07:32) (122/76 - 132/80)  BP(mean): --  RR: 16 (19 May 2021 07:32) (16 - 18)  SpO2: 97% (19 May 2021 07:32) (97% - 98%)          Physical Exam:  Gen - NAD, Comfortable  Pulm - CTAB,   Cardiovascular - RRR, S1S2,   Abdomen - Soft, NT/ND, +BS  Extremities - No C/C/E, No calf tenderness     Motor -   5/5      Sensory - impaired distal fingertips, toes- improving per patient (pt also reports baseline diabetic neuropathy)  Psychiatric - Mood stable, Affect WNL    Functional:   OT: Independent eating, Mod I grooming, CG Bathing, Supervision Dressing  PT: Min A ambulation 100ft RW, Min A transfers      LABS:  05-17    141  |  105  |  14  ----------------------------<  151<H>  4.0   |  27  |  1.24    Ca    9.2      17 May 2021 06:23    TPro  6.5  /  Alb  3.4  /  TBili  0.1<L>  /  DBili  x   /  AST  19  /  ALT  38  /  AlkPhos  89  05-17                           9.9    6.27  )-----------( 260      ( 17 May 2021 06:23 )             29.1       MEDICATIONS  (STANDING):  amLODIPine   Tablet 10 milliGRAM(s) Oral daily  clotrimazole 1% Cream 1 Application(s) Topical two times a day  dextrose 40% Gel 15 Gram(s) Oral once  dextrose 5%. 1000 milliLiter(s) (50 mL/Hr) IV Continuous <Continuous>  dextrose 5%. 1000 milliLiter(s) (100 mL/Hr) IV Continuous <Continuous>  dextrose 50% Injectable 25 Gram(s) IV Push once  dextrose 50% Injectable 12.5 Gram(s) IV Push once  dextrose 50% Injectable 25 Gram(s) IV Push once  enoxaparin Injectable 40 milliGRAM(s) SubCutaneous daily  famotidine    Tablet 20 milliGRAM(s) Oral two times a day  gabapentin 300 milliGRAM(s) Oral every 8 hours  glucagon  Injectable 1 milliGRAM(s) IntraMuscular once  hydrALAZINE 10 milliGRAM(s) Oral three times a day  insulin glargine Injectable (LANTUS) 60 Unit(s) SubCutaneous at bedtime  insulin lispro (ADMELOG) corrective regimen sliding scale   SubCutaneous three times a day before meals  insulin lispro (ADMELOG) corrective regimen sliding scale   SubCutaneous at bedtime  insulin lispro Injectable (ADMELOG) 5 Unit(s) SubCutaneous <User Schedule>  insulin lispro Injectable (ADMELOG) 10 Unit(s) SubCutaneous with breakfast  insulin lispro Injectable (ADMELOG) 10 Unit(s) SubCutaneous with lunch  insulin lispro Injectable (ADMELOG) 10 Unit(s) SubCutaneous with dinner  lidocaine   Patch 1 Patch Transdermal daily  lidocaine   Patch 1 Patch Transdermal every 24 hours  losartan 100 milliGRAM(s) Oral daily  metoprolol tartrate 12.5 milliGRAM(s) Oral two times a day    MEDICATIONS  (PRN):  acetaminophen   Tablet .. 650 milliGRAM(s) Oral every 6 hours PRN Mild Pain (1 - 3)  cyclobenzaprine 5 milliGRAM(s) Oral at bedtime PRN Muscle Spasm  senna 2 Tablet(s) Oral at bedtime PRN Constipation

## 2021-05-20 LAB
ALBUMIN SERPL ELPH-MCNC: 3.4 G/DL — SIGNIFICANT CHANGE UP (ref 3.3–5)
ALP SERPL-CCNC: 98 U/L — SIGNIFICANT CHANGE UP (ref 40–120)
ALT FLD-CCNC: 48 U/L — HIGH (ref 10–45)
ANION GAP SERPL CALC-SCNC: 8 MMOL/L — SIGNIFICANT CHANGE UP (ref 5–17)
AST SERPL-CCNC: 25 U/L — SIGNIFICANT CHANGE UP (ref 10–40)
BILIRUB SERPL-MCNC: 0.2 MG/DL — SIGNIFICANT CHANGE UP (ref 0.2–1.2)
BUN SERPL-MCNC: 20 MG/DL — SIGNIFICANT CHANGE UP (ref 7–23)
CALCIUM SERPL-MCNC: 9 MG/DL — SIGNIFICANT CHANGE UP (ref 8.4–10.5)
CHLORIDE SERPL-SCNC: 106 MMOL/L — SIGNIFICANT CHANGE UP (ref 96–108)
CO2 SERPL-SCNC: 27 MMOL/L — SIGNIFICANT CHANGE UP (ref 22–31)
CREAT SERPL-MCNC: 1.29 MG/DL — SIGNIFICANT CHANGE UP (ref 0.5–1.3)
GLUCOSE BLDC GLUCOMTR-MCNC: 152 MG/DL — HIGH (ref 70–99)
GLUCOSE BLDC GLUCOMTR-MCNC: 160 MG/DL — HIGH (ref 70–99)
GLUCOSE BLDC GLUCOMTR-MCNC: 178 MG/DL — HIGH (ref 70–99)
GLUCOSE BLDC GLUCOMTR-MCNC: 227 MG/DL — HIGH (ref 70–99)
GLUCOSE SERPL-MCNC: 205 MG/DL — HIGH (ref 70–99)
HCT VFR BLD CALC: 29.8 % — LOW (ref 39–50)
HGB BLD-MCNC: 10.2 G/DL — LOW (ref 13–17)
MCHC RBC-ENTMCNC: 30.4 PG — SIGNIFICANT CHANGE UP (ref 27–34)
MCHC RBC-ENTMCNC: 34.2 GM/DL — SIGNIFICANT CHANGE UP (ref 32–36)
MCV RBC AUTO: 89 FL — SIGNIFICANT CHANGE UP (ref 80–100)
NRBC # BLD: 0 /100 WBCS — SIGNIFICANT CHANGE UP (ref 0–0)
PLATELET # BLD AUTO: 247 K/UL — SIGNIFICANT CHANGE UP (ref 150–400)
POTASSIUM SERPL-MCNC: 4.4 MMOL/L — SIGNIFICANT CHANGE UP (ref 3.5–5.3)
POTASSIUM SERPL-SCNC: 4.4 MMOL/L — SIGNIFICANT CHANGE UP (ref 3.5–5.3)
PROT SERPL-MCNC: 6.6 G/DL — SIGNIFICANT CHANGE UP (ref 6–8.3)
RBC # BLD: 3.35 M/UL — LOW (ref 4.2–5.8)
RBC # FLD: 12.9 % — SIGNIFICANT CHANGE UP (ref 10.3–14.5)
SODIUM SERPL-SCNC: 141 MMOL/L — SIGNIFICANT CHANGE UP (ref 135–145)
WBC # BLD: 6.44 K/UL — SIGNIFICANT CHANGE UP (ref 3.8–10.5)
WBC # FLD AUTO: 6.44 K/UL — SIGNIFICANT CHANGE UP (ref 3.8–10.5)

## 2021-05-20 PROCEDURE — 99232 SBSQ HOSP IP/OBS MODERATE 35: CPT

## 2021-05-20 RX ORDER — LANOLIN ALCOHOL/MO/W.PET/CERES
6 CREAM (GRAM) TOPICAL AT BEDTIME
Refills: 0 | Status: DISCONTINUED | OUTPATIENT
Start: 2021-05-20 | End: 2021-05-21

## 2021-05-20 RX ORDER — INSULIN GLARGINE 100 [IU]/ML
30 INJECTION, SOLUTION SUBCUTANEOUS ONCE
Refills: 0 | Status: COMPLETED | OUTPATIENT
Start: 2021-05-20 | End: 2021-05-20

## 2021-05-20 RX ADMIN — Medication 10 MILLIGRAM(S): at 06:08

## 2021-05-20 RX ADMIN — Medication 4: at 08:03

## 2021-05-20 RX ADMIN — Medication 12.5 MILLIGRAM(S): at 17:33

## 2021-05-20 RX ADMIN — AMLODIPINE BESYLATE 10 MILLIGRAM(S): 2.5 TABLET ORAL at 06:07

## 2021-05-20 RX ADMIN — Medication 1 APPLICATION(S): at 17:33

## 2021-05-20 RX ADMIN — Medication 2: at 12:18

## 2021-05-20 RX ADMIN — Medication 10 UNIT(S): at 08:03

## 2021-05-20 RX ADMIN — LOSARTAN POTASSIUM 100 MILLIGRAM(S): 100 TABLET, FILM COATED ORAL at 06:08

## 2021-05-20 RX ADMIN — CYCLOBENZAPRINE HYDROCHLORIDE 5 MILLIGRAM(S): 10 TABLET, FILM COATED ORAL at 20:13

## 2021-05-20 RX ADMIN — Medication 10 UNIT(S): at 17:28

## 2021-05-20 RX ADMIN — GABAPENTIN 300 MILLIGRAM(S): 400 CAPSULE ORAL at 22:01

## 2021-05-20 RX ADMIN — Medication 650 MILLIGRAM(S): at 10:30

## 2021-05-20 RX ADMIN — Medication 10 UNIT(S): at 12:18

## 2021-05-20 RX ADMIN — Medication 10 MILLIGRAM(S): at 22:01

## 2021-05-20 RX ADMIN — Medication 10 MILLIGRAM(S): at 13:09

## 2021-05-20 RX ADMIN — Medication 2: at 17:28

## 2021-05-20 RX ADMIN — ENOXAPARIN SODIUM 40 MILLIGRAM(S): 100 INJECTION SUBCUTANEOUS at 12:19

## 2021-05-20 RX ADMIN — INSULIN GLARGINE 30 UNIT(S): 100 INJECTION, SOLUTION SUBCUTANEOUS at 22:26

## 2021-05-20 RX ADMIN — FAMOTIDINE 20 MILLIGRAM(S): 10 INJECTION INTRAVENOUS at 17:33

## 2021-05-20 RX ADMIN — Medication 12.5 MILLIGRAM(S): at 06:07

## 2021-05-20 RX ADMIN — Medication 650 MILLIGRAM(S): at 11:00

## 2021-05-20 RX ADMIN — Medication 1 APPLICATION(S): at 06:08

## 2021-05-20 RX ADMIN — GABAPENTIN 300 MILLIGRAM(S): 400 CAPSULE ORAL at 06:07

## 2021-05-20 RX ADMIN — Medication 6 MILLIGRAM(S): at 22:24

## 2021-05-20 RX ADMIN — FAMOTIDINE 20 MILLIGRAM(S): 10 INJECTION INTRAVENOUS at 06:07

## 2021-05-20 RX ADMIN — Medication 5 UNIT(S): at 22:24

## 2021-05-20 NOTE — PROGRESS NOTE ADULT - SUBJECTIVE AND OBJECTIVE BOX
Patient is a 50y old  Male who presents with a chief complaint of 03.4 GBS (18 May 2021 12:30)      Interval  seen and examined on 5/20 but note was skipped. placing note restrospectively  Chart reviewed  No overnight events  Limb weakness improving with therapy. b/l leg weak reported  BM+  No pain      ROS:  no fever/chills/CP/sob/palpitation/dizziness/ abd pain/nausea/vomiting/diarrhea/constipation/headaches. all other ROS neg    ALLERGIES:  No Known Drug Allergies  shellfish (Urticaria)    MEDICATIONS  (STANDING):  amLODIPine   Tablet 10 milliGRAM(s) Oral daily  clotrimazole 1% Cream 1 Application(s) Topical two times a day  dextrose 40% Gel 15 Gram(s) Oral once  dextrose 5%. 1000 milliLiter(s) (50 mL/Hr) IV Continuous <Continuous>  dextrose 5%. 1000 milliLiter(s) (100 mL/Hr) IV Continuous <Continuous>  dextrose 50% Injectable 25 Gram(s) IV Push once  dextrose 50% Injectable 12.5 Gram(s) IV Push once  dextrose 50% Injectable 25 Gram(s) IV Push once  enoxaparin Injectable 40 milliGRAM(s) SubCutaneous daily  famotidine    Tablet 20 milliGRAM(s) Oral two times a day  gabapentin 300 milliGRAM(s) Oral every 8 hours  glucagon  Injectable 1 milliGRAM(s) IntraMuscular once  hydrALAZINE 10 milliGRAM(s) Oral three times a day  insulin glargine Injectable (LANTUS) 60 Unit(s) SubCutaneous at bedtime  insulin lispro (ADMELOG) corrective regimen sliding scale   SubCutaneous three times a day before meals  insulin lispro (ADMELOG) corrective regimen sliding scale   SubCutaneous at bedtime  insulin lispro Injectable (ADMELOG) 5 Unit(s) SubCutaneous <User Schedule>  insulin lispro Injectable (ADMELOG) 10 Unit(s) SubCutaneous with breakfast  insulin lispro Injectable (ADMELOG) 10 Unit(s) SubCutaneous with lunch  insulin lispro Injectable (ADMELOG) 10 Unit(s) SubCutaneous with dinner  lidocaine   Patch 1 Patch Transdermal daily  lidocaine   Patch 1 Patch Transdermal every 24 hours  losartan 100 milliGRAM(s) Oral daily  melatonin 6 milliGRAM(s) Oral at bedtime  metoprolol tartrate 12.5 milliGRAM(s) Oral two times a day    MEDICATIONS  (PRN):  acetaminophen   Tablet .. 650 milliGRAM(s) Oral every 6 hours PRN Mild Pain (1 - 3)  cyclobenzaprine 5 milliGRAM(s) Oral at bedtime PRN Muscle Spasm  senna 2 Tablet(s) Oral at bedtime PRN Constipation    VS: reviewed    CAPILLARY BLOOD GLUCOSE    POCT Blood Glucose.: 178 mg/dL (20 May 2021 21:59)  POCT Blood Glucose.: 160 mg/dL (20 May 2021 17:09)  POCT Blood Glucose.: 152 mg/dL (20 May 2021 12:17)      GENERAL: Not in distress. Alert    HEENT: AT/NC. clear conjuctiva, MMM.     CARDIOVASCULAR: RRR S1, S2. No murmur/rubs/gallop  LUNGS: BLAE+, no rales, no wheezing, no rhonchi.    ABDOMEN: ND. Soft,  NT, no guarding / rebound / rigidity.  BACK: No spine tenderness.  EXTREMITIES: no edema. no leg or calf TP.  SKIN: no rash. warm and dry  NEUROLOGIC: AAO*3, b./l LE mild weakness, sensation intact, speech fluent.    PSYCHIATRIC: Calm.  No agitation.    LABS:                                   10.2   6.44  )-----------( 247      ( 20 May 2021 05:30 )             29.8       05-20    141  |  106  |  20  ----------------------------<  205<H>  4.4   |  27  |  1.29    Ca    9.0      20 May 2021 05:30    TPro  6.6  /  Alb  3.4  /  TBili  0.2  /  DBili  x   /  AST  25  /  ALT  48<H>  /  AlkPhos  98  05-20                RADIOLOGY & ADDITIONAL TESTS:    Care Discussed with Consultants/Other Providers:

## 2021-05-20 NOTE — PROGRESS NOTE ADULT - ASSESSMENT
50y with a history of DM, HTN, hyperlipidemia who presented to [facility]  on 4/10 with complaint of abdominal pain, diarrhea from laxative use and weakness and 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 Hospital course complicated by uncontrolled diabetes, hyponatremia, ALLIE, . Now admitted to [facility] after for initiation of a multidisciplinary rehab program consisting focused on functional mobility, transfers and ADLs (activities of daily living).    GBS,  s/p PLEX  completed PT/OT per rehab  Pain control per rehab    Hyponatremia/ALLIE  - s/p fluid restriciotn  - improved    hematuria/proteinura prior to rehab admission  Resolved  f/u with renal as outpt    DM2, A1c 11.5  on Lantus and lispro  ISS  accucheck  f/u with endo as outpt    Anemia  h/h stable  monitor for now    HTN  Norvasc, Hydralazine, losartan, metoprolol    ALT elevated  - mild  - monitor by PCP    DVT-P: lovenox    d/w Dr. Stern in [facility]

## 2021-05-20 NOTE — PROGRESS NOTE ADULT - ASSESSMENT
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 Hospital course complicated by uncontrolled diabetes, hyponatremia, ALLIE, . Now admitted to [facility] after for initiation of a multidisciplinary rehab program consisting focused on functional mobility, transfers and ADLs (activities of daily living).    Comprehensive Multidisciplinary Rehab Program:  - Continue comprehensive rehab program, 3 hours a day, 5 days a week.  - PT 2hr/day: - OT 1hr/day- 5 days/week     LE weakness - w/u  suggestive of GBS  -S/p PLEX  -Follow up with neuro after dc (f/u remaining LP results)  -ganglioside antibodies, Gq1b -->neg    Hyponatremia/ALLIE- now resolved     DM type 2 with neuropathy : uncontrolled   -A1C 11.5%  -Lantus increased to 60  units qhs,- but patient refuses  ( 5/19 ) Admelog 10/10/10 with meals   -c/w Admelog 5 units at bedtime only if taking a snack   -at home Pt was on Tresiba 30 units qhs and Novolog 15 units premeal TID.    + RPR  -ID consulted at [facility]- treated 2017 (PCN)  -RPR titer 1:1 - no s/s of active syphilis  -Per ID, no need for treatment at present time     HTN : -C/w norvasc, losartan, hydralazine, metoprolol tartrate    Hematuria / proteinuria :--Per nephrology, will need kidney biopsy likely as outpt     Sleep: - Melatonin PRN     Pain Management:Tylenol PRN, - gabapentin 300mg tid   - flexeril qhs for muscle spasms    GI/Bowel:- Senna QHS  - GI ppx: pepcid 20mg bid    /Bladder: toileting prn       Skin/Pressure Injury: OOB as tolerated     Diet: CC Glucerna BID, no shellfish    DVT ppx:- lovenox   - SCDs    IDT 5/18:  -Has shown good functional and neurological recovery  TDD 5/22 with outpatient therapy

## 2021-05-20 NOTE — PROGRESS NOTE ADULT - SUBJECTIVE AND OBJECTIVE BOX
HPI:  This is a 49 y/o male, PMH DM, HTN, hyperlipidemia; pt presented to the ED at [facility] on 4/10 with c/o abdominal cramping, generalized, and diarrhea following use of laxatives for constipation.    Pt. was treated with IV hydration and transferred to CDU for continued care plan:  IV hydration, supportive care, AM labs, general observation care / monitoring. Patient continued to have generalized weakness, numbness and persistent hyponatremia. Patient admitted for further evaluation.  Neurology consulted. 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 Patient refused IVIG but underwent PLEX.   Nephrology consulted for ALLIE and hyponatremia. Likely due to hypovolemia in setting of hyperglycemia. Work up suggested SIADH. Recs to optimize DM control, continue free water restriction <1.2L/day, and monitor Na levels (Na level should not increase more than 6meq in 24 hrs). Also, noted to have proteinuia/hematuria. Renal felt sec to Diabetic Nephropathy but patient will need full vasculitis work up and based on work up result, he might need kidney biopsy which would be scheduled as an outpatient.   Endocrinology also following for uncontrolled diabetes. Insulin regimen adjusted with improvement in glucoses.   ID consulted due to h/o syphilis in the past. Patient had undergone treatment in the past. No need for further treatment.   Last night (5/3), patient reported increased left scapula pain and given IV Dilaudid. Patient attributed to increased use of UEs in PT. Pain management NP consulted. Recommended switch to PO Dilaudid.   Patient has been seen by PT, OT and PM&R attending with recommendations for acute IPR.   Patient is deemed medically stable for transfer to acute IPR at [facility] on 5/5       INTERVAL HPI/OVERNIGHT EVENTS:  Chart Reviewed and patient seen at bedside this am  No new issues overnight  Family training completed yesterday    ROS:  Denies fevers, chills, chest pain, shortness of breath, abdominal pain, headaches, nausea/vomiting    Physical Exam:  Gen - NAD, Comfortable  Pulm - CTAB,   Cardiovascular - S1S2,   Abdomen - Soft, NT/ND, +BS  Extremities - No C/C/E, No calf tenderness     Motor -   5/5      Psychiatric - Mood stable, Affect WNL    Functional:   OT: Independent eating, Mod I grooming, CG Bathing, Supervision Dressing  PT: Min A ambulation 100ft RW, Min A transfers      MEDICATIONS  (STANDING):  amLODIPine   Tablet 10 milliGRAM(s) Oral daily  clotrimazole 1% Cream 1 Application(s) Topical two times a day  dextrose 40% Gel 15 Gram(s) Oral once  dextrose 5%. 1000 milliLiter(s) (50 mL/Hr) IV Continuous <Continuous>  dextrose 5%. 1000 milliLiter(s) (100 mL/Hr) IV Continuous <Continuous>  dextrose 50% Injectable 25 Gram(s) IV Push once  dextrose 50% Injectable 12.5 Gram(s) IV Push once  dextrose 50% Injectable 25 Gram(s) IV Push once  enoxaparin Injectable 40 milliGRAM(s) SubCutaneous daily  famotidine    Tablet 20 milliGRAM(s) Oral two times a day  gabapentin 300 milliGRAM(s) Oral every 8 hours  glucagon  Injectable 1 milliGRAM(s) IntraMuscular once  hydrALAZINE 10 milliGRAM(s) Oral three times a day  insulin glargine Injectable (LANTUS) 60 Unit(s) SubCutaneous at bedtime  insulin lispro (ADMELOG) corrective regimen sliding scale   SubCutaneous three times a day before meals  insulin lispro (ADMELOG) corrective regimen sliding scale   SubCutaneous at bedtime  insulin lispro Injectable (ADMELOG) 5 Unit(s) SubCutaneous <User Schedule>  insulin lispro Injectable (ADMELOG) 10 Unit(s) SubCutaneous with breakfast  insulin lispro Injectable (ADMELOG) 10 Unit(s) SubCutaneous with lunch  insulin lispro Injectable (ADMELOG) 10 Unit(s) SubCutaneous with dinner  lidocaine   Patch 1 Patch Transdermal daily  lidocaine   Patch 1 Patch Transdermal every 24 hours  losartan 100 milliGRAM(s) Oral daily  metoprolol tartrate 12.5 milliGRAM(s) Oral two times a day    MEDICATIONS  (PRN):  acetaminophen   Tablet .. 650 milliGRAM(s) Oral every 6 hours PRN Mild Pain (1 - 3)  cyclobenzaprine 5 milliGRAM(s) Oral at bedtime PRN Muscle Spasm  senna 2 Tablet(s) Oral at bedtime PRN Constipation                            10.2   6.44  )-----------( 247      ( 20 May 2021 05:30 )             29.8     05-20    141  |  106  |  20  ----------------------------<  205<H>  4.4   |  27  |  1.29    Ca    9.0      20 May 2021 05:30    TPro  6.6  /  Alb  3.4  /  TBili  0.2  /  DBili  x   /  AST  25  /  ALT  48<H>  /  AlkPhos  98  05-20

## 2021-05-21 ENCOUNTER — TRANSCRIPTION ENCOUNTER (OUTPATIENT)
Age: 51
End: 2021-05-21

## 2021-05-21 VITALS — DIASTOLIC BLOOD PRESSURE: 87 MMHG | SYSTOLIC BLOOD PRESSURE: 139 MMHG | HEART RATE: 95 BPM

## 2021-05-21 LAB
GLUCOSE BLDC GLUCOMTR-MCNC: 161 MG/DL — HIGH (ref 70–99)
GLUCOSE BLDC GLUCOMTR-MCNC: 172 MG/DL — HIGH (ref 70–99)
GLUCOSE BLDC GLUCOMTR-MCNC: 207 MG/DL — HIGH (ref 70–99)

## 2021-05-21 PROCEDURE — 97163 PT EVAL HIGH COMPLEX 45 MIN: CPT

## 2021-05-21 PROCEDURE — 80053 COMPREHEN METABOLIC PANEL: CPT

## 2021-05-21 PROCEDURE — 97167 OT EVAL HIGH COMPLEX 60 MIN: CPT

## 2021-05-21 PROCEDURE — 86769 SARS-COV-2 COVID-19 ANTIBODY: CPT

## 2021-05-21 PROCEDURE — 83735 ASSAY OF MAGNESIUM: CPT

## 2021-05-21 PROCEDURE — 99232 SBSQ HOSP IP/OBS MODERATE 35: CPT

## 2021-05-21 PROCEDURE — 97110 THERAPEUTIC EXERCISES: CPT

## 2021-05-21 PROCEDURE — 85025 COMPLETE CBC W/AUTO DIFF WBC: CPT

## 2021-05-21 PROCEDURE — 84100 ASSAY OF PHOSPHORUS: CPT

## 2021-05-21 PROCEDURE — 82962 GLUCOSE BLOOD TEST: CPT

## 2021-05-21 PROCEDURE — 36415 COLL VENOUS BLD VENIPUNCTURE: CPT

## 2021-05-21 PROCEDURE — 85027 COMPLETE CBC AUTOMATED: CPT

## 2021-05-21 PROCEDURE — 97112 NEUROMUSCULAR REEDUCATION: CPT

## 2021-05-21 PROCEDURE — 97116 GAIT TRAINING THERAPY: CPT

## 2021-05-21 PROCEDURE — 97530 THERAPEUTIC ACTIVITIES: CPT

## 2021-05-21 PROCEDURE — 97535 SELF CARE MNGMENT TRAINING: CPT

## 2021-05-21 PROCEDURE — 99238 HOSP IP/OBS DSCHRG MGMT 30/<: CPT

## 2021-05-21 RX ORDER — GABAPENTIN 400 MG/1
1 CAPSULE ORAL
Qty: 0 | Refills: 0 | DISCHARGE
Start: 2021-05-21

## 2021-05-21 RX ORDER — FAMOTIDINE 10 MG/ML
1 INJECTION INTRAVENOUS
Qty: 0 | Refills: 0 | DISCHARGE
Start: 2021-05-21

## 2021-05-21 RX ORDER — INSULIN ASPART 100 [IU]/ML
14 INJECTION, SOLUTION SUBCUTANEOUS
Qty: 1260 | Refills: 0
Start: 2021-05-21 | End: 2021-06-19

## 2021-05-21 RX ORDER — HYDRALAZINE HCL 50 MG
1 TABLET ORAL
Qty: 0 | Refills: 0 | DISCHARGE
Start: 2021-05-21

## 2021-05-21 RX ORDER — FAMOTIDINE 10 MG/ML
1 INJECTION INTRAVENOUS
Qty: 60 | Refills: 0
Start: 2021-05-21 | End: 2021-06-19

## 2021-05-21 RX ORDER — INSULIN ASPART 100 [IU]/ML
0 INJECTION, SOLUTION SUBCUTANEOUS
Qty: 0 | Refills: 0 | DISCHARGE

## 2021-05-21 RX ORDER — AMLODIPINE BESYLATE 2.5 MG/1
1 TABLET ORAL
Qty: 30 | Refills: 0
Start: 2021-05-21 | End: 2021-06-19

## 2021-05-21 RX ORDER — INSULIN DEGLUDEC 100 U/ML
52 INJECTION, SOLUTION SUBCUTANEOUS
Qty: 1560 | Refills: 0
Start: 2021-05-21 | End: 2021-06-19

## 2021-05-21 RX ORDER — METOPROLOL TARTRATE 50 MG
0.5 TABLET ORAL
Qty: 30 | Refills: 0
Start: 2021-05-21 | End: 2021-06-19

## 2021-05-21 RX ORDER — ACETAMINOPHEN 500 MG
2 TABLET ORAL
Qty: 240 | Refills: 0
Start: 2021-05-21 | End: 2021-06-19

## 2021-05-21 RX ORDER — INSULIN DEGLUDEC 100 U/ML
52 INJECTION, SOLUTION SUBCUTANEOUS
Qty: 0 | Refills: 0 | DISCHARGE

## 2021-05-21 RX ORDER — AMLODIPINE BESYLATE 2.5 MG/1
1 TABLET ORAL
Qty: 0 | Refills: 0 | DISCHARGE
Start: 2021-05-21

## 2021-05-21 RX ORDER — HYDRALAZINE HCL 50 MG
1 TABLET ORAL
Qty: 90 | Refills: 0
Start: 2021-05-21 | End: 2021-06-19

## 2021-05-21 RX ORDER — LOSARTAN POTASSIUM 100 MG/1
1 TABLET, FILM COATED ORAL
Qty: 30 | Refills: 0
Start: 2021-05-21 | End: 2021-06-19

## 2021-05-21 RX ORDER — LIDOCAINE 4 G/100G
1 CREAM TOPICAL
Qty: 30 | Refills: 0
Start: 2021-05-21 | End: 2021-06-19

## 2021-05-21 RX ORDER — GABAPENTIN 400 MG/1
1 CAPSULE ORAL
Qty: 90 | Refills: 0
Start: 2021-05-21 | End: 2021-06-19

## 2021-05-21 RX ORDER — LOSARTAN POTASSIUM 100 MG/1
1 TABLET, FILM COATED ORAL
Qty: 0 | Refills: 0 | DISCHARGE
Start: 2021-05-21

## 2021-05-21 RX ORDER — CYCLOBENZAPRINE HYDROCHLORIDE 10 MG/1
1 TABLET, FILM COATED ORAL
Qty: 0 | Refills: 0 | DISCHARGE
Start: 2021-05-21

## 2021-05-21 RX ORDER — LANOLIN ALCOHOL/MO/W.PET/CERES
2 CREAM (GRAM) TOPICAL
Qty: 0 | Refills: 0 | DISCHARGE
Start: 2021-05-21

## 2021-05-21 RX ORDER — CYCLOBENZAPRINE HYDROCHLORIDE 10 MG/1
1 TABLET, FILM COATED ORAL
Qty: 30 | Refills: 0
Start: 2021-05-21 | End: 2021-06-19

## 2021-05-21 RX ORDER — INSULIN DEGLUDEC 100 U/ML
30 INJECTION, SOLUTION SUBCUTANEOUS
Qty: 900 | Refills: 0
Start: 2021-05-21 | End: 2021-06-19

## 2021-05-21 RX ORDER — LANOLIN ALCOHOL/MO/W.PET/CERES
2 CREAM (GRAM) TOPICAL
Qty: 60 | Refills: 0
Start: 2021-05-21 | End: 2021-06-19

## 2021-05-21 RX ADMIN — Medication 10 UNIT(S): at 17:22

## 2021-05-21 RX ADMIN — LOSARTAN POTASSIUM 100 MILLIGRAM(S): 100 TABLET, FILM COATED ORAL at 06:06

## 2021-05-21 RX ADMIN — AMLODIPINE BESYLATE 10 MILLIGRAM(S): 2.5 TABLET ORAL at 06:06

## 2021-05-21 RX ADMIN — Medication 12.5 MILLIGRAM(S): at 06:06

## 2021-05-21 RX ADMIN — Medication 650 MILLIGRAM(S): at 13:01

## 2021-05-21 RX ADMIN — GABAPENTIN 300 MILLIGRAM(S): 400 CAPSULE ORAL at 06:06

## 2021-05-21 RX ADMIN — Medication 10 UNIT(S): at 08:20

## 2021-05-21 RX ADMIN — FAMOTIDINE 20 MILLIGRAM(S): 10 INJECTION INTRAVENOUS at 17:21

## 2021-05-21 RX ADMIN — Medication 10 UNIT(S): at 12:10

## 2021-05-21 RX ADMIN — Medication 2: at 17:22

## 2021-05-21 RX ADMIN — Medication 2: at 12:10

## 2021-05-21 RX ADMIN — FAMOTIDINE 20 MILLIGRAM(S): 10 INJECTION INTRAVENOUS at 06:06

## 2021-05-21 RX ADMIN — Medication 10 MILLIGRAM(S): at 06:06

## 2021-05-21 RX ADMIN — Medication 12.5 MILLIGRAM(S): at 17:21

## 2021-05-21 RX ADMIN — Medication 4: at 08:20

## 2021-05-21 RX ADMIN — Medication 650 MILLIGRAM(S): at 12:31

## 2021-05-21 RX ADMIN — ENOXAPARIN SODIUM 40 MILLIGRAM(S): 100 INJECTION SUBCUTANEOUS at 12:10

## 2021-05-21 NOTE — PROGRESS NOTE ADULT - ATTENDING COMMENTS
No new complaints  Neurologically and medically stable  Observed in OT gym - functionally improving   Showers daily
Patient seen at bedside  No new issues overnight  Feels he is ready to go home and continue therapy  Follow up discussed   Has met rehab goals and plan for dc home in am with outpatient therapy    2. For diabetes, patient to resume his home regimen
Patient seen in PT gym this am with resident Dr. Ti Fisher  Patient states that he feels well, but concerned about his Insulin requirements- Discussed with patient that he would be discharged on his home insulin and diabetic regimen.    2. He reports some muscle spasms over weekend, and took flexeril as he did not want to take any narcotics    3. Labs stable    4. Continue rehab program
Patient seen with resident Dr. Ti Fisher  He states that he feels well and happy with his progress in therapy  No new issues overnight  Slept well  Denies any new pain     Progress reviewed at team conference today. DC changed to 5/22 as he has shown excellent functional gains. Family training to be coordinated    Medical and rehab status discussed with wife at bedside on 5/17
Patient examined, medical records reviewed  Improving motorically, no painful paresthesias  Good spirits  Tolerates therapy well  Labs and medication regimen reviewed
Patient seen this am along with resident Dr. Ti Fisher   States that he feels ok  Foot fungal - ? improved with home cream     2. Per chart review, patient non compliant with diet and also refusing dosed insulin, but taking it later.   Monitor sugars closely and adjust insulin accordingly . D/W patient     3. Patient seen during PT session. Discussed with PT about G-EO trial   continue rehab program
Chart reviewed. Patient seen at bedside this am with resident Dr. Ti Fisher  Patient concerned about high dose of Insulin while in hospital . Changed premeal insulin to with meals as at times patient PO intake varied  2. BP running normal range. SBP < 130 . will decrease hydralazine to 10mg q8h and hold for SBP < 120mmhg    2. Team conference today. Goals of modified independent.  TDD 5/26  Progress reviewed with wife at bedside yesterday and all questions answered

## 2021-05-21 NOTE — PROGRESS NOTE ADULT - NSICDXPILOT_GEN_ALL_CORE
[place]

## 2021-05-21 NOTE — DISCHARGE NOTE PROVIDER - PROVIDER TOKENS
PROVIDER:[TOKEN:[[id number]],FOLLOWUP:[1 week]],PROVIDER:[TOKEN:[[id number]],FOLLOWUP:[1 month]]

## 2021-05-21 NOTE — PROGRESS NOTE ADULT - ASSESSMENT
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 Hospital course complicated by uncontrolled diabetes, hyponatremia, ALLIE, . Now admitted to [facility] after for initiation of a multidisciplinary rehab program consisting focused on functional mobility, transfers and ADLs (activities of daily living).    Comprehensive Multidisciplinary Rehab Program:  - Continue comprehensive rehab program, 3 hours a day, 5 days a week.  - PT 2hr/day: - OT 1hr/day- 5 days/week     LE weakness - w/u  suggestive of GBS  -S/p PLEX  -Follow up with neuro after dc (f/u remaining LP results)  -ganglioside antibodies, Gq1b -->neg    Hyponatremia/ALLIE- now resolved     DM type 2 with neuropathy : uncontrolled   -A1C 11.5%  -Lantus increased to 60  units qhs,- but patient refuses  ( 5/19 ) Admelog 10/10/10 with meals   -c/w Admelog 5 units at bedtime only if taking a snack   -at home Pt was on Tresiba 30 units qhs and Novolog 15 units premeal TID. 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[facility]- treated 2017 (PCN)  -RPR titer 1:1 - no s/s of active syphilis  -Per ID, no need for treatment at present time     HTN : -C/w norvasc, losartan, hydralazine, metoprolol tartrate    Hematuria / proteinuria :--Per nephrology, will need kidney biopsy likely as outpt     Sleep: - Melatonin PRN     Pain Management:Tylenol PRN, - gabapentin 300mg tid   - flexeril qhs for muscle spasms    GI/Bowel:- Senna QHS  - GI ppx: pepcid 20mg bid    /Bladder: toileting prn       Skin/Pressure Injury: OOB as tolerated     Diet: CC Glucerna BID, no shellfish    DVT ppx:- lovenox   - SCDs    IDT 5/18:  -Has shown good functional and neurological recovery  TDD 5/22 with outpatient therapy

## 2021-05-21 NOTE — PROGRESS NOTE ADULT - ASSESSMENT
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 Hospital course complicated by uncontrolled diabetes, hyponatremia, ALLIE, . Now admitted to [facility] after for initiation of a multidisciplinary rehab program consisting focused on functional mobility, transfers and ADLs (activities of daily living).    GBS,  s/p PLEX  completed PT/OT per rehab  Pain control per rehab    Hyponatremia/ALLIE  - s/p fluid restriciotn  - improved    hematuria/proteinura prior to rehab admission  Resolved  f/u with renal as outpt    DM2, A1c 11.5  on Lantus and lispro  ISS  accucheck  f/u with endo as outpt    Anemia  h/h stable  monitor for now    HTN  Norvasc, Hydralazine, losartan, metoprolol    ALT elevated  - mild  - monitor by PCP    DVT-P: lovenox    d/w Dr. Stern in [facility]DR

## 2021-05-21 NOTE — PROGRESS NOTE ADULT - REASON FOR ADMISSION
03.4 GBS

## 2021-05-21 NOTE — PROGRESS NOTE ADULT - SUBJECTIVE AND OBJECTIVE BOX
Patient is a 50y old  Male who presents with a chief complaint of 03.4 GBS (18 May 2021 12:30)      Interval  seen and examined  Chart reviewed  No overnight events  Limb weakness improving with therapy  BM+  No pain  RN reported patient got 30 unit of lantus last night as BS was lower normal  for DC today      ROS:  no fever/chills/CP/sob/palpitation/dizziness/ abd pain/nausea/vomiting/diarrhea/constipation/headaches. all other ROS neg    ALLERGIES:  No Known Drug Allergies  shellfish (Urticaria)    MEDICATIONS  (STANDING):  amLODIPine   Tablet 10 milliGRAM(s) Oral daily  clotrimazole 1% Cream 1 Application(s) Topical two times a day  dextrose 40% Gel 15 Gram(s) Oral once  dextrose 5%. 1000 milliLiter(s) (50 mL/Hr) IV Continuous <Continuous>  dextrose 5%. 1000 milliLiter(s) (100 mL/Hr) IV Continuous <Continuous>  dextrose 50% Injectable 25 Gram(s) IV Push once  dextrose 50% Injectable 12.5 Gram(s) IV Push once  dextrose 50% Injectable 25 Gram(s) IV Push once  enoxaparin Injectable 40 milliGRAM(s) SubCutaneous daily  famotidine    Tablet 20 milliGRAM(s) Oral two times a day  gabapentin 300 milliGRAM(s) Oral every 8 hours  glucagon  Injectable 1 milliGRAM(s) IntraMuscular once  hydrALAZINE 10 milliGRAM(s) Oral three times a day  insulin glargine Injectable (LANTUS) 60 Unit(s) SubCutaneous at bedtime  insulin lispro (ADMELOG) corrective regimen sliding scale   SubCutaneous three times a day before meals  insulin lispro (ADMELOG) corrective regimen sliding scale   SubCutaneous at bedtime  insulin lispro Injectable (ADMELOG) 5 Unit(s) SubCutaneous <User Schedule>  insulin lispro Injectable (ADMELOG) 10 Unit(s) SubCutaneous with breakfast  insulin lispro Injectable (ADMELOG) 10 Unit(s) SubCutaneous with lunch  insulin lispro Injectable (ADMELOG) 10 Unit(s) SubCutaneous with dinner  lidocaine   Patch 1 Patch Transdermal daily  lidocaine   Patch 1 Patch Transdermal every 24 hours  losartan 100 milliGRAM(s) Oral daily  melatonin 6 milliGRAM(s) Oral at bedtime  metoprolol tartrate 12.5 milliGRAM(s) Oral two times a day    MEDICATIONS  (PRN):  acetaminophen   Tablet .. 650 milliGRAM(s) Oral every 6 hours PRN Mild Pain (1 - 3)  cyclobenzaprine 5 milliGRAM(s) Oral at bedtime PRN Muscle Spasm  senna 2 Tablet(s) Oral at bedtime PRN Constipation      Vital Signs Last 24 Hrs  T(C): 36.6 (20 May 2021 20:03), Max: 36.6 (20 May 2021 20:03)  T(F): 97.8 (20 May 2021 20:03), Max: 97.8 (20 May 2021 20:03)  HR: 85 (21 May 2021 06:04) (85 - 102)  BP: 143/79 (21 May 2021 06:04) (131/74 - 145/89)  BP(mean): --  RR: 16 (20 May 2021 20:03) (16 - 16)  SpO2: 98% (20 May 2021 20:03) (98% - 98%)    CAPILLARY BLOOD GLUCOSE      POCT Blood Glucose.: 207 mg/dL (21 May 2021 08:04)  POCT Blood Glucose.: 178 mg/dL (20 May 2021 21:59)  POCT Blood Glucose.: 160 mg/dL (20 May 2021 17:09)  POCT Blood Glucose.: 152 mg/dL (20 May 2021 12:17)      GENERAL: Not in distress. Alert    HEENT: AT/NC. clear conjuctiva, MMM.     CARDIOVASCULAR: RRR S1, S2. No murmur/rubs/gallop  LUNGS: BLAE+, no rales, no wheezing, no rhonchi.    ABDOMEN: ND. Soft,  NT, no guarding / rebound / rigidity. BS normoactive. No CVA tenderness.    BACK: No spine tenderness.  EXTREMITIES: no edema. no leg or calf TP.  SKIN: no rash. warm and dry  NEUROLOGIC: AAO*3, b./l LE mild weakness, sensation intact, speech fluent.    PSYCHIATRIC: Calm.  No agitation.    LABS:                                   10.2   6.44  )-----------( 247      ( 20 May 2021 05:30 )             29.8       05-20    141  |  106  |  20  ----------------------------<  205<H>  4.4   |  27  |  1.29    Ca    9.0      20 May 2021 05:30    TPro  6.6  /  Alb  3.4  /  TBili  0.2  /  DBili  x   /  AST  25  /  ALT  48<H>  /  AlkPhos  98  05-20                RADIOLOGY & ADDITIONAL TESTS:    Care Discussed with Consultants/Other Providers:

## 2021-05-21 NOTE — DISCHARGE NOTE PROVIDER - CARE PROVIDERS DIRECT ADDRESSES
,DirectAddress_Unknown,[email or web address] ,DirectAddress_Unknown,[email or web address],DirectAddress_Unknown

## 2021-05-21 NOTE — DISCHARGE NOTE PROVIDER - CARE PROVIDER_API CALL
Ted Garza)  Internal Medicine  [address]  [place] [address]  Phone: [phone]  Fax: [phone]  Follow Up Time: 1 week    Aline Stern)  Brain Injury Medicine; PhysicalRehab Medicine  [address] [place] [address]  Phone: [phone]  Fax: [phone]  Follow Up Time: 1 month

## 2021-05-21 NOTE — DISCHARGE NOTE PROVIDER - NSDCCPCAREPLAN_GEN_ALL_CORE_FT
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 Please keep all your recommended follow up visits

## 2021-05-21 NOTE — PROGRESS NOTE ADULT - SUBJECTIVE AND OBJECTIVE BOX
HPI:  This is a 49 y/o male, PMH DM, HTN, hyperlipidemia; pt presented to the ED at [facility] on 4/10 with c/o abdominal cramping, generalized, and diarrhea following use of laxatives for constipation.  In the ED, WBC 10.82, Hb 13.6, CMP: sodium 126, chloride 89, glucose 281, CMP otherwise unremarkable.  VBG: Lactate 2.6 (later downtrended to 1.6).  Pt. was treated with IV hydration and transferred to CDU for continued care plan:  IV hydration, supportive care, AM labs, general observation care / monitoring. Patient continued to have generalized weakness, numbness and persistent hyponatremia. Patient admitted for further evaluation.  Neurology consulted. 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 Patient refused IVIG but underwent PLEX.   Nephrology consulted for ALLIE and hyponatremia. Likely due to hypovolemia in setting of hyperglycemia. Work up suggested SIADH. Recs to optimize DM control, continue free water restriction <1.2L/day, and monitor Na levels (Na level should not increase more than 6meq in 24 hrs). Also, noted to have proteinuia/hematuria. Renal felt sec to Diabetic Nephropathy but patient will need full vasculitis work up and based on work up result, he might need kidney biopsy which would be scheduled as an outpatient.   Endocrinology also following for uncontrolled diabetes. Insulin regimen adjusted with improvement in glucoses.   ID consulted due to h/o syphilis in the past. Patient had undergone treatment in the past. No need for further treatment.   Last night (5/3), patient reported increased left scapula pain and given IV Dilaudid. Patient attributed to increased use of UEs in PT. Pain management NP consulted. Recommended switch to PO Dilaudid.   Patient has been seen by PT, OT and PM&R attending with recommendations for acute IPR.   Patient is deemed medically stable for transfer to acute IPR at [facility] on 5/4 and arrived 5/5. (05 May 2021 13:33)      INTERVAL HPI/OVERNIGHT EVENTS:  Chart Reviewed and patient seen at bedside.  Pt refusing 60 units lantus past 2-3 nights.  Does not wish to take more than 30 units.  Otherwise no complaints and issues. Says he's ready to walk out of rehab.    ROS:  Denies fevers, chills, chest pain, shortness of breath, abdominal pain, headaches, nausea/vomiting    Vital Signs Last 24 Hrs  T(C): 36.6 (20 May 2021 20:03), Max: 36.6 (20 May 2021 20:03)  T(F): 97.8 (20 May 2021 20:03), Max: 97.8 (20 May 2021 20:03)  HR: 91 (21 May 2021 13:51) (85 - 99)  BP: 119/77 (21 May 2021 13:51) (119/77 - 145/89)  BP(mean): --  RR: 16 (21 May 2021 13:51) (16 - 16)  SpO2: 98% (20 May 2021 20:03) (98% - 98%)    Physical Exam:  Chart Reviewed and patient seen at bedside this am  No new issues overnight  Family training completed yesterday    ROS:  Denies fevers, chills, chest pain, shortness of breath, abdominal pain, headaches, nausea/vomiting    Physical Exam:  Gen - NAD, Comfortable  Pulm - CTAB,   Cardiovascular - S1S2,   Abdomen - Soft, NT/ND, +BS  Extremities - No C/C/E, No calf tenderness     Motor -   5/5      Psychiatric - Mood stable, Affect WNL    Functional:   OT: Independent eating, Mod I grooming, CG Bathing, Supervision Dressing  PT: Min A ambulation 100ft RW, Min A transfers    LABS:  05-20    141  |  106  |  20  ----------------------------<  205<H>  4.4   |  27  |  1.29    Ca    9.0      20 May 2021 05:30    TPro  6.6  /  Alb  3.4  /  TBili  0.2  /  DBili  x   /  AST  25  /  ALT  48<H>  /  AlkPhos  98  05-20                                              10.2   6.44  )-----------( 247      ( 20 May 2021 05:30 )             29.8     CAPILLARY BLOOD GLUCOSE      POCT Blood Glucose.: 161 mg/dL (21 May 2021 11:58)  POCT Blood Glucose.: 207 mg/dL (21 May 2021 08:04)  POCT Blood Glucose.: 178 mg/dL (20 May 2021 21:59)  POCT Blood Glucose.: 160 mg/dL (20 May 2021 17:09)      MEDICATIONS:  MEDICATIONS  (STANDING):  amLODIPine   Tablet 10 milliGRAM(s) Oral daily  clotrimazole 1% Cream 1 Application(s) Topical two times a day  dextrose 40% Gel 15 Gram(s) Oral once  dextrose 5%. 1000 milliLiter(s) (50 mL/Hr) IV Continuous <Continuous>  dextrose 5%. 1000 milliLiter(s) (100 mL/Hr) IV Continuous <Continuous>  dextrose 50% Injectable 25 Gram(s) IV Push once  dextrose 50% Injectable 12.5 Gram(s) IV Push once  dextrose 50% Injectable 25 Gram(s) IV Push once  enoxaparin Injectable 40 milliGRAM(s) SubCutaneous daily  famotidine    Tablet 20 milliGRAM(s) Oral two times a day  gabapentin 300 milliGRAM(s) Oral every 8 hours  glucagon  Injectable 1 milliGRAM(s) IntraMuscular once  hydrALAZINE 10 milliGRAM(s) Oral three times a day  insulin glargine Injectable (LANTUS) 60 Unit(s) SubCutaneous at bedtime  insulin lispro (ADMELOG) corrective regimen sliding scale   SubCutaneous three times a day before meals  insulin lispro (ADMELOG) corrective regimen sliding scale   SubCutaneous at bedtime  insulin lispro Injectable (ADMELOG) 5 Unit(s) SubCutaneous <User Schedule>  insulin lispro Injectable (ADMELOG) 10 Unit(s) SubCutaneous with breakfast  insulin lispro Injectable (ADMELOG) 10 Unit(s) SubCutaneous with lunch  insulin lispro Injectable (ADMELOG) 10 Unit(s) SubCutaneous with dinner  lidocaine   Patch 1 Patch Transdermal daily  lidocaine   Patch 1 Patch Transdermal every 24 hours  losartan 100 milliGRAM(s) Oral daily  melatonin 6 milliGRAM(s) Oral at bedtime  metoprolol tartrate 12.5 milliGRAM(s) Oral two times a day    MEDICATIONS  (PRN):  acetaminophen   Tablet .. 650 milliGRAM(s) Oral every 6 hours PRN Mild Pain (1 - 3)  cyclobenzaprine 5 milliGRAM(s) Oral at bedtime PRN Muscle Spasm  senna 2 Tablet(s) Oral at bedtime PRN Constipation

## 2021-05-21 NOTE — DISCHARGE NOTE PROVIDER - NSDCMRMEDTOKEN_GEN_ALL_CORE_FT
acetaminophen 325 mg oral tablet: 2 tab(s) orally every 6 hours, As needed, Temp greater or equal to 38C (100.4F), Mild Pain (1 - 3), Moderate Pain (4 - 6)  acetaminophen 325 mg oral tablet: 2 tab(s) orally every 6 hours, As needed, Mild Pain (1 - 3)  amLODIPine 10 mg oral tablet: 1 tab(s) orally once a day  bisacodyl 5 mg oral delayed release tablet: 1 tab(s) orally once a day (at bedtime)  cyclobenzaprine 5 mg oral tablet: 1 tab(s) orally once a day (at bedtime), As needed, Muscle Spasm  famotidine 20 mg oral tablet: 1 tab(s) orally 2 times a day  gabapentin 300 mg oral capsule: 1 cap(s) orally every 8 hours  hydrALAZINE 10 mg oral tablet: 1 tab(s) orally 3 times a day  lidocaine 5% topical film: Apply topically to affected area once a day  losartan 100 mg oral tablet: 1 tab(s) orally once a day  melatonin 3 mg oral tablet: 2 tab(s) orally once a day (at bedtime)  NovoLO units subcutaneous   with breakfast   14 units suncutaneous with lunch,   14 units subcutaneous with dinner  Tresiba 100 units/mL subcutaneous solution: 52 unit(s) subcutaneous once a day (at bedtime)   acetaminophen 325 mg oral tablet: 2 tab(s) orally every 6 hours, As needed, Mild Pain (1 - 3)  amLODIPine 10 mg oral tablet: 1 tab(s) orally once a day  bisacodyl 5 mg oral delayed release tablet: 1 tab(s) orally once a day (at bedtime)  cyclobenzaprine 5 mg oral tablet: 1 tab(s) orally once a day (at bedtime), As needed, Muscle Spasm  famotidine 20 mg oral tablet: 1 tab(s) orally 2 times a day  gabapentin 300 mg oral capsule: 1 cap(s) orally every 8 hours  hydrALAZINE 10 mg oral tablet: 1 tab(s) orally 3 times a day  lidocaine 5% topical film: Apply topically to affected area once a day  losartan 100 mg oral tablet: 1 tab(s) orally once a day  melatonin 3 mg oral tablet: 2 tab(s) orally once a day (at bedtime)  metoprolol tartrate 25 mg oral tablet: 0.5 tab(s) orally 2 times a day   NovoLOG 100 units/mL injectable solution: 14 unit(s) injectable 3 times a day (before meals)   Tresiba 100 units/mL subcutaneous solution: 30 unit(s) subcutaneous once a day (at bedtime)

## 2021-05-21 NOTE — DISCHARGE NOTE NURSING/CASE MANAGEMENT/SOCIAL WORK - PATIENT PORTAL LINK FT
You can access the FollowMyHealth Patient Portal offered by [facility] by registering at the following website: [email or web address]. By joining [employer]’s FollowMyHealth portal, you will also be able to view your health information using other applications (apps) compatible with our system.

## 2021-05-21 NOTE — DISCHARGE NOTE PROVIDER - HOSPITAL COURSE
a 50y with a history of DM, HTN, hyperlipidemia who presented to [facility]  on 4/10 with complaint of abdominal pain, diarrhea from laxative use and weakness and 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 Hospital course complicated by uncontrolled diabetes, hyponatremia, ALLIE, .   5/5/ refused admission COVID swab. understood the risks to himself and others  5/6: refused 52 units of lantus  but later took it.  5/8: refused 52 units of lantus at bedtime, says he is on 28u at home, ended up taking it  5/10: coltramizole for feet fungal infection - pt says he gets this at home  5/12: loose stool overnight. decreased hydralazine to 10mg tid  5/15: Lantus increased to 58 units   5/16: flexeril PRN for muslc spasms  5/20: refusing full dose of lantus in evening, took 30U      Patient has participated therapy sessions and has made good progress in therapy. His sugars were managed by hospitalist by adjustment of insulin, but patient non compliant with regimen and diet at times

## 2021-06-08 ENCOUNTER — APPOINTMENT (OUTPATIENT)
Dept: ENDOCRINOLOGY | Facility: CLINIC | Age: 51
End: 2021-06-08

## 2021-07-12 ENCOUNTER — APPOINTMENT (OUTPATIENT)
Dept: ENDOCRINOLOGY | Facility: CLINIC | Age: 51
End: 2021-07-12

## 2021-12-29 NOTE — PROGRESS NOTE ADULT - REASON FOR ADMISSION
Called patient to reschedule due to PT being out of office and she decided that she just wanted to be discharged.  She will let us know if she needs therapy again in the future.   
abdominal pain
abdominal pain
constipation
GBS
abdominal pain and consitpaiton
abdominal pian
consitpaiton, change in bowels
constipation
constipation
constipaton
dyspepsia
nighat
abdominal pain
abdominal pain
consitpaiton
consitpiation
constipaiton, abdominal pain
constipation
dyspepsia
abd pain, diarrhea

## 2022-01-25 NOTE — PROVIDER CONTACT NOTE (OTHER) - ASSESSMENT
[address]   OPERATIVE REPORT    Name:  Swathi Landry  MR#:  [id number]  :  1956  ACCOUNT #:  [de-identified]  DATE OF SERVICE:  2022    PREOPERATIVE DIAGNOSIS:  Pilonidal cyst.    POSTOPERATIVE DIAGNOSIS:  Pilonidal cyst.    PROCEDURE PERFORMED:  Cleft lift repair of pilonidal cyst.    SURGEON:  Gurpreet Murguia MD    ASSISTANT:      ANESTHESIA:  General plus 30 mL of 0.25% Marcaine with epinephrine. COMPLICATIONS:  None apparent. SPECIMENS REMOVED:  Pilonidal cyst sent to Pathology. IMPLANTS:  None. ESTIMATED BLOOD LOSS:  5 mL. DISPOSITION:  The patient tolerated the procedure well and was transferred to the recovery room in stable condition. INDICATIONS:  This is a 77-year-old with a history of pilonidal cyst disease. Risks, benefits, and alternatives were discussed regarding pilonidal cleft lift procedure. PROCEDURE:  He was taken to the operating room and placed on the table in normal supine position. He was intubated and flipped in the prone jackknife position. The buttocks were taped apart and the perineum was prepped and draped in the usual fashion. Time-out was performed. He had several midline pits and sinuses. At the apex and at the inferior margin, he had 2 pockets of small amounts of purulence. The area was marked out in order to rotate the flap of skin and subcutaneous fat from the left side to the right side. I lifted the flap of skin and subcutaneous fat from left side using electrocautery. We removed the tapes and then rotated the flap over. I removed the skin and subcutaneous tissue overlying the pilonidal cyst.  I then scored the deep layers in order to break up the scar tissue. We debrided this area. I irrigated it with saline. I then removed all the tapes and this allowed the flap to be raised from left to right. I then used 3-0 Vicryl to close the wound in layers.   I used a 4-0 Stratafix to close the skin in subcuticular fashion. Dermabond was used as sterile dressing. I placed the drain deep in the subcutaneous tissue to allow for drainage. The drain was a blue vessel loop. This was secured with 2-0 silk suture. Dermabond was used as sterile dressing. The patient tolerated the procedure well and was transferred to the recovery room in stable condition.       MD DIONNE [id number]  D:  01/24/2022 16:19  T:  01/25/2022 1:28  JOB #:  [id number] Pt is alert and oriented. Other vital signs stable. Pt denies headache, dizziness, chest pain and shortness of breath. Pt denies any other s/s. Pt appears to be resting comfortably in bed.

## 2022-03-06 NOTE — ED CDU PROVIDER INITIAL DAY NOTE - PRO INTERPRETER NEED 2
Stop smoking. Take albuterol as directed. Follow up with your primary care provider in 2 days. Return to the emergency department for any increase in symptoms or for any other new or worrisome symptoms.     
English

## 2023-02-11 NOTE — PROVIDER CONTACT NOTE (OTHER) - BACKGROUND
Monisha Lezama, HTN, DM2
Patient admitted for abdominal cramping and diarrhea due to use of laxatives.
Pt is a 50 yr old male w/ pmhx of hyperlipidemia, HTN and DM p/w abdominal cramping and diarrhea
50 year old male admitted for hyponatremia with abdominal cramping. PMH of HTN and diabetes.
Electrolyte imbalance possible GB
Patient admitted with GI discomfort.
Patient admitted with GI discomfort.
Pt is a 50 yr old male w/ pmhx of hyperlipidemia, HTN and DM p/w abdominal cramping and diarrhea
50 year old male admitted for hyponatremia. PMH of HTN and diabetes.
English

## 2023-02-13 NOTE — ED ADULT TRIAGE NOTE - NS ED NOTE AC HIGH RISK COUNTRIES
Health Maintenance Due   Topic Date Due   • Shingles Vaccine (1 of 2) Never done   • COVID-19 Vaccine (3 - Booster for Moderna series) 09/10/2021       Patient is due for topics as listed above but is not proceeding with Immunization(s) COVID-19 and Shingles at this time. Wants to discuss with provider      No [Negative] : Heme/Lymph

## 2023-11-29 NOTE — PHYSICAL THERAPY INITIAL EVALUATION ADULT - PERTINENT HX OF CURRENT PROBLEM, REHAB EVAL
36.9 Hans is 50 year old male admitted with history of HTN, DM, hyperlipidemia, presents with abdominal cramping, generalized and diarrhea.

## 2023-12-11 NOTE — CONSULT NOTE ADULT - SUBJECTIVE AND OBJECTIVE BOX
Dr. Gregory  Office [phone]  Cell [phone]  Linda LÓPEZ  Cell [phone]    RENAL INITIAL CONSULT NOTE: DATE OF SERVICE 21 @ 17:49    HPI:  51 yo male, PMH DM, HTN, hyperlipidemia; pt presented to the ED c/o abdominal cramping, generalized, and diarrhea following use of laxatives as above for constipation.  In the ED, WBC 10.82, Hb 13.6, CMP: sodium 126, chloride 89, glucose 281, CMP otherwise unremarkable.  VBG: Lactate 2.6 (later downtrended to 1.6).  Pt. was treated with IV hydration and dispo'd to CDU for continued care plan:  IV hydration, supportive care, AM labs, general observation care / monitoring. pt continued to have generalized weakness , numbness and on labs persistent hyponatermia and now being admitted for furhter evlauation .        Allergies:  No Known Drug Allergies  shellfish (Urticaria)      PAST MEDICAL & SURGICAL HISTORY:  DM (diabetes mellitus)    HTN (hypertension)    Hyperlipidemia    H/O total knee replacement    Foot fracture        Home Medications Reviewed    Hospital Medications:   MEDICATIONS  (STANDING):  dextrose 40% Gel 15 Gram(s) Oral once  dextrose 5%. 1000 milliLiter(s) (50 mL/Hr) IV Continuous <Continuous>  dextrose 5%. 1000 milliLiter(s) (100 mL/Hr) IV Continuous <Continuous>  dextrose 50% Injectable 25 Gram(s) IV Push once  dextrose 50% Injectable 12.5 Gram(s) IV Push once  dextrose 50% Injectable 25 Gram(s) IV Push once  glucagon  Injectable 1 milliGRAM(s) IntraMuscular once  insulin glargine Injectable (LANTUS) 20 Unit(s) SubCutaneous at bedtime  insulin lispro (ADMELOG) corrective regimen sliding scale   SubCutaneous three times a day before meals  insulin lispro (ADMELOG) corrective regimen sliding scale   SubCutaneous at bedtime  insulin lispro Injectable (ADMELOG) 10 Unit(s) SubCutaneous three times a day before meals  losartan 100 milliGRAM(s) Oral daily  sodium chloride 0.9%. 1000 milliLiter(s) (150 mL/Hr) IV Continuous <Continuous>      SOCIAL HISTORY:  Denies ETOh, Smoking,     FAMILY HISTORY:  No pertinent family history in first degree relatives        REVIEW OF SYSTEMS:  CONSTITUTIONAL: has weakness, no fevers or chills  EYES/ENT: No visual changes;  No vertigo or throat pain   NECK: No pain or stiffness  RESPIRATORY: No cough, wheezing, hemoptysis; No shortness of breath  CARDIOVASCULAR: No chest pain or palpitations.  GASTROINTESTINAL: as per HPI  GENITOURINARY: No dysuria, frequency, foamy urine, urinary urgency, incontinence or hematuria  NEUROLOGICAL: No numbness or weakness  SKIN: No itching, burning, rashes, or lesions   VASCULAR: No bilateral lower extremity edema.   All other review of systems is negative unless indicated above.    VITALS:  T(F): 98.7 (21 @ 16:31), Max: 98.7 (21 @ 16:31)  HR: 80 (21 @ 16:31)  BP: 150/101 (21 @ 16:31)  RR: 17 (21 @ 16:31)  SpO2: 100% (21 @ 16:31)  Wt(kg): --        PHYSICAL EXAM:  Constitutional: NAD  HEENT: anicteric sclera, oropharynx clear, MMM  Neck: No JVD  Respiratory: CTAB, no wheezes, rales or rhonchi  Cardiovascular: S1, S2, RRR  Gastrointestinal: BS+, soft, NT/ND  Extremities: No cyanosis or clubbing. No peripheral edema  Neurological: A/O x 3, no focal deficits  Psychiatric: Normal mood, normal affect  : No CVA tenderness. No lucas.   Skin: No rashes       LABS:      126<L>  |  92<L>  |  16  ----------------------------<  244<H>  4.5   |  24  |  0.94    Ca    8.4      2021 11:43  Phos  2.4       Mg     2.3         TPro  7.9  /  Alb  3.8  /  TBili  0.5  /  DBili      /  AST  20  /  ALT  24  /  AlkPhos  83      Creatinine Trend: 0.94 <--, 0.86 <--, 1.07 <--, 1.13 <--                        15.0   10.08 )-----------( 338      ( 2021 07:40 )             43.5     Urine Studies:  Urinalysis Basic - ( 2021 12:32 )    Color: Light Yellow / Appearance: Clear / S.013 / pH:   Gluc:  / Ketone: Small  / Bili: Negative / Urobili: <2 mg/dL   Blood:  / Protein: 100 mg/dL / Nitrite: Negative   Leuk Esterase: Negative / RBC: 10 /HPF / WBC 1 /HPF   Sq Epi:  / Non Sq Epi: 1 /HPF / Bacteria: Negative      Creatinine, Random Urine: 51 mg/dL ( @ 16:02)  Osmolality, Random Urine: 465 mosm/kg ( @ 16:00)  Sodium, Random Urine: 104 mmol/L ( @ 12:32)  Potassium, Random Urine: 25.1 mmol/L ( @ 12:32)  Chloride, Random Urine: 90 mmol/L ( @ 12:32)      RADIOLOGY & ADDITIONAL STUDIES:                 No

## 2024-09-30 ENCOUNTER — NON-APPOINTMENT (OUTPATIENT)
Age: 54
End: 2024-09-30

## 2024-10-01 NOTE — ED ADULT NURSE NOTE - CAS DISCH CONDITION
Spoke to patient for pre-call to confirm scheduled Colonoscopy and patient verbalized understanding of the following:       Date & arrival time of procedure(s) verified 10/8/24, 12:00 PM.  Location of procedure(s) [address]  verified.  NPO status reinforced. Ok to continue clear liquids until 9:00 AM.   Patient denies use of blood thinners, GLP-1 medications, and weight loss medications.  Patient confirmed receipt of prep instructions and Rx prep.  Instructions provided to patient via [facility].  Patient confirmed ride home after procedure if procedure requires anesthesia.   Pre-call screening questionnaire reviewed and completed with patient.   Appointment details are tentative, including check-in time.  If the patient begins taking any blood thinning medications, injectable weight loss/diabetes medications (other than insulin), or Adipex (phentermine) patient was instructed to contact the endoscopy scheduling department as soon as possible.  Patient was advised to call the endoscopy scheduling department if any questions or concerns arise.     [facility] Scheduling Department       Stable

## 2024-11-13 NOTE — ED ADULT NURSE NOTE - NS ED NOTE  TALK SOMEONE YN
Diagnosis, treatment and and course discussed. Potential side effects discussed. Return if there is worsening or persistence of symptoms.     No nsaids  Use a brace and tylenol   no

## 2025-05-19 NOTE — PATIENT PROFILE ADULT - HAVE YOU EXPERIENCED VIOLENCE OR A TRAUMATIC EVENT?
65-year-old male with PMH of DLBCL treated with HDMTX x3 cycles and RCHOP x6 cycles with relapse treated with R/CTX and then Ang-R2 x4 cycles. 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 Today is day -1.    Conditioning Regimen: RI Bu/Flu/Cy/TBI  Day -6 (5/14) to Day -5 (5/15): Busulfan 130 mg/m2 IV administered over 3 hours for 2 doses -> avoid APAP and azole antifungals for 48 hours post busulfan (until 5/20)  Day -6 (5/14) to Day -2 (5/18) Fludarabine 30 mg/m2 IV administered over 30 minutes for 5 doses  Moving up by 1 hour every day to ensure 48 hours between final dose and CTP infusion  Day -3 (5/17) to Day -2 (5/18) Cyclophosphamide 14.5 mg/k2 IV administered over 1 hour for 2 doses  Day -1 (5/19)  cGy x1    GVHD ppx: CAST  Cyclophosphamide 50 mg/kg IV daily on Day +3 (5/23) and Day +4 (5/24).   Abatacept IV 10 mg/kg on days +5 (5/25), +14 (6/3), +28 (6/17), and +56 (7/15).  Patient will start tacrolimus 0.02 mg/kg IV on Sunday, 5/25 (day +5) - please order a one time trough on Wednesday, 5/28 (day +8) and one time trough on Saturday, 5/31 along with troughs every Tuesday to start on 6/3. Goal trough is 8-12 ng/mL. Please ensure trough is drawn peripherally.    Antimicrobial ppx.:  Started antimicrobial (levofloxacin 500 mg PO QD), and PO vanco 125 mg BID once ANC <1000 (5/17) & will continue until ANC >500 for 3 consecutive days. Will start antifungal (posaconazole 300 mg PO QD) on day 0 (5/20) and continue until day +75. Will also plan to start GCSF on day +7 (5/27) & stop once ANC >1500 for two days.     Evelyne Murcia, PharmD, BCOP  Stem Cell Transplant Clinical Pharmacy Specialist  Available via Microsoft Teams no

## 2025-07-09 ENCOUNTER — RESULT REVIEW (OUTPATIENT)
Age: 55
End: 2025-07-09

## 2025-07-25 NOTE — ED ADULT TRIAGE NOTE - HEIGHT IN CM
Mom called and left a message looking for results of monitor placed at visit. Please call mom  back.   [phone]